# Patient Record
Sex: FEMALE | Race: BLACK OR AFRICAN AMERICAN | Employment: OTHER | URBAN - METROPOLITAN AREA
[De-identification: names, ages, dates, MRNs, and addresses within clinical notes are randomized per-mention and may not be internally consistent; named-entity substitution may affect disease eponyms.]

---

## 2017-01-27 DIAGNOSIS — Z74.09 IMPAIRED FUNCTIONAL MOBILITY, BALANCE, GAIT, AND ENDURANCE: ICD-10-CM

## 2017-01-27 DIAGNOSIS — Z91.81 RISK FOR FALLS: ICD-10-CM

## 2017-01-27 DIAGNOSIS — R26.9 GAIT ABNORMALITY: Primary | ICD-10-CM

## 2017-02-03 DIAGNOSIS — I10 ESSENTIAL HYPERTENSION WITH GOAL BLOOD PRESSURE LESS THAN 130/85: ICD-10-CM

## 2017-02-03 DIAGNOSIS — E78.2 MIXED HYPERLIPIDEMIA: ICD-10-CM

## 2017-02-06 RX ORDER — METOPROLOL TARTRATE 100 MG/1
TABLET ORAL
Qty: 90 TAB | Refills: 1 | Status: SHIPPED | OUTPATIENT
Start: 2017-02-06 | End: 2017-08-14 | Stop reason: SDUPTHER

## 2017-02-06 RX ORDER — FUROSEMIDE 20 MG/1
TABLET ORAL
Qty: 90 TAB | Refills: 1 | Status: SHIPPED | OUTPATIENT
Start: 2017-02-06 | End: 2017-08-14 | Stop reason: SDUPTHER

## 2017-02-06 RX ORDER — SIMVASTATIN 40 MG/1
TABLET, FILM COATED ORAL
Qty: 90 TAB | Refills: 1 | Status: SHIPPED | OUTPATIENT
Start: 2017-02-06 | End: 2017-12-05 | Stop reason: SDUPTHER

## 2017-02-10 ENCOUNTER — TELEPHONE (OUTPATIENT)
Dept: INTERNAL MEDICINE CLINIC | Age: 82
End: 2017-02-10

## 2017-02-10 DIAGNOSIS — E11.9 TYPE 2 DIABETES MELLITUS WITHOUT COMPLICATION, WITHOUT LONG-TERM CURRENT USE OF INSULIN (HCC): Primary | ICD-10-CM

## 2017-03-17 DIAGNOSIS — M17.0 PRIMARY OSTEOARTHRITIS OF BOTH KNEES: ICD-10-CM

## 2017-03-17 DIAGNOSIS — E11.9 TYPE 2 DIABETES MELLITUS WITHOUT COMPLICATION (HCC): ICD-10-CM

## 2017-03-17 RX ORDER — MELOXICAM 15 MG/1
TABLET ORAL
Qty: 90 TAB | Refills: 0 | Status: SHIPPED | OUTPATIENT
Start: 2017-03-17 | End: 2017-06-20 | Stop reason: SDUPTHER

## 2017-03-20 RX ORDER — SITAGLIPTIN AND METFORMIN HYDROCHLORIDE 50; 1000 MG/1; MG/1
TABLET, FILM COATED ORAL
Qty: 180 TAB | Refills: 0 | Status: SHIPPED | OUTPATIENT
Start: 2017-03-20 | End: 2017-08-25 | Stop reason: SDUPTHER

## 2017-05-02 DIAGNOSIS — F32.89 OTHER DEPRESSION: ICD-10-CM

## 2017-05-03 RX ORDER — PAROXETINE HYDROCHLORIDE 20 MG/1
TABLET, FILM COATED ORAL
Qty: 90 TAB | Refills: 0 | Status: SHIPPED | OUTPATIENT
Start: 2017-05-03 | End: 2017-10-02 | Stop reason: SDUPTHER

## 2017-05-09 ENCOUNTER — OFFICE VISIT (OUTPATIENT)
Dept: INTERNAL MEDICINE CLINIC | Age: 82
End: 2017-05-09

## 2017-05-09 VITALS
BODY MASS INDEX: 31.39 KG/M2 | RESPIRATION RATE: 20 BRPM | SYSTOLIC BLOOD PRESSURE: 148 MMHG | HEART RATE: 75 BPM | OXYGEN SATURATION: 96 % | HEIGHT: 67 IN | DIASTOLIC BLOOD PRESSURE: 73 MMHG | TEMPERATURE: 96.9 F | WEIGHT: 200 LBS

## 2017-05-09 DIAGNOSIS — F32.A DEPRESSION, UNSPECIFIED DEPRESSION TYPE: ICD-10-CM

## 2017-05-09 DIAGNOSIS — I10 ESSENTIAL HYPERTENSION: ICD-10-CM

## 2017-05-09 DIAGNOSIS — E11.9 TYPE 2 DIABETES MELLITUS WITHOUT COMPLICATION, WITHOUT LONG-TERM CURRENT USE OF INSULIN (HCC): Primary | ICD-10-CM

## 2017-05-09 DIAGNOSIS — I87.2 CHRONIC VENOUS INSUFFICIENCY: ICD-10-CM

## 2017-05-09 DIAGNOSIS — H61.23 EXCESSIVE CERUMEN IN BOTH EAR CANALS: ICD-10-CM

## 2017-05-09 DIAGNOSIS — E78.2 MIXED HYPERLIPIDEMIA: ICD-10-CM

## 2017-05-09 RX ORDER — DICYCLOMINE HYDROCHLORIDE 10 MG/1
10 CAPSULE ORAL 3 TIMES DAILY
COMMUNITY
Start: 2017-04-25 | End: 2018-03-09

## 2017-05-09 RX ORDER — MONTELUKAST SODIUM 4 MG/1
1 TABLET, CHEWABLE ORAL 2 TIMES DAILY
COMMUNITY
Start: 2017-04-25 | End: 2018-03-09

## 2017-05-09 NOTE — LETTER
5/16/2017 9:09 AM 
 
Ms. [facility] [address] [address] Dear [facility]: Please find your most recent results below. Resulted Orders CBC W/O DIFF Result Value Ref Range WBC 7.6 3.4 - 10.8 x10E3/uL  
 RBC 3.82 3.77 - 5.28 x10E6/uL HGB 10.8 (L) 11.1 - 15.9 g/dL HCT 35.0 34.0 - 46.6 % MCV 92 79 - 97 fL  
 MCH 28.3 26.6 - 33.0 pg  
 MCHC 30.9 (L) 31.5 - 35.7 g/dL  
 RDW 15.7 (H) 12.3 - 15.4 % PLATELET 667 529 - 264 x10E3/uL Narrative Performed at:  [address]  [id number] : Renate To MD, Phone:  [phone] METABOLIC PANEL, COMPREHENSIVE Result Value Ref Range Glucose 61 (L) 65 - 99 mg/dL BUN 15 8 - 27 mg/dL Creatinine 1.10 (H) 0.57 - 1.00 mg/dL GFR est non-AA 48 (L) >59 mL/min/1.73 GFR est AA 55 (L) >59 mL/min/1.73  
 BUN/Creatinine ratio 14 12 - 28 Sodium 145 (H) 134 - 144 mmol/L Potassium 4.6 3.5 - 5.2 mmol/L Chloride 101 96 - 106 mmol/L  
 CO2 22 18 - 29 mmol/L Calcium 9.3 8.7 - 10.3 mg/dL Protein, total 7.2 6.0 - 8.5 g/dL Albumin 4.4 3.5 - 4.8 g/dL GLOBULIN, TOTAL 2.8 1.5 - 4.5 g/dL A-G Ratio 1.6 1.2 - 2.2 Bilirubin, total <0.2 0.0 - 1.2 mg/dL Alk. phosphatase 49 39 - 117 IU/L  
 AST (SGOT) 18 0 - 40 IU/L  
 ALT (SGPT) 8 0 - 32 IU/L Narrative Performed at:  [address]  [id number] : Renate To MD, Phone:  [phone] TSH 3RD GENERATION Result Value Ref Range TSH 0.658 0.450 - 4.500 uIU/mL Narrative Performed at:  [address]  [id number] : Renate To MD, Phone:  [phone] MICROALBUMIN, UR, RAND W/ MICROALBUMIN/CREA RATIO Result Value Ref Range Creatinine, urine 103.1 Not Estab. mg/dL Microalbumin, urine 12.9 Not Estab. ug/mL Microalb/Creat ratio (ug/mg creat.) 12.5 0.0 - 30.0 mg/g creat Narrative Performed at:  [address]  [id number] : Stephani Olmedo MD, Phone:  [phone] CKD REPORT Result Value Ref Range Interpretation Note Comment:  
   Supplement report is available. Narrative Performed at:  [address] [place] [address]  [id number] : Ghazala Baxter PhD, Phone:  [phone] DIABETES PATIENT EDUCATION Result Value Ref Range PDF Image Not applicable Narrative Performed at:  [address] [place] [address]  [id number] : Ghazala Baxter PhD, Phone:  [phone] RECOMMENDATIONS: 
None. Keep up the good work! Please call me if you have any questions: [phone] Sincerely, Cristobal Hartmann NP

## 2017-05-09 NOTE — MR AVS SNAPSHOT
Visit Information Date & Time Provider Department Dept. Phone Encounter #  
 5/9/2017  1:00 PM Rashel Dillon, [address] [facility] Internal Medicine [phone] [id number] Upcoming Health Maintenance Date Due DTaP/Tdap/Td series (1 - Tdap) 8/13/1958 ZOSTER VACCINE AGE 60> 8/13/1997 HEMOGLOBIN A1C Q6M 12/10/2016 MICROALBUMIN Q1 2/13/2017 FOOT EXAM Q1 6/6/2017 MEDICARE YEARLY EXAM 6/7/2017 LIPID PANEL Q1 6/10/2017 EYE EXAM RETINAL OR DILATED Q1 7/25/2017 INFLUENZA AGE 9 TO ADULT 8/1/2017 Pneumococcal 65+ Low/Medium Risk (2 of 2 - PPSV23) 11/28/2017 GLAUCOMA SCREENING Q2Y 7/25/2018 COLONOSCOPY 4/17/2025 Allergies as of 5/9/2017  Review Complete On: 5/9/2017 By: Rashel Dillon NP No Known Allergies Current Immunizations  Reviewed on 11/28/2016 Name Date Influenza Vaccine 11/28/2016 Pneumococcal Conjugate (PCV-13) 11/28/2016 Not reviewed this visit You Were Diagnosed With   
  
 Codes Comments Type 2 diabetes mellitus without complication, without long-term current use of insulin (HCC)    -  Primary ICD-10-CM: E11.9 ICD-9-CM: 250.00 Essential hypertension     ICD-10-CM: I10 
ICD-9-CM: 401.9 Vitals BP Pulse Temp Resp Height(growth percentile) Weight(growth percentile) 148/73 75 96.9 °F (36.1 °C) (Oral) 20 5' 7\" (1.702 m) 200 lb (90.7 kg) SpO2 BMI OB Status Smoking Status 96% 31.32 kg/m2 Hysterectomy Never Smoker BMI and BSA Data Body Mass Index Body Surface Area  
 31.32 kg/m 2 2.07 m 2 Preferred Pharmacy Pharmacy Name Phone [address] [facility], [address] [address] [place] [address] [address] Your Updated Medication List  
  
   
This list is accurate as of: 5/9/17  1:52 PM.  Always use your most recent med list.  
  
  
  
  
 aspirin 81 mg chewable tablet Take 81 mg by mouth daily. colestipol 1 gram tablet Commonly known as:  COLESTID  
 Take 1 g by mouth two (2) times a day. dicyclomine 10 mg capsule Commonly known as:  BENTYL 10 mg three (3) times daily. furosemide 20 mg tablet Commonly known as:  LASIX Take 1 tablet by mouth  daily HumaLOG 100 unit/mL injection Generic drug:  insulin lispro 5 Units by SubCUTAneous route. Indications: sliding scale JANUMET 50-1,000 mg per tablet Generic drug:  SITagliptin-metFORMIN Take 1 tablet by mouth two  times daily with meals  
  
 meloxicam 15 mg tablet Commonly known as:  MOBIC Take 1 tablet by mouth  daily  
  
 metoprolol tartrate 100 mg IR tablet Commonly known as:  LOPRESSOR Take 1 tablet by mouth  daily OTHER Compression stockings knee thigh,large. please measure pt's calf PARoxetine 20 mg tablet Commonly known as:  PAXIL Take 1 tablet by mouth  every night at bedtime  
  
 raNITIdine 150 mg tablet Commonly known as:  ZANTAC Take 1 Tab by mouth nightly. simethicone 80 mg chewable tablet Commonly known as:  Laymon Captain Take 1 Tab by mouth two (2) times daily as needed for Flatulence. simvastatin 40 mg tablet Commonly known as:  ZOCOR Take 1 tablet by mouth  nightly VITAMIN D3 1,000 unit Cap Generic drug:  cholecalciferol Take 1,000 Units by mouth daily. * Dale Mering Commonly known as:  ULTRA-LIGHT ROLLATOR  
1 Device by Does Not Apply route daily as needed. * Dale Mering Commonly known as:  ULTRA-LIGHT ROLLATOR  
1 Units by Does Not Apply route daily as needed. * Notice: This list has 2 medication(s) that are the same as other medications prescribed for you. Read the directions carefully, and ask your doctor or other care provider to review them with you. We Performed the Following CBC W/O DIFF [57089 CPT(R)] METABOLIC PANEL, COMPREHENSIVE [45526 CPT(R)] MICROALBUMIN, UR, RAND W/ MICROALBUMIN/CREA RATIO [id number] CPT(R)] TSH 3RD GENERATION [33449 CPT(R)] Please provide this summary of care documentation to your next provider. Your primary care clinician is listed as Roberts Spatz. If you have any questions after today's visit, please call [phone].

## 2017-05-09 NOTE — PROGRESS NOTES
HISTORY OF PRESENT ILLNESS  Mehul Rhodes is a 80 y.o. female. This is a patient of Dr. Trevor Schwartz who presents today for follow-up. The patient's past medical history includes diabetes. She is followed by endocrinology every 6 months and states blood sugar has been under control. The patient is also followed by Dr. Bennett Baker related to IBS. She is taking Bentyl and Colestid, which have helped, she states. She describes regular bowel movements. The patient is generally feeling well at the time of today's visit. She denies chest pain, shortness of breath, dizziness, and GI/ problems at this time. She does describe some decreased hearing and occasional ringing in ears. Visit Vitals    /73    Pulse 75    Temp 96.9 °F (36.1 °C) (Oral)    Resp 20    Ht 5' 7\" (1.702 m)    Wt 200 lb (90.7 kg)    SpO2 96%    BMI 31.32 kg/m2     HPI    Review of Systems   Constitutional: Negative for chills and fever. HENT: Positive for hearing loss and tinnitus. Negative for congestion and ear pain. Eyes: Negative for blurred vision. Respiratory: Negative for cough, shortness of breath and wheezing. Cardiovascular: Negative for chest pain, palpitations and leg swelling. Gastrointestinal: Negative for abdominal pain, constipation and diarrhea. Genitourinary: Negative. Musculoskeletal: Negative. Skin: Negative. Neurological: Negative for dizziness and headaches. Endo/Heme/Allergies: Negative. Psychiatric/Behavioral: Negative. Physical Exam   Constitutional: She is oriented to person, place, and time. She appears well-developed and well-nourished. No distress. HENT:   Head: Normocephalic and atraumatic. Mouth/Throat: Oropharynx is clear and moist.   Cerumen noted in bilateral ear canals- partially blocking TM   Eyes: Conjunctivae are normal.   Neck: Neck supple. Cardiovascular: Normal rate, regular rhythm, normal heart sounds and intact distal pulses.     Pulmonary/Chest: Effort normal and breath sounds normal. No respiratory distress. She has no wheezes. She has no rales. Abdominal: Soft. Bowel sounds are normal. She exhibits no distension. There is no tenderness. Musculoskeletal: She exhibits no edema. Neurological: She is alert and oriented to person, place, and time. Skin: Skin is warm and dry. Psychiatric: She has a normal mood and affect. Her behavior is normal.   Nursing note and vitals reviewed. ASSESSMENT and PLAN    ICD-10-CM ICD-9-CM    1. Type 2 diabetes mellitus without complication, without long-term current use of insulin (HCC) E11.9 250.00 Will order  MICROALBUMIN, UR, RAND W/ MICROALBUMIN/CREA RATIO  Continue to follow with endocrinology   2. Essential hypertension I10 401.9 Stable. BP in office 148/73  Will order  CBC W/O DIFF      METABOLIC PANEL, COMPREHENSIVE      TSH 3RD GENERATION   3. Mixed hyperlipidemia E78.2 272.2 Taking Simvastatin 40 mg nightly   4. Chronic venous insufficiency I87.2 459.81 Taking Lasix 20 mg daily   5. Depression, unspecified depression type F32.9 311 Taking Paxil 20 mg nightly   6. Excessive cerumen in both ear canals H61.23 380.4 Ear lavage in office- patient tolerated well. Lab results and schedule of future lab studies reviewed with patient  Reviewed diet, exercise and weight control  Reviewed medications and side effects in detail  Patient encouraged to call or return to office if symptoms do not improve or worsen. Reviewed plan of care with patient who acknowledges understanding and agrees. Follow-up with Dr. Violet Flores in 6 months, or sooner as needed.

## 2017-05-09 NOTE — PROGRESS NOTES
Chief Complaint   Patient presents with    Follow Up Chronic Condition     Reviewed record  In preparation for visit and have obtained necessary documentation. 1. Have you been to the ER, urgent care clinic since your last visit? Hospitalized since your last visit? No    2. Have you seen or consulted any other health care providers outside of the [address] [place] [address] since your last visit? Include any pap smears or colon screening. No   Patient is accompanied by her daughter, Robbie Salguero.     Used 2 patient I. D. 's

## 2017-05-10 LAB
ALBUMIN SERPL-MCNC: 4.4 G/DL (ref 3.5–4.8)
ALBUMIN/CREAT UR: 12.5 MG/G CREAT (ref 0–30)
ALBUMIN/GLOB SERPL: 1.6 {RATIO} (ref 1.2–2.2)
ALP SERPL-CCNC: 49 IU/L (ref 39–117)
ALT SERPL-CCNC: 8 IU/L (ref 0–32)
AST SERPL-CCNC: 18 IU/L (ref 0–40)
BILIRUB SERPL-MCNC: <0.2 MG/DL (ref 0–1.2)
BUN SERPL-MCNC: 15 MG/DL (ref 8–27)
BUN/CREAT SERPL: 14 (ref 12–28)
CALCIUM SERPL-MCNC: 9.3 MG/DL (ref 8.7–10.3)
CHLORIDE SERPL-SCNC: 101 MMOL/L (ref 96–106)
CO2 SERPL-SCNC: 22 MMOL/L (ref 18–29)
CREAT SERPL-MCNC: 1.1 MG/DL (ref 0.57–1)
CREAT UR-MCNC: 103.1 MG/DL
ERYTHROCYTE [DISTWIDTH] IN BLOOD BY AUTOMATED COUNT: 15.7 % (ref 12.3–15.4)
GLOBULIN SER CALC-MCNC: 2.8 G/DL (ref 1.5–4.5)
GLUCOSE SERPL-MCNC: 61 MG/DL (ref 65–99)
HCT VFR BLD AUTO: 35 % (ref 34–46.6)
HGB BLD-MCNC: 10.8 G/DL (ref 11.1–15.9)
INTERPRETATION: NORMAL
Lab: NORMAL
MCH RBC QN AUTO: 28.3 PG (ref 26.6–33)
MCHC RBC AUTO-ENTMCNC: 30.9 G/DL (ref 31.5–35.7)
MCV RBC AUTO: 92 FL (ref 79–97)
MICROALBUMIN UR-MCNC: 12.9 UG/ML
PLATELET # BLD AUTO: 266 X10E3/UL (ref 150–379)
POTASSIUM SERPL-SCNC: 4.6 MMOL/L (ref 3.5–5.2)
PROT SERPL-MCNC: 7.2 G/DL (ref 6–8.5)
RBC # BLD AUTO: 3.82 X10E6/UL (ref 3.77–5.28)
SODIUM SERPL-SCNC: 145 MMOL/L (ref 134–144)
TSH SERPL DL<=0.005 MIU/L-ACNC: 0.66 UIU/ML (ref 0.45–4.5)
WBC # BLD AUTO: 7.6 X10E3/UL (ref 3.4–10.8)

## 2017-05-22 ENCOUNTER — TELEPHONE (OUTPATIENT)
Dept: INTERNAL MEDICINE CLINIC | Age: 82
End: 2017-05-22

## 2017-05-22 NOTE — TELEPHONE ENCOUNTER
Patient called to see what she needed to do if anything to get her disposable pampers at a whole sale facility or if they have to be bought at a drugstore

## 2017-05-23 NOTE — TELEPHONE ENCOUNTER
Call placed to pt and asked if there was anyway that her insurance would pay for adult undergarments, writer advised to contact her insurance to see what steps that insurance requires them to take, voiced understanding

## 2017-06-20 DIAGNOSIS — M17.0 PRIMARY OSTEOARTHRITIS OF BOTH KNEES: ICD-10-CM

## 2017-06-20 RX ORDER — MELOXICAM 15 MG/1
TABLET ORAL
Qty: 90 TAB | Refills: 1 | Status: SHIPPED | OUTPATIENT
Start: 2017-06-20 | End: 2017-12-05 | Stop reason: SDUPTHER

## 2017-08-14 ENCOUNTER — PATIENT OUTREACH (OUTPATIENT)
Dept: INTERNAL MEDICINE CLINIC | Age: 82
End: 2017-08-14

## 2017-08-14 DIAGNOSIS — I10 ESSENTIAL HYPERTENSION WITH GOAL BLOOD PRESSURE LESS THAN 130/85: ICD-10-CM

## 2017-08-14 NOTE — PROGRESS NOTES
E-mail received from virtual team member in regards to scheduling patient for AWV prior to September 30th. Several attempts to contact made by Letart Amiigo with no success. Writer is attempt contact and/or let Letart Buzz Referrals [facility] know if comes into contact as the list is reportable for the Medical Group. Email was forwarded to manager Corinna Rueda to arrange for pt to be contacted and scheduled for AWV with Shemar Trent NP if pt willing to do so. Typically pt sees PCP every 6 months in May and November. Pt also sees endocrinology for DM every 6 months and GI for IBS.

## 2017-08-15 RX ORDER — METOPROLOL TARTRATE 100 MG/1
TABLET ORAL
Qty: 90 TAB | Refills: 1 | Status: SHIPPED | OUTPATIENT
Start: 2017-08-15 | End: 2018-01-23 | Stop reason: SDUPTHER

## 2017-08-15 RX ORDER — FUROSEMIDE 20 MG/1
TABLET ORAL
Qty: 90 TAB | Refills: 1 | Status: SHIPPED | OUTPATIENT
Start: 2017-08-15 | End: 2017-11-09 | Stop reason: SDUPTHER

## 2017-08-25 DIAGNOSIS — E11.9 TYPE 2 DIABETES MELLITUS WITHOUT COMPLICATION (HCC): ICD-10-CM

## 2017-08-28 RX ORDER — SITAGLIPTIN AND METFORMIN HYDROCHLORIDE 50; 1000 MG/1; MG/1
TABLET, FILM COATED ORAL
Qty: 180 TAB | Refills: 5 | Status: SHIPPED | OUTPATIENT
Start: 2017-08-28 | End: 2020-12-14 | Stop reason: SDUPTHER

## 2017-10-02 DIAGNOSIS — F32.89 OTHER DEPRESSION: ICD-10-CM

## 2017-10-02 RX ORDER — PAROXETINE HYDROCHLORIDE 20 MG/1
TABLET, FILM COATED ORAL
Qty: 90 TAB | Refills: 1 | Status: SHIPPED | OUTPATIENT
Start: 2017-10-02 | End: 2018-01-23 | Stop reason: SDUPTHER

## 2017-11-09 ENCOUNTER — OFFICE VISIT (OUTPATIENT)
Dept: INTERNAL MEDICINE CLINIC | Age: 82
End: 2017-11-09

## 2017-11-09 VITALS
SYSTOLIC BLOOD PRESSURE: 166 MMHG | HEART RATE: 71 BPM | TEMPERATURE: 96.7 F | DIASTOLIC BLOOD PRESSURE: 71 MMHG | WEIGHT: 193 LBS | OXYGEN SATURATION: 97 % | RESPIRATION RATE: 20 BRPM | BODY MASS INDEX: 30.29 KG/M2 | HEIGHT: 67 IN

## 2017-11-09 DIAGNOSIS — Z00.00 MEDICARE ANNUAL WELLNESS VISIT, SUBSEQUENT: ICD-10-CM

## 2017-11-09 DIAGNOSIS — I10 ESSENTIAL HYPERTENSION: ICD-10-CM

## 2017-11-09 DIAGNOSIS — R26.9 GAIT DIFFICULTY: ICD-10-CM

## 2017-11-09 DIAGNOSIS — Z23 ENCOUNTER FOR IMMUNIZATION: ICD-10-CM

## 2017-11-09 DIAGNOSIS — E11.9 TYPE 2 DIABETES MELLITUS WITHOUT COMPLICATION, WITHOUT LONG-TERM CURRENT USE OF INSULIN (HCC): Primary | ICD-10-CM

## 2017-11-09 DIAGNOSIS — Z86.73 H/O: STROKE: ICD-10-CM

## 2017-11-09 RX ORDER — FUROSEMIDE 20 MG/1
TABLET ORAL
Qty: 45 TAB | Refills: 2 | Status: ON HOLD | OUTPATIENT
Start: 2017-11-09 | End: 2018-06-12

## 2017-11-09 NOTE — PATIENT INSTRUCTIONS
Schedule of Personalized Health Plan  (Provide Copy to Patient)  The best way to stay healthy is to live a healthy lifestyle. A healthy lifestyle includes regular exercise, eating a well-balanced diet, keeping a healthy weight and not smoking. Regular physical exams and screening tests are another important way to take care of yourself. Preventive exams provided by health care providers can find health problems early when treatment works best and can keep you from getting certain diseases or illnesses. Preventive services include exams, lab tests, screenings, shots, monitoring and information to help you take care of your own health. All people over 65 should have a pneumonia shot. Pneumonia shots are usually only needed once in a lifetime unless your doctor decides differently. Up to date    All people over 72 should have a yearly flu shot.will give. People over 65 are at medium to high risk for Hepatitis B. Three shots are needed for complete protection. In addition to your physical exam, some screening tests are recommended:    Bone mass measurement (dexa scan) is recommended every two years  Diabetes Mellitus screening is recommended every year. Glaucoma is an eye disease caused by high pressure in the eye. An eye exam is recommended every year. Cardiovascular screening tests that check your cholesterol and other blood fat (lipid) levels are recommended every five years. Colorectal Cancer screening tests help to find pre-cancerous polyps (growths in the colon) so they can be removed before they turn into cancer. Tests ordered for screening depend on your personal and family history risk factors. Screening for Breast Cancer is recommended yearly with a mammogram.N/A    Screening for Cervical Cancer is recommended every two years (annually for certain risk factors, such as previous history of STD or abnormal PAP in past 7 years), with a Pelvic Exam with PAP. N/A    Here is a list of your current Health Maintenance items with a due date:  Health Maintenance   Topic Date Due    DTaP/Tdap/Td series (1 - Tdap) 08/13/1958    ZOSTER VACCINE AGE 60>  06/13/1997    FOOT EXAM Q1  06/06/2017    EYE EXAM RETINAL OR DILATED Q1  07/25/2017    Influenza Age 5 to Adult  08/01/2017    Pneumococcal 65+ Low/Medium Risk (2 of 2 - PPSV23) 11/28/2017    HEMOGLOBIN A1C Q6M  02/16/2018    MICROALBUMIN Q1  05/09/2018    GLAUCOMA SCREENING Q2Y  07/25/2018    LIPID PANEL Q1  08/16/2018    MEDICARE YEARLY EXAM  11/10/2018    COLONOSCOPY  04/17/2025    OSTEOPOROSIS SCREENING (DEXA)  Completed          Pneumococcal Polysaccharide Vaccine: What You Need to Know  Why get vaccinated? Vaccination can protect older adults (and some children and younger adults) from pneumococcal disease. Pneumococcal disease is caused by bacteria that can spread from person to person through close contact. It can cause ear infections, and it can also lead to more serious infections of the:  · Lungs (pneumonia),  · Blood (bacteremia), and  · Covering of the brain and spinal cord (meningitis). Meningitis can cause deafness and brain damage, and it can be fatal.  Anyone can get pneumococcal disease, but children under 3years of age, people with certain medical conditions, adults over 72years of age, and cigarette smokers are at the highest risk. About 18,000 older adults die each year from pneumococcal disease in the [place]. Treatment of pneumococcal infections with penicillin and other drugs used to be more effective. But some strains of the disease have become resistant to these drugs. This makes prevention of the disease, through vaccination, even more important. Pneumococcal polysaccharide vaccine (PPSV23)  Pneumococcal polysaccharide vaccine (PPSV23) protects against 23 types of pneumococcal bacteria. It will not prevent all pneumococcal disease.   PPSV23 is recommended for:  · All adults 72years of age and older,  · Anyone 2 through 59years of age with certain long-term health problems,  · Anyone 2 through 59years of age with a weakened immune system,  · Adults 23 through 59years of age who smoke cigarettes or have asthma. Most people need only one dose of PPSV. A second dose is recommended for certain high-risk groups. People 72 and older should get a dose even if they have gotten one or more doses of the vaccine before they turned 65. Your healthcare provider can give you more information about these recommendations. Most healthy adults develop protection within 2 to 3 weeks of getting the shot. Some people should not get this vaccine  · Anyone who has had a life-threatening allergic reaction to PPSV should not get another dose. · Anyone who has a severe allergy to any component of PPSV should not receive it. Tell your provider if you have any severe allergies. · Anyone who is moderately or severely ill when the shot is scheduled may be asked to wait until they recover before getting the vaccine. Someone with a mild illness can usually be vaccinated. · Children less than 3years of age should not receive this vaccine. · There is no evidence that PPSV is harmful to either a pregnant woman or to her fetus. However, as a precaution, women who need the vaccine should be vaccinated before becoming pregnant, if possible. Risks of a vaccine reaction  With any medicine, including vaccines, there is a chance of side effects. These are usually mild and go away on their own, but serious reactions are also possible. About half of people who get PPSV have mild side effects, such as redness or pain where the shot is given, which go away within about two days. Less than 1 out of 100 people develop a fever, muscle aches, or more severe local reactions. Problems that could happen after any vaccine:  · People sometimes faint after a medical procedure, including vaccination.  Sitting or lying down for about 15 minutes can help prevent fainting, and injuries caused by a fall. Tell your doctor if you feel dizzy, or have vision changes or ringing in the ears. · Some people get severe pain in the shoulder and have difficulty moving the arm where a shot was given. This happens very rarely. · Any medication can cause a severe allergic reaction. Such reactions from a vaccine are very rare, estimated at about 1 in a million doses, and would happen within a few minutes to a few hours after the vaccination. As with any medicine, there is a very remote chance of a vaccine causing a serious injury or death. The safety of vaccines is always being monitored. For more information, visit: www.cdc.gov/vaccinesafety/  What if there is a serious reaction? What should I look for? Look for anything that concerns you, such as signs of a severe allergic reaction, very high fever, or unusual behavior. Signs of a severe allergic reaction can include hives, swelling of the face and throat, difficulty breathing, a fast heartbeat, dizziness, and weakness. These would usually start a few minutes to a few hours after the vaccination. What should I do? If you think it is a severe allergic reaction or other emergency that can't wait, call 9-1-1 or get to the nearest hospital. Otherwise, call your doctor. Afterward, the reaction should be reported to the Vaccine Adverse Event Reporting System (VAERS). Your doctor might file this report, or you can do it yourself through the VAERS web site at www.vaers. hhs.gov, or by calling 9-217.612.5306. VAERS does not give medical advice. How can I learn more? · Ask your doctor. He or she can give you the vaccine package insert or suggest other sources of information. · Call your local or state health department.   · Contact the Centers for Disease Control and Prevention (CDC):  ¨ Call 5-299.638.4368 (1-800-CDC-INFO) or  ¨ Visit CDC's website at www.cdc.gov/vaccines  Vaccine Information Statement  PPSV Vaccine  (04/24/2015)  Department of Health and Human Services  Centers for Disease Control and Prevention  Many Vaccine Information Statements are available in Khmer and other languages. See www.immunize.org/vis. Hojas de información Sobre Vacunas están disponibles en español y en muchos otros idiomas. Visite Sonia.si. Care instructions adapted under license by [employer] (which disclaims liability or warranty for this information). If you have questions about a medical condition or this instruction, always ask your healthcare professional. [address] any warranty or liability for your use of this information.

## 2017-11-09 NOTE — PROGRESS NOTES
Maryan Carey is a [de-identified] y.o. female  who presents for routine immunizations. She denies any symptoms , reactions or allergies that would exclude them from being immunized today. Risks and adverse reactions were discussed and the VIS was given to them. All questions were addressed. She was observed for 10 min post injection. There were no reactions observed.     Melisa Husain LPN

## 2017-11-09 NOTE — MR AVS SNAPSHOT
Visit Information Date & Time Provider Department Dept. Phone Encounter #  
 11/9/2017  2:45 PM Sami Thorpe, [address] [facility] Internal Medicine [phone] [id number] Follow-up Instructions Return in about 4 months (around 3/9/2018). Upcoming Health Maintenance Date Due DTaP/Tdap/Td series (1 - Tdap) 8/13/1958 ZOSTER VACCINE AGE 60> 6/13/1997 EYE EXAM RETINAL OR DILATED Q1 7/25/2017 Influenza Age 5 to Adult 8/1/2017 Pneumococcal 65+ Low/Medium Risk (2 of 2 - PPSV23) 11/28/2017 HEMOGLOBIN A1C Q6M 2/16/2018 MICROALBUMIN Q1 5/9/2018 GLAUCOMA SCREENING Q2Y 7/25/2018 LIPID PANEL Q1 8/16/2018 FOOT EXAM Q1 11/9/2018 MEDICARE YEARLY EXAM 11/10/2018 COLONOSCOPY 4/17/2025 Allergies as of 11/9/2017  Review Complete On: 11/9/2017 By: Sami Thorpe MD  
 No Known Allergies Current Immunizations  Reviewed on 11/9/2017 Name Date Influenza High Dose Vaccine PF 11/9/2017 Influenza Vaccine 11/28/2016 Pneumococcal Conjugate (PCV-13) 11/28/2016 Pneumococcal Polysaccharide (PPSV-23)  Incomplete Reviewed by Sami Thorpe MD on 11/9/2017 at  3:32 PM  
 Reviewed by Jimmy Spencer LPN on 67/9/6042 at  4:05 PM  
You Were Diagnosed With   
  
 Codes Comments Type 2 diabetes mellitus without complication, without long-term current use of insulin (HCC)    -  Primary ICD-10-CM: E11.9 ICD-9-CM: 250.00 Essential hypertension     ICD-10-CM: I10 
ICD-9-CM: 401.9 Gait difficulty     ICD-10-CM: R26.9 ICD-9-CM: 781.2 H/O: stroke     ICD-10-CM: Z86.73 
ICD-9-CM: V12.54 Encounter for immunization     ICD-10-CM: V42 ICD-9-CM: V03.89 Medicare annual wellness visit, subsequent     ICD-10-CM: Z00.00 ICD-9-CM: V70.0 Vitals BP Pulse Temp Resp Height(growth percentile) Weight(growth percentile) 166/71 (BP 1 Location: Left arm, BP Patient Position: Sitting) 71 96.7 °F (35.9 °C) (Oral) 20 5' 7\" (1.702 m) 193 lb (87.5 kg) SpO2 BMI OB Status Smoking Status 97% 30.23 kg/m2 Hysterectomy Never Smoker BMI and BSA Data Body Mass Index Body Surface Area  
 30.23 kg/m 2 2.03 m 2 Preferred Pharmacy Pharmacy Name Phone [facility] [address] [place], [place] - [address] [place] [address] Raeann Snow [phone] Your Updated Medication List  
  
   
This list is accurate as of: 11/9/17  4:09 PM.  Always use your most recent med list.  
  
  
  
  
 aspirin 81 mg chewable tablet Take 81 mg by mouth daily. colestipol 1 gram tablet Commonly known as:  COLESTID Take 1 g by mouth two (2) times a day. dicyclomine 10 mg capsule Commonly known as:  BENTYL 10 mg three (3) times daily. furosemide 20 mg tablet Commonly known as:  LASIX  
1 tab po QOD HumaLOG 100 unit/mL injection Generic drug:  insulin lispro 5 Units by SubCUTAneous route. Indications: sliding scale JANUMET 50-1,000 mg per tablet Generic drug:  SITagliptin-metFORMIN Take 1 tablet by mouth two  times daily with meals  
  
 meloxicam 15 mg tablet Commonly known as:  MOBIC Take 1 tablet by mouth  daily  
  
 metoprolol tartrate 100 mg IR tablet Commonly known as:  LOPRESSOR Take 1 tablet by mouth  daily OTHER Compression stockings knee thigh,large. please measure pt's calf PARoxetine 20 mg tablet Commonly known as:  PAXIL TAKE 1 TABLET BY MOUTH  EVERY NIGHT AT BEDTIME  
  
 raNITIdine 150 mg tablet Commonly known as:  ZANTAC Take 1 Tab by mouth nightly. simethicone 80 mg chewable tablet Commonly known as:  Dorlene Joselyn Take 1 Tab by mouth two (2) times daily as needed for Flatulence. simvastatin 40 mg tablet Commonly known as:  ZOCOR Take 1 tablet by mouth  nightly  
  
 varicella zoster vacine live 19,400 unit/0.65 mL Susr injection Commonly known as:  ZOSTAVAX  
1 Vial by SubCUTAneous route once for 1 dose. VITAMIN D3 1,000 unit Cap Generic drug:  cholecalciferol Take 1,000 Units by mouth daily. * Pollyann Ou Commonly known as:  ULTRA-LIGHT ROLLATOR  
1 Device by Does Not Apply route daily as needed. * Pollyann Ou Commonly known as:  ULTRA-LIGHT ROLLATOR  
1 Units by Does Not Apply route daily as needed. * Notice: This list has 2 medication(s) that are the same as other medications prescribed for you. Read the directions carefully, and ask your doctor or other care provider to review them with you. Prescriptions Sent to Pharmacy Refills  
 furosemide (LASIX) 20 mg tablet 2 Si tab po QOD Class: Normal  
 Pharmacy: [facility], [address] Ph #: [phone]  
 varicella zoster vacine live (ZOSTAVAX) 19,400 unit/0.65 mL susr injection 0 Si Vial by SubCUTAneous route once for 1 dose. Class: Normal  
 Pharmacy: [address], [address] [place] [address] Ph #: [phone] Route: SubCUTAneous We Performed the Following HEMOGLOBIN A1C WITH EAG [37574 CPT(R)] INFLUENZA VIRUS VACCINE, HIGH DOSE SEASONAL, PRESERVATIVE FREE [82884 CPT(R)] METABOLIC PANEL, COMPREHENSIVE [18019 CPT(R)] MICROALBUMIN, UR, RAND C9125659 CPT(R)] PNEUMOCOCCAL POLYSACCHARIDE VACCINE, 23-VALENT, ADULT OR IMMUNOSUPPRESSED PT DOSE, [36799 CPT(R)] REFERRAL TO PHYSICAL THERAPY [LUZ18 Custom] Comments: For gait and balance therapy Follow-up Instructions Return in about 4 months (around 3/9/2018). Referral Information Referral ID Referred By Referred To  
  
 [id number] [employer] Not Available Visits Status Start Date End Date 1 Open 17 If your referral has a status of pending review or denied, additional information will be sent to support the outcome of this decision. Patient Instructions Schedule of Personalized Health Plan (Provide Copy to Patient) The best way to stay healthy is to live a healthy lifestyle. A healthy lifestyle includes regular exercise, eating a well-balanced diet, keeping a healthy weight and not smoking. Regular physical exams and screening tests are another important way to take care of yourself. Preventive exams provided by health care providers can find health problems early when treatment works best and can keep you from getting certain diseases or illnesses. Preventive services include exams, lab tests, screenings, shots, monitoring and information to help you take care of your own health. All people over 65 should have a pneumonia shot. Pneumonia shots are usually only needed once in a lifetime unless your doctor decides differently. Up to date All people over 65 should have a yearly flu shot.will give. People over 65 are at medium to high risk for Hepatitis B. Three shots are needed for complete protection. In addition to your physical exam, some screening tests are recommended: 
 
Bone mass measurement (dexa scan) is recommended every two years Diabetes Mellitus screening is recommended every year. Glaucoma is an eye disease caused by high pressure in the eye. An eye exam is recommended every year. Cardiovascular screening tests that check your cholesterol and other blood fat (lipid) levels are recommended every five years. Colorectal Cancer screening tests help to find pre-cancerous polyps (growths in the colon) so they can be removed before they turn into cancer. Tests ordered for screening depend on your personal and family history risk factors. Screening for Breast Cancer is recommended yearly with a mammogram.N/A Screening for Cervical Cancer is recommended every two years (annually for certain risk factors, such as previous history of STD or abnormal PAP in past 7 years), with a Pelvic Exam with PAP. N/A Here is a list of your current Health Maintenance items with a due date: Health Maintenance Topic Date Due  
 DTaP/Tdap/Td series (1 - Tdap) 08/13/1958  ZOSTER VACCINE AGE 60>  06/13/1997  
 FOOT EXAM Q1  06/06/2017  
 EYE EXAM RETINAL OR DILATED Q1  07/25/2017  Influenza Age 5 to Adult  08/01/2017  Pneumococcal 65+ Low/Medium Risk (2 of 2 - PPSV23) 11/28/2017  
 HEMOGLOBIN A1C Q6M  02/16/2018  MICROALBUMIN Q1  05/09/2018  GLAUCOMA SCREENING Q2Y  07/25/2018  LIPID PANEL Q1  08/16/2018  MEDICARE YEARLY EXAM  11/10/2018  COLONOSCOPY  04/17/2025  
 OSTEOPOROSIS SCREENING (DEXA)  Completed Please provide this summary of care documentation to your next provider. Your primary care clinician is listed as Angelina Gonzalez. If you have any questions after today's visit, please call [phone].

## 2017-11-09 NOTE — PROGRESS NOTES
HISTORY OF PRESENT ILLNESS  Reyes Black is a [de-identified] y.o. female here for follow up. She is seen for diabetes. Anupam Frey He reports medication compliance: compliant all of the time. Diabetic diet compliance: compliant most of the time. Home glucose monitoring: is not performed. Further diabetic ROS: no polyuria or polydipsia, no chest pain, dyspnea or TIA's, no numbness, tingling or pain in extremities, no hypoglycemia. Lab review: labs reviewed, I note that glycosylated hemoglobin normallabs reviewed and discussed with patient. Eye exam:up to date. Has chronic venous insufficiency. Using Lasix every day. Lost 7 pounds weight. Has no heart failure no shortness of breath or orthopnea. has HTN and elevated lipid. on meds. no chest pain or palpitation. Reports gait weakness and balance problem. Need physical therapy. Need pneumonia and flu shot and shingles shot. Here for Medicare wellness visit. Has no living will. Anxiety   Pertinent negatives include no chest pain. Immunization/Injection   Pertinent negatives include no chest pain. Diabetes   Pertinent negatives include no chest pain. Hypertension    Associated symptoms include anxiety. Pertinent negatives include no chest pain, no blurred vision and no nausea. Follow-up   Pertinent negatives include no chest pain. Weight Loss   Pertinent negatives include no chest pain. Review of Systems   Constitutional: Negative. Negative for chills and fever. HENT: Negative. Eyes: Negative. Negative for blurred vision and double vision. Respiratory: Negative. Cardiovascular: Negative. Negative for chest pain. Gastrointestinal: Negative for heartburn and nausea. Musculoskeletal: Positive for back pain. Negative for falls. Skin: Negative. Neurological: Negative. Psychiatric/Behavioral: Negative. Physical Exam   Constitutional: She appears well-developed and well-nourished. No distress. Neck: Normal range of motion.  Neck supple. No JVD present. No thyromegaly present. Cardiovascular: Normal rate, regular rhythm, normal heart sounds and intact distal pulses. Pulmonary/Chest: Effort normal. No respiratory distress. She has no wheezes. She has rales. Musculoskeletal: She exhibits edema. She exhibits no tenderness. ch leg edema. Diabetic foot exam:     Left: Reflexes 2+     Vibratory sensation normal    Proprioception normal   Sharp/dull discrimination normal    Filament test normal sensation with micro filament   Pulse DP: 1+ (weak)   Pulse PT: 1+ (weak)   Deformities: None  Right: Reflexes 2+   Vibratory sensation normal   Proprioception normal   Sharp/dull discrimination normal   Filament test normal sensation with micro filament   Pulse DP: 1+ (weak)   Pulse PT: 1+ (weak)   Deformities: None   Psychiatric: She has a normal mood and affect. Her behavior is normal.       ASSESSMENT and PLAN  Diagnoses and all orders for this visit:    1. Type 2 diabetes mellitus without complication, without long-term current use of insulin (HCC)    Stable. On Janumet twice a day. Last A1c was normal.    Will check,    -     METABOLIC PANEL, COMPREHENSIVE  -     HEMOGLOBIN A1C WITH EAG  -     MICROALBUMIN, UR, RAND    2. Essential hypertension    Stable on current regimen. Will check,  -     METABOLIC PANEL, COMPREHENSIVE    3. Gait difficulty  Use quad cane. Need more gait and balance therapy. Will refer her,  -     REFERRAL TO PHYSICAL THERAPY    4. H/O: stroke  -     REFERRAL TO PHYSICAL THERAPY    5. Encounter for immunization  -     INFLUENZA VIRUS VACCINE, HIGH DOSE SEASONAL, PRESERVATIVE FREE  -     PNEUMOCOCCAL POLYSACCHARIDE VACCINE, 23-VALENT, ADULT OR IMMUNOSUPPRESSED PT DOSE,  -     varicella zoster vacine live (ZOSTAVAX) 19,400 unit/0.65 mL susr injection; 1 Vial by SubCUTAneous route once for 1 dose. 6. Medicare annual wellness visit, subsequent    7. Chronic venous insufficiency  Need to be on stockings.   Leg elevation. Lost 7 pounds weight. Will change to,  -     furosemide (LASIX) 20 mg tablet; 1 tab po QOD        Discussed expected course/resolution/complications of diagnosis in detail with patient. Medication risks/benefits/costs/interactions/alternatives discussed with patient. Pt was given an after visit summary which includes diagnoses, current medications & vitals. Pt expressed understanding with the diagnosis and plan.

## 2017-11-09 NOTE — PROGRESS NOTES
Virgen Alberto is a [de-identified] y.o. female and presents for annual Medicare Wellness Visit. Problem List: Reviewed with patient and discussed risk factors. Patient Active Problem List   Diagnosis Code    DM (diabetes mellitus) ([facility] [address] E11.9    Mixed hyperlipidemia E78.2    Chronic venous insufficiency I87.2    Depression F32.9    Stomach ulcer K25.9    Diverticula of colon K57.30    Advance care planning Z71.89    Essential hypertension I10    H/O: stroke Z86.73       Current medical providers:  Patient Care Team:  Julisa Raya MD as PCP - General (Internal Medicine)  Julisa Raya MD (Internal Medicine)  Jhonny Mas MD as Consulting Provider (Internal Medicine)  Jeff Woods DPM (Podiatry)  Vidhi Teague MD (Gastroenterology)  Lawyer Key DO (Ophthalmology)  Genaro Rose RN as Nurse Navigator (Internal Medicine)    PSH: Reviewed with patient  Past Surgical History:   Procedure Laterality Date    HX CARPAL TUNNEL RELEASE  1990    HX CATARACT REMOVAL      bilateral    HX HYSTERECTOMY  1986    HX HYSTERECTOMY      HX ORTHOPAEDIC      HX ORTHOPAEDIC      carpel tunnel left    HX ORTHOPAEDIC      left foot heel spur    HX REFRACTIVE SURGERY  2006        SH: Reviewed with patient  Social History   Substance Use Topics    Smoking status: Never Smoker    Smokeless tobacco: Never Used    Alcohol use No       FH: Reviewed with patient  History reviewed. No pertinent family history.     Medications/Allergies: Reviewed with patient  Current Outpatient Prescriptions on File Prior to Visit   Medication Sig Dispense Refill    PARoxetine (PAXIL) 20 mg tablet TAKE 1 TABLET BY MOUTH  EVERY NIGHT AT BEDTIME 90 Tab 1    JANUMET 50-1,000 mg per tablet Take 1 tablet by mouth two  times daily with meals 180 Tab 5    metoprolol tartrate (LOPRESSOR) 100 mg IR tablet Take 1 tablet by mouth  daily 90 Tab 1    meloxicam (MOBIC) 15 mg tablet Take 1 tablet by mouth  daily 90 Tab 1    simvastatin (ZOCOR) 40 mg tablet Take 1 tablet by mouth  nightly 90 Tab 1    Walker (ULTRA-LIGHT ROLLATOR) misc 1 Units by Does Not Apply route daily as needed. 1 Each 0    Walker (ULTRA-LIGHT ROLLATOR) misc 1 Device by Does Not Apply route daily as needed. 1 Each 0    cholecalciferol (VITAMIN D3) 1,000 unit cap Take 1,000 Units by mouth daily.  simethicone (MYLICON) 80 mg chewable tablet Take 1 Tab by mouth two (2) times daily as needed for Flatulence. 60 Tab 0    ranitidine (ZANTAC) 150 mg tablet Take 1 Tab by mouth nightly. (Patient taking differently: Take 1 Tab by mouth two (2) times a day.) 90 Tab 3    insulin lispro (HUMALOG) 100 unit/mL injection 5 Units by SubCUTAneous route. Indications: sliding scale      aspirin 81 mg chewable tablet Take 81 mg by mouth daily.  OTHER Compression stockings knee thigh,large. please measure pt's calf 2 Each o    dicyclomine (BENTYL) 10 mg capsule 10 mg three (3) times daily.  colestipol (COLESTID) 1 gram tablet Take 1 g by mouth two (2) times a day. No current facility-administered medications on file prior to visit. No Known Allergies    Objective:  Visit Vitals    /71 (BP 1 Location: Left arm, BP Patient Position: Sitting)    Pulse 71    Temp 96.7 °F (35.9 °C) (Oral)    Resp 20    Ht 5' 7\" (1.702 m)    Wt 193 lb (87.5 kg)    SpO2 97%    BMI 30.23 kg/m2    Body mass index is 30.23 kg/(m^2). Assessment of cognitive impairment: Alert and oriented x 3    Depression Screen:   PHQ over the last two weeks 6/6/2016   Little interest or pleasure in doing things Not at all   Feeling down, depressed or hopeless Not at all   Total Score PHQ 2 0       Fall Risk Assessment:    Fall Risk Assessment, last 12 mths 11/9/2017   Able to walk? Yes   Fall in past 12 months? Yes   Fall with injury?  No   Number of falls in past 12 months 1   Fall Risk Score 1       Functional Ability:   Does the patient exhibit a steady gait?  no   How long did it take the patient to get up and walk from a sitting position? 1 min   Is the patient self reliant?  (ie can do own laundry, meals, household chores)  yes     Does the patient handle his/her own medications? yes     Does the patient handle his/her own money? yes     Is the patients home safe (ie good lighting, handrails on stairs and bath, etc.)? yes     Did you notice or did patient express any hearing difficulties? no     Did you notice or did patient express any vision difficulties?   no     Were distance and reading eye charts used? no       Advance Care Planning:   Patient was offered the opportunity to discuss advance care planning:  yes     Does patient have an Advance Directive:  no   If no, did you provide information on Caring Connections? yes       Plan:      Orders Placed This Encounter    INFLUENZA VIRUS VACCINE, HIGH DOSE SEASONAL, PRESERVATIVE FREE    PNEUMOCOCCAL POLYSACCHARIDE VACCINE, 23-VALENT, ADULT OR IMMUNOSUPPRESSED PT DOSE,    METABOLIC PANEL, COMPREHENSIVE    HEMOGLOBIN A1C WITH EAG    MICROALBUMIN, UR, RAND    REFERRAL TO PHYSICAL THERAPY    furosemide (LASIX) 20 mg tablet    varicella zoster vacine live (ZOSTAVAX) 19,400 unit/0.65 mL susr injection       Health Maintenance   Topic Date Due    DTaP/Tdap/Td series (1 - Tdap) 08/13/1958    ZOSTER VACCINE AGE 60>  06/13/1997    FOOT EXAM Q1  06/06/2017    EYE EXAM RETINAL OR DILATED Q1  07/25/2017    Influenza Age 9 to Adult  08/01/2017    Pneumococcal 65+ Low/Medium Risk (2 of 2 - PPSV23) 11/28/2017    HEMOGLOBIN A1C Q6M  02/16/2018    MICROALBUMIN Q1  05/09/2018    GLAUCOMA SCREENING Q2Y  07/25/2018    LIPID PANEL Q1  08/16/2018    MEDICARE YEARLY EXAM  11/10/2018    COLONOSCOPY  04/17/2025    OSTEOPOROSIS SCREENING (DEXA)  Completed       *Patient verbalized understanding and agreement with the plan. A copy of the After Visit Summary with personalized health plan was given to the patient today.

## 2017-12-05 DIAGNOSIS — M17.0 PRIMARY OSTEOARTHRITIS OF BOTH KNEES: ICD-10-CM

## 2017-12-05 DIAGNOSIS — E78.2 MIXED HYPERLIPIDEMIA: ICD-10-CM

## 2017-12-05 RX ORDER — SIMVASTATIN 40 MG/1
TABLET, FILM COATED ORAL
Qty: 90 TAB | Refills: 1 | Status: SHIPPED | OUTPATIENT
Start: 2017-12-05 | End: 2018-07-30 | Stop reason: SDUPTHER

## 2017-12-05 RX ORDER — MELOXICAM 15 MG/1
TABLET ORAL
Qty: 90 TAB | Refills: 1 | Status: ON HOLD | OUTPATIENT
Start: 2017-12-05 | End: 2018-06-09 | Stop reason: SDUPTHER

## 2017-12-12 ENCOUNTER — DOCUMENTATION ONLY (OUTPATIENT)
Dept: PHARMACY | Age: 82
End: 2017-12-12

## 2017-12-12 NOTE — PROGRESS NOTES
Pharmacy Note:  Medication Adherence and Education    Elza Noguera is a [de-identified] y.o. female who's pharmacy  was called today for a medication adherence check in due to concerns with possible medication non-adherence based on pharmacy claims data. Patient was flagged for possible non-adherence with the following medication: Janumet 50/1000mg. Patient was not contacted due to most recent medication refill being picked up. Last PCP visit:  11/09/17  Next PCP visit:  Not scheduled as of now    Current refill history for Janumet   Last refill: 10/17/17 (90 day supply)    Take Away:  Per pharmacy claims data patient is adherent with medication. Patient last picked up medication refill on 10/17/17 and it was for a 90 day supply. Follow-up: Patient will continue to be monitored for the next couple of months and medication refills will be verified to make sure patient is staying adherent.     Thank you,  Latrice Becker CPhT

## 2018-01-23 DIAGNOSIS — F32.89 OTHER DEPRESSION: ICD-10-CM

## 2018-01-23 DIAGNOSIS — I10 ESSENTIAL HYPERTENSION WITH GOAL BLOOD PRESSURE LESS THAN 130/85: ICD-10-CM

## 2018-01-23 RX ORDER — PAROXETINE HYDROCHLORIDE 20 MG/1
TABLET, FILM COATED ORAL
Qty: 90 TAB | Refills: 1 | Status: SHIPPED | OUTPATIENT
Start: 2018-01-23 | End: 2018-08-29 | Stop reason: SDUPTHER

## 2018-01-23 RX ORDER — METOPROLOL TARTRATE 100 MG/1
TABLET ORAL
Qty: 90 TAB | Refills: 1 | Status: SHIPPED | OUTPATIENT
Start: 2018-01-23 | End: 2018-03-09 | Stop reason: SDUPTHER

## 2018-01-26 DIAGNOSIS — K29.70 GASTRITIS, PRESENCE OF BLEEDING UNSPECIFIED, UNSPECIFIED CHRONICITY, UNSPECIFIED GASTRITIS TYPE: ICD-10-CM

## 2018-01-29 RX ORDER — RANITIDINE 150 MG/1
150 TABLET, FILM COATED ORAL
Qty: 90 TAB | Refills: 3 | Status: SHIPPED | OUTPATIENT
Start: 2018-01-29 | End: 2019-03-03 | Stop reason: SDUPTHER

## 2018-03-09 ENCOUNTER — OFFICE VISIT (OUTPATIENT)
Dept: INTERNAL MEDICINE CLINIC | Age: 83
End: 2018-03-09

## 2018-03-09 VITALS
RESPIRATION RATE: 19 BRPM | OXYGEN SATURATION: 98 % | BODY MASS INDEX: 29.98 KG/M2 | DIASTOLIC BLOOD PRESSURE: 80 MMHG | HEIGHT: 67 IN | TEMPERATURE: 96.4 F | SYSTOLIC BLOOD PRESSURE: 160 MMHG | HEART RATE: 82 BPM | WEIGHT: 191 LBS

## 2018-03-09 DIAGNOSIS — I10 ESSENTIAL HYPERTENSION WITH GOAL BLOOD PRESSURE LESS THAN 130/85: Primary | ICD-10-CM

## 2018-03-09 DIAGNOSIS — E11.21 TYPE 2 DIABETES WITH NEPHROPATHY (HCC): ICD-10-CM

## 2018-03-09 RX ORDER — METOPROLOL TARTRATE 100 MG/1
100 TABLET ORAL 2 TIMES DAILY
Qty: 180 TAB | Refills: 1 | Status: SHIPPED | OUTPATIENT
Start: 2018-03-09 | End: 2018-08-29 | Stop reason: SDUPTHER

## 2018-03-09 NOTE — PROGRESS NOTES
Health Maintenance Due   Topic Date Due    DTaP/Tdap/Td series (1 - Tdap) 08/13/1958    ZOSTER VACCINE AGE 60>  06/13/1997    EYE EXAM RETINAL OR DILATED Q1  07/25/2017    HEMOGLOBIN A1C Q6M  02/16/2018       Chief Complaint   Patient presents with    Headache    Knee Pain     right    Hypertension    Diabetes       1. Have you been to the ER, urgent care clinic since your last visit? Hospitalized since your last visit? No    2. Have you seen or consulted any other health care providers outside of the [address] [place] [address] since your last visit? Include any pap smears or colon screening. No    3) Do you have an Advance Directive on file? no    4) Are you interested in receiving information on Advance Directives?  NO      Patient is accompanied by self I have received verbal consent from [facility] to discuss any/all medical information while they are present in the room

## 2018-03-09 NOTE — PATIENT INSTRUCTIONS
Learning About High Blood Pressure  What is high blood pressure? Blood pressure is a measure of how hard the blood pushes against the walls of your arteries. It's normal for blood pressure to go up and down throughout the day, but if it stays up, you have high blood pressure. Another name for high blood pressure is hypertension. Two numbers tell you your blood pressure. The first number is the systolic pressure. It shows how hard the blood pushes when your heart is pumping. The second number is the diastolic pressure. It shows how hard the blood pushes between heartbeats, when your heart is relaxed and filling with blood. A blood pressure of less than 120/80 (say \"120 over 80\") is ideal for an adult. High blood pressure is 140/90 or higher. You have high blood pressure if your top number is 140 or higher or your bottom number is 90 or higher, or both. Many people fall into the category in between, called prehypertension. People with prehypertension need to make lifestyle changes to bring their blood pressure down and help prevent or delay high blood pressure. What happens when you have high blood pressure? · Blood flows through your arteries with too much force. Over time, this damages the walls of your arteries. But you can't feel it. High blood pressure usually doesn't cause symptoms. · Fat and calcium start to build up in your arteries. This buildup is called plaque. Plaque makes your arteries narrower and stiffer. Blood can't flow through them as easily. · This lack of good blood flow starts to damage some of the organs in your body. This can lead to problems such as coronary artery disease and heart attack, heart failure, stroke, kidney failure, and eye damage. How can you prevent high blood pressure? · Stay at a healthy weight. · Try to limit how much sodium you eat to less than 2,300 milligrams (mg) a day.  If you limit your sodium to 1,500 mg a day, you can lower your blood pressure even more.  ¨ Buy foods that are labeled \"unsalted,\" \"sodium-free,\" or \"low-sodium. \" Foods labeled \"reduced-sodium\" and \"light sodium\" may still have too much sodium. ¨ Flavor your food with garlic, lemon juice, onion, vinegar, herbs, and spices instead of salt. Do not use soy sauce, steak sauce, onion salt, garlic salt, mustard, or ketchup on your food. ¨ Use less salt (or none) when recipes call for it. You can often use half the salt a recipe calls for without losing flavor. · Be physically active. Get at least 30 minutes of exercise on most days of the week. Walking is a good choice. You also may want to do other activities, such as running, swimming, cycling, or playing tennis or team sports. · Limit alcohol to 2 drinks a day for men and 1 drink a day for women. · Eat plenty of fruits, vegetables, and low-fat dairy products. Eat less saturated and total fats. How is high blood pressure treated? · Your doctor will suggest making lifestyle changes. For example, your doctor may ask you to eat healthy foods, quit smoking, lose extra weight, and be more active. · If lifestyle changes don't help enough or your blood pressure is very high, you will have to take medicine every day. Follow-up care is a key part of your treatment and safety. Be sure to make and go to all appointments, and call your doctor if you are having problems. It's also a good idea to know your test results and keep a list of the medicines you take. Where can you learn more? Go to http://karolina-thee.info/. Enter P501 in the search box to learn more about \"Learning About High Blood Pressure. \"  Current as of: September 21, 2016  Content Version: 11.4  © 0007-6606 [employer]. Care instructions adapted under license by [employer] (which disclaims liability or warranty for this information).  If you have questions about a medical condition or this instruction, always ask your healthcare professional. [employer]Bill, [facility] disclaims any warranty or liability for your use of this information. Acute High Blood Pressure: Care Instructions  Your Care Instructions    Acute high blood pressure is very high blood pressure. It's a serious problem. Very high blood pressure can damage your brain, heart, eyes, and kidneys. You may have been given medicines to lower your blood pressure. You may have gotten them as pills or through a needle in one of your veins. This is called an IV. And maybe you were given other medicines too. These can be needed when high blood pressure causes other problems. To keep your blood pressure at a lower level, you may need to make healthy lifestyle changes. And you will probably need to take medicines. Be sure to follow up with your doctor about your blood pressure and what you can do about it. Follow-up care is a key part of your treatment and safety. Be sure to make and go to all appointments, and call your doctor if you are having problems. It's also a good idea to know your test results and keep a list of the medicines you take. How can you care for yourself at home? · See your doctor as often as he or she recommends. This is to make sure your blood pressure is under control. You will probably go at least 2 times a year. But it may be more often at first.  · Take your blood pressure medicine exactly as prescribed. You may take one or more types. They include diuretics, beta-blockers, ACE inhibitors, calcium channel blockers, and angiotensin II receptor blockers. Call your doctor if you think you are having a problem with your medicine. · If you take blood pressure medicine, talk to your doctor before you take decongestants or anti-inflammatory medicine, such as ibuprofen. These can raise blood pressure. · Learn how to check your blood pressure at home. Check it often. · Ask your doctor if it's okay to drink alcohol.   · Talk to your doctor about lifestyle changes that can help blood pressure. These include being active and not smoking. When should you call for help? Call 911 anytime you think you may need emergency care. This may mean having symptoms that suggest that your blood pressure is causing a serious heart or blood vessel problem. Your blood pressure may be over 180/110. ? For example, call 911 if:  ? · You have symptoms of a heart attack. These may include:  ¨ Chest pain or pressure, or a strange feeling in the chest.  ¨ Sweating. ¨ Shortness of breath. ¨ Nausea or vomiting. ¨ Pain, pressure, or a strange feeling in the back, neck, jaw, or upper belly or in one or both shoulders or arms. ¨ Lightheadedness or sudden weakness. ¨ A fast or irregular heartbeat. ? · You have symptoms of a stroke. These may include:  ¨ Sudden numbness, tingling, weakness, or loss of movement in your face, arm, or leg, especially on only one side of your body. ¨ Sudden vision changes. ¨ Sudden trouble speaking. ¨ Sudden confusion or trouble understanding simple statements. ¨ Sudden problems with walking or balance. ¨ A sudden, severe headache that is different from past headaches. ? · You have severe back or belly pain. ?Do not wait until your blood pressure comes down on its own. Get help right away. ?Call your doctor now or seek immediate care if:  ? · Your blood pressure is much higher than normal (such as 180/110 or higher), but you don't have symptoms. ? · You think high blood pressure is causing symptoms, such as:  ¨ Severe headache. ¨ Blurry vision. ? Watch closely for changes in your health, and be sure to contact your doctor if:  ? · Your blood pressure measures 140/90 or higher at least 2 times. That means the top number is 140 or higher or the bottom number is 90 or higher, or both. ? · You think you may be having side effects from your blood pressure medicine. ? · Your blood pressure is usually normal, but it goes above normal at least 2 times.    Where can you learn more? Go to http://karolina-thee.info/. Enter D128 in the search box to learn more about \"Acute High Blood Pressure: Care Instructions. \"  Current as of: September 21, 2016  Content Version: 11.4  © 3237-8171 [employer]. Care instructions adapted under license by [employer] (which disclaims liability or warranty for this information). If you have questions about a medical condition or this instruction, always ask your healthcare professional. [address] any warranty or liability for your use of this information. Low Sodium Diet (2,000 Milligram): Care Instructions  Your Care Instructions    Too much sodium causes your body to hold on to extra water. This can raise your blood pressure and force your heart and kidneys to work harder. In very serious cases, this could cause you to be put in the hospital. It might even be life-threatening. By limiting sodium, you will feel better and lower your risk of serious problems. The most common source of sodium is salt. People get most of the salt in their diet from canned, prepared, and packaged foods. Fast food and restaurant meals also are very high in sodium. Your doctor will probably limit your sodium to less than 2,000 milligrams (mg) a day. This limit counts all the sodium in prepared and packaged foods and any salt you add to your food. Follow-up care is a key part of your treatment and safety. Be sure to make and go to all appointments, and call your doctor if you are having problems. It's also a good idea to know your test results and keep a list of the medicines you take. How can you care for yourself at home? Read food labels  · Read labels on cans and food packages. The labels tell you how much sodium is in each serving. Make sure that you look at the serving size. If you eat more than the serving size, you have eaten more sodium.   · Food labels also tell you the Percent Daily Value for sodium. Choose products with low Percent Daily Values for sodium. · Be aware that sodium can come in forms other than salt, including monosodium glutamate (MSG), sodium citrate, and sodium bicarbonate (baking soda). MSG is often added to Asian food. When you eat out, you can sometimes ask for food without MSG or added salt. Buy low-sodium foods  · Buy foods that are labeled \"unsalted\" (no salt added), \"sodium-free\" (less than 5 mg of sodium per serving), or \"low-sodium\" (less than 140 mg of sodium per serving). Foods labeled \"reduced-sodium\" and \"light sodium\" may still have too much sodium. Be sure to read the label to see how much sodium you are getting. · Buy fresh vegetables, or frozen vegetables without added sauces. Buy low-sodium versions of canned vegetables, soups, and other canned goods. Prepare low-sodium meals  · Cut back on the amount of salt you use in cooking. This will help you adjust to the taste. Do not add salt after cooking. One teaspoon of salt has about 2,300 mg of sodium. · Take the salt shaker off the table. · Flavor your food with garlic, lemon juice, onion, vinegar, herbs, and spices. Do not use soy sauce, lite soy sauce, steak sauce, onion salt, garlic salt, celery salt, mustard, or ketchup on your food. · Use low-sodium salad dressings, sauces, and ketchup. Or make your own salad dressings and sauces without adding salt. · Use less salt (or none) when recipes call for it. You can often use half the salt a recipe calls for without losing flavor. Other foods such as rice, pasta, and grains do not need added salt. · Rinse canned vegetables, and cook them in fresh water. This removes some-but not all-of the salt. · Avoid water that is naturally high in sodium or that has been treated with water softeners, which add sodium. Call your local water company to find out the sodium content of your water supply.  If you buy bottled water, read the label and choose a sodium-free brand. Avoid high-sodium foods  · Avoid eating:  ¨ Smoked, cured, salted, and canned meat, fish, and poultry. ¨ Ham, acosta, hot dogs, and luncheon meats. ¨ Regular, hard, and processed cheese and regular peanut butter. ¨ Crackers with salted tops, and other salted snack foods such as pretzels, chips, and salted popcorn. ¨ Frozen prepared meals, unless labeled low-sodium. ¨ Canned and dried soups, broths, and bouillon, unless labeled sodium-free or low-sodium. ¨ Canned vegetables, unless labeled sodium-free or low-sodium. ¨ Carry Smart fries, pizza, tacos, and other fast foods. ¨ Pickles, olives, ketchup, and other condiments, especially soy sauce, unless labeled sodium-free or low-sodium. Where can you learn more? Go to http://karolina-thee.info/. Enter K778 in the search box to learn more about \"Low Sodium Diet (2,000 Milligram): Care Instructions. \"  Current as of: May 12, 2017  Content Version: 11.4  © 3960-5402 [employer]. Care instructions adapted under license by [employer] (which disclaims liability or warranty for this information). If you have questions about a medical condition or this instruction, always ask your healthcare professional. [address] any warranty or liability for your use of this information.

## 2018-03-09 NOTE — MR AVS SNAPSHOT
[facility] 102 1400 [address] [place] [address] 
[phone] Patient: [facility] MRN: [id number] :1935 Visit Information Date & Time Provider Department Dept. Phone Encounter #  
 3/9/2018 11:40 AM Kal Davis NP [facility] [phone] [id number] Upcoming Health Maintenance Date Due DTaP/Tdap/Td series (1 - Tdap) 1956 ZOSTER VACCINE AGE 60> 1997 EYE EXAM RETINAL OR DILATED Q1 2017 HEMOGLOBIN A1C Q6M 2018 MICROALBUMIN Q1 2018 GLAUCOMA SCREENING Q2Y 2018 LIPID PANEL Q1 2018 FOOT EXAM Q1 2018 MEDICARE YEARLY EXAM 11/10/2018 COLONOSCOPY 2025 Allergies as of 3/9/2018  Review Complete On: 3/9/2018 By: Pushpa Marin LPN No Known Allergies Current Immunizations  Reviewed on 2017 Name Date Influenza High Dose Vaccine PF 2017 Influenza Vaccine 2016 Pneumococcal Conjugate (PCV-13) 2016 Pneumococcal Polysaccharide (PPSV-23) 2017 Not reviewed this visit You Were Diagnosed With   
  
 Codes Comments Essential hypertension with goal blood pressure less than 130/85     ICD-10-CM: I10 
ICD-9-CM: 401.9 Vitals BP Pulse Temp Resp Height(growth percentile) Weight(growth percentile) 160/80 (BP 1 Location: Right arm, BP Patient Position: Sitting) 82 96.4 °F (35.8 °C) (Oral) 19 5' 7\" (1.702 m) 191 lb (86.6 kg) SpO2 BMI OB Status Smoking Status 98% 29.91 kg/m2 Hysterectomy Never Smoker Vitals History BMI and BSA Data Body Mass Index Body Surface Area  
 29.91 kg/m 2 2.02 m 2 Preferred Pharmacy Pharmacy Name Phone 305 [facility], [address] [facility] [address] [place] [address] Your Updated Medication List  
  
   
This list is accurate as of 3/9/18 12:29 PM.  Always use your most recent med list.  
  
  
  
  
 APIDRA SOLOSTAR U-100 INSULIN 100 unit/mL pen Generic drug:  insulin glulisine U-100  
by SubCUTAneous route. aspirin 81 mg chewable tablet Take 81 mg by mouth daily. colestipol 1 gram tablet Commonly known as:  COLESTID Take 1 g by mouth two (2) times a day. dicyclomine 10 mg capsule Commonly known as:  BENTYL 10 mg three (3) times daily. furosemide 20 mg tablet Commonly known as:  LASIX  
1 tab po QOD HumaLOG U-100 Insulin 100 unit/mL injection Generic drug:  insulin lispro 5 Units by SubCUTAneous route. Indications: sliding scale JANUMET 50-1,000 mg per tablet Generic drug:  SITagliptin-metFORMIN Take 1 tablet by mouth two  times daily with meals  
  
 meloxicam 15 mg tablet Commonly known as:  MOBIC  
TAKE 1 TABLET BY MOUTH  DAILY  
  
 metoprolol tartrate 100 mg IR tablet Commonly known as:  LOPRESSOR Take 1 Tab by mouth two (2) times a day. OTHER Compression stockings knee thigh,large. please measure pt's calf PARoxetine 20 mg tablet Commonly known as:  PAXIL TAKE 1 TABLET BY MOUTH  EVERY NIGHT AT BEDTIME  
  
 raNITIdine 150 mg tablet Commonly known as:  ZANTAC Take 1 Tab by mouth nightly. simethicone 80 mg chewable tablet Commonly known as:  Theoplis Basque Take 1 Tab by mouth two (2) times daily as needed for Flatulence. simvastatin 40 mg tablet Commonly known as:  ZOCOR  
TAKE 1 TABLET BY MOUTH  NIGHTLY  
  
 VITAMIN D3 1,000 unit Cap Generic drug:  cholecalciferol Take 1,000 Units by mouth daily. * Andres Clutter Commonly known as:  ULTRA-LIGHT ROLLATOR  
1 Device by Does Not Apply route daily as needed. * Andres Clutter Commonly known as:  ULTRA-LIGHT ROLLATOR  
1 Units by Does Not Apply route daily as needed. * Notice: This list has 2 medication(s) that are the same as other medications prescribed for you.  Read the directions carefully, and ask your doctor or other care provider to review them with you. Prescriptions Sent to Pharmacy Refills  
 metoprolol tartrate (LOPRESSOR) 100 mg IR tablet 1 Sig: Take 1 Tab by mouth two (2) times a day. Class: Normal  
 Pharmacy: [address]  #: [phone] Route: Oral  
  
 Please provide this summary of care documentation to your next provider. Your primary care clinician is listed as Lakhwinder Ying. If you have any questions after today's visit, please call [phone].

## 2018-03-09 NOTE — PROGRESS NOTES
Subjective:     Chief Complaint   Patient presents with    Headache    Knee Pain     right    Hypertension    Diabetes       History of Present Illness    Kristal Gaytan is a [de-identified]y.o. year old female who is a patient of Dr. Miguel Joyner that presents today with her daughter related to elevated BP readings. She took her BP at home yesterday and it was 180/100. Repeat reading 30 min later was 159/90. Associated symptoms include headache. She denies any CP or SOB. She reports compliance with her Metoprolol 100 mg daily. No other new complaints today. She is followed by endocrinology for her DM. No BS readings over 200. No CP, SOB, GI, or  symptoms. NAD. Reviewed medications, recent lab work and imaging with patient. Pt reports compliance with medications. Current Outpatient Prescriptions on File Prior to Visit   Medication Sig Dispense Refill    raNITIdine (ZANTAC) 150 mg tablet Take 1 Tab by mouth nightly. 90 Tab 3    PARoxetine (PAXIL) 20 mg tablet TAKE 1 TABLET BY MOUTH  EVERY NIGHT AT BEDTIME 90 Tab 1    meloxicam (MOBIC) 15 mg tablet TAKE 1 TABLET BY MOUTH  DAILY 90 Tab 1    simvastatin (ZOCOR) 40 mg tablet TAKE 1 TABLET BY MOUTH  NIGHTLY 90 Tab 1    furosemide (LASIX) 20 mg tablet 1 tab po QOD 45 Tab 2    JANUMET 50-1,000 mg per tablet Take 1 tablet by mouth two  times daily with meals 180 Tab 5    Walker (ULTRA-LIGHT ROLLATOR) misc 1 Units by Does Not Apply route daily as needed. 1 Each 0    Walker (ULTRA-LIGHT ROLLATOR) misc 1 Device by Does Not Apply route daily as needed. 1 Each 0    cholecalciferol (VITAMIN D3) 1,000 unit cap Take 1,000 Units by mouth daily.  aspirin 81 mg chewable tablet Take 81 mg by mouth daily.  OTHER Compression stockings knee thigh,large. please measure pt's calf 2 Each o     No current facility-administered medications on file prior to visit.         No Known Allergies   Past Medical History:   Diagnosis Date    Arthritis     Chronic pain     Depression     [facility] ([facility] [address]     Hypercholesterolemia     Hypertension     Stroke ([facility]     TIA 10 yrs back    Stroke [facility])     2003      Past Surgical History:   Procedure Laterality Date    HX CARPAL TUNNEL RELEASE  1990    HX CATARACT REMOVAL      bilateral    HX HYSTERECTOMY  1986    HX HYSTERECTOMY      HX ORTHOPAEDIC      HX ORTHOPAEDIC      carpel tunnel left    HX ORTHOPAEDIC      left foot heel spur    HX REFRACTIVE SURGERY  2006      Social History   Substance Use Topics    Smoking status: Never Smoker    Smokeless tobacco: Never Used    Alcohol use No      No family history on file. Objective:     Vitals:    03/09/18 1206   BP: 160/80   Pulse: 82   Resp: 19   Temp: 96.4 °F (35.8 °C)   TempSrc: Oral   SpO2: 98%   Weight: 191 lb (86.6 kg)   Height: 5' 7\" (1.702 m)       Review of Systems   Constitutional: Negative. HENT: Negative. Eyes: Negative. Respiratory: Negative. Cardiovascular: Negative. Gastrointestinal: Negative. Genitourinary: Negative. Musculoskeletal: Negative. Skin: Negative. Neurological: Positive for headaches. Psychiatric/Behavioral: Negative. Physical Exam   Constitutional: She is oriented to person, place, and time. She appears well-developed and well-nourished. HENT:   Head: Normocephalic and atraumatic. Eyes: Conjunctivae and EOM are normal. Pupils are equal, round, and reactive to light. Neck: Normal range of motion. Neck supple. Cardiovascular: Normal rate, regular rhythm and normal heart sounds. Pulmonary/Chest: Effort normal and breath sounds normal. No respiratory distress. She has no wheezes. Abdominal: Soft. Bowel sounds are normal. She exhibits no distension. There is no tenderness. Musculoskeletal: Normal range of motion. She exhibits edema. She exhibits no tenderness or deformity. 1+ BLE edema. Neurological: She is alert and oriented to person, place, and time. Skin: Skin is warm and dry.  No rash noted. No erythema. No pallor. Psychiatric: She has a normal mood and affect. Her behavior is normal.   Nursing note and vitals reviewed. Assessment/ Plan:   Diagnoses and all orders for this visit:    1. Essential hypertension with goal blood pressure less than 130/85   Will order  -     metoprolol tartrate (LOPRESSOR) 100 mg IR tablet; Take 1 Tab by mouth two (2) times a day. Encourage the use of the DASH diet by eating vegetables, fruits, and whole grains. Including fat-free or low-fat dairy products, fish, poultry, beans, nuts, and vegetable oils. Limiting foods that are high in saturated fat, such as fatty meats, full-fat dairy products, and tropical oils such as coconut, palm kernel, and palm oils. Limiting sugar-sweetened beverages and sweets. When following the DASH eating plan, it is important to choose foods that are:  · Low in saturated and trans fats  · Rich in potassium, calcium, magnesium, fiber, and protein  · Lower in sodium  ·   2. Type 2 diabetes with nephropathy ([facility] [address]    Patient's plan of care has been reviewed with them. Patient and/or family have verbally conveyed their understanding and agreement of the patient's signs, symptoms, diagnosis, treatment and prognosis and additionally agree to follow up as recommended or return to [facility] should their condition change prior to follow-up. Discharge instructions have also been provided to the patient with some educational information regarding their diagnosis as well a list of reasons why they would want to return to the office prior to their follow-up appointment should their condition change. Follow-up with Dr. Irma Roman in 1 week for BP check.

## 2018-03-22 ENCOUNTER — OFFICE VISIT (OUTPATIENT)
Dept: INTERNAL MEDICINE CLINIC | Age: 83
End: 2018-03-22

## 2018-03-22 VITALS
HEIGHT: 67 IN | HEART RATE: 71 BPM | SYSTOLIC BLOOD PRESSURE: 144 MMHG | OXYGEN SATURATION: 98 % | BODY MASS INDEX: 29.19 KG/M2 | WEIGHT: 186 LBS | TEMPERATURE: 98.3 F | RESPIRATION RATE: 20 BRPM | DIASTOLIC BLOOD PRESSURE: 70 MMHG

## 2018-03-22 DIAGNOSIS — E11.9 TYPE 2 DIABETES MELLITUS WITHOUT COMPLICATION, WITHOUT LONG-TERM CURRENT USE OF INSULIN (HCC): ICD-10-CM

## 2018-03-22 DIAGNOSIS — K29.70 GASTRITIS WITHOUT BLEEDING, UNSPECIFIED CHRONICITY, UNSPECIFIED GASTRITIS TYPE: Primary | ICD-10-CM

## 2018-03-22 DIAGNOSIS — M19.019 SHOULDER ARTHRITIS: ICD-10-CM

## 2018-03-22 DIAGNOSIS — R41.3 MEMORY CHANGE: ICD-10-CM

## 2018-03-22 DIAGNOSIS — M47.896 OTHER OSTEOARTHRITIS OF SPINE, LUMBAR REGION: ICD-10-CM

## 2018-03-22 DIAGNOSIS — K25.9 GASTRIC ULCER, UNSPECIFIED CHRONICITY, UNSPECIFIED WHETHER GASTRIC ULCER HEMORRHAGE OR PERFORATION PRESENT: ICD-10-CM

## 2018-03-22 RX ORDER — HYDROCODONE BITARTRATE AND ACETAMINOPHEN 5; 325 MG/1; MG/1
TABLET ORAL
COMMUNITY
End: 2018-03-22 | Stop reason: SDUPTHER

## 2018-03-22 RX ORDER — ACETAMINOPHEN 325 MG/1
650 TABLET ORAL 2 TIMES DAILY
Qty: 120 TAB | Refills: 5 | Status: SHIPPED | OUTPATIENT
Start: 2018-03-22 | End: 2018-05-30 | Stop reason: SDUPTHER

## 2018-03-22 RX ORDER — HYDROCODONE BITARTRATE AND ACETAMINOPHEN 5; 325 MG/1; MG/1
1 TABLET ORAL
Qty: 30 TAB | Refills: 0 | Status: SHIPPED | OUTPATIENT
Start: 2018-03-22 | End: 2018-05-30 | Stop reason: SDUPTHER

## 2018-03-22 RX ORDER — ALUMINA, MAGNESIA, AND SIMETHICONE 2400; 2400; 240 MG/30ML; MG/30ML; MG/30ML
10 SUSPENSION ORAL
Qty: 300 ML | Refills: 2 | Status: SHIPPED | OUTPATIENT
Start: 2018-03-22 | End: 2018-06-04

## 2018-03-22 RX ORDER — LIDOCAINE 4 G/100G
PATCH TOPICAL
Qty: 30 PATCH | Refills: 3 | Status: SHIPPED | OUTPATIENT
Start: 2018-03-22 | End: 2018-06-04

## 2018-03-22 NOTE — MR AVS SNAPSHOT
[address] [place] [address] 
[phone] Patient: [facility] MRN: [id number] :1935 Visit Information Date & Time Provider Department Dept. Phone Encounter #  
 3/22/2018  3:00 PM Maxine Lee MD [facility] Internal Medicine [phone] [id number] Upcoming Health Maintenance Date Due DTaP/Tdap/Td series (1 - Tdap) 1956 ZOSTER VACCINE AGE 60> 1997 EYE EXAM RETINAL OR DILATED Q1 2017 HEMOGLOBIN A1C Q6M 2018 MICROALBUMIN Q1 2018 GLAUCOMA SCREENING Q2Y 2018 LIPID PANEL Q1 2018 FOOT EXAM Q1 2018 COLONOSCOPY 2025 Allergies as of 3/22/2018  Review Complete On: 3/22/2018 By: Maxine Lee MD  
 No Known Allergies Current Immunizations  Reviewed on 3/22/2018 Name Date Influenza High Dose Vaccine PF 2017 Influenza Vaccine 2016 Pneumococcal Conjugate (PCV-13) 2016 Pneumococcal Polysaccharide (PPSV-23) 2017 Reviewed by Maxine Lee MD on 3/22/2018 at  3:22 PM  
You Were Diagnosed With   
  
 Codes Comments Gastritis without bleeding, unspecified chronicity, unspecified gastritis type    -  Primary ICD-10-CM: K29.70 ICD-9-CM: 535.50 Gastric ulcer, unspecified chronicity, unspecified whether gastric ulcer hemorrhage or perforation present     ICD-10-CM: K25.9 ICD-9-CM: 531.90 Type 2 diabetes mellitus without complication, without long-term current use of insulin (HCC)     ICD-10-CM: E11.9 ICD-9-CM: 250.00 Other osteoarthritis of spine, lumbar region     ICD-10-CM: M47.896 ICD-9-CM: 721.3 Shoulder arthritis     ICD-10-CM: M19.019 
ICD-9-CM: 716.91 Memory change     ICD-10-CM: R41.3 ICD-9-CM: 780.93 Vitals BP Pulse Temp Resp Height(growth percentile) Weight(growth percentile)  144/70 (BP 1 Location: Left arm, BP Patient Position: Sitting) 71 98.3 °F (36.8 °C) (Oral) 20 5' 7\" (1.702 m) 186 lb (84.4 kg) SpO2 BMI OB Status Smoking Status 98% 29.13 kg/m2 Hysterectomy Never Smoker Vitals History BMI and BSA Data Body Mass Index Body Surface Area  
 29.13 kg/m 2 2 m 2 Preferred Pharmacy Pharmacy Name Phone [facility] [address] [facility], Aaron Maya [phone] Your Updated Medication List  
  
   
This list is accurate as of 3/22/18  3:24 PM.  Always use your most recent med list.  
  
  
  
  
 acetaminophen 325 mg tablet Commonly known as:  TYLENOL Take 2 Tabs by mouth two (2) times a day. aluminum & magnesium hydroxide-simethicone 400-400-40 mg/5 mL suspension Commonly known as:  COMFORT GEL EXTRA STRENGTH Take 10 mL by mouth three (3) times daily as needed for Indigestion. APIDRA SOLOSTAR U-100 INSULIN 100 unit/mL pen Generic drug:  insulin glulisine U-100  
by SubCUTAneous route. aspirin 81 mg chewable tablet Take 81 mg by mouth daily. furosemide 20 mg tablet Commonly known as:  LASIX  
1 tab po QOD HYDROcodone-acetaminophen 5-325 mg per tablet Commonly known as:  [facility] Take 1 Tab by mouth daily as needed for Pain. JANUMET 50-1,000 mg per tablet Generic drug:  SITagliptin-metFORMIN Take 1 tablet by mouth two  times daily with meals  
  
 lidocaine 4 % patch Commonly known as:  ASPERCREME Use 1 patch a  day  
  
 meloxicam 15 mg tablet Commonly known as:  MOBIC  
TAKE 1 TABLET BY MOUTH  DAILY  
  
 metoprolol tartrate 100 mg IR tablet Commonly known as:  LOPRESSOR Take 1 Tab by mouth two (2) times a day. OTHER Compression stockings knee thigh,large. please measure pt's calf PARoxetine 20 mg tablet Commonly known as:  PAXIL TAKE 1 TABLET BY MOUTH  EVERY NIGHT AT BEDTIME  
  
 raNITIdine 150 mg tablet Commonly known as:  ZANTAC Take 1 Tab by mouth nightly. simvastatin 40 mg tablet Commonly known as:  ZOCOR  
TAKE 1 TABLET BY MOUTH  NIGHTLY  
  
 VITAMIN D3 1,000 unit Cap Generic drug:  cholecalciferol Take 1,000 Units by mouth daily. * Ladan Sainte Marie Commonly known as:  ULTRA-LIGHT ROLLATOR  
1 Device by Does Not Apply route daily as needed. * Ladan Sainte [place] Commonly known as:  ULTRA-LIGHT ROLLATOR  
1 Units by Does Not Apply route daily as needed. * Notice: This list has 2 medication(s) that are the same as other medications prescribed for you. Read the directions carefully, and ask your doctor or other care provider to review them with you. Prescriptions Printed Refills HYDROcodone-acetaminophen (NORCO) 5-325 mg per tablet 0 Sig: Take 1 Tab by mouth daily as needed for Pain. Class: Print Route: Oral  
  
Prescriptions Sent to Pharmacy Refills  
 acetaminophen (TYLENOL) 325 mg tablet 5 Sig: Take 2 Tabs by mouth two (2) times a day. Class: Normal  
 Pharmacy: [facility] Jerry [address] Ph #: [phone] Route: Oral  
 lidocaine (ASPERCREME) 4 % patch 3 Sig: Use 1 patch a  day Class: Normal  
 Pharmacy: [address] [place] [address] Ph #: [phone]  
 aluminum & magnesium hydroxide-simethicone (COMFORT GEL EXTRA STRENGTH) 400-400-40 mg/5 mL suspension 2 Sig: Take 10 mL by mouth three (3) times daily as needed for Indigestion. Class: Normal  
 Pharmacy: [address] [place] [address] Ph #: [phone] Route: Oral  
  
We Performed the Following REFERRAL TO NEUROPSYCHOLOGY [OKT41 Custom] Referral Information Referral ID Referred By Referred To  
  
 [id number] [facility], [address] [place] [address] Ana Hurley [address] [facility], [address] [facility] Phone: [phone] Fax: [phone] Visits Status Start Date End Date 1 New Request 3/22/18 3/22/19 If your referral has a status of pending review or denied, additional information will be sent to support the outcome of this decision. Please provide this summary of care documentation to your next provider. Your primary care clinician is listed as Katherine Lui. If you have any questions after today's visit, please call [phone].

## 2018-03-22 NOTE — PROGRESS NOTES
HISTORY OF PRESENT ILLNESS  Veronica Wiley is a [de-identified] y.o. female here for follow up. Report epigastric pain and indigestion on and off. She is using his ranitidine, not helping her. No blood in the stool. She is seen for diabetes. Moon Trejo He reports medication compliance: compliant all of the time. Diabetic diet compliance: compliant most of the time. Home glucose monitoring: is not performed. Further diabetic ROS: no polyuria or polydipsia, no chest pain, dyspnea or TIA's, no numbness, tingling or pain in extremities, no hypoglycemia. Lab review: labs reviewed, I note that glycosylated hemoglobin normallabs reviewed and discussed with patient. Eye exam:up to date. Has seen endocrinologist recently, has all blood work done. Advised to obtain lab work report. Report lower back pain knee pain and shoulder pain almost every day. Using Tylenol and meloxicam, not helping her a lot. Previously she used hydrocodone which helped her sometimes. Asking to have it refilled. has HTN and elevated lipid. on meds. no chest pain or palpitation. Report memory changes. Would like to get it evaluated formally. Diabetes   Pertinent negatives include no chest pain. Hypertension    Associated symptoms include anxiety. Pertinent negatives include no chest pain, no blurred vision and no nausea. Memory Loss    Her past medical history is significant for hypertension. GI Problem   Pertinent negatives include no chest pain. Follow-up   Pertinent negatives include no chest pain. Review of Systems   Constitutional: Negative. Negative for chills and fever. HENT: Negative. Eyes: Negative. Negative for blurred vision and double vision. Respiratory: Negative. Cardiovascular: Negative. Negative for chest pain. Gastrointestinal: Negative for heartburn and nausea. Musculoskeletal: Positive for back pain and joint pain. Negative for falls. Skin: Negative. Neurological: Negative.     Psychiatric/Behavioral: Positive for memory loss. Physical Exam   Constitutional: She appears well-developed and well-nourished. No distress. Neck: Normal range of motion. Neck supple. No JVD present. No thyromegaly present. Cardiovascular: Normal rate, regular rhythm, normal heart sounds and intact distal pulses. Pulmonary/Chest: Effort normal. No respiratory distress. She has no wheezes. Musculoskeletal: She exhibits edema and tenderness. Non pitting edema. Right knee: Tender knee is present on the joint. Mild crepitus present. Range of motion mildly restricted. Lower back: Spine nontender. Paravertebral facet tenderness present. Range of motion restricted. Psychiatric: She has a normal mood and affect. Her behavior is normal.       ASSESSMENT and PLAN  Diagnoses and all orders for this visit:    1. Gastritis without bleeding, unspecified chronicity, unspecified gastritis type    On zantac, not controlling. Will call in,  -     aluminum & magnesium hydroxide-simethicone (COMFORT GEL EXTRA STRENGTH) 400-400-40 mg/5 mL suspension; Take 10 mL by mouth three (3) times daily as needed for Indigestion. 2. Gastric ulcer, unspecified chronicity, unspecified whether gastric ulcer hemorrhage or perforation present    On Zantac. Will give comfort gel. 3. Type 2 diabetes mellitus without complication, without long-term current use of insulin (HCC)  On Janumet and Apidra. Stable. Seeing endocrinologist few weeks back. Advised her to obtain lab work paper from her. 4. Other osteoarthritis of spine, lumbar region    Will give,  -     acetaminophen (TYLENOL) 325 mg tablet; Take 2 Tabs by mouth two (2) times a day. -     lidocaine (ASPERCREME) 4 % patch; Use 1 patch a  Day    We will try,  -     HYDROcodone-acetaminophen (NORCO) 5-325 mg per tablet; Take 1 Tab by mouth daily as needed for Pain. #30 no refill. She is also taking meloxicam 15 mg once a day.   5. Shoulder arthritis  -     acetaminophen (TYLENOL) 325 mg tablet; Take 2 Tabs by mouth two (2) times a day. -     lidocaine (ASPERCREME) 4 % patch; Use 1 patch a  day  -     HYDROcodone-acetaminophen (NORCO) 5-325 mg per tablet; Take 1 Tab by mouth daily as needed for Pain. 6. Memory change will refer,    Will refer,  -     REFERRAL TO NEUROPSYCHOLOGY        Discussed expected course/resolution/complications of diagnosis in detail with patient. Medication risks/benefits/costs/interactions/alternatives discussed with patient. Pt was given an after visit summary which includes diagnoses, current medications & vitals. Pt expressed understanding with the diagnosis and plan.

## 2018-05-30 ENCOUNTER — TELEPHONE (OUTPATIENT)
Dept: INTERNAL MEDICINE CLINIC | Age: 83
End: 2018-05-30

## 2018-05-30 ENCOUNTER — OFFICE VISIT (OUTPATIENT)
Dept: INTERNAL MEDICINE CLINIC | Age: 83
End: 2018-05-30

## 2018-05-30 VITALS
RESPIRATION RATE: 20 BRPM | WEIGHT: 188 LBS | HEART RATE: 69 BPM | OXYGEN SATURATION: 92 % | DIASTOLIC BLOOD PRESSURE: 90 MMHG | HEIGHT: 67 IN | TEMPERATURE: 97.5 F | SYSTOLIC BLOOD PRESSURE: 150 MMHG | BODY MASS INDEX: 29.51 KG/M2

## 2018-05-30 DIAGNOSIS — M19.019 SHOULDER ARTHRITIS: ICD-10-CM

## 2018-05-30 DIAGNOSIS — E11.21 TYPE 2 DIABETES WITH NEPHROPATHY (HCC): ICD-10-CM

## 2018-05-30 DIAGNOSIS — M54.42 CHRONIC BILATERAL LOW BACK PAIN WITH BILATERAL SCIATICA: ICD-10-CM

## 2018-05-30 DIAGNOSIS — E78.2 MIXED HYPERLIPIDEMIA: ICD-10-CM

## 2018-05-30 DIAGNOSIS — G89.29 CHRONIC BILATERAL LOW BACK PAIN WITH BILATERAL SCIATICA: ICD-10-CM

## 2018-05-30 DIAGNOSIS — M47.896 OTHER OSTEOARTHRITIS OF SPINE, LUMBAR REGION: ICD-10-CM

## 2018-05-30 DIAGNOSIS — M54.41 CHRONIC BILATERAL LOW BACK PAIN WITH BILATERAL SCIATICA: ICD-10-CM

## 2018-05-30 DIAGNOSIS — I10 ESSENTIAL HYPERTENSION: Primary | ICD-10-CM

## 2018-05-30 RX ORDER — LOSARTAN POTASSIUM 25 MG/1
25 TABLET ORAL DAILY
Qty: 90 TAB | Refills: 1 | Status: SHIPPED | OUTPATIENT
Start: 2018-05-30 | End: 2018-10-27 | Stop reason: SDUPTHER

## 2018-05-30 RX ORDER — ACETAMINOPHEN 325 MG/1
650 TABLET ORAL 2 TIMES DAILY
Qty: 120 TAB | Refills: 5 | Status: SHIPPED | OUTPATIENT
Start: 2018-05-30 | End: 2019-07-11 | Stop reason: SDUPTHER

## 2018-05-30 RX ORDER — HYDROCODONE BITARTRATE AND ACETAMINOPHEN 5; 325 MG/1; MG/1
1 TABLET ORAL
Qty: 30 TAB | Refills: 0 | Status: SHIPPED | OUTPATIENT
Start: 2018-05-30 | End: 2018-06-06

## 2018-05-30 NOTE — PATIENT INSTRUCTIONS

## 2018-05-30 NOTE — MR AVS SNAPSHOT
[address] [facility] [address] [place] [address] 
[phone] Patient: [facility] MRN: [id number] :1935 Visit Information Date & Time Provider Department Dept. Phone Encounter #  
 2018 10:15 AM Dillan Su [facility] Internal Medicine [id number] Your Appointments 2018  3:00 PM  
ROUTINE CARE with Karoline Bingham MD  
[facility] Internal Medicine [address]) Appt Note: 3 month f/u  
 [address] [facility] [address]  
[phone]  
  
   
 [address] [facility] [address]  
  
    
 10/24/2018  2:20 PM  
New Patient with Martínez Gonzalez PsyD  [facility] ([address]) Appt Note: new pt ref by [facility] provider Dr Kumari [phone] for early signs of dementia [place] [address] [facility] 99 [address] [phone]  
  
   
 [place] [address] [facility] [address] [id number] [address] Upcoming Health Maintenance Date Due DTaP/Tdap/Td series (1 - Tdap) 1956 ZOSTER VACCINE AGE 60> 1997 EYE EXAM RETINAL OR DILATED Q1 2017 MICROALBUMIN Q1 2018 GLAUCOMA SCREENING Q2Y 2018 Influenza Age 5 to Adult 2018 HEMOGLOBIN A1C Q6M 2018 FOOT EXAM Q1 2018 MEDICARE YEARLY EXAM 11/10/2018 LIPID PANEL Q1 3/8/2019 COLONOSCOPY 2025 Allergies as of 2018  Review Complete On: 2018 By: Karoline Bingham MD  
 No Known Allergies Current Immunizations  Reviewed on 2018 Name Date Influenza High Dose Vaccine PF 2017 Influenza Vaccine 2016 Pneumococcal Conjugate (PCV-13) 2016 Pneumococcal Polysaccharide (PPSV-23) 2017 Reviewed by Karoline Bingham MD on 2018 at 11:03 AM  
You Were Diagnosed With   
  
 Codes Comments Essential hypertension    -  Primary ICD-10-CM: I10 
ICD-9-CM: 401.9 Type 2 diabetes with nephropathy (HCC)     ICD-10-CM: E11.21 
ICD-9-CM: 250.40, 583.81 Mixed hyperlipidemia     ICD-10-CM: E78.2 ICD-9-CM: 272.2 Chronic bilateral low back pain with bilateral sciatica     ICD-10-CM: M54.42, M54.41, G89.29 ICD-9-CM: 724.2, 724.3, 338.29 Other osteoarthritis of spine, lumbar region     ICD-10-CM: M47.896 ICD-9-CM: 721.3 Shoulder arthritis     ICD-10-CM: M19.019 
ICD-9-CM: 716.91 Vitals BP Pulse Temp Resp Height(growth percentile) Weight(growth percentile) 150/90 69 97.5 °F (36.4 °C) (Oral) 20 5' 7\" (1.702 m) 188 lb (85.3 kg) SpO2 BMI OB Status Smoking Status 92% 29.44 kg/m2 Hysterectomy Never Smoker Vitals History BMI and BSA Data Body Mass Index Body Surface Area  
 29.44 kg/m 2 2.01 m 2 Preferred Pharmacy Pharmacy Name Phone [facility] [address] [address] Anita Liu [phone] Your Updated Medication List  
  
   
This list is accurate as of 5/30/18 11:04 AM.  Always use your most recent med list.  
  
  
  
  
 acetaminophen 325 mg tablet Commonly known as:  TYLENOL Take 2 Tabs by mouth two (2) times a day. aluminum & magnesium hydroxide-simethicone 400-400-40 mg/5 mL suspension Commonly known as:  COMFORT GEL EXTRA STRENGTH Take 10 mL by mouth three (3) times daily as needed for Indigestion. APIDRA SOLOSTAR U-100 INSULIN 100 unit/mL pen Generic drug:  insulin glulisine U-100  
by SubCUTAneous route. aspirin 81 mg chewable tablet Take 81 mg by mouth daily. furosemide 20 mg tablet Commonly known as:  LASIX  
1 tab po QOD HYDROcodone-acetaminophen 5-325 mg per tablet Commonly known as:  Amelia Bobbi Take 1 Tab by mouth daily as needed for Pain. JANUMET 50-1,000 mg per tablet Generic drug:  SITagliptin-metFORMIN Take 1 tablet by mouth two  times daily with meals  
  
 lidocaine 4 % patch Commonly known as:  SALONPAS/ASPERCREME  
 Use 1 patch a  day  
  
 losartan 25 mg tablet Commonly known as:  COZAAR Take 1 Tab by mouth daily. meloxicam 15 mg tablet Commonly known as:  MOBIC  
TAKE 1 TABLET BY MOUTH  DAILY  
  
 metoprolol tartrate 100 mg IR tablet Commonly known as:  LOPRESSOR Take 1 Tab by mouth two (2) times a day. OTHER Compression stockings knee thigh,large. please measure pt's calf PARoxetine 20 mg tablet Commonly known as:  PAXIL TAKE 1 TABLET BY MOUTH  EVERY NIGHT AT BEDTIME  
  
 raNITIdine 150 mg tablet Commonly known as:  ZANTAC Take 1 Tab by mouth nightly. simvastatin 40 mg tablet Commonly known as:  ZOCOR  
TAKE 1 TABLET BY MOUTH  NIGHTLY  
  
 VITAMIN D3 1,000 unit Cap Generic drug:  cholecalciferol Take 1,000 Units by mouth daily. * [facility] Commonly known as:  ULTRA-LIGHT ROLLATOR  
1 Device by Does Not Apply route daily as needed. * [facility] Commonly known as:  ULTRA-LIGHT ROLLATOR  
1 Units by Does Not Apply route daily as needed. * Notice: This list has 2 medication(s) that are the same as other medications prescribed for you. Read the directions carefully, and ask your doctor or other care provider to review them with you. Prescriptions Printed Refills HYDROcodone-acetaminophen (NORCO) 5-325 mg per tablet 0 Sig: Take 1 Tab by mouth daily as needed for Pain. Class: Print Route: Oral  
  
Prescriptions Sent to Pharmacy Refills  
 losartan (COZAAR) 25 mg tablet 1 Sig: Take 1 Tab by mouth daily. Class: Normal  
 Pharmacy: [facility] [address] KORINA ALLEN [address] Ph #: [phone] Route: Oral  
 acetaminophen (TYLENOL) 325 mg tablet 5 Sig: Take 2 Tabs by mouth two (2) times a day. Class: Normal  
 Pharmacy: [facility] [address] KORINA ALLEN [address] Ph #: [phone] Route: Oral  
  
We Performed the Following REFERRAL TO ORTHOPEDICS [CZT212 Custom] Referral Information Referral ID Referred By Referred To  
  
 [id number] [facility], [address] [address], [facility] [address] [facility], [address] Phone: [phone] Fax: [phone] Visits Status Start Date End Date 1 New Request 5/30/18 5/30/19 If your referral has a status of pending review or denied, additional information will be sent to support the outcome of this decision. Patient Instructions DASH Diet: Care Instructions Your Care Instructions The DASH diet is an eating plan that can help lower your blood pressure. DASH stands for Dietary Approaches to Stop Hypertension. Hypertension is high blood pressure. The DASH diet focuses on eating foods that are high in calcium, potassium, and magnesium. These nutrients can lower blood pressure. The foods that are highest in these nutrients are fruits, vegetables, low-fat dairy products, nuts, seeds, and legumes. But taking calcium, potassium, and magnesium supplements instead of eating foods that are high in those nutrients does not have the same effect. The DASH diet also includes whole grains, fish, and poultry. The DASH diet is one of several lifestyle changes your doctor may recommend to lower your high blood pressure. Your doctor may also want you to decrease the amount of sodium in your diet. Lowering sodium while following the DASH diet can lower blood pressure even further than just the DASH diet alone. Follow-up care is a key part of your treatment and safety. Be sure to make and go to all appointments, and call your doctor if you are having problems. It's also a good idea to know your test results and keep a list of the medicines you take. How can you care for yourself at home? Following the DASH diet · Eat 4 to 5 servings of fruit each day.  A serving is 1 medium-sized piece of fruit, ½ cup chopped or canned fruit, 1/4 cup dried fruit, or 4 ounces (½ cup) of fruit juice. Choose fruit more often than fruit juice. · Eat 4 to 5 servings of vegetables each day. A serving is 1 cup of lettuce or raw leafy vegetables, ½ cup of chopped or cooked vegetables, or 4 ounces (½ cup) of vegetable juice. Choose vegetables more often than vegetable juice. · Get 2 to 3 servings of low-fat and fat-free dairy each day. A serving is 8 ounces of milk, 1 cup of yogurt, or 1 ½ ounces of cheese. · Eat 6 to 8 servings of grains each day. A serving is 1 slice of bread, 1 ounce of dry cereal, or ½ cup of cooked rice, pasta, or cooked cereal. Try to choose whole-grain products as much as possible. · Limit lean meat, poultry, and fish to 2 servings each day. A serving is 3 ounces, about the size of a deck of cards. · Eat 4 to 5 servings of nuts, seeds, and legumes (cooked dried beans, lentils, and split peas) each week. A serving is 1/3 cup of nuts, 2 tablespoons of seeds, or ½ cup of cooked beans or peas. · Limit fats and oils to 2 to 3 servings each day. A serving is 1 teaspoon of vegetable oil or 2 tablespoons of salad dressing. · Limit sweets and added sugars to 5 servings or less a week. A serving is 1 tablespoon jelly or jam, ½ cup sorbet, or 1 cup of lemonade. · Eat less than 2,300 milligrams (mg) of sodium a day. If you limit your sodium to 1,500 mg a day, you can lower your blood pressure even more. Tips for success · Start small. Do not try to make dramatic changes to your diet all at once. You might feel that you are missing out on your favorite foods and then be more likely to not follow the plan. Make small changes, and stick with them. Once those changes become habit, add a few more changes. · Try some of the following: ¨ Make it a goal to eat a fruit or vegetable at every meal and at snacks. This will make it easy to get the recommended amount of fruits and vegetables each day. ¨ Try yogurt topped with fruit and nuts for a snack or healthy dessert. ¨ Add lettuce, tomato, cucumber, and onion to sandwiches. ¨ Combine a ready-made pizza crust with low-fat mozzarella cheese and lots of vegetable toppings. Try using tomatoes, squash, spinach, broccoli, carrots, cauliflower, and onions. ¨ Have a variety of cut-up vegetables with a low-fat dip as an appetizer instead of chips and dip. ¨ Sprinkle sunflower seeds or chopped almonds over salads. Or try adding chopped walnuts or almonds to cooked vegetables. ¨ Try some vegetarian meals using beans and peas. Add garbanzo or kidney beans to salads. Make burritos and tacos with mashed sampson beans or black beans. Where can you learn more? Go to http://karoilna-thee.info/. Enter P616 in the search box to learn more about \"DASH Diet: Care Instructions. \" Current as of: September 21, 2016 Content Version: 11.4 © 6220-0596 [employer]. Care instructions adapted under license by [employer] (which disclaims liability or warranty for this information). If you have questions about a medical condition or this instruction, always ask your healthcare professional. [address] any warranty or liability for your use of this information. Please provide this summary of care documentation to your next provider. Your primary care clinician is listed as Dereck Homans. If you have any questions after today's visit, please call [phone].

## 2018-05-30 NOTE — PROGRESS NOTES
Chief Complaint   Patient presents with    Skin Problem     both lower legs--redness    Back Pain     \"pain all over body\"     1. Have you been to the ER, urgent care clinic since your last visit? Hospitalized since your last visit? NO    2. Have you seen or consulted any other health care providers outside of the [facility] since your last visit? Include any pap smears or colon screening.  No

## 2018-05-30 NOTE — PROGRESS NOTES
HISTORY OF PRESENT ILLNESS  Enrique Garcia is a [de-identified] y.o. female here for follow up. Report pain all over her back going down to both legs. Also having shoulder pain. No bowel bladder problem, no numbness or weakness in the legs. She is accompanied by her daughter,  would like to see an orthopedics. Taking Tylenol every day. Using Papo María as needed. Needed refill. She is seen for diabetes. Kurt Dials He reports medication compliance: compliant all of the time. Diabetic diet compliance: compliant most of the time. Home glucose monitoring: is not performed. Further diabetic ROS: no polyuria or polydipsia, no chest pain, dyspnea or TIA's, no numbness, tingling or pain in extremities, no hypoglycemia. Lab review: labs reviewed, I note that glycosylated hemoglobin normallabs reviewed and discussed with patient. Eye exam:up to date. Noticed to have elevated blood pressure. Taking metoprolol only. No chest pain palpitation or shortness of breath. All labs reviewed. Skin Problem   Pertinent negatives include no chest pain. Back Pain    Pertinent negatives include no chest pain and no fever. Diabetes   Pertinent negatives include no chest pain. Hypertension    Associated symptoms include anxiety. Pertinent negatives include no chest pain, no blurred vision and no nausea. Follow-up   Pertinent negatives include no chest pain. Review of Systems   Constitutional: Negative. Negative for chills and fever. HENT: Negative. Eyes: Negative. Negative for blurred vision and double vision. Respiratory: Negative. Cardiovascular: Negative. Negative for chest pain. Gastrointestinal: Negative for heartburn and nausea. Musculoskeletal: Positive for back pain. Negative for falls. Skin: Negative. Neurological: Negative. Psychiatric/Behavioral: Negative. Physical Exam   Constitutional: She appears well-developed and well-nourished. No distress. Neck: Normal range of motion.  Neck supple. No JVD present. No thyromegaly present. Cardiovascular: Normal rate, regular rhythm, normal heart sounds and intact distal pulses. Pulmonary/Chest: Effort normal. No respiratory distress. She has no wheezes. Musculoskeletal: She exhibits edema. She exhibits no tenderness. Lower back: Point tenderness in the LS spine. Range of motion restricted. Left shoulder: Tenderness present. Range of motion restricted. Psychiatric: She has a normal mood and affect. Her behavior is normal.       ASSESSMENT and PLAN  Diagnoses and all orders for this visit:    1. Essential hypertension    On metoprolol. Blood pressure is uncontrolled. We will add,  -     losartan (COZAAR) 25 mg tablet; Take 1 Tab by mouth daily. Be on DASH diet. Follow-up in 1 month. 2. Type 2 diabetes with nephropathy (HCC)  A1c 6.4. Seeing endocrinologist Lorenzo Prince. Last labs done in March. All are stable. 3. Mixed hyperlipidemia   stable lipid panel. 4. Chronic bilateral low back pain with bilateral sciatica  Not I would like to see a specialist rather than having x-ray. Will refer,    -     REFERRAL TO ORTHOPEDICS  -     acetaminophen (TYLENOL) 325 mg tablet; Take 2 Tabs by mouth two (2) times a day. 5. Other osteoarthritis of spine, lumbar region  -     REFERRAL TO ORTHOPEDICS  -     acetaminophen (TYLENOL) 325 mg tablet; Take 2 Tabs by mouth two (2) times a day. -     HYDROcodone-acetaminophen (NORCO) 5-325 mg per tablet; Take 1 Tab by mouth daily as needed for Pain. #30 no refill. 6. Shoulder arthritis  -     HYDROcodone-acetaminophen (NORCO) 5-325 mg per tablet; Take 1 Tab by mouth daily as needed for Pain. Discussed expected course/resolution/complications of diagnosis in detail with patient. Medication risks/benefits/costs/interactions/alternatives discussed with patient. Pt was given an after visit summary which includes diagnoses, current medications & vitals.    Pt expressed understanding with the diagnosis and plan.

## 2018-05-30 NOTE — TELEPHONE ENCOUNTER
Call placed to lorene and provided with Dr Ronel Goldberg number, lorene voiced understanding and appreciation

## 2018-05-30 NOTE — TELEPHONE ENCOUNTER
----- Message from [facility] sent at 5/30/2018  1:48 PM EDT -----  Regarding: Dr. Fermin Cortes: [phone]  Daughter, Justice Juarez  stated the doctor's recommendation for a specialist does not take pt's insurance and needs another practice and recommendation so that an appt can be made. Best contact [phone].

## 2018-06-04 ENCOUNTER — APPOINTMENT (OUTPATIENT)
Dept: GENERAL RADIOLOGY | Age: 83
DRG: 093 | End: 2018-06-04
Attending: EMERGENCY MEDICINE
Payer: MEDICARE

## 2018-06-04 ENCOUNTER — HOSPITAL ENCOUNTER (INPATIENT)
Age: 83
LOS: 2 days | Discharge: HOME HEALTH | DRG: 093 | End: 2018-06-06
Attending: EMERGENCY MEDICINE | Admitting: HOSPITALIST
Payer: MEDICARE

## 2018-06-04 ENCOUNTER — APPOINTMENT (OUTPATIENT)
Dept: CT IMAGING | Age: 83
DRG: 093 | End: 2018-06-04
Attending: EMERGENCY MEDICINE
Payer: MEDICARE

## 2018-06-04 DIAGNOSIS — G92.8 TOXIC METABOLIC ENCEPHALOPATHY: ICD-10-CM

## 2018-06-04 DIAGNOSIS — R41.82 ALTERED MENTAL STATUS, UNSPECIFIED ALTERED MENTAL STATUS TYPE: Primary | ICD-10-CM

## 2018-06-04 LAB
ANION GAP SERPL CALC-SCNC: 7 MMOL/L (ref 5–15)
APPEARANCE UR: ABNORMAL
BACTERIA URNS QL MICRO: ABNORMAL /HPF
BASOPHILS # BLD: 0 K/UL (ref 0–0.1)
BASOPHILS NFR BLD: 0 % (ref 0–1)
BILIRUB UR QL: NEGATIVE
BUN SERPL-MCNC: 15 MG/DL (ref 6–20)
BUN/CREAT SERPL: 16 (ref 12–20)
CALCIUM SERPL-MCNC: 8.6 MG/DL (ref 8.5–10.1)
CHLORIDE SERPL-SCNC: 105 MMOL/L (ref 97–108)
CO2 SERPL-SCNC: 28 MMOL/L (ref 21–32)
COLOR UR: YELLOW
CREAT SERPL-MCNC: 0.96 MG/DL (ref 0.55–1.02)
DIFFERENTIAL METHOD BLD: ABNORMAL
EOSINOPHIL # BLD: 0.1 K/UL (ref 0–0.4)
EOSINOPHIL NFR BLD: 1 % (ref 0–7)
EPITH CASTS URNS QL MICRO: ABNORMAL /LPF
ERYTHROCYTE [DISTWIDTH] IN BLOOD BY AUTOMATED COUNT: 16.1 % (ref 11.5–14.5)
GLUCOSE BLD STRIP.AUTO-MCNC: 137 MG/DL (ref 65–100)
GLUCOSE BLD STRIP.AUTO-MCNC: 138 MG/DL (ref 65–100)
GLUCOSE BLD STRIP.AUTO-MCNC: 177 MG/DL (ref 65–100)
GLUCOSE SERPL-MCNC: 184 MG/DL (ref 65–100)
GLUCOSE UR STRIP.AUTO-MCNC: 100 MG/DL
HCT VFR BLD AUTO: 31.5 % (ref 35–47)
HGB BLD-MCNC: 9.9 G/DL (ref 11.5–16)
HGB UR QL STRIP: ABNORMAL
IMM GRANULOCYTES # BLD: 0 K/UL (ref 0–0.04)
IMM GRANULOCYTES NFR BLD AUTO: 0 % (ref 0–0.5)
KETONES UR QL STRIP.AUTO: NEGATIVE MG/DL
LEUKOCYTE ESTERASE UR QL STRIP.AUTO: NEGATIVE
LYMPHOCYTES # BLD: 1.8 K/UL (ref 0.8–3.5)
LYMPHOCYTES NFR BLD: 30 % (ref 12–49)
MCH RBC QN AUTO: 29.3 PG (ref 26–34)
MCHC RBC AUTO-ENTMCNC: 31.4 G/DL (ref 30–36.5)
MCV RBC AUTO: 93.2 FL (ref 80–99)
MONOCYTES # BLD: 0.6 K/UL (ref 0–1)
MONOCYTES NFR BLD: 10 % (ref 5–13)
NEUTS SEG # BLD: 3.4 K/UL (ref 1.8–8)
NEUTS SEG NFR BLD: 59 % (ref 32–75)
NITRITE UR QL STRIP.AUTO: NEGATIVE
NRBC # BLD: 0 K/UL (ref 0–0.01)
NRBC BLD-RTO: 0 PER 100 WBC
PH UR STRIP: 7.5 [PH] (ref 5–8)
PLATELET # BLD AUTO: 176 K/UL (ref 150–400)
PMV BLD AUTO: 11.5 FL (ref 8.9–12.9)
POTASSIUM SERPL-SCNC: 3.7 MMOL/L (ref 3.5–5.1)
PROT UR STRIP-MCNC: 100 MG/DL
RBC # BLD AUTO: 3.38 M/UL (ref 3.8–5.2)
RBC #/AREA URNS HPF: ABNORMAL /HPF (ref 0–5)
RBC MORPH BLD: ABNORMAL
SERVICE CMNT-IMP: ABNORMAL
SODIUM SERPL-SCNC: 140 MMOL/L (ref 136–145)
SP GR UR REFRACTOMETRY: 1.01 (ref 1–1.03)
TROPONIN I SERPL-MCNC: <0.04 NG/ML
UR CULT HOLD, URHOLD: NORMAL
UROBILINOGEN UR QL STRIP.AUTO: 0.2 EU/DL (ref 0.2–1)
WBC # BLD AUTO: 5.9 K/UL (ref 3.6–11)
WBC URNS QL MICRO: ABNORMAL /HPF (ref 0–4)

## 2018-06-04 PROCEDURE — 85610 PROTHROMBIN TIME: CPT

## 2018-06-04 PROCEDURE — 85025 COMPLETE CBC W/AUTO DIFF WBC: CPT | Performed by: EMERGENCY MEDICINE

## 2018-06-04 PROCEDURE — 84484 ASSAY OF TROPONIN QUANT: CPT | Performed by: EMERGENCY MEDICINE

## 2018-06-04 PROCEDURE — 99285 EMERGENCY DEPT VISIT HI MDM: CPT

## 2018-06-04 PROCEDURE — 77030005563 HC CATH URETH INT MMGH -A

## 2018-06-04 PROCEDURE — 74011250637 HC RX REV CODE- 250/637: Performed by: HOSPITALIST

## 2018-06-04 PROCEDURE — 80048 BASIC METABOLIC PNL TOTAL CA: CPT | Performed by: EMERGENCY MEDICINE

## 2018-06-04 PROCEDURE — 82962 GLUCOSE BLOOD TEST: CPT

## 2018-06-04 PROCEDURE — 81001 URINALYSIS AUTO W/SCOPE: CPT | Performed by: EMERGENCY MEDICINE

## 2018-06-04 PROCEDURE — 99218 HC RM OBSERVATION: CPT

## 2018-06-04 PROCEDURE — 93970 EXTREMITY STUDY: CPT

## 2018-06-04 PROCEDURE — 70450 CT HEAD/BRAIN W/O DYE: CPT

## 2018-06-04 PROCEDURE — 93005 ELECTROCARDIOGRAM TRACING: CPT

## 2018-06-04 PROCEDURE — 71045 X-RAY EXAM CHEST 1 VIEW: CPT

## 2018-06-04 PROCEDURE — 36415 COLL VENOUS BLD VENIPUNCTURE: CPT | Performed by: EMERGENCY MEDICINE

## 2018-06-04 PROCEDURE — 65660000000 HC RM CCU STEPDOWN

## 2018-06-04 PROCEDURE — 94762 N-INVAS EAR/PLS OXIMTRY CONT: CPT

## 2018-06-04 PROCEDURE — 96374 THER/PROPH/DIAG INJ IV PUSH: CPT

## 2018-06-04 PROCEDURE — 74011250636 HC RX REV CODE- 250/636: Performed by: HOSPITALIST

## 2018-06-04 RX ORDER — INSULIN LISPRO 100 [IU]/ML
INJECTION, SOLUTION INTRAVENOUS; SUBCUTANEOUS
Status: DISCONTINUED | OUTPATIENT
Start: 2018-06-04 | End: 2018-06-06 | Stop reason: HOSPADM

## 2018-06-04 RX ORDER — SIMVASTATIN 40 MG/1
40 TABLET, FILM COATED ORAL DAILY
Status: DISCONTINUED | OUTPATIENT
Start: 2018-06-05 | End: 2018-06-06 | Stop reason: HOSPADM

## 2018-06-04 RX ORDER — ENOXAPARIN SODIUM 100 MG/ML
40 INJECTION SUBCUTANEOUS EVERY 24 HOURS
Status: DISCONTINUED | OUTPATIENT
Start: 2018-06-04 | End: 2018-06-06 | Stop reason: HOSPADM

## 2018-06-04 RX ORDER — MELATONIN
1000 DAILY
Status: DISCONTINUED | OUTPATIENT
Start: 2018-06-05 | End: 2018-06-06 | Stop reason: HOSPADM

## 2018-06-04 RX ORDER — METOPROLOL TARTRATE 25 MG/1
100 TABLET, FILM COATED ORAL 2 TIMES DAILY
Status: DISCONTINUED | OUTPATIENT
Start: 2018-06-04 | End: 2018-06-06 | Stop reason: HOSPADM

## 2018-06-04 RX ORDER — BACLOFEN 10 MG/1
10 TABLET ORAL 3 TIMES DAILY
Status: ON HOLD | COMMUNITY
End: 2018-06-12

## 2018-06-04 RX ORDER — HYDRALAZINE HYDROCHLORIDE 20 MG/ML
10 INJECTION INTRAMUSCULAR; INTRAVENOUS
Status: COMPLETED | OUTPATIENT
Start: 2018-06-04 | End: 2018-06-04

## 2018-06-04 RX ORDER — SODIUM CHLORIDE 0.9 % (FLUSH) 0.9 %
5-10 SYRINGE (ML) INJECTION EVERY 8 HOURS
Status: DISCONTINUED | OUTPATIENT
Start: 2018-06-04 | End: 2018-06-06 | Stop reason: HOSPADM

## 2018-06-04 RX ORDER — LOSARTAN POTASSIUM 25 MG/1
25 TABLET ORAL DAILY
Status: DISCONTINUED | OUTPATIENT
Start: 2018-06-05 | End: 2018-06-06 | Stop reason: HOSPADM

## 2018-06-04 RX ORDER — ACETAMINOPHEN 325 MG/1
650 TABLET ORAL 2 TIMES DAILY
Status: DISCONTINUED | OUTPATIENT
Start: 2018-06-04 | End: 2018-06-06 | Stop reason: HOSPADM

## 2018-06-04 RX ORDER — PAROXETINE HYDROCHLORIDE 20 MG/1
20 TABLET, FILM COATED ORAL DAILY
Status: DISCONTINUED | OUTPATIENT
Start: 2018-06-05 | End: 2018-06-06 | Stop reason: HOSPADM

## 2018-06-04 RX ORDER — MELOXICAM 7.5 MG/1
15 TABLET ORAL DAILY
Status: DISCONTINUED | OUTPATIENT
Start: 2018-06-04 | End: 2018-06-06 | Stop reason: HOSPADM

## 2018-06-04 RX ORDER — SODIUM CHLORIDE 0.9 % (FLUSH) 0.9 %
5-10 SYRINGE (ML) INJECTION AS NEEDED
Status: DISCONTINUED | OUTPATIENT
Start: 2018-06-04 | End: 2018-06-06 | Stop reason: HOSPADM

## 2018-06-04 RX ORDER — GUAIFENESIN 100 MG/5ML
81 LIQUID (ML) ORAL DAILY
Status: DISCONTINUED | OUTPATIENT
Start: 2018-06-05 | End: 2018-06-06 | Stop reason: HOSPADM

## 2018-06-04 RX ORDER — FUROSEMIDE 40 MG/1
20 TABLET ORAL DAILY
Status: DISCONTINUED | OUTPATIENT
Start: 2018-06-05 | End: 2018-06-06 | Stop reason: HOSPADM

## 2018-06-04 RX ORDER — DEXTROSE 50 % IN WATER (D50W) INTRAVENOUS SYRINGE
12.5-25 AS NEEDED
Status: DISCONTINUED | OUTPATIENT
Start: 2018-06-04 | End: 2018-06-06 | Stop reason: HOSPADM

## 2018-06-04 RX ORDER — MAGNESIUM SULFATE 100 %
4 CRYSTALS MISCELLANEOUS AS NEEDED
Status: DISCONTINUED | OUTPATIENT
Start: 2018-06-04 | End: 2018-06-06 | Stop reason: HOSPADM

## 2018-06-04 RX ORDER — FAMOTIDINE 20 MG/1
20 TABLET, FILM COATED ORAL DAILY
Status: DISCONTINUED | OUTPATIENT
Start: 2018-06-05 | End: 2018-06-06 | Stop reason: HOSPADM

## 2018-06-04 RX ADMIN — MELOXICAM 15 MG: 7.5 TABLET ORAL at 17:37

## 2018-06-04 RX ADMIN — Medication 10 ML: at 17:40

## 2018-06-04 RX ADMIN — METFORMIN HYDROCHLORIDE: 500 TABLET, FILM COATED ORAL at 17:38

## 2018-06-04 RX ADMIN — ENOXAPARIN SODIUM 40 MG: 100 INJECTION SUBCUTANEOUS at 17:35

## 2018-06-04 RX ADMIN — METOPROLOL TARTRATE 100 MG: 25 TABLET ORAL at 17:36

## 2018-06-04 RX ADMIN — ACETAMINOPHEN 650 MG: 325 TABLET ORAL at 17:35

## 2018-06-04 RX ADMIN — HYDRALAZINE HYDROCHLORIDE 10 MG: 20 INJECTION INTRAMUSCULAR; INTRAVENOUS at 14:56

## 2018-06-04 NOTE — PROCEDURES
[facility]  *** FINAL REPORT ***    Name: Cassidy Ozuna  MRN: [id number]    Inpatient  : 13 Aug 1935  HIS Order #: [id number]  [id number] [facility] Visit #: [id number]  Date: 2018    TYPE OF TEST: Peripheral Venous Testing    REASON FOR TEST  Pain in limb, Limb swelling    Right Leg:-  Deep venous thrombosis:           No  Superficial venous thrombosis:    No  Deep venous insufficiency:        Not examined  Superficial venous insufficiency: Not examined    Left Leg:-  Deep venous thrombosis:           No  Superficial venous thrombosis:    No  Deep venous insufficiency:        Not examined  Superficial venous insufficiency: Not examined      INTERPRETATION/FINDINGS  PROCEDURE:  Color duplex ultrasound imaging of lower extremity veins. FINDINGS:       Right: The common femoral, deep femoral, femoral, popliteal,  posterior tibial, peroneal, and great saphenous are patent and without   evidence of thrombus;  each is fully compressible and there is no  narrowing of the flow channel on color Doppler imaging. Phasic flow  is observed in the common femoral vein. Left:   The common femoral, deep femoral, femoral, popliteal,  posterior tibial, peroneal, and great saphenous are patent and without   evidence of thrombus;  each is fully compressible and there is no  narrowing of the flow channel on color Doppler imaging. Phasic flow  is observed in the common femoral vein. IMPRESSION:  No evidence of right or left lower extremity vein  thrombosis. ADDITIONAL COMMENTS    I have personally reviewed the data relevant to the interpretation of  this  study. TECHNOLOGIST: Leartis Nyhan.  Remi Maurice  Signed: 2018 03:29 PM    PHYSICIAN: Anjana Lorenz., MD  Signed: 2018 05:13 PM

## 2018-06-04 NOTE — H&P
[address]   HISTORY AND PHYSICAL      Margaret Mccurdy  MR#: [id number]  : 1935  ACCOUNT #: [de-identified]   ADMIT DATE: 2018    PRIMARY CARE PHYSICIAN:  Gwen Mcleod    PRESENTING COMPLAINT:  Altered mental status. HISTORY OF PRESENT ILLNESS:  The patient is a very pleasant 22-year-old -American female who has previous history significant for hypertension, hyperlipidemia, type 2 diabetes, chronic pain,TIA 14 years ago without residual deficits was brought from home due to altered mental status. I have obtained the history from her daughter and daughter-in-law who are both on the bedside. The patient's daughter tells me that she has chronic back issues. However, recently her back pain has become worse. Dr. Gwen Mcleod started her on baclofen 10 mg 3 times daily on the . She was also given a prescription of hydrocodone/acetaminophen 5/325 mg, which is supposed to be taken 1 tablet once daily as needed. She got 1 tablet yesterday however this morning 2 tablets were missing from the bottle. The last time the patient was in her normal mental status was last night. This morning when patient's daughter saw her, she was confused and was subsequently brought to the emergency room. Initially, a code stroke was called in. Her workup included a CT scan of the chest which was unremarkable. The patient is still slightly confused, but is better. Her main complaint is back pain which radiates to her R leg. She denies having any chest pain or shortness of breath. PAST MEDICAL HISTORY:  Significant for:    1. Chronic pain. 2.  Depression. 3.  Type 2 diabetes. 4.  Hyperlipidemia. 5.  History of TIA 14 years ago. 6.  History of stroke in . PAST SURGICAL HISTORY:  Significant for hysterectomy, carpal tunnel surgery and refractive surgery. SOCIAL HISTORY:  The patient is a . She lives with her daughter. She does not smoke or drink.   She does not use any drugs. She walks with a cane. CODE STATUS:  FULL CODE. FAMILY HISTORY:  Not available as the patient was adopted. MEDICATIONS:  Include:   1. Janumet 50/1000 two tablets daily. 2.  Metoprolol, which is 100 mg.  3.  Losartan 25 mg daily. 4.  Meloxicam 15 mg daily. 5.  Paxil 10 mg daily. 6.  Lasix 20 mg daily. 7.  Simvastatin daily. 8.  Baby aspirin 81 mg daily. 9.  Lidocaine patch. 10.  Ranitidine 150 mg daily. 11.  Apidra sliding scale insulin. 12.  New prescription of baclofen 10 mg 3 times daily. 13.  Hydrocodone/acetaminophen 5/325 one tablet daily. REVIEW OF SYSTEMS:  Unreliable because of the patient's mental status. PHYSICAL EXAMINATION:  GENERAL:  The patient is an 62-year-old female, not in any acute distress, resting comfortably in bed. VITAL SIGNS:  Temperature 98, blood pressure 172/90, pulse is 67, respiratory rate is 16, saturating 98% on room air. HEENT:  Reveals pupils equally reactive to light and accommodation. NECK:  Supple. There is no lymphadenopathy or JVD. LUNGS:  Clear. No wheezing, no crackles. HEART:  S1 and S2 with a murmur in the aortic area. ABDOMEN:  Reveals no tenderness, no guarding, no rigidity. Bowel sounds are active. EXTREMITIES:  No pedal edema. Decreased peripheral pulses. No cyanosis or clubbing. NEUROLOGIC:  This patient is alert, oriented, answers most of the questions appropriately; however, sometimes she gets confused. He is moving all her extremities. There is no pronator drift. The patient is unable to walk or sit up due to her back issues. Cranial nerves are normal.  Sensory examination and motor examination is unremarkable. Cerebellar examination was not done because of her back pain. SKIN:  Unremarkable. LABORATORY DATA:  Lab work done in the emergency room reveals a CBC with a white count of 5.9, hemoglobin of 9.9, hematocrit 31.5, MCV 93.2, platelet count is 612,926.   Her previous CBC which was done on the 9th of May also showed a low hemoglobin of 10.8. Her chemistries in the ER reveal sodium 140, potassium 2.7, chloride 105, bicarb is 28, gap of 7, glucose 184, BUN 15, creatinine 0.96, calcium is 8.6. Troponin I is less than 0.04. Urinalysis revealed proteinuria, some mild glucosuria, small amount of blood. There are no WBCs, no nitrites or leukocyte esterase, 2+ bacteria. Her EKG shows normal sinus rhythm. Chest x-ray was done which shows no acute abnormality. CT scan of the head was unremarkable also. ASSESSMENT AND PLAN:      The patient is an 44-year-old female who has previous history significant for diabetes, essential hypertension, hyperlipidemia, chronic back pain and osteoarthritis is being admitted due to :    1. Altered mental status. I think this is probably due to taking Baclofen and Percocet. The patient's daughter states that there were 2 pills of hydrocodone missing this morning. She was supposed to have 29 pills in her bottle, currently there are 27. She was also started on baclofen. I will hold her Percocet  and baclofen until her mental status is better. 2.  Patient has previous history significant for stroke; however, her neurological examination is nonfocal.  CT scan is unremarkable. 3.  The patient does have a murmur. I will order an echocardiogram to further evaluate this. 4.  Anemia, which seems to be chronic. 5.  Type 2 diabetes. We will continue on current regimen. 6.  I will continue aspirin for stroke prophylaxis. 7.  Back pain ? Sciatica which has become worse. Patient probably will need physiotherapy consult. Once her symptoms are better, we can start her on tramadol. She probably will need more imaging. 8.  Monitor blood sugars. 9.  Deep venous thrombosis prophylaxis. 10.  History of hypertension. Continue current regimen. 11.  History of depression. Continue Paxil.       MD TIN Degroot/MN  D: 06/04/2018 14:22     T: 06/04/2018 14:47  JOB #: [id number]

## 2018-06-04 NOTE — ED PROVIDER NOTES
HPI Comments: [de-identified] y.o. female with past medical history significant for TIA, hypertension, hypercholesterolemia, diabetes, chronic pain who presents from home via EMS for evaluation of altered mental status. Per daughter, patient was normal when she went to bed last night but woke up confused this morning. Daughter states patient did not know the day of the week and looked \"spacy. \"  Patient takes pain medicine and muscle relaxers for chronic back pain; two days ago she was started on baclofen TID. Daughter gave patient baclofen last night at 2230. This morning when daughter woke patient up, she was confused and daughter noted that the top to the codeine bottle was off. Daughter thinks patient may have taken extra codeine (possibly hydrocodone, per chart). EMS reports patient was initially A&O x4 but became progressively more confused en route. No recent illness or other complaints of pain. There are no other acute medical concerns at this time. Social hx: Lives at home with daughter  PCP: Jose Escobar MD    Full history, physical exam, and ROS unable to be obtained due to:  confusion. Note written by Najma Hebert. Ivana Chirinos, as dictated by Sergio Knapp MD 10:49 AM      The history is provided by the patient, the EMS personnel and a relative. Past Medical History:   Diagnosis Date    Arthritis     Chronic pain     Depression     Diabetes ([address]     Hypercholesterolemia     Hypertension     [facility]     TIA 10 yrs back    Stroke ([facility] [address]     2003       Past Surgical History:   Procedure Laterality Date    HX CARPAL TUNNEL RELEASE  1990    HX CATARACT REMOVAL      bilateral    HX HYSTERECTOMY  1986    HX HYSTERECTOMY      HX ORTHOPAEDIC      HX ORTHOPAEDIC      carpel tunnel left    HX ORTHOPAEDIC      left foot heel spur    HX REFRACTIVE SURGERY  2006         No family history on file.     Social History     Social History    Marital status:      Spouse name: N/A    Number of children: N/A    Years of education: N/A     Occupational History    Not on file. Social History Main Topics    Smoking status: Never Smoker    Smokeless tobacco: Never Used    Alcohol use No    Drug use: No    Sexual activity: No      Comment: retired,relocated from [place] ,[address] [place] [address] with daughter     Other Topics Concern    Not on file     Social History Narrative    ** Merged History Encounter **              ALLERGIES: Review of patient's allergies indicates no known allergies. Review of Systems   Unable to perform ROS: Mental status change       Vitals:    06/04/18 1040 06/04/18 1130 06/04/18 1200 06/04/18 1230   BP: 189/82 167/71 182/81 172/90   Pulse: 70 70 70 67   Resp: 17 13 18 16   Temp: 98 °F (36.7 °C)      SpO2:  98% 98% 98%   Weight: 87.7 kg (193 lb 5.5 oz)      Height: 5' 7\" (1.702 m)               Physical Exam   Constitutional: She appears well-developed and well-nourished. No distress. HENT:   Head: Normocephalic and atraumatic. Mouth/Throat: Oropharynx is clear and moist.   Eyes: Pupils are equal, round, and reactive to light. No scleral icterus. Neck: Normal range of motion. Neck supple. No thyromegaly present. Cardiovascular: Normal rate, regular rhythm, normal heart sounds and intact distal pulses. No murmur heard. Pulmonary/Chest: Effort normal and breath sounds normal. No respiratory distress. Abdominal: Soft. Bowel sounds are normal. She exhibits no distension. There is no tenderness. Musculoskeletal: Normal range of motion. She exhibits no edema. Neurological: She is alert. Mental Status:  Oriented to time, place and person. Cranial Nerves: Intact visual fields. Fundi are benign. PERRL, EOM's full and intact, no nystagmus, no ptosis. Facial sensation is normal. Facial movement is symmetric. Palate is midline with normal sternocleidomastoid and trapezius muscle strength. Tongue is midline.      Motor:  5/5 strength in upper and lower proximal and distal muscles. Normal bulk and tone. Reflexes:  Biceps and quadriceps tendon reflexes 2+ and symmetrical.     Sensory:  Normal to light touch    Gait:   Deferred    Cerebellar:  Normal finger-to-nose and heal to shin. Negative Romberg. Skin: Skin is warm and dry. No rash noted. She is not diaphoretic. Nursing note and vitals reviewed. Note written by Diana Moreno. Kajal Oh, as dictated by Scotty Dooley MD 10:53 AM       MDM  Number of Diagnoses or Management Options  Diagnosis management comments: A:  [de-identified] F who woke this AM with acute confusion and disorientation as described by daughter. VS stable with elevated BP.  nonfocal neuro exam.    P:  Discussed with tele-neuro. No tpa. Rec'd admission for further work up.  ecg  cxr  Labs  UA  reassess        ED Course       Procedures     Head CT: IMPRESSION: No acute process. Portable Chest Xray:  Portable chest xray showing no signs of acute disease. This CXR was interpreted by Scotty Dooley MD, ED Provider. ED EKG interpretation:  Rhythm: normal sinus rhythm. Rate (approx.): 69.  Axis: normal.  ST segment:  No concerning ST elevations or depressions. This EKG was interpreted by Scotty Dooley MD,ED Provider. Labs unremarkable. CONSULT NOTE:  10:55 AM Scotty Dooley MD spoke with Dr. Marycruz White, Consult for Tele-Neurology. Discussed available diagnostic tests and clinical findings. CONSULT NOTE:  2:01 PM Scotty Dooley MD spoke with Dr. Satya Mccarty, Consult for Hospitalist.  Discussed available diagnostic tests and clinical findings. Dr. Satya Mccarty will admit.

## 2018-06-04 NOTE — Clinical Note
Patient Class[de-identified] Observation [501] Type of Bed: Neuro/Stroke [9] Reason for Observation: AMS Admitting Diagnosis: Altered mental status [780.97. ICD-9-CM] Admitting Physician: José Miguel Benavides [[id number]] Attending Physician: Kain Ramos

## 2018-06-04 NOTE — ED NOTES
Pt had a large amount of emesis in CT. No aspiration noted. Pt cleaned and assisted to stretcher. Pt denies nausea at this time. MD made aware.

## 2018-06-04 NOTE — PROGRESS NOTES
Speech pathology  Consult received for SLP dysphagia screening. Nursing dysphagia screen not yet completed. RN should complete STAND. If formal SLP evaluation is desired, please re-consult with SLP eval and treat order as SLP does not complete \"screenings\" only evaluations or treatments. Thanks.     Linda Tovar M.S. CCC-SLP

## 2018-06-04 NOTE — IP AVS SNAPSHOT
[address] 
[phone] Patient: [facility] MRN: [id number] :1935 A check lorne indicates which time of day the medication should be taken. My Medications CHANGE how you take these medications Instructions Each Dose to Equal  
 Morning Noon Evening Bedtime  
 raNITIdine 150 mg tablet Commonly known as:  ZANTAC What changed:  when to take this Your last dose was: Your next dose is: Take 1 Tab by mouth nightly. 150 mg  
    
   
   
   
  
 simvastatin 40 mg tablet Commonly known as:  ZOCOR What changed:  See the new instructions. Your last dose was: Your next dose is: TAKE 1 TABLET BY MOUTH  NIGHTLY CONTINUE taking these medications Instructions Each Dose to Equal  
 Morning Noon Evening Bedtime  
 acetaminophen 325 mg tablet Commonly known as:  TYLENOL Your last dose was: Your next dose is: Take 2 Tabs by mouth two (2) times a day. 650 mg APIDRA SOLOSTAR U-100 INSULIN 100 unit/mL pen Generic drug:  insulin glulisine U-100 Your last dose was: Your next dose is:    
   
   
 by SubCUTAneous route. 5 units with breakfast and Dinner, 3 units with Lunch Sliding Scale: 150-200 = add 2 units 201-250 = add 4 units 251-300 = add 5 units 301-350 = add 6 units 351 and above = add 8 units and call MD  
     
   
   
   
  
 aspirin 81 mg chewable tablet Your last dose was: Your next dose is: Take 81 mg by mouth daily. 81 mg  
    
   
   
   
  
 baclofen 10 mg tablet Commonly known as:  LIORESAL Your last dose was: Your next dose is: Take 10 mg by mouth three (3) times daily. 10 mg  
    
   
   
   
  
 furosemide 20 mg tablet Commonly known as:  LASIX Your last dose was: Your next dose is:    
   
   
 1 tab po QOD JANUMET 50-1,000 mg per tablet Generic drug:  SITagliptin-metFORMIN Your last dose was: Your next dose is: Take 1 tablet by mouth two  times daily with meals  
     
   
   
   
  
 losartan 25 mg tablet Commonly known as:  COZAAR Your last dose was: Your next dose is: Take 1 Tab by mouth daily. 25 mg  
    
   
   
   
  
 meloxicam 15 mg tablet Commonly known as:  MOBIC Your last dose was: Your next dose is: TAKE 1 TABLET BY MOUTH  DAILY  
     
   
   
   
  
 metoprolol tartrate 100 mg IR tablet Commonly known as:  LOPRESSOR Your last dose was: Your next dose is: Take 1 Tab by mouth two (2) times a day. 100 mg PARoxetine 20 mg tablet Commonly known as:  PAXIL Your last dose was: Your next dose is: TAKE 1 TABLET BY MOUTH  EVERY NIGHT AT BEDTIME  
     
   
   
   
  
 VITAMIN D3 1,000 unit Cap Generic drug:  cholecalciferol Your last dose was: Your next dose is: Take 1,000 Units by mouth daily. 1000 Units STOP taking these medications HYDROcodone-acetaminophen 5-325 mg per tablet Commonly known as:  Kaiden Foy

## 2018-06-04 NOTE — IP AVS SNAPSHOT
[facility] 
[phone] Patient: [facility] MRN: [id number] :1935 About your hospitalization You were admitted on:  2018 You last received care in the:  [facility] 6S NEURO-SCI TELE You were discharged on:  2018 Why you were hospitalized Your primary diagnosis was:  Not on File Your diagnoses also included: Altered Mental Status Follow-up Information Follow up With Details Comments Contact Info Andrei Xiong NP Go on 2018 Hospital PCP f/u appointment on  @ 11:00 a.m. 200 [facility] 102 [facility] 13 
[phone] [facility] On 2018 daughter plans to transport. [address] [place] [address] 
[phone] Your Scheduled Appointments 2018 11:00 AM EDT Office Visit with Andrei Xiong NP [facility] Internal Medicine ([address] [place] [address]) 200 [facility] [address] 102 [facility] Arnold 13  
[phone]  10:15 AM EDT ROUTINE CARE with Mar Blue MD  
[facility] Internal Medicine [address] [place] [address]) 200 [facility] [address] 102 [facility] 13  
[phone] Discharge Orders None A check lorne indicates which time of day the medication should be taken. My Medications CHANGE how you take these medications Instructions Each Dose to Equal  
 Morning Noon Evening Bedtime  
 raNITIdine 150 mg tablet Commonly known as:  ZANTAC What changed:  when to take this Your last dose was: Your next dose is: Take 1 Tab by mouth nightly. 150 mg  
    
   
   
   
  
 simvastatin 40 mg tablet Commonly known as:  ZOCOR What changed:  See the new instructions. Your last dose was: Your next dose is: TAKE 1 TABLET BY MOUTH  NIGHTLY CONTINUE taking these medications Instructions Each Dose to Equal  
 Morning Noon Evening Bedtime  
 acetaminophen 325 mg tablet Commonly known as:  TYLENOL Your last dose was: Your next dose is: Take 2 Tabs by mouth two (2) times a day. 650 mg APIDRA SOLOSTAR U-100 INSULIN 100 unit/mL pen Generic drug:  insulin glulisine U-100 Your last dose was: Your next dose is:    
   
   
 by SubCUTAneous route. 5 units with breakfast and Dinner, 3 units with Lunch Sliding Scale: 150-200 = add 2 units 201-250 = add 4 units 251-300 = add 5 units 301-350 = add 6 units 351 and above = add 8 units and call MD  
     
   
   
   
  
 aspirin 81 mg chewable tablet Your last dose was: Your next dose is: Take 81 mg by mouth daily. 81 mg  
    
   
   
   
  
 baclofen 10 mg tablet Commonly known as:  LIORESAL Your last dose was: Your next dose is: Take 10 mg by mouth three (3) times daily. 10 mg  
    
   
   
   
  
 furosemide 20 mg tablet Commonly known as:  LASIX Your last dose was: Your next dose is:    
   
   
 1 tab po QOD JANUMET 50-1,000 mg per tablet Generic drug:  SITagliptin-metFORMIN Your last dose was: Your next dose is: Take 1 tablet by mouth two  times daily with meals  
     
   
   
   
  
 losartan 25 mg tablet Commonly known as:  COZAAR Your last dose was: Your next dose is: Take 1 Tab by mouth daily. 25 mg  
    
   
   
   
  
 meloxicam 15 mg tablet Commonly known as:  MOBIC Your last dose was: Your next dose is: TAKE 1 TABLET BY MOUTH  DAILY  
     
   
   
   
  
 metoprolol tartrate 100 mg IR tablet Commonly known as:  LOPRESSOR Your last dose was: Your next dose is: Take 1 Tab by mouth two (2) times a day. 100 mg PARoxetine 20 mg tablet Commonly known as:  PAXIL Your last dose was: Your next dose is: TAKE 1 TABLET BY MOUTH  EVERY NIGHT AT BEDTIME  
     
   
   
   
  
 VITAMIN D3 1,000 unit Cap Generic drug:  cholecalciferol Your last dose was: Your next dose is: Take 1,000 Units by mouth daily. 1000 Units STOP taking these medications HYDROcodone-acetaminophen 5-325 mg per tablet Commonly known as:  Amelia Martines Discharge Instructions Discharge SNF/Rehab Instructions/LTAC  
 
  
PATIENT ID: [facility] MRN: [id number] YOB: 1935 DATE OF ADMISSION: 6/4/2018 10:38 AM   
DATE OF DISCHARGE: 6/6/2018 PRIMARY CARE PROVIDER: Torey Doyle MD  
 
 
ATTENDING PHYSICIAN: Alisa Sneed MD 
DISCHARGING PROVIDER: Alisa Sneed MD    
To contact this individual call [phone] and ask the  to page. If unavailable ask to be transferred the Adult Hospitalist Department. CONSULTATIONS: IP CONSULT TO NEUROLOGY PROCEDURES/SURGERIES: * No surgery found * [address] COURSE: The patient is a very pleasant 82-year-old -American female who has previous history significant for hypertension, hyperlipidemia, type 2 diabetes, chronic pain,TIA 14 years ago without residual deficits was brought from home due to altered mental status. Acute metabolic encephalopathy POA 
-improved, confusion better, patient is conscious and alert, answer questions appropriately 
-CT head no evidence of acute process 
-possible due to percocet or baclofen 
-no fever or leukocytosis,  
-UA no evidence of UTI 
-chest  X ray no acute abnormality 
-Echo LV EF 55-60% no regional wall motion abnormality wall thickness normal 
-seen by neurologist   
Anemia of chronic disease  
-H/H is stable -no hematemesis or melena  
HTN 
-BP not at goal, continue losartan, metoprolol and lasix, monitor BP 
DM-II 
-continue metformin/sitagliptin, sliding scale and monitor finger stick glucose Hx of TIA 
-continue aspirin and zocor Hx of hypercholesterolemia  
-continue zocor Chronic back pain with bilateral sciatica 
-stable -PT/OT  
Hx of depression  
-stable, continue paxil 
  
Code status: Full Code DVT prophylaxis: Lovenox 
  
 
 
DISCHARGE DIAGNOSES / PLAN:   
 
Acute metabolic encephalopathy POA 
-improved 
-continue supportive care and PT 
-seen by neurologist   
Anemia of chronic disease  
-H/H is stable HTN 
-Continue losartan, metoprolol and lasix, monitor BP 
DM-II 
-continue metformin/sitagliptin, sliding scale and monitor finger stick glucose Hx of TIA 
-continue aspirin and zocor Hx of hypercholesterolemia  
-continue zocor Chronic back pain with bilateral sciatica 
-stable -PT/OT  
Hx of depression  
- continue paxil PENDING TEST RESULTS:  
At the time of discharge the following test results are still pending: none FOLLOW UP APPOINTMENTS:   
Follow-up Information Follow up With Details Comments Contact Info 1. Srinivasa Watts NP Go on 6/13/2018 Hospital PCP f/u appointment on Wednesday June 13,2018 @ 11:00 a.m. [address] [place] [phone] 2. [facility] ([facility]) On 6/6/2018 daughter plans to transport. 1 [facility] [address] 
[phone] 
  
  
  
3. Kelle Valero MD, in one week ADDITIONAL CARE RECOMMENDATIONS: 
 
DIET: Diabetic Diet TUBE FEEDING INSTRUCTIONS: none OXYGEN / BiPAP SETTINGS: none ACTIVITY: Activity as tolerated WOUND CARE: none EQUIPMENT needed: none DISCHARGE MEDICATIONS: 
 See Medication Reconciliation Form NOTIFY YOUR PHYSICIAN FOR ANY OF THE FOLLOWING:  
Fever over 101 degrees for 24 hours.   
Chest pain, shortness of breath, fever, chills, nausea, vomiting, diarrhea, change in mentation, falling, weakness, bleeding. Severe pain or pain not relieved by medications. Or, any other signs or symptoms that you may have questions about. DISPOSITION: 
  Home With: 
 OT  PT  HH  RN  
  
x SNF/Inpatient Rehab/LTAC Independent/assisted living Hospice Other:  
 
 
PATIENT CONDITION AT DISCHARGE:  
 
Functional status Poor   
x Deconditioned Independent Cognition Lucid   
x Forgetful Dementia Catheters/lines (plus indication) Mark PICC   
 PEG   
x None Code status  
x  Full code DNR   
 
PHYSICAL EXAMINATION AT DISCHARGE: 
 Refer to Progress Note CHRONIC MEDICAL DIAGNOSES: 
Problem List as of 6/6/2018  Date Reviewed: 5/30/2018 Codes Class Noted - Resolved Altered mental status ICD-10-CM: R41.82 
ICD-9-CM: 780.97  6/4/2018 - Present Type 2 diabetes with nephropathy ([facility] ICD-10-CM: E11.21 
ICD-9-CM: 250.40, 583.81  3/9/2018 - Present H/O: stroke ICD-10-CM: Z86.73 
ICD-9-CM: V12.54  11/9/2017 - Present Advance care planning ICD-10-CM: Z71.89 ICD-9-CM: V65.49  1/21/2016 - Present Essential hypertension ICD-10-CM: I10 
ICD-9-CM: 401.9  1/21/2016 - Present Stomach ulcer ICD-10-CM: K25.9 ICD-9-CM: 531.90  3/12/2015 - Present Diverticula of colon ICD-10-CM: K57.30 ICD-9-CM: 562.10  3/12/2015 - Present DM (diabetes mellitus) ([facility] ICD-10-CM: E11.9 ICD-9-CM: 250.00  5/1/2014 - Present Mixed hyperlipidemia ICD-10-CM: E78.2 ICD-9-CM: 272.2  5/1/2014 - Present Chronic venous insufficiency ICD-10-CM: I87.2 ICD-9-CM: 459.81  5/1/2014 - Present Depression ICD-10-CM: F32.9 ICD-9-CM: 624  5/1/2014 - Present CDMP Checked:  
Yes x PROBLEM LIST Updated: 
Yes x Signed:  
Veronique Leslie MD 
6/6/2018 
1:23 PM 
 
  
 
 
  
  
  
Geraldine Announcement  We are excited to announce that we are making your provider's discharge notes available to you in [facility]. You will see these notes when they are completed and signed by the physician that discharged you from your recent hospital stay. If you have any questions or concerns about any information you see in [employer] [facility], please call the Health Information Department where you were seen or reach out to your Primary Care Provider for more information about your plan of care. Introducing Scar Malik As a Claudia Sepulveda patient, I wanted to make you aware of our electronic visit tool called Scar Malik. [employer] 24/7 allows you to connect within minutes with a medical provider 24 hours a day, seven days a week via a mobile device or tablet or logging into a secure website from your computer. You can access Scar King from anywhere in the [place]. A virtual visit might be right for you when you have a simple condition and feel like you just dont want to get out of bed, or cant get away from work for an appointment, when your regular Claudia Sepulveda provider is not available (evenings, weekends or holidays), or when youre out of town and need minor care. Electronic visits cost only $49 and if the ClaudiaTalasim 24/7 provider determines a prescription is needed to treat your condition, one can be electronically transmitted to a nearby pharmacy*. Please take a moment to enroll today if you have not already done so. The enrollment process is free and takes just a few minutes. To enroll, please download the [employer] 24/7 jose antonio to your tablet or phone, or visit [email or web address] to enroll on your computer. And, as an [address] [place] [address] patient with a [employer] account, the results of your visits will be scanned into your electronic medical record and your primary care provider will be able to view the scanned results.    
We urge you to continue to see your regular Claudia Sherpany provider for your ongoing medical care. And while your primary care provider may not be the one available when you seek a Scar Burkskalifin virtual visit, the peace of mind you get from getting a real diagnosis real time can be priceless. For more information on Scar Burkskalifin, view our Frequently Asked Questions (FAQs) at [email or web address]. Sincerely, 
 
Preston العلي MD 
Chief Medical Officer Abel Schmidt *:  certain medications cannot be prescribed via Scar Burksludwin Providers Seen During Your Hospitalization Provider Specialty Primary office phone Jan Boo MD Emergency Medicine [phone] Ronn Ni MD Internal Medicine [phone] Rachel Pittman MD Internal Medicine [phone] Your Primary Care Physician (PCP) Primary Care Physician Office Phone Office Fax [address], [address] [place] [address] [phone] You are allergic to the following No active allergies Recent Documentation Height Weight Breastfeeding? BMI OB Status Smoking Status 1.702 m 80.5 kg No 27.8 kg/m2 Hysterectomy Never Smoker Emergency Contacts Name Discharge Info Relation Home Work Mobile [address] [facility] CAREGIVER [3] Child [2] [phone] Patient Belongings The following personal items are in your possession at time of discharge: 
  Dental Appliances: None  Visual Aid: Glasses, With patient      Home Medications: None   Jewelry: None  Clothing: At bedside    Other Valuables: None  Personal Items Sent to Safe: none Please provide this summary of care documentation to your next provider. Signatures-by signing, you are acknowledging that this After Visit Summary has been reviewed with you and you have received a copy. Patient Signature:  ____________________________________________________________ Date:  ____________________________________________________________ `    
    
 Provider Signature:  ____________________________________________________________ Date:  ____________________________________________________________

## 2018-06-04 NOTE — ED TRIAGE NOTES
Triage note: Pt arrived via EMS from home where she lives with her daughter who states she was not confused last night when she went to bed. Pt is now stating the president is Jose Manuel Esther and unable to identify common objects.   [facility] 4.

## 2018-06-04 NOTE — PROGRESS NOTES
Admission Medication Reconciliation:    Information obtained from:  Patient's daughter/Medication list    Comments/Recommendations: Updated PTA meds/reviewed patient's allergies. 1)  Added Baclofen. Per Daughter, MD is using TID in attempt to \"wean\" patient off Norco.     2)  Reviewed insulin regimen:  Currently on Apidra:  5 units with Breakfast, 3 units with Lunch, and 5 units with Dinner. Patient has additional sliding scale based on BGs  Sliding Scale:  150-200 = add 2 units  201-250 = add 4 units  251-300 = add 5 units  301-350 = add 6 units  351 and above = add 8 units and call MD    3)  Removed the following patient no longer takes:  Lidocaine patch and Comfort Gel    4)  Patient taking Zocor and Zantac in the AM     5)  Patient on every other day Lasix last dose Yesterday (6/03) AM       Significant PMH/Disease States:   Past Medical History:   Diagnosis Date    Arthritis     Chronic pain     Depression     [facility]     Hypercholesterolemia     Hypertension     Stroke ([facility]     TIA 10 yrs back    Stroke [facility])     2003       Chief Complaint for this Admission:    Chief Complaint   Patient presents with    Altered mental status       Allergies:  Review of patient's allergies indicates no known allergies. Prior to Admission Medications:   Prior to Admission Medications   Prescriptions Last Dose Informant Patient Reported? Taking? HYDROcodone-acetaminophen (NORCO) 5-325 mg per tablet 6/4/2018 at Unknown time  No Yes   Sig: Take 1 Tab by mouth daily as needed for Pain. JANUMET 50-1,000 mg per tablet 6/4/2018 at Unknown time  No Yes   Sig: Take 1 tablet by mouth two  times daily with meals   PARoxetine (PAXIL) 20 mg tablet 6/3/2018 at Unknown time  No Yes   Sig: TAKE 1 TABLET BY MOUTH  EVERY NIGHT AT BEDTIME   acetaminophen (TYLENOL) 325 mg tablet   No Yes   Sig: Take 2 Tabs by mouth two (2) times a day.    aspirin 81 mg chewable tablet 6/4/2018 at Unknown time  Yes Yes   Sig: Take 81 mg by mouth daily. baclofen (LIORESAL) 10 mg tablet 2018 at Unknown time  Yes Yes   Sig: Take 10 mg by mouth three (3) times daily. cholecalciferol (VITAMIN D3) 1,000 unit cap 2018 at Unknown time  Yes Yes   Sig: Take 1,000 Units by mouth daily. furosemide (LASIX) 20 mg tablet 6/3/2018 at Unknown time  No Yes   Si tab po QOD   insulin glulisine U-100 (APIDRA SOLOSTAR U-100 INSULIN) 100 unit/mL pen 6/3/2018 at 5 units  Yes Yes   Sig: by SubCUTAneous route. 5 units with breakfast and Dinner, 3 units with Lunch  Sliding Scale:  150-200 = add 2 units  201-250 = add 4 units  251-300 = add 5 units  301-350 = add 6 units  351 and above = add 8 units and call MD   losartan (COZAAR) 25 mg tablet 2018 at Unknown time  No Yes   Sig: Take 1 Tab by mouth daily. meloxicam (MOBIC) 15 mg tablet 2018 at Unknown time  No Yes   Sig: TAKE 1 TABLET BY MOUTH  DAILY   metoprolol tartrate (LOPRESSOR) 100 mg IR tablet 2018 at Unknown time  No Yes   Sig: Take 1 Tab by mouth two (2) times a day. raNITIdine (ZANTAC) 150 mg tablet 2018 at AM  No Yes   Sig: Take 1 Tab by mouth nightly. Patient taking differently: Take 150 mg by mouth daily.    simvastatin (ZOCOR) 40 mg tablet 2018 at AM  No Yes   Sig: TAKE 1 TABLET BY MOUTH  NIGHTLY   Patient taking differently: TAKE 1 TABLET BY MOUTH Daily      Facility-Administered Medications: None

## 2018-06-05 LAB
ANION GAP SERPL CALC-SCNC: 8 MMOL/L (ref 5–15)
ATRIAL RATE: 69 BPM
BUN SERPL-MCNC: 15 MG/DL (ref 6–20)
BUN/CREAT SERPL: 16 (ref 12–20)
CALCIUM SERPL-MCNC: 9 MG/DL (ref 8.5–10.1)
CALCULATED P AXIS, ECG09: 64 DEGREES
CALCULATED R AXIS, ECG10: -24 DEGREES
CALCULATED T AXIS, ECG11: 73 DEGREES
CHLORIDE SERPL-SCNC: 108 MMOL/L (ref 97–108)
CO2 SERPL-SCNC: 27 MMOL/L (ref 21–32)
CREAT SERPL-MCNC: 0.95 MG/DL (ref 0.55–1.02)
DIAGNOSIS, 93000: NORMAL
ERYTHROCYTE [DISTWIDTH] IN BLOOD BY AUTOMATED COUNT: 16 % (ref 11.5–14.5)
GLUCOSE BLD STRIP.AUTO-MCNC: 111 MG/DL (ref 65–100)
GLUCOSE BLD STRIP.AUTO-MCNC: 120 MG/DL (ref 65–100)
GLUCOSE BLD STRIP.AUTO-MCNC: 139 MG/DL (ref 65–100)
GLUCOSE BLD STRIP.AUTO-MCNC: 163 MG/DL (ref 65–100)
GLUCOSE SERPL-MCNC: 109 MG/DL (ref 65–100)
HCT VFR BLD AUTO: 30.1 % (ref 35–47)
HGB BLD-MCNC: 9.7 G/DL (ref 11.5–16)
INR BLD: <0.9 (ref 0.9–1.2)
MCH RBC QN AUTO: 29.4 PG (ref 26–34)
MCHC RBC AUTO-ENTMCNC: 32.2 G/DL (ref 30–36.5)
MCV RBC AUTO: 91.2 FL (ref 80–99)
NRBC # BLD: 0 K/UL (ref 0–0.01)
NRBC BLD-RTO: 0 PER 100 WBC
P-R INTERVAL, ECG05: 184 MS
PLATELET # BLD AUTO: 178 K/UL (ref 150–400)
PMV BLD AUTO: 11.2 FL (ref 8.9–12.9)
POTASSIUM SERPL-SCNC: 3.6 MMOL/L (ref 3.5–5.1)
Q-T INTERVAL, ECG07: 400 MS
QRS DURATION, ECG06: 84 MS
QTC CALCULATION (BEZET), ECG08: 428 MS
RBC # BLD AUTO: 3.3 M/UL (ref 3.8–5.2)
SERVICE CMNT-IMP: ABNORMAL
SODIUM SERPL-SCNC: 143 MMOL/L (ref 136–145)
VENTRICULAR RATE, ECG03: 69 BPM
WBC # BLD AUTO: 6.9 K/UL (ref 3.6–11)

## 2018-06-05 PROCEDURE — 36415 COLL VENOUS BLD VENIPUNCTURE: CPT | Performed by: HOSPITALIST

## 2018-06-05 PROCEDURE — 80048 BASIC METABOLIC PNL TOTAL CA: CPT | Performed by: HOSPITALIST

## 2018-06-05 PROCEDURE — 85027 COMPLETE CBC AUTOMATED: CPT | Performed by: HOSPITALIST

## 2018-06-05 PROCEDURE — 82962 GLUCOSE BLOOD TEST: CPT

## 2018-06-05 PROCEDURE — 74011636637 HC RX REV CODE- 636/637: Performed by: HOSPITALIST

## 2018-06-05 PROCEDURE — 93306 TTE W/DOPPLER COMPLETE: CPT

## 2018-06-05 PROCEDURE — 97116 GAIT TRAINING THERAPY: CPT

## 2018-06-05 PROCEDURE — 97161 PT EVAL LOW COMPLEX 20 MIN: CPT

## 2018-06-05 PROCEDURE — 74011250636 HC RX REV CODE- 250/636: Performed by: HOSPITALIST

## 2018-06-05 PROCEDURE — 74011250637 HC RX REV CODE- 250/637: Performed by: HOSPITALIST

## 2018-06-05 PROCEDURE — 65660000000 HC RM CCU STEPDOWN

## 2018-06-05 RX ADMIN — MELOXICAM 15 MG: 7.5 TABLET ORAL at 09:02

## 2018-06-05 RX ADMIN — ASPIRIN 81 MG CHEWABLE TABLET 81 MG: 81 TABLET CHEWABLE at 09:03

## 2018-06-05 RX ADMIN — PAROXETINE HYDROCHLORIDE 20 MG: 20 TABLET, FILM COATED ORAL at 09:03

## 2018-06-05 RX ADMIN — LOSARTAN POTASSIUM 25 MG: 25 TABLET ORAL at 09:02

## 2018-06-05 RX ADMIN — METOPROLOL TARTRATE 100 MG: 25 TABLET ORAL at 17:11

## 2018-06-05 RX ADMIN — SIMVASTATIN 40 MG: 40 TABLET, FILM COATED ORAL at 09:03

## 2018-06-05 RX ADMIN — FAMOTIDINE 20 MG: 20 TABLET ORAL at 09:02

## 2018-06-05 RX ADMIN — ENOXAPARIN SODIUM 40 MG: 100 INJECTION SUBCUTANEOUS at 17:10

## 2018-06-05 RX ADMIN — VITAMIN D, TAB 1000IU (100/BT) 1000 UNITS: 25 TAB at 09:03

## 2018-06-05 RX ADMIN — INSULIN LISPRO 2 UNITS: 100 INJECTION, SOLUTION INTRAVENOUS; SUBCUTANEOUS at 13:03

## 2018-06-05 RX ADMIN — METFORMIN HYDROCHLORIDE: 500 TABLET, FILM COATED ORAL at 09:03

## 2018-06-05 RX ADMIN — METFORMIN HYDROCHLORIDE: 500 TABLET, FILM COATED ORAL at 17:14

## 2018-06-05 RX ADMIN — Medication 10 ML: at 06:41

## 2018-06-05 RX ADMIN — FUROSEMIDE 20 MG: 40 TABLET ORAL at 09:03

## 2018-06-05 RX ADMIN — METOPROLOL TARTRATE 100 MG: 25 TABLET ORAL at 09:03

## 2018-06-05 RX ADMIN — Medication 10 ML: at 21:59

## 2018-06-05 RX ADMIN — ACETAMINOPHEN 650 MG: 325 TABLET ORAL at 09:03

## 2018-06-05 RX ADMIN — Medication 10 ML: at 13:04

## 2018-06-05 RX ADMIN — ACETAMINOPHEN 650 MG: 325 TABLET ORAL at 17:11

## 2018-06-05 NOTE — PROGRESS NOTES
Hospitalist Progress Note  Rachel Pittman MD  Answering service: [phone] OR 36 from in house phone  Cell: [phone]      Date of Service:  2018  NAME:  Juan Bourgeois  :  1935  MRN:  [id number]      Admission Summary:   The patient is a very pleasant 74-year-old -American female who has previous history significant for hypertension, hyperlipidemia, type 2 diabetes, chronic pain,TIA 14 years ago without residual deficits was brought from home due to altered mental status. Interval history / Subjective:   She said she feels better, no chest pain     Assessment & Plan:     Acute metabolic encephalopathy POA  -confusion better, patient is conscious and alert, answer questions appropriately  -CT head no evidence of acute process  -possible due to percocet or baclofen  -no fever or leukocytosis,   -UA no evidence of UTI  -chest  X ray no acute abnormality    Anemia of chronic disease   -H/H is stable  -no hematemesis or melena    HTN  -BP not at goal, continue losartan, metoprolol and lasix, monitor BP    DM-II  -continue metformin/sitagliptin, sliding scale and monitor finger stick glucose    Hx of TIA  -continue aspirin and zocor    Hx of hypercholesterolemia   -continue zocor    Chronic back pain with bilateral sciatica  -stable  -PT/OT    Hx of depression   -stable, continue paxil    Code status: Full Code  DVT prophylaxis: Lovenox    Care Plan discussed with: Patient/Family and Nurse/CM  Disposition: [facility] with [facility] vs SNF, patient wants to go home   Case discussed with daughter at [facility]  Date Reviewed: 2018          Codes Class Noted POA    Altered mental status ICD-10-CM: R41.82  ICD-9-CM: 780.97  2018 Unknown              Vital Signs:    Last 24hrs VS reviewed since prior progress note. Most recent are:  Visit Vitals    /57 (BP 1 Location: Right arm, BP Patient Position:  At rest)    Pulse 65    Temp 98.1 °F (36.7 °C)    Resp 16    Ht 5' 7\" (1.702 m)    Wt 79.2 kg (174 lb 9.7 oz)    SpO2 98%    Breastfeeding No    BMI 27.35 kg/m2       No intake or output data in the 24 hours ending 06/05/18 0751     Physical Examination:             Constitutional:  No acute distress, cooperative, pleasant    ENT:  Oral mucous moist, oropharynx benign. Neck supple,    Resp:  CTA bilaterally. No wheezing/rhonchi/rales. No accessory muscle use   CV:  Regular rhythm, normal rate, no murmurs, gallops, rubs    GI:  Soft, non distended, non tender. normoactive bowel sounds, no hepatosplenomegaly     Musculoskeletal:  No edema    Neurologic:  Conscious and alert, oriented to place and person, Moves all extremities,CN II-XII reviewed     Skin:  Good turgor, no rashes or ulcers       Data Review:    Review and/or order of clinical lab test      Labs:     Recent Labs      06/05/18   0404  06/04/18   1138   WBC  6.9  5.9   HGB  9.7*  9.9*   HCT  30.1*  31.5*   PLT  178  176     Recent Labs      06/05/18   0404  06/04/18   1138   NA  143  140   K  3.6  3.7   CL  108  105   CO2  27  28   BUN  15  15   CREA  0.95  0.96   GLU  109*  184*   CA  9.0  8.6     No results for input(s): SGOT, GPT, ALT, AP, TBIL, TBILI, TP, ALB, GLOB, GGT, AML, LPSE in the last 72 hours. No lab exists for component: AMYP, HLPSE  Recent Labs      06/04/18   1050   INR  <0.9      No results for input(s): FE, TIBC, PSAT, FERR in the last 72 hours. No results found for: FOL, RBCF   No results for input(s): PH, PCO2, PO2 in the last 72 hours.   Recent Labs      06/04/18   1138   TROIQ  <0.04     Lab Results   Component Value Date/Time    Cholesterol, total 164 06/10/2016 10:34 AM    HDL Cholesterol 86 06/10/2016 10:34 AM    LDL, calculated 68 06/10/2016 10:34 AM    Triglyceride 49 06/10/2016 10:34 AM     Lab Results   Component Value Date/Time    Glucose (POC) 120 (H) 06/05/2018 07:26 AM    Glucose (POC) 137 (H) 06/04/2018 10:12 PM Glucose (POC) 138 (H) 06/04/2018 04:50 PM    Glucose (POC) 177 (H) 06/04/2018 10:48 AM     Lab Results   Component Value Date/Time    Color YELLOW 06/04/2018 12:05 PM    Appearance HAZY (A) 06/04/2018 12:05 PM    Specific gravity 1.015 06/04/2018 12:05 PM    pH (UA) 7.5 06/04/2018 12:05 PM    Protein 100 (A) 06/04/2018 12:05 PM    Glucose 100 (A) 06/04/2018 12:05 PM    Ketone NEGATIVE  06/04/2018 12:05 PM    Bilirubin NEGATIVE  06/04/2018 12:05 PM    Urobilinogen 0.2 06/04/2018 12:05 PM    Nitrites NEGATIVE  06/04/2018 12:05 PM    Leukocyte Esterase NEGATIVE  06/04/2018 12:05 PM    Epithelial cells FEW 06/04/2018 12:05 PM    Bacteria 2+ (A) 06/04/2018 12:05 PM    WBC 0-4 06/04/2018 12:05 PM    RBC 0-5 06/04/2018 12:05 PM         Medications Reviewed:     Current Facility-Administered Medications   Medication Dose Route Frequency    sodium chloride (NS) flush 5-10 mL  5-10 mL IntraVENous Q8H    sodium chloride (NS) flush 5-10 mL  5-10 mL IntraVENous PRN    enoxaparin (LOVENOX) injection 40 mg  40 mg SubCUTAneous Q24H    acetaminophen (TYLENOL) tablet 650 mg  650 mg Oral BID    aspirin chewable tablet 81 mg  81 mg Oral DAILY    cholecalciferol (VITAMIN D3) tablet 1,000 Units  1,000 Units Oral DAILY    furosemide (LASIX) tablet 20 mg  20 mg Oral DAILY    losartan (COZAAR) tablet 25 mg  25 mg Oral DAILY    meloxicam (MOBIC) tablet 15 mg  15 mg Oral DAILY    metoprolol tartrate (LOPRESSOR) tablet 100 mg  100 mg Oral BID    PARoxetine (PAXIL) tablet 20 mg  20 mg Oral DAILY    famotidine (PEPCID) tablet 20 mg  20 mg Oral DAILY    simvastatin (ZOCOR) tablet 40 mg  40 mg Oral DAILY    insulin lispro (HUMALOG) injection   SubCUTAneous AC&HS    glucose chewable tablet 16 g  4 Tab Oral PRN    dextrose (D50W) injection syrg 12.5-25 g  12.5-25 g IntraVENous PRN    glucagon (GLUCAGEN) injection 1 mg  1 mg IntraMUSCular PRN    SITagliptin/metFORMIN (JANUMET) 50/1000 MG   Oral BID WITH MEALS ______________________________________________________________________  EXPECTED LENGTH OF STAY: - - -  ACTUAL LENGTH OF STAY:          1                 Swati Smith MD

## 2018-06-05 NOTE — INTERDISCIPLINARY ROUNDS
IDR/SLIDR Summary          Patient: Angelica Landin MRN: [id number]    Age: [de-identified] y.o. YOB: 1935 Room/Bed: [facility]   Admit Diagnosis: Altered mental status  Altered mental status  Principal Diagnosis: <principal problem not specified>   Goals: ECHO  Readmission: NO  Quality Measure: Not applicable  VTE Prophylaxis: Chemical  Influenza Vaccine screening completed? YES  Pneumococcal Vaccine screening completed? NO  Mobility needs: Yes   Nutrition plan:No  Consults:P.T, O.T. and Case Management    Financial concerns:No  Escalated to CM? NO  RRAT Score:    Interventions:  Testing due for pt today?  YES  LOS: 1 days Expected length of stay  days  Discharge plan: TBD after eval   PCP: Sallie Pennington MD  Transportation needs: TBD    Days before discharge:two or more days before discharge   Discharge disposition: TBD    Signed:     Symone Hernandez RN  6/5/2018  10:24 AM

## 2018-06-05 NOTE — PROGRESS NOTES
Reason for Admission:   Altered mental status               RRAT Score:     30             Resources/supports as identified by patient/family:   Pt lives with her daughter, Raquel Sarmiento. Son lives nearby                [facility] and [place] facing patient (as identified by patient/family and CM): Finances/Medication cost?      no            Transportation? Support system or lack thereof?  no                     Living arrangements? See above note. Plan for utilizing home health:  TBD                        Likelihood of readmission: low                 Transition of Care Plan:           Home with daughterRaquel    CM met with pt and spoke with daughter, Raquel Sarmiento, to introduce them to the role of CM. They both verbalized understanding. This pt lives at home with her daughter who at the moment, does not work. Per pt/daughter, this pt is fairly independent with ADL's. She has a cane and a raised toilet seat at home. She has used [employer] before. CM will follow. LOLA Reynaga,MICHELLE-SW  Care Management Interventions  PCP Verified by CM:  Yes  Palliative Care Criteria Met (RRAT>21 & CHF Dx)?: No  Transition of Care Consult (CM Consult): Discharge Planning  MyChart Signup: No  Discharge Durable Medical Equipment: No  Physical Therapy Consult: Yes  Occupational Therapy Consult: No  Speech Therapy Consult: No  Current Support Network: Relative's Home (Lives with daughter.)  Confirm Follow Up Transport: Family  Plan discussed with Pt/Family/Caregiver: Yes  Freedom of Choice Offered: Yes  [place] Resource Information Provided?: No

## 2018-06-05 NOTE — CONSULTS
Consult dictated. Agree that she most likely had confusion related to medications-percocet and baclofen. Now back to baseline. Suspect mild dementia in the background. Recommend OP follow up. May DC home.   Sheridan Vidal MD

## 2018-06-05 NOTE — CONSULTS
[address]    Margaret Mccurdy  MR#: [id number]  : 1935  ACCOUNT #: [de-identified]   DATE OF SERVICE: 2018    REQUESTING PHYSICIAN:  Dr. Chelsy Aguillon. HISTORY OF PRESENT ILLNESS:  The patient is an 51-year-old -American female with past medical history of hypertension, hyperlipidemia, type 2 diabetes, chronic low back pain who was recently started on the baclofen 10 mg 3 times daily along with Percocet for pain control. Yesterday morning, the family noticed that she was acting very confused. She could not tell what date it was, who the president was and was mixing up her words. She was brought into the hospital and was admitted for further workup. Toxic encephalopathy related to medications was suspected to be a cause and baclofen/Percocet was held. Today, she is fairly back to her baseline. There was no associated headache, changes in vision, speech or focal motor or sensory deficits. No fever, seizure-like activity or changes in bladder/bowel control. PAST MEDICAL HISTORY:  As mentioned above. SOCIAL HISTORY:  The patient is , lives with her daughter. No smoking or alcohol use. HOME MEDICATIONS:  Janumet, metoprolol, losartan, Meloxicam, Paxil, Lasix, simvastatin, baby aspirin, lidocaine, Apidra, baclofen. ALLERGIES:  HYDROCODONE/ACETAMINOPHEN. REVIEW OF SYSTEMS:  As mentioned above. PHYSICAL EXAMINATION:  GENERAL:  The patient is alert, fully oriented. VITAL SIGNS:  Blood pressure 150/75, temperature 98.2, pulse 74. NEUROLOGIC:  Speech is clear. Comprehension is normal.  Pupils are equal, round and reactive. Extraocular movements are full. Face is symmetric. Tongue is midline. Hearing is baseline. She knew she was at [facility].  She had to struggle telling me today's date correctly. She initially said it was Wednesday, but then corrected herself. She knew it was 2018.   She could not tell me her street address and had the house numbers mixed up. She knew the names of all of her children. She is able to follow all commands. Muscle tone and bulk normal.  Strength normal in both upper and lower extremities. DTRs 2/2 and symmetric. Toes downgoing. Sensation intact. Gait baseline. HEART:  Regular rate and rhythm. CHEST:  Clear. ABDOMEN:  Soft, nontender, positive bowel sounds. EXTREMITIES:  No edema. LABORATORY DATA:  CBC:  WBC 6.9, hemoglobin 9.7, hematocrit 30.1, platelets 313. Chemistry unremarkable. BUN 15, creatinine 0.95. CT scan of the brain did not show any acute abnormalities. Carotid duplex in 07/2016 did not show any significant stenosis and vertebrals were patent with antegrade flow. ASSESSMENT AND PLAN:  An 47-year-old female who is admitted with confusion. I agree that this was most likely related to medications Percocet and baclofen. She should minimize use or possibly reduce the dose if she must use them. Now, she is back to baseline and I do not see any focal motor or sensory deficits. I do suspect a mild dementia in the background. I would recommend outpatient followup in this regard. She may be discharged home from a neurological standpoint. Please feel free to contact me with any further questions. Thank you for this consultation.       MD RUBEN Miguel / JAVIER  D: 06/05/2018 15:50     T: 06/05/2018 16:27  JOB #: [id number]

## 2018-06-05 NOTE — PROGRESS NOTES
Problem: Mobility Impaired (Adult and Pediatric)  Goal: *Acute Goals and Plan of Care (Insert Text)  Physical Therapy Goals  Initiated 6/5/2018  1. Patient will move from supine to sit and sit to supine , scoot up and down and roll side to side in bed with modified independence within 7 day(s). 2.  Patient will transfer from bed to chair and chair to bed with modified independence using the least restrictive device within 7 day(s). 3.  Patient will perform sit to stand with modified independence within 7 day(s). 4.  Patient will ambulate with modified independence for 150 feet with the least restrictive device within 7 day(s). 5.  Patient will ascend/descend 3 stairs with 2 handrail(s) with supervision/set-up within 7 day(s). physical Therapy EVALUATION  Patient: Anival Garcia [de-identified][de-identified] y.o. female)  Date: 6/5/2018  Primary Diagnosis: Altered mental status  Altered mental status        Precautions:        ASSESSMENT :  Based on the objective data described below, the patient presents with generalized weakness, impaired standing balance requiring B UE support for balance, and overall decreased independence from baseline following admission for AMS. PTA patient was ambulating with a SPC and admits to furniture walking or holding onto caregiver with other arm. Patient lives with daughter and is alone during the day. Patient is overall min A for mobility at this time. Patient trialed RW this session and required CGA/min A + verbal cues for safe techniques especially with turning. Patient remained OOB in chair at end of session. Discussed possibility of SNF placement at discharge as patient is alone during the day-family + patient will discuss. Recommending SNF vs HHPT pending progress. Patient will benefit from skilled intervention to address the above impairments.   Patients rehabilitation potential is considered to be Good  Factors which may influence rehabilitation potential include:   [] None noted  []         Mental ability/status  [x]         Medical condition  []         Home/family situation and support systems  []         Safety awareness  []         Pain tolerance/management  []         Other:      PLAN :  Recommendations and Planned Interventions:  [x]           Bed Mobility Training             [x]    Neuromuscular Re-Education  [x]           Transfer Training                   []    Orthotic/Prosthetic Training  [x]           Gait Training                         []    Modalities  [x]           Therapeutic Exercises           []    Edema Management/Control  [x]           Therapeutic Activities            [x]    Patient and Family Training/Education  []           Other (comment):    Frequency/Duration: Patient will be followed by physical therapy  5 times a week to address goals. Discharge Recommendations: SNF vs HHPT pending progress   Further Equipment Recommendations for Discharge: Owns SPC-will need RW ordered if discharged home     SUBJECTIVE:   Patient stated I sit a lot during the day.     OBJECTIVE DATA SUMMARY:   HISTORY:    Past Medical History:   Diagnosis Date    Arthritis     Chronic pain     Depression     Diabetes ([facility]     Hypercholesterolemia     Hypertension     Stroke ([facility]     TIA 10 yrs back    Stroke ([facility]     2003     Past Surgical History:   Procedure Laterality Date    HX CARPAL TUNNEL RELEASE  1990    HX CATARACT REMOVAL      bilateral    HX HYSTERECTOMY  1986    HX HYSTERECTOMY      HX ORTHOPAEDIC      HX ORTHOPAEDIC      carpel tunnel left    HX ORTHOPAEDIC      left foot heel spur    HX REFRACTIVE SURGERY  2006     Prior Level of Function/Home Situation: mod I with SPC, no falls, furniture walks or holds onto caregiver + SPC during ambulation, alone during the day  Personal factors and/or comorbidities impacting plan of care:     Home Situation  Home Environment: Patient room  # Steps to Enter: 3  Rails to Enter: Yes  Hand Rails : Bilateral  One/Two Story Residence: One story  Living Alone: No  Support Systems: Family member(s)  Patient Expects to be Discharged to[de-identified] Private residence  Current DME Used/Available at Home: marah Vaughan    EXAMINATION/PRESENTATION/DECISION MAKING:   Critical Behavior:  Neurologic State: Alert  Orientation Level: Disoriented to time, Oriented to person, Oriented to place, Oriented to situation  Cognition: Decreased attention/concentration, Follows commands, Memory loss     Hearing: Auditory  Auditory Impairment: None  Skin:    Edema:   Range Of Motion:  AROM: Generally decreased, functional           PROM: Within functional limits           Strength:    Strength: Generally decreased, functional                    Tone & Sensation:                                  Coordination:     Vision:      Functional Mobility:  Bed Mobility:     Supine to Sit: Contact guard assistance; Additional time        Transfers:  Sit to Stand: Contact guard assistance  Stand to Sit: Contact guard assistance        Bed to Chair: Minimum assistance (RW management)              Balance:   Sitting: Intact  Standing: Impaired  Standing - Static: Good;Constant support  Standing - Dynamic : Good  Ambulation/Gait Training:  Distance (ft): 35 Feet (ft)  Assistive Device: Gait belt;Walker, rolling  Ambulation - Level of Assistance: Minimal assistance;Contact guard assistance        Gait Abnormalities: Decreased step clearance                                       Stairs:               Therapeutic Exercises:       Functional Measure:  Barthel Index:    Bathin  Bladder: 0 (proctor)  Bowels: 5  Groomin  Dressin  Feeding: 10  Mobility: 10  Stairs: 5  Toilet Use: 5  Transfer (Bed to Chair and Back): 10  Total: 55       Barthel and G-code impairment scale:  Percentage of impairment CH  0% CI  1-19% CJ  20-39% CK  40-59% CL  60-79% CM  80-99% CN  100%   Barthel Score 0-100 100 99-80 79-60 59-40 20-39 1-19   0   Barthel Score 0-20 20 17-19 13-16 9-12 5-8 1-4 0      The Barthel ADL Index: Guidelines  1. The index should be used as a record of what a patient does, not as a record of what a patient could do. 2. The main aim is to establish degree of independence from any help, physical or verbal, however minor and for whatever reason. 3. The need for supervision renders the patient not independent. 4. A patient's performance should be established using the best available evidence. Asking the patient, friends/relatives and nurses are the usual sources, but direct observation and common sense are also important. However direct testing is not needed. 5. Usually the patient's performance over the preceding 24-48 hours is important, but occasionally longer periods will be relevant. 6. Middle categories imply that the patient supplies over 50 per cent of the effort. 7. Use of aids to be independent is allowed. Flor Ruiz., Barthel, D.W. (1641). Functional evaluation: the Barthel Index. [address] [facility] (14)2. Nathaly Aldana ryan DHARMESH Shah, Siobhan Hall., Mya Sanchez., Cathy, [address] [place] [address] (1999). Measuring the change indisability after inpatient rehabilitation; comparison of the responsiveness of the Barthel Index and Functional [place] Measure. Journal of Neurology, Neurosurgery, and Psychiatry, 66(4), 500-344. SHAYLA Dominguez.ELIJAH, YESSICA Bernard, & Juan Bence, M.A. (2004.) Assessment of post-stroke quality of life in cost-effectiveness studies: The usefulness of the Barthel Index and the EuroQoL-5D. Quality of Life Research, 13, 336-91         G codes: In compliance with CMSs Claims Based Outcome Reporting, the following G-code set was chosen for this patient based on their primary functional limitation being treated: The outcome measure chosen to determine the severity of the functional limitation was the Barthel with a score of 55/100 which was correlated with the impairment scale.     ? Mobility - Walking and Moving Around:     - CURRENT STATUS: CK - 40%-59% impaired, limited or restricted    - GOAL STATUS: CJ - 20%-39% impaired, limited or restricted    - D/C STATUS:  ---------------To be determined---------------          Pain:  Pain Scale 1: Numeric (0 - 10)  Pain Intensity 1: 0  Pain Location 1: Buttocks     Pain Description 1: Aching  Pain Intervention(s) 1: Repositioned; Rest  Activity Tolerance:   VSS  Please refer to the flowsheet for vital signs taken during this treatment. After treatment:   [x]         Patient left in no apparent distress sitting up in chair  []         Patient left in no apparent distress in bed  [x]         Call bell left within reach  [x]         Nursing notified  [x]         Caregiver present  []         Bed alarm activated    COMMUNICATION/EDUCATION:   The patients plan of care was discussed with: Registered Nurse. [x]         Fall prevention education was provided and the patient/caregiver indicated understanding. [x]         Patient/family have participated as able in goal setting and plan of care. [x]         Patient/family agree to work toward stated goals and plan of care. []         Patient understands intent and goals of therapy, but is neutral about his/her participation. []         Patient is unable to participate in goal setting and plan of care.     Thank you for this referral.  Renee Andersen, PT   Time Calculation: 23 mins

## 2018-06-05 NOTE — PROGRESS NOTES
Bedside and Verbal shift change report given to Samantha Hyman RN (oncoming nurse) by Jose Howe RN (offgoing nurse). Report included the following information SBAR, Kardex, Intake/Output, MAR and Recent Results.

## 2018-06-05 NOTE — CDMP QUERY
Please clarify if this patient is (was) being treated/managed for:     => Toxic encephalopathy (POA) in the setting of AMS 2/2 taking Baclofen and Percocet requiring medication adjustments and additional neuro check monitoring   => Other explanation of clinical findings  => Clinically Undetermined (no explanation for clinical findings)    The medical record reflects the following:     Clinical Indicators/ Risk Factors: Pt noted to be altered in mentation upon awakening, unable to identify common objects. Noted 2 Percocet pills gone from the total in bottle and also started on Baclofen      Treatment: Monitor neuro status, medication adjustments, CT head     Please clarify and document your clinical opinion in the progress notes and discharge summary including the definitive and/or presumptive diagnosis, (suspected or probable), related to the above clinical findings. Please include clinical findings supporting your diagnosis.     Thank you,    Bennie Fernandez, RN, BSN, [address] [facility],   [phone]

## 2018-06-05 NOTE — PROGRESS NOTES
Bedside and Verbal shift change report given to [address] (oncoming nurse) by Dominga Song RN (offgoing nurse). Report included the following information SBAR, Kardex and Recent Results.

## 2018-06-06 VITALS
HEIGHT: 67 IN | SYSTOLIC BLOOD PRESSURE: 151 MMHG | HEART RATE: 76 BPM | OXYGEN SATURATION: 100 % | RESPIRATION RATE: 14 BRPM | DIASTOLIC BLOOD PRESSURE: 73 MMHG | WEIGHT: 177.47 LBS | TEMPERATURE: 97.8 F | BODY MASS INDEX: 27.85 KG/M2

## 2018-06-06 LAB
GLUCOSE BLD STRIP.AUTO-MCNC: 102 MG/DL (ref 65–100)
GLUCOSE BLD STRIP.AUTO-MCNC: 117 MG/DL (ref 65–100)
GLUCOSE BLD STRIP.AUTO-MCNC: 139 MG/DL (ref 65–100)
SERVICE CMNT-IMP: ABNORMAL

## 2018-06-06 PROCEDURE — 97116 GAIT TRAINING THERAPY: CPT

## 2018-06-06 PROCEDURE — 82962 GLUCOSE BLOOD TEST: CPT

## 2018-06-06 PROCEDURE — 74011250637 HC RX REV CODE- 250/637: Performed by: HOSPITALIST

## 2018-06-06 PROCEDURE — 74011250636 HC RX REV CODE- 250/636: Performed by: HOSPITALIST

## 2018-06-06 RX ADMIN — ENOXAPARIN SODIUM 40 MG: 100 INJECTION SUBCUTANEOUS at 17:38

## 2018-06-06 RX ADMIN — FUROSEMIDE 20 MG: 40 TABLET ORAL at 08:38

## 2018-06-06 RX ADMIN — LOSARTAN POTASSIUM 25 MG: 25 TABLET ORAL at 08:38

## 2018-06-06 RX ADMIN — ACETAMINOPHEN 650 MG: 325 TABLET ORAL at 17:37

## 2018-06-06 RX ADMIN — Medication 10 ML: at 06:00

## 2018-06-06 RX ADMIN — PAROXETINE HYDROCHLORIDE 20 MG: 20 TABLET, FILM COATED ORAL at 08:37

## 2018-06-06 RX ADMIN — METFORMIN HYDROCHLORIDE: 500 TABLET, FILM COATED ORAL at 08:38

## 2018-06-06 RX ADMIN — FAMOTIDINE 20 MG: 20 TABLET ORAL at 08:37

## 2018-06-06 RX ADMIN — Medication 10 ML: at 14:00

## 2018-06-06 RX ADMIN — METOPROLOL TARTRATE 100 MG: 25 TABLET ORAL at 08:36

## 2018-06-06 RX ADMIN — METFORMIN HYDROCHLORIDE: 500 TABLET, FILM COATED ORAL at 17:43

## 2018-06-06 RX ADMIN — SIMVASTATIN 40 MG: 40 TABLET, FILM COATED ORAL at 08:38

## 2018-06-06 RX ADMIN — METOPROLOL TARTRATE 100 MG: 25 TABLET ORAL at 17:39

## 2018-06-06 RX ADMIN — MELOXICAM 15 MG: 7.5 TABLET ORAL at 08:38

## 2018-06-06 RX ADMIN — VITAMIN D, TAB 1000IU (100/BT) 1000 UNITS: 25 TAB at 08:37

## 2018-06-06 RX ADMIN — ACETAMINOPHEN 650 MG: 325 TABLET ORAL at 08:37

## 2018-06-06 RX ADMIN — ASPIRIN 81 MG CHEWABLE TABLET 81 MG: 81 TABLET CHEWABLE at 08:37

## 2018-06-06 NOTE — DISCHARGE SUMMARY
Discharge Summary       PATIENT ID: Opal Manning  MRN: [id number]   YOB: 1935    DATE OF ADMISSION: 6/4/2018 10:38 AM    DATE OF DISCHARGE: 6/6/2018   PRIMARY CARE PROVIDER: Joanna Gomes MD     ATTENDING PHYSICIAN: Charo Tucker MD  DISCHARGING PROVIDER: Misbah Pedersen MD    To contact this individual call [phone] and ask the  to page. If unavailable ask to be transferred the Adult Hospitalist Department. CONSULTATIONS: IP CONSULT TO NEUROLOGY    PROCEDURES/SURGERIES: * No surgery found *    ADMITTING DIAGNOSES & HOSPITAL COURSE:     The patient is a very pleasant 62-year-old -American female who has previous history significant for hypertension, hyperlipidemia, type 2 diabetes, chronic pain,TIA 14 years ago without residual deficits was brought from home due to altered mental status.    Acute metabolic encephalopathy POA  -improved, confusion better, patient is conscious and alert, answer questions appropriately  -CT head no evidence of acute process  -possible due to percocet or baclofen  -no fever or leukocytosis,   -UA no evidence of UTI  -chest  X ray no acute abnormality  -Echo LV EF 55-60% no regional wall motion abnormality wall thickness normal  -seen by neurologist    Anemia of chronic disease   -H/H is stable  -no hematemesis or melena   HTN  -BP not at goal, continue losartan, metoprolol and lasix, monitor BP  DM-II  -continue metformin/sitagliptin, sliding scale and monitor finger stick glucose  Hx of TIA  -continue aspirin and zocor  Hx of hypercholesterolemia   -continue zocor  Chronic back pain with bilateral sciatica  -stable  -PT/OT   Hx of depression   -stable, continue paxil     Code status: Full Code  DVT prophylaxis: Lovenox         DISCHARGE DIAGNOSES / PLAN:      Acute metabolic encephalopathy POA  -improved  -continue supportive care and PT  -seen by neurologist    Anemia of chronic disease   -H/H is stable  HTN  -Continue losartan, metoprolol and lasix, monitor BP  DM-II  -continue metformin/sitagliptin, sliding scale and monitor finger stick glucose  Hx of TIA  -continue aspirin and zocor  Hx of hypercholesterolemia   -continue zocor  Chronic back pain with bilateral sciatica  -stable  -PT/OT   Hx of depression   - continue paxil    PENDING TEST RESULTS:   At the time of discharge the following test results are still pending: none    FOLLOW UP APPOINTMENTS:    Follow-up Information     Follow up With Details Comments Contact Info    Yelitza Magallanes MD In one week  [address]  [address] Antionette Acuna, NP Go on 6/13/2018 Hospital PCP f/u appointment on Wednesday June 13,2018 @ 11:00 a.m. [address] [address]  [phone]         ADDITIONAL CARE RECOMMENDATIONS:     DIET: Diabetic Diet    ACTIVITY: Activity as tolerated    WOUND CARE: none    EQUIPMENT needed: none      DISCHARGE MEDICATIONS:  Current Discharge Medication List      CONTINUE these medications which have NOT CHANGED    Details   baclofen (LIORESAL) 10 mg tablet Take 10 mg by mouth three (3) times daily. losartan (COZAAR) 25 mg tablet Take 1 Tab by mouth daily. Qty: 90 Tab, Refills: 1    Associated Diagnoses: Essential hypertension      acetaminophen (TYLENOL) 325 mg tablet Take 2 Tabs by mouth two (2) times a day. Qty: 120 Tab, Refills: 5    Associated Diagnoses: Other osteoarthritis of spine, lumbar region; Chronic bilateral low back pain with bilateral sciatica      insulin glulisine U-100 (APIDRA SOLOSTAR U-100 INSULIN) 100 unit/mL pen by SubCUTAneous route.  5 units with breakfast and Dinner, 3 units with Lunch  Sliding Scale:  150-200 = add 2 units  201-250 = add 4 units  251-300 = add 5 units  301-350 = add 6 units  351 and above = add 8 units and call MD    Associated Diagnoses: Type 2 diabetes with nephropathy (HCC)      metoprolol tartrate (LOPRESSOR) 100 mg IR tablet Take 1 Tab by mouth two (2) times a day. Qty: 180 Tab, Refills: 1    Associated Diagnoses: Essential hypertension with goal blood pressure less than 130/85      raNITIdine (ZANTAC) 150 mg tablet Take 1 Tab by mouth nightly. Qty: 90 Tab, Refills: 3    Associated Diagnoses: Gastritis, presence of bleeding unspecified, unspecified chronicity, unspecified gastritis type      PARoxetine (PAXIL) 20 mg tablet TAKE 1 TABLET BY MOUTH  EVERY NIGHT AT BEDTIME  Qty: 90 Tab, Refills: 1    Associated Diagnoses: Other depression      meloxicam (MOBIC) 15 mg tablet TAKE 1 TABLET BY MOUTH  DAILY  Qty: 90 Tab, Refills: 1    Associated Diagnoses: Primary osteoarthritis of both knees      simvastatin (ZOCOR) 40 mg tablet TAKE 1 TABLET BY MOUTH  NIGHTLY  Qty: 90 Tab, Refills: 1    Associated Diagnoses: Mixed hyperlipidemia      furosemide (LASIX) 20 mg tablet 1 tab po QOD  Qty: 45 Tab, Refills: 2      JANUMET 50-1,000 mg per tablet Take 1 tablet by mouth two  times daily with meals  Qty: 180 Tab, Refills: 5    Associated Diagnoses: Type 2 diabetes mellitus without complication (HCC)      cholecalciferol (VITAMIN D3) 1,000 unit cap Take 1,000 Units by mouth daily. aspirin 81 mg chewable tablet Take 81 mg by mouth daily. STOP taking these medications       HYDROcodone-acetaminophen (NORCO) 5-325 mg per tablet Comments:   Reason for Stopping:                 NOTIFY YOUR PHYSICIAN FOR ANY OF THE FOLLOWING:   Fever over 101 degrees for 24 hours. Chest pain, shortness of breath, fever, chills, nausea, vomiting, diarrhea, change in mentation, falling, weakness, bleeding. Severe pain or pain not relieved by medications. Or, any other signs or symptoms that you may have questions about.     DISPOSITION:    Home With:   OT  PT  HH  RN      x Long term SNF/Inpatient Rehab    Independent/assisted living    Hospice    Other:       PATIENT CONDITION AT DISCHARGE:     Functional status    Poor    x Deconditioned     Independent      Cognition     Lucid    x Forgetful     Dementia      Catheters/lines (plus indication)    Mark     PICC     PEG    x None      Code status    x Full code     DNR      PHYSICAL EXAMINATION AT DISCHARGE:   Refer to Progress Note      CHRONIC MEDICAL DIAGNOSES:  Problem List as of 6/6/2018  Date Reviewed: 5/30/2018          Codes Class Noted - Resolved    Altered mental status ICD-10-CM: R41.82  ICD-9-CM: 780.97  6/4/2018 - Present        Type 2 diabetes with nephropathy ([facility] ICD-10-CM: E11.21  ICD-9-CM: 250.40, 583.81  3/9/2018 - Present        H/O: stroke ICD-10-CM: Z86.73  ICD-9-CM: V12.54  11/9/2017 - Present        Advance care planning ICD-10-CM: Z71.89  ICD-9-CM: V65.49  1/21/2016 - Present        Essential hypertension ICD-10-CM: I10  ICD-9-CM: 401.9  1/21/2016 - Present        Stomach ulcer ICD-10-CM: K25.9  ICD-9-CM: 531.90  3/12/2015 - Present        Diverticula of colon ICD-10-CM: K57.30  ICD-9-CM: 562.10  3/12/2015 - Present        DM (diabetes mellitus) ([facility] ICD-10-CM: E11.9  ICD-9-CM: 250.00  5/1/2014 - Present        Mixed hyperlipidemia ICD-10-CM: E78.2  ICD-9-CM: 272.2  5/1/2014 - Present        Chronic venous insufficiency ICD-10-CM: I87.2  ICD-9-CM: 459.81  5/1/2014 - Present        Depression ICD-10-CM: F32.9  ICD-9-CM: 918  5/1/2014 - Present              Greater than 35 minutes were spent with the patient on counseling and coordination of care    Signed:   Ryland Lewis MD  6/6/2018  8:01 AM

## 2018-06-06 NOTE — PROGRESS NOTES
Hospitalist Progress Note  Ryland Lewis MD  Answering service: [phone] OR 36 from in house phone  Cell: [phone]      Date of Service:  2018  NAME:  Rodolfo Valdez  :  1935  MRN:  [id number]      Admission Summary:   The patient is a very pleasant 82-year-old -American female who has previous history significant for hypertension, hyperlipidemia, type 2 diabetes, chronic pain,TIA 14 years ago without residual deficits was brought from home due to altered mental status.      Interval history / Subjective:   She said she feels better, no chest pain     Assessment & Plan:     Acute metabolic encephalopathy POA  -confusion better, patient is conscious and alert, answer questions appropriately  -CT head no evidence of acute process  -possible due to percocet or baclofen  -no fever or leukocytosis,   -UA no evidence of UTI  -chest  X ray no acute abnormality  -Echo LV EF 55-60% no regional wall motion abnormality wall thickness normal  -seen by neurologist     Anemia of chronic disease   -H/H is stable  -no hematemesis or melena    HTN  -BP not at goal, continue losartan, metoprolol and lasix, monitor BP    DM-II  -continue metformin/sitagliptin, sliding scale and monitor finger stick glucose    Hx of TIA  -continue aspirin and zocor    Hx of hypercholesterolemia   -continue zocor    Chronic back pain with bilateral sciatica  -stable  -PT/OT    Hx of depression   -stable, continue paxil    Code status: Full Code  DVT prophylaxis: Lovenox    Care Plan discussed with: Patient/Family and Nurse/CM  Disposition: Rehab vs SNF  Case discussed with her daughter, Trista Gallegos at  [phone], and she said she is working and she found the patient on the floor and she does not think it is safe for her to go home now       Hospital Problems  Date Reviewed: 2018          Codes Class Noted POA    Altered mental status ICD-10-CM: R41.82  ICD-9-CM: 780.97  6/4/2018 Unknown              Vital Signs:    Last 24hrs VS reviewed since prior progress note. Most recent are:  Visit Vitals    /76 (BP 1 Location: Right arm, BP Patient Position: At rest)    Pulse 68    Temp 98.4 °F (36.9 °C)    Resp 13    Ht 5' 7\" (1.702 m)    Wt 80.5 kg (177 lb 7.5 oz)    SpO2 98%    Breastfeeding No    BMI 27.8 kg/m2         Intake/Output Summary (Last 24 hours) at 06/06/18 0747  Last data filed at 06/05/18 1700   Gross per 24 hour   Intake              240 ml   Output                1 ml   Net              239 ml        Physical Examination:             Constitutional:  No acute distress, cooperative, pleasant    ENT:  Oral mucous moist, oropharynx benign. Neck supple,    Resp:  CTA bilaterally. No wheezing/rhonchi/rales. No accessory muscle use   CV:  Regular rhythm, normal rate, no murmurs, gallops, rubs    GI:  Soft, non distended, non tender. normoactive bowel sounds, no hepatosplenomegaly     Musculoskeletal:  No edema    Neurologic:  Conscious and alert, oriented to place and person, Moves all extremities,CN II-XII reviewed     Skin:  Good turgor, no rashes or ulcers       Data Review:    Review and/or order of clinical lab test      Labs:     Recent Labs      06/05/18   0404  06/04/18   1138   WBC  6.9  5.9   HGB  9.7*  9.9*   HCT  30.1*  31.5*   PLT  178  176     Recent Labs      06/05/18   0404  06/04/18   1138   NA  143  140   K  3.6  3.7   CL  108  105   CO2  27  28   BUN  15  15   CREA  0.95  0.96   GLU  109*  184*   CA  9.0  8.6     No results for input(s): SGOT, GPT, ALT, AP, TBIL, TBILI, TP, ALB, GLOB, GGT, AML, LPSE in the last 72 hours. No lab exists for component: AMYP, HLPSE  Recent Labs      06/04/18   1050   INR  <0.9      No results for input(s): FE, TIBC, PSAT, FERR in the last 72 hours. No results found for: FOL, RBCF   No results for input(s): PH, PCO2, PO2 in the last 72 hours.   Recent Labs      06/04/18   1138   ENA <0.04     Lab Results   Component Value Date/Time    Cholesterol, total 164 06/10/2016 10:34 AM    HDL Cholesterol 86 06/10/2016 10:34 AM    LDL, calculated 68 06/10/2016 10:34 AM    Triglyceride 49 06/10/2016 10:34 AM     Lab Results   Component Value Date/Time    Glucose (POC) 117 (H) 06/06/2018 07:22 AM    Glucose (POC) 111 (H) 06/05/2018 10:00 PM    Glucose (POC) 139 (H) 06/05/2018 04:48 PM    Glucose (POC) 163 (H) 06/05/2018 11:53 AM    Glucose (POC) 120 (H) 06/05/2018 07:26 AM     Lab Results   Component Value Date/Time    Color YELLOW 06/04/2018 12:05 PM    Appearance HAZY (A) 06/04/2018 12:05 PM    Specific gravity 1.015 06/04/2018 12:05 PM    pH (UA) 7.5 06/04/2018 12:05 PM    Protein 100 (A) 06/04/2018 12:05 PM    Glucose 100 (A) 06/04/2018 12:05 PM    Ketone NEGATIVE  06/04/2018 12:05 PM    Bilirubin NEGATIVE  06/04/2018 12:05 PM    Urobilinogen 0.2 06/04/2018 12:05 PM    Nitrites NEGATIVE  06/04/2018 12:05 PM    Leukocyte Esterase NEGATIVE  06/04/2018 12:05 PM    Epithelial cells FEW 06/04/2018 12:05 PM    Bacteria 2+ (A) 06/04/2018 12:05 PM    WBC 0-4 06/04/2018 12:05 PM    RBC 0-5 06/04/2018 12:05 PM         Medications Reviewed:     Current Facility-Administered Medications   Medication Dose Route Frequency    sodium chloride (NS) flush 5-10 mL  5-10 mL IntraVENous Q8H    sodium chloride (NS) flush 5-10 mL  5-10 mL IntraVENous PRN    enoxaparin (LOVENOX) injection 40 mg  40 mg SubCUTAneous Q24H    acetaminophen (TYLENOL) tablet 650 mg  650 mg Oral BID    aspirin chewable tablet 81 mg  81 mg Oral DAILY    cholecalciferol (VITAMIN D3) tablet 1,000 Units  1,000 Units Oral DAILY    furosemide (LASIX) tablet 20 mg  20 mg Oral DAILY    losartan (COZAAR) tablet 25 mg  25 mg Oral DAILY    meloxicam (MOBIC) tablet 15 mg  15 mg Oral DAILY    metoprolol tartrate (LOPRESSOR) tablet 100 mg  100 mg Oral BID    PARoxetine (PAXIL) tablet 20 mg  20 mg Oral DAILY    famotidine (PEPCID) tablet 20 mg  20 mg Oral DAILY    simvastatin (ZOCOR) tablet 40 mg  40 mg Oral DAILY    insulin lispro (HUMALOG) injection   SubCUTAneous AC&HS    glucose chewable tablet 16 g  4 Tab Oral PRN    dextrose (D50W) injection syrg 12.5-25 g  12.5-25 g IntraVENous PRN    glucagon (GLUCAGEN) injection 1 mg  1 mg IntraMUSCular PRN    SITagliptin/metFORMIN (JANUMET) 50/1000 MG   Oral BID WITH MEALS     ______________________________________________________________________  EXPECTED LENGTH OF STAY: 2d 4h  ACTUAL LENGTH OF STAY:          2                 Beena Gould MD

## 2018-06-06 NOTE — PROGRESS NOTES
I have reviewed discharge instructions with the patient and daughter. The patient and daughter verbalized understanding. TRANSFER - OUT REPORT:    Verbal report given to Marval Saint, RN (name) on [facility]  being transferred to [facility] (unit) for routine progression of care       Report consisted of patients Situation, Background, Assessment and   Recommendations(SBAR). Information from the following report(s) SBAR, Kardex, Intake/Output, MAR, Recent Results and Cardiac Rhythm NSR was reviewed with the receiving nurse. Opportunity for questions and clarification was provided.       Patient transported with:   Family and personal belongings

## 2018-06-06 NOTE — PROGRESS NOTES
This CM was informed by co-worker that pt's daughter wants SNF/rehab. CM talked to pt to inform and then talked to her daughter, Carolina Estrada, [phone], to offer freedom of choice for SNF. The pt's daughter has preference for [facility]. Referral sent through [employer]. CM informed the daughter that pt's insurance will need to give authorization for pt to go to SNF. Miriam Friedman RN ACM CRM    [facility] will accept pt pending auth. They are asking for a DMAS 95 before pt can come. CM will do this prior to pt leaving. Met with pt's daughter in pt's room. She will transport pt to [facility] when ready to go. Pt stated she was just informed that she will have to stay here one more night.

## 2018-06-06 NOTE — PROGRESS NOTES
Spiritual Care Partner Volunteer visited patient in room 662 on 6.06.18. Documented by: : Rev. Millicent Lozano.  Rosendo Castaneda; [facility], to contact [phone] [address] Estrada call: [phone]

## 2018-06-06 NOTE — PROGRESS NOTES
Pharmacist Discharge Medication Reconciliation    Discharging Provider: Dr. Abel Chapman PMH: CVA, hypertension, hypercholesterolemia, diabetes mellitus, depression, chronic pain   Chief Complaint for this Admission: metabolic encephalopathy   Allergies: Review of patient's allergies indicates no known allergies. Discharge Medications:   Current Discharge Medication List        CONTINUE these medications which have NOT CHANGED    Details   baclofen (LIORESAL) 10 mg tablet Take 10 mg by mouth three (3) times daily. losartan (COZAAR) 25 mg tablet Take 1 Tab by mouth daily. Qty: 90 Tab, Refills: 1    Associated Diagnoses: Essential hypertension      acetaminophen (TYLENOL) 325 mg tablet Take 2 Tabs by mouth two (2) times a day. Qty: 120 Tab, Refills: 5    Associated Diagnoses: Other osteoarthritis of spine, lumbar region; Chronic bilateral low back pain with bilateral sciatica      insulin glulisine U-100 (APIDRA SOLOSTAR U-100 INSULIN) 100 unit/mL pen by SubCUTAneous route. 5 units with breakfast and Dinner, 3 units with Lunch  Sliding Scale:  150-200 = add 2 units  201-250 = add 4 units  251-300 = add 5 units  301-350 = add 6 units  351 and above = add 8 units and call MD    Associated Diagnoses: Type 2 diabetes with nephropathy (HCC)      metoprolol tartrate (LOPRESSOR) 100 mg IR tablet Take 1 Tab by mouth two (2) times a day. Qty: 180 Tab, Refills: 1    Associated Diagnoses: Essential hypertension with goal blood pressure less than 130/85      raNITIdine (ZANTAC) 150 mg tablet Take 1 Tab by mouth nightly.   Qty: 90 Tab, Refills: 3    Associated Diagnoses: Gastritis, presence of bleeding unspecified, unspecified chronicity, unspecified gastritis type      PARoxetine (PAXIL) 20 mg tablet TAKE 1 TABLET BY MOUTH  EVERY NIGHT AT BEDTIME  Qty: 90 Tab, Refills: 1    Associated Diagnoses: Other depression      meloxicam (MOBIC) 15 mg tablet TAKE 1 TABLET BY MOUTH  DAILY  Qty: 90 Tab, Refills: 1 Associated Diagnoses: Primary osteoarthritis of both knees      simvastatin (ZOCOR) 40 mg tablet TAKE 1 TABLET BY MOUTH  NIGHTLY  Qty: 90 Tab, Refills: 1    Associated Diagnoses: Mixed hyperlipidemia      furosemide (LASIX) 20 mg tablet 1 tab po QOD  Qty: 45 Tab, Refills: 2      JANUMET 50-1,000 mg per tablet Take 1 tablet by mouth two  times daily with meals  Qty: 180 Tab, Refills: 5    Associated Diagnoses: Type 2 diabetes mellitus without complication (HCC)      cholecalciferol (VITAMIN D3) 1,000 unit cap Take 1,000 Units by mouth daily. aspirin 81 mg chewable tablet Take 81 mg by mouth daily. STOP taking these medications       HYDROcodone-acetaminophen (NORCO) 5-325 mg per tablet Comments:   Reason for Stopping:               The patient's chart, MAR and AVS were reviewed by Jeramy Rowe Pharm. D., BCPS, BCPPS.

## 2018-06-06 NOTE — PROGRESS NOTES
[facility] follow-up appointment on Wednesday June 13,2018 @ 11:00 a.m.  With Yuliana Orantes NP  Added to Wilmer Ceron CM Specialist

## 2018-06-06 NOTE — DISCHARGE INSTRUCTIONS
Discharge SNF/Rehab Instructions/LTAC        PATIENT ID: Kristina Juarez  MRN: [id number]   YOB: 1935    DATE OF ADMISSION: 6/4/2018 10:38 AM    DATE OF DISCHARGE: 6/6/2018    PRIMARY CARE PROVIDER: Daphnie Richards MD       ATTENDING PHYSICIAN: Norma Potts MD  DISCHARGING PROVIDER: Norma Potts MD     To contact this individual call [phone] and ask the  to page. If unavailable ask to be transferred the Adult Hospitalist Department. CONSULTATIONS: IP CONSULT TO NEUROLOGY    PROCEDURES/SURGERIES: * No surgery found *    ADMITTING DIAGNOSES & HOSPITAL COURSE:     The patient is a very pleasant 53-year-old -American female who has previous history significant for hypertension, hyperlipidemia, type 2 diabetes, chronic pain,TIA 14 years ago without residual deficits was brought from home due to altered mental status.    Acute metabolic encephalopathy POA  -improved, confusion better, patient is conscious and alert, answer questions appropriately  -CT head no evidence of acute process  -possible due to percocet or baclofen  -no fever or leukocytosis,   -UA no evidence of UTI  -chest  X ray no acute abnormality  -Echo LV EF 55-60% no regional wall motion abnormality wall thickness normal  -seen by neurologist    Anemia of chronic disease   -H/H is stable  -no hematemesis or melena   HTN  -BP not at goal, continue losartan, metoprolol and lasix, monitor BP  DM-II  -continue metformin/sitagliptin, sliding scale and monitor finger stick glucose  Hx of TIA  -continue aspirin and zocor  Hx of hypercholesterolemia   -continue zocor  Chronic back pain with bilateral sciatica  -stable  -PT/OT   Hx of depression   -stable, continue paxil     Code status: Full Code  DVT prophylaxis: Lovenox         DISCHARGE DIAGNOSES / PLAN:      Acute metabolic encephalopathy POA  -improved  -continue supportive care and PT  -seen by neurologist    Anemia of chronic disease   -H/H is stable  HTN  -Continue losartan, metoprolol and lasix, monitor BP  DM-II  -continue metformin/sitagliptin, sliding scale and monitor finger stick glucose  Hx of TIA  -continue aspirin and zocor  Hx of hypercholesterolemia   -continue zocor  Chronic back pain with bilateral sciatica  -stable  -PT/OT   Hx of depression   - continue paxil    PENDING TEST RESULTS:   At the time of discharge the following test results are still pending: none     FOLLOW UP APPOINTMENTS:    Follow-up Information     Follow up With Details Comments Contact Info   1. Lucy Cabrera, NP Go on 6/13/2018 Hospital PCP f/u appointment on Wednesday June 13,2018 @ 11:00 a.m. [address] [id number]  [phone]     2. [facility] ([facility]) On 6/6/2018 daughter plans to transport. [address] [place] [address]  [phone]           3. Neema Michelle MD, in one week    ADDITIONAL CARE RECOMMENDATIONS:    DIET: Diabetic Diet    TUBE FEEDING INSTRUCTIONS: none    OXYGEN / BiPAP SETTINGS: none    ACTIVITY: Activity as tolerated    WOUND CARE: none    EQUIPMENT needed: none      DISCHARGE MEDICATIONS:   See Medication Reconciliation Form      NOTIFY YOUR PHYSICIAN FOR ANY OF THE FOLLOWING:   Fever over 101 degrees for 24 hours. Chest pain, shortness of breath, fever, chills, nausea, vomiting, diarrhea, change in mentation, falling, weakness, bleeding. Severe pain or pain not relieved by medications. Or, any other signs or symptoms that you may have questions about.     DISPOSITION:    Home With:   OT  PT  HH  RN      x SNF/Inpatient Rehab/LTAC    Independent/assisted living    Hospice    Other:       PATIENT CONDITION AT DISCHARGE:     Functional status    Poor    x Deconditioned     Independent      Cognition     Lucid    x Forgetful     Dementia      Catheters/lines (plus indication)    Mark     PICC     PEG    x None      Code status   x  Full code     DNR      PHYSICAL EXAMINATION AT DISCHARGE:   Refer to Progress Note      CHRONIC MEDICAL DIAGNOSES:  Problem List as of 6/6/2018  Date Reviewed: 5/30/2018          Codes Class Noted - Resolved    Altered mental status ICD-10-CM: R41.82  ICD-9-CM: 780.97  6/4/2018 - Present        Type 2 diabetes with nephropathy ([facility] ICD-10-CM: E11.21  ICD-9-CM: 250.40, 583.81  3/9/2018 - Present        H/O: stroke ICD-10-CM: Z86.73  ICD-9-CM: V12.54  11/9/2017 - Present        Advance care planning ICD-10-CM: Z71.89  ICD-9-CM: V65.49  1/21/2016 - Present        Essential hypertension ICD-10-CM: I10  ICD-9-CM: 401.9  1/21/2016 - Present        Stomach ulcer ICD-10-CM: K25.9  ICD-9-CM: 531.90  3/12/2015 - Present        Diverticula of colon ICD-10-CM: K57.30  ICD-9-CM: 562.10  3/12/2015 - Present        DM (diabetes mellitus) ([facility] ICD-10-CM: E11.9  ICD-9-CM: 250.00  5/1/2014 - Present        Mixed hyperlipidemia ICD-10-CM: E78.2  ICD-9-CM: 272.2  5/1/2014 - Present        Chronic venous insufficiency ICD-10-CM: I87.2  ICD-9-CM: 459.81  5/1/2014 - Present        Depression ICD-10-CM: F32.9  ICD-9-CM: 700  5/1/2014 - Present                CDMP Checked:   Yes x     PROBLEM LIST Updated:  Yes x         Signed:   Tayo Santos MD  6/6/2018  1:23 PM

## 2018-06-06 NOTE — INTERDISCIPLINARY ROUNDS
IDR Summary          Patient: Rodolfo Valdez MRN: [id number]    Age: [de-identified] y.o. YOB: 1935 Room/Bed: [facility]   Admit Diagnosis: Altered mental status  Altered mental status  Principal Diagnosis: <principal problem not specified>   [facility]: Attending: Faustino Doherty   Care Manager: Oscar Choi  RN: Doroteo Gonzalez  PCP: Kellie Muniz MD    Readmission: NO          Testing due for pt today? NO    Discharge plan for the day: Rehab Placement;  Referral sent        Discharge plan for the stay: Referral sent for IP Rehab Placement    Discharge plan for the way: Awaiting placement    Signed:     Doroteo Gonzalez  6/6/2018  12:31 PM

## 2018-06-06 NOTE — PROGRESS NOTES
Received call from intiki MCLEAN who notified CM that pt needs assistance with discharge to [facility]. [facility] has received insurance auth. CM confirmed this with Shekhar Finley, phone [phone] in admissions there and also faxed pt's AVS to her attention, fax [phone]. Discharge envelope to go with pt to [facility] to include AVS, kardex, MAR and nurse to call report to [phone] HUMMEL  Updated nurse Emanuel Frank on unit.     NADER Harris

## 2018-06-08 ENCOUNTER — HOSPITAL ENCOUNTER (INPATIENT)
Age: 83
LOS: 3 days | Discharge: NURSING, REHAB OR LONG-TERM CARE | DRG: 682 | End: 2018-06-12
Attending: EMERGENCY MEDICINE | Admitting: INTERNAL MEDICINE
Payer: MEDICARE

## 2018-06-08 ENCOUNTER — APPOINTMENT (OUTPATIENT)
Dept: GENERAL RADIOLOGY | Age: 83
DRG: 682 | End: 2018-06-08
Attending: EMERGENCY MEDICINE
Payer: MEDICARE

## 2018-06-08 DIAGNOSIS — N30.00 ACUTE CYSTITIS WITHOUT HEMATURIA: ICD-10-CM

## 2018-06-08 DIAGNOSIS — N17.9 AKI (ACUTE KIDNEY INJURY) (HCC): Primary | ICD-10-CM

## 2018-06-08 DIAGNOSIS — R41.82 ALTERED MENTAL STATUS, UNSPECIFIED ALTERED MENTAL STATUS TYPE: ICD-10-CM

## 2018-06-08 LAB
ALBUMIN SERPL-MCNC: 3.7 G/DL (ref 3.5–5)
ALBUMIN SERPL-MCNC: 4 G/DL (ref 3.5–5)
ALBUMIN/GLOB SERPL: 1 {RATIO} (ref 1.1–2.2)
ALBUMIN/GLOB SERPL: 1 {RATIO} (ref 1.1–2.2)
ALP SERPL-CCNC: 56 U/L (ref 45–117)
ALP SERPL-CCNC: 63 U/L (ref 45–117)
ALT SERPL-CCNC: 19 U/L (ref 12–78)
ALT SERPL-CCNC: 22 U/L (ref 12–78)
ANION GAP SERPL CALC-SCNC: 11 MMOL/L (ref 5–15)
ANION GAP SERPL CALC-SCNC: 9 MMOL/L (ref 5–15)
APPEARANCE UR: ABNORMAL
AST SERPL-CCNC: 29 U/L (ref 15–37)
AST SERPL-CCNC: 30 U/L (ref 15–37)
BASOPHILS # BLD: 0 K/UL (ref 0–0.1)
BASOPHILS NFR BLD: 0 % (ref 0–1)
BILIRUB SERPL-MCNC: 0.2 MG/DL (ref 0.2–1)
BILIRUB SERPL-MCNC: 0.2 MG/DL (ref 0.2–1)
BILIRUB UR QL: NEGATIVE
BUN SERPL-MCNC: 30 MG/DL (ref 6–20)
BUN SERPL-MCNC: 31 MG/DL (ref 6–20)
BUN/CREAT SERPL: 16 (ref 12–20)
BUN/CREAT SERPL: 18 (ref 12–20)
CALCIUM SERPL-MCNC: 8.9 MG/DL (ref 8.5–10.1)
CALCIUM SERPL-MCNC: 9.4 MG/DL (ref 8.5–10.1)
CHLORIDE SERPL-SCNC: 103 MMOL/L (ref 97–108)
CHLORIDE SERPL-SCNC: 106 MMOL/L (ref 97–108)
CO2 SERPL-SCNC: 21 MMOL/L (ref 21–32)
CO2 SERPL-SCNC: 25 MMOL/L (ref 21–32)
COLOR UR: ABNORMAL
CREAT SERPL-MCNC: 1.71 MG/DL (ref 0.55–1.02)
CREAT SERPL-MCNC: 1.83 MG/DL (ref 0.55–1.02)
DIFFERENTIAL METHOD BLD: ABNORMAL
EOSINOPHIL # BLD: 0.2 K/UL (ref 0–0.4)
EOSINOPHIL NFR BLD: 2 % (ref 0–7)
ERYTHROCYTE [DISTWIDTH] IN BLOOD BY AUTOMATED COUNT: 16.6 % (ref 11.5–14.5)
GLOBULIN SER CALC-MCNC: 3.8 G/DL (ref 2–4)
GLOBULIN SER CALC-MCNC: 4.1 G/DL (ref 2–4)
GLUCOSE SERPL-MCNC: 107 MG/DL (ref 65–100)
GLUCOSE SERPL-MCNC: 89 MG/DL (ref 65–100)
GLUCOSE UR STRIP.AUTO-MCNC: NEGATIVE MG/DL
HCT VFR BLD AUTO: 35.4 % (ref 35–47)
HGB BLD-MCNC: 11 G/DL (ref 11.5–16)
HGB UR QL STRIP: NEGATIVE
IMM GRANULOCYTES # BLD: 0 K/UL (ref 0–0.04)
IMM GRANULOCYTES NFR BLD AUTO: 0 % (ref 0–0.5)
KETONES UR QL STRIP.AUTO: NEGATIVE MG/DL
LEUKOCYTE ESTERASE UR QL STRIP.AUTO: ABNORMAL
LYMPHOCYTES # BLD: 2.2 K/UL (ref 0.8–3.5)
LYMPHOCYTES NFR BLD: 28 % (ref 12–49)
MCH RBC QN AUTO: 28.8 PG (ref 26–34)
MCHC RBC AUTO-ENTMCNC: 31.1 G/DL (ref 30–36.5)
MCV RBC AUTO: 92.7 FL (ref 80–99)
MONOCYTES # BLD: 1 K/UL (ref 0–1)
MONOCYTES NFR BLD: 12 % (ref 5–13)
NEUTS SEG # BLD: 4.7 K/UL (ref 1.8–8)
NEUTS SEG NFR BLD: 58 % (ref 32–75)
NITRITE UR QL STRIP.AUTO: NEGATIVE
NRBC # BLD: 0 K/UL (ref 0–0.01)
NRBC BLD-RTO: 0 PER 100 WBC
PH UR STRIP: 6 [PH] (ref 5–8)
PLATELET # BLD AUTO: 204 K/UL (ref 150–400)
PMV BLD AUTO: 11.6 FL (ref 8.9–12.9)
POTASSIUM SERPL-SCNC: 5.1 MMOL/L (ref 3.5–5.1)
POTASSIUM SERPL-SCNC: 5.3 MMOL/L (ref 3.5–5.1)
PROT SERPL-MCNC: 7.5 G/DL (ref 6.4–8.2)
PROT SERPL-MCNC: 8.1 G/DL (ref 6.4–8.2)
PROT UR STRIP-MCNC: ABNORMAL MG/DL
RBC # BLD AUTO: 3.82 M/UL (ref 3.8–5.2)
SODIUM SERPL-SCNC: 137 MMOL/L (ref 136–145)
SODIUM SERPL-SCNC: 138 MMOL/L (ref 136–145)
SP GR UR REFRACTOMETRY: 1.02 (ref 1–1.03)
UROBILINOGEN UR QL STRIP.AUTO: 1 EU/DL (ref 0.2–1)
WBC # BLD AUTO: 8.1 K/UL (ref 3.6–11)

## 2018-06-08 PROCEDURE — 81001 URINALYSIS AUTO W/SCOPE: CPT | Performed by: EMERGENCY MEDICINE

## 2018-06-08 PROCEDURE — 87186 SC STD MICRODIL/AGAR DIL: CPT | Performed by: EMERGENCY MEDICINE

## 2018-06-08 PROCEDURE — 99285 EMERGENCY DEPT VISIT HI MDM: CPT

## 2018-06-08 PROCEDURE — 85025 COMPLETE CBC W/AUTO DIFF WBC: CPT | Performed by: EMERGENCY MEDICINE

## 2018-06-08 PROCEDURE — 96365 THER/PROPH/DIAG IV INF INIT: CPT

## 2018-06-08 PROCEDURE — 87086 URINE CULTURE/COLONY COUNT: CPT | Performed by: EMERGENCY MEDICINE

## 2018-06-08 PROCEDURE — 74011250636 HC RX REV CODE- 250/636: Performed by: EMERGENCY MEDICINE

## 2018-06-08 PROCEDURE — 36415 COLL VENOUS BLD VENIPUNCTURE: CPT | Performed by: EMERGENCY MEDICINE

## 2018-06-08 PROCEDURE — 80053 COMPREHEN METABOLIC PANEL: CPT | Performed by: EMERGENCY MEDICINE

## 2018-06-08 PROCEDURE — 87077 CULTURE AEROBIC IDENTIFY: CPT | Performed by: EMERGENCY MEDICINE

## 2018-06-08 PROCEDURE — 74011000258 HC RX REV CODE- 258: Performed by: EMERGENCY MEDICINE

## 2018-06-08 PROCEDURE — 71045 X-RAY EXAM CHEST 1 VIEW: CPT

## 2018-06-08 RX ORDER — CEPHALEXIN 500 MG/1
500 CAPSULE ORAL
Status: DISCONTINUED | OUTPATIENT
Start: 2018-06-08 | End: 2018-06-08

## 2018-06-08 RX ADMIN — SODIUM CHLORIDE 1000 ML: 900 INJECTION, SOLUTION INTRAVENOUS at 22:52

## 2018-06-08 RX ADMIN — CEFTRIAXONE SODIUM 1 G: 1 INJECTION, POWDER, FOR SOLUTION INTRAMUSCULAR; INTRAVENOUS at 23:45

## 2018-06-08 NOTE — IP AVS SNAPSHOT
[address] 
[phone] Patient: [facility] MRN: [id number] :1935 About your hospitalization You were admitted on:  2018 You last received care in the:  [address] [place] [address] You were discharged on:  2018 Why you were hospitalized Your primary diagnosis was:  Acute Delirium Follow-up Information Follow up With Details Comments Contact Info Nimisha Moreland MD Schedule an appointment as soon as possible for a visit in 1 week For follow up after hospitalization [address] [facility] [address] [place] [address] 
[phone] 3060 [facility]   [address] [place] [address] 
[phone] Discharge Orders None A check lorne indicates which time of day the medication should be taken. My Medications START taking these medications Instructions Each Dose to Equal  
 Morning Noon Evening Bedtime L. acidoph & paracasei- S therm- Bifido 8 billion cell Cap cap Commonly known as:  ARLEY-Q/RISAQUAD Your last dose was: Your next dose is: Take 1 Cap by mouth daily. OK to substitute other probiotic. 1 Cap CHANGE how you take these medications Instructions Each Dose to Equal  
 Morning Noon Evening Bedtime  
 baclofen 10 mg tablet Commonly known as:  LIORESAL What changed:   
- how much to take - when to take this 
- reasons to take this 
- additional instructions Your last dose was: Your next dose is: Take 0.5 Tabs by mouth three (3) times daily as needed. For back pain. 5 mg  
    
   
   
   
  
 furosemide 20 mg tablet Commonly known as:  LASIX What changed:   
- how much to take 
- how to take this - when to take this 
- reasons to take this 
- additional instructions Your last dose was: Your next dose is: Take 1 Tab by mouth daily as needed. For fluid overload, leg swelling. 20 mg  
    
   
   
   
  
 raNITIdine 150 mg tablet Commonly known as:  ZANTAC What changed:  when to take this Your last dose was: Your next dose is: Take 1 Tab by mouth nightly. 150 mg  
    
   
   
   
  
 simvastatin 40 mg tablet Commonly known as:  ZOCOR What changed:  See the new instructions. Your last dose was: Your next dose is: TAKE 1 TABLET BY MOUTH  NIGHTLY CONTINUE taking these medications Instructions Each Dose to Equal  
 Morning Noon Evening Bedtime  
 acetaminophen 325 mg tablet Commonly known as:  TYLENOL Your last dose was: Your next dose is: Take 2 Tabs by mouth two (2) times a day. 650 mg APIDRA SOLOSTAR U-100 INSULIN 100 unit/mL pen Generic drug:  insulin glulisine U-100 Your last dose was: Your next dose is:    
   
   
 by SubCUTAneous route. 5 units with breakfast and Dinner, 3 units with Lunch Sliding Scale: 150-200 = add 2 units 201-250 = add 4 units 251-300 = add 5 units 301-350 = add 6 units 351 and above = add 8 units and call MD  
     
   
   
   
  
 aspirin 81 mg chewable tablet Your last dose was: Your next dose is: Take 81 mg by mouth daily. 81 mg JANUMET 50-1,000 mg per tablet Generic drug:  SITagliptin-metFORMIN Your last dose was: Your next dose is: Take 1 tablet by mouth two  times daily with meals  
     
   
   
   
  
 losartan 25 mg tablet Commonly known as:  COZAAR Your last dose was: Your next dose is: Take 1 Tab by mouth daily. 25 mg  
    
   
   
   
  
 meloxicam 15 mg tablet Commonly known as:  MOBIC Your last dose was: Your next dose is: TAKE 1 TABLET BY MOUTH  DAILY  
     
   
   
   
  
 metoprolol tartrate 100 mg IR tablet Commonly known as:  LOPRESSOR Your last dose was: Your next dose is: Take 1 Tab by mouth two (2) times a day. 100 mg PARoxetine 20 mg tablet Commonly known as:  PAXIL Your last dose was: Your next dose is: TAKE 1 TABLET BY MOUTH  EVERY NIGHT AT BEDTIME  
     
   
   
   
  
 VITAMIN D3 1,000 unit Cap Generic drug:  cholecalciferol Your last dose was: Your next dose is: Take 1,000 Units by mouth daily. 1000 Units Where to Get Your Medications These medications were sent to [address] [facility], [address] [facility] [address] [facility] [address], [facility] [address] Phone:  [phone]  
  meloxicam 15 mg tablet Information on where to get these meds will be given to you by the nurse or doctor. ! Ask your nurse or doctor about these medications  
  baclofen 10 mg tablet  
 furosemide 20 mg tablet L. acidoph & paracasei- S therm- Bifido 8 billion cell Cap cap Discharge Instructions Discharging provider: Blu Gregg MD 
 
Primary care provider: Torey Doyle MD 
 
[address]: 
 
This is an 74-year-old woman with a past medical history significant for hypertension, dyslipidemia, type 2 diabetes, depression, chronic back pain, who was in her usual state of health until the day of her presentation at the emergency room when the patient developed a change in mental status. Ernie Gonzalez resides at the assisted living facility.  The shortness of breath is progressive and because of that, she was sent to the emergency room. Jan Velarde was last admitted to this hospital from 06/04/2018 to 06/06/2018 for evaluation of similar symptoms.  The change in mental status was attributed to Percocet, which the patient was taking for chronic back pain.  The Percocet was discontinued.  The patient was discharged back to the assisted living facility in a stable condition.  When the patient came back to the emergency room, she was found to be in acute renal failure.  Patient was discharged from the hospital with normal renal function.  Her urinalysis is also suggestive of acute cystitis.  Patient was referred to the hospitalist service for evaluation for admission.  No history of fever, no rigors and no chills.  The patient has had a stroke in the past without any significant residual deficits.  During the recent hospitalization for the acute delirium, the patient was seen by the neurologist.  A CT scan of the head was also obtained, which was negative. Metabolic encephalopathy (POA) - due to renal dysfunction and UTI 
- CT head 6/4 without acute abnormality - Reduce baclofen dosage 
- Re-orient as able EDWARD (POA) - pre-renal, NSAIDs. Resolved back to baseline with IVF. Change lasix to as needed. Encourage fluid intake E.coli UTI (POA) - Ucx 6/8 with E.coli 
- Ceftriaxone 6/9-6/11. Should be sufficient for uncomplicated cystitis Aortic stenosis 
- Echo 6/5 shows LVEF 55-60%, no WMA, moderate aortic stenosis - Lasix changed to as needed - Recommend follow up with cardiology for monitoring DM2 (chronic) - A1c 6.8 
- Continue stiagliptin, metformin, insulin 
- Diabetic diet HTN (chronic) - OK to restart losartan. Continue metoprolol HLD (chronic) - continue simvastatin Chronic low back pain - Reduce dose of baclofen 
- OK to restart mobic but she needs good fluid intake to prevent recurrent EDWARD - Acetaminophen as needed FOLLOW-UP CARE RECOMMENDATIONS: 
 
APPOINTMENTS: 
Follow-up Information Follow up With Details Comments Contact Info  Mariel Holder MD Schedule an appointment as soon as possible for a visit in 1 week For follow up after hospitalization 5[address] [facility] [address] [place] [address] 
[phone] Future Appointments Date Time Provider Hardik Castro 10/24/2018 2:20 PM Alok Adams 103, Khris 2 It is very important that you keep follow-up appointment(s). Bring discharge papers, medication list (and/or medication bottles) to follow-up appointments for review by outpatient provider(s). ONGOING TREATMENT PLAN: Encourage fluids Specific symptoms to watch for: chest pain, shortness of breath, fever, chills, nausea, vomiting, diarrhea, change in mentation, falling, weakness, bleeding. DIET:  Diabetic Diet ACTIVITY:  Activity as tolerated and PT/OT Eval and Treat GOALS OF CARE: 
x  Eventual return to home/independent/assisted living Long term SNF Hospice No rehospitalization Patient condition at discharge:  
Functional status Poor   
x  Deconditioned Independent Cognition Lucid  
x  Forgetful (some sensescence) Dementia Catheters/lines (plus indication) Mark PICC   
  PEG Code status 
x  Full code DNR Stephanie Robertson . . . . . . . . . . . . . . . . . . . . . . . . . . . . . . . . . . . . . . . . . . . . . . . . . . . . . . . . . . . . . . . . . . . . . . Stephanie Robertson CHRONIC MEDICAL CONDITIONS: 
Problem List as of 6/12/2018  Date Reviewed: 6/9/2018 Codes Class Noted - Resolved * (Principal)Acute delirium ICD-10-CM: R41.0 ICD-9-CM: 780.09  6/9/2018 - Present Altered mental status ICD-10-CM: R41.82 
ICD-9-CM: 780.97  6/4/2018 - Present Type 2 diabetes with nephropathy ([facility] ICD-10-CM: E11.21 
ICD-9-CM: 250.40, 583.81  3/9/2018 - Present H/O: stroke ICD-10-CM: Z86.73 
ICD-9-CM: V12.54  11/9/2017 - Present Advance care planning ICD-10-CM: Z71.89 ICD-9-CM: V65.49  1/21/2016 - Present Essential hypertension ICD-10-CM: I10 
ICD-9-CM: 401.9  1/21/2016 - Present Stomach ulcer ICD-10-CM: K25.9 ICD-9-CM: 531.90  3/12/2015 - Present Diverticula of colon ICD-10-CM: K57.30 ICD-9-CM: 562.10  3/12/2015 - Present DM (diabetes mellitus) ([facility] ICD-10-CM: E11.9 ICD-9-CM: 250.00  5/1/2014 - Present Mixed hyperlipidemia ICD-10-CM: E78.2 ICD-9-CM: 272.2  5/1/2014 - Present Chronic venous insufficiency ICD-10-CM: I87.2 ICD-9-CM: 459.81  5/1/2014 - Present Depression ICD-10-CM: F32.9 ICD-9-CM: 549  5/1/2014 - Present Effective [employer] Announcement We are excited to announce that we are making your provider's discharge notes available to you in PeerReach. You will see these notes when they are completed and signed by the physician that discharged you from your recent hospital stay. If you have any questions or concerns about any information you see in PeerReach, please call the Health Information Department where you were seen or reach out to your Primary Care Provider for more information about your plan of care. Introducing Scar Malik As a [employer] patient, I wanted to make you aware of our electronic visit tool called Scar Burksludwin. [employer] 24/7 allows you to connect within minutes with a medical provider 24 hours a day, seven days a week via a mobile device or tablet or logging into a secure website from your computer. You can access Scar Malik from anywhere in the [place]. A virtual visit might be right for you when you have a simple condition and feel like you just dont want to get out of bed, or cant get away from work for an appointment, when your regular [employer] provider is not available (evenings, weekends or holidays), or when youre out of town and need minor care. Electronic visits cost only $49 and if the [employer] 24/7 provider determines a prescription is needed to treat your condition, one can be electronically transmitted to a nearby pharmacy*. Please take a moment to enroll today if you have not already done so. The enrollment process is free and takes just a few minutes. To enroll, please download the [email or web address] 24/7 jose antonio to your tablet or phone, or visit [email or web address] to enroll on your computer. And, as an [address] [place] [address] patient with a [employer] account, the results of your visits will be scanned into your electronic medical record and your primary care provider will be able to view the scanned results. We urge you to continue to see your regular [email or web address] provider for your ongoing medical care. And while your primary care provider may not be the one available when you seek a [employer] virtual visit, the peace of mind you get from getting a real diagnosis real time can be priceless. For more information on [employer], view our Frequently Asked Questions (FAQs) at [email or web address]. Sincerely, 
 
Pavel Terry MD 
Chief Medical Officer [address] [place] [address] *:  certain medications cannot be prescribed via [employer] Unresulted tests-please follow up with your PCP on these results Procedure/Test Authorizing Provider  CBC W/O DIFF Stevenson Hopson MD  
 CBC W/O DIFF Stevenson Hopson MD  
 CBC WITH AUTOMATED DIFF Freddie Dominguez MD  
 CBC WITH AUTOMATED DIFF Cuong Eugene MD  
 CULTURE, URINE Cuong Eugene MD  
 DUPLEX LOWER EXT VENOUS BILAT Freddie Dominguez MD  
 HEMOGLOBIN A1C WITH Mario Mederos MD  
 LIPID PANEL MD Lovely Peters MD  
 METABOLIC PANEL, Freddie Dominguez MD  
 METABOLIC PANEL, Freddie Dominguez MD  
 METABOLIC PANEL, Nathan Castillo MD  
 METABOLIC PANEL, Miriam Flowers MD  
 METABOLIC PANEL, MD Dimple Conde MD  
 SAMPLES BEING Shira Duke MD  
 TSH 3RD Ruy Xavier MD  
 Rajiv Madison MD  
 XR CHEST PORT Susanna Arshad MD  
  
Providers Seen During Your Hospitalization Provider Specialty Primary office phone Susanna Arshad MD Emergency Medicine [phone] Brian Cabral MD Internal Medicine [phone] Gil Suárez MD Internal Medicine [phone] Stefanie Alvarado MD Internal Medicine [phone] Xochitl Gonzalez MD Internal Medicine [phone] Your Primary Care Physician (PCP) Primary Care Physician Office Phone Office Fax [address], [address] [place] [address] [phone] You are allergic to the following No active allergies Recent Documentation Height Weight BMI OB Status Smoking Status 1.702 m 84 kg 29 kg/m2 Hysterectomy Never Smoker Emergency Contacts Name Discharge Info Relation Home Work Mobile [address] [facility] CAREGIVER [3] Child [2] [phone] Hardik Amezquita CAREGIVER [3] Child [2]   [phone] Patient Belongings The following personal items are in your possession at time of discharge: 
     Visual Aid: Glasses, With patient      Home Medications: None   Jewelry: None       Other Valuables: Eyeglasses Please provide this summary of care documentation to your next provider. Signatures-by signing, you are acknowledging that this After Visit Summary has been reviewed with you and you have received a copy. Patient Signature:  ____________________________________________________________ Date:  ____________________________________________________________  
  
Galina Abbott Provider Signature:  ____________________________________________________________ Date:  ____________________________________________________________

## 2018-06-08 NOTE — ED TRIAGE NOTES
Patient reports to the ED via EMS from [facility] complaining of altered mental status. Patient admitted 6/4, discharged on 6/6. Daughter reports pateint was AAO x4 yesterday. Went to see her today, patient is lethargic and oriented only to self.

## 2018-06-09 DIAGNOSIS — M17.0 PRIMARY OSTEOARTHRITIS OF BOTH KNEES: ICD-10-CM

## 2018-06-09 PROBLEM — R41.0 ACUTE DELIRIUM: Status: ACTIVE | Noted: 2018-06-09

## 2018-06-09 LAB
ALBUMIN SERPL-MCNC: 3.5 G/DL (ref 3.5–5)
ALBUMIN/GLOB SERPL: 0.9 {RATIO} (ref 1.1–2.2)
ALP SERPL-CCNC: 48 U/L (ref 45–117)
ALT SERPL-CCNC: 22 U/L (ref 12–78)
ANION GAP SERPL CALC-SCNC: 10 MMOL/L (ref 5–15)
AST SERPL-CCNC: 32 U/L (ref 15–37)
BASOPHILS # BLD: 0 K/UL (ref 0–0.1)
BASOPHILS NFR BLD: 0 % (ref 0–1)
BILIRUB SERPL-MCNC: 0.3 MG/DL (ref 0.2–1)
BUN SERPL-MCNC: 25 MG/DL (ref 6–20)
BUN/CREAT SERPL: 21 (ref 12–20)
CALCIUM SERPL-MCNC: 8.9 MG/DL (ref 8.5–10.1)
CHLORIDE SERPL-SCNC: 108 MMOL/L (ref 97–108)
CHOLEST SERPL-MCNC: 153 MG/DL
CO2 SERPL-SCNC: 24 MMOL/L (ref 21–32)
CREAT SERPL-MCNC: 1.21 MG/DL (ref 0.55–1.02)
DIFFERENTIAL METHOD BLD: ABNORMAL
EOSINOPHIL # BLD: 0.1 K/UL (ref 0–0.4)
EOSINOPHIL NFR BLD: 2 % (ref 0–7)
ERYTHROCYTE [DISTWIDTH] IN BLOOD BY AUTOMATED COUNT: 16.5 % (ref 11.5–14.5)
EST. AVERAGE GLUCOSE BLD GHB EST-MCNC: 148 MG/DL
GLOBULIN SER CALC-MCNC: 3.8 G/DL (ref 2–4)
GLUCOSE BLD STRIP.AUTO-MCNC: 102 MG/DL (ref 65–100)
GLUCOSE BLD STRIP.AUTO-MCNC: 130 MG/DL (ref 65–100)
GLUCOSE BLD STRIP.AUTO-MCNC: 132 MG/DL (ref 65–100)
GLUCOSE BLD STRIP.AUTO-MCNC: 172 MG/DL (ref 65–100)
GLUCOSE BLD STRIP.AUTO-MCNC: 210 MG/DL (ref 65–100)
GLUCOSE SERPL-MCNC: 125 MG/DL (ref 65–100)
HBA1C MFR BLD: 6.8 % (ref 4.2–6.3)
HCT VFR BLD AUTO: 33.5 % (ref 35–47)
HDLC SERPL-MCNC: 78 MG/DL
HDLC SERPL: 2 {RATIO} (ref 0–5)
HGB BLD-MCNC: 10.5 G/DL (ref 11.5–16)
IMM GRANULOCYTES # BLD: 0 K/UL (ref 0–0.04)
IMM GRANULOCYTES NFR BLD AUTO: 0 % (ref 0–0.5)
LDLC SERPL CALC-MCNC: 67.8 MG/DL (ref 0–100)
LIPID PROFILE,FLP: NORMAL
LYMPHOCYTES # BLD: 1.8 K/UL (ref 0.8–3.5)
LYMPHOCYTES NFR BLD: 26 % (ref 12–49)
MAGNESIUM SERPL-MCNC: 1.5 MG/DL (ref 1.6–2.4)
MCH RBC QN AUTO: 29.2 PG (ref 26–34)
MCHC RBC AUTO-ENTMCNC: 31.3 G/DL (ref 30–36.5)
MCV RBC AUTO: 93.1 FL (ref 80–99)
MONOCYTES # BLD: 0.8 K/UL (ref 0–1)
MONOCYTES NFR BLD: 11 % (ref 5–13)
NEUTS SEG # BLD: 4.3 K/UL (ref 1.8–8)
NEUTS SEG NFR BLD: 61 % (ref 32–75)
NRBC # BLD: 0 K/UL (ref 0–0.01)
NRBC BLD-RTO: 0 PER 100 WBC
PHOSPHATE SERPL-MCNC: 4 MG/DL (ref 2.6–4.7)
PLATELET # BLD AUTO: 204 K/UL (ref 150–400)
PMV BLD AUTO: 11.6 FL (ref 8.9–12.9)
POTASSIUM SERPL-SCNC: 4.3 MMOL/L (ref 3.5–5.1)
PROT SERPL-MCNC: 7.3 G/DL (ref 6.4–8.2)
RBC # BLD AUTO: 3.6 M/UL (ref 3.8–5.2)
SERVICE CMNT-IMP: ABNORMAL
SODIUM SERPL-SCNC: 142 MMOL/L (ref 136–145)
TRIGL SERPL-MCNC: 36 MG/DL (ref ?–150)
TSH SERPL DL<=0.05 MIU/L-ACNC: 0.38 UIU/ML (ref 0.36–3.74)
VLDLC SERPL CALC-MCNC: 7.2 MG/DL
WBC # BLD AUTO: 7.1 K/UL (ref 3.6–11)

## 2018-06-09 PROCEDURE — 80053 COMPREHEN METABOLIC PANEL: CPT | Performed by: INTERNAL MEDICINE

## 2018-06-09 PROCEDURE — 74011250636 HC RX REV CODE- 250/636: Performed by: INTERNAL MEDICINE

## 2018-06-09 PROCEDURE — 84443 ASSAY THYROID STIM HORMONE: CPT | Performed by: INTERNAL MEDICINE

## 2018-06-09 PROCEDURE — 74011250637 HC RX REV CODE- 250/637: Performed by: HOSPITALIST

## 2018-06-09 PROCEDURE — 93970 EXTREMITY STUDY: CPT

## 2018-06-09 PROCEDURE — 83036 HEMOGLOBIN GLYCOSYLATED A1C: CPT | Performed by: INTERNAL MEDICINE

## 2018-06-09 PROCEDURE — 36415 COLL VENOUS BLD VENIPUNCTURE: CPT | Performed by: INTERNAL MEDICINE

## 2018-06-09 PROCEDURE — 83735 ASSAY OF MAGNESIUM: CPT | Performed by: INTERNAL MEDICINE

## 2018-06-09 PROCEDURE — 74011636637 HC RX REV CODE- 636/637: Performed by: INTERNAL MEDICINE

## 2018-06-09 PROCEDURE — 84100 ASSAY OF PHOSPHORUS: CPT | Performed by: INTERNAL MEDICINE

## 2018-06-09 PROCEDURE — 80061 LIPID PANEL: CPT | Performed by: INTERNAL MEDICINE

## 2018-06-09 PROCEDURE — 74011250637 HC RX REV CODE- 250/637: Performed by: INTERNAL MEDICINE

## 2018-06-09 PROCEDURE — 82962 GLUCOSE BLOOD TEST: CPT

## 2018-06-09 PROCEDURE — 85025 COMPLETE CBC W/AUTO DIFF WBC: CPT | Performed by: INTERNAL MEDICINE

## 2018-06-09 PROCEDURE — 65270000029 HC RM PRIVATE

## 2018-06-09 PROCEDURE — 74011000258 HC RX REV CODE- 258: Performed by: INTERNAL MEDICINE

## 2018-06-09 RX ORDER — DEXTROSE 50 % IN WATER (D50W) INTRAVENOUS SYRINGE
12.5-25 AS NEEDED
Status: DISCONTINUED | OUTPATIENT
Start: 2018-06-09 | End: 2018-06-12 | Stop reason: HOSPADM

## 2018-06-09 RX ORDER — PAROXETINE HYDROCHLORIDE 20 MG/1
20 TABLET, FILM COATED ORAL DAILY
Status: DISCONTINUED | OUTPATIENT
Start: 2018-06-09 | End: 2018-06-09

## 2018-06-09 RX ORDER — SODIUM CHLORIDE 0.9 % (FLUSH) 0.9 %
5-10 SYRINGE (ML) INJECTION AS NEEDED
Status: DISCONTINUED | OUTPATIENT
Start: 2018-06-09 | End: 2018-06-12 | Stop reason: HOSPADM

## 2018-06-09 RX ORDER — METOPROLOL TARTRATE 50 MG/1
100 TABLET ORAL 2 TIMES DAILY
Status: DISCONTINUED | OUTPATIENT
Start: 2018-06-09 | End: 2018-06-12 | Stop reason: HOSPADM

## 2018-06-09 RX ORDER — SODIUM CHLORIDE 9 MG/ML
50 INJECTION, SOLUTION INTRAVENOUS CONTINUOUS
Status: DISCONTINUED | OUTPATIENT
Start: 2018-06-09 | End: 2018-06-10

## 2018-06-09 RX ORDER — HYDRALAZINE HYDROCHLORIDE 20 MG/ML
10 INJECTION INTRAMUSCULAR; INTRAVENOUS ONCE
Status: COMPLETED | OUTPATIENT
Start: 2018-06-09 | End: 2018-06-09

## 2018-06-09 RX ORDER — SIMVASTATIN 20 MG/1
20 TABLET, FILM COATED ORAL
Status: DISCONTINUED | OUTPATIENT
Start: 2018-06-09 | End: 2018-06-12 | Stop reason: HOSPADM

## 2018-06-09 RX ORDER — HYDRALAZINE HYDROCHLORIDE 20 MG/ML
10 INJECTION INTRAMUSCULAR; INTRAVENOUS
Status: DISCONTINUED | OUTPATIENT
Start: 2018-06-09 | End: 2018-06-12 | Stop reason: HOSPADM

## 2018-06-09 RX ORDER — LANOLIN ALCOHOL/MO/W.PET/CERES
400 CREAM (GRAM) TOPICAL DAILY
Status: DISCONTINUED | OUTPATIENT
Start: 2018-06-09 | End: 2018-06-12 | Stop reason: HOSPADM

## 2018-06-09 RX ORDER — BISACODYL 5 MG
5 TABLET, DELAYED RELEASE (ENTERIC COATED) ORAL DAILY PRN
Status: DISCONTINUED | OUTPATIENT
Start: 2018-06-09 | End: 2018-06-12 | Stop reason: HOSPADM

## 2018-06-09 RX ORDER — SIMVASTATIN 40 MG/1
40 TABLET, FILM COATED ORAL
Status: DISCONTINUED | OUTPATIENT
Start: 2018-06-09 | End: 2018-06-09

## 2018-06-09 RX ORDER — MAGNESIUM SULFATE 100 %
4 CRYSTALS MISCELLANEOUS AS NEEDED
Status: DISCONTINUED | OUTPATIENT
Start: 2018-06-09 | End: 2018-06-12 | Stop reason: HOSPADM

## 2018-06-09 RX ORDER — ONDANSETRON 2 MG/ML
4 INJECTION INTRAMUSCULAR; INTRAVENOUS
Status: DISCONTINUED | OUTPATIENT
Start: 2018-06-09 | End: 2018-06-12 | Stop reason: HOSPADM

## 2018-06-09 RX ORDER — SODIUM CHLORIDE 0.9 % (FLUSH) 0.9 %
5-10 SYRINGE (ML) INJECTION EVERY 8 HOURS
Status: DISCONTINUED | OUTPATIENT
Start: 2018-06-09 | End: 2018-06-12 | Stop reason: HOSPADM

## 2018-06-09 RX ORDER — FAMOTIDINE 20 MG/1
20 TABLET, FILM COATED ORAL
Status: DISCONTINUED | OUTPATIENT
Start: 2018-06-09 | End: 2018-06-12 | Stop reason: HOSPADM

## 2018-06-09 RX ORDER — PAROXETINE HYDROCHLORIDE 20 MG/1
20 TABLET, FILM COATED ORAL
Status: DISCONTINUED | OUTPATIENT
Start: 2018-06-09 | End: 2018-06-12 | Stop reason: HOSPADM

## 2018-06-09 RX ORDER — AMLODIPINE BESYLATE 5 MG/1
5 TABLET ORAL DAILY
Status: DISCONTINUED | OUTPATIENT
Start: 2018-06-10 | End: 2018-06-10

## 2018-06-09 RX ORDER — INSULIN LISPRO 100 [IU]/ML
INJECTION, SOLUTION INTRAVENOUS; SUBCUTANEOUS
Status: DISCONTINUED | OUTPATIENT
Start: 2018-06-09 | End: 2018-06-12 | Stop reason: HOSPADM

## 2018-06-09 RX ORDER — HEPARIN SODIUM 5000 [USP'U]/ML
5000 INJECTION, SOLUTION INTRAVENOUS; SUBCUTANEOUS EVERY 8 HOURS
Status: DISCONTINUED | OUTPATIENT
Start: 2018-06-09 | End: 2018-06-10

## 2018-06-09 RX ADMIN — FAMOTIDINE 20 MG: 20 TABLET ORAL at 21:29

## 2018-06-09 RX ADMIN — HYDRALAZINE HYDROCHLORIDE 10 MG: 20 INJECTION INTRAMUSCULAR; INTRAVENOUS at 03:41

## 2018-06-09 RX ADMIN — HEPARIN SODIUM 5000 UNITS: 5000 INJECTION, SOLUTION INTRAVENOUS; SUBCUTANEOUS at 23:16

## 2018-06-09 RX ADMIN — Medication 1 CAPSULE: at 09:12

## 2018-06-09 RX ADMIN — Medication 400 MG: at 13:00

## 2018-06-09 RX ADMIN — PAROXETINE HYDROCHLORIDE 20 MG: 20 TABLET, FILM COATED ORAL at 21:29

## 2018-06-09 RX ADMIN — METOPROLOL TARTRATE 100 MG: 50 TABLET ORAL at 18:46

## 2018-06-09 RX ADMIN — SODIUM CHLORIDE 1 G: 900 INJECTION, SOLUTION INTRAVENOUS at 23:21

## 2018-06-09 RX ADMIN — SIMVASTATIN 20 MG: 20 TABLET, FILM COATED ORAL at 21:28

## 2018-06-09 RX ADMIN — HEPARIN SODIUM 5000 UNITS: 5000 INJECTION, SOLUTION INTRAVENOUS; SUBCUTANEOUS at 15:31

## 2018-06-09 RX ADMIN — SODIUM CHLORIDE 125 ML/HR: 900 INJECTION, SOLUTION INTRAVENOUS at 02:42

## 2018-06-09 RX ADMIN — METOPROLOL TARTRATE 100 MG: 50 TABLET ORAL at 09:12

## 2018-06-09 RX ADMIN — INSULIN LISPRO 3 UNITS: 100 INJECTION, SOLUTION INTRAVENOUS; SUBCUTANEOUS at 16:58

## 2018-06-09 RX ADMIN — Medication 10 ML: at 06:00

## 2018-06-09 RX ADMIN — HEPARIN SODIUM 5000 UNITS: 5000 INJECTION, SOLUTION INTRAVENOUS; SUBCUTANEOUS at 06:20

## 2018-06-09 RX ADMIN — Medication 10 ML: at 21:28

## 2018-06-09 NOTE — PROGRESS NOTES
Primary Nurse Marylee Clayman, OG and Shonda Mariscal, RN performed a dual skin assessment on this patient No impairment noted  Jens score is 16

## 2018-06-09 NOTE — ED PROVIDER NOTES
HPI Comments: [de-identified] y.o. female with past medical history significant for chronic back pain, stroke, diabetes, and hypertension who presents from [facility] via EMS for evaluation s/p mental status decline. Per daughter, the patient was recently discharged from hospital. Upon chart review, pt was admitted for metabolic encephalopathy. A possible cause of this was found to be her prescriptions for Hydrocodone or Baclofen. Pt's prescription for Hydrocodone was discontinued but was kept on Baclofen 10 mg upon discharge. Per daughter, the pt was alert, oriented, and otherwise herself after being discharged, and yesterday. Pt's daughter reports she noticed the pt \"was not herself\" today which she describes as \"confusion\" and worsened throughout the day. Pt denies any vomiting, pain, or fever. There are no other acute medical concerns at this time. Social hx - Tobacco use: none, Alcohol Use: none      PCP: Jose Escobar MD    Note written by Orestes Chao, as dictated by Susanna Arshad MD 10:33 PM.        The history is provided by the patient and a relative. No  was used. Past Medical History:   Diagnosis Date    Arthritis     Chronic pain     Depression     Diabetes ([facility]     Hypercholesterolemia     Hypertension     Stroke ([facility]     TIA 10 yrs back    Stroke ([facility]     2003       Past Surgical History:   Procedure Laterality Date    HX CARPAL TUNNEL RELEASE  1990    HX CATARACT REMOVAL      bilateral    HX HYSTERECTOMY  1986    HX HYSTERECTOMY      HX ORTHOPAEDIC      HX ORTHOPAEDIC      carpel tunnel left    HX ORTHOPAEDIC      left foot heel spur    HX REFRACTIVE SURGERY  2006         History reviewed. No pertinent family history. Social History     Social History    Marital status:      Spouse name: N/A    Number of children: N/A    Years of education: N/A     Occupational History    Not on file.      Social History Main Topics    Smoking status: Never Smoker    Smokeless tobacco: Never Used    Alcohol use No    Drug use: No    Sexual activity: No      Comment: retired,relocated from [place] ,[address] [place] [address] with daughter     Other Topics Concern    Not on file     Social History Narrative    ** Merged History Encounter **              ALLERGIES: Review of patient's allergies indicates no known allergies. Review of Systems   Constitutional: Negative for chills and fever. HENT: Negative for congestion. Respiratory: Negative for cough and shortness of breath. Cardiovascular: Negative for chest pain. Gastrointestinal: Negative for abdominal pain, constipation, diarrhea and vomiting. Genitourinary: Negative for difficulty urinating and dysuria. Musculoskeletal: Positive for back pain. Neurological: Negative for dizziness and light-headedness. Psychiatric/Behavioral: Positive for confusion. All other systems reviewed and are negative. Vitals:    06/08/18 1930 06/08/18 2045 06/08/18 2145 06/08/18 2215   BP: 135/63 145/60 128/59 128/56   Pulse: 70 64 63 64   Resp: 15 18 19 20   Temp: 97.6 °F (36.4 °C)      SpO2: 100%  96% 96%   Height: 5' 7\" (1.702 m)               Physical Exam   Constitutional: She appears well-developed. No distress. Pt is arousable but drowsy. HENT:   Head: Normocephalic and atraumatic. Eyes: Pupils are equal, round, and reactive to light. No scleral icterus. Neck: Normal range of motion. Neck supple. Cardiovascular: Normal rate and regular rhythm. Murmur heard. Systolic murmur is present   Pulmonary/Chest: Effort normal and breath sounds normal.   Abdominal: Soft. She exhibits no distension. There is no tenderness. There is no rebound and no guarding. Musculoskeletal: Normal range of motion. Neurological: She is alert. Pt is oriented to person only. Skin: Skin is warm and dry. She is not diaphoretic. Psychiatric: She has a normal mood and affect.  Her behavior is normal. Thought content normal.   Nursing note and vitals reviewed. MDM  Number of Diagnoses or Management Options  Acute cystitis without hematuria: new and requires workup  EDWARD (acute kidney injury) ([facility]): new and requires workup  Altered mental status, unspecified altered mental status type: new and requires workup  Diagnosis management comments: The history, exam, diagnostic testing and the patient's current condition do not suggest arrhythmia, STEMI, seizure, meningitis, stroke,  subarachnoid hemorrhage, or intracranial bleeding. Workup significant for UTI with EDWARD. Patient will be admitted for additional management. ED Course       Procedures    PROGRESS NOTE:  10:35 PM  Pt and family was updated. They agree with the plan of care. CONSULT NOTE:  11:19 PM Randall Marie MD communicated with Dr. Areli Hopson, Consult for hospitalist via [facility] Text. Discussed available diagnostic tests and clinical findings. Dr. Areli Hopson will admit the pt.    11:19 PM  Patient is being admitted to the hospital.  The results of their tests and reasons for their admission have been discussed with them and/or available family. They convey agreement and understanding for the need to be admitted and for their admission diagnosis. Consultation will be made now with the inpatient physician for hospitalization. 11:51 PM  Patient admitted to Dr. Areli Hopson. Discussed available diagnostic tests and clinical findings.

## 2018-06-09 NOTE — PROCEDURES
[facility]  *** FINAL REPORT ***    Name: Radames Parra  MRN: [id number]    Inpatient  : 13 Aug 1935  HIS Order #: [id number]  [address] [facility] Visit #: [id number]  Date: 2018    TYPE OF TEST: Peripheral Venous Testing    REASON FOR TEST  Pain in limb, Limb swelling    Right Leg:-  Deep venous thrombosis:           No  Superficial venous thrombosis:    No  Deep venous insufficiency:        Not examined  Superficial venous insufficiency: Not examined    Left Leg:-  Deep venous thrombosis:           No  Superficial venous thrombosis:    No  Deep venous insufficiency:        Not examined  Superficial venous insufficiency: Not examined      INTERPRETATION/FINDINGS  PROCEDURE:  Color duplex ultrasound imaging of lower extremity veins. FINDINGS:       Right: The common femoral, deep femoral, femoral, popliteal,  posterior tibial  and great saphenous are patent and without evidence  of thrombus;  each is fully compressible and there is no narrowing of  the flow channel on color Doppler imaging. The peroneal vein was not  visualized. Phasic flow is observed in the common femoral vein. Left:   The common femoral, deep femoral, femoral, popliteal,  posterior tibial, peroneal, and great saphenous are patent and without   evidence of thrombus;  each is fully compressible and there is no  narrowing of the flow channel on color Doppler imaging. Phasic flow  is observed in the common femoral vein. IMPRESSION:  No evidence of right or left lower extremity vein  thrombosis where visualized. ADDITIONAL COMMENTS    I have personally reviewed the data relevant to the interpretation of  this  study.     TECHNOLOGIST: Margaret Holt RVT  Signed: 2018 09:49 AM    PHYSICIAN: Valerie Maharaj., MD  Signed: 06/10/2018 08:42 AM

## 2018-06-09 NOTE — ROUTINE PROCESS
TRANSFER - OUT REPORT:    Verbal report given to Theresa Vernon RN(name) on [address] [facility]  being transferred to (unit) for routine progression of care       Report consisted of patients Situation, Background, Assessment and   Recommendations(SBAR). Information from the following report(s) SBAR, ED Summary, [facility] and Recent Results was reviewed with the receiving nurse. Opportunity for questions and clarification was provided.       Patient transported with:   [employer]

## 2018-06-09 NOTE — H&P
[address]  HISTORY AND PHYSICAL      Maribell Riggins  MR#: [id number]  : 1935  ACCOUNT #: [de-identified]   ADMIT DATE: 2018    Admission order was placed at 0026 hours. The patient was seen shortly after that. PRIMARY CARE PHYSICIAN:  Amber Knox MD     SOURCE OF INFORMATION:  The patient who is not a good historian because of confusion and patient's relatives who were present at the bedside. CHIEF COMPLAINT:  Confusion. HISTORY OF PRESENT ILLNESS:  This is an 72-year-old woman with a past medical history significant for hypertension, dyslipidemia, type 2 diabetes, depression, chronic back pain, who was in her usual state of health until the day of her presentation at the emergency room when the patient developed a change in mental status. Patient resides at the assisted living facility. The shortness of breath is progressive and because of that, she was sent to the emergency room. She was last admitted to this hospital from 2018 to 2018 for evaluation of similar symptoms. The change in mental status was attributed to Percocet, which the patient was taking for chronic back pain. The Percocet was discontinued. The patient was discharged back to the assisted living facility in a stable condition. When the patient came back to the emergency room, she was found to be in acute renal failure. Patient was discharged from the hospital with normal renal function. Her urinalysis is also suggestive of acute cystitis. Patient was referred to the hospitalist service for evaluation for admission. No history of fever, no rigors and no chills. The patient has had a stroke in the past without any significant residual deficits. During the recent hospitalization for the acute delirium, the patient was seen by the neurologist.  A CT scan of the head was also obtained, which was negative.     PAST MEDICAL HISTORY:  Hypertension, dyslipidemia, type 2 diabetes, depression, chronic back pain. ALLERGIES:  NO KNOWN DRUG ALLERGIES. MEDICATIONS:  Tylenol 650 mg twice daily, aspirin 81 mg daily, baclofen 10 mg 3 times daily, Lasix 20 mg every other day, sliding scale with insulin coverage, Janumet 50/1000 one tablet twice daily, Losartan 25 mg daily, Mobic 15 mg daily, Lopressor 100 mg twice daily, Paxil 20 mg daily, Zantac 150 mg daily at bedtime, Zocor 40 mg daily. FAMILY HISTORY:  This was reviewed, not pertinent to present illness. No family history of strokes. PAST SURGICAL HISTORY:  This is significant for hysterectomy, cataract extraction. SOCIAL HISTORY:  No history of alcohol or tobacco abuse. REVIEW OF SYSTEMS:    HEAD, EYES, EARS, NOSE AND THROAT:  This is positive for confusion. No headache, no blurring of vision, no photophobia. RESPIRATORY:  No cough, no shortness of breath, no hemoptysis. CARDIOVASCULAR:  No chest pain, no orthopnea, no palpitations. GASTROINTESTINAL:  No nausea, vomiting, no diarrhea, no constipation. GENITOURINARY:  No dysuria, no urgency and no frequency. All other systems reviewed and they are negative. PHYSICAL EXAMINATION:  GENERAL:  The patient appeared ill, in moderate distress. VITAL SIGNS:  On arrival to the emergency room, temperature of 97.6, pulse 70, respiratory rate 15, blood pressure 135/63, oxygen saturation 100% on room air. HEAD:  Normocephalic, atraumatic. EYES:  Normal eye movement. No redness, no drainage, no discharge. EARS:  Normal external ears with no obvious drainage. NOSE:  No deformity and no drainage. MOUTH AND THROAT:  No visible oral lesion. Dry oral mucosa. NECK:  Supple, no JVD, no thyromegaly. CHEST:  Clear breath sounds. No wheezing, no crackles. HEART:  Normal S1 and S2, regular. No clinically appreciable murmurs. ABDOMEN:  Soft, nontender. Normal bowel sounds. CENTRAL NERVOUS SYSTEM:  Alert, oriented to place and person.   No gross focal neurological deficits. EXTREMITIES:  Edema 2+. Pulses 2+ bilaterally. SKIN:  No active skin lesion seen in the exposed part of the body. MUSCULOSKELETAL:  Bilateral lower extremity swelling noted present on admission. PSYCHIATRIC:  Unable to assess mood and affect. LYMPHATIC SYSTEM:  No cervical lymphadenopathy. DIAGNOSTIC DATA:  Chest x-ray, no acute pathology. LABORATORY DATA:  Chemistry:  Sodium 138, potassium 5.1, chloride 106, CO2 21, glucose 89, BUN 31, creatinine 1.71, calcium 8.9, bilirubin total 0.2, ALT 19, AST 30, alkaline phosphatase 56, total protein 7.5, albumin level 3.7, globulin 3.8. Urinalysis: This is significant for negative blood, moderate leukocyte esterase, negative nitrite, bacteria not available. Hematology:  WBC 8.1, hemoglobin 11.0, hematocrit 35.4, platelet 363. ASSESSMENT:  1. Acute delirium. 2.  Suspected acute cystitis without hematuria. 3.  Hypertension. 4.  Dyslipidemia. 5.  Type 2 diabetes. 6.  Depression. 7.  Chronic back pain  8. Acute renal failure. 9.  Leg swelling. PLAN:  1. Acute delirium. We will admit the patient for further evaluation and treatment. Patient was recently admitted for evaluation of similar symptoms. CT scan of the head was negative. We will not repeat a CT scan of the head. This is most likely due to metabolic event such as acute renal failure and a suspected acute cystitis. We will identify and treat underlying etiology factors. 2.  Acute cystitis without hematuria. We will start the patient on Rocephin. We will await urine culture if done in the emergency room. 3.  Hypertension. We will resume her preadmission medications except for losartan, Lasix because of the acute renal failure. The patient was to be placed on hydralazine as needed for blood pressure control. 4.  Dyslipidemia. We will resume her home medications. 5.  Type 2 diabetes. We will place the patient on sliding scale with insulin coverage.   We will check hemoglobin A1c levels. 6.  Depression. We will resume her home medications. 7.  Chronic back pain. We will place the patient on a simple analgesic. Will hold Mobic because of the acute renal failure. 8.  Acute renal failure. This is most likely due to volume depletion. Will carry out hydration with normal saline. We will hold nephrotoxic drugs such as Mobic, losartan. We will also hold Janumet. Will monitor the patient's renal function. If there is no improvement with hydration, the patient may require further evaluation such as a renal ultrasound and a nephrology consult. 9.  Bilateral leg swelling. We will check ultrasound of the lower extremity for DVT. 10.  Other issues. CODE STATUS:  THE PATIENT IS A FULL CODE. We will place the patient on heparin for DVT prophylaxis.       Eneida Yu MD       RE/LN  D: 06/09/2018 03:57     T: 06/09/2018 05:29  JOB #: [id number]  CC: Melani GARCIAS MD

## 2018-06-09 NOTE — PROGRESS NOTES
Care Management - Review for potential readmission. Patient was admitted to [address] [place] [address] from 6-4 to 6-6-18 for acute metabolic encephalopathy, possibly from her medications. Patient to ED today for AMS. Date of previous inpatient admission/ ED visit? 6-4 to 6-6-18    What brought the patient back to ED? AMS    Did patient decline recommended services during last admission/ ED visit (if yes, what)? Per daughter, patient was \"fine\" yesterday and today she was in and out. Has patient seen a provider since their last inpatient admission/ED visit (if yes, when)? Possibly at facility. CM Interventions:  From previous inpatient admission/ED visit: [facility]  From current inpatient admission/ED visit: Daughter would like patient to return to a SNF for rehab prior to returning to her home. She is perhaps interested in a new facility. Explained that patient has [employer] Medicare Complete. If patient is discharged tonight from ED, she will need to return to [facility] and they can assist her with transfer to another facility. Explained authorization cannot be obtained over the weekend. If patient is admitted as observation and [facility] can accept her back without a new authorization on Saturday, she may need to return there. If patient is here through the weekend, CM can assist with locating another facility on Monday if that is what her daughter decides to do. Per daughter, patient's belongings are still at the facility. DME at home: elevated toilet seat, can  ADLs: currently needs assistance and needs rehab  Insurance: [facility] Complete  Emergency Contact: daughter Paradise Fiore (home [phone]; cell [phone])    Care Management Interventions  PCP Verified by CM: Yes (Dr. De Oswald)  Last Visit to PCP: 05/30/18  Mode of Transport at Discharge:  Other (see comment) (Daughter verses ambulance depending on mentation.)  Transition of Care Consult (CM Consult): SNF [facility])  Partner SNF: Yes  Discharge Durable Medical Equipment: No  Physical Therapy Consult: No  Occupational Therapy Consult: No  Speech Therapy Consult: No  Current Support Network: Relative's Home (Patient normally lives with her daughter.  She has been at [facility] for 2 days.)  Confirm Follow Up Transport: Family (daughter)  Plan discussed with Pt/Family/Caregiver: Yes  Freedom of Choice Offered: Yes  [address] Provided?: No  Discharge Location  Discharge Placement: Skilled nursing facility      NADER Moses

## 2018-06-10 LAB
ANION GAP SERPL CALC-SCNC: 7 MMOL/L (ref 5–15)
BACTERIA SPEC CULT: ABNORMAL
BUN SERPL-MCNC: 17 MG/DL (ref 6–20)
BUN/CREAT SERPL: 19 (ref 12–20)
CALCIUM SERPL-MCNC: 8.5 MG/DL (ref 8.5–10.1)
CC UR VC: ABNORMAL
CHLORIDE SERPL-SCNC: 110 MMOL/L (ref 97–108)
CO2 SERPL-SCNC: 24 MMOL/L (ref 21–32)
CREAT SERPL-MCNC: 0.91 MG/DL (ref 0.55–1.02)
ERYTHROCYTE [DISTWIDTH] IN BLOOD BY AUTOMATED COUNT: 16.4 % (ref 11.5–14.5)
GLUCOSE BLD STRIP.AUTO-MCNC: 107 MG/DL (ref 65–100)
GLUCOSE BLD STRIP.AUTO-MCNC: 141 MG/DL (ref 65–100)
GLUCOSE BLD STRIP.AUTO-MCNC: 175 MG/DL (ref 65–100)
GLUCOSE BLD STRIP.AUTO-MCNC: 215 MG/DL (ref 65–100)
GLUCOSE SERPL-MCNC: 131 MG/DL (ref 65–100)
HCT VFR BLD AUTO: 29.8 % (ref 35–47)
HGB BLD-MCNC: 9.5 G/DL (ref 11.5–16)
MCH RBC QN AUTO: 29.3 PG (ref 26–34)
MCHC RBC AUTO-ENTMCNC: 31.9 G/DL (ref 30–36.5)
MCV RBC AUTO: 92 FL (ref 80–99)
NRBC # BLD: 0 K/UL (ref 0–0.01)
NRBC BLD-RTO: 0 PER 100 WBC
PLATELET # BLD AUTO: 183 K/UL (ref 150–400)
PMV BLD AUTO: 11.5 FL (ref 8.9–12.9)
POTASSIUM SERPL-SCNC: 4.4 MMOL/L (ref 3.5–5.1)
RBC # BLD AUTO: 3.24 M/UL (ref 3.8–5.2)
SERVICE CMNT-IMP: ABNORMAL
SODIUM SERPL-SCNC: 141 MMOL/L (ref 136–145)
WBC # BLD AUTO: 6.7 K/UL (ref 3.6–11)

## 2018-06-10 PROCEDURE — 74011250636 HC RX REV CODE- 250/636: Performed by: HOSPITALIST

## 2018-06-10 PROCEDURE — 74011636637 HC RX REV CODE- 636/637: Performed by: INTERNAL MEDICINE

## 2018-06-10 PROCEDURE — 74011250637 HC RX REV CODE- 250/637: Performed by: HOSPITALIST

## 2018-06-10 PROCEDURE — 36415 COLL VENOUS BLD VENIPUNCTURE: CPT | Performed by: HOSPITALIST

## 2018-06-10 PROCEDURE — 65270000029 HC RM PRIVATE

## 2018-06-10 PROCEDURE — 85027 COMPLETE CBC AUTOMATED: CPT | Performed by: HOSPITALIST

## 2018-06-10 PROCEDURE — 80048 BASIC METABOLIC PNL TOTAL CA: CPT | Performed by: HOSPITALIST

## 2018-06-10 PROCEDURE — 74011250636 HC RX REV CODE- 250/636: Performed by: INTERNAL MEDICINE

## 2018-06-10 PROCEDURE — 74011250637 HC RX REV CODE- 250/637: Performed by: INTERNAL MEDICINE

## 2018-06-10 PROCEDURE — 82962 GLUCOSE BLOOD TEST: CPT

## 2018-06-10 RX ORDER — ENOXAPARIN SODIUM 100 MG/ML
40 INJECTION SUBCUTANEOUS EVERY 24 HOURS
Status: DISCONTINUED | OUTPATIENT
Start: 2018-06-10 | End: 2018-06-12 | Stop reason: HOSPADM

## 2018-06-10 RX ORDER — BACLOFEN 10 MG/1
5 TABLET ORAL 2 TIMES DAILY
Status: DISCONTINUED | OUTPATIENT
Start: 2018-06-10 | End: 2018-06-12 | Stop reason: HOSPADM

## 2018-06-10 RX ORDER — AMLODIPINE BESYLATE 5 MG/1
7.5 TABLET ORAL DAILY
Status: DISCONTINUED | OUTPATIENT
Start: 2018-06-10 | End: 2018-06-12 | Stop reason: HOSPADM

## 2018-06-10 RX ADMIN — METOPROLOL TARTRATE 100 MG: 50 TABLET ORAL at 18:18

## 2018-06-10 RX ADMIN — ENOXAPARIN SODIUM 40 MG: 100 INJECTION SUBCUTANEOUS at 12:01

## 2018-06-10 RX ADMIN — METFORMIN HYDROCHLORIDE: 500 TABLET, FILM COATED ORAL at 09:52

## 2018-06-10 RX ADMIN — METOPROLOL TARTRATE 100 MG: 50 TABLET ORAL at 09:53

## 2018-06-10 RX ADMIN — Medication 10 ML: at 07:27

## 2018-06-10 RX ADMIN — INSULIN LISPRO 2 UNITS: 100 INJECTION, SOLUTION INTRAVENOUS; SUBCUTANEOUS at 07:27

## 2018-06-10 RX ADMIN — INSULIN LISPRO 3 UNITS: 100 INJECTION, SOLUTION INTRAVENOUS; SUBCUTANEOUS at 12:02

## 2018-06-10 RX ADMIN — BACLOFEN 5 MG: 10 TABLET ORAL at 09:52

## 2018-06-10 RX ADMIN — Medication 400 MG: at 09:53

## 2018-06-10 RX ADMIN — Medication 10 ML: at 22:06

## 2018-06-10 RX ADMIN — SIMVASTATIN 20 MG: 20 TABLET, FILM COATED ORAL at 22:04

## 2018-06-10 RX ADMIN — AMLODIPINE BESYLATE 7.5 MG: 5 TABLET ORAL at 09:51

## 2018-06-10 RX ADMIN — BACLOFEN 5 MG: 10 TABLET ORAL at 18:18

## 2018-06-10 RX ADMIN — FAMOTIDINE 20 MG: 20 TABLET ORAL at 22:04

## 2018-06-10 RX ADMIN — Medication 1 CAPSULE: at 09:52

## 2018-06-10 RX ADMIN — PAROXETINE HYDROCHLORIDE 20 MG: 20 TABLET, FILM COATED ORAL at 22:04

## 2018-06-10 RX ADMIN — METFORMIN HYDROCHLORIDE: 500 TABLET, FILM COATED ORAL at 18:19

## 2018-06-10 RX ADMIN — HEPARIN SODIUM 5000 UNITS: 5000 INJECTION, SOLUTION INTRAVENOUS; SUBCUTANEOUS at 07:27

## 2018-06-10 NOTE — PROGRESS NOTES
Hospitalist Progress Note  Thea Das MD  Answering service: [phone] from in house phone  Cell:       Date of Service:  6/10/2018  NAME:  Td Farias  :  1935  MRN:  [id number]      Admission Summary: This is an 32-year-old woman with a past medical history significant for hypertension, dyslipidemia, type 2 diabetes, depression, chronic back pain, who was in her usual state of health until the day of her presentation at the emergency room when the patient developed a change in mental status. Patient resides at the assisted living facility. The shortness of breath is progressive and because of that, she was sent to the emergency room. She was last admitted to this hospital from 2018 to 2018 for evaluation of similar symptoms. The change in mental status was attributed to Percocet, which the patient was taking for chronic back pain. The Percocet was discontinued. The patient was discharged back to the assisted living facility in a stable condition. When the patient came back to the emergency room, she was found to be in acute renal failure. Patient was discharged from the hospital with normal renal function. Her urinalysis is also suggestive of acute cystitis. Patient was referred to the hospitalist service for evaluation for admission. No history of fever, no rigors and no chills. The patient has had a stroke in the past without any significant residual deficits. During the recent hospitalization for the acute delirium, the patient was seen by the neurologist.  A CT scan of the head was also obtained, which was negative. Interval history / Subjective:     More awake and alert answers all questions appropriately     Assessment & Plan:     1. Acute delirium. Resolved probably was due to EDWARD ? UTI. ARB on hold. I will increase dose of Norvasc. 2.  Acute cystitis without hematuria. On rocephin. Urine shows GNR sensitivity is pending. 3.  Hypertension. Increase Norvasc as BP is on higher side  4. Dyslipidemia. We will resume her home medications. 5.  Type 2 diabetes. Restart Janumet as renal function back to base line. 6.  Depression. We will resume her home medications. 7.  Chronic back pain. Restart Baclofen at lower dose. 8.  Acute renal failure. Resolved with IV fluids will stop fluids. Code status: full  DVT prophylaxis: Lovenox    Care Plan discussed with: Patient/Family  Disposition: TBD     Hospital Problems  Date Reviewed: 6/9/2018          Codes Class Noted POA    * (Principal)Acute delirium ICD-10-CM: R41.0  ICD-9-CM: 780.09  6/9/2018 Yes                Review of Systems:   A comprehensive review of systems was negative except for that written in the HPI. Vital Signs:    Last 24hrs VS reviewed since prior progress note. Most recent are:  Visit Vitals    /74 (BP 1 Location: Left arm, BP Patient Position: At rest;Supine; Head of bed elevated (Comment degrees))    Pulse 70    Temp 99.1 °F (37.3 °C)    Resp 16    Ht 5' 7\" (1.702 m)    Wt 84 kg (185 lb 3 oz)    SpO2 95%    BMI 29 kg/m2         Intake/Output Summary (Last 24 hours) at 06/10/18 0803  Last data filed at 06/10/18 0640   Gross per 24 hour   Intake                0 ml   Output             2000 ml   Net            -2000 ml        Physical Examination:             Constitutional:  No acute distress, cooperative, pleasant    ENT:  Oral mucous moist, oropharynx benign. Neck supple,    Resp:  CTA bilaterally. No wheezing/rhonchi/rales. No accessory muscle use   CV:  Regular rhythm, normal rate, no murmurs, gallops, rubs    GI:  Soft, non distended, non tender. normoactive bowel sounds, no hepatosplenomegaly     Musculoskeletal:  No edema, warm, 2+ pulses throughout    Neurologic:  Moves all extremities. AAOx3, CN II-XII reviewed     Psych:  Good insight, Not anxious nor agitated.        Data Review:    Review and/or order of clinical lab test      Labs:     Recent Labs      06/10/18   0347  06/09/18   0609   WBC  6.7  7.1   HGB  9.5*  10.5*   HCT  29.8*  33.5*   PLT  183  204     Recent Labs      06/10/18   0347  06/09/18   0609  06/08/18   2140   NA  141  142  138   K  4.4  4.3  5.1   CL  110*  108  106   CO2  24  24  21   BUN  17  25*  31*   CREA  0.91  1.21*  1.71*   GLU  131*  125*  89   CA  8.5  8.9  8.9   MG   --   1.5*   --    PHOS   --   4.0   --      Recent Labs      06/09/18 0609 06/08/18 2140  06/08/18 1957   SGOT  32  30  29   ALT  22  19  22   AP  48  56  63   TBILI  0.3  0.2  0.2   TP  7.3  7.5  8.1   ALB  3.5  3.7  4.0   GLOB  3.8  3.8  4.1*     No results for input(s): INR, PTP, APTT in the last 72 hours. No lab exists for component: INREXT   No results for input(s): FE, TIBC, PSAT, FERR in the last 72 hours. No results found for: FOL, RBCF   No results for input(s): PH, PCO2, PO2 in the last 72 hours. No results for input(s): CPK, CKNDX, TROIQ in the last 72 hours.     No lab exists for component: CPKMB  Lab Results   Component Value Date/Time    Cholesterol, total 153 06/09/2018 06:09 AM    HDL Cholesterol 78 06/09/2018 06:09 AM    LDL, calculated 67.8 06/09/2018 06:09 AM    Triglyceride 36 06/09/2018 06:09 AM    CHOL/HDL Ratio 2.0 06/09/2018 06:09 AM     Lab Results   Component Value Date/Time    Glucose (POC) 141 (H) 06/10/2018 06:08 AM    Glucose (POC) 172 (H) 06/09/2018 09:15 PM    Glucose (POC) 210 (H) 06/09/2018 04:14 PM    Glucose (POC) 132 (H) 06/09/2018 11:15 AM    Glucose (POC) 130 (H) 06/09/2018 06:19 AM     Lab Results   Component Value Date/Time    Color YELLOW/STRAW 06/08/2018 07:57 PM    Appearance CLOUDY (A) 06/08/2018 07:57 PM    Specific gravity 1.019 06/08/2018 07:57 PM    Specific gravity 1.015 06/04/2018 12:05 PM    pH (UA) 6.0 06/08/2018 07:57 PM    Protein TRACE (A) 06/08/2018 07:57 PM    Glucose NEGATIVE  06/08/2018 07:57 PM    Ketone NEGATIVE  06/08/2018 07:57 PM Bilirubin NEGATIVE  06/08/2018 07:57 PM    Urobilinogen 1.0 06/08/2018 07:57 PM    Nitrites NEGATIVE  06/08/2018 07:57 PM    Leukocyte Esterase MODERATE (A) 06/08/2018 07:57 PM    Epithelial cells FEW 06/04/2018 12:05 PM    Bacteria 2+ (A) 06/04/2018 12:05 PM    WBC 0-4 06/04/2018 12:05 PM    RBC 0-5 06/04/2018 12:05 PM         Medications Reviewed:     Current Facility-Administered Medications   Medication Dose Route Frequency    baclofen (LIORESAL) tablet 5 mg  5 mg Oral BID    amLODIPine (NORVASC) tablet 7.5 mg  7.5 mg Oral DAILY    heparin (porcine) injection 5,000 Units  5,000 Units SubCUTAneous Q8H    insulin lispro (HUMALOG) injection   SubCUTAneous AC&HS    glucose chewable tablet 16 g  4 Tab Oral PRN    dextrose (D50W) injection syrg 12.5-25 g  12.5-25 g IntraVENous PRN    glucagon (GLUCAGEN) injection 1 mg  1 mg IntraMUSCular PRN    metoprolol tartrate (LOPRESSOR) tablet 100 mg  100 mg Oral BID    famotidine (PEPCID) tablet 20 mg  20 mg Oral QHS    sodium chloride (NS) flush 5-10 mL  5-10 mL IntraVENous Q8H    sodium chloride (NS) flush 5-10 mL  5-10 mL IntraVENous PRN    ondansetron (ZOFRAN) injection 4 mg  4 mg IntraVENous Q4H PRN    bisacodyl (DULCOLAX) tablet 5 mg  5 mg Oral DAILY PRN    cefTRIAXone (ROCEPHIN) 1 g in 0.9% sodium chloride (MBP/ADV) 50 mL  1 g IntraVENous Q24H    lactobac ac& pc-s.therm-b.anim (ARLEY Q/RISAQUAD)  1 Cap Oral DAILY    hydrALAZINE (APRESOLINE) 20 mg/mL injection 10 mg  10 mg IntraVENous Q6H PRN    magnesium oxide (MAG-OX) tablet 400 mg  400 mg Oral DAILY    simvastatin (ZOCOR) tablet 20 mg  20 mg Oral QHS    PARoxetine (PAXIL) tablet 20 mg  20 mg Oral QHS     ______________________________________________________________________  EXPECTED LENGTH OF STAY: - - -  ACTUAL LENGTH OF STAY:          1                 Rudy Montes MD

## 2018-06-11 LAB
ANION GAP SERPL CALC-SCNC: 6 MMOL/L (ref 5–15)
BUN SERPL-MCNC: 19 MG/DL (ref 6–20)
BUN/CREAT SERPL: 21 (ref 12–20)
CALCIUM SERPL-MCNC: 8.7 MG/DL (ref 8.5–10.1)
CHLORIDE SERPL-SCNC: 110 MMOL/L (ref 97–108)
CO2 SERPL-SCNC: 24 MMOL/L (ref 21–32)
CREAT SERPL-MCNC: 0.92 MG/DL (ref 0.55–1.02)
ERYTHROCYTE [DISTWIDTH] IN BLOOD BY AUTOMATED COUNT: 16.6 % (ref 11.5–14.5)
GLUCOSE BLD STRIP.AUTO-MCNC: 108 MG/DL (ref 65–100)
GLUCOSE BLD STRIP.AUTO-MCNC: 132 MG/DL (ref 65–100)
GLUCOSE BLD STRIP.AUTO-MCNC: 136 MG/DL (ref 65–100)
GLUCOSE BLD STRIP.AUTO-MCNC: 159 MG/DL (ref 65–100)
GLUCOSE SERPL-MCNC: 108 MG/DL (ref 65–100)
HCT VFR BLD AUTO: 32.1 % (ref 35–47)
HGB BLD-MCNC: 10.1 G/DL (ref 11.5–16)
MCH RBC QN AUTO: 29.6 PG (ref 26–34)
MCHC RBC AUTO-ENTMCNC: 31.5 G/DL (ref 30–36.5)
MCV RBC AUTO: 94.1 FL (ref 80–99)
NRBC # BLD: 0 K/UL (ref 0–0.01)
NRBC BLD-RTO: 0 PER 100 WBC
PLATELET # BLD AUTO: 182 K/UL (ref 150–400)
PMV BLD AUTO: 11.5 FL (ref 8.9–12.9)
POTASSIUM SERPL-SCNC: 4.8 MMOL/L (ref 3.5–5.1)
RBC # BLD AUTO: 3.41 M/UL (ref 3.8–5.2)
SERVICE CMNT-IMP: ABNORMAL
SODIUM SERPL-SCNC: 140 MMOL/L (ref 136–145)
WBC # BLD AUTO: 6.4 K/UL (ref 3.6–11)

## 2018-06-11 PROCEDURE — 74011250636 HC RX REV CODE- 250/636: Performed by: HOSPITALIST

## 2018-06-11 PROCEDURE — 80048 BASIC METABOLIC PNL TOTAL CA: CPT | Performed by: HOSPITALIST

## 2018-06-11 PROCEDURE — 85027 COMPLETE CBC AUTOMATED: CPT | Performed by: HOSPITALIST

## 2018-06-11 PROCEDURE — 74011250637 HC RX REV CODE- 250/637: Performed by: INTERNAL MEDICINE

## 2018-06-11 PROCEDURE — 36415 COLL VENOUS BLD VENIPUNCTURE: CPT | Performed by: HOSPITALIST

## 2018-06-11 PROCEDURE — 74011000258 HC RX REV CODE- 258: Performed by: HOSPITALIST

## 2018-06-11 PROCEDURE — 65270000029 HC RM PRIVATE

## 2018-06-11 PROCEDURE — 82962 GLUCOSE BLOOD TEST: CPT

## 2018-06-11 PROCEDURE — 74011250637 HC RX REV CODE- 250/637: Performed by: HOSPITALIST

## 2018-06-11 PROCEDURE — 74011636637 HC RX REV CODE- 636/637: Performed by: INTERNAL MEDICINE

## 2018-06-11 RX ORDER — GUAIFENESIN 100 MG/5ML
81 LIQUID (ML) ORAL DAILY
Status: DISCONTINUED | OUTPATIENT
Start: 2018-06-11 | End: 2018-06-12 | Stop reason: HOSPADM

## 2018-06-11 RX ORDER — MELOXICAM 15 MG/1
TABLET ORAL
Qty: 90 TAB | Refills: 1 | Status: SHIPPED | OUTPATIENT
Start: 2018-06-11 | End: 2018-12-23 | Stop reason: SDUPTHER

## 2018-06-11 RX ADMIN — FAMOTIDINE 20 MG: 20 TABLET ORAL at 22:06

## 2018-06-11 RX ADMIN — METOPROLOL TARTRATE 100 MG: 50 TABLET ORAL at 17:42

## 2018-06-11 RX ADMIN — SODIUM CHLORIDE 1 G: 900 INJECTION, SOLUTION INTRAVENOUS at 01:03

## 2018-06-11 RX ADMIN — Medication 400 MG: at 09:05

## 2018-06-11 RX ADMIN — METFORMIN HYDROCHLORIDE: 500 TABLET, FILM COATED ORAL at 08:25

## 2018-06-11 RX ADMIN — PAROXETINE HYDROCHLORIDE 20 MG: 20 TABLET, FILM COATED ORAL at 22:06

## 2018-06-11 RX ADMIN — LACTULOSE 30 G: 20 SOLUTION ORAL at 17:46

## 2018-06-11 RX ADMIN — Medication 10 ML: at 01:09

## 2018-06-11 RX ADMIN — INSULIN LISPRO 2 UNITS: 100 INJECTION, SOLUTION INTRAVENOUS; SUBCUTANEOUS at 12:15

## 2018-06-11 RX ADMIN — LACTULOSE 30 G: 20 SOLUTION ORAL at 16:05

## 2018-06-11 RX ADMIN — BACLOFEN 5 MG: 10 TABLET ORAL at 17:43

## 2018-06-11 RX ADMIN — Medication 10 ML: at 22:07

## 2018-06-11 RX ADMIN — ASPIRIN 81 MG 81 MG: 81 TABLET ORAL at 09:05

## 2018-06-11 RX ADMIN — Medication 1 CAPSULE: at 09:05

## 2018-06-11 RX ADMIN — ENOXAPARIN SODIUM 40 MG: 100 INJECTION SUBCUTANEOUS at 13:50

## 2018-06-11 RX ADMIN — BACLOFEN 5 MG: 10 TABLET ORAL at 08:29

## 2018-06-11 RX ADMIN — SIMVASTATIN 20 MG: 20 TABLET, FILM COATED ORAL at 22:06

## 2018-06-11 RX ADMIN — AMLODIPINE BESYLATE 7.5 MG: 5 TABLET ORAL at 09:05

## 2018-06-11 RX ADMIN — Medication 10 ML: at 13:51

## 2018-06-11 RX ADMIN — METOPROLOL TARTRATE 100 MG: 50 TABLET ORAL at 09:05

## 2018-06-11 RX ADMIN — METFORMIN HYDROCHLORIDE: 500 TABLET, FILM COATED ORAL at 17:43

## 2018-06-11 RX ADMIN — SODIUM CHLORIDE 1 G: 900 INJECTION, SOLUTION INTRAVENOUS at 23:40

## 2018-06-11 NOTE — PROGRESS NOTES
Spiritual Care Partner Volunteer visited patient in Rm 537 on 6/11/18. Documented by:   Chaplain Johnson MDiv, MACE  [phone]

## 2018-06-11 NOTE — PROGRESS NOTES
Bedside shift change report given to María Elena Doan RN (oncoming nurse) by Ashwini Garces (offgoing nurse). Report included the following information SBAR, Kardex, Intake/Output, MAR and Recent Results.

## 2018-06-11 NOTE — CDMP QUERY
Please clarify if this patient is (was) being treated/managed for: Metabolic Encephalopathy in the setting of Acute delirium, worsening confusion, AMS.    => Other explanation of clinical findings  => Clinically Undetermined (no explanation for clinical findings)    The medical record reflects the following clinical findings, treatment, and risk factors. Risk Factors:  UTI    Clinical Indicators:    Acute delirium, worsening confusion, AMS with return to baseline    Treatment: Monitoring    Please clarify and document your clinical opinion in the progress notes and discharge summary including the definitive and/or presumptive diagnosis, (suspected or probable), related to the above clinical findings. Please include clinical findings supporting your diagnosis.     Thank you  [facility]  [phone]

## 2018-06-11 NOTE — PROGRESS NOTES
Problem: Falls - Risk of  Goal: *Absence of Falls  Document Marvin Fall Risk and appropriate interventions in the flowsheet.    Outcome: Progressing Towards Goal  Fall Risk Interventions:  Mobility Interventions: Communicate number of staff needed for ambulation/transfer, Patient to call before getting OOB, PT Consult for mobility concerns, Utilize walker, cane, or other assitive device, OT consult for ADLs    Mentation Interventions: Door open when patient unattended, Family/sitter at bedside, More frequent rounding, Reorient patient, Room close to nurse's station, Update white board    Medication Interventions: Evaluate medications/consider consulting pharmacy, Patient to call before getting OOB, Teach patient to arise slowly, Utilize gait belt for transfers/ambulation    Elimination Interventions: Call light in reach, Patient to call for help with toileting needs, Toileting schedule/hourly rounds    History of Falls Interventions: Door open when patient unattended, Evaluate medications/consider consulting pharmacy, Room close to nurse's station, Utilize gait belt for transfer/ambulation

## 2018-06-11 NOTE — PROGRESS NOTES
Hospitalist Progress Note  Ema Mccurdy MD  Answering service: [phone] from in house phone         Date of Service:  2018  NAME:  Cynthia Christensen  :  1935  MRN:  [id number]    Admission Summary:    This is an 45-year-old woman with a past medical history significant for hypertension, dyslipidemia, type 2 diabetes, depression, chronic back pain, who was in her usual state of health until the day of her presentation at the emergency room when the patient developed a change in mental status.  Patient resides at the assisted living facility.  The shortness of breath is progressive and because of that, she was sent to the emergency room. Melissa Velarde was last admitted to this hospital from 2018 to 2018 for evaluation of similar symptoms.  The change in mental status was attributed to Percocet, which the patient was taking for chronic back pain.  The Percocet was discontinued.  The patient was discharged back to the assisted living facility in a stable condition.  When the patient came back to the emergency room, she was found to be in acute renal failure.  Patient was discharged from the hospital with normal renal function.  Her urinalysis is also suggestive of acute cystitis.  Patient was referred to the hospitalist service for evaluation for admission.  No history of fever, no rigors and no chills.  The patient has had a stroke in the past without any significant residual deficits.  During the recent hospitalization for the acute delirium, the patient was seen by the neurologist.  A CT scan of the head was also obtained, which was negative.     Interval history / Subjective:     2018 :  Is alert, daughter hopeful will go to rehab, came from one PTA,  Urine cult sent to abx given ,pt more alert daily. ,     PT/OT and dispo per their recommendations on oral abx.     Family ( daughter ) c/o constipation for mother, will allow lactulose x 3       Assessment & Plan:       Acute delirium.  Resolved probably was due to EDWARD ? UTI. ARB on hold. I will increase dose of Norvasc. HTN:   BP Readings from Last 1 Encounters:   06/11/18 156/79       Acute cystitis without hematuria. On rocephin. Urine : e. Coli, mulitiply sens and sens to rocephin currently on see cult below: 9  Culture result: ESCHERICHIA COLI (A)   Generally sensitive;         Dyslipidemia.  We will resume her home medications. Type 2 diabetes.  Restart Janumet as renal function back to base line. Lab Results   Component Value Date/Time    Glucose 108 (H) 06/11/2018 05:35 AM    Glucose (POC) 159 (H) 06/11/2018 11:19 AM         Depression.  We will resume her home medications. Chronic back pain.  Restart Baclofen at lower dose. Acute renal failure. Resolved with IV fluids will stop fluids.   Lab Results   Component Value Date/Time    Creatinine 0.92 06/11/2018 05:35 AM      Code status: full  DVT prophylaxis: Lovenox     Care Plan discussed with: Patient/Family  Disposition: TBD      Culture result: ESCHERICHIA COLI (A)       Antibiotic Sensitivity MIGUEL A Unit Status      Amikacin ($) Susceptible <=16 ug/mL Final     Method: MIGUEL A     Ampicillin ($) Resistant >16 ug/mL Final     Method: MIGUEL A     Ampicillin/sulbactam ($) Susceptible <=8/4 ug/mL Final     Method: MIGUEL A     Aztreonam ($$$$) Susceptible <=4 ug/mL Final     Method: MIGUEL A     Cefazolin ($) Susceptible <=8 ug/mL Final     Method: MIGUEL A     Cefepime ($$) Susceptible <=4 ug/mL Final     Method: MIGUEL A     Cefotaxime Susceptible <=2 ug/mL Final     Method: MIGUEL A     Ceftazidime ($) Susceptible 4 ug/mL Final     Method: MIGUEL A     Ceftriaxone ($) Susceptible <=1 ug/mL Final     Method: MIGUEL A     Cefuroxime ($) Susceptible <=4 ug/mL Final     Method: MIGUEL A     Ciprofloxacin ($) Intermediate 2 ug/mL Final     Method: MIGUEL A     Gentamicin ($) Susceptible <=4 ug/mL Final     Method: MIGUEL A     Imipenem Susceptible <=1 ug/mL Final     Method: MIGUEL A     Levofloxacin ($) Intermediate 4 ug/mL Final     Method: MIGUEL A     Meropenem ($$) Susceptible <=1 ug/mL Final     Method: MIGUEL A     Nitrofurantoin Susceptible <=32 ug/mL Final     Method: MIGUEL A     Piperacillin/Tazobac ($) Susceptible <=16 ug/mL Final     Method: MIGUEL A     Tobramycin ($) Resistant >8 ug/mL Final     Method: MIGUEL A     Trimeth/Sulfa Susceptible <=2/38 ug/mL Final                Hospital Problems  Date Reviewed: 6/9/2018          Codes Class Noted POA    * (Principal)Acute delirium ICD-10-CM: R41.0  ICD-9-CM: 780.09  6/9/2018 Yes                Review of Systems:   A comprehensive review of systems was negative. Vital Signs:    Last 24hrs VS reviewed since prior progress note. Most recent are:  Visit Vitals    /79 (BP 1 Location: Left arm, BP Patient Position: At rest)    Pulse 71    Temp 98.4 °F (36.9 °C)    Resp 18    Ht 5' 7\" (1.702 m)    Wt 84 kg (185 lb 3 oz)    SpO2 95%    BMI 29 kg/m2         Intake/Output Summary (Last 24 hours) at 06/11/18 1427  Last data filed at 06/11/18 1310   Gross per 24 hour   Intake                0 ml   Output             1950 ml   Net            -1950 ml        Physical Examination:             Constitutional:  No acute distress, cooperative, pleasant    ENT:  Oral mucous moist, oropharynx benign. Neck supple,    Resp:  CTA bilaterally. No wheezing/rhonchi/rales. No accessory muscle use   CV:  Regular rhythm, normal rate, no murmurs, gallops, rubs    GI:  Soft, non distended, non tender. normoactive bowel sounds, no hepatosplenomegaly     Musculoskeletal:  No edema, warm, 2+ pulses throughout    Neurologic:  Moves all extremities.       Psych:  poor insight  Skin:  Good turgor, no rashes or ulcers       Data Review:    Review and/or order of clinical lab test      Labs:     Recent Labs      06/11/18   0535  06/10/18   0347   WBC  6.4  6.7   HGB  10.1*  9.5*   HCT  32.1*  29.8*   PLT  182  183     Recent Labs      06/11/18   0535  06/10/18 8183  06/09/18   0609   NA  140  141  142   K  4.8  4.4  4.3   CL  110*  110*  108   CO2  24  24  24   BUN  19  17  25*   CREA  0.92  0.91  1.21*   GLU  108*  131*  125*   CA  8.7  8.5  8.9   MG   --    --   1.5*   PHOS   --    --   4.0     Recent Labs      06/09/18   0609  06/08/18   2140  06/08/18 1957   SGOT  32  30  29   ALT  22  19  22   AP  48  56  63   TBILI  0.3  0.2  0.2   TP  7.3  7.5  8.1   ALB  3.5  3.7  4.0   GLOB  3.8  3.8  4.1*     No results for input(s): INR, PTP, APTT in the last 72 hours. No lab exists for component: INREXT   No results for input(s): FE, TIBC, PSAT, FERR in the last 72 hours. No results found for: FOL, RBCF   No results for input(s): PH, PCO2, PO2 in the last 72 hours. No results for input(s): CPK, CKNDX, TROIQ in the last 72 hours.     No lab exists for component: CPKMB  Lab Results   Component Value Date/Time    Cholesterol, total 153 06/09/2018 06:09 AM    HDL Cholesterol 78 06/09/2018 06:09 AM    LDL, calculated 67.8 06/09/2018 06:09 AM    Triglyceride 36 06/09/2018 06:09 AM    CHOL/HDL Ratio 2.0 06/09/2018 06:09 AM     Lab Results   Component Value Date/Time    Glucose (POC) 159 (H) 06/11/2018 11:19 AM    Glucose (POC) 108 (H) 06/11/2018 06:39 AM    Glucose (POC) 175 (H) 06/10/2018 09:21 PM    Glucose (POC) 107 (H) 06/10/2018 04:09 PM    Glucose (POC) 215 (H) 06/10/2018 11:06 AM     Lab Results   Component Value Date/Time    Color YELLOW/STRAW 06/08/2018 07:57 PM    Appearance CLOUDY (A) 06/08/2018 07:57 PM    Specific gravity 1.019 06/08/2018 07:57 PM    Specific gravity 1.015 06/04/2018 12:05 PM    pH (UA) 6.0 06/08/2018 07:57 PM    Protein TRACE (A) 06/08/2018 07:57 PM    Glucose NEGATIVE  06/08/2018 07:57 PM    Ketone NEGATIVE  06/08/2018 07:57 PM    Bilirubin NEGATIVE  06/08/2018 07:57 PM    Urobilinogen 1.0 06/08/2018 07:57 PM    Nitrites NEGATIVE  06/08/2018 07:57 PM    Leukocyte Esterase MODERATE (A) 06/08/2018 07:57 PM    Epithelial cells FEW 06/04/2018 12:05 PM    Bacteria 2+ (A) 06/04/2018 12:05 PM    WBC 0-4 06/04/2018 12:05 PM    RBC 0-5 06/04/2018 12:05 PM         Medications Reviewed:     Current Facility-Administered Medications   Medication Dose Route Frequency    aspirin chewable tablet 81 mg  81 mg Oral DAILY    baclofen (LIORESAL) tablet 5 mg  5 mg Oral BID    amLODIPine (NORVASC) tablet 7.5 mg  7.5 mg Oral DAILY    SITagliptin/metFORMIN (JANUMET) 50/1000 MG   Oral BID WITH MEALS    enoxaparin (LOVENOX) injection 40 mg  40 mg SubCUTAneous Q24H    insulin lispro (HUMALOG) injection   SubCUTAneous AC&HS    glucose chewable tablet 16 g  4 Tab Oral PRN    dextrose (D50W) injection syrg 12.5-25 g  12.5-25 g IntraVENous PRN    glucagon (GLUCAGEN) injection 1 mg  1 mg IntraMUSCular PRN    metoprolol tartrate (LOPRESSOR) tablet 100 mg  100 mg Oral BID    famotidine (PEPCID) tablet 20 mg  20 mg Oral QHS    sodium chloride (NS) flush 5-10 mL  5-10 mL IntraVENous Q8H    sodium chloride (NS) flush 5-10 mL  5-10 mL IntraVENous PRN    ondansetron (ZOFRAN) injection 4 mg  4 mg IntraVENous Q4H PRN    bisacodyl (DULCOLAX) tablet 5 mg  5 mg Oral DAILY PRN    cefTRIAXone (ROCEPHIN) 1 g in 0.9% sodium chloride (MBP/ADV) 50 mL  1 g IntraVENous Q24H    lactobac ac& pc-s.therm-b.anim (ARLEY Q/RISAQUAD)  1 Cap Oral DAILY    hydrALAZINE (APRESOLINE) 20 mg/mL injection 10 mg  10 mg IntraVENous Q6H PRN    magnesium oxide (MAG-OX) tablet 400 mg  400 mg Oral DAILY    simvastatin (ZOCOR) tablet 20 mg  20 mg Oral QHS    PARoxetine (PAXIL) tablet 20 mg  20 mg Oral QHS     ______________________________________________________________________  EXPECTED LENGTH OF STAY: 3d 0h  ACTUAL LENGTH OF STAY:          2                 Issa Morton MD

## 2018-06-11 NOTE — ACP (ADVANCE CARE PLANNING)
Visited pt in response to an In-Basket request to assist with Advance Medical Directive. Explained document to patient, who is currently unable to complete same. A review of pt's chart indicates pt currently suffers from Altered mental status. Pt's Nurse Melina Crowley confirms same. Chaplains will follow as same.   Chaplain Lucien, MDiv, MS, [facility]  [phone])

## 2018-06-11 NOTE — PROGRESS NOTES
Reason for Admission:   AMS               RRAT Score:    34              Resources/supports as identified by patient/family:   daughter Snidi Mitchell (home [phone]; cell [phone])                Top Challenges facing patient (as identified by patient/family and CM):  Placement, was at [facility] but daughter does not want her to return there                     Finances/Medication cost?  No concerns at this time , has [address] Medicare Complete              Transportation? Daughter vs ambulance              Support system or lack thereof?  daughter Sindi Mitchell (home [phone]; cell [phone])                     Living arrangements? Lives with daughter Pavel Barragan             Self-care/ADLs/Cognition? Confused at times          Current Advanced Directive/Advance Care Plan:  Declined one at this time                          Plan for utilizing home health:  No need for home health at this time                        Likelihood of readmission: low                 Transition of Care Plan:      Cm met with patient and daughter to discuss discharge plan after reviewing CM note from the ED. Patients' daughter would like her to return to [facility] when medically stable. Cm sent a referral to them via [employer], advising them to start insurance authorization. Will follow.   [employer] , [place]

## 2018-06-11 NOTE — PROGRESS NOTES
NUTRITION COMPLETE ASSESSMENT    RECOMMENDATIONS:   1. Consistent CHO/2 gm Na+ diet  2. RD add Glucerna Shake (once/day)  3. Notify kitchen pt avoids all chocolate (causes diarrhea per daughter)  4. Daughter to assist with [facility] menu selections  5. Consider check B12 with admit delirium in AL pt with hx progressive wt loss     Interventions/Plan:   Food/Nutrient Delivery:  Modify diet/texture/consistency/nutrients (CHO/Na+) Commercial supplement (Glucerna shake (vanilla)) Meal setup      Nutrition Education:     Coordination of Care:    Nutrition Counseling:        Assessment:   Reason for Assessment:     [x]BPA/MST Referral: MST score 4     Diet: Consistent carb (2 gm Na+)  Supplements: RD add one Glucerna Shake (vanilla) and adjust PRN based on meal intake  Nutritionally Significant Medications: [x] Reviewed & Includes: Insulin, chronulac, Risaquad; rocephin  Meal Intake: No data found. Subjective:  Per daughter, # range; her appetite has been pretty good; chocolate causes diarrhea; she drinks Glucerna Shakes in green bottle (Glucerna Hunger Smart Shake 180 kcal/15 gm pro/14 gm CHO). Objective:  Hx HTN; HL; type 2 DM; depression; Admit from [address] [place] [address] with AMS, delerium. Although BMI 29 and overweight, malnutrition trigger for weight loss. Appears gradual progressive loss ~27# (13%) x past two years comparing wt 212# (6/6/2016) to admit 185#. Daughter claims # and prior encounter 1/2016 wt was 226# with loss 41# (18%) x past two and half years. Lives in [address] [place] [address] and admit with delirium. Daughter claims her appetite is pretty good, but gives her a Glucerna Shake daily (180 kcal), ? Consistency and amount of meals at [facility]. No acute wt loss or acute change in appetite, appears chronic. Full dentures, chew/swallow ok. Hx DM, A1C 6.8: POC , 159 and 108 today; Rx insulin and consistent CHO diet. C/O constipation now with Rx Chronulac, no other GI complaints. Urine culture pending.  Could consider check B12 in pt living in AL with delirium and hx progressive wt loss. Estimated Nutrition Needs:   Kcals/day: 1800 Kcals/day  Protein: 85 g (~1 gm/kg )  Fluid: 1900 ml     Based On: Prasanth Santana (MSJ x ~1.2)  Weight Used:  84 kg (185#)    Pt expected to meet estimated nutrient needs:  [x]   Yes with ONS  Comparative Standards:  ;  ;      Nutrition Diagnosis:   1. Altered GI function related to bowels as evidenced by Rx chronulac; daughter reports constipation    2. Unintended weight loss related to appetite/intake as evidenced by 8# loss x past ~7 months; net loss 27# x past two years      Goals:     Consume 100% Glucerna Shake a day starting in next 24 hr     Monitoring & Evaluation:    -  weight, LBM     Previous Nutrition Goals Met:  N/A  Previous Recommendations:      N/A    Education & Discharge Needs:   [x] None Identified   [x] Participated in care plan, discharge planning, and/or interdisciplinary rounds        Cultural, Advent and ethnic food preferences identified:   None    Skin Integrity: [x]Intact; PI prevention  Edema: [x]None []Other  Last BM: PTA  Food Allergies: [x]NKA; Intolerance to chocolate, per daughter causes diarrhea    Anthropometrics:    Weight Loss Metrics 6/9/2018 6/6/2018 5/30/2018 3/22/2018 3/9/2018 11/9/2017 5/9/2017   Today's Wt 185 lb 3 oz 177 lb 7.5 oz 188 lb 186 lb 191 lb 193 lb 200 lb   BMI 29 kg/m2 27.8 kg/m2 29.44 kg/m2 29.13 kg/m2 29.91 kg/m2 30.23 kg/m2 31.32 kg/m2      Last 3 Recorded Weights in this Encounter    06/09/18 0238   Weight: 84 kg (185 lb 3 oz)      Weight Source: Bed  Height: 5' 7\" (170.2 cm),    Body mass index is 29 kg/(m^2).   IBW : 61.2 kg (135 lb),    Usual Body Weight: 99.8 kg (220 lb) (Per daughter),      Labs:    Lab Results   Component Value Date/Time    Sodium 140 06/11/2018 05:35 AM    Potassium 4.8 06/11/2018 05:35 AM    Chloride 110 (H) 06/11/2018 05:35 AM    CO2 24 06/11/2018 05:35 AM    Glucose 108 (H) 06/11/2018 05:35 AM    BUN 19 06/11/2018 05:35 AM    Creatinine 0.92 06/11/2018 05:35 AM    Calcium 8.7 06/11/2018 05:35 AM    Magnesium 1.5 (L) 06/09/2018 06:09 AM    Phosphorus 4.0 06/09/2018 06:09 AM    Albumin 3.5 06/09/2018 06:09 AM     Lab Results   Component Value Date/Time    Hemoglobin A1c 6.8 (H) 06/09/2018 06:09 AM    Hemoglobin A1c, External 6.1 02/13/2016     Lab Results   Component Value Date/Time    Glucose 108 (H) 06/11/2018 05:35 AM    Glucose (POC) 132 (H) 06/11/2018 04:34 PM      Lab Results   Component Value Date/Time    ALT (SGPT) 22 06/09/2018 06:09 AM    AST (SGOT) 32 06/09/2018 06:09 AM    Alk.  phosphatase 48 06/09/2018 06:09 AM    Bilirubin, total 0.3 06/09/2018 06:09 AM        Cedric Villalba RD

## 2018-06-11 NOTE — PROGRESS NOTES
Bedside shift change report given to Dharmesh Mcdonald (oncoming nurse) by Clifton Bowman RN (offgoing nurse). Report included the following information SBAR, Kardex, MAR and Recent Results.

## 2018-06-11 NOTE — PROGRESS NOTES
Spiritual Care Assessment/Progress Note  [address]      NAMESylvia Moreno      MRN: [id number]  AGE: [de-identified] y.o. SEX: female  Temple Affiliation: Nondenominational   Language: English     6/11/2018     Total Time (in minutes): 10     Spiritual Assessment begun in [address] [place] [address] [address] through conversation with:         [x]Patient        [] Family    [] Friend(s)        Reason for Consult: Advance medical directive consult     Spiritual beliefs: (Please include comment if needed)     [x] Identifies with a alessai tradition:     [] Supported by a alessia community:      [] Claims no spiritual orientation:      [] Seeking spiritual identity:           [] Adheres to an individual form of spirituality:      [] Not able to assess:                     Identified resources for coping:      [] Prayer                               [] Music                  [] Guided Imagery     [x] Family/friends                 [] Pet visits     [] Devotional reading                         [] Unknown     [] Other:                                              Interventions offered during this visit: (See comments for more details)    Patient Interventions: Advance medical directive consult, Affirmation of emotions/emotional suffering           Plan of Care:     [] Support spiritual and/or cultural needs    [] Support AMD and/or advance care planning process      [] Support grieving process   [] Coordinate Rites and/or Rituals    [] Coordination with community clergy   [x] No spiritual needs identified at this time   [] Detailed Plan of Care below (See Comments)  [] Make referral to Music Therapy  [] Make referral to Pet Therapy     [] Make referral to Addiction services  [] Make referral to [facility]  [] Make referral to Spiritual Care Partner  [] No future visits requested        [x] Follow up visits as needed     Visited pt in response to an In-Basket request to assist with Advance Medical Directive.  Explained document to patient, who is currently unable to complete same. A review of pt's chart indicates pt currently suffers from Altered mental status. Pt's Nurse Zahra Austin confirms same. Chaplains will follow as same.   Chaplain Blas, MDiv, MS, [facility]  [phone])

## 2018-06-12 VITALS
TEMPERATURE: 97.9 F | HEART RATE: 71 BPM | RESPIRATION RATE: 18 BRPM | HEIGHT: 67 IN | SYSTOLIC BLOOD PRESSURE: 142 MMHG | OXYGEN SATURATION: 98 % | WEIGHT: 185.19 LBS | BODY MASS INDEX: 29.07 KG/M2 | DIASTOLIC BLOOD PRESSURE: 78 MMHG

## 2018-06-12 LAB
GLUCOSE BLD STRIP.AUTO-MCNC: 125 MG/DL (ref 65–100)
GLUCOSE BLD STRIP.AUTO-MCNC: 153 MG/DL (ref 65–100)
SERVICE CMNT-IMP: ABNORMAL
SERVICE CMNT-IMP: ABNORMAL

## 2018-06-12 PROCEDURE — G8979 MOBILITY GOAL STATUS: HCPCS

## 2018-06-12 PROCEDURE — G8978 MOBILITY CURRENT STATUS: HCPCS

## 2018-06-12 PROCEDURE — 97535 SELF CARE MNGMENT TRAINING: CPT

## 2018-06-12 PROCEDURE — G8988 SELF CARE GOAL STATUS: HCPCS

## 2018-06-12 PROCEDURE — 97165 OT EVAL LOW COMPLEX 30 MIN: CPT

## 2018-06-12 PROCEDURE — 82962 GLUCOSE BLOOD TEST: CPT

## 2018-06-12 PROCEDURE — 97530 THERAPEUTIC ACTIVITIES: CPT

## 2018-06-12 PROCEDURE — 97161 PT EVAL LOW COMPLEX 20 MIN: CPT

## 2018-06-12 PROCEDURE — G8987 SELF CARE CURRENT STATUS: HCPCS

## 2018-06-12 PROCEDURE — 74011250636 HC RX REV CODE- 250/636: Performed by: HOSPITALIST

## 2018-06-12 PROCEDURE — 97116 GAIT TRAINING THERAPY: CPT

## 2018-06-12 PROCEDURE — 74011250637 HC RX REV CODE- 250/637: Performed by: INTERNAL MEDICINE

## 2018-06-12 PROCEDURE — 74011636637 HC RX REV CODE- 636/637: Performed by: INTERNAL MEDICINE

## 2018-06-12 PROCEDURE — 74011250637 HC RX REV CODE- 250/637: Performed by: HOSPITALIST

## 2018-06-12 RX ORDER — BACLOFEN 10 MG/1
5 TABLET ORAL
Qty: 30 TAB | Refills: 2 | Status: SHIPPED
Start: 2018-06-12 | End: 2018-08-29 | Stop reason: SDUPTHER

## 2018-06-12 RX ORDER — FUROSEMIDE 20 MG/1
20 TABLET ORAL
Qty: 30 TAB | Refills: 2 | Status: SHIPPED
Start: 2018-06-12 | End: 2018-10-29 | Stop reason: SDUPTHER

## 2018-06-12 RX ADMIN — BACLOFEN 5 MG: 10 TABLET ORAL at 08:48

## 2018-06-12 RX ADMIN — ASPIRIN 81 MG 81 MG: 81 TABLET ORAL at 08:50

## 2018-06-12 RX ADMIN — METFORMIN HYDROCHLORIDE: 500 TABLET, FILM COATED ORAL at 08:11

## 2018-06-12 RX ADMIN — METOPROLOL TARTRATE 100 MG: 50 TABLET ORAL at 08:48

## 2018-06-12 RX ADMIN — ENOXAPARIN SODIUM 40 MG: 100 INJECTION SUBCUTANEOUS at 11:55

## 2018-06-12 RX ADMIN — Medication 400 MG: at 08:49

## 2018-06-12 RX ADMIN — Medication 1 CAPSULE: at 08:47

## 2018-06-12 RX ADMIN — AMLODIPINE BESYLATE 7.5 MG: 5 TABLET ORAL at 08:48

## 2018-06-12 RX ADMIN — INSULIN LISPRO 2 UNITS: 100 INJECTION, SOLUTION INTRAVENOUS; SUBCUTANEOUS at 11:55

## 2018-06-12 NOTE — PROGRESS NOTES
Problem: Mobility Impaired (Adult and Pediatric)  Goal: *Acute Goals and Plan of Care (Insert Text)  Physical Therapy Goals  Initiated 6/12/2018  1. Patient will move from supine to sit and sit to supine  in bed with supervision/set-up within 7 day(s). 2.  Patient will transfer from bed to chair and chair to bed with supervision/set-up using the least restrictive device within 7 day(s). 3.  Patient will perform sit to stand with supervision/set-up within 7 day(s). 4.  Patient will ambulate with supervision/set-up for 100 feet with the least restrictive device within 7 day(s). physical Therapy EVALUATION  Patient: Anival Garcia [de-identified][de-identified] y.o. female)  Date: 6/12/2018  Primary Diagnosis: Acute delirium        Precautions:   Fall    ASSESSMENT :  Based on the objective data described below, the patient presents with improved mentation from admission for delirium, alert and oriented x 4, names correct month however unable to state correct year, has decreased safety awareness, pt presents with decreased generalized strength, balance, functional mobility, and unsteady gait requiring the support of a walker. Pt is currently at risk for falls as suggested by the Tinetti assessment. Pt reports she has back pain which is chronic per chart review. Pt was instructed to use the log roll technique and given minimal assist to transfer to sit EOB. Pt stood with minimal assist from EOB however required moderate assist to stand from toilet. Pt ambulates with a rolling walker x 20 feet with CGA with slow pace and flexed posture. Pt needed frequent reminding for upright posture and to stay within the walker base. Pt remained up in chair at end of session. Reminded pt to call out for assistance if she wants to return to bed. Pt was readmitted from SNF, recommend pt return to SNF to continue rehab. Patient will benefit from skilled intervention to address the above impairments.   Patients rehabilitation potential is considered to be Good  Factors which may influence rehabilitation potential include:   []         None noted  [x]         Mental ability/status  []         Medical condition  []         Home/family situation and support systems  [x]         Safety awareness  []         Pain tolerance/management  []         Other:      PLAN :  Recommendations and Planned Interventions:  [x]           Bed Mobility Training             []    Neuromuscular Re-Education  [x]           Transfer Training                   []    Orthotic/Prosthetic Training  [x]           Gait Training                         []    Modalities  []           Therapeutic Exercises           []    Edema Management/Control  []           Therapeutic Activities            [x]    Patient and Family Training/Education  []           Other (comment):    Frequency/Duration: Patient will be followed by physical therapy  5 times a week to address goals. Discharge Recommendations: [facility]  Further Equipment Recommendations for Discharge: [facility] be determined at [facility]     SUBJECTIVE:   Patient agreeable to participate with therapy.      OBJECTIVE DATA SUMMARY:   HISTORY:    Past Medical History:   Diagnosis Date    Arthritis     Chronic pain     Depression     [facility] [address] [facility]     Hypercholesterolemia     Hypertension     Stroke ([facility]     TIA 10 yrs back    Stroke ([facility] [address] [facility]     2003     Past Surgical History:   Procedure Laterality Date    HX CARPAL TUNNEL RELEASE  1990    HX CATARACT REMOVAL      bilateral    HX HYSTERECTOMY  1986    HX HYSTERECTOMY      HX ORTHOPAEDIC      HX ORTHOPAEDIC      carpel tunnel left    HX ORTHOPAEDIC      left foot heel spur    HX REFRACTIVE SURGERY  2006     Prior Level of Function/Home Situation:  Pt was last admitted 6/4 to 6/6  for AMS which was attributed to percocet medication, pt was transferred to [facility] and has been using a rolling walker since admission, previously per chart review pt used a straight cane  Personal factors and/or comorbidities impacting plan of care: memory loss    Home Situation  Home Environment: Skilled nursing facility  # Steps to Enter: 3  One/Two Story Residence: One story  Living Alone: No  Support Systems: Family member(s), Skilled nursing facility  Patient Expects to be Discharged to[de-identified] Skilled nursing facility  Current DME Used/Available at Home: [address], rolling    EXAMINATION/PRESENTATION/DECISION MAKING:   Critical Behavior:  Neurologic State: Alert, Appropriate for age  Orientation Level: Oriented X4  Cognition: Follows commands, Poor safety awareness, Memory loss, Impaired decision making  Safety/Judgement: Decreased awareness of need for assistance, Decreased awareness of need for safety, Decreased insight into deficits, Awareness of environment  Hearing: Auditory  Auditory Impairment: Hard of hearing, bilateral  Skin:  intact    Range Of Motion:   within functional limits                        Strength:     generally decreased, functional                     Tone & Sensation:    intact                              Coordination:  functional  Vision:   Acuity: Within Defined Limits;Able to read clock/calendar on wall without difficulty; Able to read employee name badge without difficulty  Functional Mobility:  Bed Mobility:  Pt instructed in log roll technique  Rolling: Minimum assistance  Supine to Sit: Minimum assistance (for trunk righting), verbal cues provided, pt used bed rail        Transfers:  Sit to Stand: Minimum assistance (from EOB), required moderate assist x 1 to stand from toilet   Stand to Sit: Contact guard assistance                      Balance:   Sitting: Intact  Standing: Impaired  Standing - Static: Fair  Standing - Dynamic : Fair  Ambulation/Gait Training:  Distance (ft): 20 Feet (ft)  Assistive Device: Walker, rolling;Gait belt  Ambulation - Level of Assistance: Contact guard assistance;Assist x1     Gait Description (WDL): Exceptions to [facility]  Gait Abnormalities: Decreased step clearance (flexed posture), pt required frequent reminders for upright posture              Speed/Yumi: Pace decreased (<100 feet/min)  Step Length: Left shortened;Right shortened               Functional Measure:  Tinetti test:    Sitting Balance: 1  Arises: 0  Attempts to Rise: 0  Immediate Standing Balance: 1  Standing Balance: 1  Nudged: 0  Eyes Closed: 0  Turn 360 Degrees - Continuous/Discontinuous: 1  Turn 360 Degrees - Steady/Unsteady: 1  Sitting Down: 1  Balance Score: 6  Indication of Gait: 1  R Step Length/Height: 1  L Step Length/Height: 1  R Foot Clearance: 1  L Foot Clearance: 1  Step Symmetry: 1  Step Continuity: 1  Path: 1  Trunk: 1  Walking Time: 1  Gait Score: 10  Total Score: 16       Tinetti Test and G-code impairment scale:  Percentage of Impairment CH    0%   CI    1-19% CJ    20-39% CK    40-59% CL    60-79% CM    80-99% CN     100%   Tinetti  Score 0-28 28 23-27 17-22 12-16 6-11 1-5 0       Tinetti Tool Score Risk of Falls  <19 = High Fall Risk  19-24 = Moderate Fall Risk  25-28 = Low Fall Risk  Tinetti ME. Performance-Oriented Assessment of Mobility Problems in Elderly Patients. [address]; [id number]. (Scoring Description: PT Bulletin Feb. 10, 1993)    Older adults: Shahriar Lira et al, 2009; n = 1000 [facility] elderly evaluated with ABC, NAVARRO, ADL, and IADL)  · Mean NAVARRO score for males aged 69-68 years = 26.21(3.40)  · Mean NAVARRO score for females age 69-68 years = 25.16(4.30)  · Mean NAVARRO score for males over 80 years = 23.29(6.02)  · Mean NAVARRO score for females over 80 years = 17.20(8.32)       G codes: In compliance with CMSs Claims Based Outcome Reporting, the following G-code set was chosen for this patient based on their primary functional limitation being treated: The outcome measure chosen to determine the severity of the functional limitation was the Tinetti with a score of 16/28 which was correlated with the impairment scale.     ? Mobility - Walking and Moving Around:     - CURRENT STATUS: CK - 40%-59% impaired, limited or restricted    - GOAL STATUS: CJ - 20%-39% impaired, limited or restricted    - D/C STATUS:  ---------------To be determined---------------      Physical Therapy Evaluation Charge Determination   History Examination Presentation Decision-Making   MEDIUM  Complexity : 1-2 comorbidities / personal factors will impact the outcome/ POC  MEDIUM Complexity : 3 Standardized tests and measures addressing body structure, function, activity limitation and / or participation in recreation  LOW Complexity : Stable, uncomplicated  LOW Complexity : FOTO score of       Based on the above components, the patient evaluation is determined to be of the following complexity level: LOW     Pain:  Pain Scale 1: Numeric (0 - 10)  Pain Intensity 1: 6  Location: back  Description: aching, chronic  Intervention: rest, repositioning               Activity Tolerance:   fair    After treatment:   [x]         Patient left in no apparent distress sitting up in chair  []         Patient left in no apparent distress in bed  [x]         Call bell left within reach  [x]         Nursing notified  []         Caregiver present  []         Bed alarm activated    COMMUNICATION/EDUCATION:   The patients plan of care was discussed with: Registered Nurse. [x]         Fall prevention education was provided and the patient/caregiver indicated understanding. []         Patient/family have participated as able in goal setting and plan of care. [x]         Patient/family agree to work toward stated goals and plan of care. []         Patient understands intent and goals of therapy, but is neutral about his/her participation. []         Patient is unable to participate in goal setting and plan of care.     Thank you for this referral.  Cecelia Whitaker   Time Calculation: 29 mins

## 2018-06-12 NOTE — PROGRESS NOTES
Problem: Self Care Deficits Care Plan (Adult)  Goal: *Acute Goals and Plan of Care (Insert Text)  Occupational Therapy Goals  Initiated 6/12/2018  1. Patient will perform lower body dressing with moderate assistance  within 7 day(s). 2.  Patient will perform grooming standing at sink with supervision/set-up within 7 day(s). 3.  Patient will perform bathing with minimal assistance within 7 day(s). 4.  Patient will perform toilet transfers with contact guard assist within 7 day(s). 5.  Patient will perform all aspects of toileting with minimal assistance within 7 day(s). 6.  Patient will participate in upper extremity therapeutic exercise/activities with supervision/ set-up for 10 minutes within 7 day(s). 7.  Patient will utilize energy conservation techniques during functional activities with verbal cues within 7 day(s). Occupational Therapy EVALUATION  Patient: Jesus Hoover [de-identified][de-identified] y.o. female)  Date: 6/12/2018  Primary Diagnosis: Acute delirium        Precautions: fall       ASSESSMENT :  Based on the objective data described below, the patient presents with impaired static and dynamic standing balance, impaired functional mobility, fall risk, impaired access to LB, and impaired ADL independence s/p admission for acute delirium (resolved). Patient received supine in bed, alert, oriented x3 (unsure of year but know month). Co-treated with physical therapist to optimize patient safety and functional outcome. Performs bed mobility with min-A, sit-stand with min-A, ambulates with CGA using RW, requires mod-A for toilet transfer on raised toilet seat due to lower surface. Patient requires overall set-up to CGA for UB ADLs and mod-A to total-A for LB ADLs. Patient admitted from SNF, recommend return to SNF at discharge for continued therapy and assistance. Patient will benefit from skilled intervention to address the above impairments.   Patients rehabilitation potential is considered to be Good  Factors which may influence rehabilitation potential include:   [x]             None noted  []             Mental ability/status  []             Medical condition  []             Home/family situation and support systems  []             Safety awareness  []             Pain tolerance/management  []             Other:      PLAN :  Recommendations and Planned Interventions:  [x]               Self Care Training                  [x]        Therapeutic Activities  [x]               Functional Mobility Training    []        Cognitive Retraining  [x]               Therapeutic Exercises           [x]        Endurance Activities  [x]               Balance Training                   []        Neuromuscular Re-Education  []               Visual/Perceptual Training     [x]   Home Safety Training  [x]               Patient Education                 [x]        Family Training/Education  []               Other (comment):    Frequency/Duration: Patient will be followed by occupational therapy 3 times a week to address goals. Discharge Recommendations: Skilled Nursing Facility  Further Equipment Recommendations for Discharge: none     SUBJECTIVE:   Patient stated I feel okay.     OBJECTIVE DATA SUMMARY:   HISTORY:   Past Medical History:   Diagnosis Date    Arthritis     Chronic pain     Depression     [facility] [address] [facility]     Hypercholesterolemia     Hypertension     Stroke ([facility] [address] [facility]     TIA 10 yrs back    Stroke ([facility] [address] [facility]     2003     Past Surgical History:   Procedure Laterality Date    HX CARPAL TUNNEL RELEASE  1990    HX CATARACT REMOVAL      bilateral    HX HYSTERECTOMY  1986    HX HYSTERECTOMY      HX ORTHOPAEDIC      HX ORTHOPAEDIC      carpel tunnel left    HX ORTHOPAEDIC      left foot heel spur    HX REFRACTIVE SURGERY  2006       Prior Level of Function/Environment/Context: Patient admitted from [facility], reports ambulated with RW and receiving assistance with all ADLs  Expanded or extensive additional review of patient history:     Home Situation  Home Environment: Private residence  # Steps to Enter: 3  One/Two Story Residence: One story  Living Alone: No  Support Systems: Family member(s), Friends \ neighbors  Patient Expects to be Discharged to[de-identified] Rehabilitation facility  Current DME Used/Available at Home: Other (comment) (pt needs a walker)    EXAMINATION OF PERFORMANCE DEFICITS:  Cognitive/Behavioral Status:  Neurologic State: Alert  Orientation Level: Oriented to person;Oriented to place;Oriented to situation;Disoriented to time  Cognition: Follows commands  Perception: Appears intact  Perseveration: No perseveration noted  Safety/Judgement: Awareness of environment;Decreased awareness of need for safety;Decreased awareness of need for assistance;Decreased insight into deficits    Skin: visible skin appears intact    Edema: none noted    Hearing: Auditory  Auditory Impairment: Hard of hearing, bilateral    Vision/Perceptual:                           Acuity: Within Defined Limits;Able to read clock/calendar on wall without difficulty; Able to read employee name badge without difficulty         Range of Motion:  AROM: BUE generally decreased/ functional                            Strength:  BUE generally decreased/ functional                   Coordination:     Fine Motor Skills-Upper: Left Intact; Right Intact    Gross Motor Skills-Upper: Left Intact; Right Intact    Tone & Sensation:  Tone: Normal  Sensation: Intact                            Balance:  Sitting: Intact  Standing: Impaired  Standing - Static: Fair  Standing - Dynamic : Fair    Functional Mobility and Transfers for ADLs:  Bed Mobility:  Rolling: Minimum assistance  Supine to Sit: Minimum assistance (for trunk righting)    Transfers:  Sit to Stand: Minimum assistance (from EOB)  Stand to Sit: Contact guard assistance  Bed to Chair: Minimum assistance  Toilet Transfer :  Moderate assistance (from raised toilet seat, using grab bar)    ADL Assessment:  Feeding: Independent (inferred)    Oral Facial Hygiene/Grooming: Contact guard assistance (infer)    Bathing: Moderate assistance (inferred for impaired access to LB)    Upper Body Dressing: Setup (inferred)    Lower Body Dressing: Maximum assistance (impaired access to distal LB)    Toileting: Maximum assistance (bladder hygiene seated, max-A for raising/ lowering brierf)                ADL Intervention and task modifications:               Cognitive Retraining  Safety/Judgement: Awareness of environment;Decreased awareness of need for safety;Decreased awareness of need for assistance;Decreased insight into deficits      Functional Measure:  Barthel Index:    Bathin  Bladder: 5  Bowels: 5  Groomin  Dressin  Feeding: 10  Mobility: 0  Stairs: 0  Toilet Use: 5  Transfer (Bed to Chair and Back): 10  Total: 45       Barthel and G-code impairment scale:  Percentage of impairment CH  0% CI  1-19% CJ  20-39% CK  40-59% CL  60-79% CM  80-99% CN  100%   Barthel Score 0-100 100 99-80 79-60 59-40 20-39 1-19   0   Barthel Score 0-20 20 17-19 13-16 9-12 5-8 1-4 0      The Barthel ADL Index: Guidelines  1. The index should be used as a record of what a patient does, not as a record of what a patient could do. 2. The main aim is to establish degree of independence from any help, physical or verbal, however minor and for whatever reason. 3. The need for supervision renders the patient not independent. 4. A patient's performance should be established using the best available evidence. Asking the patient, friends/relatives and nurses are the usual sources, but direct observation and common sense are also important. However direct testing is not needed. 5. Usually the patient's performance over the preceding 24-48 hours is important, but occasionally longer periods will be relevant. 6. Middle categories imply that the patient supplies over 50 per cent of the effort.   7. Use of aids to be independent is kristofer. Flor Ruiz., Barthel, D.W. ([id number]). Functional evaluation: the Barthel Index. [address]. DHARMESH Rosales, Siobhan Hall.Mya.Cathy, [address] (1999). Measuring the change indisability after inpatient rehabilitation; comparison of the responsiveness of the Barthel Index and Functional Crockett Measure. Journal of Neurology, Neurosurgery, and Psychiatry, 66(4), 914-346. AYSE Dominguez, YESSICA Bernard, & Juan Bence, M.A. (2004.) Assessment of post-stroke quality of life in cost-effectiveness studies: The usefulness of the Barthel Index and the EuroQoL-5D. Quality of Life Research, 13, 078-68         G codes: In compliance with CMSs Claims Based Outcome Reporting, the following G-code set was chosen for this patient based on their primary functional limitation being treated: The outcome measure chosen to determine the severity of the functional limitation was the Barthel Index with a score of 45/100 which was correlated with the impairment scale. ? Self Care:     - CURRENT STATUS: CK - 40%-59% impaired, limited or restricted    - GOAL STATUS: CJ - 20%-39% impaired, limited or restricted    - D/C STATUS:  ---------------To be determined---------------     Occupational Therapy Evaluation Charge Determination   History Examination Decision-Making   LOW Complexity : Brief history review  MEDIUM Complexity : 3-5 performance deficits relating to physical, cognitive , or psychosocial skils that result in activity limitations and / or participation restrictions MEDIUM Complexity : Patient may present with comorbidities that affect occupational performnce.  Miniml to moderate modification of tasks or assistance (eg, physical or verbal ) with assesment(s) is necessary to enable patient to complete evaluation       Based on the above components, the patient evaluation is determined to be of the following complexity level: LOW   Pain:  Pain Scale 1: Numeric (0 - 10)  Pain Intensity 1: 0              Activity Tolerance:   VSS  Please refer to the flowsheet for vital signs taken during this treatment. After treatment:   [x] Patient left in no apparent distress sitting up in chair  [] Patient left in no apparent distress in bed  [x] Call bell left within reach  [x] Nursing notified  [] Caregiver present  [] Bed alarm activated    COMMUNICATION/EDUCATION:   The patients plan of care was discussed with: Physical Therapist and Registered Nurse. [x] Home safety education was provided and the patient/caregiver indicated understanding. [x] Patient/family have participated as able in goal setting and plan of care. [x] Patient/family agree to work toward stated goals and plan of care. [] Patient understands intent and goals of therapy, but is neutral about his/her participation. [] Patient is unable to participate in goal setting and plan of care. This patients plan of care is appropriate for delegation to [facility].     Thank you for this referral.  Christiano Bauman, OT  Time Calculation: 25 mins

## 2018-06-12 NOTE — PROGRESS NOTES
Patient stated he takes Nexium everyday, currently not on scheduled list. Refused BP medications this time.

## 2018-06-12 NOTE — DISCHARGE INSTRUCTIONS
Discharging provider: Harpreet Sinha MD    Primary care [place]    [address]:    This is an 82-year-old woman with a past medical history significant for hypertension, dyslipidemia, type 2 diabetes, depression, chronic back pain, who was in her usual state of health until the day of her presentation at the emergency room when the patient developed a change in mental status. Lisa Dodson resides at the assisted living facility.  The shortness of breath is progressive and because of that, she was sent to the emergency room. Rose aMry Dunn was last admitted to this hospital from 06/04/2018 to 06/06/2018 for evaluation of similar symptoms.  The change in mental status was attributed to Percocet, which the patient was taking for chronic back pain.  The Percocet was discontinued.  The patient was discharged back to the assisted living facility in a stable condition.  When the patient came back to the emergency room, she was found to be in acute renal failure.  Patient was discharged from the hospital with normal renal function.  Her urinalysis is also suggestive of acute cystitis.  Patient was referred to the hospitalist service for evaluation for admission.  No history of fever, no rigors and no chills.  The patient has had a stroke in the past without any significant residual deficits.  During the recent hospitalization for the acute delirium, the patient was seen by the neurologist.  A CT scan of the head was also obtained, which was negative. Metabolic encephalopathy (POA) - due to renal dysfunction and UTI  - CT head 6/4 without acute abnormality  - Reduce baclofen dosage  - Re-orient as able    EDWARD (POA) - pre-renal, NSAIDs. Resolved back to baseline with IVF. Change lasix to as needed. Encourage fluid intake    E.coli UTI (POA)  - Ucx 6/8 with E.coli  - Ceftriaxone 6/9-6/11.  Should be sufficient for uncomplicated cystitis    Aortic stenosis  - Echo 6/5 shows LVEF 55-60%, no WMA, moderate aortic stenosis  - Lasix changed to as needed  - Recommend follow up with cardiology for monitoring    DM2 (chronic) - A1c 6.8  - Continue stiagliptin, metformin, insulin  - Diabetic diet    HTN (chronic)  - OK to restart losartan. Continue metoprolol    HLD (chronic) - continue simvastatin    Chronic low back pain  - Reduce dose of baclofen  - OK to restart mobic but she needs good fluid intake to prevent recurrent EDWARD  - Acetaminophen as needed    FOLLOW-UP CARE RECOMMENDATIONS:    APPOINTMENTS:  Follow-up Information     Follow up With Details Comments Contact Info    Galilea Stubbs MD Schedule an appointment as soon as possible for a visit in 1 week For follow up after hospitalization [address]  [address]  [phone]           Future Appointments  Date Time Provider Hardik Castro   10/24/2018 2:20 PM Alok Adams 103, PsyD Christina [address]     It is very important that you keep follow-up appointment(s). Bring discharge papers, medication list (and/or medication bottles) to follow-up appointments for review by outpatient provider(s). ONGOING TREATMENT PLAN: Encourage fluids    Specific symptoms to watch for: chest pain, shortness of breath, fever, chills, nausea, vomiting, diarrhea, change in mentation, falling, weakness, bleeding. DIET:  Diabetic Diet    ACTIVITY:  Activity as tolerated and PT/OT Eval and Treat    GOALS OF CARE:  x  Eventual return to home/independent/assisted living     Long term SNF      Hospice     No rehospitalization     Patient condition at discharge:   Functional status    Poor    x  Deconditioned      Independent   Cognition    Lucid   x  Forgetful (some sensescence)     Dementia   Catheters/lines (plus indication)    Mark     PICC      PEG         Code status  x  Full code      DNR    . . . . . . . . . . . . . . . . . . . . . . . . . . . . . . . . . . . . . . . . . . . . . . . . . . . . . . . . . . . . . . . . . . . . . . . [place] CHRONIC MEDICAL CONDITIONS:  Problem List as of 6/12/2018  Date Reviewed: 6/9/2018          Codes Class Noted - Resolved    * (Principal)Acute delirium ICD-10-CM: R41.0  ICD-9-CM: 780.09  6/9/2018 - Present        Altered mental status ICD-10-CM: R41.82  ICD-9-CM: 780.97  6/4/2018 - Present        Type 2 diabetes with nephropathy ([facility] ICD-10-CM: E11.21  ICD-9-CM: 250.40, 583.81  3/9/2018 - Present        H/O: stroke ICD-10-CM: Z86.73  ICD-9-CM: V12.54  11/9/2017 - Present        Advance care planning ICD-10-CM: Z71.89  ICD-9-CM: V65.49  1/21/2016 - Present        Essential hypertension ICD-10-CM: I10  ICD-9-CM: 401.9  1/21/2016 - Present        Stomach ulcer ICD-10-CM: K25.9  ICD-9-CM: 531.90  3/12/2015 - Present        Diverticula of colon ICD-10-CM: K57.30  ICD-9-CM: 562.10  3/12/2015 - Present        DM (diabetes mellitus) ([facility] ICD-10-CM: E11.9  ICD-9-CM: 250.00  5/1/2014 - Present        Mixed hyperlipidemia ICD-10-CM: E78.2  ICD-9-CM: 272.2  5/1/2014 - Present        Chronic venous insufficiency ICD-10-CM: I87.2  ICD-9-CM: 459.81  5/1/2014 - Present        Depression ICD-10-CM: F32.9  ICD-9-CM: 738  5/1/2014 - Present

## 2018-06-12 NOTE — PROGRESS NOTES
Cm informed that patient is stable for discharge today and authorization has been received for patient to go to [facility]. Cm contacted patients' daughter and she asked that patient work with therapy before she leaves. Daughter also asked that cm arrange transport. Referral sent to [facility] for 1:30 pm and they responded they can come at that time. Discharge instructions have been faxed and report can be called to [phone].   [employer] , [place]

## 2018-06-12 NOTE — DISCHARGE SUMMARY
Discharge Summary     Patient:  Chavez Fenton       MRN: [id number]       YOB: 1935       Age: [de-identified] y.o. Date of admission:  6/8/2018    Date of discharge:  6/12/2018    Primary care provider: Dr. Kwesi Elias MD    Admitting provider:  Gabi Charles MD    Discharging provider:  Jake [address] [phone]. If unavailable, call [phone] and ask the  to page the triage hospitalist.    Consultations  None    Procedures  * No surgery found *    Discharge destination: SNF. The patient is stable for discharge. Admission diagnosis  Acute delirium    Current Discharge Medication List      START taking these medications    Details   L. acidoph & paracasei- S therm- Bifido (ARLEY-Q/RISAQUAD) 8 billion cell cap cap Take 1 Cap by mouth daily. OK to substitute other probiotic. Qty: 30 Cap, Refills: 0         CONTINUE these medications which have CHANGED    Details   baclofen (LIORESAL) 10 mg tablet Take 0.5 Tabs by mouth three (3) times daily as needed. For back pain. Qty: 30 Tab, Refills: 2      furosemide (LASIX) 20 mg tablet Take 1 Tab by mouth daily as needed. For fluid overload, leg swelling. Qty: 30 Tab, Refills: 2         CONTINUE these medications which have NOT CHANGED    Details   meloxicam (MOBIC) 15 mg tablet TAKE 1 TABLET BY MOUTH  DAILY  Qty: 90 Tab, Refills: 1    Associated Diagnoses: Primary osteoarthritis of both knees      losartan (COZAAR) 25 mg tablet Take 1 Tab by mouth daily. Qty: 90 Tab, Refills: 1    Associated Diagnoses: Essential hypertension      acetaminophen (TYLENOL) 325 mg tablet Take 2 Tabs by mouth two (2) times a day.   Qty: 120 Tab, Refills: 5    Associated Diagnoses: Other osteoarthritis of spine, lumbar region; Chronic bilateral low back pain with bilateral sciatica      insulin glulisine U-100 (APIDRA SOLOSTAR U-100 INSULIN) 100 unit/mL pen by SubCUTAneous route. 5 units with breakfast and Dinner, 3 units with Lunch  Sliding Scale:  150-200 = add 2 units  201-250 = add 4 units  251-300 = add 5 units  301-350 = add 6 units  351 and above = add 8 units and call MD    Associated Diagnoses: Type 2 diabetes with nephropathy (HCC)      metoprolol tartrate (LOPRESSOR) 100 mg IR tablet Take 1 Tab by mouth two (2) times a day. Qty: 180 Tab, Refills: 1    Associated Diagnoses: Essential hypertension with goal blood pressure less than 130/85      raNITIdine (ZANTAC) 150 mg tablet Take 1 Tab by mouth nightly. Qty: 90 Tab, Refills: 3    Associated Diagnoses: Gastritis, presence of bleeding unspecified, unspecified chronicity, unspecified gastritis type      PARoxetine (PAXIL) 20 mg tablet TAKE 1 TABLET BY MOUTH  EVERY NIGHT AT BEDTIME  Qty: 90 Tab, Refills: 1    Associated Diagnoses: Other depression      simvastatin (ZOCOR) 40 mg tablet TAKE 1 TABLET BY MOUTH  NIGHTLY  Qty: 90 Tab, Refills: 1    Associated Diagnoses: Mixed hyperlipidemia      JANUMET 50-1,000 mg per tablet Take 1 tablet by mouth two  times daily with meals  Qty: 180 Tab, Refills: 5    Associated Diagnoses: Type 2 diabetes mellitus without complication (HCC)      cholecalciferol (VITAMIN D3) 1,000 unit cap Take 1,000 Units by mouth daily. aspirin 81 mg chewable tablet Take 81 mg by mouth daily. Follow-up Information     Follow up With Details Comments Contact Info    Gwen Mcleod MD Schedule an appointment as soon as possible for a visit in 1 week For follow up after hospitalization [address] [facility]  [address]  [phone]            Future Appointments  Date Time Provider Hardik Castro   10/24/2018 2:20 PM Vishal Gonzalez 29     Final discharge diagnoses and brief hospital course  Please also refer to the admission H&P for details on the presenting problem.     This is an 71-year-old woman with a past medical history significant for hypertension, dyslipidemia, type 2 diabetes, depression, chronic back pain, who was in her usual state of health until the day of her presentation at the emergency room when the patient developed a change in mental status. Farhana Salas resides at the assisted living facility.  The shortness of breath is progressive and because of that, she was sent to the emergency room. Ally Gomez was last admitted to this hospital from 06/04/2018 to 06/06/2018 for evaluation of similar symptoms.  The change in mental status was attributed to Percocet, which the patient was taking for chronic back pain.  The Percocet was discontinued.  The patient was discharged back to the assisted living facility in a stable condition.  When the patient came back to the emergency room, she was found to be in acute renal failure.  Patient was discharged from the hospital with normal renal function.  Her urinalysis is also suggestive of acute cystitis.  Patient was referred to the hospitalist service for evaluation for admission.  No history of fever, no rigors and no chills.  The patient has had a stroke in the past without any significant residual deficits.  During the recent hospitalization for the acute delirium, the patient was seen by the neurologist.  A CT scan of the head was also obtained, which was negative. Metabolic encephalopathy (POA) - due to renal dysfunction and UTI  - CT head 6/4 without acute abnormality  - Reduce baclofen dosage  - Re-orient as able    EDWARD (POA) - pre-renal, NSAIDs. Resolved back to baseline with IVF. Change lasix to as needed. Encourage fluid intake    E.coli UTI (POA)  - Ucx 6/8 with E.coli  - Ceftriaxone 6/9-6/11.  Should be sufficient for uncomplicated cystitis    Aortic stenosis  - Echo 6/5 shows LVEF 55-60%, no WMA, moderate aortic stenosis  - Recommend follow up with cardiology for monitoring    DM2 (chronic) - A1c 6.8  - Continue stiagliptin, metformin, insulin  - Diabetic diet    HTN (chronic)  - OK to restart losartan. Continue metoprolol    HLD (chronic) - continue simvastatin    Chronic low back pain  - Reduce dose of baclofen  - OK to restart mobic but she needs good fluid intake to prevent recurrent EDWARD  - Acetaminophen as needed    Depression - continue paxil    FOLLOW-UP CARE RECOMMENDATIONS:    It is very important that you keep follow-up appointment(s). Bring discharge papers, medication list (and/or medication bottles) to follow-up appointments for review by outpatient provider(s). ONGOING TREATMENT PLAN: Encourage fluids    Specific symptoms to watch for: chest pain, shortness of breath, fever, chills, nausea, vomiting, diarrhea, change in mentation, falling, weakness, bleeding. DIET:  Diabetic Diet    ACTIVITY:  Activity as tolerated and PT/OT Eval and Treat    Physical examination at discharge  Visit Vitals    /70    Pulse 83    Temp 98.1 °F (36.7 °C)    Resp 18    Ht 5' 7\" (1.702 m)    Wt 84 kg (185 lb 3 oz)    SpO2 95%    BMI 29 kg/m2     Constitutional:  No acute distress, cooperative, pleasant    ENT:  Oral mucous moist, oropharynx benign. Neck supple,    Resp:  CTA bilaterally. No wheezing/rhonchi/rales. No accessory muscle use   CV:  Regular rhythm, normal rate, no murmurs, gallops, rubs    GI:  Soft, non distended, non tender. normoactive bowel sounds, no hepatosplenomegaly     Musculoskeletal:  No edema, warm, 2+ pulses throughout    Neurologic:  Moves all extremities. Psych:  poor insight, cooperative       Skin:  Good turgor, no rashes or ulcers    Pertinent imaging studies:    CXR 6/8/18  FINDINGS: A portable AP radiograph of the chest was obtained at 2012 hours. The  patient is on a cardiac monitor. The lungs are clear. Heart size is within  normal limits. Aortic arch is slightly ectatic.   There are degenerative changes  in the spine and shoulders.      IMPRESSION  IMPRESSION: No acute process    Duplex LE doppler 6/10/18  FINDINGS:       Right: The common femoral, deep femoral, femoral, popliteal,  posterior tibial  and great saphenous are patent and without evidence  of thrombus;  each is fully compressible and there is no narrowing of  the flow channel on color Doppler imaging. The peroneal vein was not  visualized. Phasic flow is observed in the common femoral vein. Left:   The common femoral, deep femoral, femoral, popliteal,  posterior tibial, peroneal, and great saphenous are patent and without   evidence of thrombus;  each is fully compressible and there is no  narrowing of the flow channel on color Doppler imaging. Phasic flow  is observed in the common femoral vein.     IMPRESSION:  No evidence of right or left lower extremity vein  thrombosis where visualized. Recent Labs      06/11/18   0535  06/10/18   0347   WBC  6.4  6.7   HGB  10.1*  9.5*   HCT  32.1*  29.8*   PLT  182  183     Recent Labs      06/11/18   0535  06/10/18   0347   NA  140  141   K  4.8  4.4   CL  110*  110*   CO2  24  24   BUN  19  17   CREA  0.92  0.91   GLU  108*  131*   CA  8.7  8.5     No results for input(s): SGOT, GPT, AP, TBIL, TP, ALB, GLOB, GGT, AML, LPSE in the last 72 hours. No lab exists for component: AMYP, HLPSE  No results for input(s): INR, PTP, APTT in the last 72 hours. No lab exists for component: INREXT, INREXT   No results for input(s): FE, TIBC, PSAT, FERR in the last 72 hours. No results for input(s): PH, PCO2, PO2 in the last 72 hours. No results for input(s): CPK, CKMB in the last 72 hours.     No lab exists for component: TROPONINI  No components found for: [place]    Chronic Diagnoses:    Problem List as of 6/12/2018  Date Reviewed: 6/9/2018          Codes Class Noted - Resolved    * (Principal)Acute delirium ICD-10-CM: R41.0  ICD-9-CM: 780.09  6/9/2018 - Present        Altered mental status ICD-10-CM: R41.82  ICD-9-CM: 780.97  6/4/2018 - Present        Type 2 diabetes with nephropathy ([facility] ICD-10-CM: E11.21  ICD-9-CM: 250.40, 583.81  3/9/2018 - Present        H/O: stroke ICD-10-CM: Z86.73  ICD-9-CM: V12.54  11/9/2017 - Present        Advance care planning ICD-10-CM: Z71.89  ICD-9-CM: V65.49  1/21/2016 - Present        Essential hypertension ICD-10-CM: I10  ICD-9-CM: 401.9  1/21/2016 - Present        Stomach ulcer ICD-10-CM: K25.9  ICD-9-CM: 531.90  3/12/2015 - Present        Diverticula of colon ICD-10-CM: K57.30  ICD-9-CM: 562.10  3/12/2015 - Present        DM [facility]diabetes mellitus) ([facility] ICD-10-CM: E11.9  ICD-9-CM: 250.00  5/1/2014 - Present        Mixed hyperlipidemia ICD-10-CM: E78.2  ICD-9-CM: 272.2  5/1/2014 - Present        Chronic venous insufficiency ICD-10-CM: I87.2  ICD-9-CM: 459.81  5/1/2014 - Present        Depression ICD-10-CM: F32.9  ICD-9-CM: 709  5/1/2014 - Present              Time spent on discharge related activities today greater than 30 minutes.       Signed:  Darell Salazar MD                 Hospitalist, Internal Medicine      Cc: Ilene Armstrong MD

## 2018-06-19 ENCOUNTER — TELEPHONE (OUTPATIENT)
Dept: INTERNAL MEDICINE CLINIC | Age: 83
End: 2018-06-19

## 2018-06-19 NOTE — TELEPHONE ENCOUNTER
[place] called. He is following up on [facility] paper work he said he faxed over to the office. The number listed is his work number and he will be there until 330pm today.

## 2018-06-20 NOTE — TELEPHONE ENCOUNTER
Pt is calling about his [facility] paperwork again. ..  [facility], [phone]

## 2018-07-02 NOTE — IP AVS SNAPSHOT
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 She did initiate EPOCH-R  in the hospital on 06/16/2018.    History of Present Illness      The patient is a 25 y.o. female with the above-mentioned history, who returns today for scheduled follow-up and lab review.  She was recently discharged from the hospital after receiving her first cycle of EPOCH-R, initiated 6/16/2018.  She did receive growth factor support with Neulasta 6/22/2018.   Additionally, the patient reports tolerating chemotherapy reasonably well.  She denies nausea or vomiting.  She denies any changes in her bowel or bladder habits.  She notes overall improvement in her shortness of breath and cough since beginning chemotherapy.  She has not experienced significant arthralgias related to Neulasta.  Currently, she denies fevers or chills, signs or symptoms of infection.  She has noted improvement in her night sweats since initiation of chemotherapy.  She denies dysuria, productive sputum, head congestion, headaches.  She does continue on prophylaxis including acyclovir, fluconazole and Bactrim.  She denies signs or symptoms of bleeding aside from menstrual spotting.  She did initiate Lupron, though has continued to have menstrual irregularities since beginning chemotherapy.    Patient is here today without complaints.  She has not had any nausea vomiting.  She did have neutropenia with low-grade fever for which she required Levaquin.  Her next Lupron injection is due on July 12.    Past Medical History, Past Surgical History, Social History, Family History have been reviewed and are without significant changes except as mentioned.    Oncologic history:.   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  She saw Dr. Arina Cohen on last Friday.  The reason the patient was referred to Dr. Cohen was because they thought she had cardiomegaly on chest x-ray.   [address] 
[phone] Patient: [facility] MRN: [id number] :1935 A check lorne indicates which time of day the medication should be taken. My Medications START taking these medications Instructions Each Dose to Equal  
 Morning Noon Evening Bedtime L. acidoph & paracasei- S therm- Bifido 8 billion cell Cap cap Commonly known as:  ARLEY-Q/RISAQUAD Your last dose was: Your next dose is: Take 1 Cap by mouth daily. OK to substitute other probiotic. 1 Cap CHANGE how you take these medications Instructions Each Dose to Equal  
 Morning Noon Evening Bedtime  
 baclofen 10 mg tablet Commonly known as:  LIORESAL What changed:   
- how much to take - when to take this 
- reasons to take this 
- additional instructions Your last dose was: Your next dose is: Take 0.5 Tabs by mouth three (3) times daily as needed. For back pain. 5 mg  
    
   
   
   
  
 furosemide 20 mg tablet Commonly known as:  LASIX What changed:   
- how much to take 
- how to take this - when to take this 
- reasons to take this 
- additional instructions Your last dose was: Your next dose is: Take 1 Tab by mouth daily as needed. For fluid overload, leg swelling. 20 mg  
    
   
   
   
  
 raNITIdine 150 mg tablet Commonly known as:  ZANTAC What changed:  when to take this Your last dose was: Your next dose is: Take 1 Tab by mouth nightly. 150 mg  
    
   
   
   
  
 simvastatin 40 mg tablet Commonly known as:  ZOCOR What changed:  See the new instructions. Your last dose was: Your next dose is: TAKE 1 TABLET BY MOUTH  NIGHTLY CONTINUE taking these medications  Instructions Each Dose to Equal  
 However a chest x-ray was ordered which showedThe chest x-ray showed extensive abnormal increased density throughout the anterior mediastinum extending into the left hilar region and left perihilar region and is most likely due to confluent lymphadenopathy.     CT angiogram of the chest did not show pulmonary embolism but showed     there is a large conglomerate  mass encases multiple vascular structures of the mediastinum and left  hilar region that is most likely extensive confluent lymphadenopathy.  The primary differential diagnostic considerations would be lymphoma.  The tumor posteriorly displaces the pulmonary arteries and the left and  moderately narrows the main pulmonary artery. The tumor also posteriorly  displaces the trachea and markedly narrows the left mainstem bronchus.  The pulmonary veins in the left are also encased and narrowed. The mass  encases and markedly narrows the SVC. The large edy mass measures  approximately 19.8 x 13.7 x 14.0 cm in diameter. Enlarged lymph nodes  are also present in the left supraclavicular region where they appear to  produce occlusion of the left internal jugular vein. There is no  axillary lymphadenopathy. There are no filling defects within the  pulmonary arteries. There is no CT evidence of pulmonary embolism. There  is a small left pleural effusion. A small pericardial effusion measuring  8 mm in maximum thickness is also present. There is compressive  atelectasis of the left lung. Patchy airspace opacities are also present  in the superior aspect of the left upper lobe. These are most likely due  to direct tumor infiltration of the lung parenchyma, but could also  represent postobstructive pneumonia. The right lung is well expanded and  clear. Images through the upper abdomen partially show what could be a  edy mass in the retroperitoneum posterior and inferior to the  pancreas. Further evaluation with a CT scan of the abdomen and pelvis is  recommended.  Morning Noon Evening Bedtime  
 acetaminophen 325 mg tablet Commonly known as:  TYLENOL Your last dose was: Your next dose is: Take 2 Tabs by mouth two (2) times a day. 650 mg APIDRA SOLOSTAR U-100 INSULIN 100 unit/mL pen Generic drug:  insulin glulisine U-100 Your last dose was: Your next dose is:    
   
   
 by SubCUTAneous route. 5 units with breakfast and Dinner, 3 units with Lunch Sliding Scale: 150-200 = add 2 units 201-250 = add 4 units 251-300 = add 5 units 301-350 = add 6 units 351 and above = add 8 units and call MD  
     
   
   
   
  
 aspirin 81 mg chewable tablet Your last dose was: Your next dose is: Take 81 mg by mouth daily. 81 mg JANUMET 50-1,000 mg per tablet Generic drug:  SITagliptin-metFORMIN Your last dose was: Your next dose is: Take 1 tablet by mouth two  times daily with meals  
     
   
   
   
  
 losartan 25 mg tablet Commonly known as:  COZAAR Your last dose was: Your next dose is: Take 1 Tab by mouth daily. 25 mg  
    
   
   
   
  
 meloxicam 15 mg tablet Commonly known as:  MOBIC Your last dose was: Your next dose is: TAKE 1 TABLET BY MOUTH  DAILY  
     
   
   
   
  
 metoprolol tartrate 100 mg IR tablet Commonly known as:  LOPRESSOR Your last dose was: Your next dose is: Take 1 Tab by mouth two (2) times a day. 100 mg PARoxetine 20 mg tablet Commonly known as:  PAXIL Your last dose was: Your next dose is: TAKE 1 TABLET BY MOUTH  EVERY NIGHT AT BEDTIME  
     
   
   
   
  
 VITAMIN D3 1,000 unit Cap Generic drug:  cholecalciferol Your last dose was: Your next dose is: Take 1,000 Units by mouth daily. 1000 Units Bone window images demonstrate patchy sclerosis in the C7  and T1 and T2 and T3 and T4 vertebral body suspicious for bone  involvement with lymphoma.     IMPRESSION:  Very large tumor mass in the mediastinum encasing multiple  vascular structures and also moderately narrowing the left mainstem  bronchus as described in more detail above. Direct tumor infiltration of  the left upper lobe is also suspected. Small left pleural effusion and a  small pericardial effusion. There may also be partially visualized  lymphadenopathy in the upper abdomen. Patchy areas of sclerosis are also  noted in the C7 and T1 and T2 and T3 and T4 vertebral bodies suspicious  for osseous metastatic disease. The findings are consistent with  Lymphoma.     Dr. Cohen felt that she had a small pericardial effusion.  Patient has been symptomatic with cough which is worsening which started back in February 2018 and worsened over the last 4 months.  Patient also has some shortness of breath with walking up the steps.  She has not lost weight.  She say she is reasonable appetite.  She does have fever kind of low-grade fever and night sweats.  She is more fatigued compared to before.  She was referred to us because of concern that this could be lymphoma.  She does not have any tissue diagnosis yet.  Patient does complain of back pain.    Echo done on admission June 12, 2018 by Dr. Fitzgerald showed that the patient's posterior and appears large primary leukemia the left ventricle and apex.  There is no tamponade.  The pericardium appears thickened pointing to involvement of the pericardium into the mediastinal process.  Dr. Fitzgerald felt that it would be difficult to do pericardial synthesis as the mediastinal mass covers the anterior aspect of the heart.  Ejection fraction is 58%  CT-guided needle biopsy June 12, 2018 was negative  LDH is elevated.  Uric acid is high.  MRI thoracic spine, negative  CT abdomen pelvis negative  Scan July 13, 2018 shows large  Where to Get Your Medications These medications were sent to [address], [address] [facility] [address], [facility] [address] Phone:  [phone]  
  meloxicam 15 mg tablet Information on where to get these meds will be given to you by the nurse or doctor. ! Ask your nurse or doctor about these medications  
  baclofen 10 mg tablet  
 furosemide 20 mg tablet L. acidoph & paracasei- S therm- Bifido 8 billion cell Cap cap infiltrative edy mass in the anterior mediastinum and left hilar region that nearly completely fills the left hemithorax and shows intense hypermetabolism with an SUV of 19.8.  No foci of pathologic hypermetabolism are identified elsewhere in the chest, neck abdomen or pelvis.    Pathology was consistent with primary mediastinal large B-cell lymphoma.    Patient was seen by Dr. Melgar.  He discussed harvesting eggs versus Zoladex plus oral contraceptives versus Zoladex alone for fertility preservation.  Due to large size of the tumor and rapid initiation of treatment needed the patient was not able to have aches harvested done.  Because she is at increased risk of thrombosis with the use of oral contraceptives secondary to malignancy he recommended Zoladex alone.  Patient received first dose of Zoladex 3.6 mg subcutaneous on Belia 15, 2018.    Patient started on EPOCH/R on June 16, 2018    Patient had a reaction to Rituxan which improved with steroids, Benadryl and Pepcid.  She had to receive it slow.  She started having itching over her EARS , sore throat.  She was given IV steroids Benadryl and Pepcid and following that she was started at a slower rate and she completed it.    Right upper extremity Doppler ultrasound showed new superficial vein thrombosis around the PICC line with no extension into the deep system.  Repeat Doppler was ordered.    A Doppler ultrasound initially showed superficial thrombophlebitis.  She was on prophylactic Arixtra.  A Doppler was repeated 2 days later on 6/20/18  which showed extension of thrombus into the axillary and brachial veins.  She was therefore started on Xarelto 15 mg one every 12 hours and the PICC line was removed as soon as chemotherapy completed on 6/20/18.  She will take 15 mg of Xarelto every 12 hours for 21 days and then transition to 20 mg once a day.  The patient will have CBC performed twice a week.  If the platelet count drops below 50,000, anticoagulation  "will need to be temporarily held  Patient was started on acyclovir/fluconazole/Bactrim  Bone marrow done June 14, 2018, morphology was negative for lymphoma    Fertility preservation, Zoladex is next due July 12, 2018.    Review of Systems   Constitutional: Positive for fatigue. Negative for activity change, appetite change, chills and fever.   HENT: Negative for mouth sores.    Eyes: Negative for visual disturbance.   Respiratory: Positive for cough (improved) and shortness of breath (improved).    Cardiovascular: Negative.    Gastrointestinal: Negative.  Negative for abdominal pain, diarrhea, nausea and vomiting.   Endocrine: Negative.    Genitourinary: Positive for menstrual problem. Negative for dysuria and frequency.   Musculoskeletal: Negative for arthralgias, back pain, joint swelling and myalgias.   Skin: Negative.    Allergic/Immunologic: Negative.    Neurological: Negative.  Negative for dizziness and weakness.   Hematological: Negative.  Does not bruise/bleed easily.   Psychiatric/Behavioral: The patient is not nervous/anxious.    All other systems reviewed and are negative.  Review of systems unchanged except as updated.    Medications:  The current medication list was reviewed in the EMR    ALLERGIES:  No Known Allergies    Objective      Vitals:    07/02/18 0750   BP: 90/60   Pulse: 63   Resp: 16   Temp: 97.9 °F (36.6 °C)   SpO2: 96%   Weight: 63 kg (138 lb 12.8 oz)   Height: 157 cm (61.81\")   PainSc:   1   PainLoc: Back  Comment: lower back     Current Status 7/2/2018   ECOG score 0       Physical Exam   Constitutional: She is oriented to person, place, and time. She appears well-developed and well-nourished.   She is accompanied by her mother today.   HENT:   Head: Normocephalic.   Eyes: Pupils are equal, round, and reactive to light.   Neck: Normal range of motion.   Cardiovascular: Normal rate, regular rhythm and normal heart sounds.    Pulmonary/Chest: Effort normal and breath sounds normal. No " respiratory distress. She has no wheezes. She has no rales.   Abdominal: Soft.   Lymphadenopathy:     She has no cervical adenopathy.   Neurological: She is alert and oriented to person, place, and time.   Skin: Skin is warm and dry.   Psychiatric: She has a normal mood and affect.   physical exam unchanged from previous except as updated.    RECENT LABS:    Results from last 7 days  Lab Units 07/02/18  0737 06/29/18  1249 06/27/18  1039   WBC 10*3/mm3 14.49* 15.81* 0.80*   NEUTROS ABS 10*3/mm3 7.36* 8.51* 0.14*   HEMOGLOBIN g/dL 12.7 11.9 12.2   HEMATOCRIT % 38.7 35.2 35.6   PLATELETS 10*3/mm3 136* 130* 148*       Results from last 7 days  Lab Units 06/25/18  1237   SODIUM mmol/L 139   POTASSIUM mmol/L 4.4   CHLORIDE mmol/L 98   CO2 mmol/L 28.5   BUN mg/dL 19   CREATININE mg/dL 0.70   CALCIUM mg/dL 10.1   GLUCOSE mg/dL 95           Assessment/Plan   1.  Primary mediastinal large B-cell lymphoma: The patient presented with dyspnea, cough, and chest pain.  A CT angiogram of the chest 6/8/18 showed a very large tumor mass in the mediastinum encasing multiple vascular structures and narrowing the left mainstem bronchus.  There was direct tumor infiltration in the left upper lobe.  There was a small left pleural effusion.  She underwent a CT-guided biopsy of the mediastinal mass on 6/12/18 and pathology reviewed at [facility] oncology showed diffuse large B-cell lymphoma consistent with a primary diffuse large B-cell lymphoma.  A bone marrow exam was performed on 6/14/18 which was negative for lymphoma.  She underwent a PET scan 6/13/18 which showed a large hypermetabolic mass in the chest involving the anterior mediastinum, left hilum, nearly completely filling the left hemothorax with SUV 19.8.  There was physiologic distribution elsewhere.     The patient was started on IV fluid hydration and allopurinol for prevention of hyperuricemia.  She initiated systemic chemotherapy with DA-EPOCH-R on 6/16/18 and completed the  evening of 6/21/18.  She had a infusion reaction to rituximab but was able to complete with additional medications and otherwise tolerated chemotherapy well.  She will require additional premedications with additional rituximab.  Plan is to monitor her blood counts twice per week outpatient and proceed with cycle 2 of chemotherapy day 21.     The patient had significant improvement in shortness of breath, cough, and chest pain by the time of discharge.    · Patient received Neulasta support  · Her white count dropped down to as low as 0.8 low-grade temperature and patient was placed on Levaquin ×5 days starting June 27, 2018, neutropenia AT  11 days posttreatment.  Platelets dropped to 82.  · Her next Lupron injection is due July 12.  · She is due to start chemotherapy on July 9.  · Discussed with Dr. Fox at the [facility] and his suggestion was to do the CT scan and PET scan after 2 cycles and discuss the results with him.  If she has an excellent response early on she would not require any further treatments after completion of 6 cycles of EPOCH/R        2.  Pericardial effusion:   a 2-D echocardiogram 6/8/18 showed a left ventricular systolic function 58%.  There was a 2 cm pericardial effusion with no tamponade.  The pericardium appeared thickened.  She had no evidence of volume overload or Nod throughout the hospital stay     3.   FEN: She was monitored very carefully for any evidence of tumor lysis.  She was maintained on IV fluids and allopurinol throughout hospitalization.    her uric acid at discharge was 1.2 but I recommended that she stay on allopurinol twice daily until her next cycle of chemotherapy to prevent hyperuricemia.  She has mild hypokalemia and will be discharged on potassium 20 mEq once per day.  Have ordered an outpatient BMP weekly for monitoring of her renal function and electrolytes     4.  Fertility preservation:  Patient received Zoladex injection  for fertility  preservation; this should be continued once monthly during her chemotherapy.  Next dose due 7/12/18     5.  Right upper extremity swelling:  The patient had a PIC line placed in the right upper extremity for administration of chemotherapy.  Her arm was noted to be swollen.  A Doppler ultrasound initially showed superficial thrombophlebitis.  She was on prophylactic Arixtra.  A Doppler was repeated 2 days later on 6/20/18  which showed extension of thrombus into the axillary and brachial veins.  She was therefore started on Xarelto 15 mg one every 12 hours and the PICC line was removed as soon as chemotherapy completed on 6/20/18.  She will take 15 mg of Xarelto every 12 hours for 21 days and then transition to 20 mg once a day.  The patient will have CBC performed twice a week.  If the platelet count drops below 50,000, anticoagulation will need to be temporarily held     6.  ID: The patient will receive Neulasta 24 hours after completion of chemotherapy for reduction of neutropenic infection.  She will be discharged on prophylactic antibiotics including acyclovir, Diflucan, and Bactrim.  She may require bacterial prophylaxis if the ANC drops below 1000.    Plan 1.  Follow-up July 9 with nurse practitioner for admission to the hospital for cycle 2 of chemotherapy with EPOCH/R. we will give Neulasta support    2.  Lupron injection is due 3.6 mg subcutaneous on July 12, 2018.    3.  Nurse practitioner to discuss with me on July 9 ninth prior to admission    4.  CT scan of the neck chest abdomen pelvis and PET scan to be done after completion of cycle 2 of chemotherapy and need to discuss with Dr. Fox at the [facility].  If patient has a very good response then standard of care is to give only 6 cycles of EPOCH/R    5.  Continue Xarelto    6.  PICC line to be placed on admission    7.  We will consult vascular surgery for possible port placement when she is admitted to the hospital.  Both acute right upper  extremity deep vein thrombosis axillary and brachial veins it may be difficult to place port and she may need PICC line with every chemotherapy.     Millie Padilla MD   7/2/2018      CC: Dr. Arina Munoz

## 2018-07-24 ENCOUNTER — OFFICE VISIT (OUTPATIENT)
Dept: URGENT CARE | Age: 83
End: 2018-07-24

## 2018-07-24 VITALS
SYSTOLIC BLOOD PRESSURE: 195 MMHG | TEMPERATURE: 97.3 F | HEART RATE: 79 BPM | OXYGEN SATURATION: 95 % | DIASTOLIC BLOOD PRESSURE: 79 MMHG | HEIGHT: 67 IN | RESPIRATION RATE: 16 BRPM | WEIGHT: 185 LBS | BODY MASS INDEX: 29.03 KG/M2

## 2018-07-24 DIAGNOSIS — H61.21 IMPACTED CERUMEN OF RIGHT EAR: Primary | ICD-10-CM

## 2018-07-24 NOTE — PROGRESS NOTES
HPI Comments: Sayda Rodriguez presents with right ear fullness for the past week. Denies pain, fever, dizziness, ear drainage, cough, ST. The history is provided by the patient. Past Medical History:   Diagnosis Date    Arthritis     Chronic pain     Depression     Diabetes ([address]     Hypercholesterolemia     Hypertension     St[facility] [address]     TIA 10 yrs back    Stroke ([facility] [address]     2003        Past Surgical History:   Procedure Laterality Date    HX CARPAL TUNNEL RELEASE  1990    HX CATARACT REMOVAL      bilateral    HX HYSTERECTOMY  1986    HX HYSTERECTOMY      HX ORTHOPAEDIC      HX ORTHOPAEDIC      carpel tunnel left    HX ORTHOPAEDIC      left foot heel spur    HX REFRACTIVE SURGERY  2006         History reviewed. No pertinent family history. Social History     Social History    Marital status:      Spouse name: N/A    Number of children: N/A    Years of education: N/A     Occupational History    Not on file. Social History Main Topics    Smoking status: Never Smoker    Smokeless tobacco: Never Used    Alcohol use No    Drug use: No    Sexual activity: No      Comment: retired,relocated from [address] with daughter     Other Topics Concern    Not on file     Social History Narrative    ** Merged History Encounter **                     ALLERGIES: Review of patient's allergies indicates no known allergies. Review of Systems   Constitutional: Negative for activity change, appetite change, chills and fever. HENT: Negative for congestion, ear pain, rhinorrhea, sinus pain, sinus pressure, sore throat and trouble swallowing. Respiratory: Negative for cough, shortness of breath and wheezing. Cardiovascular: Negative for chest pain and palpitations. Gastrointestinal: Negative for nausea and vomiting. Musculoskeletal: Negative for myalgias. Skin: Negative for rash. Neurological: Negative for dizziness and headaches.    Hematological: Negative for adenopathy. Vitals:    07/24/18 1117   BP: 195/79   Pulse: 79   Resp: 16   Temp: 97.3 °F (36.3 °C)   SpO2: 95%   Weight: 185 lb (83.9 kg)   Height: 5' 7\" (1.702 m)       Physical Exam   Constitutional: She appears well-developed and well-nourished. No distress. HENT:   Right Ear: Tympanic membrane, external ear and ear canal normal.   Left Ear: Tympanic membrane, external ear and ear canal normal.   Right ear canal: cerumen impaction s/p lavage  Left ear canal: ceruminous s/p lavage   Neurological: She is alert. Skin: She is not diaphoretic. Psychiatric: She has a normal mood and affect. Her behavior is normal. Judgment and thought content normal.   Nursing note and vitals reviewed. MDM    Procedures             ICD-10-CM ICD-9-CM    1. Impacted cerumen of right ear H61.21 380.4 REMOVE IMPACTED EAR WAX     S/p lavage with relief.

## 2018-07-24 NOTE — MR AVS SNAPSHOT
[address]avier [address] [place] [address] 
[phone] Patient: [facility] MRN: [id number] :1935 Visit Information Date & Time Provider Department Dept. Phone Encounter #  
 2018 11:15 AM [address] [facility] [phone] [id number] Your Appointments 2018  2:45 PM  
ROUTINE CARE with Roland Magallon MD  
[facility] Internal Medicine [facility]) Appt Note: f/u  
 [address] [place] [address]  
[phone]  
  
   
 [address] [facility] [address] [address]  
  
    
 10/24/2018  2:20 PM  
New Patient with Carmelita Herbert PsyD [address] [facility] [address] ([facility]) Appt Note: new pt ref by [facility] provider Dr Senait Jacques [phone] for early signs of dementia [place] [address] [facility] 99 [address] [phone]  
  
   
 [place] [address] [facility] [address] [id number] [address] Upcoming Health Maintenance Date Due DTaP/Tdap/Td series (1 - Tdap) 1956 ZOSTER VACCINE AGE 60> 1997 EYE EXAM RETINAL OR DILATED Q1 2017 MICROALBUMIN Q1 2018 GLAUCOMA SCREENING Q2Y 2018 Influenza Age 5 to Adult 2018 FOOT EXAM Q1 2018 HEMOGLOBIN A1C Q6M 2018 LIPID PANEL Q1 2019 COLONOSCOPY 2025 Allergies as of 2018  Review Complete On: 2018 By: Cyndee Pierce MD  
 No Known Allergies Current Immunizations  Reviewed on 2018 Name Date Influenza High Dose Vaccine PF 2017 Influenza Vaccine 2016 Pneumococcal Conjugate (PCV-13) 2016 Pneumococcal Polysaccharide (PPSV-23) 2017 Not reviewed this visit You Were Diagnosed With   
  
 Codes Comments Impacted cerumen of right ear    -  Primary ICD-10-CM: H61.21 ICD-9-CM: 380.4 Vitals BP Pulse Temp Resp Height(growth percentile) Weight(growth percentile) 195/79 79 97.3 °F (36.3 °C) 16 5' 7\" (1.702 m) 185 lb (83.9 kg) SpO2 BMI OB Status Smoking Status 95% 28.98 kg/m2 Hysterectomy Never Smoker BMI and BSA Data Body Mass Index Body Surface Area  
 28.98 kg/m 2 1.99 m 2 Preferred Pharmacy Pharmacy Name Phone [facility] [address] [facility], [place] [address] Ramona Ringer [phone] Your Updated Medication List  
  
   
This list is accurate as of 7/24/18 11:38 AM.  Always use your most recent med list.  
  
  
  
  
 acetaminophen 325 mg tablet Commonly known as:  TYLENOL Take 2 Tabs by mouth two (2) times a day. APIDRA SOLOSTAR U-100 INSULIN 100 unit/mL pen Generic drug:  insulin glulisine U-100  
by SubCUTAneous route. 5 units with breakfast and Dinner, 3 units with Lunch Sliding Scale: 150-200 = add 2 units 201-250 = add 4 units 251-300 = add 5 units 301-350 = add 6 units 351 and above = add 8 units and call MD  
  
 aspirin 81 mg chewable tablet Take 81 mg by mouth daily. baclofen 10 mg tablet Commonly known as:  LIORESAL Take 0.5 Tabs by mouth three (3) times daily as needed. For back pain. furosemide 20 mg tablet Commonly known as:  LASIX Take 1 Tab by mouth daily as needed. For fluid overload, leg swelling. JANUMET 50-1,000 mg per tablet Generic drug:  SITagliptin-metFORMIN Take 1 tablet by mouth two  times daily with meals L. acidoph & paracasei- S therm- Bifido 8 billion cell Cap cap Commonly known as:  ARLEY-Q/RISAQUAD Take 1 Cap by mouth daily. OK to substitute other probiotic.  
  
 losartan 25 mg tablet Commonly known as:  COZAAR Take 1 Tab by mouth daily. meloxicam 15 mg tablet Commonly known as:  MOBIC  
TAKE 1 TABLET BY MOUTH  DAILY  
  
 metoprolol tartrate 100 mg IR tablet Commonly known as:  LOPRESSOR Take 1 Tab by mouth two (2) times a day. PARoxetine 20 mg tablet Commonly known as:  PAXIL TAKE 1 TABLET BY MOUTH  EVERY NIGHT AT BEDTIME  
  
 raNITIdine 150 mg tablet Commonly known as:  ZANTAC Take 1 Tab by mouth nightly. simvastatin 40 mg tablet Commonly known as:  ZOCOR  
TAKE 1 TABLET BY MOUTH  NIGHTLY  
  
 VITAMIN D3 1,000 unit Cap Generic drug:  cholecalciferol Take 1,000 Units by mouth daily. We Performed the Following REMOVE IMPACTED EAR WAX [14484 CPT(R)] Patient Instructions Earwax Blockage: Care Instructions Your Care Instructions Earwax is a natural substance that protects the ear canal. Normally, earwax drains from the ears and does not cause problems. Sometimes earwax builds up and hardens. Earwax blockage (also called cerumen impaction) can cause some loss of hearing and pain. When wax is tightly packed, you will need to have your doctor remove it. Follow-up care is a key part of your treatment and safety. Be sure to make and go to all appointments, and call your doctor if you are having problems. It's also a good idea to know your test results and keep a list of the medicines you take. How can you care for yourself at home? · Do not try to remove earwax with cotton swabs, fingers, or other objects. This can make the blockage worse and damage the eardrum. · If your doctor recommends that you try to remove earwax at home: ¨ Soften and loosen the earwax with warm mineral oil. You also can try hydrogen peroxide mixed with an equal amount of room temperature water. Place 2 drops of the fluid, warmed to body temperature, in the ear two times a day for up to 5 days. ¨ Once the wax is loose and soft, all that is usually needed to remove it from the ear canal is a gentle, warm shower. Direct the water into the ear, then tip your head to let the earwax drain out. Dry your ear thoroughly with a hair dryer set on low. Hold the dryer several inches from your ear. ¨ If the warm mineral oil and shower do not work, use an over-the-counter wax softener. Read and follow all instructions on the label. After using the wax softener, use an ear syringe to gently flush the ear. Make sure the flushing solution is body temperature. Cool or hot fluids in the ear can cause dizziness. When should you call for help? Call your doctor now or seek immediate medical care if: 
  · Pus or blood drains from your ear.  
  · Your ears are ringing or feel full.  
  · You have a loss of hearing.  
 Watch closely for changes in your health, and be sure to contact your doctor if: 
  · You have pain or reduced hearing after 1 week of home treatment.  
  · You have any new symptoms, such as nausea or balance problems. Where can you learn more? Go to http://karolina-thee.info/. Enter V255 in the search box to learn more about \"Earwax Blockage: Care Instructions. \" Current as of: November 20, 2017 Content Version: 11.7 © 0169-0158 [employer]. Care instructions adapted under license by [employer] (which disclaims liability or warranty for this information). If you have questions about a medical condition or this instruction, always ask your healthcare professional. [address] any warranty or liability for your use of this information. Please provide this summary of care documentation to your next provider. Your primary care clinician is listed as Sami Thorpe. If you have any questions after today's visit, please call [phone].

## 2018-07-24 NOTE — PATIENT INSTRUCTIONS
Earwax Blockage: Care Instructions  Your Care Instructions    Earwax is a natural substance that protects the ear canal. Normally, earwax drains from the ears and does not cause problems. Sometimes earwax builds up and hardens. Earwax blockage (also called cerumen impaction) can cause some loss of hearing and pain. When wax is tightly packed, you will need to have your doctor remove it. Follow-up care is a key part of your treatment and safety. Be sure to make and go to all appointments, and call your doctor if you are having problems. It's also a good idea to know your test results and keep a list of the medicines you take. How can you care for yourself at home? · Do not try to remove earwax with cotton swabs, fingers, or other objects. This can make the blockage worse and damage the eardrum. · If your doctor recommends that you try to remove earwax at home:  ¨ Soften and loosen the earwax with warm mineral oil. You also can try hydrogen peroxide mixed with an equal amount of room temperature water. Place 2 drops of the fluid, warmed to body temperature, in the ear two times a day for up to 5 days. ¨ Once the wax is loose and soft, all that is usually needed to remove it from the ear canal is a gentle, warm shower. Direct the water into the ear, then tip your head to let the earwax drain out. Dry your ear thoroughly with a hair dryer set on low. Hold the dryer several inches from your ear. ¨ If the warm mineral oil and shower do not work, use an over-the-counter wax softener. Read and follow all instructions on the label. After using the wax softener, use an ear syringe to gently flush the ear. Make sure the flushing solution is body temperature. Cool or hot fluids in the ear can cause dizziness. When should you call for help? Call your doctor now or seek immediate medical care if:    · Pus or blood drains from your ear.     · Your ears are ringing or feel full.     · You have a loss of hearing.  Watch closely for changes in your health, and be sure to contact your doctor if:    · You have pain or reduced hearing after 1 week of home treatment.     · You have any new symptoms, such as nausea or balance problems. Where can you learn more? Go to http://karolina-thee.info/. Enter F124 in the search box to learn more about \"Earwax Blockage: Care Instructions. \"  Current as of: November 20, 2017  Content Version: 11.7  © 6666-2477 [employer]. Care instructions adapted under license by [employer] (which disclaims liability or warranty for this information). If you have questions about a medical condition or this instruction, always ask your healthcare professional. [address] any warranty or liability for your use of this information.

## 2018-07-30 DIAGNOSIS — E78.2 MIXED HYPERLIPIDEMIA: ICD-10-CM

## 2018-07-31 RX ORDER — SIMVASTATIN 40 MG/1
TABLET, FILM COATED ORAL
Qty: 90 TAB | Refills: 1 | Status: SHIPPED | OUTPATIENT
Start: 2018-07-31 | End: 2019-01-20 | Stop reason: SDUPTHER

## 2018-08-29 ENCOUNTER — OFFICE VISIT (OUTPATIENT)
Dept: INTERNAL MEDICINE CLINIC | Age: 83
End: 2018-08-29

## 2018-08-29 VITALS
BODY MASS INDEX: 29.35 KG/M2 | OXYGEN SATURATION: 98 % | TEMPERATURE: 97.6 F | HEIGHT: 67 IN | WEIGHT: 187 LBS | SYSTOLIC BLOOD PRESSURE: 155 MMHG | DIASTOLIC BLOOD PRESSURE: 79 MMHG | RESPIRATION RATE: 18 BRPM | HEART RATE: 72 BPM

## 2018-08-29 DIAGNOSIS — F32.89 OTHER DEPRESSION: ICD-10-CM

## 2018-08-29 DIAGNOSIS — Z23 ENCOUNTER FOR IMMUNIZATION: ICD-10-CM

## 2018-08-29 DIAGNOSIS — E11.9 TYPE 2 DIABETES MELLITUS WITHOUT COMPLICATION, WITHOUT LONG-TERM CURRENT USE OF INSULIN (HCC): ICD-10-CM

## 2018-08-29 DIAGNOSIS — I10 ESSENTIAL HYPERTENSION WITH GOAL BLOOD PRESSURE LESS THAN 130/85: ICD-10-CM

## 2018-08-29 DIAGNOSIS — B37.2 CANDIDAL INTERTRIGO: ICD-10-CM

## 2018-08-29 DIAGNOSIS — I10 ESSENTIAL HYPERTENSION: ICD-10-CM

## 2018-08-29 DIAGNOSIS — M47.22 OSTEOARTHRITIS OF SPINE WITH RADICULOPATHY, CERVICAL REGION: Primary | ICD-10-CM

## 2018-08-29 DIAGNOSIS — E78.2 MIXED HYPERLIPIDEMIA: ICD-10-CM

## 2018-08-29 RX ORDER — NYSTATIN 100000 [USP'U]/G
POWDER TOPICAL 2 TIMES DAILY
Qty: 1 BOTTLE | Refills: 1 | Status: SHIPPED | OUTPATIENT
Start: 2018-08-29 | End: 2019-01-30 | Stop reason: ALTCHOICE

## 2018-08-29 RX ORDER — PAROXETINE HYDROCHLORIDE 20 MG/1
20 TABLET, FILM COATED ORAL DAILY
Qty: 90 TAB | Refills: 1 | Status: SHIPPED | OUTPATIENT
Start: 2018-08-29 | End: 2018-09-21 | Stop reason: SDUPTHER

## 2018-08-29 RX ORDER — BACLOFEN 10 MG/1
5 TABLET ORAL
Qty: 30 TAB | Refills: 2 | Status: SHIPPED | OUTPATIENT
Start: 2018-08-29 | End: 2019-02-17 | Stop reason: SDUPTHER

## 2018-08-29 RX ORDER — METOPROLOL TARTRATE 100 MG/1
100 TABLET ORAL 2 TIMES DAILY
Qty: 180 TAB | Refills: 1 | Status: SHIPPED | OUTPATIENT
Start: 2018-08-29 | End: 2019-04-21 | Stop reason: SDUPTHER

## 2018-08-29 RX ORDER — GUAIFENESIN 100 MG/5ML
81 LIQUID (ML) ORAL DAILY
Qty: 90 TAB | Refills: 4 | Status: SHIPPED | OUTPATIENT
Start: 2018-08-29

## 2018-08-29 NOTE — PROGRESS NOTES
HISTORY OF PRESENT ILLNESS  Daniel Castro is a 82 y.o. female here for follow up. Report neck pain radiating to both arms. She went to subacute rehab after hospital admission. Had x-ray knee done showed spondylosis of the cervical area. TENS unit helps her during therapy. Need to get her prescriptions. She is seen for diabetes. Rose Mary Grace He reports medication compliance: compliant all of the time. Diabetic diet compliance: compliant most of the time. Home glucose monitoring: is not performed. Further diabetic ROS: no polyuria or polydipsia, no chest pain, dyspnea or TIA's, no numbness, tingling or pain in extremities, no hypoglycemia. Lab review: labs reviewed, I note that glycosylated hemoglobin normallabs reviewed and discussed with patient. Eye exam:up to date. Report rash on both groin. Slightly itchy. Has hypertension,  Taking metoprolol only. No chest pain palpitation or shortness of breath. Need med refill. All labs reviewed. Need flu shot. Hypertension    Associated symptoms include anxiety and neck pain. Pertinent negatives include no chest pain, no blurred vision and no nausea. Neck Pain   Pertinent negatives include no chest pain. Skin Problem   Pertinent negatives include no chest pain. Back Pain    Pertinent negatives include no chest pain and no fever. Diabetes   Pertinent negatives include no chest pain. Review of Systems   Constitutional: Negative. Negative for chills and fever. HENT: Negative. Eyes: Negative. Negative for blurred vision and double vision. Respiratory: Negative. Cardiovascular: Negative. Negative for chest pain. Gastrointestinal: Negative for heartburn and nausea. Musculoskeletal: Positive for back pain and neck pain. Negative for falls. Skin: Positive for itching and rash. Neurological: Negative. Psychiatric/Behavioral: Negative. Physical Exam   Constitutional: She appears well-developed and well-nourished. No distress. Neck: Normal range of motion. Neck supple. No JVD present. No thyromegaly present. Cardiovascular: Normal rate, regular rhythm, normal heart sounds and intact distal pulses. Pulmonary/Chest: Effort normal. No respiratory distress. She has no wheezes. Musculoskeletal: She exhibits tenderness. Neck: Spine nontender paravertebral muscle spasm present. Range of motion restricted. .   Neurological: She displays normal reflexes. She exhibits normal muscle tone. Coordination normal.   Skin:   Bilateral groin. Mild to moderate Candida infection present. Itchy. Psychiatric: She has a normal mood and affect. Her behavior is normal.       ASSESSMENT and PLAN  Diagnoses and all orders for this visit:    1. Osteoarthritis of spine with radiculopathy, cervical region  X-ray of the cervical spine shows spondylosis. She has finished her physical therapy mentioned only TENS unit helped her a lot. Will order,    -     TENS unit and electrodes cmpk; Use 3 times a day  -     REFERRAL TO HOME HEALTH    We will refill,  -     baclofen (LIORESAL) 10 mg tablet; Take 0.5 Tabs by mouth three (3) times daily as needed. For back pain. -     REFERRAL TO PAIN CLINIC for epidural shot. 2. Type 2 diabetes mellitus without complication, without long-term current use of insulin (HCC)    Stable A1c. Continue same medicine. 3. Essential hypertension     stable on current regimen will continue same. Will order,      -     aspirin 81 mg chewable tablet; Take 1 Tab by mouth daily. 4. Mixed hyperlipidemia    On Zocor. No myalgia. 5. Essential hypertension with goal blood pressure less than 130/85    Stable. Will refill,  -     metoprolol tartrate (LOPRESSOR) 100 mg IR tablet; Take 1 Tab by mouth two (2) times a day. 6. Other depression    Stable. Will refill,  -     PARoxetine (PAXIL) 20 mg tablet; Take 1 Tab by mouth daily. 7. Candidal intertrigo  -     nystatin (MYCOSTATIN) powder;  Apply  to affected area two (2) times a day. 8. Encounter for immunization  -     INFLUENZA VACCINE INACTIVATED (IIV), SUBUNIT, ADJUVANTED, IM              Discussed expected course/resolution/complications of diagnosis in detail with patient. Medication risks/benefits/costs/interactions/alternatives discussed with patient. Pt was given an after visit summary which includes diagnoses, current medications & vitals. Pt expressed understanding with the diagnosis and plan.

## 2018-08-29 NOTE — MR AVS SNAPSHOT
[address] [facility] [address] [place] [address] 
[phone] Patient: [facility] MRN: [id number] :1935 Visit Information Date & Time Provider Department Dept. Phone Encounter #  
 2018  1:45 PM Dillan Burt [facility] Internal Medicine [phone] [id number] Your Appointments 10/24/2018  2:20 PM  
New Patient with Chloe Corona PsyD [address] [facility] [address] ([address]) Appt Note: new pt ref by [facility] provider Dr Shama Werner [phone] for early signs of dementia [place]  [address] [facility] 99 [address] [phone]  
  
   
 [place]  [address] [id number] [address] Upcoming Health Maintenance Date Due DTaP/Tdap/Td series (1 - Tdap) 1956 ZOSTER VACCINE AGE 60> 1997 EYE EXAM RETINAL OR DILATED Q1 2017 MICROALBUMIN Q1 2018 GLAUCOMA SCREENING Q2Y 2018 Influenza Age 5 to Adult 2018 FOOT EXAM Q1 2018 MEDICARE YEARLY EXAM 11/10/2018 HEMOGLOBIN A1C Q6M 2018 LIPID PANEL Q1 2019 COLONOSCOPY 2025 Allergies as of 2018  Review Complete On: 2018 By: Kvng Anders MD  
 No Known Allergies Current Immunizations  Reviewed on 2018 Name Date Influenza High Dose Vaccine PF 2017 Influenza Vaccine 2016 Influenza Vaccine (Tri) Adjuvanted  Incomplete Pneumococcal Conjugate (PCV-13) 2016 Pneumococcal Polysaccharide (PPSV-23) 2017 Reviewed by Kvng Anders MD on 2018 at  2:31 PM  
You Were Diagnosed With   
  
 Codes Comments Osteoarthritis of spine with radiculopathy, cervical region    -  Primary ICD-10-CM: M47.22 
ICD-9-CM: 721.0 Type 2 diabetes mellitus without complication, without long-term current use of insulin (HCC)     ICD-10-CM: E11.9 ICD-9-CM: 250.00 Essential hypertension     ICD-10-CM: I10 
ICD-9-CM: 401.9 Mixed hyperlipidemia     ICD-10-CM: E78.2 ICD-9-CM: 272.2 Essential hypertension with goal blood pressure less than 130/85     ICD-10-CM: I10 
ICD-9-CM: 401.9 Other depression     ICD-10-CM: F32.89 ICD-9-CM: 560 Candidal intertrigo     ICD-10-CM: B37.2 ICD-9-CM: 112.3 Encounter for immunization     ICD-10-CM: M00 ICD-9-CM: V03.89 Vitals BP Pulse Temp Resp Height(growth percentile) Weight(growth percentile) 155/79 (BP 1 Location: Left arm, BP Patient Position: Sitting) 72 97.6 °F (36.4 °C) (Oral) 18 5' 7\" (1.702 m) 187 lb (84.8 kg) SpO2 BMI OB Status Smoking Status 98% 29.29 kg/m2 Hysterectomy Never Smoker Vitals History BMI and BSA Data Body Mass Index Body Surface Area  
 29.29 kg/m 2 2 m 2 Preferred Pharmacy Pharmacy Name Phone [facility] [address] [phone] Your Updated Medication List  
  
   
This list is accurate as of 8/29/18  2:31 PM.  Always use your most recent med list.  
  
  
  
  
 acetaminophen 325 mg tablet Commonly known as:  TYLENOL Take 2 Tabs by mouth two (2) times a day. APIDRA SOLOSTAR U-100 INSULIN 100 unit/mL pen Generic drug:  insulin glulisine U-100  
by SubCUTAneous route. 5 units with breakfast and Dinner, 3 units with Lunch Sliding Scale: 150-200 = add 2 units 201-250 = add 4 units 251-300 = add 5 units 301-350 = add 6 units 351 and above = add 8 units and call MD  
  
 aspirin 81 mg chewable tablet Take 1 Tab by mouth daily. baclofen 10 mg tablet Commonly known as:  LIORESAL Take 0.5 Tabs by mouth three (3) times daily as needed. For back pain. furosemide 20 mg tablet Commonly known as:  LASIX Take 1 Tab by mouth daily as needed. For fluid overload, leg swelling. JANUMET 50-1,000 mg per tablet Generic drug:  SITagliptin-metFORMIN  
 Take 1 tablet by mouth two  times daily with meals L. acidoph & paracasei- S therm- Bifido 8 billion cell Cap cap Commonly known as:  ARLEY-Q/RISAQUAD Take 1 Cap by mouth daily. OK to substitute other probiotic.  
  
 losartan 25 mg tablet Commonly known as:  COZAAR Take 1 Tab by mouth daily. meloxicam 15 mg tablet Commonly known as:  MOBIC  
TAKE 1 TABLET BY MOUTH  DAILY  
  
 metoprolol tartrate 100 mg IR tablet Commonly known as:  LOPRESSOR Take 1 Tab by mouth two (2) times a day. nystatin powder Commonly known as:  MYCOSTATIN Apply  to affected area two (2) times a day. PARoxetine 20 mg tablet Commonly known as:  PAXIL Take 1 Tab by mouth daily. raNITIdine 150 mg tablet Commonly known as:  ZANTAC Take 1 Tab by mouth nightly. simvastatin 40 mg tablet Commonly known as:  ZOCOR  
TAKE 1 TABLET BY MOUTH  NIGHTLY  
  
 TENS unit and electrodes Cmpk Use 3 times a day VITAMIN D3 1,000 unit Cap Generic drug:  cholecalciferol Take 1,000 Units by mouth daily. Prescriptions Printed Refills TENS unit and electrodes cmpk 0 Sig: Use 3 times a day Class: Print Prescriptions Sent to Pharmacy Refills  
 baclofen (LIORESAL) 10 mg tablet 2 Sig: Take 0.5 Tabs by mouth three (3) times daily as needed. For back pain. Class: Normal  
 Pharmacy: [address] Ph #: [phone] Route: Oral  
 metoprolol tartrate (LOPRESSOR) 100 mg IR tablet 1 Sig: Take 1 Tab by mouth two (2) times a day. Class: Normal  
 Pharmacy: [address] Ph #: [phone] Route: Oral  
 PARoxetine (PAXIL) 20 mg tablet 1 Sig: Take 1 Tab by mouth daily. Class: Normal  
 Pharmacy: [address] Ph #: [phone]  Route: Oral  
 aspirin 81 mg chewable tablet 4  
 Sig: Take 1 Tab by mouth daily. Class: Normal  
 Pharmacy: [facility] DR KORINA ALLEN 166 [facility], [address] [place] [address] Ph #: [phone] Route: Oral  
 nystatin (MYCOSTATIN) powder 1 Sig: Apply  to affected area two (2) times a day. Class: Normal  
 Pharmacy: [facility] ELROY ALLEN [address] [facility], [address] [place] [address] Ph #: [phone] Route: Topical  
  
We Performed the Following INFLUENZA VACCINE INACTIVATED (IIV), SUBUNIT, ADJUVANTED, IM [id number] CPT(R)] [address] REFERRAL TO PAIN CLINIC [AAF11 Custom] Referral Information Referral ID Referred By Referred To  
  
 [id number] Amalia Carlson Not Available Visits Status Start Date End Date 1 New Request 8/29/18 8/29/19 If your referral has a status of pending review or denied, additional information will be sent to support the outcome of this decision. Referral ID Referred By Referred To  
 [id number] [facility] [address] Visits Status Start Date End Date 1 New Request 8/29/18 8/29/19 If your referral has a status of pending review or denied, additional information will be sent to support the outcome of this decision. Please provide this summary of care documentation to your next provider. Your primary care clinician is listed as Cullen Li. If you have any questions after today's visit, please call [phone].

## 2018-08-29 NOTE — PROGRESS NOTES
Patient identified with 2 ID's, Name and Libia Pichardo is a 82 y.o. female  Chief Complaint   Patient presents with    Hypertension     follow up appointment       1. Have you been to the ER, urgent care clinic since your last visit? Hospitalized since your last visit? Yes admission from  [facility] ED 6-8-18 for acute delirium      2. Have you seen or consulted any other health care providers outside of the [address] [place] [address] Brayan since your last visit? Include any pap smears or colon screening. No     In the event something were to happen to you and you were unable to speak on your behalf, do you have an Advance Directive/ Living Will in place stating your wishes? No      If no, would you like information?  declined    Visit Vitals    /79 (BP 1 Location: Left arm, BP Patient Position: Sitting)    Pulse 72    Temp 97.6 °F (36.4 °C) (Oral)    Resp 18    Ht 5' 7\" (1.702 m)    Wt 187 lb (84.8 kg)    SpO2 98%    BMI 29.29 kg/m2       Medication Reconciliation reviewed with patient on this date    Fall Risk Assessment, last 12 mths 5/30/2018   Able to walk? Yes   Fall in past 12 months? No   Fall with injury? -   Number of falls in past 12 months -   Fall Risk Score -       PHQ over the last two weeks 3/9/2018   Little interest or pleasure in doing things Not at all   Feeling down, depressed, irritable, or hopeless Not at all   Total Score PHQ 2 0       Learning Assessment 5/15/2014   PRIMARY LEARNER Patient   PRIMARY LANGUAGE ENGLISH   LEARNER PREFERENCE PRIMARY READING   ANSWERED BY patient   RELATIONSHIP SELF       Abuse Screening Questionnaire 5/30/2018   Do you ever feel afraid of your partner? N   Are you in a relationship with someone who physically or mentally threatens you? N   Is it safe for you to go home? Y     After obtaining consent, and per orders of Dr. Diana Lux, injection of flu vaccine administered to left deltoid by Debbie Garcia RN.  Patient instructed to remain in clinic for 20 minutes afterwards, and to report any adverse reaction to me immediately. VIS information sheet provided.

## 2018-09-21 DIAGNOSIS — I10 ESSENTIAL HYPERTENSION WITH GOAL BLOOD PRESSURE LESS THAN 130/85: ICD-10-CM

## 2018-09-21 DIAGNOSIS — F32.89 OTHER DEPRESSION: ICD-10-CM

## 2018-09-21 RX ORDER — METOPROLOL TARTRATE 100 MG/1
TABLET ORAL
Qty: 180 TAB | Refills: 1 | Status: SHIPPED | OUTPATIENT
Start: 2018-09-21 | End: 2018-10-29 | Stop reason: SDUPTHER

## 2018-09-24 RX ORDER — FUROSEMIDE 20 MG/1
TABLET ORAL
Qty: 45 TAB | Refills: 3 | Status: SHIPPED | OUTPATIENT
Start: 2018-09-24 | End: 2019-09-15 | Stop reason: SDUPTHER

## 2018-09-24 RX ORDER — PAROXETINE HYDROCHLORIDE 20 MG/1
TABLET, FILM COATED ORAL
Qty: 90 TAB | Refills: 3 | Status: SHIPPED | OUTPATIENT
Start: 2018-09-24 | End: 2019-09-15 | Stop reason: SDUPTHER

## 2018-10-27 DIAGNOSIS — I10 ESSENTIAL HYPERTENSION: ICD-10-CM

## 2018-10-29 ENCOUNTER — HOSPITAL ENCOUNTER (OUTPATIENT)
Dept: GENERAL RADIOLOGY | Age: 83
Discharge: HOME | End: 2018-10-29
Payer: MEDICARE

## 2018-10-29 ENCOUNTER — OFFICE VISIT (OUTPATIENT)
Dept: INTERNAL MEDICINE CLINIC | Age: 83
End: 2018-10-29

## 2018-10-29 ENCOUNTER — TELEPHONE (OUTPATIENT)
Dept: INTERNAL MEDICINE CLINIC | Age: 83
End: 2018-10-29

## 2018-10-29 ENCOUNTER — HOME HEALTH ADMISSION (OUTPATIENT)
Dept: HOME HEALTH SERVICES | Facility: HOME HEALTH | Age: 83
End: 2018-10-29
Payer: MEDICARE

## 2018-10-29 VITALS
RESPIRATION RATE: 15 BRPM | WEIGHT: 190.4 LBS | HEART RATE: 65 BPM | SYSTOLIC BLOOD PRESSURE: 164 MMHG | BODY MASS INDEX: 29.88 KG/M2 | OXYGEN SATURATION: 96 % | DIASTOLIC BLOOD PRESSURE: 83 MMHG | HEIGHT: 67 IN

## 2018-10-29 DIAGNOSIS — E11.9 TYPE 2 DIABETES MELLITUS WITHOUT COMPLICATION, WITHOUT LONG-TERM CURRENT USE OF INSULIN (HCC): Primary | ICD-10-CM

## 2018-10-29 DIAGNOSIS — Z86.73 H/O: STROKE: ICD-10-CM

## 2018-10-29 DIAGNOSIS — G89.29 CHRONIC BILATERAL LOW BACK PAIN WITHOUT SCIATICA: ICD-10-CM

## 2018-10-29 DIAGNOSIS — M54.50 CHRONIC BILATERAL LOW BACK PAIN WITHOUT SCIATICA: ICD-10-CM

## 2018-10-29 DIAGNOSIS — Z71.89 ACP (ADVANCE CARE PLANNING): ICD-10-CM

## 2018-10-29 DIAGNOSIS — I10 ESSENTIAL HYPERTENSION: ICD-10-CM

## 2018-10-29 DIAGNOSIS — E55.9 VITAMIN D DEFICIENCY: ICD-10-CM

## 2018-10-29 DIAGNOSIS — F32.A DEPRESSION, UNSPECIFIED DEPRESSION TYPE: ICD-10-CM

## 2018-10-29 DIAGNOSIS — Z00.00 MEDICARE ANNUAL WELLNESS VISIT, SUBSEQUENT: ICD-10-CM

## 2018-10-29 DIAGNOSIS — E78.2 MIXED HYPERLIPIDEMIA: ICD-10-CM

## 2018-10-29 PROCEDURE — 72100 X-RAY EXAM L-S SPINE 2/3 VWS: CPT

## 2018-10-29 RX ORDER — TRAMADOL HYDROCHLORIDE 50 MG/1
50 TABLET ORAL
Qty: 30 TAB | Refills: 0 | Status: SHIPPED | OUTPATIENT
Start: 2018-10-29 | End: 2018-12-10 | Stop reason: SDUPTHER

## 2018-10-29 RX ORDER — LOSARTAN POTASSIUM 25 MG/1
TABLET ORAL
Qty: 90 TAB | Refills: 1 | Status: SHIPPED | OUTPATIENT
Start: 2018-10-29 | End: 2019-01-30 | Stop reason: SDUPTHER

## 2018-10-29 NOTE — TELEPHONE ENCOUNTER
Stacy Huerta called from [facility] home care. They received the order for Home health but needs toknow what she needs. PT? OT?  Please resend with specifics

## 2018-10-29 NOTE — PROGRESS NOTES
HISTORY OF PRESENT ILLNESS Breana Golden is a 82 y.o. female here for follow up. Report lower back pain for long time. Meloxicam is not helping her at all. She is taking Tylenol twice a day, not helping her at all. No bowel bladder problem. Did not see a back specialist yet. Has gait and balance problem, would like to see a therapist. 
 
She is seen for diabetes. David Perez He reports medication compliance: compliant all of the time. Diabetic diet compliance: compliant most of the time. Home glucose monitoring: is not performed. Further diabetic ROS: no polyuria or polydipsia, no chest pain, dyspnea or TIA's, no numbness, tingling or pain in extremities, no hypoglycemia. Lab review: labs reviewed, I note that glycosylated hemoglobin normallabs reviewed and discussed with patient. Eye exam:up to date. Has hypertension, compliant with medication. No chest pain palpitation or shortness of breath. Here for Medicare wellness visit. Has no living will. Hypertension Pertinent negatives include no chest pain, no blurred vision and no nausea. Back Pain Pertinent negatives include no chest pain and no fever. Diabetes Pertinent negatives include no chest pain. Cholesterol Problem Pertinent negatives include no chest pain. Review of Systems Constitutional: Negative. Negative for chills and fever. HENT: Negative. Eyes: Negative. Negative for blurred vision and double vision. Respiratory: Negative. Cardiovascular: Negative. Negative for chest pain. Gastrointestinal: Negative for heartburn and nausea. Musculoskeletal: Positive for back pain. Negative for falls. Skin: Negative. Neurological: Negative. Psychiatric/Behavioral: Negative. Physical Exam  
Constitutional: She appears well-developed and well-nourished. No distress. Neck: Normal range of motion. Neck supple. No JVD present. No thyromegaly present. Cardiovascular: Normal rate, regular rhythm, normal heart sounds and intact distal pulses. Pulmonary/Chest: Effort normal. No respiratory distress. She has no wheezes. Musculoskeletal: She exhibits tenderness. Neck: Spine nontender paravertebral muscle spasm present. Range of motion restricted. Clerance Ricarolyn Neurological: She displays normal reflexes. She exhibits normal muscle tone. Coordination normal.  
Skin:  
Bilateral groin. Mild to moderate Candida infection present. Itchy. Psychiatric: She has a normal mood and affect. Her behavior is normal.  
 
 
ASSESSMENT and PLAN Diagnoses and all orders for this visit: 
 
1. Type 2 diabetes mellitus without complication, without long-term current use of insulin ([address] On Janumet, stable. Will check, 
-     METABOLIC PANEL, COMPREHENSIVE 
-     HEMOGLOBIN A1C WITH EAG 2. Essential hypertension Stable with current regimen, will continue same. Will check, 
-     METABOLIC PANEL, COMPREHENSIVE 3. Mixed hyperlipidemia On Zocor, stable. 4. H/O: stroke On aspirin and statin. Stable. 5. Depression, unspecified depression type Under control, taking Paxil. 6. Chronic bilateral low back pain without sciatica #30 no refill. 
-     XR SPINE LUMB 2 OR 3 V; Future 
-     REFERRAL TO HOME HEALTH 
-     REFERRAL TO ORTHOPEDICS 
-     traMADol (ULTRAM) 50 mg tablet; Take 1 Tab by mouth two (2) times daily as needed for Pain. Max Daily Amount: 100 mg. #30 no refill. 7. Vitamin D deficiency 
-     VITAMIN D, 25 HYDROXY 8. Medicare annual wellness visit, subsequent Discussed expected course/resolution/complications of diagnosis in detail with patient. Medication risks/benefits/costs/interactions/alternatives discussed with patient. Pt was given an after visit summary which includes diagnoses, current medications & vitals. Pt expressed understanding with the diagnosis and plan.

## 2018-10-29 NOTE — PATIENT INSTRUCTIONS
Schedule of Personalized Health Plan (Provide Copy to Patient) The best way to stay healthy is to live a healthy lifestyle. A healthy lifestyle includes regular exercise, eating a well-balanced diet, keeping a healthy weight and not smoking. Regular physical exams and screening tests are another important way to take care of yourself. Preventive exams provided by health care providers can find health problems early when treatment works best and can keep you from getting certain diseases or illnesses. Preventive services include exams, lab tests, screenings, shots, monitoring and information to help you take care of your own health. All people over 65 should have a pneumonia shot. Pneumonia shots are usually only needed once in a lifetime unless your doctor decides differently. All people over 65 should have a yearly flu shot. up to date People over 65 are at medium to high risk for Hepatitis B. Three shots are needed for complete protection. In addition to your physical exam, some screening tests are recommended: 
 
Bone mass measurement (dexa scan) is recommended every two years. up to date Diabetes Mellitus screening is recommended every year. up to date Glaucoma is an eye disease caused by high pressure in the eye. An eye exam is recommended every year. up to date. Cardiovascular screening tests that check your cholesterol and other blood fat (lipid) levels are recommended every five years. Colorectal Cancer screening tests help to find pre-cancerous polyps (growths in the colon) so they can be removed before they turn into cancer. Tests ordered for screening depend on your personal and family history risk factors. N/A Screening for Breast Cancer is recommended yearly with a mammogram.N/A Screening for Cervical Cancer is recommended every two years (annually for certain risk factors, such as previous history of STD or abnormal PAP in past 7 years), with a Pelvic Exam with PAP. N/A 
 
 Here is a list of your current Health Maintenance items with a due date: 
Health Maintenance Topic Date Due  
 DTaP/Tdap/Td series (1 - Tdap) 08/13/1958  Shingrix Vaccine Age 50> (1 of 2) 08/13/1987  
 EYE EXAM RETINAL OR DILATED Q1  07/25/2017  MICROALBUMIN Q1  05/09/2018  GLAUCOMA SCREENING Q2Y  07/25/2018  
 FOOT EXAM Q1  11/09/2018  MEDICARE YEARLY EXAM  11/10/2018  HEMOGLOBIN A1C Q6M  12/09/2018  LIPID PANEL Q1  06/09/2019  COLONOSCOPY  04/17/2025  Bone Densitometry (Dexa) Screening  Completed  Pneumococcal 65+ Low/Medium Risk  Completed  Influenza Age 5 to Adult  Completed

## 2018-10-29 NOTE — PROGRESS NOTES
Health Maintenance Due Topic Date Due  
 DTaP/Tdap/Td series (1 - Tdap) 08/13/1958  Shingrix Vaccine Age 50> (1 of 2) 08/13/1987  
 EYE EXAM RETINAL OR DILATED Q1  07/25/2017  MICROALBUMIN Q1  05/09/2018  GLAUCOMA SCREENING Q2Y  07/25/2018  
 FOOT EXAM Q1  11/09/2018 Chief Complaint Patient presents with  Hypertension  Cholesterol Problem  Diabetes [facility] Annual Wellness Visit 1. Have you been to the ER, urgent care clinic since your last visit? Hospitalized since your last visit? Yes When: 6/4/18 &6/8/18 [address] [place] [address] 2. Have you seen or consulted any other health care providers outside of the [address] [place] [address] since your last visit? Include any pap smears or colon screening. No 
 
3) Do you have an Advance Directive on file? no 
 
4) Are you interested in receiving information on Advance Directives? NO Patient is accompanied by daughter-in-law I have received verbal consent from [facility] to discuss any/all medical information while they are present in the room.

## 2018-10-29 NOTE — PROGRESS NOTES
Genaro Madrigal is a 82 y.o. female and presents for annual Medicare Wellness Visit. Problem List: Reviewed with patient and discussed risk factors. Patient Active Problem List  
Diagnosis Code  DM (diabetes mellitus) ([address] E11.9  Mixed hyperlipidemia E78.2  Chronic venous insufficiency I87.2  Depression F32.9  Stomach ulcer K25.9  Diverticula of colon K57.30  Advance care planning Z71.89  
 Essential hypertension I10  
 H/O: stroke Z80.78  
 Type 2 diabetes with nephropathy (HCC) E11.21  
 Altered mental status R41.82  
 Acute delirium R41.0  Mild depression (HCC) F32.0 Current medical providers:  Patient Care Team: 
Dorothy Morillo MD as PCP - General (Internal Medicine) Dorothy Morillo MD (Internal Medicine) Jeane Arce MD as Consulting Provider (Internal Medicine) Grazyna Garcia DPM (Podiatry) Morgan Fontenot MD (Gastroenterology) Georgie Lewis DO (Ophthalmology) PSH: Reviewed with patient Past Surgical History:  
Procedure Laterality Date  HX CARPAL TUNNEL RELEASE  1990  
 HX CATARACT REMOVAL    
 bilateral  
 HX HYSTERECTOMY  1986  HX HYSTERECTOMY  HX ORTHOPAEDIC    
 HX ORTHOPAEDIC    
 carpel tunnel left  HX ORTHOPAEDIC    
 left foot heel spur  HX REFRACTIVE SURGERY  2006 SH: Reviewed with patient Social History Tobacco Use  Smoking status: Never Smoker  Smokeless tobacco: Never Used Substance Use Topics  Alcohol use: No  
 Drug use: No  
 
 
FH: Reviewed with patient Family History Adopted: Yes Medications/Allergies: Reviewed with patient Current Outpatient Medications on File Prior to Visit Medication Sig Dispense Refill  losartan (COZAAR) 25 mg tablet TAKE 1 TABLET BY MOUTH DAILY 90 Tab 1  
 PARoxetine (PAXIL) 20 mg tablet TAKE 1 TABLET BY MOUTH  EVERY NIGHT AT BEDTIME 90 Tab 3  
 TENS unit and electrodes cmpk Use 3 times a day 1 Each 0  
  baclofen (LIORESAL) 10 mg tablet Take 0.5 Tabs by mouth three (3) times daily as needed. For back pain. 30 Tab 2  
 metoprolol tartrate (LOPRESSOR) 100 mg IR tablet Take 1 Tab by mouth two (2) times a day. 180 Tab 1  
 aspirin 81 mg chewable tablet Take 1 Tab by mouth daily. 90 Tab 4  
 nystatin (MYCOSTATIN) powder Apply  to affected area two (2) times a day. 1 Bottle 1  
 simvastatin (ZOCOR) 40 mg tablet TAKE 1 TABLET BY MOUTH  NIGHTLY 90 Tab 1  
 L. acidoph & paracasei- S therm- Bifido (ARLEY-Q/RISAQUAD) 8 billion cell cap cap Take 1 Cap by mouth daily. OK to substitute other probiotic. 30 Cap 0  
 meloxicam (MOBIC) 15 mg tablet TAKE 1 TABLET BY MOUTH  DAILY 90 Tab 1  
 acetaminophen (TYLENOL) 325 mg tablet Take 2 Tabs by mouth two (2) times a day. 120 Tab 5  
 raNITIdine (ZANTAC) 150 mg tablet Take 1 Tab by mouth nightly. (Patient taking differently: Take 150 mg by mouth daily.) 90 Tab 3  JANUMET 50-1,000 mg per tablet Take 1 tablet by mouth two  times daily with meals 180 Tab 5  cholecalciferol (VITAMIN D3) 1,000 unit cap Take 1,000 Units by mouth daily.  furosemide (LASIX) 20 mg tablet TAKE 1 TABLET BY MOUTH  EVERY OTHER DAY (Patient not taking: Reported on 10/29/2018) 45 Tab 3 No current facility-administered medications on file prior to visit. No Known Allergies Objective: 
Visit Vitals /83 (BP 1 Location: Left arm, BP Patient Position: Sitting) Pulse 65 Resp 15 Ht 5' 7\" (1.702 m) Wt 190 lb 6.4 oz (86.4 kg) SpO2 96% BMI 29.82 kg/m² Body mass index is 29.82 kg/m². Assessment of cognitive impairment: Alert and oriented x 3 Depression Screen: PHQ over the last two weeks 10/29/2018 Little interest or pleasure in doing things Not at all Feeling down, depressed, irritable, or hopeless Not at all Total Score PHQ 2 0 Fall Risk Assessment:   
Fall Risk Assessment, last 12 mths 10/29/2018 Able to walk? Yes Fall in past 12 months?  Yes  
 Fall with injury? No  
Number of falls in past 12 months 2 Fall Risk Score 2 Functional Ability:  
Does the patient exhibit a steady gait?  no How long did it take the patient to get up and walk from a sitting position? 30 sec Is the patient self reliant?  (ie can do own laundry, meals, household chores)  no Does the patient handle his/her own medications? yes Does the patient handle his/her own money? yes Is the patients home safe (ie good lighting, handrails on stairs and bath, etc.)? yes Did you notice or did patient express any hearing difficulties? no 
  
Did you notice or did patient express any vision difficulties?   no 
  
Were distance and reading eye charts used? no 
  
 
Advance Care Planning:  
Patient was offered the opportunity to discuss advance care planning:  yes Does patient have an Advance Directive:  no If no, did you provide information on Caring Connections? yes Plan:   
 
Orders Placed This Encounter  XR SPINE LUMB 2 OR 3 V  
 METABOLIC PANEL, COMPREHENSIVE  VITAMIN D, [address]  REFERRAL TO ORTHOPEDICS  traMADol (ULTRAM) 50 mg tablet Health Maintenance Topic Date Due  
 DTaP/Tdap/Td series (1 - Tdap) 08/13/1958  Shingrix Vaccine Age 50> (1 of 2) 08/13/1987  
 EYE EXAM RETINAL OR DILATED Q1  07/25/2017  MICROALBUMIN Q1  05/09/2018  GLAUCOMA SCREENING Q2Y  07/25/2018  
 FOOT EXAM Q1  11/09/2018  MEDICARE YEARLY EXAM  11/10/2018  HEMOGLOBIN A1C Q6M  12/09/2018  LIPID PANEL Q1  06/09/2019  COLONOSCOPY  04/17/2025  Bone Densitometry (Dexa) Screening  Completed  Pneumococcal 65+ Low/Medium Risk  Completed  Influenza Age 5 to Adult  Completed *Patient verbalized understanding and agreement with the plan. A copy of the After Visit Summary with personalized health plan was given to the patient today.

## 2018-10-30 ENCOUNTER — HOME CARE VISIT (OUTPATIENT)
Dept: SCHEDULING | Facility: HOME HEALTH | Age: 83
End: 2018-10-30
Payer: MEDICARE

## 2018-10-30 VITALS
DIASTOLIC BLOOD PRESSURE: 78 MMHG | RESPIRATION RATE: 17 BRPM | OXYGEN SATURATION: 96 % | WEIGHT: 191 LBS | BODY MASS INDEX: 29.98 KG/M2 | HEIGHT: 67 IN | SYSTOLIC BLOOD PRESSURE: 140 MMHG | HEART RATE: 68 BPM | TEMPERATURE: 98.2 F

## 2018-10-30 LAB
25(OH)D3+25(OH)D2 SERPL-MCNC: 27.7 NG/ML (ref 30–100)
ALBUMIN SERPL-MCNC: 4.2 G/DL (ref 3.5–4.7)
ALBUMIN/GLOB SERPL: 1.6 {RATIO} (ref 1.2–2.2)
ALP SERPL-CCNC: 45 IU/L (ref 39–117)
ALT SERPL-CCNC: 9 IU/L (ref 0–32)
AST SERPL-CCNC: 24 IU/L (ref 0–40)
BILIRUB SERPL-MCNC: 0.3 MG/DL (ref 0–1.2)
BUN SERPL-MCNC: 17 MG/DL (ref 8–27)
BUN/CREAT SERPL: 15 (ref 12–28)
CALCIUM SERPL-MCNC: 8.7 MG/DL (ref 8.7–10.3)
CHLORIDE SERPL-SCNC: 102 MMOL/L (ref 96–106)
CO2 SERPL-SCNC: 24 MMOL/L (ref 20–29)
CREAT SERPL-MCNC: 1.12 MG/DL (ref 0.57–1)
EST. AVERAGE GLUCOSE BLD GHB EST-MCNC: 146 MG/DL
GLOBULIN SER CALC-MCNC: 2.6 G/DL (ref 1.5–4.5)
GLUCOSE SERPL-MCNC: 112 MG/DL (ref 65–99)
HBA1C MFR BLD: 6.7 % (ref 4.8–5.6)
INTERPRETATION: NORMAL
Lab: NORMAL
POTASSIUM SERPL-SCNC: 4.5 MMOL/L (ref 3.5–5.2)
PROT SERPL-MCNC: 6.8 G/DL (ref 6–8.5)
SODIUM SERPL-SCNC: 141 MMOL/L (ref 134–144)

## 2018-10-30 PROCEDURE — G0151 HHCP-SERV OF PT,EA 15 MIN: HCPCS

## 2018-10-30 PROCEDURE — 400013 HH SOC

## 2018-11-01 ENCOUNTER — HOME CARE VISIT (OUTPATIENT)
Dept: SCHEDULING | Facility: HOME HEALTH | Age: 83
End: 2018-11-01
Payer: MEDICARE

## 2018-11-01 PROCEDURE — G0157 HHC PT ASSISTANT EA 15: HCPCS

## 2018-11-02 VITALS
HEART RATE: 70 BPM | OXYGEN SATURATION: 92 % | SYSTOLIC BLOOD PRESSURE: 131 MMHG | DIASTOLIC BLOOD PRESSURE: 86 MMHG | TEMPERATURE: 98.8 F | RESPIRATION RATE: 18 BRPM

## 2018-11-06 ENCOUNTER — HOME CARE VISIT (OUTPATIENT)
Dept: SCHEDULING | Facility: HOME HEALTH | Age: 83
End: 2018-11-06
Payer: MEDICARE

## 2018-11-06 VITALS
OXYGEN SATURATION: 97 % | TEMPERATURE: 99.9 F | DIASTOLIC BLOOD PRESSURE: 94 MMHG | HEART RATE: 54 BPM | SYSTOLIC BLOOD PRESSURE: 148 MMHG | RESPIRATION RATE: 18 BRPM

## 2018-11-06 PROCEDURE — G0157 HHC PT ASSISTANT EA 15: HCPCS

## 2018-11-08 ENCOUNTER — HOME CARE VISIT (OUTPATIENT)
Dept: SCHEDULING | Facility: HOME HEALTH | Age: 83
End: 2018-11-08
Payer: MEDICARE

## 2018-11-08 ENCOUNTER — DOCUMENTATION ONLY (OUTPATIENT)
Dept: INTERNAL MEDICINE CLINIC | Age: 83
End: 2018-11-08

## 2018-11-08 VITALS
RESPIRATION RATE: 18 BRPM | HEART RATE: 67 BPM | SYSTOLIC BLOOD PRESSURE: 145 MMHG | DIASTOLIC BLOOD PRESSURE: 86 MMHG | OXYGEN SATURATION: 97 % | TEMPERATURE: 99.9 F

## 2018-11-08 PROCEDURE — G0157 HHC PT ASSISTANT EA 15: HCPCS

## 2018-11-13 ENCOUNTER — HOME CARE VISIT (OUTPATIENT)
Dept: SCHEDULING | Facility: HOME HEALTH | Age: 83
End: 2018-11-13
Payer: MEDICARE

## 2018-11-13 PROCEDURE — G0157 HHC PT ASSISTANT EA 15: HCPCS

## 2018-11-14 VITALS
TEMPERATURE: 99.6 F | DIASTOLIC BLOOD PRESSURE: 76 MMHG | OXYGEN SATURATION: 98 % | HEART RATE: 67 BPM | RESPIRATION RATE: 18 BRPM | SYSTOLIC BLOOD PRESSURE: 137 MMHG

## 2018-11-15 ENCOUNTER — HOME CARE VISIT (OUTPATIENT)
Dept: SCHEDULING | Facility: HOME HEALTH | Age: 83
End: 2018-11-15
Payer: MEDICARE

## 2018-11-15 PROCEDURE — G0157 HHC PT ASSISTANT EA 15: HCPCS

## 2018-11-16 VITALS
SYSTOLIC BLOOD PRESSURE: 148 MMHG | TEMPERATURE: 98.1 F | DIASTOLIC BLOOD PRESSURE: 92 MMHG | OXYGEN SATURATION: 97 % | HEART RATE: 79 BPM

## 2018-11-19 ENCOUNTER — HOME CARE VISIT (OUTPATIENT)
Dept: SCHEDULING | Facility: HOME HEALTH | Age: 83
End: 2018-11-19
Payer: MEDICARE

## 2018-11-19 PROCEDURE — G0151 HHCP-SERV OF PT,EA 15 MIN: HCPCS

## 2018-11-21 VITALS — DIASTOLIC BLOOD PRESSURE: 80 MMHG | OXYGEN SATURATION: 98 % | SYSTOLIC BLOOD PRESSURE: 132 MMHG | HEART RATE: 78 BPM

## 2018-12-10 DIAGNOSIS — G89.29 CHRONIC BILATERAL LOW BACK PAIN WITHOUT SCIATICA: ICD-10-CM

## 2018-12-10 DIAGNOSIS — M54.50 CHRONIC BILATERAL LOW BACK PAIN WITHOUT SCIATICA: ICD-10-CM

## 2018-12-10 RX ORDER — TRAMADOL HYDROCHLORIDE 50 MG/1
TABLET ORAL
Qty: 30 TAB | Refills: 0 | Status: SHIPPED | OUTPATIENT
Start: 2018-12-10 | End: 2019-02-17 | Stop reason: SDUPTHER

## 2018-12-23 DIAGNOSIS — M17.0 PRIMARY OSTEOARTHRITIS OF BOTH KNEES: ICD-10-CM

## 2018-12-24 RX ORDER — MELOXICAM 15 MG/1
TABLET ORAL
Qty: 90 TAB | Refills: 1 | Status: SHIPPED | OUTPATIENT
Start: 2018-12-24 | End: 2019-07-14 | Stop reason: SDUPTHER

## 2019-01-20 DIAGNOSIS — E78.2 MIXED HYPERLIPIDEMIA: ICD-10-CM

## 2019-01-21 RX ORDER — SIMVASTATIN 40 MG/1
TABLET, FILM COATED ORAL
Qty: 90 TAB | Refills: 1 | Status: SHIPPED | OUTPATIENT
Start: 2019-01-21 | End: 2019-08-07 | Stop reason: SDUPTHER

## 2019-01-30 ENCOUNTER — OFFICE VISIT (OUTPATIENT)
Dept: INTERNAL MEDICINE CLINIC | Age: 84
End: 2019-01-30

## 2019-01-30 VITALS
HEIGHT: 67 IN | OXYGEN SATURATION: 95 % | SYSTOLIC BLOOD PRESSURE: 172 MMHG | HEART RATE: 72 BPM | DIASTOLIC BLOOD PRESSURE: 87 MMHG | WEIGHT: 182 LBS | TEMPERATURE: 97.3 F | BODY MASS INDEX: 28.56 KG/M2 | RESPIRATION RATE: 18 BRPM

## 2019-01-30 DIAGNOSIS — E66.3 OVERWEIGHT: ICD-10-CM

## 2019-01-30 DIAGNOSIS — I10 ESSENTIAL HYPERTENSION: Primary | ICD-10-CM

## 2019-01-30 RX ORDER — LOSARTAN POTASSIUM 50 MG/1
50 TABLET ORAL DAILY
Qty: 30 TAB | Refills: 0 | Status: SHIPPED | OUTPATIENT
Start: 2019-01-30 | End: 2019-03-16 | Stop reason: SDUPTHER

## 2019-01-30 NOTE — PROGRESS NOTES
Health Maintenance Due Topic Date Due  
 DTaP/Tdap/Td series (1 - Tdap) 08/13/1958  Shingrix Vaccine Age 50> (1 of 2) 08/13/1987  MICROALBUMIN Q1  05/09/2018  GLAUCOMA SCREENING Q2Y  07/25/2018  
 EYE EXAM RETINAL OR DILATED  07/25/2018  
 FOOT EXAM Q1  11/09/2018 Chief Complaint Patient presents with  Hypertension  Head Injury 1. Have you been to the ER, urgent care clinic since your last visit? Hospitalized since your last visit? No 
 
2. Have you seen or consulted any other health care providers outside of the [address] [place] [address] since your last visit? Include any pap smears or colon screening. No 
 
3) Do you have an Advance Directive on file? no 
 
4) Are you interested in receiving information on Advance Directives? NO Patient is accompanied by self I have received verbal consent from [facility] to discuss any/all medical information while they are present in the room.

## 2019-01-31 NOTE — PROGRESS NOTES
Subjective: Chief Complaint Patient presents with  Hypertension  Head Injury History of Present Illness Jerald Leo is a 82y.o. year old female who is a patient of Dr. Jae Ortega that presents today with her daughter for complaints of elevated BP. She has a hx of HTN and is taking Losartan and Metoprolol. She reports compliance with her medications. She states for the past several days she has been having some discomfort in her head, so her daughter began checking her BP. Since Sunday her BP readings have all be higher than 170/85. Some as high as 200/100. Her BP is also elevated in office today. She denies any other associated symptoms. No other new complaints at this time. NAD. No CP, SOB, GI, or  symptoms. Reviewed medications, recent lab work and imaging with patient. Pt reports compliance with medications. Current Outpatient Medications on File Prior to Visit Medication Sig Dispense Refill  simvastatin (ZOCOR) 40 mg tablet TAKE 1 TABLET BY MOUTH NIGHTLY 90 Tab 1  
 meloxicam (MOBIC) 15 mg tablet TAKE 1 TABLET BY MOUTH  DAILY 90 Tab 1  
 traMADol (ULTRAM) 50 mg tablet TAKE 1 TABLET BY MOUTH TWICE DAILY AS NEEDED FOR PAIN MAX  DAILY  AMOUNT  100  MG. 30 Tab 0  
 furosemide (LASIX) 20 mg tablet TAKE 1 TABLET BY MOUTH  EVERY OTHER DAY 45 Tab 3  
 PARoxetine (PAXIL) 20 mg tablet TAKE 1 TABLET BY MOUTH  EVERY NIGHT AT BEDTIME 90 Tab 3  
 baclofen (LIORESAL) 10 mg tablet Take 0.5 Tabs by mouth three (3) times daily as needed. For back pain. 30 Tab 2  
 metoprolol tartrate (LOPRESSOR) 100 mg IR tablet Take 1 Tab by mouth two (2) times a day. 180 Tab 1  
 aspirin 81 mg chewable tablet Take 1 Tab by mouth daily. 90 Tab 4  
 acetaminophen (TYLENOL) 325 mg tablet Take 2 Tabs by mouth two (2) times a day. 120 Tab 5  
 raNITIdine (ZANTAC) 150 mg tablet Take 1 Tab by mouth nightly.  (Patient taking differently: Take 150 mg by mouth daily.) 90 Tab 3  
  JANUMET 50-1,000 mg per tablet Take 1 tablet by mouth two  times daily with meals 180 Tab 5  cholecalciferol (VITAMIN D3) 1,000 unit cap Take 1,000 Units by mouth daily. No current facility-administered medications on file prior to visit. No Known Allergies Past Medical History:  
Diagnosis Date  Arthritis  Chronic pain  Depression  Diabetes ([facility]  Hypercholesterolemia  Hypertension  Stroke ([facility] TIA 10 yrs back  [facility] Past Surgical History:  
Procedure Laterality Date  HX CARPAL TUNNEL RELEASE  1990  
 HX CATARACT REMOVAL    
 bilateral  
 HX HYSTERECTOMY  1986  HX HYSTERECTOMY  HX ORTHOPAEDIC    
 HX ORTHOPAEDIC    
 carpel tunnel left  HX ORTHOPAEDIC    
 left foot heel spur  HX REFRACTIVE SURGERY  2006 Social History Tobacco Use  Smoking status: Never Smoker  Smokeless tobacco: Never Used Substance Use Topics  Alcohol use: No  
 Drug use: No  
  
Family History Adopted: Yes Objective:  
 
Vitals:  
 01/30/19 1438 BP: 172/87 Pulse: 72 Resp: 18 Temp: 97.3 °F (36.3 °C) TempSrc: Oral  
SpO2: 95% Weight: 182 lb (82.6 kg) Height: 5' 7\" (1.702 m) Review of Systems Constitutional: Negative. HENT: Negative. Eyes: Negative. Respiratory: Negative. Cardiovascular: Negative. Gastrointestinal: Negative. Genitourinary: Negative. Musculoskeletal: Negative. Skin: Negative. Neurological: Positive for headaches. Psychiatric/Behavioral: Negative. Physical Exam  
Constitutional: She is oriented to person, place, and time. She appears well-developed and well-nourished. Pleasant elderly AA female. NAD HENT:  
Head: Normocephalic and atraumatic. Eyes: Conjunctivae and EOM are normal. Pupils are equal, round, and reactive to light. Neck: Normal range of motion. Neck supple. Cardiovascular: Normal rate, regular rhythm and normal heart sounds. Pulmonary/Chest: Effort normal and breath sounds normal. No respiratory distress. She has no wheezes. Abdominal: She exhibits no distension. There is no tenderness. Musculoskeletal: She exhibits no edema, tenderness or deformity. In wheelchair Neurological: She is alert and oriented to person, place, and time. Skin: Skin is warm and dry. No rash noted. No erythema. No pallor. Psychiatric: She has a normal mood and affect. Her behavior is normal.  
Nursing note and vitals reviewed. Assessment/ Plan:  
Diagnoses and all orders for this visit: 1. Essential hypertension Will increase 
-     losartan (COZAAR) 50 mg tablet; Take 1 Tab by mouth daily. 2. Overweight Addressed weight, diet and exercise with patient. Decrease carbohydrates (white foods, sweet foods, sweet drinks and alcohol), increase green leafy vegetables and protein (lean meats and beans) with each meal. Avoid fried foods. Eat 3-5 small meals daily. Do not skip meals. Increase water intake. Increase physical activity to 30 minutes daily for health benefit or 60 minutes daily to prevent weight regain, as tolerated. Get 7-8 hours uninterrupted sleep nightly. Patient's plan of care has been reviewed with them. Patient and/or family have verbally conveyed their understanding and agreement of the patient's signs, symptoms, diagnosis, treatment and prognosis and additionally agree to follow up as recommended or return to [facility] should their condition change prior to follow-up. Discharge instructions have also been provided to the patient with some educational information regarding their diagnosis as well a list of reasons why they would want to return to the office prior to their follow-up appointment should their condition change. Follow-up with Dr. Dakota Joseph as scheduled in February.

## 2019-01-31 NOTE — PATIENT INSTRUCTIONS
DASH Diet: Care Instructions Your Care Instructions The DASH diet is an eating plan that can help lower your blood pressure. DASH stands for Dietary Approaches to Stop Hypertension. Hypertension is high blood pressure. The DASH diet focuses on eating foods that are high in calcium, potassium, and magnesium. These nutrients can lower blood pressure. The foods that are highest in these nutrients are fruits, vegetables, low-fat dairy products, nuts, seeds, and legumes. But taking calcium, potassium, and magnesium supplements instead of eating foods that are high in those nutrients does not have the same effect. The DASH diet also includes whole grains, fish, and poultry. The DASH diet is one of several lifestyle changes your doctor may recommend to lower your high blood pressure. Your doctor may also want you to decrease the amount of sodium in your diet. Lowering sodium while following the DASH diet can lower blood pressure even further than just the DASH diet alone. Follow-up care is a key part of your treatment and safety. Be sure to make and go to all appointments, and call your doctor if you are having problems. It's also a good idea to know your test results and keep a list of the medicines you take. How can you care for yourself at home? Following the DASH diet · Eat 4 to 5 servings of fruit each day. A serving is 1 medium-sized piece of fruit, ½ cup chopped or canned fruit, 1/4 cup dried fruit, or 4 ounces (½ cup) of fruit juice. Choose fruit more often than fruit juice. · Eat 4 to 5 servings of vegetables each day. A serving is 1 cup of lettuce or raw leafy vegetables, ½ cup of chopped or cooked vegetables, or 4 ounces (½ cup) of vegetable juice. Choose vegetables more often than vegetable juice. · Get 2 to 3 servings of low-fat and fat-free dairy each day. A serving is 8 ounces of milk, 1 cup of yogurt, or 1 ½ ounces of cheese. · Eat 6 to 8 servings of grains each day. A serving is 1 slice of bread, 1 ounce of dry cereal, or ½ cup of cooked rice, pasta, or cooked cereal. Try to choose whole-grain products as much as possible. · Limit lean meat, poultry, and fish to 2 servings each day. A serving is 3 ounces, about the size of a deck of cards. · Eat 4 to 5 servings of nuts, seeds, and legumes (cooked dried beans, lentils, and split peas) each week. A serving is 1/3 cup of nuts, 2 tablespoons of seeds, or ½ cup of cooked beans or peas. · Limit fats and oils to 2 to 3 servings each day. A serving is 1 teaspoon of vegetable oil or 2 tablespoons of salad dressing. · Limit sweets and added sugars to 5 servings or less a week. A serving is 1 tablespoon jelly or jam, ½ cup sorbet, or 1 cup of lemonade. · Eat less than 2,300 milligrams (mg) of sodium a day. If you limit your sodium to 1,500 mg a day, you can lower your blood pressure even more. Tips for success · Start small. Do not try to make dramatic changes to your diet all at once. You might feel that you are missing out on your favorite foods and then be more likely to not follow the plan. Make small changes, and stick with them. Once those changes become habit, add a few more changes. · Try some of the following: ? Make it a goal to eat a fruit or vegetable at every meal and at snacks. This will make it easy to get the recommended amount of fruits and vegetables each day. ? Try yogurt topped with fruit and nuts for a snack or healthy dessert. ? Add lettuce, tomato, cucumber, and onion to sandwiches. ? Combine a ready-made pizza crust with low-fat mozzarella cheese and lots of vegetable toppings. Try using tomatoes, squash, spinach, broccoli, carrots, cauliflower, and onions. ? Have a variety of cut-up vegetables with a low-fat dip as an appetizer instead of chips and dip. ? Sprinkle sunflower seeds or chopped almonds over salads.  Or try adding chopped walnuts or almonds to cooked vegetables. ? Try some vegetarian meals using beans and peas. Add garbanzo or kidney beans to salads. Make burritos and tacos with mashed sampson beans or black beans. Where can you learn more? Go to http://karolina-thee.info/. Enter V052 in the search box to learn more about \"DASH Diet: Care Instructions. \" Current as of: July 22, 2018 Content Version: 11.9 © 6726-4571 [employer]. Care instructions adapted under license by [employer] (which disclaims liability or warranty for this information). If you have questions about a medical condition or this instruction, always ask your healthcare professional. [address] any warranty or liability for your use of this information. Learning About High Blood Pressure What is high blood pressure? Blood pressure is a measure of how hard the blood pushes against the walls of your arteries. It's normal for blood pressure to go up and down throughout the day, but if it stays up, you have high blood pressure. Another name for high blood pressure is hypertension. Two numbers tell you your blood pressure. The first number is the systolic pressure. It shows how hard the blood pushes when your heart is pumping. The second number is the diastolic pressure. It shows how hard the blood pushes between heartbeats, when your heart is relaxed and filling with blood. Your doctor will give you a goal for your blood pressure. Your goal will be based on your health and your age. High blood pressure (hypertension) means that the top number stays high, or the bottom number stays high, or both. High blood pressure increases the risk of stroke, heart attack, and other problems. You and your doctor will talk about your risks of these problems based on your blood pressure. What happens when you have high blood pressure? · Blood flows through your arteries with too much force. Over time, this damages the walls of your arteries. But you can't feel it. High blood pressure usually doesn't cause symptoms. · Fat and calcium start to build up in your arteries. This buildup is called plaque. Plaque makes your arteries narrower and stiffer. Blood can't flow through them as easily. · This lack of good blood flow starts to damage some of the organs in your body. This can lead to problems such as coronary artery disease and heart attack, heart failure, stroke, kidney failure, and eye damage. How can you prevent high blood pressure? · Stay at a healthy weight. · Try to limit how much sodium you eat to less than 2,300 milligrams (mg) a day. If you limit your sodium to 1,500 mg a day, you can lower your blood pressure even more. ? Buy foods that are labeled \"unsalted,\" \"sodium-free,\" or \"low-sodium. \" Foods labeled \"reduced-sodium\" and \"light sodium\" may still have too much sodium. ? Flavor your food with garlic, lemon juice, onion, vinegar, herbs, and spices instead of salt. Do not use soy sauce, steak sauce, onion salt, garlic salt, mustard, or ketchup on your food. ? Use less salt (or none) when recipes call for it. You can often use half the salt a recipe calls for without losing flavor. · Be physically active. Get at least 30 minutes of exercise on most days of the week. Walking is a good choice. You also may want to do other activities, such as running, swimming, cycling, or playing tennis or team sports. · Limit alcohol to 2 drinks a day for men and 1 drink a day for women. · Eat plenty of fruits, vegetables, and low-fat dairy products. Eat less saturated and total fats. How is high blood pressure treated? · Your doctor will suggest making lifestyle changes. For example, your doctor may ask you to eat healthy foods, quit smoking, lose extra weight, and be more active. · If lifestyle changes don't help enough, your doctor may recommend that you take medicine. · When blood pressure is very high, medicines are needed to lower it. Follow-up care is a key part of your treatment and safety. Be sure to make and go to all appointments, and call your doctor if you are having problems. It's also a good idea to know your test results and keep a list of the medicines you take. Where can you learn more? Go to http://karolina-thee.info/. Enter P501 in the search box to learn more about \"Learning About High Blood Pressure. \" Current as of: July 22, 2018 Content Version: 11.9 © 4691-8687 [employer]. Care instructions adapted under license by [employer] (which disclaims liability or warranty for this information). If you have questions about a medical condition or this instruction, always ask your healthcare professional. [address] any warranty or liability for your use of this information.

## 2019-02-17 DIAGNOSIS — G89.29 CHRONIC BILATERAL LOW BACK PAIN WITHOUT SCIATICA: ICD-10-CM

## 2019-02-17 DIAGNOSIS — M47.22 OSTEOARTHRITIS OF SPINE WITH RADICULOPATHY, CERVICAL REGION: ICD-10-CM

## 2019-02-17 DIAGNOSIS — M54.50 CHRONIC BILATERAL LOW BACK PAIN WITHOUT SCIATICA: ICD-10-CM

## 2019-02-18 RX ORDER — TRAMADOL HYDROCHLORIDE 50 MG/1
TABLET ORAL
Qty: 30 TAB | Refills: 0 | Status: SHIPPED | OUTPATIENT
Start: 2019-02-18 | End: 2019-03-16 | Stop reason: SDUPTHER

## 2019-02-18 RX ORDER — BACLOFEN 10 MG/1
TABLET ORAL
Qty: 30 TAB | Refills: 2 | Status: SHIPPED | OUTPATIENT
Start: 2019-02-18 | End: 2019-05-26 | Stop reason: SDUPTHER

## 2019-03-03 DIAGNOSIS — K29.70 GASTRITIS, PRESENCE OF BLEEDING UNSPECIFIED, UNSPECIFIED CHRONICITY, UNSPECIFIED GASTRITIS TYPE: ICD-10-CM

## 2019-03-04 RX ORDER — RANITIDINE 150 MG/1
TABLET, FILM COATED ORAL
Qty: 90 TAB | Refills: 3 | Status: SHIPPED | OUTPATIENT
Start: 2019-03-04 | End: 2019-12-04 | Stop reason: ALTCHOICE

## 2019-03-07 ENCOUNTER — OFFICE VISIT (OUTPATIENT)
Dept: INTERNAL MEDICINE CLINIC | Age: 84
End: 2019-03-07

## 2019-03-07 VITALS
TEMPERATURE: 95.7 F | HEART RATE: 75 BPM | DIASTOLIC BLOOD PRESSURE: 90 MMHG | WEIGHT: 191 LBS | SYSTOLIC BLOOD PRESSURE: 155 MMHG | OXYGEN SATURATION: 95 % | HEIGHT: 67 IN | RESPIRATION RATE: 16 BRPM | BODY MASS INDEX: 29.98 KG/M2

## 2019-03-07 DIAGNOSIS — I10 ESSENTIAL HYPERTENSION: ICD-10-CM

## 2019-03-07 DIAGNOSIS — E11.9 TYPE 2 DIABETES MELLITUS WITHOUT COMPLICATION, WITHOUT LONG-TERM CURRENT USE OF INSULIN (HCC): Primary | ICD-10-CM

## 2019-03-07 DIAGNOSIS — E55.9 VITAMIN D DEFICIENCY: ICD-10-CM

## 2019-03-07 DIAGNOSIS — G89.29 CHRONIC MIDLINE LOW BACK PAIN WITHOUT SCIATICA: ICD-10-CM

## 2019-03-07 DIAGNOSIS — M54.50 CHRONIC MIDLINE LOW BACK PAIN WITHOUT SCIATICA: ICD-10-CM

## 2019-03-07 DIAGNOSIS — Z86.73 H/O: STROKE: ICD-10-CM

## 2019-03-07 RX ORDER — AMLODIPINE BESYLATE 2.5 MG/1
2.5 TABLET ORAL DAILY
Qty: 30 TAB | Refills: 3 | Status: SHIPPED | OUTPATIENT
Start: 2019-03-07 | End: 2019-06-08 | Stop reason: SDUPTHER

## 2019-03-07 NOTE — PROGRESS NOTES
Identified pt with two pt identifiers(name and ). Reviewed record in preparation for visit and have obtained necessary documentation. All patient medications has been reviewed. Chief Complaint   Patient presents with    Diabetes     4 month f/u    Hypertension    Cholesterol Problem    Diabetic Foot Exam    Other     Home nurse is requesting electrical probes rx       Health Maintenance Due   Topic    DTaP/Tdap/Td series (1 - Tdap)    Shingrix Vaccine Age 50> (1 of 2)    MICROALBUMIN Q1     GLAUCOMA SCREENING Q2Y     EYE EXAM RETINAL OR DILATED     FOOT EXAM Q1        Vitals:    19 1446   BP: 173/87   Pulse: 75   Resp: 16   Temp: 95.7 °F (35.4 °C)   TempSrc: Oral   SpO2: 95%   Weight: 191 lb (86.6 kg)   Height: 5' 7\" (1.702 m)   PainSc:   0 - No pain       Coordination of Care Questionnaire:  :   1) Have you been to an emergency room, urgent care, or hospitalized since your last visit?   no       2. Have seen or consulted any other health care provider since your last visit? NO    3) Do you have an Advanced Directive/ Living Will in place? Yes, informed pt to bring into office  If yes, do we have a copy on file NO  If no, would you like information NO    Patient is accompanied by friend I have received verbal consent from [facility] to discuss any/all medical information while they are present in the room.

## 2019-03-07 NOTE — PROGRESS NOTES
HISTORY OF PRESENT ILLNESS  Enmanuel Foley is a 82 y.o. female here for follow up. Has hypertension, on multiple medications. Blood pressure has been elevated lately. I reviewed her blood pressure log. Average blood pressure is 155/90 mmHg. No chest pain palpitation or shortness of breath  Has chronic lower back pain, using Tylenol and baclofen. Pain is not controlled. She is wondering if she can get get TENS unit. She is working with physical therapy right now no fall or injury. She is seen for diabetes. Jeff Bowen He reports medication compliance: compliant all of the time. Diabetic diet compliance: compliant most of the time. Home glucose monitoring: is not performed. Further diabetic ROS: no polyuria or polydipsia, no chest pain, dyspnea or TIA's, no numbness, tingling or pain in extremities, no hypoglycemia. Lab review: labs reviewed, I note that glycosylated hemoglobin normallabs reviewed and discussed with patient. Eye exam:up to date. Hypertension    Pertinent negatives include no chest pain, no blurred vision and no nausea. Cholesterol Problem   Pertinent negatives include no chest pain. Diabetes   Pertinent negatives include no chest pain. Back Pain    Pertinent negatives include no chest pain and no fever. Review of Systems   Constitutional: Negative. Negative for chills and fever. HENT: Negative. Eyes: Negative. Negative for blurred vision and double vision. Respiratory: Negative. Cardiovascular: Negative. Negative for chest pain. Gastrointestinal: Negative for heartburn and nausea. Musculoskeletal: Positive for back pain. Negative for falls. Skin: Negative. Neurological: Negative. Psychiatric/Behavioral: Negative. Physical Exam   Constitutional: She appears well-developed and well-nourished. No distress. Neck: Normal range of motion. Neck supple. No JVD present. No thyromegaly present.    Cardiovascular: Normal rate, regular rhythm, normal heart sounds and intact distal pulses. Pulmonary/Chest: Effort normal. No respiratory distress. She has no wheezes. Musculoskeletal: She exhibits tenderness. Back: Spine nontender paravertebral muscle spasm present. Range of motion restricted. .    Diabetic foot exam:     Left Foot:   Visual Exam: normal    Pulse DP: 2+ (normal)   Filament test: reduced sensation    Vibratory sensation: diminished      Right Foot:   Visual Exam: normal    Pulse DP: 2+ (normal)   Filament test: normal sensation    Vibratory sensation: normal             Neurological: She displays normal reflexes. She exhibits normal muscle tone. Coordination normal.   Psychiatric: She has a normal mood and affect. Her behavior is normal.       ASSESSMENT and PLAN    Diagnoses and all orders for this visit:    1. Type 2 diabetes mellitus without complication, without long-term current use of insulin (HCC)    On Janumet twice daily. Will check,  -     HEMOGLOBIN A1C WITH EAG  -     METABOLIC PANEL, COMPREHENSIVE    2. Essential hypertension    Blood pressure log reviewed. Average blood pressure is 155/90 mmHg. She is already taking metoprolol and losartan. Will add amlodipine 2.5 mg a day. -     METABOLIC PANEL, COMPREHENSIVE  -     amLODIPine (NORVASC) 2.5 mg tablet; Take 1 Tab by mouth daily. 3. H/O: stroke  Using walker and wheelchair. On aspirin and statin. 4. Chronic midline low back pain without sciatica    Using Tylenol as needed. Sometimes she is baclofen. Will give,  -     TENS unit and electrodes cmpk; Use in lower back every day. DX;chronic lower back pain  Continue physical therapy and occasional therapy. 5. Vitamin D deficiency     Will check,  -     VITAMIN D, 25 HYDROXY            Discussed expected course/resolution/complications of diagnosis in detail with patient. Medication risks/benefits/costs/interactions/alternatives discussed with patient.    Pt was given an after visit summary which includes diagnoses, current medications & vitals. Pt expressed understanding with the diagnosis and plan.

## 2019-03-08 LAB
25(OH)D3+25(OH)D2 SERPL-MCNC: 38 NG/ML (ref 30–100)
ALBUMIN SERPL-MCNC: 4.4 G/DL (ref 3.5–4.7)
ALBUMIN/GLOB SERPL: 1.5 {RATIO} (ref 1.2–2.2)
ALP SERPL-CCNC: 49 IU/L (ref 39–117)
ALT SERPL-CCNC: 10 IU/L (ref 0–32)
AST SERPL-CCNC: 19 IU/L (ref 0–40)
BILIRUB SERPL-MCNC: <0.2 MG/DL (ref 0–1.2)
BUN SERPL-MCNC: 19 MG/DL (ref 8–27)
BUN/CREAT SERPL: 20 (ref 12–28)
CALCIUM SERPL-MCNC: 8.9 MG/DL (ref 8.7–10.3)
CHLORIDE SERPL-SCNC: 102 MMOL/L (ref 96–106)
CO2 SERPL-SCNC: 26 MMOL/L (ref 20–29)
CREAT SERPL-MCNC: 0.93 MG/DL (ref 0.57–1)
EST. AVERAGE GLUCOSE BLD GHB EST-MCNC: 148 MG/DL
GLOBULIN SER CALC-MCNC: 3 G/DL (ref 1.5–4.5)
GLUCOSE SERPL-MCNC: 89 MG/DL (ref 65–99)
HBA1C MFR BLD: 6.8 % (ref 4.8–5.6)
INTERPRETATION: NORMAL
Lab: NORMAL
POTASSIUM SERPL-SCNC: 5.5 MMOL/L (ref 3.5–5.2)
PROT SERPL-MCNC: 7.4 G/DL (ref 6–8.5)
SODIUM SERPL-SCNC: 141 MMOL/L (ref 134–144)

## 2019-03-16 DIAGNOSIS — G89.29 CHRONIC BILATERAL LOW BACK PAIN WITHOUT SCIATICA: ICD-10-CM

## 2019-03-16 DIAGNOSIS — M54.50 CHRONIC BILATERAL LOW BACK PAIN WITHOUT SCIATICA: ICD-10-CM

## 2019-03-18 RX ORDER — TRAMADOL HYDROCHLORIDE 50 MG/1
TABLET ORAL
Qty: 30 TAB | Refills: 0 | Status: SHIPPED | OUTPATIENT
Start: 2019-03-18 | End: 2019-04-17

## 2019-04-21 DIAGNOSIS — I10 ESSENTIAL HYPERTENSION WITH GOAL BLOOD PRESSURE LESS THAN 130/85: ICD-10-CM

## 2019-04-22 RX ORDER — METOPROLOL TARTRATE 100 MG/1
TABLET ORAL
Qty: 180 TAB | Refills: 1 | Status: SHIPPED | OUTPATIENT
Start: 2019-04-22 | End: 2019-09-15 | Stop reason: SDUPTHER

## 2019-04-24 ENCOUNTER — OFFICE VISIT (OUTPATIENT)
Dept: URGENT CARE | Age: 84
End: 2019-04-24

## 2019-04-24 VITALS
WEIGHT: 189 LBS | BODY MASS INDEX: 29.66 KG/M2 | OXYGEN SATURATION: 99 % | DIASTOLIC BLOOD PRESSURE: 75 MMHG | HEIGHT: 67 IN | SYSTOLIC BLOOD PRESSURE: 164 MMHG | RESPIRATION RATE: 18 BRPM | TEMPERATURE: 97.4 F | HEART RATE: 71 BPM

## 2019-04-24 DIAGNOSIS — M25.512 ACUTE PAIN OF LEFT SHOULDER: ICD-10-CM

## 2019-04-24 DIAGNOSIS — Z74.09 DECREASED AMBULATION STATUS: ICD-10-CM

## 2019-04-24 DIAGNOSIS — S80.02XA CONTUSION OF LEFT KNEE, INITIAL ENCOUNTER: ICD-10-CM
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
## 2019-04-24 RX ORDER — ONDANSETRON 4 MG/1
4 TABLET, ORALLY DISINTEGRATING ORAL
Qty: 1 TAB | Refills: 0 | Status: SHIPPED | COMMUNITY
Start: 2019-04-24 | End: 2019-04-24

## 2019-04-24 NOTE — LETTER
NOTIFICATION RETURN TO WORK / SCHOOL 
 
4/24/2019 4:14 PM 
 
Ms. [facility] [place] [id number] To Whom It May Concern: 
 
[facility] is currently under the care of [address]. She was brought to clinic by her daughter Mary Landaverde and she can go back to work 4/25/19. If there are questions or concerns please have the patient contact our office. Sincerely, G[facility] PROVIDER

## 2019-04-24 NOTE — PATIENT INSTRUCTIONS
Contusion: Care Instructions  Your Care Instructions    Contusion is the medical term for a bruise. It is the result of a direct blow or an impact, such as a fall. Contusions are common sports injuries. Most people think of a bruise as a black-and-blue spot. This happens when small blood vessels get torn and leak blood under the skin. But bones, muscles, and organs can also get bruised. This may damage deep tissues but not cause a bruise you can see. The doctor will do a physical exam to find the location of your contusion. You may also have tests to make sure you do not have a more serious injury, such as a broken bone or nerve damage. These may include X-rays or other imaging tests like a CT scan or MRI. Deep-tissue contusions may cause pain and swelling. But if there is no serious damage, they will often get better in a few weeks with home treatment. The doctor has checked you carefully, but problems can develop later. If you notice any problems or new symptoms, get medical treatment right away. Follow-up care is a key part of your treatment and safety. Be sure to make and go to all appointments, and call your doctor if you are having problems. It's also a good idea to know your test results and keep a list of the medicines you take. How can you care for yourself at home? · Put ice or a cold pack on the sore area for 10 to 20 minutes at a time to stop swelling. Put a thin cloth between the ice pack and your skin. · Be safe with medicines. Read and follow all instructions on the label. ? If the doctor gave you a prescription medicine for pain, take it as prescribed. ? If you are not taking a prescription pain medicine, ask your doctor if you can take an over-the-counter medicine. · If you can, prop up the sore area on pillows as much as possible for the next few days. Try to keep the sore area above the level of your heart. When should you call for help?   Call your doctor now or seek immediate medical care if:    · Your pain gets worse.     · You have new or worse swelling.     · You have tingling, weakness, or numbness in the area near the contusion.     · The area near the contusion is cold or pale.    Watch closely for changes in your health, and be sure to contact your doctor if:    · You do not get better as expected. Where can you learn more? Go to http://karolina-thee.info/. Enter D539 in the search box to learn more about \"Contusion: Care Instructions. \"  Current as of: September 23, 2018  Content Version: 11.9  © 4962-0311 [employer]. Care instructions adapted under license by [employer] (which disclaims liability or warranty for this information). If you have questions about a medical condition or this instruction, always ask your healthcare professional. [address] any warranty or liability for your use of this information.

## 2019-04-24 NOTE — PROGRESS NOTES
Fall   This is a new problem. The current episode started yesterday (slipped in bedroom - fell on her left side). The problem has not changed since onset. Associated symptoms comments: Left shoulder- left hip and left knee pain  Difficulty with movements  Not able to walk. The symptoms are aggravated by walking and standing. Nothing relieves the symptoms. She has tried nothing for the symptoms.         Past Medical History:   Diagnosis Date    Arthritis     Chronic pain     Depression     [facility]     Hypercholesterolemia     Hypertension     Stroke ([facility]     TIA 10 yrs back    [facility]     [facility]        Past Surgical History:   Procedure Laterality Date    HX CARPAL TUNNEL RELEASE  1990    HX CATARACT REMOVAL      bilateral    HX HYSTERECTOMY  1986    HX HYSTERECTOMY      HX ORTHOPAEDIC      HX ORTHOPAEDIC      carpel tunnel left    HX ORTHOPAEDIC      left foot heel spur    HX REFRACTIVE SURGERY  2006         Family History   Adopted: Yes        Social History     Socioeconomic History    Marital status:      Spouse name: Not on file    Number of children: Not on file    Years of education: Not on file    Highest education level: Not on file   Occupational History    Not on file   Social Needs    Financial resource strain: Not on file    Food insecurity:     Worry: Not on file     Inability: Not on file    Transportation needs:     Medical: Not on file     Non-medical: Not on file   Tobacco Use    Smoking status: Never Smoker    Smokeless tobacco: Never Used   Substance and Sexual Activity    Alcohol use: No    Drug use: No    Sexual activity: Never     Birth control/protection: None     Comment: retired,relocated from [address] with daughter   Lifestyle    Physical activity:     Days per week: Not on file     Minutes per session: Not on file    Stress: Not on file   Relationships    Social connections:     Talks on phone: Not on file     Gets together: Not on file     Attends [employer] service: Not on file     Active member of club or organization: Not on file     Attends meetings of clubs or organizations: Not on file     Relationship status: Not on file    Intimate partner violence:     Fear of current or ex partner: Not on file     Emotionally abused: Not on file     Physically abused: Not on file     Forced sexual activity: Not on file   Other Topics Concern    Not on file   Social History Narrative    ** Merged History Encounter **                     ALLERGIES: Patient has no known allergies. Review of Systems   Musculoskeletal: Positive for gait problem. All other systems reviewed and are negative. Vitals:    04/24/19 1355   BP: 164/75   Pulse: 71   Resp: 18   Temp: 97.4 °F (36.3 °C)   SpO2: 99%   Weight: 189 lb (85.7 kg)   Height: 5' 7\" (1.702 m)       Physical Exam   Constitutional: She is oriented to person, place, and time. No distress. Musculoskeletal:        Right shoulder: Normal.        Left shoulder: She exhibits decreased range of motion and pain. She exhibits no tenderness, no bony tenderness, no deformity and no spasm. Left hip: She exhibits decreased range of motion. Left knee: She exhibits decreased range of motion. She exhibits no effusion, no ecchymosis and no laceration (superficial abrasion ). No tenderness found. Cervical back: She exhibits decreased range of motion and pain. She exhibits no tenderness. Legs:  Neurological: She is alert and oriented to person, place, and time. Nursing note and vitals reviewed. MDM    Procedures      ICD-10-CM ICD-9-CM    1. Fall, initial encounter [address]. XXXA E888.9 XR KNEE LT 3 V      XR HIP LT W OR WO PELV 2-3 VWS      XR SHOULDER LT AP/LAT MIN 2 V   2. Contusion of left knee, initial encounter S80. 02XA 924.11    3. Acute pain of left shoulder M25.512 719.41    4.  Decreased ambulation status Z74.09 780.99      Medications Ordered Today   Medications    ondansetron (ZOFRAN ODT) 4 mg disintegrating tablet     Sig: Take 1 Tab by mouth now for 1 dose. Dispense:  1 Tab     Refill:  0     Order Specific Question:   Expiration Date     Answer:   4/30/2021     Order Specific Question:   Lot#     Answer:   [id number]     Order Specific Question:        Answer: [employer]urobindo     Order Specific Question:   NDC#     Answer:   [id number]     Results for orders placed or performed in visit on 04/24/19   XR KNEE LT 3 V    Narrative    EXAM: XR KNEE LT 3 V    INDICATION: fall. COMPARISON: None. FINDINGS: Three views of the left knee demonstrate no fracture or other acute  osseous or articular abnormality. There is no effusion. Impression    IMPRESSION: No acute abnormality. Results for orders placed or performed in visit on 04/24/19   XR HIP LT W OR WO PELV 2-3 VWS    Narrative    EXAM:  XR HIP LT W OR WO PELV 2-3 VWS  INDICATION: Left hip pain after fall. COMPARISON: None. FINDINGS: An AP view of the pelvis and a frogleg lateral view of the left hip  demonstrate no fracture, dislocation or other acute abnormality. There is  dextroconvex scoliosis and degenerative change of the lumbar spine. Impression    IMPRESSION: Normal left hip. Results for orders placed or performed in visit on 04/24/19   XR SHOULDER LT AP/LAT MIN 2 V    Narrative    EXAM:  XR SHOULDER LT AP/LAT MIN 2 V  INDICATION: Left shoulder pain after fall. COMPARISON: None. FINDINGS: Two views of the left shoulder demonstrate generative change. There is  no fracture, dislocation or other abnormality. There is no axillary view. Impression    IMPRESSION: No fracture. The patients condition was discussed with the patient and they understand. The patient is to follow up with primary care doctor. If signs and symptoms become worse the pt is to go to the ER. The patient is to take medications as prescribed.

## 2019-05-15 ENCOUNTER — TELEPHONE (OUTPATIENT)
Dept: NEUROLOGY | Age: 84
End: 2019-05-15

## 2019-05-15 NOTE — TELEPHONE ENCOUNTER
Called daughter back and advised that Dr. Regan OON with the [facility] side of pt's plan, advised I can add pt to list to call back once he is credentialed.  She said she will look around but to still add pt to list.

## 2019-05-15 NOTE — TELEPHONE ENCOUNTER
----- Message from [facility] sent at 5/15/2019 12:37 PM EDT -----  Regarding: Dr. Uyen Birch  Contact: [phone]  Pt's, Yadi Jules [phone], is requesting a call back to schedule new pt ref by [facility] provider Dr Erasmo Espinal [phone] for early signs of dementia

## 2019-05-26 DIAGNOSIS — M47.22 OSTEOARTHRITIS OF SPINE WITH RADICULOPATHY, CERVICAL REGION: ICD-10-CM

## 2019-05-28 RX ORDER — BACLOFEN 10 MG/1
TABLET ORAL
Qty: 30 TAB | Refills: 2 | Status: SHIPPED | OUTPATIENT
Start: 2019-05-28 | End: 2019-08-31 | Stop reason: SDUPTHER

## 2019-06-08 DIAGNOSIS — I10 ESSENTIAL HYPERTENSION: ICD-10-CM

## 2019-06-10 RX ORDER — AMLODIPINE BESYLATE 2.5 MG/1
TABLET ORAL
Qty: 30 TAB | Refills: 3 | Status: SHIPPED | OUTPATIENT
Start: 2019-06-10 | End: 2019-11-01 | Stop reason: SDUPTHER

## 2019-07-11 ENCOUNTER — OFFICE VISIT (OUTPATIENT)
Dept: INTERNAL MEDICINE CLINIC | Age: 84
End: 2019-07-11

## 2019-07-11 VITALS
DIASTOLIC BLOOD PRESSURE: 80 MMHG | RESPIRATION RATE: 18 BRPM | BODY MASS INDEX: 29.38 KG/M2 | HEIGHT: 67 IN | TEMPERATURE: 98.2 F | WEIGHT: 187.2 LBS | OXYGEN SATURATION: 96 % | HEART RATE: 74 BPM | SYSTOLIC BLOOD PRESSURE: 142 MMHG

## 2019-07-11 DIAGNOSIS — M54.41 CHRONIC BILATERAL LOW BACK PAIN WITH BILATERAL SCIATICA: ICD-10-CM

## 2019-07-11 DIAGNOSIS — M47.896 OTHER OSTEOARTHRITIS OF SPINE, LUMBAR REGION: ICD-10-CM

## 2019-07-11 DIAGNOSIS — Z78.0 POST-MENOPAUSE: ICD-10-CM

## 2019-07-11 DIAGNOSIS — I87.2 CHRONIC VENOUS INSUFFICIENCY: ICD-10-CM

## 2019-07-11 DIAGNOSIS — M54.42 CHRONIC BILATERAL LOW BACK PAIN WITH BILATERAL SCIATICA: ICD-10-CM

## 2019-07-11 DIAGNOSIS — I10 ESSENTIAL HYPERTENSION: ICD-10-CM

## 2019-07-11 DIAGNOSIS — G89.29 CHRONIC BILATERAL LOW BACK PAIN WITH BILATERAL SCIATICA: ICD-10-CM

## 2019-07-11 DIAGNOSIS — E11.9 TYPE 2 DIABETES MELLITUS WITHOUT COMPLICATION, WITHOUT LONG-TERM CURRENT USE OF INSULIN (HCC): ICD-10-CM

## 2019-07-11 DIAGNOSIS — M19.90 OSTEOARTHRITIS, UNSPECIFIED OSTEOARTHRITIS TYPE, UNSPECIFIED SITE: Primary | ICD-10-CM

## 2019-07-11 RX ORDER — ACETAMINOPHEN 325 MG/1
650 TABLET ORAL 2 TIMES DAILY
Qty: 120 TAB | Refills: 5 | Status: SHIPPED | OUTPATIENT
Start: 2019-07-11 | End: 2020-09-03 | Stop reason: SDUPTHER

## 2019-07-11 NOTE — PROGRESS NOTES
HISTORY OF PRESENT ILLNESS  Leana Smith is a 82 y.o. female here for follow up. She is accompanied by her next her neighbor. She is getting frail. Using walker to walk. But  She is getting slower and not able to do all ADL. Needs some help. No fall or injury. Report generalized aches and pains all over her body. Have DJD. Taking meloxicam and Tylenol. Having loose stool often. No diarrhea. Has hypertension, on multiple medications. No chest pain or palpitation. Reported leg swelling. No shortness of breath orthopnea. Has chronic lower back pain, using Tylenol and baclofen. Pain is not controlled. She is wondering if she can get get TENS unit. She is working with physical therapy right now no fall or injury. She is seen for diabetes. Kelley Ch He reports medication compliance: compliant all of the time. Diabetic diet compliance: compliant most of the time. Home glucose monitoring: is not performed. Further diabetic ROS: no polyuria or polydipsia, no chest pain, dyspnea or TIA's, no numbness, tingling or pain in extremities, no hypoglycemia. Lab review: labs reviewed, I note that glycosylated hemoglobin normallabs reviewed and discussed with patient. Eye exam:up to date. Diabetes   Pertinent negatives include no chest pain. Hypertension    Pertinent negatives include no chest pain, no blurred vision and no nausea. Cholesterol Problem   Pertinent negatives include no chest pain. Back Pain    Pertinent negatives include no chest pain and no fever. Generalized Body Aches   Pertinent negatives include no chest pain. Gait Problem   Pertinent negatives include no chest pain. Review of Systems   Constitutional: Negative. Negative for chills and fever. HENT: Negative. Eyes: Negative. Negative for blurred vision and double vision. Respiratory: Negative. Cardiovascular: Negative. Negative for chest pain. Gastrointestinal: Positive for diarrhea.  Negative for heartburn and nausea. Musculoskeletal: Positive for back pain and gait problem. Negative for falls. Skin: Negative. Psychiatric/Behavioral: Negative. Physical Exam   Constitutional: She appears well-developed and well-nourished. No distress. Neck: Normal range of motion. Neck supple. No JVD present. No thyromegaly present. Cardiovascular: Normal rate, regular rhythm, normal heart sounds and intact distal pulses. Pulmonary/Chest: Effort normal. No respiratory distress. She has no wheezes. Musculoskeletal: She exhibits tenderness. Bilateral trace edema present chronic venous insufficiency. Spine nontender. Range of motion mildly restricted. Neurological: She displays normal reflexes. She exhibits normal muscle tone. Coordination normal.   Psychiatric: She has a normal mood and affect. Her behavior is normal.       ASSESSMENT and PLAN    Diagnoses and all orders for this visit:    1. Osteoarthritis, unspecified osteoarthritis type, unspecified site    Advised to take Tylenol twice a day. Already on Mobic. Will add,  -     menthol (BIOFREEZE, MENTHOL,) 4 % gel; Apply top BID    2. Other osteoarthritis of spine, lumbar region  -     acetaminophen (TYLENOL) 325 mg tablet; Take 2 Tabs by mouth two o (2) times a day. -     menthol (BIOFREEZE, MENTHOL,) 4 % gel; Apply top BID    3. Chronic bilateral low back pain with bilateral sciatica  -     acetaminophen (TYLENOL) 325 mg tablet; Take 2 Tabs by mouth two (2) times a day. 4. Chronic venous insufficiency  Leg elevation. Stockings. 5. Essential hypertension     stable on current regimen. Will continue same.      -     METABOLIC PANEL, COMPREHENSIVE    6. Type 2 diabetes mellitus without complication, without long-term current use of insulin ([address]    On Janumet. Will check,  -     HEMOGLOBIN A1C WITH EAG  -     MICROALBUMIN, UR, RAND W/ MICROALB/CREAT RATIO    7.  Post-menopause    Will order,  -     DEXA BONE DENSITY STUDY AXIAL; Future            Discussed expected course/resolution/complications of diagnosis in detail with patient. Medication risks/benefits/costs/interactions/alternatives discussed with patient. Pt was given an after visit summary which includes diagnoses, current medications & vitals. Pt expressed understanding with the diagnosis and plan.

## 2019-07-11 NOTE — PROGRESS NOTES
Nemo Bates is a 82 y.o. female    Chief Complaint   Patient presents with    Diabetes    Hypertension    Cholesterol Problem     1. Have you been to the ER, urgent care clinic since your last visit? Hospitalized since your last visit? No    2. Have you seen or consulted any other health care providers outside of the [address] [place] [address] since your last visit? Include any pap smears or colon screening.   No      Visit Vitals  /80 (BP 1 Location: Left arm, BP Patient Position: Sitting)   Pulse 74   Temp 98.2 °F (36.8 °C) (Oral)   Resp 18   Ht 5' 7\" (1.702 m)   Wt 187 lb 3.2 oz (84.9 kg)   SpO2 96%   BMI 29.32 kg/m²

## 2019-07-12 LAB
ALBUMIN SERPL-MCNC: 4.4 G/DL (ref 3.5–4.7)
ALBUMIN/CREAT UR: 10.8 MG/G CREAT (ref 0–30)
ALBUMIN/GLOB SERPL: 1.6 {RATIO} (ref 1.2–2.2)
ALP SERPL-CCNC: 43 IU/L (ref 39–117)
ALT SERPL-CCNC: 7 IU/L (ref 0–32)
AST SERPL-CCNC: 17 IU/L (ref 0–40)
BILIRUB SERPL-MCNC: 0.3 MG/DL (ref 0–1.2)
BUN SERPL-MCNC: 16 MG/DL (ref 8–27)
BUN/CREAT SERPL: 14 (ref 12–28)
CALCIUM SERPL-MCNC: 9.2 MG/DL (ref 8.7–10.3)
CHLORIDE SERPL-SCNC: 101 MMOL/L (ref 96–106)
CO2 SERPL-SCNC: 25 MMOL/L (ref 20–29)
CREAT SERPL-MCNC: 1.16 MG/DL (ref 0.57–1)
CREAT UR-MCNC: 62.1 MG/DL
EST. AVERAGE GLUCOSE BLD GHB EST-MCNC: 166 MG/DL
GLOBULIN SER CALC-MCNC: 2.7 G/DL (ref 1.5–4.5)
GLUCOSE SERPL-MCNC: 95 MG/DL (ref 65–99)
HBA1C MFR BLD: 7.4 % (ref 4.8–5.6)
INTERPRETATION: NORMAL
Lab: NORMAL
MICROALBUMIN UR-MCNC: 6.7 UG/ML
POTASSIUM SERPL-SCNC: 4.1 MMOL/L (ref 3.5–5.2)
PROT SERPL-MCNC: 7.1 G/DL (ref 6–8.5)
SODIUM SERPL-SCNC: 141 MMOL/L (ref 134–144)

## 2019-07-14 DIAGNOSIS — M17.0 PRIMARY OSTEOARTHRITIS OF BOTH KNEES: ICD-10-CM

## 2019-07-15 RX ORDER — MELOXICAM 15 MG/1
TABLET ORAL
Qty: 90 TAB | Refills: 1 | Status: SHIPPED | OUTPATIENT
Start: 2019-07-15 | End: 2020-01-20

## 2019-07-18 NOTE — PROGRESS NOTES
Slightly reduced kidney function test.  Please reduce meloxicam 15 mg tablets to half tablet a day. Need to drink more fluid. Diabetes slightly worse, may need to be on ADA diet and exercise. No change of medicine for now.

## 2019-07-20 NOTE — PROGRESS NOTES
Called the pt and after verifying HIPAA, reviewed the pt's lab results and provider's recommendations. The pt wrote down and read back the directions for the meloxicam. She voiced thanks and understanding.

## 2019-08-07 DIAGNOSIS — E78.2 MIXED HYPERLIPIDEMIA: ICD-10-CM

## 2019-08-07 RX ORDER — SIMVASTATIN 40 MG/1
TABLET, FILM COATED ORAL
Qty: 90 TAB | Refills: 1 | Status: SHIPPED | OUTPATIENT
Start: 2019-08-07 | End: 2020-02-03

## 2019-08-31 ENCOUNTER — OFFICE VISIT (OUTPATIENT)
Dept: URGENT CARE | Age: 84
End: 2019-08-31

## 2019-08-31 VITALS
SYSTOLIC BLOOD PRESSURE: 143 MMHG | HEIGHT: 67 IN | HEART RATE: 78 BPM | WEIGHT: 187 LBS | BODY MASS INDEX: 29.35 KG/M2 | DIASTOLIC BLOOD PRESSURE: 68 MMHG | TEMPERATURE: 98.7 F | OXYGEN SATURATION: 97 % | RESPIRATION RATE: 18 BRPM

## 2019-08-31 DIAGNOSIS — R35.0 FREQUENCY OF URINATION: ICD-10-CM

## 2019-08-31 DIAGNOSIS — N30.90 CYSTITIS: Primary | ICD-10-CM

## 2019-08-31 LAB
BILIRUB UR QL STRIP: NEGATIVE
GLUCOSE UR-MCNC: NEGATIVE MG/DL
KETONES P FAST UR STRIP-MCNC: NEGATIVE MG/DL
PH UR STRIP: 5.5 [PH] (ref 4.6–8)
PROT UR QL STRIP: NORMAL
SP GR UR STRIP: 1.02 (ref 1–1.03)
UA UROBILINOGEN AMB POC: NORMAL (ref 0.2–1)
URINALYSIS CLARITY POC: NORMAL
URINALYSIS COLOR POC: NORMAL
URINE BLOOD POC: NORMAL
URINE LEUKOCYTES POC: NORMAL
URINE NITRITES POC: NEGATIVE

## 2019-08-31 RX ORDER — TRAMADOL HYDROCHLORIDE 50 MG/1
TABLET ORAL
COMMUNITY
End: 2019-11-11 | Stop reason: SDUPTHER

## 2019-08-31 RX ORDER — NITROFURANTOIN 25; 75 MG/1; MG/1
100 CAPSULE ORAL 2 TIMES DAILY
Qty: 10 CAP | Refills: 0 | Status: SHIPPED | OUTPATIENT
Start: 2019-08-31 | End: 2019-09-05

## 2019-08-31 NOTE — PATIENT INSTRUCTIONS
Urinary Tract Infection in Women: Care Instructions  Your Care Instructions    A urinary tract infection, or UTI, is a general term for an infection anywhere between the kidneys and the urethra (where urine comes out). Most UTIs are bladder infections. They often cause pain or burning when you urinate. UTIs are caused by bacteria and can be cured with antibiotics. Be sure to complete your treatment so that the infection goes away. Follow-up care is a key part of your treatment and safety. Be sure to make and go to all appointments, and call your doctor if you are having problems. It's also a good idea to know your test results and keep a list of the medicines you take. How can you care for yourself at home? · Take your antibiotics as directed. Do not stop taking them just because you feel better. You need to take the full course of antibiotics. · Drink extra water and other fluids for the next day or two. This may help wash out the bacteria that are causing the infection. (If you have kidney, heart, or liver disease and have to limit fluids, talk with your doctor before you increase your fluid intake.)  · Avoid drinks that are carbonated or have caffeine. They can irritate the bladder. · Urinate often. Try to empty your bladder each time. · To relieve pain, take a hot bath or lay a heating pad set on low over your lower belly or genital area. Never go to sleep with a heating pad in place. To prevent UTIs  · Drink plenty of water each day. This helps you urinate often, which clears bacteria from your system. (If you have kidney, heart, or liver disease and have to limit fluids, talk with your doctor before you increase your fluid intake.)  · Urinate when you need to. · Urinate right after you have sex. · Change sanitary pads often. · Avoid douches, bubble baths, feminine hygiene sprays, and other feminine hygiene products that have deodorants.   · After going to the bathroom, wipe from front to back.  When should you call for help? Call your doctor now or seek immediate medical care if:    · Symptoms such as fever, chills, nausea, or vomiting get worse or appear for the first time.     · You have new pain in your back just below your rib cage. This is called flank pain.     · There is new blood or pus in your urine.     · You have any problems with your antibiotic medicine.    Watch closely for changes in your health, and be sure to contact your doctor if:    · You are not getting better after taking an antibiotic for 2 days.     · Your symptoms go away but then come back. Where can you learn more? Go to http://karolina-thee.info/. Enter I812 in the search box to learn more about \"Urinary Tract Infection in Women: Care Instructions. \"  Current as of: December 19, 2018  Content Version: 12.1  © 9460-0982 [employer]. Care instructions adapted under license by [employer] (which disclaims liability or warranty for this information). If you have questions about a medical condition or this instruction, always ask your healthcare professional. [address] any warranty or liability for your use of this information.

## 2019-08-31 NOTE — PROGRESS NOTES
Patient with urinary symptoms for several days, foul odor and dysuria. The history is provided by the patient. Bladder Infection   This is a new problem. The current episode started more than 2 days ago. The problem occurs constantly. The problem has not changed since onset. Nothing aggravates the symptoms. Nothing relieves the symptoms.         Past Medical History:   Diagnosis Date    Arthritis     Chronic pain     Depression     [facility]     Hypercholesterolemia     Hypertension     Stroke ([facility]     TIA 10 yrs back    Stroke ([facility]     2003        Past Surgical History:   Procedure Laterality Date    HX CARPAL TUNNEL RELEASE  1990    HX CATARACT REMOVAL      bilateral    HX HYSTERECTOMY  1986    HX HYSTERECTOMY      HX ORTHOPAEDIC      HX ORTHOPAEDIC      carpel tunnel left    HX ORTHOPAEDIC      left foot heel spur    HX REFRACTIVE SURGERY  2006         Family History   Adopted: Yes        Social History     Socioeconomic History    Marital status:      Spouse name: Not on file    Number of children: Not on file    Years of education: Not on file    Highest education level: Not on file   Occupational History    Not on file   Social Needs    Financial resource strain: Not on file    Food insecurity:     Worry: Not on file     Inability: Not on file    Transportation needs:     Medical: Not on file     Non-medical: Not on file   Tobacco Use    Smoking status: Never Smoker    Smokeless tobacco: Never Used   Substance and Sexual Activity    Alcohol use: No    Drug use: No    Sexual activity: Never     Birth control/protection: None     Comment: retired,relocated from [address] with daughter   Lifestyle    Physical activity:     Days per week: Not on file     Minutes per session: Not on file    Stress: Not on file   Relationships    Social connections:     Talks on phone: Not on file     Gets together: Not on file     Attends Gnosticist service: Not on file Active member of club or organization: Not on file     Attends meetings of clubs or organizations: Not on file     Relationship status: Not on file    Intimate partner violence:     Fear of current or ex partner: Not on file     Emotionally abused: Not on file     Physically abused: Not on file     Forced sexual activity: Not on file   Other Topics Concern    Not on file   Social History Narrative    ** Merged History Encounter **                     ALLERGIES: Patient has no known allergies. Review of Systems   Constitutional: Negative for activity change, appetite change, chills and fever. Genitourinary: Positive for difficulty urinating, dysuria, frequency and urgency. Vitals:    08/31/19 1151 08/31/19 1206   BP: 152/67 143/68   Pulse: 78    Resp: 18    Temp: 98.7 °F (37.1 °C)    SpO2: 97%    Weight: 187 lb (84.8 kg)    Height: 5' 7\" (1.702 m)        Physical Exam   Constitutional: She appears well-developed and well-nourished. She does not appear ill. No distress. HENT:   Head: Normocephalic. Right Ear: Ear canal normal. No drainage. Tympanic membrane is not erythematous and not bulging. Left Ear: Tympanic membrane and ear canal normal. No drainage. Tympanic membrane is not erythematous and not bulging. Nose: Nose normal. No rhinorrhea. Mouth/Throat: No oropharyngeal exudate, posterior oropharyngeal edema or posterior oropharyngeal erythema. Cardiovascular: Normal rate, regular rhythm and normal heart sounds. Pulmonary/Chest: Effort normal and breath sounds normal. No respiratory distress. She has no decreased breath sounds. She has no rhonchi. Abdominal: There is tenderness in the suprapubic area. There is no CVA tenderness. Lymphadenopathy:     She has no cervical adenopathy. MDM    ICD-10-CM ICD-9-CM    1. Cystitis N30.90 595.9 nitrofurantoin, macrocrystal-monohydrate, (MACROBID) 100 mg capsule      URINE CULTURE,COMPREHENSIVE   2.  Frequency of urination R35.0 788.41 AMB POC URINALYSIS DIP STICK AUTO W/O MICRO     Medications Ordered Today   Medications    nitrofurantoin, macrocrystal-monohydrate, (MACROBID) 100 mg capsule     Sig: Take 1 Cap by mouth two (2) times a day for 5 days. Indications: Bacterial Urinary Tract Infection     Dispense:  10 Cap     Refill:  0     Results for orders placed or performed in visit on 08/31/19   AMB POC URINALYSIS DIP STICK AUTO W/O MICRO   Result Value Ref Range    Color (UA POC)      Clarity (UA POC)      Glucose (UA POC) Negative Negative    Bilirubin (UA POC) Negative Negative    Ketones (UA POC) Negative Negative    Specific gravity (UA POC) 1.020 1.001 - 1.035    Blood (UA POC) 2+ Negative    pH (UA POC) 5.5 4.6 - 8.0    Protein (UA POC) 1+ Negative    Urobilinogen (UA POC) 0.2 mg/dL 0.2 - 1    Nitrites (UA POC) Negative Negative    Leukocyte esterase (UA POC) 1+ Negative     The patients condition was discussed with the patient and they understand. The patient is to follow up with primary care doctor. If signs and symptoms become worse the pt is to go to the ER. The patient is to take medications as prescribed.      Procedures

## 2019-09-04 LAB — BACTERIA UR CULT: ABNORMAL

## 2019-09-04 NOTE — PROGRESS NOTES
Pt informed of results. Pt instructed to complete rx and f/u if symptoms are not resolved. Pt verbalized understanding.

## 2019-09-07 DIAGNOSIS — I10 ESSENTIAL HYPERTENSION: ICD-10-CM

## 2019-09-09 RX ORDER — LOSARTAN POTASSIUM 50 MG/1
TABLET ORAL
Qty: 90 TAB | Refills: 1 | Status: SHIPPED | OUTPATIENT
Start: 2019-09-09 | End: 2020-03-02 | Stop reason: SDUPTHER

## 2019-09-15 DIAGNOSIS — I10 ESSENTIAL HYPERTENSION WITH GOAL BLOOD PRESSURE LESS THAN 130/85: ICD-10-CM

## 2019-09-15 DIAGNOSIS — F32.89 OTHER DEPRESSION: ICD-10-CM

## 2019-09-16 RX ORDER — PAROXETINE HYDROCHLORIDE 20 MG/1
TABLET, FILM COATED ORAL
Qty: 90 TAB | Refills: 3 | Status: SHIPPED | OUTPATIENT
Start: 2019-09-16 | End: 2020-09-21

## 2019-09-16 RX ORDER — METOPROLOL TARTRATE 100 MG/1
TABLET ORAL
Qty: 180 TAB | Refills: 1 | Status: SHIPPED | OUTPATIENT
Start: 2019-09-16 | End: 2020-05-06

## 2019-09-16 RX ORDER — FUROSEMIDE 20 MG/1
TABLET ORAL
Qty: 45 TAB | Refills: 3 | Status: SHIPPED | OUTPATIENT
Start: 2019-09-16 | End: 2020-09-03 | Stop reason: SDUPTHER

## 2019-11-10 DIAGNOSIS — M47.22 OSTEOARTHRITIS OF SPINE WITH RADICULOPATHY, CERVICAL REGION: ICD-10-CM

## 2019-11-11 RX ORDER — BACLOFEN 10 MG/1
TABLET ORAL
Qty: 30 TAB | Refills: 1 | Status: SHIPPED | OUTPATIENT
Start: 2019-11-11 | End: 2019-12-30

## 2019-11-14 ENCOUNTER — OFFICE VISIT (OUTPATIENT)
Dept: INTERNAL MEDICINE CLINIC | Age: 84
End: 2019-11-14

## 2019-11-14 VITALS
BODY MASS INDEX: 29.03 KG/M2 | SYSTOLIC BLOOD PRESSURE: 134 MMHG | HEART RATE: 72 BPM | HEIGHT: 67 IN | OXYGEN SATURATION: 98 % | WEIGHT: 185 LBS | TEMPERATURE: 98 F | RESPIRATION RATE: 15 BRPM | DIASTOLIC BLOOD PRESSURE: 84 MMHG

## 2019-11-14 DIAGNOSIS — N30.90 CYSTITIS: ICD-10-CM

## 2019-11-14 DIAGNOSIS — E55.9 VITAMIN D DEFICIENCY: ICD-10-CM

## 2019-11-14 DIAGNOSIS — R30.0 DYSURIA: Primary | ICD-10-CM

## 2019-11-14 DIAGNOSIS — R31.9 HEMATURIA, UNSPECIFIED TYPE: ICD-10-CM

## 2019-11-14 DIAGNOSIS — E11.9 TYPE 2 DIABETES MELLITUS WITHOUT COMPLICATION, WITHOUT LONG-TERM CURRENT USE OF INSULIN (HCC): ICD-10-CM

## 2019-11-14 DIAGNOSIS — Z23 ENCOUNTER FOR IMMUNIZATION: ICD-10-CM

## 2019-11-14 DIAGNOSIS — Z71.89 ACP (ADVANCE CARE PLANNING): ICD-10-CM

## 2019-11-14 DIAGNOSIS — I10 ESSENTIAL HYPERTENSION: ICD-10-CM

## 2019-11-14 DIAGNOSIS — Z00.00 MEDICARE ANNUAL WELLNESS VISIT, SUBSEQUENT: ICD-10-CM

## 2019-11-14 LAB
BILIRUB UR QL STRIP: NEGATIVE
GLUCOSE UR-MCNC: NEGATIVE MG/DL
KETONES P FAST UR STRIP-MCNC: NEGATIVE MG/DL
PH UR STRIP: 5.5 [PH] (ref 4.6–8)
PROT UR QL STRIP: NEGATIVE
SP GR UR STRIP: 1.01 (ref 1–1.03)
UA UROBILINOGEN AMB POC: NORMAL (ref 0.2–1)
URINALYSIS CLARITY POC: NORMAL
URINALYSIS COLOR POC: YELLOW
URINE BLOOD POC: NORMAL
URINE LEUKOCYTES POC: NORMAL
URINE NITRITES POC: POSITIVE

## 2019-11-14 RX ORDER — CEFUROXIME AXETIL 500 MG/1
500 TABLET ORAL 2 TIMES DAILY
Qty: 10 TAB | Refills: 0 | Status: SHIPPED | OUTPATIENT
Start: 2019-11-14 | End: 2020-04-13 | Stop reason: ALTCHOICE

## 2019-11-14 RX ORDER — ERGOCALCIFEROL 1.25 MG/1
CAPSULE ORAL
Refills: 2 | COMMUNITY
Start: 2019-11-01

## 2019-11-14 NOTE — PROGRESS NOTES
Health Maintenance Due   Topic Date Due    DTaP/Tdap/Td series (1 - Tdap) 08/13/1958    Shingrix Vaccine Age 50> (1 of 2) 08/13/1987    EYE EXAM RETINAL OR DILATED  07/25/2018    LIPID PANEL Q1  06/09/2019    Influenza Age 5 to Adult  08/01/2019    MEDICARE YEARLY EXAM  10/30/2019       Chief Complaint   Patient presents with    Hypertension    Diabetes    Cholesterol Problem       1. Have you been to the ER, urgent care clinic since your last visit? Hospitalized since your last visit? Yes urgent care (but not 100% sure)    2. Have you seen or consulted any other health care providers outside of the [address] [place] [address] since your last visit? Include any pap smears or colon screening. No    3) Do you have an Advance Directive on file? no    4) Are you interested in receiving information on Advance Directives? NO      Patient is accompanied by adult caretaker I have received verbal consent from [facility] to discuss any/all medical information while they are present in the room. [facility] is a 82 y.o. female  who presents for routine immunization(s) Fluad. Patient denies any symptoms , reactions or allergies that would exclude them from being immunized today. Risks and adverse reactions were discussed and the VIS was given to them. Patient voiced full understanding and signed Adult Immunization Consent form. All questions were addressed. Patient was observed for 10 min post injection. There were no reactions observed.     Delbing Light, LPN    Results for orders placed or performed in visit on 11/14/19   AMB POC URINALYSIS DIP STICK AUTO W/O MICRO   Result Value Ref Range    Color (UA POC) Yellow     Clarity (UA POC) Cloudy     Glucose (UA POC) Negative Negative    Bilirubin (UA POC) Negative Negative    Ketones (UA POC) Negative Negative    Specific gravity (UA POC) 1.010 1.001 - 1.035    Blood (UA POC) Trace Negative    pH (UA POC) 5.5 4.6 - 8.0    Protein (UA POC) Negative Negative    Urobilinogen (UA POC) 0.2 mg/dL 0.2 - 1    Nitrites (UA POC) Positive Negative    Leukocyte esterase (UA POC) 1+ Negative

## 2019-11-14 NOTE — PROGRESS NOTES
This is the Subsequent Medicare Annual Wellness Exam, performed 12 months or more after the Initial AWV or the last Subsequent AWV    I have reviewed the patient's medical history in detail and updated the computerized patient record.      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    Altered mental status R41.82    Acute delirium R41.0    Mild depression ([address] F32.0     Past Medical History:   Diagnosis Date    Arthritis     Chronic pain     Depression     Diabetes ([facility]     Hypercholesterolemia     Hypertension     Stroke ([facility]     TIA 10 yrs back    Stroke ([facility]     2003      Past Surgical History:   Procedure Laterality Date    HX CARPAL TUNNEL RELEASE  1990    HX CATARACT REMOVAL      bilateral    HX HYSTERECTOMY  1986    HX HYSTERECTOMY      HX ORTHOPAEDIC      HX ORTHOPAEDIC      carpel tunnel left    HX ORTHOPAEDIC      left foot heel spur    HX REFRACTIVE SURGERY  2006     Current Outpatient Medications   Medication Sig Dispense Refill    VITAMIN D2 50,000 unit capsule TAKE 1 CAPSULE BY MOUTH ONCE A WEEK  2    baclofen (LIORESAL) 10 mg tablet TAKE 1/2 (ONE-HALF) TABLET BY MOUTH THREE TIMES DAILY AS NEEDED FOR BACK PAIN 30 Tab 1    traMADol (ULTRAM) 50 mg tablet TAKE 1 TABLET BY MOUTH TWICE DAILY AS NEEDED FOR PAIN 30 Tab 0    amLODIPine (NORVASC) 2.5 mg tablet TAKE 1 TABLET BY MOUTH ONCE DAILY 30 Tab 3    metoprolol tartrate (LOPRESSOR) 100 mg IR tablet TAKE 1 TABLET BY MOUTH TWO  TIMES DAILY 180 Tab 1    furosemide (LASIX) 20 mg tablet TAKE 1 TABLET BY MOUTH  EVERY OTHER DAY 45 Tab 3    PARoxetine (PAXIL) 20 mg tablet TAKE 1 TABLET BY MOUTH  EVERY NIGHT AT BEDTIME 90 Tab 3    losartan (COZAAR) 50 mg tablet TAKE 1 TABLET BY MOUTH ONCE DAILY 90 Tab 1    simvastatin (ZOCOR) 40 mg tablet TAKE 1 TABLET BY MOUTH ONCE DAILY AT NIGHT 90 Tab 1    meloxicam (MOBIC) 15 mg tablet TAKE 1 TABLET BY MOUTH  DAILY 90 Tab 1    acetaminophen (TYLENOL) 325 mg tablet Take 2 Tabs by mouth two (2) times a day. 120 Tab 5    raNITIdine (ZANTAC) 150 mg tablet TAKE 1 TABLET BY MOUTH  NIGHTLY 90 Tab 3    aspirin 81 mg chewable tablet Take 1 Tab by mouth daily. 90 Tab 4    JANUMET 50-1,000 mg per tablet Take 1 tablet by mouth two  times daily with meals 180 Tab 5    cholecalciferol (VITAMIN D3) 1,000 unit cap Take 2,000 Units by mouth daily.  menthol (BIOFREEZE, MENTHOL,) 4 % gel Apply top BID 1 Tube 2    TENS unit and electrodes cmpk Use in lower back every day. DX;chronic lower back pain 1 Each 0     No Known Allergies    Family History   Adopted: Yes     Social History     Tobacco Use    Smoking status: Never Smoker    Smokeless tobacco: Never Used   Substance Use Topics    Alcohol use: No       Depression Risk Factor Screening:     3 most recent PHQ Screens 11/14/2019   Little interest or pleasure in doing things Not at all   Feeling down, depressed, irritable, or hopeless Not at all   Total Score PHQ 2 0       Alcohol Risk Factor Screening:   Do you average 1 drink per night or more than 7 drinks a week:  No    On any one occasion in the past three months have you have had more than 3 drinks containing alcohol:  No      Functional Ability and Level of Safety:   Hearing: The patient needs further evaluation. Activities of Daily Living: The home contains: walker  Patient needs help with:  transportation, shopping, preparing meals and housework    Ambulation: with difficulty, uses a walker    Fall Risk:  Fall Risk Assessment, last 12 mths 11/14/2019   Able to walk? Yes   Fall in past 12 months?  No   Fall with injury? -   Number of falls in past 12 months -   Fall Risk Score -       Abuse Screen:  Patient is not abused    Cognitive Screening   Has your family/caregiver stated any concerns about your memory: no  Cognitive Screening: Normal - MMSE (Mini Mental Status Exam)    Patient Care Team   Patient Care Team:  Roseann Webb MD as PCP - General (Internal Medicine)  Roseann Webb MD as PCP - [facility]  Tulio Harvey MD as Consulting Provider (Internal Medicine)  Filemon Gil DPM ([facility])  Joshua York MD (Gastroenterology)  Anais Shaffer DO (Ophthalmology)    Assessment/Plan   Education and counseling provided:  Are appropriate based on today's review and evaluation  End-of-Life planning (with patient's consent)  Pneumococcal Vaccine  Bone mass measurement (DEXA)  Screening for glaucoma  Diabetes screening test    Diagnoses and all orders for this visit:    1.  Encounter for immunization  -     INFLUENZA VACCINE INACTIVATED (IIV), SUBUNIT, ADJUVANTED, IM  -     ADMIN INFLUENZA VIRUS VAC        Health Maintenance Due   Topic Date Due    DTaP/Tdap/Td series (1 - Tdap) 08/13/1958    Shingrix Vaccine Age 50> (1 of 2) 08/13/1987    EYE EXAM RETINAL OR DILATED  07/25/2018    LIPID PANEL Q1  06/09/2019    Influenza Age 5 to Adult  08/01/2019

## 2019-11-14 NOTE — PROGRESS NOTES
HISTORY OF PRESENT ILLNESS  Arelis Romero is a 82 y.o. female here for follow up. She is accompanied by her next her neighbor. Report dysuria and increased urinary frequency for a week. No flank pain or fever. Has hypertension, on multiple medications. No chest pain or palpitation. Has chronic lower back pain, using Tylenol and baclofen. Pain is not controlled. She is wondering if she can get get TENS unit. She is working with physical therapy right now no fall or injury. She is seen for diabetes. Duane Leader He reports medication compliance: compliant all of the time. Diabetic diet compliance: compliant most of the time. Home glucose monitoring: is not performed. Further diabetic ROS: no polyuria or polydipsia, no chest pain, dyspnea or TIA's, no numbness, tingling or pain in extremities, no hypoglycemia. Lab review: labs reviewed, I note that glycosylated hemoglobin normallabs reviewed. Need flu shot. Here for Medicare wellness visit. Has no living will. Diabetes   Pertinent negatives include no chest pain. Hypertension    Pertinent negatives include no chest pain, no blurred vision and no nausea. Cholesterol Problem   Pertinent negatives include no chest pain. Generalized Body Aches   Pertinent negatives include no chest pain. Gait Problem   Pertinent negatives include no chest pain. Back Pain    Associated symptoms include dysuria. Pertinent negatives include no chest pain and no fever. Immunization/Injection   Pertinent negatives include no chest pain. Review of Systems   Constitutional: Negative. Negative for chills and fever. HENT: Negative. Eyes: Negative. Negative for blurred vision and double vision. Respiratory: Negative. Cardiovascular: Negative. Negative for chest pain. Gastrointestinal: Negative for heartburn and nausea. Genitourinary: Positive for dysuria and frequency. Negative for flank pain. Musculoskeletal: Positive for gait problem.  Negative for falls.   Skin: Negative. Psychiatric/Behavioral: Positive for memory loss. Physical Exam   Constitutional: She appears well-developed and well-nourished. No distress. Neck: Normal range of motion. Neck supple. No JVD present. No thyromegaly present. Cardiovascular: Normal rate, regular rhythm, normal heart sounds and intact distal pulses. Pulmonary/Chest: Effort normal. No respiratory distress. She has no wheezes. Abdominal: Soft. Bowel sounds are normal. She exhibits no distension. There is no tenderness. KUB nontender. Musculoskeletal:        Neurological: She displays normal reflexes. She exhibits normal muscle tone. Coordination normal.   Psychiatric: She has a normal mood and affect. Her behavior is normal.       ASSESSMENT and PLAN    Diagnoses and all orders for this visit:    1. Hematuria, unspecified type  -     CULTURE, URINE    2. Encounter for immunization  -     INFLUENZA VACCINE INACTIVATED (IIV), SUBUNIT, ADJUVANTED, IM  -     ADMIN INFLUENZA VIRUS VAC    3. Dysuria  -     AMB POC URINALYSIS DIP STICK AUTO W/O MICRO  UA shows positive nitrate, leukocyte positive blood. Will send out,  -     CULTURE, URINE    4. Essential hypertension    Stable on current regimen, continue same. Will check,  -     METABOLIC PANEL, COMPREHENSIVE    5. Type 2 diabetes mellitus without complication, without long-term current use of insulin (HCC)    Diet controlled. Will check,  -     METABOLIC PANEL, COMPREHENSIVE  -     HEMOGLOBIN A1C WITH EAG  -     LDL, DIRECT    6. Medicare annual wellness visit, subsequent    7. ACP (advance care planning)    8. Vitamin D deficiency  -     VITAMIN D, 25 HYDROXY    9. Cystitis    Need to drink more fluid. Will call in,  -     cefUROXime (CEFTIN) 500 mg tablet; Take 1 Tab by mouth two (2) times a day. Discussed expected course/resolution/complications of diagnosis in detail with patient.    Medication risks/benefits/costs/interactions/alternatives discussed with patient. Pt was given an after visit summary which includes diagnoses, current medications & vitals. Pt expressed understanding with the diagnosis and plan.

## 2019-11-14 NOTE — ACP (ADVANCE CARE PLANNING)
Advance Care Planning (ACP) Provider Conversation Snapshot    Date of ACP Conversation: 11/14/19  Persons included in Conversation:  patient  Length of ACP Conversation in minutes:  16 minutes    Authorized Decision Maker (if patient is incapable of making informed decisions):    This person is:   VERÓNICA daughtertomas            For Patients with Decision Making Capacity:   Values/Goals: Exploration of values, goals, and preferences if recovery is not expected, even with continued medical treatment in the event of:  Imminent death    Conversation Outcomes / Follow-Up Plan:   Recommended completion of Advance Directive form after review of ACP materials and conversation with prospective healthcare agent

## 2019-11-14 NOTE — PATIENT INSTRUCTIONS
Vaccine Information Statement Influenza (Flu) Vaccine (Inactivated or Recombinant): What You Need to Know Many Vaccine Information Statements are available in Hungarian and other languages. See www.immunize.org/vis Hojas de información sobre vacunas están disponibles en español y en muchos otros idiomas. Visite www.immunize.org/vis 1. Why get vaccinated? Influenza vaccine can prevent influenza (flu). Flu is a contagious disease that spreads around the [place] every year, usually between October and May. Anyone can get the flu, but it is more dangerous for some people. Infants and young children, people 72years of age and older, pregnant women, and people with certain health conditions or a weakened immune system are at greatest risk of flu complications. Pneumonia, bronchitis, sinus infections and ear infections are examples of flu-related complications. If you have a medical condition, such as heart disease, cancer or diabetes, flu can make it worse. Flu can cause fever and chills, sore throat, muscle aches, fatigue, cough, headache, and runny or stuffy nose. Some people may have vomiting and diarrhea, though this is more common in children than adults. Each year thousands of people in the [facility] die from flu, and many more are hospitalized. Flu vaccine prevents millions of illnesses and flu-related visits to the doctor each year. 2. Influenza vaccines CDC recommends everyone 10months of age and older get vaccinated every flu season. Children 6 months through 6years of age may need 2 doses during a single flu season. Everyone else needs only 1 dose each flu season. It takes about 2 weeks for protection to develop after vaccination. There are many flu viruses, and they are always changing. Each year a new flu vaccine is made to protect against three or four viruses that are likely to cause disease in the upcoming flu season.  Even when the vaccine doesnt exactly match these viruses, it may still provide some protection. Influenza vaccine does not cause flu. Influenza vaccine may be given at the same time as other vaccines. 3. Talk with your health care provider Tell your vaccine provider if the person getting the vaccine: 
 Has had an allergic reaction after a previous dose of influenza vaccine, or has any severe, life-threatening allergies.  Has ever had Guillain-Barré Syndrome (also called GBS). In some cases, your health care provider may decide to postpone influenza vaccination to a future visit. People with minor illnesses, such as a cold, may be vaccinated. People who are moderately or severely ill should usually wait until they recover before getting influenza vaccine. Your health care provider can give you more information. 4. Risks of a reaction  Soreness, redness, and swelling where shot is given, fever, muscle aches, and headache can happen after influenza vaccine.  There may be a very small increased risk of Guillain-Barré Syndrome (GBS) after inactivated influenza vaccine (the flu shot). [facility] children who get the flu shot along with pneumococcal vaccine (PCV13), and/or DTaP vaccine at the same time might be slightly more likely to have a seizure caused by fever. Tell your health care provider if a child who is getting flu vaccine has ever had a seizure. People sometimes faint after medical procedures, including vaccination. Tell your provider if you feel dizzy or have vision changes or ringing in the ears. As with any medicine, there is a very remote chance of a vaccine causing a severe allergic reaction, other serious injury, or death. 5. What if there is a serious problem? An allergic reaction could occur after the vaccinated person leaves the clinic.  If you see signs of a severe allergic reaction (hives, swelling of the face and throat, difficulty breathing, a fast heartbeat, dizziness, or weakness), call 9-1-1 and get the person to the nearest hospital. 
 
For other signs that concern you, call your health care provider. Adverse reactions should be reported to the Vaccine Adverse Event Reporting System (VAERS). Your health care provider will usually file this report, or you can do it yourself. Visit the VAERS website at www.vaers. hhs.gov or call 1-462.960.7997. VAERS is only for reporting reactions, and VAERS staff do not give medical advice. 6. The National Vaccine Injury Compensation Program 
 
The [facility] Vaccine Injury Compensation Program (VICP) is a federal program that was created to compensate people who may have been injured by certain vaccines. Visit the VICP website at www.[facility].gov/vaccinecompensation or call 2-105.920.6848 to learn about the program and about filing a claim. There is a time limit to file a claim for compensation. 7. How can I learn more?  Ask your health care provider.  Call your local or state health department.  Contact the Centers for Disease Control and Prevention (CDC): 
- Call 1-448.840.7321 (6-075-JIZ-INFO) or 
- Visit CDCs influenza website at www.cdc.gov/flu Vaccine Information Statement (Interim) Inactivated Influenza Vaccine 8/15/2019 
42 U. Orlinda Sandhoff [id number] [facility] and [employer] Centers for Disease Control and Prevention Office Use Only Medicare Wellness Visit, Female The best way to live healthy is to have a lifestyle where you eat a well-balanced diet, exercise regularly, limit alcohol use, and quit all forms of tobacco/nicotine, if applicable. Regular preventive services are another way to keep healthy. Preventive services (vaccines, screening tests, monitoring & exams) can help personalize your care plan, which helps you manage your own care. Screening tests can find health problems at the earliest stages, when they are easiest to treat. [facility] follows the current, evidence-based guidelines published by the [facility] ([facility]) when recommending preventive services for our patients. Because we follow these guidelines, sometimes recommendations change over time as research supports it. (For example, mammograms used to be recommended annually. Even though Medicare will still pay for an annual mammogram, the newer guidelines recommend a mammogram every two years for women of average risk). Of course, you and your doctor may decide to screen more often for some diseases, based on your risk and your co-morbidities (chronic disease you are already diagnosed with). Preventive services for you include: - Medicare offers their members a free annual wellness visit, which is time for you and your primary care provider to discuss and plan for your preventive service needs. Take advantage of this benefit every year! 
-All adults over the age of 72 should receive the recommended pneumonia vaccines. Current USPSTF guidelines recommend a series of two vaccines for the best pneumonia protection.  
-All adults should have a flu vaccine yearly and a tetanus vaccine every 10 years.  
-All adults age 48 and older should receive the shingles vaccines (series of two vaccines).      
-All adults age 38-68 who are overweight should have a diabetes screening test once every three years.  
-All adults born between [birth year] and 1965 should be screened once for Hepatitis C. 
-Other screening tests and preventive services for persons with diabetes include: an eye exam to screen for diabetic retinopathy, a kidney function test, a foot exam, and stricter control over your cholesterol.  
-Cardiovascular screening for adults with routine risk involves an electrocardiogram (ECG) at intervals determined by your doctor.  
-Colorectal cancer screenings should be done for adults age 54-65 with no increased risk factors for colorectal cancer. There are a number of acceptable methods of screening for this type of cancer. Each test has its own benefits and drawbacks. Discuss with your doctor what is most appropriate for you during your annual wellness visit. The different tests include: colonoscopy (considered the best screening method), a fecal occult blood test, a fecal DNA test, and sigmoidoscopy. 
 
-A bone mass density test is recommended when a woman turns 65 to screen for osteoporosis. This test is only recommended one time, as a screening. Some providers will use this same test as a disease monitoring tool if you already have osteoporosis. -Breast cancer screenings are recommended every other year for women of normal risk, age 54-69. 
-Cervical cancer screenings for women over age 72 are only recommended with certain risk factors. Here is a list of your current Health Maintenance items (your personalized list of preventive services) with a due date: 
Health Maintenance Due Topic Date Due  
 DTaP/Tdap/Td  (1 - Tdap) 08/13/1958  Shingles Vaccine (1 of 2) 08/13/1987 [facility]  Eye Exam  07/25/2018  Cholesterol Test   06/09/2019  Flu Vaccine  08/01/2019

## 2019-11-15 LAB
25(OH)D3+25(OH)D2 SERPL-MCNC: 54.9 NG/ML (ref 30–100)
ALBUMIN SERPL-MCNC: 4.7 G/DL (ref 3.5–4.7)
ALBUMIN/GLOB SERPL: 1.6 {RATIO} (ref 1.2–2.2)
ALP SERPL-CCNC: 56 IU/L (ref 39–117)
ALT SERPL-CCNC: 8 IU/L (ref 0–32)
AST SERPL-CCNC: 19 IU/L (ref 0–40)
BILIRUB SERPL-MCNC: 0.2 MG/DL (ref 0–1.2)
BUN SERPL-MCNC: 17 MG/DL (ref 8–27)
BUN/CREAT SERPL: 17 (ref 12–28)
CALCIUM SERPL-MCNC: 9.7 MG/DL (ref 8.7–10.3)
CHLORIDE SERPL-SCNC: 101 MMOL/L (ref 96–106)
CO2 SERPL-SCNC: 22 MMOL/L (ref 20–29)
CREAT SERPL-MCNC: 1.01 MG/DL (ref 0.57–1)
EST. AVERAGE GLUCOSE BLD GHB EST-MCNC: 151 MG/DL
GLOBULIN SER CALC-MCNC: 3 G/DL (ref 1.5–4.5)
GLUCOSE SERPL-MCNC: 95 MG/DL (ref 65–99)
HBA1C MFR BLD: 6.9 % (ref 4.8–5.6)
INTERPRETATION: NORMAL
LDLC SERPL DIRECT ASSAY-MCNC: 65 MG/DL (ref 0–99)
Lab: NORMAL
POTASSIUM SERPL-SCNC: 4.7 MMOL/L (ref 3.5–5.2)
PROT SERPL-MCNC: 7.7 G/DL (ref 6–8.5)
SODIUM SERPL-SCNC: 142 MMOL/L (ref 134–144)

## 2019-11-18 LAB — BACTERIA UR CULT: ABNORMAL

## 2019-11-19 NOTE — PROGRESS NOTES
Creatinine is high, drink plenty of water during the day. A1c is coming back down. Continue reg mine and watch fried, starches and carbohydrates   Ecoli, nitrates and Leukos found in urine.  Antibiotic started   All other labs stable

## 2019-12-02 ENCOUNTER — TELEPHONE (OUTPATIENT)
Dept: INTERNAL MEDICINE CLINIC | Age: 84
End: 2019-12-02

## 2019-12-02 DIAGNOSIS — K25.9 GASTRIC ULCER, UNSPECIFIED CHRONICITY, UNSPECIFIED WHETHER GASTRIC ULCER HEMORRHAGE OR PERFORATION PRESENT: Primary | ICD-10-CM

## 2019-12-02 NOTE — TELEPHONE ENCOUNTER
----- Message from Kike Patel sent at 12/2/2019  4:24 PM EST -----  Regarding: Dr. Vasquez Vallecillo / Telephone  Caller's first and last name: ST COLE NGUYENTL - Daughter  Reason for call: Pt's daughter is requesting Dr. Vasquez Vallecillo prescribe an alternative medication for Zantac for the pt since Zantac was recalled.   Callback required yes/no and why: Yes  Best contact number(s): [phone]  Details to clarify the request:

## 2019-12-04 RX ORDER — FAMOTIDINE 20 MG/1
20 TABLET, FILM COATED ORAL 2 TIMES DAILY
Qty: 60 TAB | Refills: 5 | Status: SHIPPED | OUTPATIENT
Start: 2019-12-04

## 2019-12-04 NOTE — TELEPHONE ENCOUNTER
Requested Prescriptions     Signed Prescriptions Disp Refills    famotidine (PEPCID) 20 mg tablet 60 Tab 5     Sig: Take 1 Tab by mouth two (2) times a day. Authorizing Provider: Jimena Cardenas     Ordering User: Mechelle Acosta, verbal order received.

## 2019-12-29 DIAGNOSIS — M47.22 OSTEOARTHRITIS OF SPINE WITH RADICULOPATHY, CERVICAL REGION: ICD-10-CM

## 2019-12-30 RX ORDER — BACLOFEN 10 MG/1
TABLET ORAL
Qty: 30 TAB | Refills: 0 | Status: SHIPPED | OUTPATIENT
Start: 2019-12-30 | End: 2020-02-03

## 2020-01-17 DIAGNOSIS — M17.0 PRIMARY OSTEOARTHRITIS OF BOTH KNEES: ICD-10-CM

## 2020-01-20 RX ORDER — MELOXICAM 15 MG/1
TABLET ORAL
Qty: 90 TAB | Refills: 1 | Status: SHIPPED | OUTPATIENT
Start: 2020-01-20 | End: 2020-09-21

## 2020-02-02 DIAGNOSIS — M47.22 OSTEOARTHRITIS OF SPINE WITH RADICULOPATHY, CERVICAL REGION: ICD-10-CM

## 2020-02-02 DIAGNOSIS — E78.2 MIXED HYPERLIPIDEMIA: ICD-10-CM

## 2020-02-03 RX ORDER — BACLOFEN 10 MG/1
TABLET ORAL
Qty: 30 TAB | Refills: 0 | Status: SHIPPED | OUTPATIENT
Start: 2020-02-03 | End: 2020-02-25 | Stop reason: SDUPTHER

## 2020-02-03 RX ORDER — SIMVASTATIN 40 MG/1
TABLET, FILM COATED ORAL
Qty: 90 TAB | Refills: 0 | Status: SHIPPED | OUTPATIENT
Start: 2020-02-03 | End: 2020-04-27

## 2020-02-23 DIAGNOSIS — M47.22 OSTEOARTHRITIS OF SPINE WITH RADICULOPATHY, CERVICAL REGION: ICD-10-CM

## 2020-02-23 DIAGNOSIS — I10 ESSENTIAL HYPERTENSION: ICD-10-CM

## 2020-02-24 RX ORDER — AMLODIPINE BESYLATE 2.5 MG/1
TABLET ORAL
Qty: 30 TAB | Refills: 0 | Status: SHIPPED | OUTPATIENT
Start: 2020-02-24 | End: 2020-03-09

## 2020-02-25 RX ORDER — BACLOFEN 10 MG/1
TABLET ORAL
Qty: 30 TAB | Refills: 0 | Status: SHIPPED | OUTPATIENT
Start: 2020-02-25 | End: 2020-04-07

## 2020-02-28 DIAGNOSIS — M17.0 PRIMARY OSTEOARTHRITIS OF BOTH KNEES: ICD-10-CM

## 2020-03-02 DIAGNOSIS — I10 ESSENTIAL HYPERTENSION: ICD-10-CM

## 2020-03-02 RX ORDER — TRAMADOL HYDROCHLORIDE 50 MG/1
50 TABLET ORAL
Qty: 30 TAB | Refills: 0 | Status: SHIPPED | OUTPATIENT
Start: 2020-03-02 | End: 2020-04-01

## 2020-03-02 RX ORDER — LOSARTAN POTASSIUM 50 MG/1
50 TABLET ORAL DAILY
Qty: 90 TAB | Refills: 0 | Status: SHIPPED | OUTPATIENT
Start: 2020-03-02 | End: 2020-03-08

## 2020-03-02 NOTE — TELEPHONE ENCOUNTER
Requested Prescriptions     Pending Prescriptions Disp Refills    losartan (COZAAR) 50 mg tablet 90 Tab 1     11/14/2019  No upcoming  [facility] on file

## 2020-03-05 DIAGNOSIS — I10 ESSENTIAL HYPERTENSION: ICD-10-CM

## 2020-03-08 RX ORDER — LOSARTAN POTASSIUM 50 MG/1
TABLET ORAL
Qty: 90 TAB | Refills: 0 | Status: SHIPPED | OUTPATIENT
Start: 2020-03-08 | End: 2020-05-18

## 2020-04-01 DIAGNOSIS — M17.0 PRIMARY OSTEOARTHRITIS OF BOTH KNEES: ICD-10-CM

## 2020-04-01 RX ORDER — TRAMADOL HYDROCHLORIDE 50 MG/1
50 TABLET ORAL
Qty: 30 TAB | Refills: 0 | OUTPATIENT
Start: 2020-04-01 | End: 2020-05-01

## 2020-04-01 NOTE — TELEPHONE ENCOUNTER
Requested Prescriptions     Pending Prescriptions Disp Refills    traMADoL (ULTRAM) 50 mg tablet 30 Tab 0     Sig: Take 1 Tab by mouth daily as needed for Pain for up to 30 days.    11/14/2019  No upcoming appointments  [facility] on [facility]

## 2020-04-07 DIAGNOSIS — M47.22 OSTEOARTHRITIS OF SPINE WITH RADICULOPATHY, CERVICAL REGION: ICD-10-CM

## 2020-04-07 DIAGNOSIS — I10 ESSENTIAL HYPERTENSION: ICD-10-CM

## 2020-04-07 RX ORDER — BACLOFEN 10 MG/1
TABLET ORAL
Qty: 30 TAB | Refills: 0 | Status: SHIPPED | OUTPATIENT
Start: 2020-04-07 | End: 2020-04-13 | Stop reason: ALTCHOICE

## 2020-04-07 RX ORDER — AMLODIPINE BESYLATE 2.5 MG/1
TABLET ORAL
Qty: 30 TAB | Refills: 0 | Status: SHIPPED | OUTPATIENT
Start: 2020-04-07 | End: 2020-05-18

## 2020-04-13 ENCOUNTER — VIRTUAL VISIT (OUTPATIENT)
Dept: INTERNAL MEDICINE CLINIC | Age: 85
End: 2020-04-13

## 2020-04-13 VITALS — HEIGHT: 67 IN | BODY MASS INDEX: 28.98 KG/M2

## 2020-04-13 DIAGNOSIS — M17.0 PRIMARY OSTEOARTHRITIS OF BOTH KNEES: ICD-10-CM

## 2020-04-13 DIAGNOSIS — M47.22 OSTEOARTHRITIS OF SPINE WITH RADICULOPATHY, CERVICAL REGION: ICD-10-CM

## 2020-04-13 DIAGNOSIS — I10 ESSENTIAL HYPERTENSION: Primary | ICD-10-CM

## 2020-04-13 DIAGNOSIS — E11.9 TYPE 2 DIABETES MELLITUS WITHOUT COMPLICATION, WITHOUT LONG-TERM CURRENT USE OF INSULIN (HCC): ICD-10-CM

## 2020-04-13 RX ORDER — BACLOFEN 10 MG/1
10 TABLET ORAL
Qty: 45 TAB | Refills: 1 | Status: SHIPPED | OUTPATIENT
Start: 2020-04-13 | End: 2020-04-13 | Stop reason: SDUPTHER

## 2020-04-13 RX ORDER — BACLOFEN 10 MG/1
10 TABLET ORAL
Qty: 60 TAB | Refills: 1 | Status: SHIPPED | OUTPATIENT
Start: 2020-04-13 | End: 2020-08-31

## 2020-04-13 RX ORDER — TRAMADOL HYDROCHLORIDE 50 MG/1
50 TABLET ORAL
Qty: 30 TAB | Refills: 0 | Status: SHIPPED | OUTPATIENT
Start: 2020-04-13 | End: 2020-05-18

## 2020-04-13 NOTE — PROGRESS NOTES
Health Maintenance Due   Topic Date Due    DTaP/Tdap/Td series (1 - Tdap) 08/13/1958    Shingrix Vaccine Age 50> (1 of 2) 08/13/1987    Eye Exam Retinal or Dilated  07/25/2018    Lipid Screen  06/09/2019    Foot Exam Q1  03/07/2020       Chief Complaint   Patient presents with    Medication Refill     Tramadol    Diabetes    Hypertension    Cough     and sniffling; believes it is allergies       1. Have you been to the ER, urgent care clinic since your last visit? Hospitalized since your last visit? No    2. Have you seen or consulted any other health care providers outside of the [address] [place] [address] since your last visit? Include any pap smears or colon screening. No    3) Do you have an Advance Directive on file? no    4) Are you interested in receiving information on Advance Directives? NO      Patient is accompanied by daughter I have received verbal consent from [facility] to discuss any/all medical information while they are present in the room.

## 2020-04-13 NOTE — PROGRESS NOTES
Consent: Lynda Piper, who was seen by synchronous (real-time) audio-video technology, and/or her healthcare decision maker, is aware that this patient-initiated, Telehealth encounter on 4/13/2020 is a billable service, with coverage as determined by her insurance carrier. She is aware that she may receive a bill and has provided verbal consent to proceed: Yes. Assessment & Plan:   Diagnoses and all orders for this visit:    1. Essential hypertension  Stable blood pressure. On losartan and amlodipine. 2. Primary osteoarthritis of both knees  -     traMADoL (ULTRAM) 50 mg tablet; Take 1 Tab by mouth daily as needed for Pain for up to 30 days. 3. Osteoarthritis of spine with radiculopathy, cervical region  -     traMADoL (ULTRAM) 50 mg tablet; Take 1 Tab by mouth daily as needed for Pain for up to 30 days. -     baclofen (LIORESAL) 10 mg tablet; Take 1 Tab by mouth two (2) times daily as needed for Muscle Spasm(s). Please ignore previous script  4. type 2 diabetes mellitus    On Janumet. Blood sugars been running okay. Last lab work done for 5 months back. A1c was stable. Will recheck CMP A1c and urine microalbumin. Discussed expected course/resolution/complications of diagnosis in detail with patient. Medication risks/benefits/costs/interactions/alternatives discussed with patient. Pt was given an after visit summary which includes diagnoses, current medications & vitals. Pt expressed understanding with the diagnosis and plan. I spent at least 25 minutes with this established patient, and >50% of the time was spent counseling and/or coordinating care regarding For chronic back pain, nasal congestion, diabetes and hypertension management. 712  Subjective:   Lynda Piper is a 82 y.o. female who was seen for Medication Refill (Tramadol); Diabetes; Hypertension; Cough (and sniffling; believes it is allergies); and Back Pain  Ms.  [address] has been suffering from chronic lower back pain. Back has lot of stiffness. She has been using baclofen twice a day. Her daughter mentioned without baclofen she is not able to function at all. Sometimes she also takes tramadol. Would like to get refill on both. Has diabetes, blood sugar seems within normal limit. Need lab work. Denies chest pain palpitation or shortness of breath. Has hypertension, compliant with medicine. Has mild nasal congestion. No wheezing shortness of breath or fever. Prior to Admission medications    Medication Sig Start Date End Date Taking? Authorizing Provider   traMADoL (ULTRAM) 50 mg tablet Take 1 Tab by mouth daily as needed for Pain for up to 30 days. 4/13/20 5/13/20 Yes Melida Mchugh MD   baclofen (LIORESAL) 10 mg tablet Take 1 Tab by mouth two (2) times daily as needed for Muscle Spasm(s). Please ignore previous script 4/13/20  Yes Melida Mchugh MD   [facility]) 2.5 mg tablet Take 1 tablet by mouth once daily 4/7/20  Yes Melida Mchugh MD   losartan (COZAAR) 50 mg tablet Take 1 tablet by mouth once daily 3/8/20  Yes Tiara Alvarez NP   simvastatin (ZOCOR) 40 mg tablet TAKE 1 TABLET BY MOUTH ONCE DAILY AT NIGHT 2/3/20  Yes Tiara Alvarez NP   meloxicam (MOBIC) 15 mg tablet TAKE 1 TABLET BY MOUTH  DAILY 1/20/20  Yes Melida Mchugh MD   famotidine (PEPCID) 20 mg tablet Take 1 Tab by mouth two (2) times a day. 12/4/19  Yes Melida Mchugh MD   VITAMIN D2 50,000 unit capsule TAKE 1 CAPSULE BY MOUTH ONCE A WEEK 11/1/19  Yes Provider, Historical   metoprolol tartrate (LOPRESSOR) 100 mg IR tablet TAKE 1 TABLET BY MOUTH TWO  TIMES DAILY 9/16/19  Yes Ruby Alejandre NP   furosemide (LASIX) 20 mg tablet TAKE 1 TABLET BY MOUTH  EVERY OTHER DAY 9/16/19  Yes Melida Mchugh MD   PARoxetine (PAXIL) 20 mg tablet TAKE 1 TABLET BY MOUTH  EVERY NIGHT AT BEDTIME 9/16/19  Yes Melida Mchugh MD   acetaminophen (TYLENOL) 325 mg tablet Take 2 Tabs by mouth two (2) times a day.  7/11/19  Yes Melida Mchugh MD   aspirin 81 mg chewable tablet Take 1 Tab by mouth daily. 8/29/18  Yes Yasmeen Reeves MD   JANUMET 50-1,000 mg per tablet Take 1 tablet by mouth two  times daily with meals 8/28/17  Yes Yasmeen Reeves MD   cholecalciferol (VITAMIN D3) 1,000 unit cap Take 2,000 Units by mouth daily. 11/28/16  Yes Yasmeen Reeves MD   baclofen (LIORESAL) 10 mg tablet Take 1 Tab by mouth two (2) times daily as needed for Muscle Spasm(s). 4/13/20 4/13/20  Yasmeen Reeves MD   baclofen (LIORESAL) 10 mg tablet TAKE 1/2 (ONE-HALF) TABLET BY MOUTH THREE TIMES DAILY AS NEEDED FOR BACK PAIN 4/7/20 4/13/20  César Alvarez, NP   cefUROXime (CEFTIN) 500 mg tablet Take 1 Tab by mouth two (2) times a day. 11/14/19 4/13/20  Yasmeen Reeves MD   menthol Zuly Tyson MENTHOL,) 4 % gel Apply top BID 7/11/19   Yasmeen Reeves MD   TENS unit and electrodes cmpk Use in lower back every day. DX;chronic lower back pain 3/7/19   Yasmeen Reeves MD     No Known Allergies    ROS significant for back pain and stiffness. Mild nasal congestion. Objective:   Vital Signs: (As obtained by patient/caregiver at home)  Visit Vitals  Ht 5' 7\" (1.702 m)   BMI 28.98 kg/m²            Constitutional: [x] Appears well-developed and well-nourished [x] No apparent distress      [] Abnormal -     Mental status: [x] Alert and awake  [x] Oriented to person/place/time [x] Able to follow commands    [] Abnormal -         HENT: [x] Normocephalic, atraumatic  [] Abnormal -   [x] Mouth/Throat: Mucous membranes are moist    External Ears [x] Normal  [] Abnormal -    Neck: [x] No visualized mass [] Abnormal -     Pulmonary/Chest: [x] Respiratory effort normal   [x] No visualized signs of difficulty breathing or respiratory distress        [] Abnormal -      Musculoskeletal:   [x] Normal gait with no signs of ataxia   Lower back: Spine tender. Paravertebral stiffness present. Range of motion moderately restricted.     Neurological:        [x] No Facial Asymmetry (Cranial nerve 7 motor function) (limited exam due to video visit)          [x] No gaze palsy        [] Abnormal -          Skin:        [x] No significant exanthematous lesions or discoloration noted on facial skin         [] Abnormal -            Psychiatric:       [x] Normal Affect [] Abnormal -        [x] No Hallucinations    Other pertinent observable physical exam findings:-        We discussed the expected course, resolution and complications of the diagnosis(es) in detail. Medication risks, benefits, costs, interactions, and alternatives were discussed as indicated. I advised her to contact the office if her condition worsens, changes or fails to improve as anticipated. She expressed understanding with the diagnosis(es) and plan. Maru Fuentes is a 82 y.o. female being evaluated by a video visit encounter for concerns as above. A caregiver was present when appropriate. Due to this being a TeleHealth encounter (During EEJGN-00 public health emergency), evaluation of the following organ systems was limited: Vitals/Constitutional/EENT/Resp/CV/GI//MS/Neuro/Skin/Heme-Lymph-Imm. Pursuant to the emergency declaration under the [facility], 1135 waiver authority and the [employer] and [employer] Act, this Virtual  Visit was conducted, with patient's (and/or legal guardian's) consent, to reduce the patient's risk of exposure to COVID-19 and provide necessary medical care. Services were provided through a video synchronous discussion virtually to substitute for in-person clinic visit. Patient and provider were located at their individual homes.         Pamela Jansen MD

## 2020-04-26 DIAGNOSIS — E78.2 MIXED HYPERLIPIDEMIA: ICD-10-CM

## 2020-04-27 RX ORDER — SIMVASTATIN 40 MG/1
TABLET, FILM COATED ORAL
Qty: 90 TAB | Refills: 0 | Status: SHIPPED | OUTPATIENT
Start: 2020-04-27 | End: 2020-07-27

## 2020-05-01 ENCOUNTER — VIRTUAL VISIT (OUTPATIENT)
Dept: INTERNAL MEDICINE CLINIC | Age: 85
End: 2020-05-01

## 2020-05-01 VITALS — BODY MASS INDEX: 28.98 KG/M2 | HEIGHT: 67 IN

## 2020-05-01 DIAGNOSIS — R35.0 URINARY FREQUENCY: Primary | ICD-10-CM

## 2020-05-01 DIAGNOSIS — E11.9 TYPE 2 DIABETES MELLITUS WITHOUT COMPLICATION, WITHOUT LONG-TERM CURRENT USE OF INSULIN (HCC): ICD-10-CM

## 2020-05-01 DIAGNOSIS — R51.9 NONINTRACTABLE EPISODIC HEADACHE, UNSPECIFIED HEADACHE TYPE: ICD-10-CM

## 2020-05-01 DIAGNOSIS — R68.89 NECK PROBLEM: ICD-10-CM

## 2020-05-01 DIAGNOSIS — I10 ESSENTIAL HYPERTENSION: ICD-10-CM

## 2020-05-01 RX ORDER — CIPROFLOXACIN 500 MG/1
500 TABLET ORAL 2 TIMES DAILY
Qty: 14 TAB | Refills: 0 | Status: SHIPPED | OUTPATIENT
Start: 2020-05-01 | End: 2020-09-03 | Stop reason: SDUPTHER

## 2020-05-01 NOTE — PROGRESS NOTES
Satish Trujillo is a 82 y.o. female who was seen by synchronous (real-time) audio-video technology on 5/1/2020. Consent: Satish Trujillo, who was seen by synchronous (real-time) audio-video technology, and/or her healthcare decision maker, is aware that this patient-initiated, Telehealth encounter on 5/1/2020 is a billable service, with coverage as determined by her insurance carrier. She is aware that she may receive a bill and has provided verbal consent to proceed: Yes. Assessment & Plan:   Diagnoses and all orders for this visit:    1. Urinary frequency  -     ciprofloxacin HCl (CIPRO) 500 mg tablet; Take 1 Tab by mouth two (2) times a day. 2. Essential hypertension    3. Type 2 diabetes mellitus without complication, without long-term current use of insulin ([facility]    4. Nonintractable episodic headache, unspecified headache type    5. Neck problem          I spent at least 23 minutes on this visit with this established patient. ([id number])    Subjective:   Satish Trujillo is a 82 y.o. female who was seen for Urinary Odor (frequency, no dysuria); Hypertension; Diabetes; and Fall (Had 2 falls prior to [address] [place] [address], not addressed at that time)    Patient was seen today for a follow up and complaints of urinary frequency. Patient complains of urinary symptoms for the last week. Reports no dysuria. Urine has smell to it and is having some vaginal itching. Also, complains of headaches and that their frequency is picking up. Reports that Tylenol usually helps. Denies any stiff neck or photosensitivity. No slurred speech or weakness     HTN: stable, daughter reports she checks it daily     Denies CP, SOB, fever, chills, flank pain   Prior to Admission medications    Medication Sig Start Date End Date Taking?  Authorizing Provider   simvastatin (ZOCOR) 40 mg tablet Take 1 tablet by mouth nightly 4/27/20  Yes César Alvarez NP   traMADoL (ULTRAM) 50 mg tablet Take 1 Tab by mouth daily as needed for Pain for up to 30 days. 4/13/20 5/13/20 Yes Tyrese Morales MD   baclofen (LIORESAL) 10 mg tablet Take 1 Tab by mouth two (2) times daily as needed for Muscle Spasm(s). Please ignore previous script 4/13/20  Yes Tyrese Morales MD   amLODIPine [facility]) 2.5 mg tablet Take 1 tablet by mouth once daily 4/7/20  Yes Tyrese Morales MD   losartan (COZAAR) 50 mg tablet Take 1 tablet by mouth once daily 3/8/20  Yes Ira Alvarez NP   meloxicam (MOBIC) 15 mg tablet TAKE 1 TABLET BY MOUTH  DAILY 1/20/20  Yes Tyrese Morales MD   famotidine (PEPCID) 20 mg tablet Take 1 Tab by mouth two (2) times a day. 12/4/19  Yes Tyrese Morales MD   VITAMIN D2 50,000 unit capsule TAKE 1 CAPSULE BY MOUTH ONCE A WEEK 11/1/19  Yes Provider, Alysia   metoprolol tartrate (LOPRESSOR) 100 mg IR tablet TAKE 1 TABLET BY MOUTH TWO  TIMES DAILY 9/16/19  Yes Omar Weir NP   furosemide (LASIX) 20 mg tablet TAKE 1 TABLET BY MOUTH  EVERY OTHER DAY 9/16/19  Yes Tyrese Morales MD   PARoxetine (PAXIL) 20 mg tablet TAKE 1 TABLET BY MOUTH  EVERY NIGHT AT BEDTIME 9/16/19  Yes Tyrese Morales MD   acetaminophen (TYLENOL) 325 mg tablet Take 2 Tabs by mouth two (2) times a day. 7/11/19  Yes Tyrese Morales MD   aspirin 81 mg chewable tablet Take 1 Tab by mouth daily. 8/29/18  Yes Tyrese Morales MD   JANUMET 50-1,000 mg per tablet Take 1 tablet by mouth two  times daily with meals 8/28/17  Yes Tyrese Morales MD   cholecalciferol (VITAMIN D3) 1,000 unit cap Take 2,000 Units by mouth daily. 11/28/16  Yes Tyrese Morales MD   menthol Kana Chinchilla MENTHOL,) 4 % gel Apply top BID 7/11/19   Tyrese Morales MD   TENS unit and electrodes cmpk Use in lower back every day.   DX;chronic lower back pain 3/7/19   Tyrese Morales MD     No Known Allergies    Patient Active Problem List   Diagnosis Code    DM (diabetes mellitus) ([facility] E11.9    Mixed hyperlipidemia E78.2    Chronic venous insufficiency I87.2    Depression F32.9    Stomach ulcer K25.9    Diverticula of colon K57.30    Advance care planning Z71.89    Essential hypertension I10    H/O: stroke Z80.78    Type 2 diabetes with nephropathy ([facility] E11.21    Altered mental status R41.82    Acute delirium R41.0    Mild depression ([facility] F32.0     Patient Active Problem List    Diagnosis Date Noted    Mild depression ([facility] 10/29/2018    Acute delirium 06/09/2018    Altered mental status 06/04/2018    Type 2 diabetes with nephropathy ([facility] 03/09/2018    H/O: stroke 11/09/2017    Advance care planning 01/21/2016    Essential hypertension 01/21/2016    Stomach ulcer 03/12/2015    Diverticula of colon 03/12/2015    DM (diabetes mellitus) ([facility] 05/01/2014    Mixed hyperlipidemia 05/01/2014    Chronic venous insufficiency 05/01/2014    Depression 05/01/2014     Current Outpatient Medications   Medication Sig Dispense Refill    ciprofloxacin HCl (CIPRO) 500 mg tablet Take 1 Tab by mouth two (2) times a day. 14 Tab 0    simvastatin (ZOCOR) 40 mg tablet Take 1 tablet by mouth nightly 90 Tab 0    traMADoL (ULTRAM) 50 mg tablet Take 1 Tab by mouth daily as needed for Pain for up to 30 days. 30 Tab 0    baclofen (LIORESAL) 10 mg tablet Take 1 Tab by mouth two (2) times daily as needed for Muscle Spasm(s). Please ignore previous script 60 Tab 1    amLODIPine (NORVASC) 2.5 mg tablet Take 1 tablet by mouth once daily 30 Tab 0    losartan (COZAAR) 50 mg tablet Take 1 tablet by mouth once daily 90 Tab 0    meloxicam (MOBIC) 15 mg tablet TAKE 1 TABLET BY MOUTH  DAILY 90 Tab 1    famotidine (PEPCID) 20 mg tablet Take 1 Tab by mouth two (2) times a day.  60 Tab 5    VITAMIN D2 50,000 unit capsule TAKE 1 CAPSULE BY MOUTH ONCE A WEEK  2    metoprolol tartrate (LOPRESSOR) 100 mg IR tablet TAKE 1 TABLET BY MOUTH TWO  TIMES DAILY 180 Tab 1    furosemide (LASIX) 20 mg tablet TAKE 1 TABLET BY MOUTH  EVERY OTHER DAY 45 Tab 3    PARoxetine (PAXIL) 20 mg tablet TAKE 1 TABLET BY MOUTH  EVERY NIGHT AT BEDTIME 90 Tab 3    acetaminophen (TYLENOL) 325 mg tablet Take 2 Tabs by mouth two (2) times a day. 120 Tab 5    aspirin 81 mg chewable tablet Take 1 Tab by mouth daily. 90 Tab 4    JANUMET 50-1,000 mg per tablet Take 1 tablet by mouth two  times daily with meals 180 Tab 5    cholecalciferol (VITAMIN D3) 1,000 unit cap Take 2,000 Units by mouth daily.  menthol (BIOFREEZE, MENTHOL,) 4 % gel Apply top BID 1 Tube 2    TENS unit and electrodes cmpk Use in lower back every day. DX;chronic lower back pain 1 Each 0     No Known Allergies  Past Medical History:   Diagnosis Date    Arthritis     Chronic pain     Depression     Diabetes ([facility] [address] [address]     Hypercholesterolemia     Hypertension     Stroke ([facility] [address]     TIA 10 yrs back    Stroke ([facility] [address]     2003     Past Surgical History:   Procedure Laterality Date    HX CARPAL TUNNEL RELEASE  1990    HX CATARACT REMOVAL      bilateral    HX HYSTERECTOMY  1986    HX HYSTERECTOMY      HX ORTHOPAEDIC      HX ORTHOPAEDIC      carpel tunnel left    HX ORTHOPAEDIC      left foot heel spur    HX REFRACTIVE SURGERY  2006     Family History   Adopted: Yes     Social History     Tobacco Use    Smoking status: Never Smoker    Smokeless tobacco: Never Used   Substance Use Topics    Alcohol use: No       Review of Systems   Constitutional: Negative for chills and fever. Respiratory: Negative. Cardiovascular: Negative. Gastrointestinal: Negative for abdominal pain and nausea. Genitourinary: Positive for frequency and urgency. Negative for dysuria and flank pain. Vaginal itching   Musculoskeletal: Positive for falls and joint pain. Neurological: Negative. Psychiatric/Behavioral: Negative.         Objective:   Vital Signs: (As obtained by patient/caregiver at home)  Visit Vitals  Ht 5' 7\" (1.702 m)   BMI 28.98 kg/m²        [INSTRUCTIONS:  \"[x]\" Indicates a positive item  \"[]\" Indicates a negative item  -- DELETE ALL ITEMS NOT EXAMINED]    Constitutional: [x] Appears well-developed and well-nourished [x] No apparent distress      [] Abnormal -     Mental status: [x] Alert and awake  [x] Oriented to person/place/time [x] Able to follow commands    [] Abnormal -     Eyes:   EOM    [x]  Normal    [] Abnormal -   Sclera  [x]  Normal    [] Abnormal -          Discharge [x]  None visible   [] Abnormal -     HENT: [x] Normocephalic, atraumatic  [] Abnormal -   [x] Mouth/Throat: Mucous membranes are moist    External Ears [x] Normal  [] Abnormal -    Neck: [x] No visualized mass [] Abnormal -     Pulmonary/Chest: [x] Respiratory effort normal   [x] No visualized signs of difficulty breathing or respiratory distress        [] Abnormal -      Musculoskeletal:   [x] Normal gait with no signs of ataxia         [x] Normal range of motion of neck        [] Abnormal -     Neurological:        [x] No Facial Asymmetry (Cranial nerve 7 motor function) (limited exam due to video visit)          [x] No gaze palsy        [] Abnormal -          Skin:        [x] No significant exanthematous lesions or discoloration noted on facial skin         [] Abnormal -            Psychiatric:       [x] Normal Affect [] Abnormal -        [x] No Hallucinations    Other pertinent observable physical exam findings:-        We discussed the expected course, resolution and complications of the diagnosis(es) in detail. Medication risks, benefits, costs, interactions, and alternatives were discussed as indicated. I advised her to contact the office if her condition worsens, changes or fails to improve as anticipated. She expressed understanding with the diagnosis(es) and plan. Ezra Morrison is a 82 y.o. female who was evaluated by a video visit encounter for concerns as above. Patient identification was verified prior to start of the visit. A caregiver was present when appropriate.  Due to this being a TeleHealth encounter (During [employer]-76 public health emergency), evaluation of the following organ systems was limited: Vitals/Constitutional/EENT/Resp/CV/GI//MS/Neuro/Skin/Heme-Lymph-Imm. Pursuant to the emergency declaration under the [address] [place] [address], [facility] waiver authority and the [employer] Act, this Virtual  Visit was conducted, with patient's (and/or legal guardian's) consent, to reduce the patient's risk of exposure to COVID-19 and provide necessary medical care. Services were provided through a video synchronous discussion virtually to substitute for in-person clinic visit. Patient and provider were located at their individual homes.       Cy Balderas NP

## 2020-05-01 NOTE — PROGRESS NOTES
Health Maintenance Due   Topic Date Due    DTaP/Tdap/Td series (1 - Tdap) 08/13/1958    Shingrix Vaccine Age 50> (1 of 2) 08/13/1987    Eye Exam Retinal or Dilated  07/25/2018    Lipid Screen  06/09/2019    Foot Exam Q1  03/07/2020    A1C test (Diabetic or Prediabetic)  05/14/2020       No chief complaint on file. 1. Have you been to the ER, urgent care clinic since your last visit? Hospitalized since your last visit? No    2. Have you seen or consulted any other health care providers outside of the [address] [place] [address] since your last visit? Include any pap smears or colon screening. No    3) Do you have an Advance Directive on file? no    4) Are you interested in receiving information on Advance Directives? NO      Patient is accompanied by daughter I have received verbal consent from [facility] to discuss any/all medical information while they are present in the room.

## 2020-05-06 DIAGNOSIS — I10 ESSENTIAL HYPERTENSION WITH GOAL BLOOD PRESSURE LESS THAN 130/85: ICD-10-CM

## 2020-05-06 RX ORDER — METOPROLOL TARTRATE 100 MG/1
TABLET ORAL
Qty: 180 TAB | Refills: 1 | Status: SHIPPED | OUTPATIENT
Start: 2020-05-06 | End: 2020-09-21

## 2020-05-17 DIAGNOSIS — M17.0 PRIMARY OSTEOARTHRITIS OF BOTH KNEES: ICD-10-CM

## 2020-05-17 DIAGNOSIS — I10 ESSENTIAL HYPERTENSION: ICD-10-CM

## 2020-05-17 DIAGNOSIS — M47.22 OSTEOARTHRITIS OF SPINE WITH RADICULOPATHY, CERVICAL REGION: ICD-10-CM

## 2020-05-18 RX ORDER — TRAMADOL HYDROCHLORIDE 50 MG/1
TABLET ORAL
Qty: 30 TAB | Refills: 0 | Status: SHIPPED | OUTPATIENT
Start: 2020-05-18 | End: 2020-08-31

## 2020-05-18 RX ORDER — LOSARTAN POTASSIUM 50 MG/1
TABLET ORAL
Qty: 90 TAB | Refills: 0 | Status: SHIPPED | OUTPATIENT
Start: 2020-05-18 | End: 2020-07-27

## 2020-05-18 RX ORDER — AMLODIPINE BESYLATE 2.5 MG/1
TABLET ORAL
Qty: 30 TAB | Refills: 0 | Status: SHIPPED | OUTPATIENT
Start: 2020-05-18 | End: 2020-06-01

## 2020-05-19 ENCOUNTER — TELEPHONE (OUTPATIENT)
Dept: INTERNAL MEDICINE CLINIC | Age: 85
End: 2020-05-19

## 2020-05-19 DIAGNOSIS — B37.9 YEAST INFECTION: Primary | ICD-10-CM

## 2020-05-19 RX ORDER — FLUCONAZOLE 150 MG/1
150 TABLET ORAL DAILY
Qty: 1 TAB | Refills: 0 | Status: SHIPPED | OUTPATIENT
Start: 2020-05-19 | End: 2020-05-20

## 2020-06-06 DIAGNOSIS — I10 ESSENTIAL HYPERTENSION: ICD-10-CM

## 2020-06-08 RX ORDER — AMLODIPINE BESYLATE 2.5 MG/1
TABLET ORAL
Qty: 30 TAB | Refills: 0 | Status: SHIPPED | OUTPATIENT
Start: 2020-06-08 | End: 2020-07-27

## 2020-06-12 LAB
ALBUMIN SERPL-MCNC: 4.6 G/DL (ref 3.6–4.6)
ALBUMIN/CREAT UR: 18 MG/G CREAT (ref 0–29)
ALBUMIN/GLOB SERPL: 1.7 {RATIO} (ref 1.2–2.2)
ALP SERPL-CCNC: 47 IU/L (ref 39–117)
ALT SERPL-CCNC: 9 IU/L (ref 0–32)
AST SERPL-CCNC: 20 IU/L (ref 0–40)
BILIRUB SERPL-MCNC: 0.2 MG/DL (ref 0–1.2)
BUN SERPL-MCNC: 17 MG/DL (ref 8–27)
BUN/CREAT SERPL: 16 (ref 12–28)
CALCIUM SERPL-MCNC: 9.6 MG/DL (ref 8.7–10.3)
CHLORIDE SERPL-SCNC: 103 MMOL/L (ref 96–106)
CO2 SERPL-SCNC: 25 MMOL/L (ref 20–29)
CREAT SERPL-MCNC: 1.08 MG/DL (ref 0.57–1)
CREAT UR-MCNC: 119.5 MG/DL
EST. AVERAGE GLUCOSE BLD GHB EST-MCNC: 169 MG/DL
GLOBULIN SER CALC-MCNC: 2.7 G/DL (ref 1.5–4.5)
GLUCOSE SERPL-MCNC: 120 MG/DL (ref 65–99)
HBA1C MFR BLD: 7.5 % (ref 4.8–5.6)
INTERPRETATION: NORMAL
Lab: NORMAL
MICROALBUMIN UR-MCNC: 22 UG/ML
POTASSIUM SERPL-SCNC: 5.3 MMOL/L (ref 3.5–5.2)
PROT SERPL-MCNC: 7.3 G/DL (ref 6–8.5)
SODIUM SERPL-SCNC: 142 MMOL/L (ref 134–144)

## 2020-07-26 DIAGNOSIS — E78.2 MIXED HYPERLIPIDEMIA: ICD-10-CM

## 2020-07-27 RX ORDER — SIMVASTATIN 40 MG/1
TABLET, FILM COATED ORAL
Qty: 90 TAB | Refills: 0 | Status: SHIPPED | OUTPATIENT
Start: 2020-07-27 | End: 2020-11-09 | Stop reason: SDUPTHER

## 2020-08-11 ENCOUNTER — VIRTUAL VISIT (OUTPATIENT)
Dept: INTERNAL MEDICINE CLINIC | Age: 85
End: 2020-08-11
Payer: MEDICARE

## 2020-08-11 DIAGNOSIS — G89.29 CHRONIC BILATERAL LOW BACK PAIN WITH BILATERAL SCIATICA: ICD-10-CM

## 2020-08-11 DIAGNOSIS — I10 ESSENTIAL HYPERTENSION: ICD-10-CM

## 2020-08-11 DIAGNOSIS — E11.9 TYPE 2 DIABETES MELLITUS WITHOUT COMPLICATION, WITHOUT LONG-TERM CURRENT USE OF INSULIN (HCC): ICD-10-CM

## 2020-08-11 DIAGNOSIS — M25.511 BILATERAL SHOULDER PAIN, UNSPECIFIED CHRONICITY: ICD-10-CM

## 2020-08-11 DIAGNOSIS — M47.896 OTHER OSTEOARTHRITIS OF SPINE, LUMBAR REGION: ICD-10-CM

## 2020-08-11 DIAGNOSIS — M79.89 BILATERAL SWELLING OF FEET: Primary | ICD-10-CM

## 2020-08-11 DIAGNOSIS — M54.42 CHRONIC BILATERAL LOW BACK PAIN WITH BILATERAL SCIATICA: ICD-10-CM

## 2020-08-11 DIAGNOSIS — M54.41 CHRONIC BILATERAL LOW BACK PAIN WITH BILATERAL SCIATICA: ICD-10-CM

## 2020-08-11 DIAGNOSIS — M25.512 BILATERAL SHOULDER PAIN, UNSPECIFIED CHRONICITY: ICD-10-CM

## 2020-08-11 PROCEDURE — 99214 OFFICE O/P EST MOD 30 MIN: CPT | Performed by: INTERNAL MEDICINE

## 2020-08-11 PROCEDURE — 3051F HG A1C>EQUAL 7.0%<8.0%: CPT | Performed by: INTERNAL MEDICINE

## 2020-08-11 RX ORDER — METOLAZONE 5 MG/1
5 TABLET ORAL DAILY
Qty: 7 TAB | Refills: 0 | Status: SHIPPED | OUTPATIENT
Start: 2020-08-11 | End: 2020-09-03 | Stop reason: ALTCHOICE

## 2020-08-11 NOTE — PROGRESS NOTES
Health Maintenance Due   Topic Date Due    DTaP/Tdap/Td series (1 - Tdap) 08/13/1958    Shingrix Vaccine Age 50> (1 of 2) 08/13/1987    Eye Exam Retinal or Dilated  07/25/2018    Lipid Screen  06/09/2019    Foot Exam Q1  03/07/2020    Influenza Age 5 to Adult  08/01/2020       Chief Complaint   Patient presents with    Arm Pain     right arm entire arm started yesterday    Foot Swelling     bilateral feet       1. Have you been to the ER, urgent care clinic since your last visit? Hospitalized since your last visit? No    2. Have you seen or consulted any other health care providers outside of the [address] [place] [address] since your last visit? Include any pap smears or colon screening. No    3) Do you have an Advance Directive on file? no    4) Are you interested in receiving information on Advance Directives? NO      Patient is accompanied by self I have received verbal consent from [facility] to discuss any/all medical information while they are present in the room.

## 2020-08-11 NOTE — PROGRESS NOTES
Leonette Cranker is a 82 y.o. female who was seen by synchronous (real-time) audio-video technology on 8/11/2020 for Arm Pain (right arm entire arm started yesterday) and Foot Swelling (bilateral feet)        Assessment & Plan:   Diagnoses and all orders for this visit:    1. Bilateral swelling of feet  -     metOLazone (ZAROXOLYN) 5 mg tablet; Take 1 Tab by mouth daily. 2. Essential hypertension    3. Type 2 diabetes mellitus without complication, without long-term current use of insulin ([facility] [address]    4. Bilateral shoulder pain, unspecified chronicity    5. Other osteoarthritis of spine, lumbar region    6. Chronic bilateral low back pain with bilateral sciatica    Will add metolazone since Cr. Was slightly elevated in June. Is on CCB, but a low dose for 7 days. Encourage a cardiac diet and elevated as tolerated      Patient reports that she has not taken Tylenol or NSAIDS. Reccommended taking Tylenol 650 mg every 6 hours. Subjective:     Patient with a history of DM, HTN, back pain. Was seen for a a follow up, as well as concerns with arm pain and ankle swelling. Reports that the pain began last night. States that it happens when lifting the arm and brush her hair. No pain when it is not moving it. Has used heating pads and it has helped. Reports that the pain begins in her shoulders and moves down. Rates the pain a 4/10 when it occurs. Reports ankle swelling for the last week. Reports that she has not been elevating like she should and sometimes monitors her salt. Denies calf pain. Last Cr was slightly elevated     Denies CP, SOB, fever     Prior to Admission medications    Medication Sig Start Date End Date Taking?  Authorizing Provider   simvastatin (ZOCOR) 40 mg tablet Take 1 tablet by mouth nightly 7/27/20   Doug Ingram NP   losartan (COZAAR) 50 mg tablet Take 1 tablet by mouth once daily 7/27/20   Jeremy Rush MD   amLODIPine (NORVASC) 2.5 mg tablet Take 1 tablet by mouth once daily 7/27/20   Peter Morrissey MD   metoprolol tartrate (LOPRESSOR) 100 mg IR tablet TAKE 1 TABLET BY MOUTH TWO  TIMES DAILY 5/6/20   Sudheer Alvarez, NP   ciprofloxacin HCl (CIPRO) 500 mg tablet Take 1 Tab by mouth two (2) times a day. 5/1/20   Sudheer Alvarez, NP   baclofen (LIORESAL) 10 mg tablet Take 1 Tab by mouth two (2) times daily as needed for Muscle Spasm(s). Please ignore previous script 4/13/20   Peter Morrissey MD   meloxicam [facility]) 15 mg tablet TAKE 1 TABLET BY MOUTH  DAILY 1/20/20   Peter Morrissey MD   famotidine (PEPCID) 20 mg tablet Take 1 Tab by mouth two (2) times a day. 12/4/19   Peter Morrissey MD   VITAMIN D2 50,000 unit capsule TAKE 1 CAPSULE BY MOUTH ONCE A WEEK 11/1/19   Provider, Historical   furosemide (LASIX) 20 mg tablet TAKE 1 TABLET BY MOUTH  EVERY OTHER DAY 9/16/19   Peter Morrissey MD   PARoxetine (PAXIL) 20 mg tablet TAKE 1 TABLET BY MOUTH  EVERY NIGHT AT BEDTIME 9/16/19   Peter Morrissey MD   acetaminophen (TYLENOL) 325 mg tablet Take 2 Tabs by mouth two (2) times a day. 7/11/19   Peter Morrissey MD   menthol Annitacarolyn Merritt, MENTHOL,) 4 % gel Apply top BID 7/11/19   Peter Morrissey MD   TENS unit and electrodes cmpk Use in lower back every day. DX;chronic lower back pain 3/7/19   Peter Morrissey MD   aspirin 81 mg chewable tablet Take 1 Tab by mouth daily. 8/29/18   Peter Morrissey MD   JANUMET 50-1,000 mg per tablet Take 1 tablet by mouth two  times daily with meals 8/28/17   Peter Morrissey MD   cholecalciferol (VITAMIN D3) 1,000 unit cap Take 2,000 Units by mouth daily.  11/28/16   Peter Morrissey MD     Patient Active Problem List   Diagnosis Code    DM (diabetes mellitus) ([facility] [address] [address] E11.9    Mixed hyperlipidemia E78.2    Chronic venous insufficiency I87.2    Depression F32.9    Stomach ulcer K25.9    Diverticula of colon K57.30    Advance care planning Z71.89    Essential hypertension I10    H/O: stroke Z80.78    Type 2 diabetes with nephropathy ([facility] E11.21    Altered mental status R41.82    Acute delirium R41.0    Mild depression ([facility] F32.0     Patient Active Problem List    Diagnosis Date Noted    Mild depression ([facility] 10/29/2018    Acute delirium 06/09/2018    Altered mental status 06/04/2018    Type 2 diabetes with nephropathy ([facility] 03/09/2018    H/O: stroke 11/09/2017    Advance care planning 01/21/2016    Essential hypertension 01/21/2016    Stomach ulcer 03/12/2015    Diverticula of colon 03/12/2015    DM (diabetes mellitus) ([facility] 05/01/2014    Mixed hyperlipidemia 05/01/2014    Chronic venous insufficiency 05/01/2014    Depression 05/01/2014     Current Outpatient Medications   Medication Sig Dispense Refill    metOLazone (ZAROXOLYN) 5 mg tablet Take 1 Tab by mouth daily. 7 Tab 0    simvastatin (ZOCOR) 40 mg tablet Take 1 tablet by mouth nightly 90 Tab 0    losartan (COZAAR) 50 mg tablet Take 1 tablet by mouth once daily 90 Tab 0    amLODIPine (NORVASC) 2.5 mg tablet Take 1 tablet by mouth once daily 30 Tab 0    metoprolol tartrate (LOPRESSOR) 100 mg IR tablet TAKE 1 TABLET BY MOUTH TWO  TIMES DAILY 180 Tab 1    ciprofloxacin HCl (CIPRO) 500 mg tablet Take 1 Tab by mouth two (2) times a day. 14 Tab 0    baclofen (LIORESAL) 10 mg tablet Take 1 Tab by mouth two (2) times daily as needed for Muscle Spasm(s). Please ignore previous script 60 Tab 1    meloxicam (MOBIC) 15 mg tablet TAKE 1 TABLET BY MOUTH  DAILY 90 Tab 1    famotidine (PEPCID) 20 mg tablet Take 1 Tab by mouth two (2) times a day. 60 Tab 5    VITAMIN D2 50,000 unit capsule TAKE 1 CAPSULE BY MOUTH ONCE A WEEK  2    furosemide (LASIX) 20 mg tablet TAKE 1 TABLET BY MOUTH  EVERY OTHER DAY 45 Tab 3    PARoxetine (PAXIL) 20 mg tablet TAKE 1 TABLET BY MOUTH  EVERY NIGHT AT BEDTIME 90 Tab 3    acetaminophen (TYLENOL) 325 mg tablet Take 2 Tabs by mouth two (2) times a day. 120 Tab 5    menthol (BIOFREEZE, MENTHOL,) 4 % gel Apply top BID 1 Tube 2    TENS unit and electrodes cmpk Use in lower back every day.   DX;chronic lower back pain 1 Each 0    aspirin 81 mg chewable tablet Take 1 Tab by mouth daily. 90 Tab 4    JANUMET 50-1,000 mg per tablet Take 1 tablet by mouth two  times daily with meals 180 Tab 5    cholecalciferol (VITAMIN D3) 1,000 unit cap Take 2,000 Units by mouth daily. No Known Allergies  Past Medical History:   Diagnosis Date    Arthritis     Chronic pain     Depression     Diabetes ([facility]     Hypercholesterolemia     Hypertension     [facility]     TIA 10 yrs back    Stroke ([facility]     2003     Past Surgical History:   Procedure Laterality Date    HX CARPAL TUNNEL RELEASE  1990    HX CATARACT REMOVAL      bilateral    HX HYSTERECTOMY  1986    HX HYSTERECTOMY      HX ORTHOPAEDIC      HX ORTHOPAEDIC      carpel tunnel left    HX ORTHOPAEDIC      left foot heel spur    HX REFRACTIVE SURGERY  2006     Family History   Adopted: Yes     Social History     Tobacco Use    Smoking status: Never Smoker    Smokeless tobacco: Never Used   Substance Use Topics    Alcohol use: No       Review of Systems   Constitutional: Negative for chills and fever. Respiratory: Negative. Cardiovascular: Negative. Negative for chest pain. Gastrointestinal: Negative. Genitourinary: Negative. Musculoskeletal: Positive for back pain and joint pain. Negative for falls. Neurological: Negative. Psychiatric/Behavioral: Negative.         Objective:     Patient-Reported Vitals 8/11/2020   Patient-Reported Height 5f7        [INSTRUCTIONS:  \"[x]\" Indicates a positive item  \"[]\" Indicates a negative item  -- DELETE ALL ITEMS NOT EXAMINED]    Constitutional: [x] Appears well-developed and well-nourished [x] No apparent distress      [] Abnormal -     Mental status: [x] Alert and awake  [x] Oriented to person/place/time [x] Able to follow commands    [] Abnormal -     Neck: [x] No visualized mass [] Abnormal -     Pulmonary/Chest: [x] Respiratory effort normal   [x] No visualized signs of difficulty breathing or respiratory distress        [] Abnormal -      Musculoskeletal:   [x] Normal gait with no signs of ataxia         [x] Normal range of motion of neck        [x] Abnormal - bilateral ankles with edema. Does not appear pitting     Neurological:        [x] No Facial Asymmetry (Cranial nerve 7 motor function) (limited exam due to video visit)          [x] No gaze palsy        [] Abnormal -          Skin:        [x] No significant exanthematous lesions or discoloration noted on facial skin         [] Abnormal -            Psychiatric:       [x] Normal Affect [] Abnormal -        [x] No Hallucinations    Other pertinent observable physical exam findings:-        We discussed the expected course, resolution and complications of the diagnosis(es) in detail. Medication risks, benefits, costs, interactions, and alternatives were discussed as indicated. I advised her to contact the office if her condition worsens, changes or fails to improve as anticipated. She expressed understanding with the diagnosis(es) and plan. [facility], who was evaluated through a patient-initiated, synchronous (real-time) audio-video encounter, and/or her healthcare decision maker, is aware that it is a billable service, with coverage as determined by her insurance carrier. She provided verbal consent to proceed: Yes, and patient identification was verified. It was conducted pursuant to the emergency declaration under the [address] [place] [address] authority and the [employer] Act. A caregiver was present when appropriate. Ability to conduct physical exam was limited. I was in the office. The patient was in the office.       Tushar Gomes NP

## 2020-08-30 DIAGNOSIS — M47.22 OSTEOARTHRITIS OF SPINE WITH RADICULOPATHY, CERVICAL REGION: ICD-10-CM

## 2020-08-30 DIAGNOSIS — M17.0 PRIMARY OSTEOARTHRITIS OF BOTH KNEES: ICD-10-CM

## 2020-08-31 RX ORDER — TRAMADOL HYDROCHLORIDE 50 MG/1
TABLET ORAL
Qty: 30 TAB | Refills: 0 | Status: SHIPPED | OUTPATIENT
Start: 2020-08-31 | End: 2020-12-28

## 2020-08-31 RX ORDER — BACLOFEN 10 MG/1
TABLET ORAL
Qty: 60 TAB | Refills: 0 | Status: SHIPPED | OUTPATIENT
Start: 2020-08-31 | End: 2020-11-09

## 2020-09-03 ENCOUNTER — VIRTUAL VISIT (OUTPATIENT)
Dept: INTERNAL MEDICINE CLINIC | Age: 85
End: 2020-09-03
Payer: MEDICARE

## 2020-09-03 DIAGNOSIS — N30.90 CYSTITIS: ICD-10-CM

## 2020-09-03 DIAGNOSIS — R35.0 URINARY FREQUENCY: ICD-10-CM

## 2020-09-03 DIAGNOSIS — I10 ESSENTIAL HYPERTENSION: Primary | ICD-10-CM

## 2020-09-03 DIAGNOSIS — E11.9 TYPE 2 DIABETES MELLITUS WITHOUT COMPLICATION, WITHOUT LONG-TERM CURRENT USE OF INSULIN (HCC): ICD-10-CM

## 2020-09-03 DIAGNOSIS — I10 ESSENTIAL HYPERTENSION WITH GOAL BLOOD PRESSURE LESS THAN 130/85: ICD-10-CM

## 2020-09-03 DIAGNOSIS — M79.89 BILATERAL SWELLING OF FEET: ICD-10-CM

## 2020-09-03 PROCEDURE — G8419 CALC BMI OUT NRM PARAM NOF/U: HCPCS | Performed by: INTERNAL MEDICINE

## 2020-09-03 PROCEDURE — G8427 DOCREV CUR MEDS BY ELIG CLIN: HCPCS | Performed by: INTERNAL MEDICINE

## 2020-09-03 PROCEDURE — G8756 NO BP MEASURE DOC: HCPCS | Performed by: INTERNAL MEDICINE

## 2020-09-03 PROCEDURE — 3051F HG A1C>EQUAL 7.0%<8.0%: CPT | Performed by: INTERNAL MEDICINE

## 2020-09-03 PROCEDURE — 99214 OFFICE O/P EST MOD 30 MIN: CPT | Performed by: INTERNAL MEDICINE

## 2020-09-03 PROCEDURE — G9717 DOC PT DX DEP/BP F/U NT REQ: HCPCS | Performed by: INTERNAL MEDICINE

## 2020-09-03 PROCEDURE — 1090F PRES/ABSN URINE INCON ASSESS: CPT | Performed by: INTERNAL MEDICINE

## 2020-09-03 PROCEDURE — G8536 NO DOC ELDER MAL SCRN: HCPCS | Performed by: INTERNAL MEDICINE

## 2020-09-03 PROCEDURE — G8399 PT W/DXA RESULTS DOCUMENT: HCPCS | Performed by: INTERNAL MEDICINE

## 2020-09-03 PROCEDURE — 1101F PT FALLS ASSESS-DOCD LE1/YR: CPT | Performed by: INTERNAL MEDICINE

## 2020-09-03 RX ORDER — CIPROFLOXACIN 500 MG/1
500 TABLET ORAL 2 TIMES DAILY
Qty: 10 TAB | Refills: 0 | Status: SHIPPED | OUTPATIENT
Start: 2020-09-03 | End: 2020-11-06 | Stop reason: ALTCHOICE

## 2020-09-03 RX ORDER — DEXTROMETHORPHAN HYDROBROMIDE, GUAIFENESIN 5; 100 MG/5ML; MG/5ML
650 LIQUID ORAL
COMMUNITY

## 2020-09-03 RX ORDER — FUROSEMIDE 20 MG/1
TABLET ORAL
Qty: 45 TAB | Refills: 3 | Status: SHIPPED | OUTPATIENT
Start: 2020-09-03 | End: 2020-09-21

## 2020-09-03 NOTE — PROGRESS NOTES
Health Maintenance Due   Topic Date Due    DTaP/Tdap/Td series (1 - Tdap) 08/13/1958    Shingrix Vaccine Age 50> (1 of 2) 08/13/1987    Eye Exam Retinal or Dilated  07/25/2018    Lipid Screen  06/09/2019    Foot Exam Q1  03/07/2020    Flu Vaccine (1) 09/01/2020       Chief Complaint   Patient presents with    Urinary Odor     and dysuria    Leg Swelling       1. Have you been to the ER, urgent care clinic since your last visit? Hospitalized since your last visit? No    2. Have you seen or consulted any other health care providers outside of the [address] [place] [address] since your last visit? Include any pap smears or colon screening. No    3) Do you have an Advance Directive on file? no    4) Are you interested in receiving information on Advance Directives? NO      Patient is accompanied by self I have received verbal consent from [facility] to discuss any/all medical information while they are present in the room. Awake/Alert

## 2020-09-03 NOTE — PROGRESS NOTES
Leela Nuñez is a 82 y.o. female who was seen by synchronous (real-time) audio-video technology on 9/3/2020 for Urinary Odor (and dysuria) and Leg Swelling        Assessment & Plan:   Diagnoses and all orders for this visit:    1. Essential hypertension  Blood pressure. On amlodipine losartan. CMP stable. 2. Bilateral swelling of feet    She was prescribed metolazone, I do not want her to take metolazone. I have talked to her grandson that she may take Lasix 1 tablet every day and extra another tablet on Monday and Friday. Need to do leg elevation and watch salt. Will give,  -     furosemide (LASIX) 20 mg tablet; 1 tablet p.o. daily and another tablet on Monday and Friday every week. Discontinue metolazone. 4. Type 2 diabetes mellitus without complication, without long-term current use of insulin ([facility])  A1c stable. On Janumet. 5. Urinary frequency  -     ciprofloxacin HCl (CIPRO) 500 mg tablet; Take 1 Tab by mouth two (2) times a day. 6. Cystitis    Has a increased urinary frequency with strong urinary odor. She is not able to come in the office. Will call in,  -     ciprofloxacin HCl (CIPRO) 500 mg tablet; Take 1 Tab by mouth two (2) times a day. Advised to drink more fluids. If symptoms persist, she needs to come here for urine sample. I spent at least 25 minutes on this visit with this established patient. Subjective:     Ms. Alejandro Lantigua [place] reports increased urinary frequency and strong urinary odor for last 5 to 7 days. She has chronic back pain so not sure if she has flank pain also. No fever. Has diabetes, compliant with medications. Monitoring blood sugar closely. A1c seems stable. Has hypertension, compliant with medicine. Denies chest pain palpitation or shortness of breath. She has chronic back pain. Taking tramadol and muscle relaxer. Doing well. Has bilateral leg swelling and feet swelling. She is taking Lasix 20 mg a day. No shortness of breath or orthopnea. Leg swelling is not down. She was prescribed metolazone by my nurse practitioner. She did not start it. Prior to Admission medications    Medication Sig Start Date End Date Taking? Authorizing Provider   acetaminophen (TYLENOL) 650 mg TbER Take 650 mg by mouth two (2) times daily as needed for Pain. Yes Provider, Historical   traMADoL (ULTRAM) 50 mg tablet TAKE 1 TABLET BY MOUTH ONCE DAILY AS NEEDED FOR PAIN FOR  UP  TO  30  DAYS 8/31/20 9/30/20 Yes Steve Nelson MD   baclofen (LIORESAL) 10 mg tablet TAKE 1 TABLET BY MOUTH TWICE DAILY AS NEEDED FOR MUSCLE SPASM 8/31/20  Yes Steve Nelson MD   simvastatin (ZOCOR) 40 mg tablet Take 1 tablet by mouth nightly 7/27/20  Yes Polly Alvarez, NP   losartan (COZAAR) 50 mg tablet Take 1 tablet by mouth once daily 7/27/20  Yes Steve Nelson MD   amLODIPine (NORVASC) 2.5 mg tablet Take 1 tablet by mouth once daily 7/27/20  Yes Steve Nelson MD   metoprolol tartrate (LOPRESSOR) 100 mg IR tablet TAKE 1 TABLET BY MOUTH TWO  TIMES DAILY 5/6/20  Yes Polly Alvarez, NP   meloxicam (MOBIC) 15 mg tablet TAKE 1 TABLET BY MOUTH  DAILY 1/20/20  Yes Steve Nelson MD   famotidine (PEPCID) 20 mg tablet Take 1 Tab by mouth two (2) times a day. 12/4/19  Yes Steve Nelson MD   VITAMIN D2 50,000 unit capsule TAKE 1 CAPSULE BY MOUTH ONCE A WEEK 11/1/19  Yes Provider, Historical   furosemide (LASIX) 20 mg tablet TAKE 1 TABLET BY MOUTH  EVERY OTHER DAY 9/16/19  Yes Steve Nelson MD   PARoxetine (PAXIL) 20 mg tablet TAKE 1 TABLET BY MOUTH  EVERY NIGHT AT BEDTIME 9/16/19  Yes Steve Nelson MD   aspirin 81 mg chewable tablet Take 1 Tab by mouth daily. 8/29/18  Yes Steve Nelson MD   JANUMET 50-1,000 mg per tablet Take 1 tablet by mouth two  times daily with meals 8/28/17  Yes Steve Nelson MD   cholecalciferol (VITAMIN D3) 1,000 unit cap Take 2,000 Units by mouth daily. 11/28/16  Yes Steve Nelson MD   metOLazone (ZAROXOLYN) 5 mg tablet Take 1 Tab by mouth daily.  8/11/20 9/3/20  Chayito Alejo NP ciprofloxacin HCl (CIPRO) 500 mg tablet Take 1 Tab by mouth two (2) times a day. 5/1/20   Gela Alvarez, ESTEBAN   menthol (BIOFREEZE, MENTHOL,) 4 % gel Apply top BID 7/11/19   Mel Foster MD   acetaminophen (TYLENOL) 325 mg tablet Take 2 Tabs by mouth two (2) times a day. 7/11/19 9/3/20  Mel Foster MD   TENS unit and electrodes cmpk Use in lower back every day. DX;chronic lower back pain 3/7/19   Mel Foster MD     Past Medical History:   Diagnosis Date    Arthritis     Chronic pain     Depression     Diabetes ([facility]     Hypercholesterolemia     Hypertension     Stroke [facility])     TIA 10 yrs back    Stroke ([facility]     2003       ROS significant for feet swelling and urinary frequency and urine odor. Objective:     Patient-Reported Vitals 8/11/2020   Patient-Reported Height 5f7          Constitutional: [x] Appears well-developed and well-nourished [x] No apparent distress      [] Abnormal -     Mental status: [x] Alert and awake  [x] Oriented to person/place/time [x] Able to follow commands    [] Abnormal -     HENT: [x] Normocephalic, atraumatic  [] Abnormal -   [x] Mouth/Throat: Mucous membranes are moist    External Ears [x] Normal  [] Abnormal -    Neck: [x] No visualized mass [] Abnormal -     Pulmonary/Chest: [x] Respiratory effort normal   [x] No visualized signs of difficulty breathing or respiratory distress        [] Abnormal -      Musculoskeletal:   [x] Normal gait with no signs of ataxia         [x] Normal range of motion of neck        [] Abnormal -     Neurological:        [x] No Facial Asymmetry (Cranial nerve 7 motor function) (limited exam due to video visit)          [x] No gaze palsy        [] Abnormal -    Extremities: Bilateral trace edema present.       Skin:        [x] No significant exanthematous lesions or discoloration noted on facial skin         [] Abnormal -            Psychiatric:       [x] Normal Affect [] Abnormal -        [x] No Hallucinations    Other pertinent observable physical exam findings:-        We discussed the expected course, resolution and complications of the diagnosis(es) in detail. Medication risks, benefits, costs, interactions, and alternatives were discussed as indicated. I advised her to contact the office if her condition worsens, changes or fails to improve as anticipated. She expressed understanding with the diagnosis(es) and plan. [facility], who was evaluated through a patient-initiated, synchronous (real-time) audio-video encounter, and/or her healthcare decision maker, is aware that it is a billable service, with coverage as determined by her insurance carrier. She provided verbal consent to proceed: Yes, and patient identification was verified. It was conducted pursuant to the emergency declaration under the [address] [place] [address] authority and the [employer] General Act. A caregiver was present when appropriate. Ability to conduct physical exam was limited. I was in the office. The patient was at home.       Kayode Degroot MD

## 2020-09-20 DIAGNOSIS — F32.89 OTHER DEPRESSION: ICD-10-CM

## 2020-09-20 DIAGNOSIS — I10 ESSENTIAL HYPERTENSION WITH GOAL BLOOD PRESSURE LESS THAN 130/85: ICD-10-CM

## 2020-09-20 DIAGNOSIS — M17.0 PRIMARY OSTEOARTHRITIS OF BOTH KNEES: ICD-10-CM

## 2020-09-20 DIAGNOSIS — M79.89 BILATERAL SWELLING OF FEET: ICD-10-CM

## 2020-09-21 RX ORDER — PAROXETINE HYDROCHLORIDE 20 MG/1
TABLET, FILM COATED ORAL
Qty: 90 TAB | Refills: 3 | Status: SHIPPED | OUTPATIENT
Start: 2020-09-21 | End: 2021-10-18

## 2020-09-21 RX ORDER — MELOXICAM 15 MG/1
TABLET ORAL
Qty: 90 TAB | Refills: 3 | Status: SHIPPED | OUTPATIENT
Start: 2020-09-21 | End: 2021-10-18

## 2020-09-21 RX ORDER — FUROSEMIDE 20 MG/1
TABLET ORAL
Qty: 45 TAB | Refills: 3 | Status: SHIPPED | OUTPATIENT
Start: 2020-09-21 | End: 2021-04-12 | Stop reason: SDUPTHER

## 2020-09-21 RX ORDER — METOPROLOL TARTRATE 100 MG/1
TABLET ORAL
Qty: 180 TAB | Refills: 3 | Status: SHIPPED | OUTPATIENT
Start: 2020-09-21 | End: 2021-10-18

## 2020-11-04 DIAGNOSIS — R15.9 INCONTINENCE OF FECES, UNSPECIFIED FECAL INCONTINENCE TYPE: Primary | ICD-10-CM

## 2020-11-06 ENCOUNTER — VIRTUAL VISIT (OUTPATIENT)
Dept: INTERNAL MEDICINE CLINIC | Age: 85
End: 2020-11-06
Payer: MEDICARE

## 2020-11-06 DIAGNOSIS — R19.7 DIARRHEA, UNSPECIFIED TYPE: Primary | ICD-10-CM

## 2020-11-06 DIAGNOSIS — E11.9 TYPE 2 DIABETES MELLITUS WITHOUT COMPLICATION, WITHOUT LONG-TERM CURRENT USE OF INSULIN (HCC): ICD-10-CM

## 2020-11-06 DIAGNOSIS — Z71.89 ACP (ADVANCE CARE PLANNING): ICD-10-CM

## 2020-11-06 DIAGNOSIS — I10 ESSENTIAL HYPERTENSION: ICD-10-CM

## 2020-11-06 DIAGNOSIS — Z00.00 MEDICARE ANNUAL WELLNESS VISIT, SUBSEQUENT: ICD-10-CM

## 2020-11-06 PROCEDURE — G8427 DOCREV CUR MEDS BY ELIG CLIN: HCPCS | Performed by: INTERNAL MEDICINE

## 2020-11-06 PROCEDURE — G9717 DOC PT DX DEP/BP F/U NT REQ: HCPCS | Performed by: INTERNAL MEDICINE

## 2020-11-06 PROCEDURE — G8399 PT W/DXA RESULTS DOCUMENT: HCPCS | Performed by: INTERNAL MEDICINE

## 2020-11-06 PROCEDURE — G0439 PPPS, SUBSEQ VISIT: HCPCS | Performed by: INTERNAL MEDICINE

## 2020-11-06 PROCEDURE — G8756 NO BP MEASURE DOC: HCPCS | Performed by: INTERNAL MEDICINE

## 2020-11-06 PROCEDURE — 99214 OFFICE O/P EST MOD 30 MIN: CPT | Performed by: INTERNAL MEDICINE

## 2020-11-06 PROCEDURE — G8419 CALC BMI OUT NRM PARAM NOF/U: HCPCS | Performed by: INTERNAL MEDICINE

## 2020-11-06 PROCEDURE — 1101F PT FALLS ASSESS-DOCD LE1/YR: CPT | Performed by: INTERNAL MEDICINE

## 2020-11-06 PROCEDURE — 1090F PRES/ABSN URINE INCON ASSESS: CPT | Performed by: INTERNAL MEDICINE

## 2020-11-06 PROCEDURE — G8536 NO DOC ELDER MAL SCRN: HCPCS | Performed by: INTERNAL MEDICINE

## 2020-11-06 PROCEDURE — 99497 ADVNCD CARE PLAN 30 MIN: CPT | Performed by: INTERNAL MEDICINE

## 2020-11-06 PROCEDURE — 3051F HG A1C>EQUAL 7.0%<8.0%: CPT | Performed by: INTERNAL MEDICINE

## 2020-11-06 RX ORDER — CHOLESTYRAMINE 4 G/9G
1 POWDER, FOR SUSPENSION ORAL
Qty: 30 PACKET | Refills: 0 | Status: SHIPPED | OUTPATIENT
Start: 2020-11-06

## 2020-11-06 NOTE — ACP (ADVANCE CARE PLANNING)

## 2020-11-06 NOTE — PATIENT INSTRUCTIONS
Medicare Wellness Visit, Female The best way to live healthy is to have a lifestyle where you eat a well-balanced diet, exercise regularly, limit alcohol use, and quit all forms of tobacco/nicotine, if applicable. Regular preventive services are another way to keep healthy. Preventive services (vaccines, screening tests, monitoring & exams) can help personalize your care plan, which helps you manage your own care. Screening tests can find health problems at the earliest stages, when they are easiest to treat. [facility] follows the current, evidence-based guidelines published by the [facility] ([facility]) when recommending preventive services for our patients. Because we follow these guidelines, sometimes recommendations change over time as research supports it. (For example, mammograms used to be recommended annually. Even though Medicare will still pay for an annual mammogram, the newer guidelines recommend a mammogram every two years for women of average risk). Of course, you and your doctor may decide to screen more often for some diseases, based on your risk and your co-morbidities (chronic disease you are already diagnosed with). Preventive services for you include: - Medicare offers their members a free annual wellness visit, which is time for you and your primary care provider to discuss and plan for your preventive service needs. Take advantage of this benefit every year! 
-All adults over the age of 72 should receive the recommended pneumonia vaccines. Current USPSTF guidelines recommend a series of two vaccines for the best pneumonia protection.  
-All adults should have a flu vaccine yearly and a tetanus vaccine every 10 years.  
-All adults age 48 and older should receive the shingles vaccines (series of two vaccines). -All adults age 38-68 who are overweight should have a diabetes screening test once every three years. -All adults born between [birth year] and 1963 should be screened once for Hepatitis C. 
-Other screening tests and preventive services for persons with diabetes include: an eye exam to screen for diabetic retinopathy, a kidney function test, a foot exam, and stricter control over your cholesterol.  
-Cardiovascular screening for adults with routine risk involves an electrocardiogram (ECG) at intervals determined by your doctor.  
-Colorectal cancer screenings should be done for adults age 54-65 with no increased risk factors for colorectal cancer. There are a number of acceptable methods of screening for this type of cancer. Each test has its own benefits and drawbacks. Discuss with your doctor what is most appropriate for you during your annual wellness visit. The different tests include: colonoscopy (considered the best screening method), a fecal occult blood test, a fecal DNA test, and sigmoidoscopy. 
 
-A bone mass density test is recommended when a woman turns 65 to screen for osteoporosis. This test is only recommended one time, as a screening. Some providers will use this same test as a disease monitoring tool if you already have osteoporosis. -Breast cancer screenings are recommended every other year for women of normal risk, age 54-69. 
-Cervical cancer screenings for women over age 72 are only recommended with certain risk factors. Here is a list of your current Health Maintenance items (your personalized list of preventive services) with a due date: 
Health Maintenance Due Topic Date Due  
 DTaP/Tdap/Td  (1 - Tdap) 08/13/1958  Shingles Vaccine (1 of 2) 08/13/1987 Mariela Noland Eye Exam  07/25/2018  Cholesterol Test   06/09/2019 Mariela Noland Diabetic Foot Care  03/07/2020  Yearly Flu Vaccine (1) 09/01/2020

## 2020-11-06 NOTE — PROGRESS NOTES
This is the Subsequent Medicare Annual Wellness Exam, performed 12 months or more after the Initial AWV or the last Subsequent AWV    I have reviewed the patient's medical history in detail and updated the computerized patient record. History     Patient Active Problem List   Diagnosis Code    DM (diabetes mellitus) ([facility] E11.9    Mixed hyperlipidemia E78.2    Chronic venous insufficiency I87.2    Depression F32.9    Stomach ulcer K25.9    Diverticula of colon K57.30    Advance care planning Z71.89    Essential hypertension I10    H/O: stroke Z80.78    Type 2 diabetes with nephropathy ([facility] E11.21    Altered mental status R41.82    Acute delirium R41.0    Mild depression ([address] F32.0     Past Medical History:   Diagnosis Date    Arthritis     Chronic pain     Depression     Diabetes ([facility]     Hypercholesterolemia     Hypertension     Stroke ([facility] [facility]     TIA 10 yrs back    Stroke ([facility]     2003      Past Surgical History:   Procedure Laterality Date    HX CARPAL TUNNEL RELEASE  1990    HX CATARACT REMOVAL      bilateral    HX HYSTERECTOMY  1986    HX HYSTERECTOMY      HX ORTHOPAEDIC      HX ORTHOPAEDIC      carpel tunnel left    HX ORTHOPAEDIC      left foot heel spur    HX REFRACTIVE SURGERY  2006     Current Outpatient Medications   Medication Sig Dispense Refill    meloxicam (MOBIC) 15 mg tablet TAKE 1 TABLET BY MOUTH  DAILY 90 Tab 3    PARoxetine (PAXIL) 20 mg tablet TAKE 1 TABLET BY MOUTH  EVERY NIGHT AT BEDTIME 90 Tab 3    furosemide (LASIX) 20 mg tablet TAKE 1 TABLET BY MOUTH  EVERY OTHER DAY 45 Tab 3    metoprolol tartrate (LOPRESSOR) 100 mg IR tablet TAKE 1 TABLET BY MOUTH  TWICE DAILY 180 Tab 3    acetaminophen (TYLENOL) 650 mg TbER Take 650 mg by mouth two (2) times daily as needed for Pain.       baclofen (LIORESAL) 10 mg tablet TAKE 1 TABLET BY MOUTH TWICE DAILY AS NEEDED FOR MUSCLE SPASM 60 Tab 0    simvastatin (ZOCOR) 40 mg tablet Take 1 tablet by mouth nightly 90 Tab 0    losartan (COZAAR) 50 mg tablet Take 1 tablet by mouth once daily 90 Tab 0    amLODIPine (NORVASC) 2.5 mg tablet Take 1 tablet by mouth once daily 30 Tab 0    famotidine (PEPCID) 20 mg tablet Take 1 Tab by mouth two (2) times a day. 60 Tab 5    VITAMIN D2 50,000 unit capsule TAKE 1 CAPSULE BY MOUTH ONCE A WEEK  2    menthol (BIOFREEZE, MENTHOL,) 4 % gel Apply top BID 1 Tube 2    TENS unit and electrodes cmpk Use in lower back every day. DX;chronic lower back pain 1 Each 0    aspirin 81 mg chewable tablet Take 1 Tab by mouth daily. 90 Tab 4    JANUMET 50-1,000 mg per tablet Take 1 tablet by mouth two  times daily with meals 180 Tab 5    cholecalciferol (VITAMIN D3) 1,000 unit cap Take 2,000 Units by mouth daily. No Known Allergies    Family History   Adopted: Yes     Social History     Tobacco Use    Smoking status: Never Smoker    Smokeless tobacco: Never Used   Substance Use Topics    Alcohol use: No       Depression Risk Factor Screening:     3 most recent PHQ Screens 11/6/2020   Little interest or pleasure in doing things Not at all   Feeling down, depressed, irritable, or hopeless Not at all   Total Score PHQ 2 0       Alcohol Risk Screen   Do you average more than 1 drink per night or more than 7 drinks a week:  No    On any one occasion in the past three months have you have had more than 3 drinks containing alcohol:  No        Functional Ability and Level of Safety:   Hearing: The patient needs further evaluation. Activities of Daily Living: The home contains: walker cane  Patient does total self care     Ambulation: with mild difficulty     Fall Risk:  Fall Risk Assessment, last 12 mths 11/6/2020   Able to walk? Yes   Fall in past 12 months?  No   Fall with injury? -   Number of falls in past 12 months -   Fall Risk Score -     Abuse Screen:  Patient is not abused       Cognitive Screening   Has your family/caregiver stated any concerns about your memory: no    Cognitive Screening: Normal - MMSE (Mini Mental Status Exam)    Patient Care Team   Patient Care Team:  French Oliver MD as PCP - General (Internal Medicine)  French Oliver MD as PCP - [facility] Empaneled Provider  Tirso Leong MD as Consulting Provider (Internal Medicine)  Fouzia Mcgill DPM (Denny Kohler)  Justin Laura MD (Gastroenterology)  Juanita Bosworth, DO (Ophthalmology)    Assessment/Plan   Education and counseling provided:  Are appropriate based on today's review and evaluation  Pneumococcal Vaccine  Influenza Vaccine  Bone mass measurement (DEXA)  Screening for glaucoma        Health Maintenance Due   Topic Date Due    DTaP/Tdap/Td series (1 - Tdap) 08/13/1958    Shingrix Vaccine Age 50> (1 of 2) 08/13/1987    Eye Exam Retinal or Dilated  07/25/2018    Lipid Screen  06/09/2019    Foot Exam Q1  03/07/2020    Flu Vaccine (1) 09/01/2020       [facility], who was evaluated through a synchronous (real-time) audio-video encounter, and/or her healthcare decision maker, is aware that it is a billable service, with coverage as determined by her insurance carrier. She provided verbal consent to proceed: Yes, and patient identification was verified. It was conducted pursuant to the emergency declaration under the [address] [place] [address], [address] [place] [address] authority and the [employer] Act. A caregiver was present when appropriate. Ability to conduct physical exam was limited. I was at home. The patient was at home.     Dillon Batista MD

## 2020-11-06 NOTE — PROGRESS NOTES
Health Maintenance Due   Topic Date Due    DTaP/Tdap/Td series (1 - Tdap) 08/13/1958    Shingrix Vaccine Age 50> (1 of 2) 08/13/1987    Eye Exam Retinal or Dilated  07/25/2018    Lipid Screen  06/09/2019    Foot Exam Q1  03/07/2020    Flu Vaccine (1) 09/01/2020    Medicare Yearly Exam  11/14/2020       Chief Complaint   Patient presents with    Diarrhea     3-4 x a day for past 3-4 weeks, denies abdominal pain, no bleeding       1. Have you been to the ER, urgent care clinic since your last visit? Hospitalized since your last visit? No    2. Have you seen or consulted any other health care providers outside of the [address] [place] [address] since your last visit? Include any pap smears or colon screening. No    3) Do you have an Advance Directive on file? no    4) Are you interested in receiving information on Advance Directives? NO      Patient is accompanied by daughter I have received verbal consent from [facility] to discuss any/all medical information while they are present in the room.

## 2020-11-08 DIAGNOSIS — M47.22 OSTEOARTHRITIS OF SPINE WITH RADICULOPATHY, CERVICAL REGION: ICD-10-CM

## 2020-11-09 DIAGNOSIS — E78.2 MIXED HYPERLIPIDEMIA: ICD-10-CM

## 2020-11-09 RX ORDER — SIMVASTATIN 40 MG/1
TABLET, FILM COATED ORAL
Qty: 90 TAB | Refills: 0 | Status: SHIPPED | OUTPATIENT
Start: 2020-11-09 | End: 2021-02-08

## 2020-11-09 RX ORDER — BACLOFEN 10 MG/1
TABLET ORAL
Qty: 60 TAB | Refills: 0 | Status: SHIPPED | OUTPATIENT
Start: 2020-11-09 | End: 2020-12-28

## 2020-12-06 DIAGNOSIS — I10 ESSENTIAL HYPERTENSION: ICD-10-CM

## 2020-12-07 RX ORDER — LOSARTAN POTASSIUM 50 MG/1
TABLET ORAL
Qty: 90 TAB | Refills: 0 | Status: SHIPPED | OUTPATIENT
Start: 2020-12-07 | End: 2021-02-22

## 2020-12-14 ENCOUNTER — OFFICE VISIT (OUTPATIENT)
Dept: INTERNAL MEDICINE CLINIC | Age: 85
End: 2020-12-14
Payer: MEDICARE

## 2020-12-14 VITALS
WEIGHT: 197.7 LBS | OXYGEN SATURATION: 95 % | BODY MASS INDEX: 31.03 KG/M2 | RESPIRATION RATE: 18 BRPM | SYSTOLIC BLOOD PRESSURE: 160 MMHG | DIASTOLIC BLOOD PRESSURE: 80 MMHG | HEART RATE: 70 BPM | TEMPERATURE: 97.8 F | HEIGHT: 67 IN

## 2020-12-14 DIAGNOSIS — E78.2 MIXED HYPERLIPIDEMIA: ICD-10-CM

## 2020-12-14 DIAGNOSIS — E11.9 TYPE 2 DIABETES MELLITUS WITHOUT COMPLICATION, WITHOUT LONG-TERM CURRENT USE OF INSULIN (HCC): ICD-10-CM

## 2020-12-14 DIAGNOSIS — I10 ESSENTIAL HYPERTENSION WITH GOAL BLOOD PRESSURE LESS THAN 130/85: ICD-10-CM

## 2020-12-14 DIAGNOSIS — I10 ESSENTIAL HYPERTENSION: ICD-10-CM

## 2020-12-14 DIAGNOSIS — Z86.73 H/O: STROKE: ICD-10-CM

## 2020-12-14 DIAGNOSIS — E11.9 TYPE 2 DIABETES MELLITUS WITHOUT COMPLICATION, WITHOUT LONG-TERM CURRENT USE OF INSULIN (HCC): Primary | ICD-10-CM

## 2020-12-14 DIAGNOSIS — E11.9 TYPE 2 DIABETES MELLITUS WITHOUT COMPLICATION (HCC): ICD-10-CM

## 2020-12-14 DIAGNOSIS — Z23 ENCOUNTER FOR IMMUNIZATION: ICD-10-CM

## 2020-12-14 DIAGNOSIS — Z78.0 POST-MENOPAUSE: ICD-10-CM

## 2020-12-14 PROCEDURE — G0008 ADMIN INFLUENZA VIRUS VAC: HCPCS | Performed by: INTERNAL MEDICINE

## 2020-12-14 PROCEDURE — G9717 DOC PT DX DEP/BP F/U NT REQ: HCPCS | Performed by: INTERNAL MEDICINE

## 2020-12-14 PROCEDURE — G8754 DIAS BP LESS 90: HCPCS | Performed by: INTERNAL MEDICINE

## 2020-12-14 PROCEDURE — 1101F PT FALLS ASSESS-DOCD LE1/YR: CPT | Performed by: INTERNAL MEDICINE

## 2020-12-14 PROCEDURE — 1090F PRES/ABSN URINE INCON ASSESS: CPT | Performed by: INTERNAL MEDICINE

## 2020-12-14 PROCEDURE — G8399 PT W/DXA RESULTS DOCUMENT: HCPCS | Performed by: INTERNAL MEDICINE

## 2020-12-14 PROCEDURE — 99214 OFFICE O/P EST MOD 30 MIN: CPT | Performed by: INTERNAL MEDICINE

## 2020-12-14 PROCEDURE — G8427 DOCREV CUR MEDS BY ELIG CLIN: HCPCS | Performed by: INTERNAL MEDICINE

## 2020-12-14 PROCEDURE — G8753 SYS BP > OR = 140: HCPCS | Performed by: INTERNAL MEDICINE

## 2020-12-14 PROCEDURE — G8536 NO DOC ELDER MAL SCRN: HCPCS | Performed by: INTERNAL MEDICINE

## 2020-12-14 PROCEDURE — 90694 VACC AIIV4 NO PRSRV 0.5ML IM: CPT | Performed by: INTERNAL MEDICINE

## 2020-12-14 PROCEDURE — G8417 CALC BMI ABV UP PARAM F/U: HCPCS | Performed by: INTERNAL MEDICINE

## 2020-12-14 RX ORDER — AMLODIPINE BESYLATE 2.5 MG/1
TABLET ORAL
Qty: 90 TAB | Refills: 1 | Status: SHIPPED | OUTPATIENT
Start: 2020-12-14 | End: 2021-02-22

## 2020-12-14 RX ORDER — SITAGLIPTIN AND METFORMIN HYDROCHLORIDE 50; 1000 MG/1; MG/1
TABLET, FILM COATED ORAL
Qty: 180 TAB | Refills: 1 | Status: SHIPPED | OUTPATIENT
Start: 2020-12-14 | End: 2022-01-18

## 2020-12-14 NOTE — PROGRESS NOTES
Room 4    Identified pt with two pt identifiers(name and ). Reviewed record in preparation for visit and have obtained necessary documentation. All patient medications has been reviewed. Chief Complaint   Patient presents with    Complete Physical       3 most recent PHQ Screens 2020   Little interest or pleasure in doing things Not at all   Feeling down, depressed, irritable, or hopeless Not at all   Total Score PHQ 2 0     Abuse Screening Questionnaire 2020   Do you ever feel afraid of your partner? N   Are you in a relationship with someone who physically or mentally threatens you? N   Is it safe for you to go home? Y       Health Maintenance Due   Topic    DTaP/Tdap/Td series (1 - Tdap)    Shingrix Vaccine Age 49> (1 of 2)    Eye Exam Retinal or Dilated     Lipid Screen     Flu Vaccine (1)    A1C test (Diabetic or Prediabetic)     GLAUCOMA SCREENING Q2Y      Health Maintenance Review: Patient reminded of \"due or due soon\" health maintenance. I have asked the patient to contact his/her primary care provider (PCP) for follow-up on his/her health maintenance. Vitals:    20 1526   Temp: 97.8 °F (36.6 °C)   TempSrc: Oral       Wt Readings from Last 3 Encounters:   19 185 lb (83.9 kg)   19 187 lb (84.8 kg)   19 187 lb 3.2 oz (84.9 kg)     Temp Readings from Last 3 Encounters:   20 97.8 °F (36.6 °C) (Oral)   19 98 °F (36.7 °C) (Oral)   19 98.7 °F (37.1 °C)     BP Readings from Last 3 Encounters:   19 134/84   19 143/68   19 142/80     Pulse Readings from Last 3 Encounters:   19 72   19 78   19 74       Coordination of Care Questionnaire:   1) Have you been to an emergency room, urgent care, or hospitalized since your last visit? No    2. Have seen or consulted any other health care provider since your last visit?   No

## 2020-12-14 NOTE — PROGRESS NOTES
Damien Friedman is a 82 y.o. female  who presents for routine immunization(s). Patient denies any symptoms , reactions or allergies that would exclude them from being immunized today. Risks and adverse reactions were discussed. The patient/caregiver was provided the VIS and allotted time to read and ask questions prior to administration of vaccine. Patient voiced full understanding and signed Adult Immunization Consent form. All questions were addressed. Patient was observed for 10 min post injection. There were no reactions observed.

## 2020-12-14 NOTE — PROGRESS NOTES
HISTORY OF PRESENT ILLNESS  Suzie Gil is a 82 y.o. female here for follow-up. Has diabetes, compliant with Janumet. Need refill. Medications is very expensive. Eye checkup up-to-date. Need foot exam.  Has hypertension, compliant with medication. Denies chest pain palpitation or shortness of breath. Had history of stroke, gait and balance is not great. Using wheelchair and walker to walk. On aspirin and statin. Has anxiety and depression, on Paxil. Doing well. Need flu shot. Need bone density. HPI    Review of Systems   Constitutional: Negative. HENT: Negative. Eyes: Negative. Respiratory: Negative. Cardiovascular: Negative. Gastrointestinal: Negative. Genitourinary: Negative. Musculoskeletal: Negative. Skin: Negative. Neurological: Negative. Endo/Heme/Allergies: Negative. Psychiatric/Behavioral: Negative. Physical Exam  Constitutional:       Appearance: Normal appearance. She is obese. Neck:      Musculoskeletal: Normal range of motion and neck supple. Cardiovascular:      Rate and Rhythm: Normal rate and regular rhythm. Pulses: Normal pulses. Heart sounds: Normal heart sounds. Pulmonary:      Effort: Pulmonary effort is normal.      Breath sounds: Normal breath sounds. Abdominal:      General: Abdomen is flat. Bowel sounds are normal.      Palpations: Abdomen is soft. Musculoskeletal:      Comments: Diabetic foot exam:     Left Foot:   Visual Exam: normal    Pulse DP: 2+ (normal)   Filament test: normal sensation    Vibratory sensation: normal      Right Foot:   Visual Exam: normal    Pulse DP: 2+ (normal)   Filament test: normal sensation    Vibratory sensation: normal     Neurological:      Mental Status: She is alert. Psychiatric:         Mood and Affect: Mood normal.         Thought Content: Thought content normal.         ASSESSMENT and PLANWill check, diagnoses and all orders for this visit:    1.  Type 2 diabetes mellitus without complication, without long-term current use of insulin ([facility]  Be on ADA diet and exercise. She is on Janumet which is very expensive. We will send it to mail order. Will order,    -     METABOLIC PANEL, COMPREHENSIVE; Future  -     MICROALBUMIN, UR, RAND W/ MICROALB/CREAT RATIO; Future  -     HEMOGLOBIN A1C WITH EAG; Future  -     SITagliptin-metFORMIN (Janumet) 50-1,000 mg per tablet; Take 1 tablet by mouth two  times daily with meals    2. Essential hypertension   stable blood pressure. On losartan and amlodipine.     -     METABOLIC PANEL, COMPREHENSIVE; Future  -     CBC WITH AUTOMATED DIFF; Future  -     amLODIPine (NORVASC) 2.5 mg tablet; Take 1 tablet by mouth once daily    3. Mixed hyperlipidemia  -     METABOLIC PANEL, COMPREHENSIVE; Future  -     LIPID PANEL; Future    4. Essential hypertension with goal blood pressure less than 130/85    5. H/O: stroke  Doing well. On aspirin and statin. 6. Type 2 diabetes mellitus without complication (HCC)  -     SITagliptin-metFORMIN (Janumet) 50-1,000 mg per tablet; Take 1 tablet by mouth two  times daily with meals    7. Encounter for immunization    We will give,  -     FLU (FLUAD QUAD INFLUENZA VACCINE,QUAD,ADJUVANTED)    8. Post-menopause    Will order,  -     DEXA BONE DENSITY STUDY AXIAL; Future    Issues reviewed with her: referral to Diabetic Education department, diabetic diet discussed in detail, written exchange diet given, home glucose monitoring emphasized, all medications, side effects and compliance discussed carefully, foot care discussed and Podiatry visits discussed, annual eye examinations at Ophthalmology discussed, long term diabetic complications discussed and labs immediately prior to next visit.

## 2020-12-15 LAB
ALBUMIN SERPL-MCNC: 4.5 G/DL (ref 3.6–4.6)
ALBUMIN/GLOB SERPL: 1.5 {RATIO} (ref 1.2–2.2)
ALP SERPL-CCNC: 48 IU/L (ref 39–117)
ALT SERPL-CCNC: 9 IU/L (ref 0–32)
AST SERPL-CCNC: 14 IU/L (ref 0–40)
BASOPHILS # BLD AUTO: 0 X10E3/UL (ref 0–0.2)
BASOPHILS NFR BLD AUTO: 0 %
BILIRUB SERPL-MCNC: 0.3 MG/DL (ref 0–1.2)
BUN SERPL-MCNC: 18 MG/DL (ref 8–27)
BUN/CREAT SERPL: 19 (ref 12–28)
CALCIUM SERPL-MCNC: 9.1 MG/DL (ref 8.7–10.3)
CHLORIDE SERPL-SCNC: 101 MMOL/L (ref 96–106)
CHOLEST SERPL-MCNC: 138 MG/DL (ref 100–199)
CO2 SERPL-SCNC: 19 MMOL/L (ref 20–29)
CREAT SERPL-MCNC: 0.97 MG/DL (ref 0.57–1)
EOSINOPHIL # BLD AUTO: 0.2 X10E3/UL (ref 0–0.4)
EOSINOPHIL NFR BLD AUTO: 2 %
ERYTHROCYTE [DISTWIDTH] IN BLOOD BY AUTOMATED COUNT: 14.8 % (ref 11.7–15.4)
EST. AVERAGE GLUCOSE BLD GHB EST-MCNC: 189 MG/DL
GLOBULIN SER CALC-MCNC: 3 G/DL (ref 1.5–4.5)
GLUCOSE SERPL-MCNC: 107 MG/DL (ref 65–99)
HBA1C MFR BLD: 8.2 % (ref 4.8–5.6)
HCT VFR BLD AUTO: 29.6 % (ref 34–46.6)
HDLC SERPL-MCNC: 59 MG/DL
HGB BLD-MCNC: 9.3 G/DL (ref 11.1–15.9)
IMM GRANULOCYTES # BLD AUTO: 0 X10E3/UL (ref 0–0.1)
IMM GRANULOCYTES NFR BLD AUTO: 0 %
INTERPRETATION, 910389: NORMAL
INTERPRETATION: NORMAL
LDLC SERPL CALC-MCNC: 63 MG/DL (ref 0–99)
LYMPHOCYTES # BLD AUTO: 1.9 X10E3/UL (ref 0.7–3.1)
LYMPHOCYTES NFR BLD AUTO: 21 %
Lab: NORMAL
MCH RBC QN AUTO: 28.8 PG (ref 26.6–33)
MCHC RBC AUTO-ENTMCNC: 31.4 G/DL (ref 31.5–35.7)
MCV RBC AUTO: 92 FL (ref 79–97)
MONOCYTES # BLD AUTO: 0.7 X10E3/UL (ref 0.1–0.9)
MONOCYTES NFR BLD AUTO: 8 %
NEUTROPHILS # BLD AUTO: 6 X10E3/UL (ref 1.4–7)
NEUTROPHILS NFR BLD AUTO: 69 %
PDF IMAGE, 910387: NORMAL
PLATELET # BLD AUTO: 305 X10E3/UL (ref 150–450)
POTASSIUM SERPL-SCNC: 4.8 MMOL/L (ref 3.5–5.2)
PROT SERPL-MCNC: 7.5 G/DL (ref 6–8.5)
RBC # BLD AUTO: 3.23 X10E6/UL (ref 3.77–5.28)
SODIUM SERPL-SCNC: 141 MMOL/L (ref 134–144)
TRIGL SERPL-MCNC: 80 MG/DL (ref 0–149)
VLDLC SERPL CALC-MCNC: 16 MG/DL (ref 5–40)
WBC # BLD AUTO: 8.9 X10E3/UL (ref 3.4–10.8)

## 2020-12-18 NOTE — PROGRESS NOTES
Hemoglobin dropping, need to be on iron rich diet. Repeat hemoglobin with iron panel in 2 months. Slightly raised fasting sugar, watch sweets and carbohydrate.

## 2020-12-21 DIAGNOSIS — D64.9 ANEMIA, UNSPECIFIED TYPE: Primary | ICD-10-CM

## 2020-12-21 DIAGNOSIS — D64.9 ANEMIA, UNSPECIFIED TYPE: ICD-10-CM

## 2021-01-08 ENCOUNTER — HOSPITAL ENCOUNTER (OUTPATIENT)
Dept: MAMMOGRAPHY | Age: 86
Discharge: HOME | End: 2021-01-08
Attending: INTERNAL MEDICINE
Payer: MEDICARE

## 2021-01-08 DIAGNOSIS — Z78.0 POST-MENOPAUSE: ICD-10-CM

## 2021-01-08 PROCEDURE — 77080 DXA BONE DENSITY AXIAL: CPT

## 2021-01-09 LAB
BASOPHILS # BLD AUTO: 0 X10E3/UL (ref 0–0.2)
BASOPHILS NFR BLD AUTO: 0 %
EOSINOPHIL # BLD AUTO: 0.1 X10E3/UL (ref 0–0.4)
EOSINOPHIL NFR BLD AUTO: 2 %
ERYTHROCYTE [DISTWIDTH] IN BLOOD BY AUTOMATED COUNT: 14.6 % (ref 11.7–15.4)
FERRITIN SERPL-MCNC: 13 NG/ML (ref 15–150)
HCT VFR BLD AUTO: 30.2 % (ref 34–46.6)
HGB BLD-MCNC: 9.3 G/DL (ref 11.1–15.9)
IMM GRANULOCYTES # BLD AUTO: 0 X10E3/UL (ref 0–0.1)
IMM GRANULOCYTES NFR BLD AUTO: 0 %
IRON SATN MFR SERPL: 5 % (ref 15–55)
IRON SERPL-MCNC: 19 UG/DL (ref 27–139)
LYMPHOCYTES # BLD AUTO: 2.1 X10E3/UL (ref 0.7–3.1)
LYMPHOCYTES NFR BLD AUTO: 29 %
MCH RBC QN AUTO: 28.4 PG (ref 26.6–33)
MCHC RBC AUTO-ENTMCNC: 30.8 G/DL (ref 31.5–35.7)
MCV RBC AUTO: 92 FL (ref 79–97)
MONOCYTES # BLD AUTO: 0.7 X10E3/UL (ref 0.1–0.9)
MONOCYTES NFR BLD AUTO: 10 %
NEUTROPHILS # BLD AUTO: 4.2 X10E3/UL (ref 1.4–7)
NEUTROPHILS NFR BLD AUTO: 59 %
PLATELET # BLD AUTO: 305 X10E3/UL (ref 150–450)
RBC # BLD AUTO: 3.27 X10E6/UL (ref 3.77–5.28)
TIBC SERPL-MCNC: 373 UG/DL (ref 250–450)
UIBC SERPL-MCNC: 354 UG/DL (ref 118–369)
WBC # BLD AUTO: 7.1 X10E3/UL (ref 3.4–10.8)

## 2021-01-13 ENCOUNTER — TELEPHONE (OUTPATIENT)
Dept: INTERNAL MEDICINE CLINIC | Age: 86
End: 2021-01-13

## 2021-01-13 DIAGNOSIS — D50.9 IRON DEFICIENCY ANEMIA, UNSPECIFIED IRON DEFICIENCY ANEMIA TYPE: Primary | ICD-10-CM

## 2021-01-13 RX ORDER — FERROUS SULFATE 325(65) MG
325 TABLET, DELAYED RELEASE (ENTERIC COATED) ORAL
Qty: 30 TAB | Refills: 1 | Status: SHIPPED | OUTPATIENT
Start: 2021-01-13 | End: 2021-02-22

## 2021-01-13 NOTE — PROGRESS NOTES
Iron level is low, with stable hemoglobin. Start, ferrous sulfate 325 mg po daily. Repeat CBC in 4-6 weeks.

## 2021-01-13 NOTE — TELEPHONE ENCOUNTER
----- Message from Sheeba Deutsch NP sent at 1/13/2021  8:23 AM EST -----  Iron level is low, with stable hemoglobin. Start, ferrous sulfate 325 mg po daily. Repeat CBC in 4-6 weeks.

## 2021-02-06 DIAGNOSIS — M47.22 OSTEOARTHRITIS OF SPINE WITH RADICULOPATHY, CERVICAL REGION: ICD-10-CM

## 2021-02-06 DIAGNOSIS — E78.2 MIXED HYPERLIPIDEMIA: ICD-10-CM

## 2021-02-08 RX ORDER — SIMVASTATIN 40 MG/1
TABLET, FILM COATED ORAL
Qty: 90 TAB | Refills: 0 | Status: SHIPPED | OUTPATIENT
Start: 2021-02-08 | End: 2021-05-10

## 2021-02-08 RX ORDER — BACLOFEN 10 MG/1
TABLET ORAL
Qty: 30 TAB | Refills: 0 | Status: SHIPPED | OUTPATIENT
Start: 2021-02-08 | End: 2021-02-22

## 2021-02-20 DIAGNOSIS — I10 ESSENTIAL HYPERTENSION: ICD-10-CM

## 2021-02-20 DIAGNOSIS — M47.22 OSTEOARTHRITIS OF SPINE WITH RADICULOPATHY, CERVICAL REGION: ICD-10-CM

## 2021-02-22 RX ORDER — LOSARTAN POTASSIUM 50 MG/1
TABLET ORAL
Qty: 90 TAB | Refills: 0 | Status: SHIPPED | OUTPATIENT
Start: 2021-02-22 | End: 2021-06-04

## 2021-02-22 RX ORDER — AMLODIPINE BESYLATE 2.5 MG/1
TABLET ORAL
Qty: 90 TAB | Refills: 0 | Status: SHIPPED | OUTPATIENT
Start: 2021-02-22 | End: 2021-04-12 | Stop reason: DRUGHIGH

## 2021-02-22 RX ORDER — BACLOFEN 10 MG/1
TABLET ORAL
Qty: 30 TAB | Refills: 0 | Status: SHIPPED | OUTPATIENT
Start: 2021-02-22 | End: 2021-04-04

## 2021-02-22 RX ORDER — FERROUS SULFATE 325(65) MG
TABLET, DELAYED RELEASE (ENTERIC COATED) ORAL
Qty: 30 TAB | Refills: 0 | Status: SHIPPED | OUTPATIENT
Start: 2021-02-22 | End: 2021-04-05

## 2021-03-01 DIAGNOSIS — D50.9 IRON DEFICIENCY ANEMIA, UNSPECIFIED IRON DEFICIENCY ANEMIA TYPE: ICD-10-CM

## 2021-03-02 ENCOUNTER — TELEPHONE (OUTPATIENT)
Dept: INTERNAL MEDICINE CLINIC | Age: 86
End: 2021-03-02

## 2021-03-02 NOTE — TELEPHONE ENCOUNTER
Pts son called states that pt does not want to COVID vaccine and to cancel  the appt. I called and spoke with pt. Pt thinks this is a scam and that is why she does not want to go. She said well how will she get her second vaccine. I advised pt of how the appt works and that this not not a scam. I asked pt to please think about it. She said that she will. I called pts daughter and left a VM.

## 2021-04-04 DIAGNOSIS — M47.22 OSTEOARTHRITIS OF SPINE WITH RADICULOPATHY, CERVICAL REGION: ICD-10-CM

## 2021-04-04 RX ORDER — BACLOFEN 10 MG/1
TABLET ORAL
Qty: 30 TAB | Refills: 0 | Status: SHIPPED | OUTPATIENT
Start: 2021-04-04 | End: 2021-05-04

## 2021-04-05 RX ORDER — FERROUS SULFATE 325(65) MG
TABLET, DELAYED RELEASE (ENTERIC COATED) ORAL
Qty: 30 TAB | Refills: 0 | Status: SHIPPED | OUTPATIENT
Start: 2021-04-05 | End: 2021-05-04

## 2021-04-12 ENCOUNTER — OFFICE VISIT (OUTPATIENT)
Dept: INTERNAL MEDICINE CLINIC | Age: 86
End: 2021-04-12
Payer: MEDICARE

## 2021-04-12 VITALS
OXYGEN SATURATION: 98 % | WEIGHT: 182 LBS | TEMPERATURE: 97 F | HEART RATE: 71 BPM | BODY MASS INDEX: 28.56 KG/M2 | RESPIRATION RATE: 18 BRPM | SYSTOLIC BLOOD PRESSURE: 142 MMHG | DIASTOLIC BLOOD PRESSURE: 90 MMHG | HEIGHT: 67 IN

## 2021-04-12 DIAGNOSIS — I87.2 CHRONIC VENOUS INSUFFICIENCY: ICD-10-CM

## 2021-04-12 DIAGNOSIS — R19.7 DIARRHEA, UNSPECIFIED TYPE: ICD-10-CM

## 2021-04-12 DIAGNOSIS — E11.9 TYPE 2 DIABETES MELLITUS WITHOUT COMPLICATION, WITHOUT LONG-TERM CURRENT USE OF INSULIN (HCC): ICD-10-CM

## 2021-04-12 DIAGNOSIS — Z86.73 H/O: STROKE: ICD-10-CM

## 2021-04-12 DIAGNOSIS — M79.89 BILATERAL SWELLING OF FEET: ICD-10-CM

## 2021-04-12 DIAGNOSIS — I10 ESSENTIAL HYPERTENSION: Primary | ICD-10-CM

## 2021-04-12 PROCEDURE — G8427 DOCREV CUR MEDS BY ELIG CLIN: HCPCS | Performed by: INTERNAL MEDICINE

## 2021-04-12 PROCEDURE — G8755 DIAS BP > OR = 90: HCPCS | Performed by: INTERNAL MEDICINE

## 2021-04-12 PROCEDURE — 1101F PT FALLS ASSESS-DOCD LE1/YR: CPT | Performed by: INTERNAL MEDICINE

## 2021-04-12 PROCEDURE — 1090F PRES/ABSN URINE INCON ASSESS: CPT | Performed by: INTERNAL MEDICINE

## 2021-04-12 PROCEDURE — G8417 CALC BMI ABV UP PARAM F/U: HCPCS | Performed by: INTERNAL MEDICINE

## 2021-04-12 PROCEDURE — G8536 NO DOC ELDER MAL SCRN: HCPCS | Performed by: INTERNAL MEDICINE

## 2021-04-12 PROCEDURE — G9717 DOC PT DX DEP/BP F/U NT REQ: HCPCS | Performed by: INTERNAL MEDICINE

## 2021-04-12 PROCEDURE — G8399 PT W/DXA RESULTS DOCUMENT: HCPCS | Performed by: INTERNAL MEDICINE

## 2021-04-12 PROCEDURE — 99214 OFFICE O/P EST MOD 30 MIN: CPT | Performed by: INTERNAL MEDICINE

## 2021-04-12 PROCEDURE — G8753 SYS BP > OR = 140: HCPCS | Performed by: INTERNAL MEDICINE

## 2021-04-12 RX ORDER — FUROSEMIDE 20 MG/1
TABLET ORAL
Qty: 50 TAB | Refills: 3 | Status: SHIPPED | OUTPATIENT
Start: 2021-04-12 | End: 2022-03-28

## 2021-04-12 RX ORDER — AMLODIPINE BESYLATE 5 MG/1
5 TABLET ORAL DAILY
Qty: 90 TAB | Refills: 1 | Status: SHIPPED | OUTPATIENT
Start: 2021-04-12 | End: 2021-07-19 | Stop reason: SDUPTHER

## 2021-04-12 RX ORDER — GLIPIZIDE 2.5 MG/1
2.5 TABLET, EXTENDED RELEASE ORAL DAILY
Qty: 30 TAB | Refills: 4 | Status: SHIPPED | OUTPATIENT
Start: 2021-04-12 | End: 2021-09-09 | Stop reason: ALTCHOICE

## 2021-04-12 NOTE — PROGRESS NOTES
HISTORY OF PRESENT ILLNESS  Manuel Ellington 1981 Rhina Schmidt is a 82 y.o. female here for follow-up. Has diabetes, compliant with Janumet. A1c is uncontrolled. Report diarrhea for last several months. She went to urgent care, was prescribed budesonide which helped her. He has seen GI specialist.  No blood in the stool. Right hand area seems under control. Has hypertension, compliant with medication. Reviewed blood pressure logs. Average blood pressure 150/90. Compliant with medication. Denies chest pain palpitation or shortness of breath. Had history of stroke, gait and balance is not great. Using wheelchair and walker to walk. On aspirin and statin. Has anxiety and depression, on Paxil. Doing well. HPI      Review of Systems   Constitutional: Negative. HENT: Negative. Eyes: Negative. Respiratory: Negative. Cardiovascular: Positive for leg swelling. Gastrointestinal: Positive for diarrhea. Genitourinary: Negative. Musculoskeletal: Negative. Skin: Negative. Neurological: Negative. Endo/Heme/Allergies: Negative. Psychiatric/Behavioral: Negative. Physical Exam  Constitutional:       Appearance: Normal appearance. She is obese. Neck:      Musculoskeletal: Normal range of motion and neck supple. Cardiovascular:      Rate and Rhythm: Normal rate and regular rhythm. Pulses: Normal pulses. Heart sounds: Normal heart sounds. Pulmonary:      Effort: Pulmonary effort is normal.      Breath sounds: Normal breath sounds. Abdominal:      General: Abdomen is flat. Bowel sounds are normal.      Palpations: Abdomen is soft. Musculoskeletal:         General: Swelling present. Comments: Bilateral 1+ leg edema present. No sign of cellulitis. Neurological:      Mental Status: She is alert. Psychiatric:         Mood and Affect: Mood normal.         Thought Content: Thought content normal.         ASSESSMENT and PLAN    Diagnoses and all orders for this visit:    1. Essential hypertension    Diastolic blood pressure in the 90s. He is on losartan and metoprolol. Taking low-dose of amlodipine. Will increase,  -     amLODIPine (NORVASC) 5 mg tablet; Take 1 Tab by mouth daily. DC norvasc 2.5 mg  Reassured patient that it might cause living more leg swelling. She needs to use stockings and leg elevation. 2. Diarrhea, unspecified type    Has a frequent loose stool. Did not see GI specialist.  Diarrhea is stable right now. She was prescribed budesonide from urgent care which helped her. And need to rule out Crohn's disease or ulcerative colitis. Will refer,  -     REFERRAL TO GASTROENTEROLOGY    3. Type 2 diabetes mellitus without complication, without long-term current use of insulin ([address]    Diabetes is not controlled. A1c high. Taking Janumet every day. We will add,  -     glipiZIDE SR (Glucotrol XL) 2.5 mg CR tablet; Take 1 Tab by mouth daily. 4. H/O: stroke  Mostly wheelchair-bound. On aspirin and statin. 5. Bilateral swelling of feet    From chronic venous insufficiency. Advised to use stockings. Need to do leg elevation. Will increase,  -     furosemide (LASIX) 20 mg tablet; TAKE 1 TABLET BY MOUTH  Monday,wednesday Friday and saturday    6. Chronic venous insufficiency    Need to take Lasix 4 days a week. Issues reviewed with her: referral to Diabetic Education department, diabetic diet discussed in detail, written exchange diet given, home glucose monitoring emphasized, all medications, side effects and compliance discussed carefully, foot care discussed and Podiatry visits discussed, annual eye examinations at Ophthalmology discussed, long term diabetic complications discussed and labs immediately prior to next visit.

## 2021-04-12 NOTE — PROGRESS NOTES
Health Maintenance Due   Topic Date Due    DTaP/Tdap/Td series (1 - Tdap) Never done    Shingrix Vaccine Age 50> (1 of 2) Never done    Eye Exam Retinal or Dilated  07/25/2018       Chief Complaint   Patient presents with    Hypertension    Diabetes       1. Have you been to the ER, urgent care clinic since your last visit? Hospitalized since your last visit? No    2. Have you seen or consulted any other health care providers outside of the [address] [place] [address] since your last visit? Include any pap smears or colon screening. No    3) Do you have an Advance Directive on file? no    4) Are you interested in receiving information on Advance Directives? NO      Patient is accompanied by self I have received verbal consent from [address] [place] [address] to discuss any/all medical information while they are present in the room.

## 2021-05-03 DIAGNOSIS — M47.22 OSTEOARTHRITIS OF SPINE WITH RADICULOPATHY, CERVICAL REGION: ICD-10-CM

## 2021-05-04 RX ORDER — FERROUS SULFATE 325(65) MG
TABLET, DELAYED RELEASE (ENTERIC COATED) ORAL
Qty: 30 TAB | Refills: 0 | Status: SHIPPED | OUTPATIENT
Start: 2021-05-04 | End: 2021-06-07

## 2021-05-04 RX ORDER — BACLOFEN 10 MG/1
TABLET ORAL
Qty: 30 TAB | Refills: 0 | Status: SHIPPED | OUTPATIENT
Start: 2021-05-04 | End: 2021-05-10

## 2021-05-09 DIAGNOSIS — E78.2 MIXED HYPERLIPIDEMIA: ICD-10-CM

## 2021-05-09 DIAGNOSIS — M47.22 OSTEOARTHRITIS OF SPINE WITH RADICULOPATHY, CERVICAL REGION: ICD-10-CM

## 2021-05-10 RX ORDER — BACLOFEN 10 MG/1
TABLET ORAL
Qty: 30 TAB | Refills: 0 | Status: SHIPPED | OUTPATIENT
Start: 2021-05-10 | End: 2021-06-14

## 2021-05-10 RX ORDER — SIMVASTATIN 40 MG/1
TABLET, FILM COATED ORAL
Qty: 90 TAB | Refills: 0 | Status: SHIPPED | OUTPATIENT
Start: 2021-05-10 | End: 2021-08-09

## 2021-06-03 DIAGNOSIS — I10 ESSENTIAL HYPERTENSION: ICD-10-CM

## 2021-06-04 RX ORDER — LOSARTAN POTASSIUM 50 MG/1
TABLET ORAL
Qty: 90 TABLET | Refills: 0 | Status: SHIPPED | OUTPATIENT
Start: 2021-06-04 | End: 2021-06-07

## 2021-06-06 DIAGNOSIS — I10 ESSENTIAL HYPERTENSION: ICD-10-CM

## 2021-06-06 DIAGNOSIS — M47.22 OSTEOARTHRITIS OF SPINE WITH RADICULOPATHY, CERVICAL REGION: ICD-10-CM

## 2021-06-06 DIAGNOSIS — M17.0 PRIMARY OSTEOARTHRITIS OF BOTH KNEES: ICD-10-CM

## 2021-06-07 RX ORDER — TRAMADOL HYDROCHLORIDE 50 MG/1
TABLET ORAL
Qty: 15 TABLET | Refills: 0 | Status: SHIPPED | OUTPATIENT
Start: 2021-06-07 | End: 2021-06-10

## 2021-06-07 RX ORDER — FERROUS SULFATE 325(65) MG
TABLET, DELAYED RELEASE (ENTERIC COATED) ORAL
Qty: 30 TABLET | Refills: 0 | Status: SHIPPED | OUTPATIENT
Start: 2021-06-07 | End: 2021-06-14

## 2021-06-07 RX ORDER — LOSARTAN POTASSIUM 50 MG/1
TABLET ORAL
Qty: 90 TABLET | Refills: 0 | Status: SHIPPED | OUTPATIENT
Start: 2021-06-07 | End: 2021-11-29 | Stop reason: SDUPTHER

## 2021-06-13 DIAGNOSIS — M47.22 OSTEOARTHRITIS OF SPINE WITH RADICULOPATHY, CERVICAL REGION: ICD-10-CM

## 2021-06-14 RX ORDER — BACLOFEN 10 MG/1
TABLET ORAL
Qty: 30 TABLET | Refills: 0 | Status: SHIPPED | OUTPATIENT
Start: 2021-06-14 | End: 2021-08-09

## 2021-06-14 RX ORDER — FERROUS SULFATE 325(65) MG
TABLET, DELAYED RELEASE (ENTERIC COATED) ORAL
Qty: 30 TABLET | Refills: 0 | Status: SHIPPED | OUTPATIENT
Start: 2021-06-14 | End: 2021-08-17

## 2021-07-19 DIAGNOSIS — I10 ESSENTIAL HYPERTENSION: ICD-10-CM

## 2021-07-19 RX ORDER — AMLODIPINE BESYLATE 5 MG/1
5 TABLET ORAL DAILY
Qty: 90 TABLET | Refills: 1 | Status: SHIPPED | OUTPATIENT
Start: 2021-07-19 | End: 2021-08-17 | Stop reason: SDUPTHER

## 2021-07-27 ENCOUNTER — TELEPHONE (OUTPATIENT)
Dept: INTERNAL MEDICINE CLINIC | Age: 86
End: 2021-07-27

## 2021-07-27 ENCOUNTER — OFFICE VISIT (OUTPATIENT)
Dept: INTERNAL MEDICINE CLINIC | Age: 86
End: 2021-07-27
Payer: MEDICARE

## 2021-07-27 VITALS
HEIGHT: 67 IN | BODY MASS INDEX: 28.56 KG/M2 | SYSTOLIC BLOOD PRESSURE: 140 MMHG | TEMPERATURE: 97.5 F | RESPIRATION RATE: 18 BRPM | WEIGHT: 182 LBS | HEART RATE: 77 BPM | OXYGEN SATURATION: 93 % | DIASTOLIC BLOOD PRESSURE: 86 MMHG

## 2021-07-27 DIAGNOSIS — N39.0 URINARY TRACT INFECTION WITHOUT HEMATURIA, SITE UNSPECIFIED: ICD-10-CM

## 2021-07-27 DIAGNOSIS — F32.A MILD DEPRESSION: ICD-10-CM

## 2021-07-27 DIAGNOSIS — R41.3 MEMORY CHANGE: Primary | ICD-10-CM

## 2021-07-27 DIAGNOSIS — E11.21 TYPE 2 DIABETES WITH NEPHROPATHY (HCC): ICD-10-CM

## 2021-07-27 PROCEDURE — G8399 PT W/DXA RESULTS DOCUMENT: HCPCS | Performed by: INTERNAL MEDICINE

## 2021-07-27 PROCEDURE — G8417 CALC BMI ABV UP PARAM F/U: HCPCS | Performed by: INTERNAL MEDICINE

## 2021-07-27 PROCEDURE — 81003 URINALYSIS AUTO W/O SCOPE: CPT | Performed by: INTERNAL MEDICINE

## 2021-07-27 PROCEDURE — G8427 DOCREV CUR MEDS BY ELIG CLIN: HCPCS | Performed by: INTERNAL MEDICINE

## 2021-07-27 PROCEDURE — G9717 DOC PT DX DEP/BP F/U NT REQ: HCPCS | Performed by: INTERNAL MEDICINE

## 2021-07-27 PROCEDURE — 1101F PT FALLS ASSESS-DOCD LE1/YR: CPT | Performed by: INTERNAL MEDICINE

## 2021-07-27 PROCEDURE — 1090F PRES/ABSN URINE INCON ASSESS: CPT | Performed by: INTERNAL MEDICINE

## 2021-07-27 PROCEDURE — G8754 DIAS BP LESS 90: HCPCS | Performed by: INTERNAL MEDICINE

## 2021-07-27 PROCEDURE — G8753 SYS BP > OR = 140: HCPCS | Performed by: INTERNAL MEDICINE

## 2021-07-27 PROCEDURE — 99214 OFFICE O/P EST MOD 30 MIN: CPT | Performed by: INTERNAL MEDICINE

## 2021-07-27 PROCEDURE — G8536 NO DOC ELDER MAL SCRN: HCPCS | Performed by: INTERNAL MEDICINE

## 2021-07-27 RX ORDER — NITROFURANTOIN 25; 75 MG/1; MG/1
100 CAPSULE ORAL 2 TIMES DAILY
Qty: 10 CAPSULE | Refills: 0 | Status: SHIPPED | OUTPATIENT
Start: 2021-07-27 | End: 2021-08-10 | Stop reason: ALTCHOICE

## 2021-07-27 NOTE — PROGRESS NOTES
Health Maintenance Due   Topic Date Due    DTaP/Tdap/Td series (1 - Tdap) Never done    Shingrix Vaccine Age 50> (1 of 2) Never done    MICROALBUMIN Q1  06/11/2021    A1C test (Diabetic or Prediabetic)  06/14/2021       Chief Complaint   Patient presents with    Altered mental status    Other     sleeping more and bruising on left lower leg    Swelling    Diabetes    Bladder Infection       1. Have you been to the ER, urgent care clinic since your last visit? Hospitalized since your last visit? [facility] x this year. 2. Have you seen or consulted any other health care providers outside of the [address] [place] [address] since your last visit? Include any pap smears or colon screening. No    3) Do you have an Advance Directive on file? no    4) Are you interested in receiving information on Advance Directives? NO      Patient is accompanied by daughter I have received verbal consent from [address] [place] [address] to discuss any/all medical information while they are present in the room.

## 2021-07-27 NOTE — PROGRESS NOTES
HISTORY OF PRESENT ILLNESS  Anne ARITA [place] Hussain is a 82 y.o. female. Patient was seen with daughter. Daughter reports that over the last few weeks patient has been having an increase in memory deficit. Is not able to remember things she said or did within a few days. Is not violent and can discuss her past. Does not complain of stomach pain or urinary concerns. Daughter would like a definite dx so would like to see psych as well. Continues to eat her meals, but often has to be reminded by daughter. Reports no falls or reports of HA. Patient continues to have lower leg swelling. Will see vascular in the next few weeks. Reports no CP, SOB. Visit Vitals  BP (!) 140/86 (BP 1 Location: Right upper arm, BP Patient Position: Sitting, BP Cuff Size: Adult)   Pulse 77   Temp 97.5 °F (36.4 °C) (Oral)   Resp 18   Ht 5' 7\" (1.702 m)   Wt 182 lb (82.6 kg)   SpO2 93%   BMI 28.51 kg/m²     Past Medical History:   Diagnosis Date    Arthritis     Chronic pain     Depression     Diabetes ([address]     Hypercholesterolemia     Hypertension     Stroke ([address]     TIA 10 yrs back    Stroke ([address]     2003     Past Surgical History:   Procedure Laterality Date    HX CARPAL TUNNEL RELEASE  1990    HX CATARACT REMOVAL      bilateral    HX HYSTERECTOMY  1986    HX HYSTERECTOMY      HX ORTHOPAEDIC      HX ORTHOPAEDIC      carpel tunnel left    HX ORTHOPAEDIC      left foot heel spur    HX REFRACTIVE SURGERY  2006     Family History   Adopted: Yes     Outpatient Encounter Medications as of 7/27/2021   Medication Sig Dispense Refill    nitrofurantoin, macrocrystal-monohydrate, (MACROBID) 100 mg capsule Take 1 Capsule by mouth two (2) times a day. 10 Capsule 0    Janumet XR 50-1,000 mg TM24 TAKE 1 TABLET BY MOUTH  TWICE DAILY 180 Tablet 3    amLODIPine (NORVASC) 5 mg tablet Take 1 Tablet by mouth daily.  90 Tablet 1    baclofen (LIORESAL) 10 mg tablet TAKE 1 TABLET BY MOUTH TWICE DAILY AS NEEDED FOR MUSCLE SPASM 30 Tablet 0  ferrous sulfate (IRON) 325 mg (65 mg iron) EC tablet TAKE 1 TABLET BY MOUTH ONCE DAILY BEFORE BREAKFAST 30 Tablet 0    losartan (COZAAR) 50 mg tablet Take 1 tablet by mouth once daily 90 Tablet 0    simvastatin (ZOCOR) 40 mg tablet TAKE 1 TABLET BY MOUTH AT BEDTIME 90 Tab 0    furosemide (LASIX) 20 mg tablet TAKE 1 TABLET BY MOUTH  Monday,wednesday Friday and saturday 50 Tab 3    SITagliptin-metFORMIN (Janumet) 50-1,000 mg per tablet Take 1 tablet by mouth two  times daily with meals 180 Tab 1    cholestyramine (QUESTRAN) 4 gram packet Take 1 Packet by mouth daily (with dinner). 30 Packet 0    meloxicam (MOBIC) 15 mg tablet TAKE 1 TABLET BY MOUTH  DAILY 90 Tab 3    PARoxetine (PAXIL) 20 mg tablet TAKE 1 TABLET BY MOUTH  EVERY NIGHT AT BEDTIME 90 Tab 3    metoprolol tartrate (LOPRESSOR) 100 mg IR tablet TAKE 1 TABLET BY MOUTH  TWICE DAILY 180 Tab 3    acetaminophen (TYLENOL) 650 mg TbER Take 650 mg by mouth two (2) times daily as needed for Pain.  famotidine (PEPCID) 20 mg tablet Take 1 Tab by mouth two (2) times a day. 60 Tab 5    VITAMIN D2 50,000 unit capsule TAKE 1 CAPSULE BY MOUTH ONCE A WEEK  2    menthol (BIOFREEZE, MENTHOL,) 4 % gel Apply top BID 1 Tube 2    aspirin 81 mg chewable tablet Take 1 Tab by mouth daily. 90 Tab 4    cholecalciferol (VITAMIN D3) 1,000 unit cap Take 2,000 Units by mouth daily.  glipiZIDE SR (Glucotrol XL) 2.5 mg CR tablet Take 1 Tab by mouth daily. (Patient not taking: Reported on 7/27/2021) 30 Tab 4    TENS unit and electrodes cmpk Use in lower back every day. DX;chronic lower back pain (Patient not taking: Reported on 7/27/2021) 1 Each 0     No facility-administered encounter medications on file as of 7/27/2021. HPI    Review of Systems   Reason unable to perform ROS: per daughter    Constitutional: Negative. Respiratory: Negative. Cardiovascular: Negative. Gastrointestinal: Negative. Musculoskeletal: Positive for joint pain.  Negative for falls.   Neurological: Negative. Psychiatric/Behavioral: Positive for memory loss. The patient has insomnia. Physical Exam  Vitals and nursing note reviewed. Constitutional:       General: She is not in acute distress. HENT:      Head: Normocephalic. Cardiovascular:      Rate and Rhythm: Normal rate and regular rhythm. Pulmonary:      Effort: Pulmonary effort is normal.      Breath sounds: Normal breath sounds. Abdominal:      Palpations: Abdomen is soft. Skin:     General: Skin is warm. Neurological:      Mental Status: She is alert. Motor: No weakness. Comments: Oriented x2   Psychiatric:         Mood and Affect: Mood normal.         Behavior: Behavior normal.         Cognition and Memory: She exhibits impaired recent memory. ASSESSMENT and PLAN  Diagnoses and all orders for this visit:    1. Memory change  -     REFERRAL TO NEUROPSYCHOLOGY  -     AMB POC URINALYSIS DIP STICK AUTO W/O MICRO  -     CULTURE, URINE; Future    2. Mild depression ([facility]    3. Type 2 diabetes with nephropathy ([facility]    4. Urinary tract infection without hematuria, site unspecified  -     nitrofurantoin, macrocrystal-monohydrate, (MACROBID) 100 mg capsule; Take 1 Capsule by mouth two (2) times a day. lab results and schedule of future lab studies reviewed with patient  reviewed medications and side effects in detail    Follow-up and Dispositions    · Return if symptoms worsen or fail to improve, for Follow up.

## 2021-07-30 ENCOUNTER — TELEPHONE (OUTPATIENT)
Dept: INTERNAL MEDICINE CLINIC | Age: 86
End: 2021-07-30

## 2021-07-30 ENCOUNTER — VIRTUAL VISIT (OUTPATIENT)
Dept: INTERNAL MEDICINE CLINIC | Age: 86
End: 2021-07-30
Payer: MEDICARE

## 2021-07-30 DIAGNOSIS — K52.9 CHRONIC DIARRHEA: ICD-10-CM

## 2021-07-30 DIAGNOSIS — N39.0 URINARY TRACT INFECTION WITHOUT HEMATURIA, SITE UNSPECIFIED: Primary | ICD-10-CM

## 2021-07-30 DIAGNOSIS — R11.0 NAUSEA: ICD-10-CM

## 2021-07-30 LAB
BACTERIA UR CULT: ABNORMAL
SPECIMEN STATUS REPORT, ROLRST: NORMAL

## 2021-07-30 PROCEDURE — 99442 PR PHYS/QHP TELEPHONE EVALUATION 11-20 MIN: CPT | Performed by: NURSE PRACTITIONER

## 2021-07-30 RX ORDER — CEFUROXIME AXETIL 250 MG/1
250 TABLET ORAL 2 TIMES DAILY
Qty: 14 TABLET | Refills: 0 | Status: SHIPPED | OUTPATIENT
Start: 2021-07-30 | End: 2021-08-06

## 2021-07-30 NOTE — PROGRESS NOTES
Camilla Telles is a 82 y.o. female, evaluated via audio-only technology on 7/30/2021 for Tingling, Dizziness, Vomiting, and Bladder Infection (Currently treating with / Karla Grade )  . Assessment & Plan:   Diagnoses and all orders for this visit:    1. Urinary tract infection without hematuria, site unspecified    2. Nausea    3. Chronic diarrhea    May discontinue Macrobid. Will order  -     cefUROXime (CEFTIN) 250 mg tablet; Take 1 Tablet by mouth two (2) times a day for 7 days. Per chart review, patient has previously tolerated medication. Advised to take with food and water. Encourage oral fluids, as tolerated. [place] diet, as tolerated. Gastroenterology, as previously referred. Provided contact information for [facility], as well. Patient encouraged to call or return to office if symptoms do not improve or worsen. Reviewed medications and side effects in detail. Reviewed plan of care with patient who acknowledges understanding and agrees. 12  Subjective: This patient of Caitlyn Mccollum NP is seen today with complaints of upset stomach. The patient was seen by PCP 3 days ago and diagnosed with urinary tract infection. Patient started Karla Grade. Urine culture now available does indicate E. Coli UTI sensitive to Macrobid. Patient began Karla Grade yesterday. Since this morning, she has had upset stomach with several episodes of diarrhea this morning, nausea,and  decreased appetite. Patient did take the medication this morning on an empty stomach. Per patient's daughter, patient has had ongoing frequent loose stools over the last several months and is taking budesonide 3 mg tid which has been helping. She is awaiting appt with gastroenterology. She says she did have appt scheduled for next week, but this now has to be rescheduled as the provider will be out of the office and it will likely be several months before she gets in.       Prior to Admission medications    Medication Sig Start Date End Date Taking? Authorizing Provider   nitrofurantoin, macrocrystal-monohydrate, (MACROBID) 100 mg capsule Take 1 Capsule by mouth two (2) times a day. 7/27/21  Yes Eugenio Alvarez NP   Janumet XR 50-1,000 mg TM24 TAKE 1 TABLET BY MOUTH  TWICE DAILY 7/19/21  Yes Malvin Nguyen MD   amLODIPine (NORVASC) 5 mg tablet Take 1 Tablet by mouth daily. 7/19/21  Yes Malvin Nguyen MD   baclofen (LIORESAL) 10 mg tablet TAKE 1 TABLET BY MOUTH TWICE DAILY AS NEEDED FOR MUSCLE SPASM 6/14/21  Yes Eugenio Alvarez NP   ferrous sulfate (IRON) 325 mg (65 mg iron) EC tablet TAKE 1 TABLET BY MOUTH ONCE DAILY BEFORE BREAKFAST 6/14/21  Yes Eugenio Alvarez NP   losartan (COZAAR) 50 mg tablet Take 1 tablet by mouth once daily 6/7/21  Yes Malvin Nguyen MD   simvastatin (ZOCOR) 40 mg tablet TAKE 1 TABLET BY MOUTH AT BEDTIME 5/10/21  Yes Malvin Nguyen MD   glipiZIDE SR (Glucotrol XL) 2.5 mg CR tablet Take 1 Tab by mouth daily. 4/12/21  Corinne Nguyen MD   furosemide (LASIX) 20 mg tablet TAKE 1 TABLET BY MOUTH  Monday,wednesday Friday and saturday 4/12/21  Yes Malvin Nguyen MD   SITagliptin-metFORMIN (Janumet) 50-1,000 mg per tablet Take 1 tablet by mouth two  times daily with meals 12/14/20  Corinne Nguyen MD   cholestyramine Catherene Doom) 4 gram packet Take 1 Packet by mouth daily (with dinner). 11/6/20  Yes Malvin Nguyen MD   meloxicam (MOBIC) 15 mg tablet TAKE 1 TABLET BY MOUTH  DAILY 9/21/20  Corinne Nguyen MD   PARoxetine (PAXIL) 20 mg tablet TAKE 1 TABLET BY MOUTH  EVERY NIGHT AT BEDTIME 9/21/20  Corinne Nguyen MD   metoprolol tartrate (LOPRESSOR) 100 mg IR tablet TAKE 1 TABLET BY MOUTH  TWICE DAILY 9/21/20  Yes Eugenio Alvarez NP   acetaminophen (TYLENOL) 650 mg TbER Take 650 mg by mouth two (2) times daily as needed for Pain. Yes Provider, Historical   famotidine (PEPCID) 20 mg tablet Take 1 Tab by mouth two (2) times a day.  12/4/19  Yes Malvin Nguyen MD   VITAMIN D2 50,000 unit capsule TAKE 1 CAPSULE BY MOUTH ONCE A WEEK 11/1/19  Yes Provider, Historical   menthol (BIOFREEZE, MENTHOL,) 4 % gel Apply top BID 7/11/19  Yes Derick Marlow MD   TENS unit and electrodes cmpk Use in lower back every day. DX;chronic lower back pain 3/7/19  Yes Derick Marlow MD   aspirin 81 mg chewable tablet Take 1 Tab by mouth daily. 8/29/18  Yes Derick Marlow MD   cholecalciferol (VITAMIN D3) 1,000 unit cap Take 2,000 Units by mouth daily. 11/28/16  Yes Derick Marlow MD     No Known Allergies  Past Medical History:   Diagnosis Date    Arthritis     Chronic pain     Depression     Diabetes ([facility]     Hypercholesterolemia     Hypertension     Stroke ([facility]     TIA 10 yrs back    Stroke ([facility]     2003     Past Surgical History:   Procedure Laterality Date    HX CARPAL TUNNEL RELEASE  1990    HX CATARACT REMOVAL      bilateral    HX HYSTERECTOMY  1986    HX HYSTERECTOMY      HX ORTHOPAEDIC      HX ORTHOPAEDIC      carpel tunnel left    HX ORTHOPAEDIC      left foot heel spur    HX REFRACTIVE SURGERY  2006       Review of Systems   Constitutional: Negative for chills, fever and malaise/fatigue. HENT: Negative. Respiratory: Negative. Cardiovascular: Negative. Gastrointestinal: Positive for diarrhea and nausea. Musculoskeletal: Negative. Skin: Negative. Neurological: Negative. Endo/Heme/Allergies: Negative. Psychiatric/Behavioral: Negative. Patient-Reported Vitals 7/30/2021   Patient-Reported Weight 182 lbs   Patient-Reported Height -   Patient-Reported Pulse 85   Patient-Reported Temperature 98.6   Patient-Reported Systolic  355   Patient-Reported Diastolic 68   Patient-Reported LMP hysterectomy        [place], who was evaluated through a patient-initiated, synchronous (real-time) audio only encounter, and/or her healthcare decision maker, is aware that it is a billable service, with coverage as determined by her insurance carrier.  She provided verbal consent to proceed: Yes. She has not had a related appointment within my department in the past 7 days or scheduled within the next 24 hours.       Total Time: minutes: 11-20 minutes    Michael Hsieh NP

## 2021-07-30 NOTE — PROGRESS NOTES
Health Maintenance Due   Topic Date Due    DTaP/Tdap/Td series (1 - Tdap) Never done    Shingrix Vaccine Age 50> (1 of 2) Never done    MICROALBUMIN Q1  06/11/2021    A1C test (Diabetic or Prediabetic)  06/14/2021       Chief Complaint   Patient presents with    Tingling    Dizziness    Vomiting    Bladder Infection     Currently treating with / Margo Steinberg        1. Have you been to the ER, urgent care clinic since your last visit? Hospitalized since your last visit? No    2. Have you seen or consulted any other health care providers outside of the [address] [place] [address] since your last visit? Include any pap smears or colon screening. No    3) Do you have an Advance Directive on file? no    4) Are you interested in receiving information on Advance Directives? NO      Patient is accompanied by daughter I have received verbal consent from [address] [place] [address] to discuss any/all medical information while they are present in the room.

## 2021-07-30 NOTE — TELEPHONE ENCOUNTER
----- Message from Jens Alvarez NP sent at 7/30/2021  1:09 PM EDT -----  Patient is currently on right abx

## 2021-07-30 NOTE — LETTER
NOTIFICATION RETURN TO WORK / SCHOOL    7/30/2021 3:55 PM    Ms. Anne ARITA [address] [address]      To Whom It May Concern:    [facility] is currently under the care of [address]. Please excuse her daughter, Mushtaq Tidwell, from work 07/30/21. If there are questions or concerns please have the patient contact our office.         Sincerely,      Valentina Salazar NP

## 2021-07-30 NOTE — TELEPHONE ENCOUNTER
Call placed to daughter and discussed urine results, She states her mother is not feeling well, having some nausea and vomiting.  Pt scheduled for VV with NP

## 2021-08-02 ENCOUNTER — TELEPHONE (OUTPATIENT)
Dept: INTERNAL MEDICINE CLINIC | Age: 86
End: 2021-08-02

## 2021-08-02 NOTE — TELEPHONE ENCOUNTER
Faxed [employer] form faxed to the [employer] per pt katherine Oliva.   Fax:[phone]  #- [phone]
31-May-2017

## 2021-08-05 ENCOUNTER — OFFICE VISIT (OUTPATIENT)
Dept: NEUROLOGY | Age: 86
End: 2021-08-05
Payer: MEDICARE

## 2021-08-05 VITALS
DIASTOLIC BLOOD PRESSURE: 60 MMHG | RESPIRATION RATE: 18 BRPM | SYSTOLIC BLOOD PRESSURE: 120 MMHG | OXYGEN SATURATION: 93 % | HEART RATE: 63 BPM

## 2021-08-05 DIAGNOSIS — R41.3 SHORT-TERM MEMORY LOSS: Primary | ICD-10-CM

## 2021-08-05 DIAGNOSIS — F01.50 MIXED CORTICAL AND SUBCORTICAL VASCULAR DEMENTIA WITHOUT BEHAVIORAL DISTURBANCE (HCC): ICD-10-CM

## 2021-08-05 PROCEDURE — 99204 OFFICE O/P NEW MOD 45 MIN: CPT | Performed by: PSYCHIATRY & NEUROLOGY

## 2021-08-05 PROCEDURE — 1090F PRES/ABSN URINE INCON ASSESS: CPT | Performed by: PSYCHIATRY & NEUROLOGY

## 2021-08-05 PROCEDURE — G9717 DOC PT DX DEP/BP F/U NT REQ: HCPCS | Performed by: PSYCHIATRY & NEUROLOGY

## 2021-08-05 PROCEDURE — G8536 NO DOC ELDER MAL SCRN: HCPCS | Performed by: PSYCHIATRY & NEUROLOGY

## 2021-08-05 PROCEDURE — G8399 PT W/DXA RESULTS DOCUMENT: HCPCS | Performed by: PSYCHIATRY & NEUROLOGY

## 2021-08-05 PROCEDURE — G8417 CALC BMI ABV UP PARAM F/U: HCPCS | Performed by: PSYCHIATRY & NEUROLOGY

## 2021-08-05 PROCEDURE — G8754 DIAS BP LESS 90: HCPCS | Performed by: PSYCHIATRY & NEUROLOGY

## 2021-08-05 PROCEDURE — G8427 DOCREV CUR MEDS BY ELIG CLIN: HCPCS | Performed by: PSYCHIATRY & NEUROLOGY

## 2021-08-05 PROCEDURE — 1101F PT FALLS ASSESS-DOCD LE1/YR: CPT | Performed by: PSYCHIATRY & NEUROLOGY

## 2021-08-05 PROCEDURE — G8752 SYS BP LESS 140: HCPCS | Performed by: PSYCHIATRY & NEUROLOGY

## 2021-08-05 RX ORDER — DONEPEZIL HYDROCHLORIDE 5 MG/1
5 TABLET, FILM COATED ORAL
Qty: 30 TABLET | Refills: 1 | Status: SHIPPED | OUTPATIENT
Start: 2021-08-05 | End: 2021-09-24

## 2021-08-05 NOTE — LETTER
To whom it may concern     8/5/2021 8:50 AM    Ms. Anne ARITA [address] [facility] is currently under the care of [facility]. She was seen in the office today at 8 AM and was brought in to the visit by her daughter, Christelle Black who is her primary caregiver. If there are questions or concerns please have the patient contact our office.         Sincerely,      Greg Larios MD

## 2021-08-05 NOTE — PATIENT INSTRUCTIONS
[facility]   Statement to Patients  April 1, 2014      In an effort to ensure the large volume of patient prescription refills is processed in the most efficient and expeditious manner, we are asking our patients to assist us by calling your Pharmacy for all prescription refills, this will include also your  Mail Order Pharmacy. The pharmacy will contact our office electronically to continue the refill process. Please do not wait until the last minute to call your pharmacy. We need at least 48 hours (2days) to fill prescriptions. We also encourage you to call your pharmacy before going to  your prescription to make sure it is ready. With regard to controlled substance prescription refill requests (narcotic refills) that need to be picked up at our office, we ask your cooperation by providing us with at least 72 hours (3days) notice that you will need a refill. We will not refill narcotic prescription refill requests after 4:00pm on any weekday, Monday through Thursday, or after 2:00pm on Fridays, or on the weekends. We encourage everyone to explore another way of getting your prescription refill request processed using [employer], our patient web portal through our electronic medical record system. [employer] is an efficient and effective way to communicate your medication request directly to the office and  downloadable as an jose antonio on your smart phone . [employer] also features a review functionality that allows you to view your medication list as well as leave messages for your physician. Are you ready to get connected? If so please review the attatched instructions or speak to any of our staff to get you set up right away! Thank you so much for your cooperation. Should you have any questions please contact our Practice Administrator.     The Physicians and Staff,  [facility]

## 2021-08-05 NOTE — PROGRESS NOTES
Chief Complaint   Patient presents with    Memory Loss       HISTORY OF PRESENT ILLNESS  Anne ARITA 1981 Rhina Schmidt is a 82 y.o. female who came in for a neurological consultation requested by Ang Golden NP  She came along with her daughter. For the past couple years she has been having difficulties with short-term memory. She will often repeat herself, forgets conversations or forgets what she needs to do. Mostly stays at home very sedentary lifestyle. Needs supervision with most activities of daily living. She is able to feed herself, can take a shower herself and will sometimes walk outside of the house with a walker. Watches television while at home and sometimes she will do crossword puzzles, plays board games. No changes in vision, speech, swallowing ability. Denies any tremor, behavioral issues or hallucinations. Sleeps well at night. Sometimes she will wake up to go to the bathroom or other times because she just cannot stay asleep. No wandering episodes. She does not drive or handle money. She used to work as a  and has been retired for more than 10 years. Past Medical History:   Diagnosis Date    Arthritis     Chronic pain     Depression     [facility]     Hypercholesterolemia     Hypertension     Stroke ([facility]     TIA 10 yrs back    [facility]     [facility]     Current Outpatient Medications   Medication Sig    donepeziL (ARICEPT) 5 mg tablet Take 1 Tablet by mouth nightly.  cefUROXime (CEFTIN) 250 mg tablet Take 1 Tablet by mouth two (2) times a day for 7 days.  Janumet XR 50-1,000 mg TM24 TAKE 1 TABLET BY MOUTH  TWICE DAILY    amLODIPine (NORVASC) 5 mg tablet Take 1 Tablet by mouth daily.     baclofen (LIORESAL) 10 mg tablet TAKE 1 TABLET BY MOUTH TWICE DAILY AS NEEDED FOR MUSCLE SPASM    ferrous sulfate (IRON) 325 mg (65 mg iron) EC tablet TAKE 1 TABLET BY MOUTH ONCE DAILY BEFORE BREAKFAST    losartan (COZAAR) 50 mg tablet Take 1 tablet by mouth once daily  simvastatin (ZOCOR) 40 mg tablet TAKE 1 TABLET BY MOUTH AT BEDTIME    glipiZIDE SR (Glucotrol XL) 2.5 mg CR tablet Take 1 Tab by mouth daily.  furosemide (LASIX) 20 mg tablet TAKE 1 TABLET BY MOUTH  Monday,wednesday Friday and saturday    SITagliptin-metFORMIN (Janumet) 50-1,000 mg per tablet Take 1 tablet by mouth two  times daily with meals    cholestyramine (QUESTRAN) 4 gram packet Take 1 Packet by mouth daily (with dinner).  meloxicam (MOBIC) 15 mg tablet TAKE 1 TABLET BY MOUTH  DAILY    PARoxetine (PAXIL) 20 mg tablet TAKE 1 TABLET BY MOUTH  EVERY NIGHT AT BEDTIME    metoprolol tartrate (LOPRESSOR) 100 mg IR tablet TAKE 1 TABLET BY MOUTH  TWICE DAILY    acetaminophen (TYLENOL) 650 mg TbER Take 650 mg by mouth two (2) times daily as needed for Pain.  famotidine (PEPCID) 20 mg tablet Take 1 Tab by mouth two (2) times a day.  VITAMIN D2 50,000 unit capsule TAKE 1 CAPSULE BY MOUTH ONCE A WEEK    menthol (BIOFREEZE, MENTHOL,) 4 % gel Apply top BID    TENS unit and electrodes cmpk Use in lower back every day. DX;chronic lower back pain    aspirin 81 mg chewable tablet Take 1 Tab by mouth daily.  cholecalciferol (VITAMIN D3) 1,000 unit cap Take 2,000 Units by mouth daily.  nitrofurantoin, macrocrystal-monohydrate, (MACROBID) 100 mg capsule Take 1 Capsule by mouth two (2) times a day. (Patient not taking: Reported on 8/5/2021)     No current facility-administered medications for this visit.      No Known Allergies  Family History   Adopted: Yes     Social History     Tobacco Use    Smoking status: Never Smoker    Smokeless tobacco: Never Used   Substance Use Topics    Alcohol use: No    Drug use: No     Past Surgical History:   Procedure Laterality Date    HX CARPAL TUNNEL RELEASE  1990    HX CATARACT REMOVAL      bilateral    HX HYSTERECTOMY  1986    HX HYSTERECTOMY      HX ORTHOPAEDIC      HX ORTHOPAEDIC      carpel tunnel left    HX ORTHOPAEDIC      left foot heel spur    HX REFRACTIVE SURGERY  2006         REVIEW OF SYSTEMS  Review of Systems - History obtained from the patient  Psychological ROS: negative  ENT ROS: negative  Hematological and Lymphatic ROS: negative  Endocrine ROS: negative  Respiratory ROS: no cough, shortness of breath, or wheezing  Cardiovascular ROS: no chest pain or dyspnea on exertion  Gastrointestinal ROS: no abdominal pain, change in bowel habits, or black or bloody stools  Genito-Urinary ROS: no dysuria, trouble voiding, or hematuria  Musculoskeletal ROS: negative  Dermatological ROS: negative      PHYSICAL EXAMINATION:    Visit Vitals  /60   Pulse 63   Resp 18   SpO2 93%     General:  Well nourished and groomed individual in no acute distress. Neck: Supple, nontender, no bruits, no pain with resistance to active range of motion. Heart: Regular rate and rhythm. Normal S1S2. Lungs:  Equal chest expansion, no cough, no wheeze  Musculoskeletal:  Extremities revealed no edema and had full range of motion of joints. Psych:  Good mood and bright affect    NEUROLOGICAL EXAMINATION:     Mental Status:   Alert and oriented to person, place. She scored 19/30 on the [facility] cognitive assessment. She had difficulties with visuospatial/executive function. Delayed recall was 3/5. She had problem with calculations mentally but could do it on paper. Significantly impaired word recall. Cranial Nerves:    II, III, IV, VI:  Visual acuity grossly intact. Visual fields are normal.    Pupils are equal, round, and reactive to light and accommodation. Extra-ocular movements are full and fluid. Fundoscopic exam was benign, no ptosis or nystagmus. V-XII: Hearing is grossly intact. Facial features are symmetric, with normal sensation and strength. The palate rises symmetrically and the tongue protrudes midline. Sternocleidomastoids 5/5. Motor Examination: Normal tone, bulk, and strength. 5/5 muscle strength throughout.   No cogwheel rigidity or clonus present. Sensory exam:  Normal throughout to pinprick, temperature, and vibration sense. Normal proprioception. Coordination:  Finger to nose and rapid arm movement testing was normal.   No resting or intention tremor    Gait and Station: Able to walk with assistance only. Reflexes:  DTRs 2+ throughout. Toes downgoing. LABS / IMAGING  Lab Results   Component Value Date/Time    WBC 7.1 01/08/2021 01:20 PM    HGB 9.3 (L) 01/08/2021 01:20 PM    HCT 30.2 (L) 01/08/2021 01:20 PM    PLATELET 866 25/84/7478 01:20 PM    MCV 92 01/08/2021 01:20 PM     Lab Results   Component Value Date/Time    Sodium 141 12/14/2020 04:14 PM    Potassium 4.8 12/14/2020 04:14 PM    Chloride 101 12/14/2020 04:14 PM    CO2 19 (L) 12/14/2020 04:14 PM    Anion gap 6 06/11/2018 05:35 AM    Glucose 107 (H) 12/14/2020 04:14 PM    BUN 18 12/14/2020 04:14 PM    Creatinine 0.97 12/14/2020 04:14 PM    BUN/Creatinine ratio 19 12/14/2020 04:14 PM    GFR est AA 62 12/14/2020 04:14 PM    GFR est non-AA 54 (L) 12/14/2020 04:14 PM    Calcium 9.1 12/14/2020 04:14 PM    Bilirubin, total 0.3 12/14/2020 04:14 PM    Alk. phosphatase 48 12/14/2020 04:14 PM    Protein, total 7.5 12/14/2020 04:14 PM    Albumin 4.5 12/14/2020 04:14 PM    Globulin 3.8 06/09/2018 06:09 AM    A-G Ratio 1.5 12/14/2020 04:14 PM    ALT (SGPT) 9 12/14/2020 04:14 PM    AST (SGOT) 14 12/14/2020 04:14 PM     Lab Results   Component Value Date/Time    TSH 0.38 06/09/2018 06:09 AM     No results found for: B12LT, FOL, RBCF      ASSESSMENT    ICD-10-CM ICD-9-CM    1. Short-term memory loss  R41.3 780.93 CT HEAD WO CONT      VITAMIN B12      TSH 3RD GENERATION      donepeziL (ARICEPT) 5 mg tablet   2.  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 Tor Freeman has at least moderate dementia and I suspect it is of a mixed vascular and Alzheimer's type  She has significant difficulties with visuospatial function, executive function, word recall and short-term memory  I have recommended screening work-up as above including CT brain, B12 levels and TSHS  Start donepezil 5 mg daily for a month then increase to 10 mg daily if she can tolerate  Different resources strengthen her memory were discussed and importance of physical activity was reviewed  Her daughter currently lives with her and she is under supervision 24/7. She does not drive or handle money/finances  We discussed that her needs are likely going to increase over time and we do not have any definitive treatment for dementia  I will continue to follow her periodically    Thank you for allowing me to participate in the care of Ms. Nick Nixon. Please feel free to contact me if you have any questions. I will be happy to follow to follow her along with you.     Maximo Casper MD  Diplomate, American Board of Psychiatry & Neurology (Neurology)  [place] [employer] Board of Psychiatry & Neurology (Clinical Neurophysiology)  Diplomate, American Board of Electrodiagnostic Medicine

## 2021-08-06 LAB
TSH SERPL DL<=0.005 MIU/L-ACNC: 0.47 UIU/ML (ref 0.45–4.5)
VIT B12 SERPL-MCNC: 71 PG/ML (ref 232–1245)

## 2021-08-08 DIAGNOSIS — M47.22 OSTEOARTHRITIS OF SPINE WITH RADICULOPATHY, CERVICAL REGION: ICD-10-CM

## 2021-08-09 RX ORDER — BACLOFEN 10 MG/1
TABLET ORAL
Qty: 30 TABLET | Refills: 0 | Status: SHIPPED | OUTPATIENT
Start: 2021-08-09 | End: 2021-09-13

## 2021-08-10 ENCOUNTER — OFFICE VISIT (OUTPATIENT)
Dept: URGENT CARE | Age: 86
End: 2021-08-10
Payer: MEDICARE

## 2021-08-10 VITALS
HEART RATE: 70 BPM | OXYGEN SATURATION: 95 % | RESPIRATION RATE: 18 BRPM | TEMPERATURE: 98.4 F | DIASTOLIC BLOOD PRESSURE: 84 MMHG | SYSTOLIC BLOOD PRESSURE: 137 MMHG

## 2021-08-10 DIAGNOSIS — R30.0 DYSURIA: Primary | ICD-10-CM

## 2021-08-10 DIAGNOSIS — R41.3 MEMORY CHANGE: ICD-10-CM

## 2021-08-10 LAB
BILIRUB UR QL STRIP: NEGATIVE
GLUCOSE UR-MCNC: NEGATIVE MG/DL
KETONES P FAST UR STRIP-MCNC: NEGATIVE MG/DL
PH UR STRIP: 5.5 [PH] (ref 4.6–8)
PROT UR QL STRIP: NORMAL
SP GR UR STRIP: 1.03 (ref 1–1.03)
UA UROBILINOGEN AMB POC: NORMAL (ref 0.2–1)
URINALYSIS CLARITY POC: CLEAR
URINALYSIS COLOR POC: NORMAL
URINE BLOOD POC: NEGATIVE
URINE LEUKOCYTES POC: NORMAL
URINE NITRITES POC: NEGATIVE

## 2021-08-10 PROCEDURE — G8399 PT W/DXA RESULTS DOCUMENT: HCPCS | Performed by: FAMILY MEDICINE

## 2021-08-10 PROCEDURE — G8754 DIAS BP LESS 90: HCPCS | Performed by: FAMILY MEDICINE

## 2021-08-10 PROCEDURE — 81003 URINALYSIS AUTO W/O SCOPE: CPT | Performed by: FAMILY MEDICINE

## 2021-08-10 PROCEDURE — G8536 NO DOC ELDER MAL SCRN: HCPCS | Performed by: FAMILY MEDICINE

## 2021-08-10 PROCEDURE — G9717 DOC PT DX DEP/BP F/U NT REQ: HCPCS | Performed by: FAMILY MEDICINE

## 2021-08-10 PROCEDURE — 1101F PT FALLS ASSESS-DOCD LE1/YR: CPT | Performed by: FAMILY MEDICINE

## 2021-08-10 PROCEDURE — 1090F PRES/ABSN URINE INCON ASSESS: CPT | Performed by: FAMILY MEDICINE

## 2021-08-10 PROCEDURE — 99213 OFFICE O/P EST LOW 20 MIN: CPT | Performed by: FAMILY MEDICINE

## 2021-08-10 PROCEDURE — G8427 DOCREV CUR MEDS BY ELIG CLIN: HCPCS | Performed by: FAMILY MEDICINE

## 2021-08-10 PROCEDURE — G8417 CALC BMI ABV UP PARAM F/U: HCPCS | Performed by: FAMILY MEDICINE

## 2021-08-10 PROCEDURE — G8752 SYS BP LESS 140: HCPCS | Performed by: FAMILY MEDICINE

## 2021-08-10 RX ORDER — DOXYCYCLINE 100 MG/1
100 TABLET ORAL 2 TIMES DAILY
Qty: 14 TABLET | Refills: 0 | Status: SHIPPED | OUTPATIENT
Start: 2021-08-10 | End: 2021-08-15

## 2021-08-10 NOTE — PROGRESS NOTES
Kizzy Mead is a 82 y.o. female who complains of dysuria x 2 weeks, without flank pain, fever, chills, or abdominal pain. Dx'ed with UTI 2 weeks ago, initially treated with Sandra Landin but was having abdominal discomfort while on it; was then changed to ceftin which she completed but has persistent dysuria. Denies urinary frequency. Reports slight lower back pain. The history is provided by the patient. Past Medical History:   Diagnosis Date    Arthritis     Chronic pain     Depression     [facility]     Hypercholesterolemia     Hypertension     Stroke ([facility]     TIA 10 yrs back    Stroke ([facility]     2003        Past Surgical History:   Procedure Laterality Date    HX CARPAL TUNNEL RELEASE  1990    HX CATARACT REMOVAL      bilateral    HX HYSTERECTOMY  1986    HX HYSTERECTOMY      HX ORTHOPAEDIC      HX ORTHOPAEDIC      carpel tunnel left    HX ORTHOPAEDIC      left foot heel spur    HX REFRACTIVE SURGERY  2006         Family History   Adopted: Yes        Social History     Socioeconomic History    Marital status:      Spouse name: Not on file    Number of children: Not on file    Years of education: Not on file    Highest education level: Not on file   Occupational History    Not on file   Tobacco Use    Smoking status: Never Smoker    Smokeless tobacco: Never Used   Substance and Sexual Activity    Alcohol use: No    Drug use: No    Sexual activity: Never     Birth control/protection: None     Comment: retired,relocated from [address] with daughter   Other Topics Concern    Not on file   Social History Narrative    ** Merged History Encounter **          Social Determinants of Health     Financial Resource Strain:     Difficulty of Paying Living Expenses:    Food Insecurity:     Worried About Running Out of Food in the Last Year:     [address] in the Last Year:    Transportation Needs:     Lack of Transportation (Medical):      Lack of Transportation (Non-Medical):    Physical Activity:     Days of Exercise per Week:     Minutes of Exercise per Session:    Stress:     Feeling of Stress :    Social Connections:     Frequency of Communication with Friends and Family:     Frequency of Social Gatherings with Friends and Family:     Attends Nondenominational Services:     Active Member of Clubs or Organizations:     Attends Club or Organization Meetings:     Marital Status:    Intimate Partner Violence:     Fear of Current or Ex-Partner:     Emotionally Abused:     Physically Abused:     Sexually Abused: ALLERGIES: Patient has no known allergies. Review of Systems   Gastrointestinal: Negative for abdominal pain, nausea and vomiting. Genitourinary: Positive for dysuria. Negative for flank pain, frequency and urgency. Musculoskeletal: Positive for back pain. Vitals:    08/10/21 1123   BP: 137/84   Pulse: 70   Resp: 18   Temp: 98.4 °F (36.9 °C)   SpO2: 95%       Physical Exam  Vitals and nursing note reviewed. Constitutional:       General: She is not in acute distress. Appearance: She is well-developed. She is not diaphoretic. Cardiovascular:      Rate and Rhythm: Normal rate and regular rhythm. Heart sounds: Murmur heard. Pulmonary:      Effort: Pulmonary effort is normal. No respiratory distress. Breath sounds: Normal breath sounds. No stridor. No wheezing, rhonchi or rales. Abdominal:      General: There is no distension. Palpations: Abdomen is soft. Tenderness: There is no abdominal tenderness. There is no right CVA tenderness, left CVA tenderness, guarding or rebound. Neurological:      Mental Status: She is alert. Psychiatric:         Behavior: Behavior normal.         Thought Content: Thought content normal.         Judgment: Judgment normal.         [facility]    ICD-10-CM ICD-9-CM   1.  Dysuria  R30.0 788.1       Orders Placed This Encounter    CULTURE, URINE     Standing Status: Future     Standing Expiration Date:   2/10/2022    AMB POC URINALYSIS DIP STICK AUTO W/O MICRO    doxycycline (ADOXA) 100 mg tablet     Sig: Take 1 Tablet by mouth two (2) times a day for 7 days. Dispense:  14 Tablet     Refill:  0        Start doxycycline  Await UCx  F/u with PCP    If signs and symptoms become worse the pt is to go to the ER.        Results for orders placed or performed in visit on 08/10/21   AMB POC URINALYSIS DIP STICK AUTO W/O MICRO   Result Value Ref Range    Color (UA POC) Dark Yellow     Clarity (UA POC) Clear     Glucose (UA POC) Negative Negative    Bilirubin (UA POC) Negative Negative    Ketones (UA POC) Negative Negative    Specific gravity (UA POC) 1.030 1.001 - 1.035    Blood (UA POC) Negative Negative    pH (UA POC) 5.5 4.6 - 8.0    Protein (UA POC) 1+ Negative    Urobilinogen (UA POC) 0.2 mg/dL 0.2 - 1    Nitrites (UA POC) Negative Negative    Leukocyte esterase (UA POC) 1+ Negative     Procedures

## 2021-08-10 NOTE — PATIENT INSTRUCTIONS
Urinary Tract Infection (UTI) in Women: Care Instructions  Overview     A urinary tract infection, or UTI, is a general term for an infection anywhere between the kidneys and the urethra (where urine comes out). Most UTIs are bladder infections. They often cause pain or burning when you urinate. UTIs are caused by bacteria and can be cured with antibiotics. Be sure to complete your treatment so that the infection does not get worse. Follow-up care is a key part of your treatment and safety. Be sure to make and go to all appointments, and call your doctor if you are having problems. It's also a good idea to know your test results and keep a list of the medicines you take. How can you care for yourself at home? · Take your antibiotics as directed. Do not stop taking them just because you feel better. You need to take the full course of antibiotics. · Drink extra water and other fluids for the next day or two. This will help make the urine less concentrated and help wash out the bacteria that are causing the infection. (If you have kidney, heart, or liver disease and have to limit fluids, talk with your doctor before you increase the amount of fluids you drink.)  · Avoid drinks that are carbonated or have caffeine. They can irritate the bladder. · Urinate often. Try to empty your bladder each time. · To relieve pain, take a hot bath or lay a heating pad set on low over your lower belly or genital area. Never go to sleep with a heating pad in place. To prevent UTIs  · Drink plenty of water each day. This helps you urinate often, which clears bacteria from your system. (If you have kidney, heart, or liver disease and have to limit fluids, talk with your doctor before you increase the amount of fluids you drink.)  · Urinate when you need to. · If you are sexually active, urinate right after you have sex. · Change sanitary pads often.   · Avoid douches, bubble baths, feminine hygiene sprays, and other See chart for paper documentation during downtime. feminine hygiene products that have deodorants. · After going to the bathroom, wipe from front to back. When should you call for help? Call your doctor now or seek immediate medical care if:    · Symptoms such as fever, chills, nausea, or vomiting get worse or appear for the first time.     · You have new pain in your back just below your rib cage. This is called flank pain.     · There is new blood or pus in your urine.     · You have any problems with your antibiotic medicine. Watch closely for changes in your health, and be sure to contact your doctor if:    · You are not getting better after taking an antibiotic for 2 days.     · Your symptoms go away but then come back. Where can you learn more? Go to http://www.gray.com/  Enter E214 in the search box to learn more about \"Urinary Tract Infection (UTI) in Women: Care Instructions. \"  Current as of: June 29, 2020               Content Version: 12.8  © 2006-2021 [employer]. Care instructions adapted under license by [employer] (which disclaims liability or warranty for this information). If you have questions about a medical condition or this instruction, always ask your healthcare professional. [address] any warranty or liability for your use of this information.

## 2021-08-13 LAB
BACTERIA SPEC CULT: NORMAL
CC UR VC: NORMAL
SERVICE CMNT-IMP: NORMAL

## 2021-08-13 NOTE — PROGRESS NOTES
Notify Urine specimen contaminated. RTC for recollection of urine culture. Ok to hold off symptoms have resolved.

## 2021-08-14 ENCOUNTER — HOSPITAL ENCOUNTER (OUTPATIENT)
Age: 86
Setting detail: OBSERVATION
Discharge: HOME HEALTH | End: 2021-08-15
Attending: STUDENT IN AN ORGANIZED HEALTH CARE EDUCATION/TRAINING PROGRAM | Admitting: STUDENT IN AN ORGANIZED HEALTH CARE EDUCATION/TRAINING PROGRAM
Payer: MEDICARE

## 2021-08-14 ENCOUNTER — APPOINTMENT (OUTPATIENT)
Dept: CT IMAGING | Age: 86
End: 2021-08-14
Attending: STUDENT IN AN ORGANIZED HEALTH CARE EDUCATION/TRAINING PROGRAM
Payer: MEDICARE

## 2021-08-14 ENCOUNTER — APPOINTMENT (OUTPATIENT)
Dept: GENERAL RADIOLOGY | Age: 86
End: 2021-08-14
Attending: STUDENT IN AN ORGANIZED HEALTH CARE EDUCATION/TRAINING PROGRAM
Payer: MEDICARE

## 2021-08-14 DIAGNOSIS — R42 DIZZINESS: Primary | ICD-10-CM

## 2021-08-14 DIAGNOSIS — R29.898 UPPER EXTREMITY WEAKNESS: ICD-10-CM

## 2021-08-14 LAB
ALBUMIN SERPL-MCNC: 3.6 G/DL (ref 3.5–5)
ALBUMIN/GLOB SERPL: 0.9 {RATIO} (ref 1.1–2.2)
ALP SERPL-CCNC: 41 U/L (ref 45–117)
ALT SERPL-CCNC: 20 U/L (ref 12–78)
ANION GAP SERPL CALC-SCNC: 5 MMOL/L (ref 5–15)
APPEARANCE UR: CLEAR
AST SERPL-CCNC: 24 U/L (ref 15–37)
BACTERIA URNS QL MICRO: NEGATIVE /HPF
BASOPHILS # BLD: 0 K/UL (ref 0–0.1)
BASOPHILS NFR BLD: 0 % (ref 0–1)
BILIRUB SERPL-MCNC: 0.7 MG/DL (ref 0.2–1)
BILIRUB UR QL: NEGATIVE
BNP SERPL-MCNC: 1304 PG/ML
BUN SERPL-MCNC: 13 MG/DL (ref 6–20)
BUN/CREAT SERPL: 15 (ref 12–20)
CALCIUM SERPL-MCNC: 8.1 MG/DL (ref 8.5–10.1)
CHLORIDE SERPL-SCNC: 106 MMOL/L (ref 97–108)
CO2 SERPL-SCNC: 28 MMOL/L (ref 21–32)
COLOR UR: ABNORMAL
CREAT SERPL-MCNC: 0.84 MG/DL (ref 0.55–1.02)
DIFFERENTIAL METHOD BLD: ABNORMAL
EOSINOPHIL # BLD: 0.2 K/UL (ref 0–0.4)
EOSINOPHIL NFR BLD: 2 % (ref 0–7)
EPITH CASTS URNS QL MICRO: ABNORMAL /LPF
ERYTHROCYTE [DISTWIDTH] IN BLOOD BY AUTOMATED COUNT: 20.3 % (ref 11.5–14.5)
EST. AVERAGE GLUCOSE BLD GHB EST-MCNC: 189 MG/DL
FOLATE SERPL-MCNC: 20 NG/ML (ref 5–21)
GLOBULIN SER CALC-MCNC: 3.8 G/DL (ref 2–4)
GLUCOSE BLD STRIP.AUTO-MCNC: 124 MG/DL (ref 65–117)
GLUCOSE BLD STRIP.AUTO-MCNC: 166 MG/DL (ref 65–117)
GLUCOSE SERPL-MCNC: 126 MG/DL (ref 65–100)
GLUCOSE UR STRIP.AUTO-MCNC: NEGATIVE MG/DL
HBA1C MFR BLD: 8.2 % (ref 4–5.6)
HCT VFR BLD AUTO: 28.4 % (ref 35–47)
HGB BLD-MCNC: 9.7 G/DL (ref 11.5–16)
HGB UR QL STRIP: ABNORMAL
HYALINE CASTS URNS QL MICRO: ABNORMAL /LPF (ref 0–5)
IMM GRANULOCYTES # BLD AUTO: 0.1 K/UL (ref 0–0.04)
IMM GRANULOCYTES NFR BLD AUTO: 1 % (ref 0–0.5)
KETONES UR QL STRIP.AUTO: NEGATIVE MG/DL
LEUKOCYTE ESTERASE UR QL STRIP.AUTO: NEGATIVE
LYMPHOCYTES # BLD: 2.8 K/UL (ref 0.8–3.5)
LYMPHOCYTES NFR BLD: 34 % (ref 12–49)
MCH RBC QN AUTO: 38.2 PG (ref 26–34)
MCHC RBC AUTO-ENTMCNC: 34.2 G/DL (ref 30–36.5)
MCV RBC AUTO: 111.8 FL (ref 80–99)
MONOCYTES # BLD: 0.6 K/UL (ref 0–1)
MONOCYTES NFR BLD: 7 % (ref 5–13)
NEUTS SEG # BLD: 4.5 K/UL (ref 1.8–8)
NEUTS SEG NFR BLD: 56 % (ref 32–75)
NITRITE UR QL STRIP.AUTO: NEGATIVE
NRBC # BLD: 0.02 K/UL (ref 0–0.01)
NRBC BLD-RTO: 0.2 PER 100 WBC
PH UR STRIP: 8 [PH] (ref 5–8)
PLATELET # BLD AUTO: 291 K/UL (ref 150–400)
PMV BLD AUTO: 11.9 FL (ref 8.9–12.9)
POTASSIUM SERPL-SCNC: 4.1 MMOL/L (ref 3.5–5.1)
PROT SERPL-MCNC: 7.4 G/DL (ref 6.4–8.2)
PROT UR STRIP-MCNC: NEGATIVE MG/DL
RBC # BLD AUTO: 2.54 M/UL (ref 3.8–5.2)
RBC #/AREA URNS HPF: ABNORMAL /HPF (ref 0–5)
RBC MORPH BLD: ABNORMAL
RBC MORPH BLD: ABNORMAL
SERVICE CMNT-IMP: ABNORMAL
SERVICE CMNT-IMP: ABNORMAL
SODIUM SERPL-SCNC: 139 MMOL/L (ref 136–145)
SP GR UR REFRACTOMETRY: 1.01 (ref 1–1.03)
TROPONIN I SERPL-MCNC: <0.05 NG/ML
UR CULT HOLD, URHOLD: NORMAL
UROBILINOGEN UR QL STRIP.AUTO: 0.2 EU/DL (ref 0.2–1)
WBC # BLD AUTO: 8.2 K/UL (ref 3.6–11)
WBC URNS QL MICRO: ABNORMAL /HPF (ref 0–4)

## 2021-08-14 PROCEDURE — 83921 ORGANIC ACID SINGLE QUANT: CPT

## 2021-08-14 PROCEDURE — 81001 URINALYSIS AUTO W/SCOPE: CPT

## 2021-08-14 PROCEDURE — 74011250636 HC RX REV CODE- 250/636: Performed by: STUDENT IN AN ORGANIZED HEALTH CARE EDUCATION/TRAINING PROGRAM

## 2021-08-14 PROCEDURE — 74011250637 HC RX REV CODE- 250/637: Performed by: STUDENT IN AN ORGANIZED HEALTH CARE EDUCATION/TRAINING PROGRAM

## 2021-08-14 PROCEDURE — 93005 ELECTROCARDIOGRAM TRACING: CPT

## 2021-08-14 PROCEDURE — 99218 HC RM OBSERVATION: CPT

## 2021-08-14 PROCEDURE — 83880 ASSAY OF NATRIURETIC PEPTIDE: CPT

## 2021-08-14 PROCEDURE — 77030019905 HC CATH URETH INTMIT MDII -A

## 2021-08-14 PROCEDURE — 84484 ASSAY OF TROPONIN QUANT: CPT

## 2021-08-14 PROCEDURE — 96361 HYDRATE IV INFUSION ADD-ON: CPT

## 2021-08-14 PROCEDURE — 82962 GLUCOSE BLOOD TEST: CPT

## 2021-08-14 PROCEDURE — 99285 EMERGENCY DEPT VISIT HI MDM: CPT

## 2021-08-14 PROCEDURE — 36415 COLL VENOUS BLD VENIPUNCTURE: CPT

## 2021-08-14 PROCEDURE — 80053 COMPREHEN METABOLIC PANEL: CPT

## 2021-08-14 PROCEDURE — 83036 HEMOGLOBIN GLYCOSYLATED A1C: CPT

## 2021-08-14 PROCEDURE — 70450 CT HEAD/BRAIN W/O DYE: CPT

## 2021-08-14 PROCEDURE — 85025 COMPLETE CBC W/AUTO DIFF WBC: CPT

## 2021-08-14 PROCEDURE — 71045 X-RAY EXAM CHEST 1 VIEW: CPT

## 2021-08-14 PROCEDURE — 96360 HYDRATION IV INFUSION INIT: CPT

## 2021-08-14 PROCEDURE — 82746 ASSAY OF FOLIC ACID SERUM: CPT

## 2021-08-14 RX ORDER — SODIUM CHLORIDE 0.9 % (FLUSH) 0.9 %
5-40 SYRINGE (ML) INJECTION AS NEEDED
Status: DISCONTINUED | OUTPATIENT
Start: 2021-08-14 | End: 2021-08-15 | Stop reason: HOSPADM

## 2021-08-14 RX ORDER — DONEPEZIL HYDROCHLORIDE 5 MG/1
5 TABLET, FILM COATED ORAL
Status: DISCONTINUED | OUTPATIENT
Start: 2021-08-14 | End: 2021-08-15 | Stop reason: HOSPADM

## 2021-08-14 RX ORDER — ONDANSETRON 2 MG/ML
4 INJECTION INTRAMUSCULAR; INTRAVENOUS
Status: DISCONTINUED | OUTPATIENT
Start: 2021-08-14 | End: 2021-08-15 | Stop reason: HOSPADM

## 2021-08-14 RX ORDER — ONDANSETRON 4 MG/1
4 TABLET, ORALLY DISINTEGRATING ORAL
Status: DISCONTINUED | OUTPATIENT
Start: 2021-08-14 | End: 2021-08-15 | Stop reason: HOSPADM

## 2021-08-14 RX ORDER — MAGNESIUM SULFATE 100 %
4 CRYSTALS MISCELLANEOUS AS NEEDED
Status: DISCONTINUED | OUTPATIENT
Start: 2021-08-14 | End: 2021-08-15 | Stop reason: HOSPADM

## 2021-08-14 RX ORDER — ATORVASTATIN CALCIUM 20 MG/1
20 TABLET, FILM COATED ORAL DAILY
Status: DISCONTINUED | OUTPATIENT
Start: 2021-08-15 | End: 2021-08-15 | Stop reason: HOSPADM

## 2021-08-14 RX ORDER — DEXTROSE 50 % IN WATER (D50W) INTRAVENOUS SYRINGE
25-50 AS NEEDED
Status: DISCONTINUED | OUTPATIENT
Start: 2021-08-14 | End: 2021-08-15 | Stop reason: HOSPADM

## 2021-08-14 RX ORDER — ACETAMINOPHEN 325 MG/1
650 TABLET ORAL
Status: DISCONTINUED | OUTPATIENT
Start: 2021-08-14 | End: 2021-08-15 | Stop reason: HOSPADM

## 2021-08-14 RX ORDER — BACLOFEN 10 MG/1
10 TABLET ORAL
Status: DISCONTINUED | OUTPATIENT
Start: 2021-08-14 | End: 2021-08-15 | Stop reason: HOSPADM

## 2021-08-14 RX ORDER — POLYETHYLENE GLYCOL 3350 17 G/17G
17 POWDER, FOR SOLUTION ORAL DAILY PRN
Status: DISCONTINUED | OUTPATIENT
Start: 2021-08-14 | End: 2021-08-15 | Stop reason: HOSPADM

## 2021-08-14 RX ORDER — GUAIFENESIN 100 MG/5ML
81 LIQUID (ML) ORAL DAILY
Status: DISCONTINUED | OUTPATIENT
Start: 2021-08-15 | End: 2021-08-15 | Stop reason: HOSPADM

## 2021-08-14 RX ORDER — ACETAMINOPHEN 650 MG/1
650 SUPPOSITORY RECTAL
Status: DISCONTINUED | OUTPATIENT
Start: 2021-08-14 | End: 2021-08-15 | Stop reason: HOSPADM

## 2021-08-14 RX ORDER — SODIUM CHLORIDE 0.9 % (FLUSH) 0.9 %
5-40 SYRINGE (ML) INJECTION EVERY 8 HOURS
Status: DISCONTINUED | OUTPATIENT
Start: 2021-08-14 | End: 2021-08-15 | Stop reason: HOSPADM

## 2021-08-14 RX ORDER — LANOLIN ALCOHOL/MO/W.PET/CERES
325 CREAM (GRAM) TOPICAL
Status: DISCONTINUED | OUTPATIENT
Start: 2021-08-15 | End: 2021-08-15 | Stop reason: HOSPADM

## 2021-08-14 RX ORDER — INSULIN LISPRO 100 [IU]/ML
INJECTION, SOLUTION INTRAVENOUS; SUBCUTANEOUS
Status: DISCONTINUED | OUTPATIENT
Start: 2021-08-14 | End: 2021-08-15 | Stop reason: HOSPADM

## 2021-08-14 RX ORDER — AMLODIPINE BESYLATE 5 MG/1
5 TABLET ORAL DAILY
Status: DISCONTINUED | OUTPATIENT
Start: 2021-08-15 | End: 2021-08-15 | Stop reason: HOSPADM

## 2021-08-14 RX ORDER — METOPROLOL TARTRATE 50 MG/1
100 TABLET ORAL 2 TIMES DAILY
Status: DISCONTINUED | OUTPATIENT
Start: 2021-08-15 | End: 2021-08-15 | Stop reason: HOSPADM

## 2021-08-14 RX ORDER — LOSARTAN POTASSIUM 50 MG/1
100 TABLET ORAL DAILY
Status: DISCONTINUED | OUTPATIENT
Start: 2021-08-15 | End: 2021-08-15 | Stop reason: HOSPADM

## 2021-08-14 RX ORDER — PAROXETINE HYDROCHLORIDE 20 MG/1
20 TABLET, FILM COATED ORAL DAILY
Status: DISCONTINUED | OUTPATIENT
Start: 2021-08-15 | End: 2021-08-15 | Stop reason: HOSPADM

## 2021-08-14 RX ADMIN — DONEPEZIL HYDROCHLORIDE 5 MG: 5 TABLET, FILM COATED ORAL at 23:27

## 2021-08-14 RX ADMIN — SODIUM CHLORIDE 500 ML: 900 INJECTION, SOLUTION INTRAVENOUS at 20:01

## 2021-08-14 RX ADMIN — Medication 10 ML: at 22:14

## 2021-08-14 NOTE — ED TRIAGE NOTES
Pt arrives with EMS from home for new onset bilateral arm shaking. Per EMS, shaking is worse when arms extended and holding something.

## 2021-08-14 NOTE — ED PROVIDER NOTES
Chief Complaint   Patient presents with    Tremors     bilateral upper extremities     History and review of systems limited due to dementia. This is an 72-year-old female with a history of hypertension, hyperlipidemia, diabetes, and multiple prior strokes and TIAs according to her daughter, recent diagnosis of dementia, brought in today for multiple complaints including dizziness for the last 3 weeks, nausea, and bilateral upper extremity tremor since yesterday. She's unable to characterize the dizziness any further but denies any headaches or vomiting. Her daughter denies any reported chest pain, shortness of breath, or abdominal pain. Has noticed that she has seemed more off balance than usual and yesterday when she was trying to hold a glass was so tremulous that she spilled its contents when she tried to hold it with each hand. The patient herself feels weak in her left leg as well. She has significant lower extremity edema at baseline and is able to use a walker for transfers but has poor mobility, lives with her daughter. She is fully vaccinated and's COVID-19. No other recent changes in her health.       Past Medical History:   Diagnosis Date    Arthritis     Chronic pain     Depression     Diabetes ([address]     Hypercholesterolemia     Hypertension     [address]     TIA 10 yrs back    Stroke ([facility] [address]     2003       Past Surgical History:   Procedure Laterality Date    HX CARPAL TUNNEL RELEASE  1990    HX CATARACT REMOVAL      bilateral    HX HYSTERECTOMY  1986    HX HYSTERECTOMY      HX ORTHOPAEDIC      HX ORTHOPAEDIC      carpel tunnel left    HX ORTHOPAEDIC      left foot heel spur    HX REFRACTIVE SURGERY  2006         Family History:   Adopted: Yes       Social History     Socioeconomic History    Marital status:      Spouse name: Not on file    Number of children: Not on file    Years of education: Not on file    Highest education level: Not on file   Occupational History    Not on file   Tobacco Use    Smoking status: Never Smoker    Smokeless tobacco: Never Used   Substance and Sexual Activity    Alcohol use: No    Drug use: No    Sexual activity: Never     Birth control/protection: None     Comment: retired,relocated from [place] ,[address] [place] [address] with daughter   Other Topics Concern    Not on file   Social History Narrative    ** Merged History Encounter **          Social Determinants of Health     Financial Resource Strain:     Difficulty of Paying Living Expenses:    Food Insecurity:     Worried About Running Out of Food in the Last Year:     [address] in the Last Year:    Transportation Needs:     Lack of Transportation (Medical):  Lack of Transportation (Non-Medical):    Physical Activity:     Days of Exercise per Week:     Minutes of Exercise per Session:    Stress:     Feeling of Stress :    Social Connections:     Frequency of Communication with Friends and Family:     Frequency of Social Gatherings with Friends and Family:     Attends [employer] Services:     Active Member of Clubs or Organizations:     Attends Club or Organization Meetings:     Marital Status:    Intimate Partner Violence:     Fear of Current or Ex-Partner:     Emotionally Abused:     Physically Abused:     Sexually Abused: ALLERGIES: Patient has no known allergies. Review of Systems   Unable to perform ROS: Dementia   Neurological: Positive for tremors.        Vitals:    08/14/21 1203 08/14/21 1900   BP: (!) 150/85 (!) 185/98   Pulse: 77 79   Resp: 18 18   Temp: 97.8 °F (36.6 °C)    SpO2: 91% 94%            Physical Exam  General:  Awake and alert, NAD  HEENT:  NC/AT, equal pupils, moist mucous membranes  Neck:   Normal inspection, full range of motion  Cardiac:  RRR, no murmurs  Respiratory:  Clear bilaterally, no wheezes, rales, rhonchi  Abdomen:  Soft and nontender, nondistended  Extremities: Warm and well perfused,+moderate pitting edema in the lower extremities  Neuro:  +Coarse intention tremor in the upper extremities, otherwise moving all extremities symmetrically without gross motor deficit  Skin:   No rashes or pallor    RESULTS  Recent Results (from the past 12 hour(s))   GLUCOSE, POC    Collection Time: 08/14/21  3:28 PM   Result Value Ref Range    Glucose (POC) 124 (H) 65 - 117 mg/dL    Performed by [facility]    METABOLIC PANEL, COMPREHENSIVE    Collection Time: 08/14/21  6:49 PM   Result Value Ref Range    Sodium 139 136 - 145 mmol/L    Potassium 4.1 3.5 - 5.1 mmol/L    Chloride 106 97 - 108 mmol/L    CO2 28 21 - 32 mmol/L    Anion gap 5 5 - 15 mmol/L    Glucose 126 (H) 65 - 100 mg/dL    BUN 13 6 - 20 MG/DL    Creatinine 0.84 0.55 - 1.02 MG/DL    BUN/Creatinine ratio 15 12 - 20      GFR est AA >60 >60 ml/min/1.73m2    GFR est non-AA >60 >60 ml/min/1.73m2    Calcium 8.1 (L) 8.5 - 10.1 MG/DL    Bilirubin, total 0.7 0.2 - 1.0 MG/DL    ALT (SGPT) 20 12 - 78 U/L    AST (SGOT) 24 15 - 37 U/L    Alk. phosphatase 41 (L) 45 - 117 U/L    Protein, total 7.4 6.4 - 8.2 g/dL    Albumin 3.6 3.5 - 5.0 g/dL    Globulin 3.8 2.0 - 4.0 g/dL    A-G Ratio 0.9 (L) 1.1 - 2.2     CBC WITH AUTOMATED DIFF    Collection Time: 08/14/21  6:49 PM   Result Value Ref Range    WBC 8.2 3.6 - 11.0 K/uL    RBC 2.54 (L) 3.80 - 5.20 M/uL    HGB 9.7 (L) 11.5 - 16.0 g/dL    HCT 28.4 (L) 35.0 - 47.0 %    .8 (H) 80.0 - 99.0 FL    MCH 38.2 (H) 26.0 - 34.0 PG    MCHC 34.2 30.0 - 36.5 g/dL    RDW 20.3 (H) 11.5 - 14.5 %    PLATELET 116 839 - 287 K/uL    MPV 11.9 8.9 - 12.9 FL    NRBC 0.2 (H) 0  WBC    ABSOLUTE NRBC 0.02 (H) 0.00 - 0.01 K/uL    NEUTROPHILS 56 32 - 75 %    LYMPHOCYTES 34 12 - 49 %    MONOCYTES 7 5 - 13 %    EOSINOPHILS 2 0 - 7 %    BASOPHILS 0 0 - 1 %    IMMATURE GRANULOCYTES 1 (H) 0.0 - 0.5 %    ABS. NEUTROPHILS 4.5 1.8 - 8.0 K/UL    ABS. LYMPHOCYTES 2.8 0.8 - 3.5 K/UL    ABS. MONOCYTES 0.6 0.0 - 1.0 K/UL    ABS. EOSINOPHILS 0.2 0.0 - 0.4 K/UL    ABS.  BASOPHILS 0.0 0.0 - 0.1 K/UL    ABS. IMM. GRANS. 0.1 (H) 0.00 - 0.04 K/UL    DF SMEAR SCANNED      RBC COMMENTS ANISOCYTOSIS  2+        RBC COMMENTS MACROCYTOSIS  2+       NT-PRO BNP    Collection Time: 08/14/21  6:49 PM   Result Value Ref Range    NT pro-BNP 1,304 (H) <450 PG/ML   TROPONIN I    Collection Time: 08/14/21  6:49 PM   Result Value Ref Range    Troponin-I, Qt. <0.05 <0.05 ng/mL        IMAGING  CT HEAD WO CONT    Result Date: 8/14/2021  No acute findings. Chronic microvascular ischemic white matter change. Intracranial atherosclerosis. XR CHEST PORT    Result Date: 8/14/2021  No acute findings. Procedures - none unless documented below  EKG as interpreted by me:  Normal sinus rhythm at a rate of 70, left axis deviation, normal intervals, left ventricular hypertrophy with associated repolarization changes in the lateral limb leads, grossly no ST elevation suggesting acute ischemia. ED course: Labs, EKG and imaging reviewed. Daughter reports tremor and weakness in the upper extremities, concerned for recurrent stroke. She has dementia, has been dizzy for several weeks, nauseous, no focal deficits on exam and CT head is unremarkable but will need stroke/TIA investigation. Urinalysis pending. Will admit for continued management, discussed with the hospitalist.      Damian Longoria Serve for Admission  8:08 PM    ED Room Number:   ER12/12  Patient Name and age:  Porter Christensen 82 y.o.  female  Working Diagnosis:     1. Dizziness    2. Upper extremity weakness      COVID suspicion:   no  Code Status:    Full Code  Readmission:    no  Isolation Requirements:  no  Recommended Level of Care: telemetry  Department:    [facility] Adult ED - 21   Other:     Daughter reports tremor and weakness in the upper extremities, concerned for recurrent stroke.   She has dementia, has been dizzy for several weeks, nauseous, no focal deficits on exam and CT head is unremarkable but will need stroke/TIA investigation. Urinalysis pending.

## 2021-08-15 ENCOUNTER — APPOINTMENT (OUTPATIENT)
Dept: MRI IMAGING | Age: 86
End: 2021-08-15
Attending: STUDENT IN AN ORGANIZED HEALTH CARE EDUCATION/TRAINING PROGRAM
Payer: MEDICARE

## 2021-08-15 VITALS
SYSTOLIC BLOOD PRESSURE: 154 MMHG | DIASTOLIC BLOOD PRESSURE: 78 MMHG | OXYGEN SATURATION: 92 % | TEMPERATURE: 98.3 F | HEART RATE: 79 BPM | RESPIRATION RATE: 16 BRPM

## 2021-08-15 LAB
ATRIAL RATE: 70 BPM
ATRIAL RATE: 72 BPM
CALCULATED P AXIS, ECG09: 100 DEGREES
CALCULATED P AXIS, ECG09: 75 DEGREES
CALCULATED R AXIS, ECG10: -40 DEGREES
CALCULATED R AXIS, ECG10: -42 DEGREES
CALCULATED T AXIS, ECG11: 101 DEGREES
CALCULATED T AXIS, ECG11: 94 DEGREES
DIAGNOSIS, 93000: NORMAL
DIAGNOSIS, 93000: NORMAL
GLUCOSE BLD STRIP.AUTO-MCNC: 108 MG/DL (ref 65–117)
GLUCOSE BLD STRIP.AUTO-MCNC: 142 MG/DL (ref 65–117)
P-R INTERVAL, ECG05: 156 MS
P-R INTERVAL, ECG05: 162 MS
Q-T INTERVAL, ECG07: 402 MS
Q-T INTERVAL, ECG07: 414 MS
QRS DURATION, ECG06: 120 MS
QRS DURATION, ECG06: 96 MS
QTC CALCULATION (BEZET), ECG08: 440 MS
QTC CALCULATION (BEZET), ECG08: 447 MS
SERVICE CMNT-IMP: ABNORMAL
SERVICE CMNT-IMP: NORMAL
VENTRICULAR RATE, ECG03: 70 BPM
VENTRICULAR RATE, ECG03: 72 BPM

## 2021-08-15 PROCEDURE — 97530 THERAPEUTIC ACTIVITIES: CPT

## 2021-08-15 PROCEDURE — 99218 HC RM OBSERVATION: CPT

## 2021-08-15 PROCEDURE — 70551 MRI BRAIN STEM W/O DYE: CPT

## 2021-08-15 PROCEDURE — 97116 GAIT TRAINING THERAPY: CPT

## 2021-08-15 PROCEDURE — 96372 THER/PROPH/DIAG INJ SC/IM: CPT

## 2021-08-15 PROCEDURE — 97161 PT EVAL LOW COMPLEX 20 MIN: CPT

## 2021-08-15 PROCEDURE — 74011250636 HC RX REV CODE- 250/636: Performed by: STUDENT IN AN ORGANIZED HEALTH CARE EDUCATION/TRAINING PROGRAM

## 2021-08-15 PROCEDURE — 99214 OFFICE O/P EST MOD 30 MIN: CPT | Performed by: PSYCHIATRY & NEUROLOGY

## 2021-08-15 PROCEDURE — 82962 GLUCOSE BLOOD TEST: CPT

## 2021-08-15 PROCEDURE — 74011250637 HC RX REV CODE- 250/637: Performed by: STUDENT IN AN ORGANIZED HEALTH CARE EDUCATION/TRAINING PROGRAM

## 2021-08-15 RX ORDER — CYANOCOBALAMIN 1000 UG/ML
INJECTION, SOLUTION INTRAMUSCULAR; SUBCUTANEOUS
Qty: 30 ML | Refills: 1 | Status: SHIPPED | OUTPATIENT
Start: 2021-08-15 | End: 2022-10-26 | Stop reason: ALTCHOICE

## 2021-08-15 RX ORDER — TRAMADOL HYDROCHLORIDE 50 MG/1
50 TABLET ORAL
COMMUNITY

## 2021-08-15 RX ORDER — CYANOCOBALAMIN 1000 UG/ML
1000 INJECTION, SOLUTION INTRAMUSCULAR; SUBCUTANEOUS DAILY
Status: DISCONTINUED | OUTPATIENT
Start: 2021-08-15 | End: 2021-08-15 | Stop reason: HOSPADM

## 2021-08-15 RX ADMIN — LOSARTAN POTASSIUM 100 MG: 50 TABLET, FILM COATED ORAL at 09:54

## 2021-08-15 RX ADMIN — CYANOCOBALAMIN 1000 MCG: 1000 INJECTION, SOLUTION INTRAMUSCULAR; SUBCUTANEOUS at 02:34

## 2021-08-15 RX ADMIN — AMLODIPINE BESYLATE 5 MG: 5 TABLET ORAL at 09:55

## 2021-08-15 RX ADMIN — Medication 10 ML: at 05:54

## 2021-08-15 RX ADMIN — PAROXETINE HYDROCHLORIDE 20 MG: 20 TABLET, FILM COATED ORAL at 09:55

## 2021-08-15 RX ADMIN — ATORVASTATIN CALCIUM 20 MG: 20 TABLET, FILM COATED ORAL at 09:54

## 2021-08-15 RX ADMIN — ASPIRIN 81 MG: 81 TABLET, CHEWABLE ORAL at 09:54

## 2021-08-15 RX ADMIN — FERROUS SULFATE TAB 325 MG (65 MG ELEMENTAL FE) 325 MG: 325 (65 FE) TAB at 07:00

## 2021-08-15 RX ADMIN — METOPROLOL TARTRATE 100 MG: 50 TABLET, FILM COATED ORAL at 09:54

## 2021-08-15 NOTE — PROGRESS NOTES
Orthopedic Spine Nursing Dual Skin Assessment      Primary Nurse Judith Roche RN and Rajat Strange RN performed a dual skin assessment on patient Zeynep Rose, admitted on 8/14/2021  3:11 PM, found with No impairment noted, and with a Jens Score: 21. Wound care consult will be placed if appropriate.       Signed by: Judith Roche RN, BSN, CMSRN                       8/15/2021 at 1:08 AM

## 2021-08-15 NOTE — CONSULTS
Consult dictated. 82year-old with mild to moderate dementia, presenting with dizziness, tremors in both upper extremities, generalized weakness. She has a benign tremor of both upper extremities on exam which does not seem to be limiting her activities. Suspect generalized deconditioning. MRI brain is negative for any acute findings. Okay to discharge home with home PT.  B12 is being replenished.   Asia Arrington MD

## 2021-08-15 NOTE — PROGRESS NOTES
.                Orthopedic Spine Nursing Note      Patient Name: Zeynep Rose    : 1935               MRN: [id number]    Admit Diagnosis: Dizziness [R42]    Surgery: * No surgery found *           Patient and daughter refused to have lab draw done, stating that \"her mom has been stuck a lot of times, and it was just a few hours since drawing for the same test\".         Lines:   Peripheral IV 21 Left Antecubital (Active)   Site Assessment Clean, dry, & intact 21   Phlebitis Assessment 0 21 190   Infiltration Assessment 0 21   Dressing Status Clean, dry, & intact 21       Signed by: Judith Roche, RN, BSN, CMSRN                                            8/15/2021 at 2:51 AM

## 2021-08-15 NOTE — PROGRESS NOTES
.                Orthopedic Spine Nursing Note      Patient Name: Camilla Telles    : 1935               MRN: [id number]    Admit Diagnosis: Dizziness [R42]    Surgery: * No surgery found *           Patient and daughter refused to have lab draw done, stating that \"her mom has been stuck a lot of times, and it was just a few hours since drawing for the same test\".  Nurse explained        Lines:   Peripheral IV 21 Left Antecubital (Active)   Site Assessment Clean, dry, & intact 21   Phlebitis Assessment 0 21 190   Infiltration Assessment 0 21   Dressing Status Clean, dry, & intact 21       Signed by: Jaida Ledbetter RN, BSN, CMSRN                                            8/15/2021 at 2:51 AM

## 2021-08-15 NOTE — CONSULTS
[address]    Name:  Deirdre Black  MR#:  [id number]  :  1935  ACCOUNT #:  [de-identified]  DATE OF SERVICE:  08/15/2021    REQUESTING PHYSICIAN:  Gucci Gtz MD    REASON FOR EVALUATION:  Dizziness, ataxia, tremors. HISTORY OF PRESENT ILLNESS:  The patient is an 51-year-old female who is known to me from outpatient clinic and was recently evaluated for memory problems. 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 She was found to have difficulties with visual-spatial function, executive function, word recall, and short-term memory. She was brought in this time because of multiple complaints including tremulousness in her extremities, feeling of dizziness, and physical deconditioning/generalized sense of weakness. She was admitted for further workup and had an MRI scan of the brain and labs which were unremarkable. She did have B12 levels checked recently which came back significantly below the lower limits of normal.  Overall she feels much better today. Tremors come on mainly when she tries to do something with her hands such as eating but do not seem to be limiting her activities. No changes in overall strength or focal weakness. No chest pain, shortness of breath, palpitations, nausea, or vomiting. PAST MEDICAL HISTORY:  As mentioned above. HOME MEDICATIONS:  1. Doxycycline. 2.  Simvastatin. 3.  Baclofen. 4.  Donepezil. 5.  Janumet XR. 6.  Norvasc. 7.  Glipizide. 8.  Lasix. 9.  Sitagliptin-metformin. 10.  Paroxetine. 11.  Metoprolol. 12.  Aspirin 81 mg daily. 13.  Vitamin D.    SOCIAL HISTORY:  The patient lives with her daughter. No history of smoking or alcohol use. Is able to ambulate independently. PHYSICAL EXAMINATION:  GENERAL:  The patient is sitting in bed in no acute distress, is able to answer simple questions and follows commands.   VITAL SIGNS:  Blood pressure 154/78, temperature is 98.3, pulse 79. HEART:  Regular rate and rhythm. CHEST:  Clear. ABDOMEN:  Soft, nontender. Positive bowel sounds. EXTREMITIES:  No edema. NEUROLOGIC:  Speech is clear. Comprehension is normal.  Pupils are equal, round, reactive. Extraocular movements are full. Face is symmetric. Tongue is midline. Hearing is baseline. Muscle tone and bulk normal.  Strength normal in all extremities. DTRs hypoactive all over. Toes downgoing. Sensation intact. Gait exam is deferred. LABORATORY DATA:  CBC with WBC 8.2, hemoglobin 9.7, hematocrit 28.4, platelets 681. Chemistry:  Sodium 139, potassium 4.1, BUN 13, creatinine 0.84. AST 24, ALT 20. Vitamin B12 level is 71. Hemoglobin A1c is 8.2. MRI scan of the brain shows advanced small vessel ischemic changes. No acute findings. ASSESSMENT AND PLAN:  An 82-year-old female with an underlying diagnosis of moderate dementia of Alzheimer's and vascular type. She is presenting with symptoms of dizziness, tremors in both upper extremities, and generalized weakness. On exam, she does have mild intention tremor in both upper extremities which does not seem to be limiting her activities. Suspect this to be a benign essential tremor. She appears to have generalized deconditioning. MRI of the brain is negative for any acute findings. Agree with the plan to discharge home and continue with home physical therapy for deconditioning. Her B12 is being replenished parenterally. She will continue outpatient followup as scheduled. Thank you for this consultation.       Pili Moss MD      [id number]  D:  08/15/2021 13:32  T:  08/15/2021 15:15  JOB #:  [id number]

## 2021-08-15 NOTE — PROGRESS NOTES
[address]           8/15/2021      RE: Radha Petty        To Whom It May Concern,    This is to certify that [address] [place] [address] has been under the care of [facility] due to acute illness from 8/14/2021 until 8/15/2021. Patient's daughter (Patricia Emanuel) who is patient's care taker should be excused for work missed. Patient has underlying condition which required Patricia Emanuel to miss work as she was at bedside throughout patient's hospitalization. Patricia Emanuel may return to work on/after 8/16/2021. Please feel free to contact my office ([phone]) if you have any questions or concerns. Thank you for your assistance in this matter.     Sincerely,            Sugar Quiros M.D  Internal Medicine

## 2021-08-15 NOTE — H&P
History & Physical    Primary Care Provider: Sugey Mccracken NP  Source of Information: Patient, family and chart review    History of Presenting Illness:   Radha Petty is a 82 y.o. female with hx of dementia, mdd, niddm ii, hypertension, CVAs and TIAs, dyslipidemia who presents to hospital accompanied by daughter with multiple neurological concerns. Patient states over the last 3 days, she has noted intermittent unintentional tremors in her right hand. Though she has recent diagnosis of dementia, she is able to provide reliable and appropriate history. Additionally, patient describes overall deconditioning weakness and inability to perform her daily tasks as swiftly as she used to before. Patient's daughter who is at bedside states she is extremely concerned that patient have had recurrent stroke or even worse. She states patient has had dizziness that is severely worsened from her baseline over the last 2 weeks. She has also noted increased weakness and tremors in patient's right upper extremity. Also reports intermittent nausea and decreased p.o. intake. The patient denies any fever, chills, chest pain, cough, congestion, recent illness, palpitations, or dysuria. Vitals on ER presentation remarkable for BPs to 180s over 90s. Labs remarkable for baseline hemoglobin of 9.7. Chest x-ray and CT head showed no acute findings. Review of Systems:  A comprehensive review of systems was negative except for that written in the History of Present Illness.      Past Medical History:   Diagnosis Date    Arthritis     Chronic pain     Depression     [facility]     Hypercholesterolemia     Hypertension     Stroke ([facility]     TIA 10 yrs back    Stroke ([facility] [facility]     2003      Past Surgical History:   Procedure Laterality Date    HX CARPAL TUNNEL RELEASE  1990    HX CATARACT REMOVAL      bilateral    HX HYSTERECTOMY  1986    HX HYSTERECTOMY      HX ORTHOPAEDIC      HX ORTHOPAEDIC      carpel tunnel left    HX ORTHOPAEDIC      left foot heel spur    HX REFRACTIVE SURGERY  2006     Prior to Admission medications    Medication Sig Start Date End Date Taking? Authorizing Provider   doxycycline (ADOXA) 100 mg tablet Take 1 Tablet by mouth two (2) times a day for 7 days. 8/10/21 8/17/21  Trevor Olivas MD   simvastatin (ZOCOR) 40 mg tablet TAKE 1 TABLET BY MOUTH AT BEDTIME 8/9/21   Joi Pearson NP   baclofen (LIORESAL) 10 mg tablet TAKE 1 TABLET BY MOUTH TWICE DAILY AS NEEDED FOR MUSCLE SPASM 8/9/21   Joi Pearson NP   donepeziL (ARICEPT) 5 mg tablet Take 1 Tablet by mouth nightly. 8/5/21   Monica Segura MD   Janumet XR 50-1,000 mg TM24 TAKE 1 TABLET BY MOUTH  TWICE DAILY 7/19/21   Bryan Pace MD   amLODIPine (NORVASC) 5 mg tablet Take 1 Tablet by mouth daily. 7/19/21   Bryan Pace MD   ferrous sulfate (IRON) 325 mg (65 mg iron) EC tablet TAKE 1 TABLET BY MOUTH ONCE DAILY BEFORE BREAKFAST 6/14/21   Sherley Alvarez NP   losartan (COZAAR) 50 mg tablet Take 1 tablet by mouth once daily 6/7/21   Bryan Pace MD   glipiZIDE SR (Glucotrol XL) 2.5 mg CR tablet Take 1 Tab by mouth daily. 4/12/21   Bryan Pace MD   furosemide (LASIX) 20 mg tablet TAKE 1 TABLET BY MOUTH  Monday,wednesday Friday and saturday 4/12/21   Bryan Pace MD   SITagliptin-metFORMIN (Janumet) 50-1,000 mg per tablet Take 1 tablet by mouth two  times daily with meals 12/14/20   Bryan Pace MD   cholestyramine Kaitlin Nelly) 4 gram packet Take 1 Packet by mouth daily (with dinner).  11/6/20   Bryan Pace MD   meloxicam (MOBIC) 15 mg tablet TAKE 1 TABLET BY MOUTH  DAILY 9/21/20   Bryan Pace MD   PARoxetine (PAXIL) 20 mg tablet TAKE 1 TABLET BY MOUTH  EVERY NIGHT AT BEDTIME 9/21/20   Bryan Pace MD   metoprolol tartrate (LOPRESSOR) 100 mg IR tablet TAKE 1 TABLET BY MOUTH  TWICE DAILY 9/21/20   Sherley Alvarez NP   acetaminophen (TYLENOL) 650 mg TbER Take 650 mg by mouth two (2) times daily as needed for Pain. Provider, Historical   famotidine (PEPCID) 20 mg tablet Take 1 Tab by mouth two (2) times a day. 12/4/19   Chavez Sabillon MD   VITAMIN D2 50,000 unit capsule TAKE 1 CAPSULE BY MOUTH ONCE A WEEK 11/1/19   Provider, Historical   menthol (BIOFREEZE, MENTHOL,) 4 % gel Apply top BID 7/11/19   Chavez Sabillon MD   TENS unit and electrodes cmpk Use in lower back every day. DX;chronic lower back pain 3/7/19   Chavez Sabillon MD   aspirin 81 mg chewable tablet Take 1 Tab by mouth daily. 8/29/18   Chavez Sabillon MD   cholecalciferol (VITAMIN D3) 1,000 unit cap Take 2,000 Units by mouth daily. 11/28/16   Chavez Sabillon MD     No Known Allergies   Family History   Adopted: Yes        SOCIAL HISTORY:  Patient resides:  Independently x   Assisted Living    SNF    With family care       Smoking history:   None x   Former    Chronic      Alcohol history:   None x   Social    Chronic      Ambulates:   Independently x   w/cane    w/walker    w/wc    CODE STATUS:  DNR    Full x   Other      Objective:     Physical Exam:     Visit Vitals  BP (!) 185/98   Pulse 79   Temp 97.8 °F (36.6 °C)   Resp 18   SpO2 94%           General:  Alert, cooperative, no distress, appears stated age. Head:  Normocephalic, without obvious abnormality, atraumatic. Eyes:  Conjunctivae/corneas clear. PERRL, EOMs intact. Nose: Nares normal. Septum midline. Mucosa normal.        Neck: Supple, symmetrical, trachea midline, no carotid bruit and no JVD. Lungs:   Clear to auscultation bilaterally. Chest wall:  No tenderness or deformity. Heart:  Regular rate and rhythm, S1, S2 normal, no murmur, click, rub or gallop. Abdomen:   Soft, non-tender. Bowel sounds normal. No masses,  No organomegaly. Extremities: Extremities normal, atraumatic, no cyanosis or edema. Pulses: 2+ and symmetric all extremities. Skin: Skin color, texture, turgor normal. No rashes or lesions   Neurologic: CNII-XII intact.           EKG: Normal sinus rhythm. Data Review:     Recent Days:  Recent Labs     08/14/21 1849   WBC 8.2   HGB 9.7*   HCT 28.4*        Recent Labs     08/14/21 1849      K 4.1      CO2 28   *   BUN 13   CREA 0.84   CA 8.1*   ALB 3.6   ALT 20     No results for input(s): PH, PCO2, PO2, HCO3, FIO2 in the last 72 hours. 24 Hour Results:  Recent Results (from the past 24 hour(s))   GLUCOSE, POC    Collection Time: 08/14/21  3:28 PM   Result Value Ref Range    Glucose (POC) 124 (H) 65 - 117 mg/dL    Performed by Wilbert Singh    METABOLIC PANEL, COMPREHENSIVE    Collection Time: 08/14/21  6:49 PM   Result Value Ref Range    Sodium 139 136 - 145 mmol/L    Potassium 4.1 3.5 - 5.1 mmol/L    Chloride 106 97 - 108 mmol/L    CO2 28 21 - 32 mmol/L    Anion gap 5 5 - 15 mmol/L    Glucose 126 (H) 65 - 100 mg/dL    BUN 13 6 - 20 MG/DL    Creatinine 0.84 0.55 - 1.02 MG/DL    BUN/Creatinine ratio 15 12 - 20      GFR est AA >60 >60 ml/min/1.73m2    GFR est non-AA >60 >60 ml/min/1.73m2    Calcium 8.1 (L) 8.5 - 10.1 MG/DL    Bilirubin, total 0.7 0.2 - 1.0 MG/DL    ALT (SGPT) 20 12 - 78 U/L    AST (SGOT) 24 15 - 37 U/L    Alk. phosphatase 41 (L) 45 - 117 U/L    Protein, total 7.4 6.4 - 8.2 g/dL    Albumin 3.6 3.5 - 5.0 g/dL    Globulin 3.8 2.0 - 4.0 g/dL    A-G Ratio 0.9 (L) 1.1 - 2.2     CBC WITH AUTOMATED DIFF    Collection Time: 08/14/21  6:49 PM   Result Value Ref Range    WBC 8.2 3.6 - 11.0 K/uL    RBC 2.54 (L) 3.80 - 5.20 M/uL    HGB 9.7 (L) 11.5 - 16.0 g/dL    HCT 28.4 (L) 35.0 - 47.0 %    .8 (H) 80.0 - 99.0 FL    MCH 38.2 (H) 26.0 - 34.0 PG    MCHC 34.2 30.0 - 36.5 g/dL    RDW 20.3 (H) 11.5 - 14.5 %    PLATELET 709 429 - 597 K/uL    MPV 11.9 8.9 - 12.9 FL    NRBC 0.2 (H) 0  WBC    ABSOLUTE NRBC 0.02 (H) 0.00 - 0.01 K/uL    NEUTROPHILS 56 32 - 75 %    LYMPHOCYTES 34 12 - 49 %    MONOCYTES 7 5 - 13 %    EOSINOPHILS 2 0 - 7 %    BASOPHILS 0 0 - 1 %    IMMATURE GRANULOCYTES 1 (H) 0.0 - 0.5 %    ABS. NEUTROPHILS 4.5 1.8 - 8.0 K/UL    ABS. LYMPHOCYTES 2.8 0.8 - 3.5 K/UL    ABS. MONOCYTES 0.6 0.0 - 1.0 K/UL    ABS. EOSINOPHILS 0.2 0.0 - 0.4 K/UL    ABS. BASOPHILS 0.0 0.0 - 0.1 K/UL    ABS. IMM. GRANS. 0.1 (H) 0.00 - 0.04 K/UL    DF SMEAR SCANNED      RBC COMMENTS ANISOCYTOSIS  2+        RBC COMMENTS MACROCYTOSIS  2+       NT-PRO BNP    Collection Time: 08/14/21  6:49 PM   Result Value Ref Range    NT pro-BNP 1,304 (H) <450 PG/ML   TROPONIN I    Collection Time: 08/14/21  6:49 PM   Result Value Ref Range    Troponin-I, Qt. <0.05 <0.05 ng/mL         Imaging:     Assessment:     Kizzy Mead is a 82 y.o. female with hx of dementia, mdd, niddm ii, hypertension, CVAs and TIAs, dyslipidemia who is admitted for, dizziness, ataxia and tremors.        Plan:       Dizziness / Tamika Jewett / RUE Tremors  -CT head unremarkable  -Patient with known, untreated B12 deficiency  -TSH unremarkable  -Obtain MRI brain to complete work-up  -Treatment of B12 deficiency as outlined below  -Check magnesium, phosphorus   -PT OT consult    B12 deficiency  -Initiate B12 replacement  -Outpatient follow-up with PCP/neurology    Insulin-dependent type 2 diabetes  -SSI plus hypoglycemic protocols    History of CVA and TIA   -continue aspirin, statin  -Repeat lipid panel    Hypertension  -continue home metoprolol and losartan    Dementia /MDD  -Continue Paxil and Aricept                  FEN/GI -  NS @ 75 ml/hr  Activity - as tolerated  DVT prophylaxis - as tolerated  GI prophylaxis -  NI  Disposition - Home    CODE STATUS:  Full code       Signed By: Jaylene Crane MD     August 14, 2021

## 2021-08-15 NOTE — DISCHARGE SUMMARY
Discharge Summary     Patient:  Vasquez Castillo       MRN: [id number]       YOB: 1935       Age: 82 y.o. Date of admission:  8/14/2021    Date of discharge:  8/15/2021    Primary care provider: Dr. Dawn Erwin, NP    Admitting provider:  Maxim Hayden MD    Discharging provider:  Jonny Callaway MD - [phone]  If unavailable, call [phone] and ask the  to page the triage hospitalist.    Consultations  · IP CONSULT TO HOSPITALIST  · IP CONSULT TO NEUROLOGY    Procedures  · * No surgery found *    Discharge destination: HOME with [facility]. The patient is stable for discharge. Admission diagnosis  · Dizziness [R42]    Current Discharge Medication List      START taking these medications    Details   cyanocobalamin (VITAMIN B12) 1,000 mcg/mL injection Give 1ml IM daily X 7 days then 1ml IM weekly X 4 weeks then 1ml IM monthly  Qty: 30 mL, Refills: 1  Start date: 8/15/2021         CONTINUE these medications which have NOT CHANGED    Details   traMADoL (ULTRAM) 50 mg tablet Take 50 mg by mouth every six (6) hours as needed for Pain.      simvastatin (ZOCOR) 40 mg tablet TAKE 1 TABLET BY MOUTH AT BEDTIME  Qty: 90 Tablet, Refills: 1    Associated Diagnoses: Mixed hyperlipidemia      baclofen (LIORESAL) 10 mg tablet TAKE 1 TABLET BY MOUTH TWICE DAILY AS NEEDED FOR MUSCLE SPASM  Qty: 30 Tablet, Refills: 0    Associated Diagnoses: Osteoarthritis of spine with radiculopathy, cervical region      donepeziL (ARICEPT) 5 mg tablet Take 1 Tablet by mouth nightly.   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 Qty: 90 Tablet, Refills: 1    Associated Diagnoses: Essential hypertension      ferrous sulfate (IRON) 325 mg (65 mg iron) EC tablet TAKE 1 TABLET BY MOUTH ONCE DAILY BEFORE BREAKFAST  Qty: 30 Tablet, Refills: 0      losartan (COZAAR) 50 mg tablet Take 1 tablet by mouth once daily  Qty: 90 Tablet, Refills: 0    Associated Diagnoses: Essential hypertension      glipiZIDE SR (Glucotrol XL) 2.5 mg CR tablet Take 1 Tab by mouth daily. Qty: 30 Tab, Refills: 4    Associated Diagnoses: Type 2 diabetes mellitus without complication, without long-term current use of insulin ([facility])      furosemide (LASIX) 20 mg tablet TAKE 1 TABLET BY MOUTH  Monday,wednesday Friday and saturday  Qty: 50 Tab, Refills: 3    Comments: Requesting 1 year supply  Associated Diagnoses: Bilateral swelling of feet      SITagliptin-metFORMIN (Janumet) 50-1,000 mg per tablet Take 1 tablet by mouth two  times daily with meals  Qty: 180 Tab, Refills: 1    Associated Diagnoses: Type 2 diabetes mellitus without complication ([facility] Type 2 diabetes mellitus without complication, without long-term current use of insulin ([facility])      cholestyramine (QUESTRAN) 4 gram packet Take 1 Packet by mouth daily (with dinner).   Qty: 30 Packet, Refills: 0    Associated Diagnoses: Diarrhea, unspecified type      meloxicam (MOBIC) 15 mg tablet TAKE 1 TABLET BY MOUTH  DAILY  Qty: 90 Tab, Refills: 3    Comments: Requesting 1 year supply  Associated Diagnoses: Primary osteoarthritis of both knees      PARoxetine (PAXIL) 20 mg tablet TAKE 1 TABLET BY MOUTH  EVERY NIGHT AT BEDTIME  Qty: 90 Tab, Refills: 3    Comments: Requesting 1 year supply  Associated Diagnoses: Other depression      metoprolol tartrate (LOPRESSOR) 100 mg IR tablet TAKE 1 TABLET BY MOUTH  TWICE DAILY  Qty: 180 Tab, Refills: 3    Comments: Requesting 1 year supply  Associated Diagnoses: Essential hypertension with goal blood pressure less than 130/85      acetaminophen (TYLENOL) 650 mg TbER Take 650 mg by mouth two (2) times daily as needed for Pain.      famotidine (PEPCID) 20 mg tablet Take 1 Tab by mouth two (2) times a day. Qty: 60 Tab, Refills: 5    Associated Diagnoses: Gastric ulcer, unspecified chronicity, unspecified whether gastric ulcer hemorrhage or perforation present      VITAMIN D2 50,000 unit capsule TAKE 1 CAPSULE BY MOUTH ONCE A WEEK  Refills: 2      aspirin 81 mg chewable tablet Take 1 Tab by mouth daily. Qty: 90 Tab, Refills: 4    Associated Diagnoses: Essential hypertension      cholecalciferol (VITAMIN D3) 1,000 unit cap Take 2,000 Units by mouth daily. menthol (BIOFREEZE, MENTHOL,) 4 % gel Apply top BID  Qty: 1 Tube, Refills: 2    Associated Diagnoses: Other osteoarthritis of spine, lumbar region; Osteoarthritis, unspecified osteoarthritis type, unspecified site      TENS unit and electrodes cmpk Use in lower back every day. DX;chronic lower back pain  Qty: 1 Each, Refills: 0    Associated Diagnoses: Chronic midline low back pain without sciatica         STOP taking these medications       doxycycline (ADOXA) 100 mg tablet Comments:   Reason for Stopping: Follow-up Information     Follow up With Specialties Details Why Contact Info    Tho Bain NP Nurse Practitioner In 2 weeks discharge follow up  [address] [facility] [address] [place] [address]  [phone]      Susan pSencer MD Neurology  follow up as scheduled [address] [place] [address]  [phone]            Final discharge diagnoses and brief hospital course  Please also refer to the admission H&P for details on the presenting problem. 83 yo female with PMhx of Dementia, MDD, DM, HTN, Hx of CVA, admitted for new onset of tremors dizziness noted by family. Patient has had a decline in past few months and recently saw Neurology Miley Gill and was diagnosed with Alzheimer's dementia.      Benign Essential Tremors, suspect from Generalized Deconditioning  - pt's neuro exam showed b/l intentional tremors, no stiffness, no resting tremors   - home with Home health PT/PT  - MRI brain negative for cva, hemorrhage, mass    Vit B12 def  - started on B12 injection, Rx given for 1000mcg daily X 7 days, then weekly then monthly  - RN to educate family and teach injection administration    DM   - c/w Glipizide and Janumet    Alzheimer's dementia, recently diagnosis  - c/w aricept, supportive care     HTN  - c/w Metoprolol and Amlodipine    HLD  - c/w Statins    Hx of CVA  - c/w ASA/Statins    C/w other meds    FOLLOW-UP TESTS recommended:   - Repeat B12 levels in 3 months with PCP      PENDING TEST RESULTS:  At the time of your discharge the following test results are still pending: NONE  Please make sure you review these results with your outpatient follow-up provider(s).     SYMPTOMS to watch for: chest pain, shortness of breath, fever, chills, nausea, vomiting, diarrhea, change in mentation, falling, weakness, bleeding.     DIET/what to eat:  Diabetic Diet     ACTIVITY:  Activity as tolerated     WOUND CARE: NONE     EQUIPMENT needed:  NONE  Physical examination at discharge  Visit Vitals  BP (!) 154/78 (BP 1 Location: Right arm, BP Patient Position: At rest)   Pulse 79   Temp 98.3 °F (36.8 °C)   Resp 16   SpO2 92%   Breastfeeding No     Awake and alert  Lung Clear  CVS: RRR  Abd: soft NT ND  Ext: no edema  Neuro: intentional tremors, Motor intact, sensory intact,     Pertinent imaging studies:    MRI brain  IMPRESSION  Atrophy and chronic small vessel ischemic disease the white matter. No acute  infarction demonstrated. Recent Labs     08/14/21 1849   WBC 8.2   HGB 9.7*   HCT 28.4*        Recent Labs     08/14/21 1849      K 4.1      CO2 28   BUN 13   CREA 0.84   *   CA 8.1*     Recent Labs     08/14/21 1849   AP 41*   TP 7.4   ALB 3.6   GLOB 3.8     No results for input(s): INR, PTP, APTT, INREXT in the last 72 hours.    No results for input(s): FE, TIBC, PSAT, FERR in the last 72 hours. No results for input(s): PH, PCO2, PO2 in the last 72 hours. No results for input(s): CPK, CKMB in the last 72 hours. No lab exists for component: TROPONINI  No components found for: [place]    Chronic Diagnoses:    Problem List as of 8/15/2021 Date Reviewed: 8/10/2021        Codes Class Noted - Resolved    Dizziness ICD-10-CM: R42  ICD-9-CM: 780.4  8/14/2021 - Present        Mild depression ([facility] ICD-10-CM: F32.0  ICD-9-CM: 027  10/29/2018 - Present        Acute delirium ICD-10-CM: R41.0  ICD-9-CM: 780.09  6/9/2018 - Present        Altered mental status ICD-10-CM: R41.82  ICD-9-CM: 780.97  6/4/2018 - Present        Type 2 diabetes with nephropathy ([facility] ICD-10-CM: E11.21  ICD-9-CM: 250.40, 583.81  3/9/2018 - Present        H/O: stroke ICD-10-CM: Z86.73  ICD-9-CM: V12.54  11/9/2017 - Present        Advance care planning ICD-10-CM: Z71.89  ICD-9-CM: V65.49  1/21/2016 - Present        Essential hypertension ICD-10-CM: I10  ICD-9-CM: 401.9  1/21/2016 - Present        Stomach ulcer ICD-10-CM: K25.9  ICD-9-CM: 531.90  3/12/2015 - Present        Diverticula of colon ICD-10-CM: K57.30  ICD-9-CM: 562.10  3/12/2015 - Present        DM (diabetes mellitus) ([facility] ICD-10-CM: E11.9  ICD-9-CM: 250.00  5/1/2014 - Present        Mixed hyperlipidemia ICD-10-CM: E78.2  ICD-9-CM: 272.2  5/1/2014 - Present        Chronic venous insufficiency ICD-10-CM: I87.2  ICD-9-CM: 459.81  5/1/2014 - Present        Depression ICD-10-CM: F32.9  ICD-9-CM: 008  5/1/2014 - Present              Time spent on discharge related activities today greater than 30 minutes.       Signed:  Amaury Sidhu MD                 Hospitalist, Internal Medicine      Cc: Burma Seip, NP

## 2021-08-15 NOTE — ROUTINE PROCESS
TRANSFER - OUT REPORT:    Verbal report given to RN Michael(name) on [address]  being transferred to (unit) for routine progression of care       Report consisted of patients Situation, Background, Assessment and   Recommendations(SBAR). Information from the following report(s) SBAR, Kardex and ED Summary was reviewed with the receiving nurse. Lines:   Peripheral IV 08/14/21 Left Antecubital (Active)   Site Assessment Clean, dry, & intact 08/14/21 1902   Phlebitis Assessment 0 08/14/21 1902   Infiltration Assessment 0 08/14/21 1902   Dressing Status Clean, dry, & intact 08/14/21 1902        Opportunity for questions and clarification was provided.       Patient transported with:   [employer]

## 2021-08-15 NOTE — PROGRESS NOTES
Orthopedics Spine Incoming Transfer Nursing Note        TRANSFER - IN REPORT:    Verbal and/or Written report received from Idania Black RN to Georgina Arredondo RN on [facility] a 82 y.o. female who was admitted on 8/14/2021  3:11 PM, with an admitting diagnosis of Dizziness [R42] and being transferred for routine progression of care. Surgery: * No surgery found *      Lines:   Peripheral IV 08/14/21 Left Antecubital (Active)   Site Assessment Clean, dry, & intact 08/14/21 1902   Phlebitis Assessment 0 08/14/21 1902   Infiltration Assessment 0 08/14/21 1902   Dressing Status Clean, dry, & intact 08/14/21 1902       Report consisted of the following information SBAR, Kardex, MAR and Recent Results and the information was reviewed with the reporting nurse. Opportunity for questions and clarifications were provided. Assessment to be completed when patient arrives on the unit.     Georgina Arredondo RN, BSN, V[facility]    8/15/2021 at 12:10 AM

## 2021-08-15 NOTE — PROGRESS NOTES
CM received consult to assist with home health. CM sent referral through [employer] to [facility]. [facility] can not accept pt's insurance. CM sent referrals to [facility], [facility], [facility], [facility], and [facility]. Observation notice provided in writing to patient and/or caregiver as well as verbal explanation of the policy. Patients who are in outpatient status also receive the Observation notice. Patient has received notice and or patient representative has received via secure email, fax, or certified mail based on patient representative's preference.    [facility], [place], [place]

## 2021-08-15 NOTE — PROGRESS NOTES
PHYSICAL THERAPY EVALUATION/DISCHARGE  Patient: Spohia Jarvis (28 y.o. female)  Date: 8/15/2021  Primary Diagnosis: Dizziness [R42]       Precautions:   Fall      ASSESSMENT  Based on the objective data described below, the patient presents with baseline mobility and function after hospitalization for dizziness and MRI. Patient's daughters present for session and provided information about home situation and PLOF. Daughter and grandson live with patient 24/7 and are always there for supervision-contact guard assistance for mobiity and ADLs as needed. Family observed PT session and report that patient remains at her baseline PLOF with no decline in functional mobility. Patient able to perform mobility with contact guard assistance and ambulated 65 ft with RW and gait belt. Patient is cleared for discharge from PT standpoint. Functional Outcome Measure: The patient scored 45/100 on the Barthel outcome measure which is indicative of moderate impaired ability to care for basic self-needs/dependency on others. Other factors to consider for discharge: Motivated/Dementia/Supportive Family/Contact guard-Supervision  PLOF     Further skilled acute physical therapy is not indicated at this time. PLAN :  Recommendation for discharge: (in order for the patient to meet his/her long term goals)  Physical therapy at least 2 days/week in the home and supervision for all ADLs and mobiity. This discharge recommendation:  Has been made in collaboration with the attending provider and/or case management    IF patient discharges home will need the following DME: patient owns DME required for discharge       SUBJECTIVE:   Patient stated Girish Rounds will get up with you. Shalonda Mathew    OBJECTIVE DATA SUMMARY:   HISTORY:    Past Medical History:   Diagnosis Date    Arthritis     Chronic pain     Depression     Diabetes ([facility]     Hypercholesterolemia     Hypertension     [facility]     TIA 10 yrs back    Stroke ([facility] 2003     Past Surgical History:   Procedure Laterality Date    HX CARPAL TUNNEL RELEASE  1990    HX CATARACT REMOVAL      bilateral    HX HYSTERECTOMY  1986    HX HYSTERECTOMY      HX ORTHOPAEDIC      HX ORTHOPAEDIC      carpel tunnel left    HX ORTHOPAEDIC      left foot heel spur    HX REFRACTIVE SURGERY  2006       Prior level of function: Contact guard-supervision PLOF/Family with patient 24/7  Personal factors and/or comorbidities impacting plan of care: Motivated/Dementia/Supportive Family/Contact guard-Supervision  PLOF    Home Situation  Home Environment: Private residence  # Steps to Enter:  (uses ramp)  Wheelchair Ramp: Yes (can walk on ramp with RW)  One/Two Story Residence: One story  Living Alone: No  Support Systems: Child(ty) (Adult Daughter & grandson 24/7)  Patient Expects to be Discharged to[de-identified] House  Current DME Used/Available at Home: Rayetta Carol Ann, rolling  Tub or Shower Type: Shower    EXAMINATION/PRESENTATION/DECISION MAKING:   Critical Behavior:      A & O x 2  Decreased Safety Awareness        Hearing: Auditory  Auditory Impairment: None    Range Of Motion:  AROM: Generally decreased, functional           PROM: Generally decreased, functional           Strength:    Strength: Generally decreased, functional                    Tone & Sensation:   Tone: Normal              Sensation: Impaired               Coordination:  Coordination: Generally decreased, functional  Vision:      Functional Mobility:  Bed Mobility:     Supine to Sit: Contact guard assistance  Sit to Supine: Contact guard assistance  Scooting: Contact guard assistance (cues)  Transfers:  Sit to Stand: Contact guard assistance  Stand to Sit: Contact guard assistance        Bed to Chair: Contact guard assistance              Balance:   Sitting: Intact  Standing: Impaired; Without support  Standing - Static: Good;Constant support  Standing - Dynamic : Fair;Constant support  Ambulation/Gait Training:  Distance (ft): 65 Feet (ft)  Assistive Device: Walker, rolling;Gait belt  Ambulation - Level of Assistance: Contact guard assistance;Minimal assistance        Gait Abnormalities: Decreased step clearance; Path deviations; Shuffling gait;Trunk sway increased (leaning forward)        Base of Support: Widened;Center of gravity altered (leaning forward)     Speed/Yumi: Shuffled; Slow  Step Length: Right shortened;Left shortened  Swing Pattern: Right asymmetrical;Left asymmetrical     Interventions: Safety awareness training;Verbal cues; Tactile cues               Functional Measure:  Barthel Index:    Bathin  Bladder: 0  Bowels: 10  Groomin  Dressin  Feeding: 10  Mobility: 0  Stairs: 0  Toilet Use: 5  Transfer (Bed to Chair and Back): 10  Total: 45/100       The Barthel ADL Index: Guidelines  1. The index should be used as a record of what a patient does, not as a record of what a patient could do. 2. The main aim is to establish degree of independence from any help, physical or verbal, however minor and for whatever reason. 3. The need for supervision renders the patient not independent. 4. A patient's performance should be established using the best available evidence. Asking the patient, friends/relatives and nurses are the usual sources, but direct observation and common sense are also important. However direct testing is not needed. 5. Usually the patient's performance over the preceding 24-48 hours is important, but occasionally longer periods will be relevant. 6. Middle categories imply that the patient supplies over 50 per cent of the effort. 7. Use of aids to be independent is allowed. Stacey Larkin., Barthel, D.W. (1851). Functional evaluation: the Barthel Index. [facility] (14)2. DHARMESH Johnston Jona June., Cathy Gomes, [address] ().  Measuring the change indisability after inpatient rehabilitation; comparison of the responsiveness of the Barthel Index and Functional [place] Measure. Journal of Neurology, Neurosurgery, and Psychiatry, 66(4), 634-632. AYSE Mcclendon, YESSICA Bernard, & Zulay Banerjee M.A. (2004.) Assessment of post-stroke quality of life in cost-effectiveness studies: The usefulness of the Barthel Index and the EuroQoL-5D. Quality of Life Research, 15, 063-69          Physical Therapy Evaluation Charge Determination   History Examination Presentation Decision-Making   MEDIUM  Complexity : 1-2 comorbidities / personal factors will impact the outcome/ POC  MEDIUM Complexity : 3 Standardized tests and measures addressing body structure, function, activity limitation and / or participation in recreation  LOW Complexity : Stable, uncomplicated  MEDIUM Complexity : FOTO score of 26-74      Based on the above components, the patient evaluation is determined to be of the following complexity level: LOW     Pain Rating:  None reported or observed    Activity Tolerance:   Fair      After treatment patient left in no apparent distress:   Supine in bed, Call bell within reach, Caregiver / family present, Side rails x 3 and nurse notified. COMMUNICATION/EDUCATION:   The patients plan of care was discussed with: Registered nurse and Physician. Fall prevention education was provided and the patient/caregiver indicated understanding., Patient/family have participated as able in goal setting and plan of care. and Patient/family agree to work toward stated goals and plan of care.     Thank you for this referral.  [facility]   Time Calculation: 40 mins

## 2021-08-15 NOTE — DISCHARGE INSTRUCTIONS
Please bring this form with you to show your primary care provider at your follow-up appointment. Primary care provider:  Dr. Gayla Patel NP    Discharging provider:  Randy Apley, MD    You have been admitted to the hospital with the following diagnoses:  · Dizziness [R42]    FOLLOW-UP CARE RECOMMENDATIONS:    APPOINTMENTS:  Future Appointments   Date Time Provider Hardik Castro   8/26/2021  5:00 PM [facility] ER 1 [facility]   9/28/2021  3:00 PM Gayla Patel NP [facility] BS AMB   3/4/2022  9:40 AM Bjorn Weir MD [facility] BS AMB     Follow-up Information     Follow up With Specialties Details Why Contact Info    Gayla Patel NP Nurse Practitioner In 2 weeks discharge follow up  [address] [place] [id number] [phone]      Bjorn Weir MD Neurology  follow up as scheduled [address]  [phone]              FOLLOW-UP TESTS recommended:   - Repeat B12 levels in 3 months with PCP     PENDING TEST RESULTS:  At the time of your discharge the following test results are still pending: NONE  Please make sure you review these results with your outpatient follow-up provider(s). SYMPTOMS to watch for: chest pain, shortness of breath, fever, chills, nausea, vomiting, diarrhea, change in mentation, falling, weakness, bleeding. DIET/what to eat:  Diabetic Diet    ACTIVITY:  Activity as tolerated    WOUND CARE: NONE    EQUIPMENT needed:  NONE      What to do if new or unexpected symptoms occur? If you experience any of the above symptoms (or should other concerns or questions arise after discharge) please call your primary care physician. Return to the emergency room if you cannot get hold of your doctor. · It is very important that you keep your follow-up appointment(s).   · Please bring discharge papers, medication list (and/or medication bottles) to your follow-up appointments for review by your outpatient provider(s). · Please check the list of medications and be sure it includes every medication (even non-prescription medications) that your provider wants you to take. · It is important that you take the medication exactly as they are prescribed. · Keep your medication in the bottles provided by the pharmacist and keep a list of the medication names, dosages, and times to be taken in your wallet. · Do not take other medications without consulting your doctor. · If you have any questions about your medications or other instructions, please talk to your nurse or care provider before you leave the hospital.    I understand that if any problems occur once I am at home I am to contact my physician. These instructions were explained to me and I had the opportunity to ask questions.

## 2021-08-16 ENCOUNTER — PATIENT OUTREACH (OUTPATIENT)
Dept: CASE MANAGEMENT | Age: 86
End: 2021-08-16

## 2021-08-16 RX ORDER — BUDESONIDE 3 MG/1
3 CAPSULE, COATED PELLETS ORAL 3 TIMES DAILY
COMMUNITY
End: 2022-05-31 | Stop reason: ALTCHOICE

## 2021-08-16 NOTE — PROGRESS NOTES
CM received call from [facility]. They will provide [place] for patient.     Gillian Quiñones, RN/CRM  [phone]

## 2021-08-17 ENCOUNTER — VIRTUAL VISIT (OUTPATIENT)
Dept: INTERNAL MEDICINE CLINIC | Age: 86
End: 2021-08-17
Payer: MEDICARE

## 2021-08-17 DIAGNOSIS — I10 ESSENTIAL HYPERTENSION: ICD-10-CM

## 2021-08-17 DIAGNOSIS — Z76.89 ENCOUNTER FOR SUPPORT AND COORDINATION OF TRANSITION OF CARE: Primary | ICD-10-CM

## 2021-08-17 DIAGNOSIS — R25.1 TREMOR: ICD-10-CM

## 2021-08-17 DIAGNOSIS — R41.3 MEMORY CHANGE: ICD-10-CM

## 2021-08-17 PROCEDURE — 99496 TRANSJ CARE MGMT HIGH F2F 7D: CPT | Performed by: INTERNAL MEDICINE

## 2021-08-17 PROCEDURE — G8427 DOCREV CUR MEDS BY ELIG CLIN: HCPCS | Performed by: INTERNAL MEDICINE

## 2021-08-17 RX ORDER — FERROUS SULFATE 325(65) MG
TABLET, DELAYED RELEASE (ENTERIC COATED) ORAL
Qty: 30 TABLET | Refills: 0 | Status: SHIPPED | OUTPATIENT
Start: 2021-08-17 | End: 2021-09-13

## 2021-08-17 RX ORDER — AMLODIPINE BESYLATE 5 MG/1
5 TABLET ORAL DAILY
Qty: 90 TABLET | Refills: 1 | Status: SHIPPED | OUTPATIENT
Start: 2021-08-17 | End: 2021-10-18

## 2021-08-17 NOTE — PROGRESS NOTES
Kennedy Hayden is a 82 y.o. female who was seen by synchronous (real-time) audio-video technology on 8/17/2021 for Hospital Follow Up (diziness), Fatigue, and Sore Throat        Assessment & Plan:   Diagnoses and all orders for this visit:    1. Encounter for support and coordination of transition of care        -    Daughter to contact [facility] for follow up and beginning PT  2. Tremor        -   Is current taking b12 injection to replace   3. Essential hypertension    4. Memory change       - will continue to follow up with neurology and reorient patient       Subjective:     Patient was seen for a transition of care. Had a recent hospital admission for weakness and tremors. B12 was low and is now being replaced. Her CT and MRI was negative. Reports that the tremors has decreased but still present. Daughter also reports that she is eating well, but needs to increase her water intake. Also reports that she is able to walk around ok, and without the wheelchair. No falls or trauma. Will see neurology soon for a follow up. Prior to Admission medications    Medication Sig Start Date End Date Taking? Authorizing Provider   budesonide (ENTOCORT EC) 3 mg capsule Take 3 mg by mouth three (3) times daily. Yes Provider, Historical   traMADoL (ULTRAM) 50 mg tablet Take 50 mg by mouth every six (6) hours as needed for Pain. Yes Provider, Historical   cyanocobalamin (VITAMIN B12) 1,000 mcg/mL injection Give 1ml IM daily X 7 days then 1ml IM weekly X 4 weeks then 1ml IM monthly 8/15/21  Yes Rivera Bynum MD   simvastatin (ZOCOR) 40 mg tablet TAKE 1 TABLET BY MOUTH AT BEDTIME 8/9/21  Yes Nevin Doyle NP   baclofen (LIORESAL) 10 mg tablet TAKE 1 TABLET BY MOUTH TWICE DAILY AS NEEDED FOR MUSCLE SPASM 8/9/21  Yes Nevin Doyle NP   donepeziL (ARICEPT) 5 mg tablet Take 1 Tablet by mouth nightly.  8/5/21  Yes Steffi Tello MD   Janumet XR 50-1,000 mg TM24 TAKE 1 TABLET BY MOUTH  TWICE DAILY 7/19/21 Yes Bryan Pace MD   amLODIPine (NORVASC) 5 mg tablet Take 1 Tablet by mouth daily. 7/19/21  Yes Bryan Pace MD   ferrous sulfate (IRON) 325 mg (65 mg iron) EC tablet TAKE 1 TABLET BY MOUTH ONCE DAILY BEFORE BREAKFAST 6/14/21  Yes Sherley Alvarez NP   losartan (COZAAR) 50 mg tablet Take 1 tablet by mouth once daily 6/7/21  Yes Bryan Pace MD   glipiZIDE SR (Glucotrol XL) 2.5 mg CR tablet Take 1 Tab by mouth daily. 4/12/21  Yes Bryan Pace MD   furosemide (LASIX) 20 mg tablet TAKE 1 TABLET BY MOUTH  Monday,wednesday Friday and saturday 4/12/21  Yes Bryan Pace MD   SITagliptin-metFORMIN (Janumet) 50-1,000 mg per tablet Take 1 tablet by mouth two  times daily with meals 12/14/20  Yes Bryan Pace MD   cholestyramine Kaitlin Nelly) 4 gram packet Take 1 Packet by mouth daily (with dinner). 11/6/20  Yes Bryan Pace MD   meloxicam (MOBIC) 15 mg tablet TAKE 1 TABLET BY MOUTH  DAILY 9/21/20  Yes Bryan Pace MD   PARoxetine (PAXIL) 20 mg tablet TAKE 1 TABLET BY MOUTH  EVERY NIGHT AT BEDTIME 9/21/20  Yes Bryan Pace MD   metoprolol tartrate (LOPRESSOR) 100 mg IR tablet TAKE 1 TABLET BY MOUTH  TWICE DAILY 9/21/20  Yes Sherley Alvarez NP   acetaminophen (TYLENOL) 650 mg TbER Take 650 mg by mouth two (2) times daily as needed for Pain. Yes Provider, Historical   famotidine (PEPCID) 20 mg tablet Take 1 Tab by mouth two (2) times a day. 12/4/19  Yes Bryan Pace MD   VITAMIN D2 50,000 unit capsule TAKE 1 CAPSULE BY MOUTH ONCE A WEEK 11/1/19  Yes Provider, Historical   menthol (BIOFREEZE, MENTHOL,) 4 % gel Apply top BID 7/11/19  Yes Bryan Pace MD   TENS unit and electrodes cmpk Use in lower back every day. DX;chronic lower back pain 3/7/19  Yes Bryan Pace MD   aspirin 81 mg chewable tablet Take 1 Tab by mouth daily. 8/29/18  Yes Bryan Pace MD   cholecalciferol (VITAMIN D3) 1,000 unit cap Take 2,000 Units by mouth daily.  11/28/16  Yes Bryan Pace MD     Patient Active Problem List   Diagnosis Code    DM [facility]diabetes mellitus) ([facility] E11.9    Mixed hyperlipidemia E78.2    Chronic venous insufficiency I87.2    Depression F32.9    Stomach ulcer K25.9    Diverticula of colon K57.30    Advance care planning Z71.89    Essential hypertension I10    H/O: stroke Z80.78    Type 2 diabetes with nephropathy (HCC) E11.21    Altered mental status R41.82    Acute delirium R41.0    Mild depression (HCC) F32.0    Dizziness R42     Patient Active Problem List    Diagnosis Date Noted    Dizziness 08/14/2021    Mild depression [facility] 10/29/2018    Acute delirium 06/09/2018    Altered mental status 06/04/2018    Type 2 diabetes with nephropathy ([facility] 03/09/2018    H/O: stroke 11/09/2017    Advance care planning 01/21/2016    Essential hypertension 01/21/2016    Stomach ulcer 03/12/2015    Diverticula of colon 03/12/2015    DM (diabetes mellitus) ([facility] 05/01/2014    Mixed hyperlipidemia 05/01/2014    Chronic venous insufficiency 05/01/2014    Depression 05/01/2014     Current Outpatient Medications   Medication Sig Dispense Refill    budesonide (ENTOCORT EC) 3 mg capsule Take 3 mg by mouth three (3) times daily.  traMADoL (ULTRAM) 50 mg tablet Take 50 mg by mouth every six (6) hours as needed for Pain.  cyanocobalamin (VITAMIN B12) 1,000 mcg/mL injection Give 1ml IM daily X 7 days then 1ml IM weekly X 4 weeks then 1ml IM monthly 30 mL 1    simvastatin (ZOCOR) 40 mg tablet TAKE 1 TABLET BY MOUTH AT BEDTIME 90 Tablet 1    baclofen (LIORESAL) 10 mg tablet TAKE 1 TABLET BY MOUTH TWICE DAILY AS NEEDED FOR MUSCLE SPASM 30 Tablet 0    donepeziL (ARICEPT) 5 mg tablet Take 1 Tablet by mouth nightly. 30 Tablet 1    Janumet XR 50-1,000 mg TM24 TAKE 1 TABLET BY MOUTH  TWICE DAILY 180 Tablet 3    amLODIPine (NORVASC) 5 mg tablet Take 1 Tablet by mouth daily.  90 Tablet 1    ferrous sulfate (IRON) 325 mg (65 mg iron) EC tablet TAKE 1 TABLET BY MOUTH ONCE DAILY BEFORE BREAKFAST 30 Tablet 0    losartan (COZAAR) 50 mg tablet Take 1 tablet by mouth once daily 90 Tablet 0    glipiZIDE SR (Glucotrol XL) 2.5 mg CR tablet Take 1 Tab by mouth daily. 30 Tab 4    furosemide (LASIX) 20 mg tablet TAKE 1 TABLET BY MOUTH  Monday,wednesday Friday and saturday 50 Tab 3    SITagliptin-metFORMIN (Janumet) 50-1,000 mg per tablet Take 1 tablet by mouth two  times daily with meals 180 Tab 1    cholestyramine (QUESTRAN) 4 gram packet Take 1 Packet by mouth daily (with dinner). 30 Packet 0    meloxicam (MOBIC) 15 mg tablet TAKE 1 TABLET BY MOUTH  DAILY 90 Tab 3    PARoxetine (PAXIL) 20 mg tablet TAKE 1 TABLET BY MOUTH  EVERY NIGHT AT BEDTIME 90 Tab 3    metoprolol tartrate (LOPRESSOR) 100 mg IR tablet TAKE 1 TABLET BY MOUTH  TWICE DAILY 180 Tab 3    acetaminophen (TYLENOL) 650 mg TbER Take 650 mg by mouth two (2) times daily as needed for Pain.  famotidine (PEPCID) 20 mg tablet Take 1 Tab by mouth two (2) times a day. 60 Tab 5    VITAMIN D2 50,000 unit capsule TAKE 1 CAPSULE BY MOUTH ONCE A WEEK  2    menthol (BIOFREEZE, MENTHOL,) 4 % gel Apply top BID 1 Tube 2    TENS unit and electrodes cmpk Use in lower back every day. DX;chronic lower back pain 1 Each 0    aspirin 81 mg chewable tablet Take 1 Tab by mouth daily. 90 Tab 4    cholecalciferol (VITAMIN D3) 1,000 unit cap Take 2,000 Units by mouth daily.        No Known Allergies  Past Medical History:   Diagnosis Date    Arthritis     Chronic pain     Depression     Diabetes [facility]     Hypercholesterolemia     Hypertension     Stroke ([facility]     TIA 10 yrs back    Stroke ([facility]     2003     Past Surgical History:   Procedure Laterality Date    HX CARPAL TUNNEL RELEASE  1990    HX CATARACT REMOVAL      bilateral    HX HYSTERECTOMY  1986    HX HYSTERECTOMY      HX ORTHOPAEDIC      HX ORTHOPAEDIC      carpel tunnel left    HX ORTHOPAEDIC      left foot heel spur    HX REFRACTIVE SURGERY  2006     Family History   Adopted: Yes     Social History     Tobacco Use    Smoking status: Never Smoker    Smokeless tobacco: Never Used   Substance Use Topics    Alcohol use: No       Review of Systems   Constitutional: Negative. Respiratory: Negative. Cardiovascular: Negative. Gastrointestinal: Negative. Genitourinary: Negative. Musculoskeletal: Positive for back pain. Negative for falls. Neurological: Positive for tremors. Negative for dizziness and tingling. Psychiatric/Behavioral: Positive for memory loss.        Objective:     Patient-Reported Vitals 8/17/2021   Patient-Reported Weight 182 lbs   Patient-Reported Height -   Patient-Reported Pulse 80   Patient-Reported Temperature 98.0   Patient-Reported SpO2 92   Patient-Reported Systolic  497   Patient-Reported Diastolic 57   Patient-Reported LMP Hysterectomy        [INSTRUCTIONS:  \"[x]\" Indicates a positive item  \"[]\" Indicates a negative item  -- DELETE ALL ITEMS NOT EXAMINED]    Constitutional: [x] Appears well-developed and well-nourished [x] No apparent distress      [] Abnormal -     Mental status: [x] Alert and awake  [x] Oriented to person/place/time [x] Able to follow commands    [] Abnormal -     Eyes:   EOM    [x]  Normal    [] Abnormal -   Sclera  [x]  Normal    [] Abnormal -          Discharge [x]  None visible   [] Abnormal -     HENT: [x] Normocephalic, atraumatic  [] Abnormal -   [x] Mouth/Throat: Mucous membranes are moist    External Ears [x] Normal  [] Abnormal -    Neck: [x] No visualized mass [] Abnormal -     Pulmonary/Chest: [x] Respiratory effort normal   [x] No visualized signs of difficulty breathing or respiratory distress        [] Abnormal -      Musculoskeletal:   [x] Normal gait with no signs of ataxia         [x] Normal range of motion of neck        [] Abnormal -     Neurological:        [x] No Facial Asymmetry (Cranial nerve 7 motor function) (limited exam due to video visit)          [x] No gaze palsy        [] Abnormal -          Skin:        [x] No significant exanthematous lesions or discoloration noted on facial skin         [] Abnormal -            Psychiatric:       [x] Normal Affect [] Abnormal -        [x] No Hallucinations    Other pertinent observable physical exam findings:-        We discussed the expected course, resolution and complications of the diagnosis(es) in detail. Medication risks, benefits, costs, interactions, and alternatives were discussed as indicated. I advised her to contact the office if her condition worsens, changes or fails to improve as anticipated. She expressed understanding with the diagnosis(es) and plan. [address] [place] [address], was evaluated through a synchronous (real-time) audio-video encounter. The patient (or guardian if applicable) is aware that this is a billable service. Verbal consent to proceed has been obtained within the past 12 months. The visit was conducted pursuant to the emergency declaration under the [address] [place] [address], [address] [place] [address] authority and the [employer] and [employer] Act. Patient identification was verified, and a caregiver was present when appropriate. The patient was located in a state where the provider was credentialed to provide care.       Jens Alvarez NP Yes

## 2021-08-17 NOTE — LETTER
NOTIFICATION RETURN TO WORK / SCHOOL    8/17/2021 3:50 PM    Ms. Patricia Rodriguez [place] [address]      To Whom It May Concern: [facility] is currently under the care of [address]. Please excuse her daughter for 8/16/21, 8/17/21, and 8/18/21. She will return back on 8/19/21. If there are questions or concerns please have the patient contact our office.         Sincerely,      Ashley Lawson NP

## 2021-08-17 NOTE — PROGRESS NOTES
Health Maintenance Due   Topic Date Due    DTaP/Tdap/Td series (1 - Tdap) Never done    Shingrix Vaccine Age 50> (1 of 2) Never done    MICROALBUMIN Q1  06/11/2021       Chief Complaint   Patient presents with   [facility] Follow Up     diziness    Fatigue    Sore Throat       1. Have you been to the ER, urgent care clinic since your last visit? Hospitalized since your last visit? Yes When: 8/14/21-8/15/21 , Dizziness   2. Have you seen or consulted any other health care providers outside of the [address] [place] [address] since your last visit? Include any pap smears or colon screening. No    3) Do you have an Advance Directive on file? no    4) Are you interested in receiving information on Advance Directives? NO      Patient is accompanied by daughter I have received verbal consent from [address] [place] [address] to discuss any/all medical information while they are present in the room.

## 2021-08-19 ENCOUNTER — DOCUMENTATION ONLY (OUTPATIENT)
Dept: INTERNAL MEDICINE CLINIC | Age: 86
End: 2021-08-19

## 2021-08-19 NOTE — PROGRESS NOTES
FMLA forms received for daughter/caregiver [employer] for intermittent leave 2-3 x a month lasting 1 day completed and faxed 8/2/2021    On 8/4/2021 received an addendum which was completed and faxed on 8/4/2021 and 8/5/2021

## 2021-08-20 LAB
Lab: ABNORMAL
METHYLMALONATE SERPL-SCNC: 611 NMOL/L (ref 0–378)

## 2021-08-24 ENCOUNTER — PATIENT OUTREACH (OUTPATIENT)
Dept: CASE MANAGEMENT | Age: 86
End: 2021-08-24

## 2021-08-25 NOTE — PROGRESS NOTES
Called patient x 2 on 8/24, no response, LM. My Chart message sent on 8/25. Will continue to try and reach patient.

## 2021-08-26 ENCOUNTER — HOSPITAL ENCOUNTER (OUTPATIENT)
Dept: CT IMAGING | Age: 86
Discharge: HOME | End: 2021-08-26
Attending: PSYCHIATRY & NEUROLOGY
Payer: MEDICARE

## 2021-08-26 DIAGNOSIS — R41.3 SHORT-TERM MEMORY LOSS: ICD-10-CM

## 2021-08-26 PROCEDURE — 70450 CT HEAD/BRAIN W/O DYE: CPT

## 2021-09-02 ENCOUNTER — TELEPHONE (OUTPATIENT)
Dept: INTERNAL MEDICINE CLINIC | Age: 86
End: 2021-09-02

## 2021-09-02 ENCOUNTER — TELEPHONE (OUTPATIENT)
Dept: NEUROLOGY | Age: 86
End: 2021-09-02

## 2021-09-02 NOTE — TELEPHONE ENCOUNTER
Dennis Posey returning phone call, I told her it was regarding test results. But she said she didn't understand why she was receiving a call about them because the patient has an upcoming appt for 9/9.

## 2021-09-02 NOTE — TELEPHONE ENCOUNTER
----- Message from Kelechi Lacey sent at 9/2/2021 12:37 PM EDT -----  Regarding: Karla/Telephone  General Message/Vendor Calls    Caller's first and last name: Aida Burkett from [address] [place] [address]      Reason for call: health questions      Callback required yes/no and why:yes      Best contact number(s):[phone]      Details to clarify the request:please call Aida Burkett from [place] [address]

## 2021-09-03 ENCOUNTER — PATIENT OUTREACH (OUTPATIENT)
Dept: CASE MANAGEMENT | Age: 86
End: 2021-09-03

## 2021-09-09 ENCOUNTER — OFFICE VISIT (OUTPATIENT)
Dept: NEUROLOGY | Age: 86
End: 2021-09-09
Payer: MEDICARE

## 2021-09-09 VITALS
WEIGHT: 182 LBS | BODY MASS INDEX: 28.56 KG/M2 | OXYGEN SATURATION: 94 % | SYSTOLIC BLOOD PRESSURE: 110 MMHG | RESPIRATION RATE: 20 BRPM | HEIGHT: 67 IN | DIASTOLIC BLOOD PRESSURE: 60 MMHG | HEART RATE: 78 BPM

## 2021-09-09 DIAGNOSIS — F01.50 MIXED CORTICAL AND SUBCORTICAL VASCULAR DEMENTIA WITHOUT BEHAVIORAL DISTURBANCE (HCC): Primary | ICD-10-CM

## 2021-09-09 DIAGNOSIS — E53.8 B12 DEFICIENCY: ICD-10-CM

## 2021-09-09 PROCEDURE — 1090F PRES/ABSN URINE INCON ASSESS: CPT | Performed by: PSYCHIATRY & NEUROLOGY

## 2021-09-09 PROCEDURE — G8417 CALC BMI ABV UP PARAM F/U: HCPCS | Performed by: PSYCHIATRY & NEUROLOGY

## 2021-09-09 PROCEDURE — 1101F PT FALLS ASSESS-DOCD LE1/YR: CPT | Performed by: PSYCHIATRY & NEUROLOGY

## 2021-09-09 PROCEDURE — G8427 DOCREV CUR MEDS BY ELIG CLIN: HCPCS | Performed by: PSYCHIATRY & NEUROLOGY

## 2021-09-09 PROCEDURE — G8399 PT W/DXA RESULTS DOCUMENT: HCPCS | Performed by: PSYCHIATRY & NEUROLOGY

## 2021-09-09 PROCEDURE — 99214 OFFICE O/P EST MOD 30 MIN: CPT | Performed by: PSYCHIATRY & NEUROLOGY

## 2021-09-09 PROCEDURE — G8536 NO DOC ELDER MAL SCRN: HCPCS | Performed by: PSYCHIATRY & NEUROLOGY

## 2021-09-09 PROCEDURE — G8752 SYS BP LESS 140: HCPCS | Performed by: PSYCHIATRY & NEUROLOGY

## 2021-09-09 PROCEDURE — G8754 DIAS BP LESS 90: HCPCS | Performed by: PSYCHIATRY & NEUROLOGY

## 2021-09-09 PROCEDURE — G9717 DOC PT DX DEP/BP F/U NT REQ: HCPCS | Performed by: PSYCHIATRY & NEUROLOGY

## 2021-09-09 NOTE — PROGRESS NOTES
Chief Complaint   Patient presents with    Neurologic Problem       HISTORY OF PRESENT ILLNESS  Anne Schmidt came back for follow-up. She was last seen by me in the clinic about 4 weeks ago and then she ended up in the hospital 2 weeks ago with symptoms of generalized weakness, tremors and dizziness. She had MRI scan of the brain which showed age-related atrophy and white matter changes. The tremors have subsided and these were suspected to be benign essential tremors. She was also found to have B12 deficiency and has been doing intramuscular injections. Her memory issues are unchanged    RECAP  She came along with her daughter. For the past couple years she has been having difficulties with short-term memory. She will often repeat herself, forgets conversations or forgets what she needs to do. Mostly stays at home very sedentary lifestyle. Needs supervision with most activities of daily living. She is able to feed herself, can take a shower herself and will sometimes walk outside of the house with a walker. Watches television while at home and sometimes she will do crossword puzzles, plays board games. No changes in vision, speech, swallowing ability. Denies any tremor, behavioral issues or hallucinations. Sleeps well at night. Sometimes she will wake up to go to the bathroom or other times because she just cannot stay asleep. No wandering episodes. She does not drive or handle money. She used to work as a  and has been retired for more than 10 years. Current Outpatient Medications   Medication Sig    ferrous sulfate (IRON) 325 mg (65 mg iron) EC tablet TAKE 1 TABLET BY MOUTH ONCE DAILY BEFORE BREAKFAST    amLODIPine (NORVASC) 5 mg tablet Take 1 Tablet by mouth daily.  budesonide (ENTOCORT EC) 3 mg capsule Take 3 mg by mouth three (3) times daily.  traMADoL (ULTRAM) 50 mg tablet Take 50 mg by mouth every six (6) hours as needed for Pain.     cyanocobalamin (VITAMIN B12) 1,000 mcg/mL injection Give 1ml IM daily X 7 days then 1ml IM weekly X 4 weeks then 1ml IM monthly    simvastatin (ZOCOR) 40 mg tablet TAKE 1 TABLET BY MOUTH AT BEDTIME    baclofen (LIORESAL) 10 mg tablet TAKE 1 TABLET BY MOUTH TWICE DAILY AS NEEDED FOR MUSCLE SPASM    donepeziL (ARICEPT) 5 mg tablet Take 1 Tablet by mouth nightly.  Janumet XR 50-1,000 mg TM24 TAKE 1 TABLET BY MOUTH  TWICE DAILY    losartan (COZAAR) 50 mg tablet Take 1 tablet by mouth once daily    furosemide (LASIX) 20 mg tablet TAKE 1 TABLET BY MOUTH  Monday,wednesday Friday and saturday    SITagliptin-metFORMIN (Janumet) 50-1,000 mg per tablet Take 1 tablet by mouth two  times daily with meals    cholestyramine (QUESTRAN) 4 gram packet Take 1 Packet by mouth daily (with dinner).  meloxicam (MOBIC) 15 mg tablet TAKE 1 TABLET BY MOUTH  DAILY    PARoxetine (PAXIL) 20 mg tablet TAKE 1 TABLET BY MOUTH  EVERY NIGHT AT BEDTIME    metoprolol tartrate (LOPRESSOR) 100 mg IR tablet TAKE 1 TABLET BY MOUTH  TWICE DAILY    acetaminophen (TYLENOL) 650 mg TbER Take 650 mg by mouth two (2) times daily as needed for Pain.  famotidine (PEPCID) 20 mg tablet Take 1 Tab by mouth two (2) times a day.  VITAMIN D2 50,000 unit capsule TAKE 1 CAPSULE BY MOUTH ONCE A WEEK    menthol (BIOFREEZE, MENTHOL,) 4 % gel Apply top BID    aspirin 81 mg chewable tablet Take 1 Tab by mouth daily.  cholecalciferol (VITAMIN D3) 1,000 unit cap Take 2,000 Units by mouth daily.  TENS unit and electrodes cmpk Use in lower back every day. DX;chronic lower back pain (Patient not taking: Reported on 9/9/2021)     No current facility-administered medications for this visit. PHYSICAL EXAMINATION:    Visit Vitals  /60   Pulse 78   Resp 20   Ht 5' 7\" (1.702 m)   Wt 182 lb (82.6 kg)   SpO2 94%   BMI 28.51 kg/m²       NEUROLOGICAL EXAMINATION:     Mental Status:   Alert and oriented to person, place.   She scored 16/30 on the [facility] assessment. She had difficulties with visuospatial/executive function. Delayed recall was 3/5. She had problem with calculations mentally but could do it on paper. Significantly impaired word recall. Cranial Nerves:    II, III, IV, VI:  Visual acuity grossly intact. Visual fields are normal.    Pupils are equal, round, and reactive to light and accommodation. Extra-ocular movements are full and fluid. V-XII: Hearing is grossly intact. Facial features are symmetric, with normal sensation and strength. The palate rises symmetrically and the tongue protrudes midline. Sternocleidomastoids 5/5. Motor Examination: Normal tone, bulk, and strength. 5/5 muscle strength throughout. No cogwheel rigidity or clonus present. Sensory exam:  Normal throughout to pinprick, temperature, and vibration sense. Normal proprioception. Coordination:  Finger to nose and rapid arm movement testing was normal.   No resting or intention tremor    Gait and Station: Able to walk with assistance only. Reflexes:  DTRs 2+ throughout. Toes downgoing. LABS / IMAGING  Lab Results   Component Value Date/Time    WBC 8.2 08/14/2021 06:49 PM    HGB 9.7 (L) 08/14/2021 06:49 PM    HCT 28.4 (L) 08/14/2021 06:49 PM    PLATELET 951 57/40/0950 06:49 PM    .8 (H) 08/14/2021 06:49 PM     Lab Results   Component Value Date/Time    Sodium 139 08/14/2021 06:49 PM    Potassium 4.1 08/14/2021 06:49 PM    Chloride 106 08/14/2021 06:49 PM    CO2 28 08/14/2021 06:49 PM    Anion gap 5 08/14/2021 06:49 PM    Glucose 126 (H) 08/14/2021 06:49 PM    BUN 13 08/14/2021 06:49 PM    Creatinine 0.84 08/14/2021 06:49 PM    BUN/Creatinine ratio 15 08/14/2021 06:49 PM    GFR est AA >60 08/14/2021 06:49 PM    GFR est non-AA >60 08/14/2021 06:49 PM    Calcium 8.1 (L) 08/14/2021 06:49 PM    Bilirubin, total 0.7 08/14/2021 06:49 PM    Alk.  phosphatase 41 (L) 08/14/2021 06:49 PM    Protein, total 7.4 08/14/2021 06:49 PM    Albumin 3.6 08/14/2021 06:49 PM    Globulin 3.8 08/14/2021 06:49 PM    A-G Ratio 0.9 (L) 08/14/2021 06:49 PM    ALT (SGPT) 20 08/14/2021 06:49 PM    AST (SGOT) 24 08/14/2021 06:49 PM     Lab Results   Component Value Date/Time    TSH 0.472 08/05/2021 09:05 AM     Lab Results   Component Value Date/Time    Vitamin B12 71 (L) 08/05/2021 09:05 AM    Folate 20.0 08/14/2021 06:49 PM     MRI Results (most recent):  Results from Hospital Encounter encounter on 08/14/21    MRI BRAIN WO CONT    Narrative  EXAM: MRI BRAIN WO CONT    INDICATION: dizziness    COMPARISON: CT 8/14/2021. CONTRAST: None. TECHNIQUE:  Multiplanar multisequence acquisition without contrast of the brain. FINDINGS:  The ventricles are normal in size and position. There is no acute is no  demonstration of acute infarction. There is no intracranial mass demonstrated. There is mild prominence of the ventricles and cortical sulci indicating  atrophy. Abundant nonspecific bilateral cerebral periventricular and centrum  semiovale white matter signal alteration. Though nonspecific, the findings could  be on the basis of chronic small vessel ischemic disease the white matter. The  vascular flow voids at the base the brain are normal in conspicuity. The  unenhanced sella, optic chiasm, orbits and paranasal sinuses appear within  normal limits. Impression  Atrophy and chronic small vessel ischemic disease the white matter. No acute  infarction demonstrated. Lab Results   Component Value Date/Time    Vitamin B12 71 (L) 08/05/2021 09:05 AM    Folate 20.0 08/14/2021 06:49 PM     Lab Results   Component Value Date/Time    TSH 0.472 08/05/2021 09:05 AM         ASSESSMENT    ICD-10-CM ICD-9-CM    1. 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  B12 deficiency  E53.8 266.2 VITAMIN B12       DISCUSSION  Ms. [address] has moderate dementia and it may be due to a combination of mixed Alzheimer's and vascular pathology along with B12 deficiency   She has now started taking B12 injections and has done 11 injections so far  We will repeat level and consider changing to sublingual tablets if adequate  Continue donepezil 10 mg daily  Her daughter currently lives with her and she is under supervision 24/7.   She does not drive or handle money/finances  We discussed that her needs may increase over time and we do not have any definitive treatment for dementia  I will continue to follow her periodically    Time spent 30 minutes      Marty oCats MD  Diplomate, American Board of Psychiatry & Neurology (Neurology)  Diplomate, American Board of Psychiatry & Neurology (Clinical Neurophysiology)  Diplomate, American Board of Electrodiagnostic Medicine

## 2021-09-09 NOTE — PATIENT INSTRUCTIONS
[facility]   Statement to Patients  April 1, 2014      In an effort to ensure the large volume of patient prescription refills is processed in the most efficient and expeditious manner, we are asking our patients to assist us by calling your Pharmacy for all prescription refills, this will include also your  Mail Order Pharmacy. The pharmacy will contact our office electronically to continue the refill process. Please do not wait until the last minute to call your pharmacy. We need at least 48 hours (2days) to fill prescriptions. We also encourage you to call your pharmacy before going to  your prescription to make sure it is ready. With regard to controlled substance prescription refill requests (narcotic refills) that need to be picked up at our office, we ask your cooperation by providing us with at least 72 hours (3days) notice that you will need a refill. We will not refill narcotic prescription refill requests after 4:00pm on any weekday, Monday through Thursday, or after 2:00pm on Fridays, or on the weekends. We encourage everyone to explore another way of getting your prescription refill request processed using Retrace, our patient web portal through our electronic medical record system. Retrace is an efficient and effective way to communicate your medication request directly to the office and  downloadable as an jose antonio on your smart phone . Retrace also features a review functionality that allows you to view your medication list as well as leave messages for your physician. Are you ready to get connected? If so please review the attatched instructions or speak to any of our staff to get you set up right away! Thank you so much for your cooperation. Should you have any questions please contact our Practice Administrator.     The Physicians and Staff,  [facility]

## 2021-09-10 LAB — VIT B12 SERPL-MCNC: 957 PG/ML (ref 232–1245)

## 2021-09-12 DIAGNOSIS — M47.22 OSTEOARTHRITIS OF SPINE WITH RADICULOPATHY, CERVICAL REGION: ICD-10-CM

## 2021-09-13 RX ORDER — FERROUS SULFATE 325(65) MG
TABLET, DELAYED RELEASE (ENTERIC COATED) ORAL
Qty: 30 TABLET | Refills: 0 | Status: SHIPPED | OUTPATIENT
Start: 2021-09-13 | End: 2021-10-18

## 2021-09-13 RX ORDER — BACLOFEN 10 MG/1
TABLET ORAL
Qty: 30 TABLET | Refills: 0 | Status: SHIPPED | OUTPATIENT
Start: 2021-09-13 | End: 2021-09-23

## 2021-09-14 ENCOUNTER — PATIENT OUTREACH (OUTPATIENT)
Dept: CASE MANAGEMENT | Age: 86
End: 2021-09-14

## 2021-09-14 NOTE — PROGRESS NOTES
Patient resolved from Transition of Care episode on 9/14/21. ACM/CTN was unsuccessful at contacting this patient today. Patient/family was provided the following resources and education related to COVID-19 during the initial call:                         Signs, symptoms and red flags related to COVID-19            CDC exposure and quarantine guidelines            Conduit exposure contact - [phone]            Contact for their local Department of Health                 Patient has not had any additional ED or hospital visits. No further outreach scheduled with this CTN/ACM. Episode of Care resolved. Patient has this CTN/ACM contact information if future needs arise.

## 2021-09-23 DIAGNOSIS — M47.22 OSTEOARTHRITIS OF SPINE WITH RADICULOPATHY, CERVICAL REGION: ICD-10-CM

## 2021-09-23 RX ORDER — BACLOFEN 10 MG/1
TABLET ORAL
Qty: 30 TABLET | Refills: 0 | Status: SHIPPED | OUTPATIENT
Start: 2021-09-23 | End: 2021-10-19

## 2021-09-28 ENCOUNTER — OFFICE VISIT (OUTPATIENT)
Dept: INTERNAL MEDICINE CLINIC | Age: 86
End: 2021-09-28
Payer: MEDICARE

## 2021-09-28 VITALS
TEMPERATURE: 98 F | OXYGEN SATURATION: 96 % | HEIGHT: 67 IN | DIASTOLIC BLOOD PRESSURE: 76 MMHG | SYSTOLIC BLOOD PRESSURE: 122 MMHG | WEIGHT: 175 LBS | RESPIRATION RATE: 20 BRPM | BODY MASS INDEX: 27.47 KG/M2 | HEART RATE: 79 BPM

## 2021-09-28 DIAGNOSIS — R41.3 MEMORY CHANGE: ICD-10-CM

## 2021-09-28 DIAGNOSIS — I10 ESSENTIAL HYPERTENSION: ICD-10-CM

## 2021-09-28 DIAGNOSIS — F32.A MILD DEPRESSION: ICD-10-CM

## 2021-09-28 DIAGNOSIS — Z86.73 H/O: STROKE: ICD-10-CM

## 2021-09-28 DIAGNOSIS — E11.21 TYPE 2 DIABETES WITH NEPHROPATHY (HCC): Primary | ICD-10-CM

## 2021-09-28 DIAGNOSIS — E78.2 MIXED HYPERLIPIDEMIA: ICD-10-CM

## 2021-09-28 PROCEDURE — G8399 PT W/DXA RESULTS DOCUMENT: HCPCS | Performed by: INTERNAL MEDICINE

## 2021-09-28 PROCEDURE — G9717 DOC PT DX DEP/BP F/U NT REQ: HCPCS | Performed by: INTERNAL MEDICINE

## 2021-09-28 PROCEDURE — 1101F PT FALLS ASSESS-DOCD LE1/YR: CPT | Performed by: INTERNAL MEDICINE

## 2021-09-28 PROCEDURE — G8427 DOCREV CUR MEDS BY ELIG CLIN: HCPCS | Performed by: INTERNAL MEDICINE

## 2021-09-28 PROCEDURE — 1090F PRES/ABSN URINE INCON ASSESS: CPT | Performed by: INTERNAL MEDICINE

## 2021-09-28 PROCEDURE — 99214 OFFICE O/P EST MOD 30 MIN: CPT | Performed by: INTERNAL MEDICINE

## 2021-09-28 PROCEDURE — G8754 DIAS BP LESS 90: HCPCS | Performed by: INTERNAL MEDICINE

## 2021-09-28 PROCEDURE — G8536 NO DOC ELDER MAL SCRN: HCPCS | Performed by: INTERNAL MEDICINE

## 2021-09-28 PROCEDURE — G8417 CALC BMI ABV UP PARAM F/U: HCPCS | Performed by: INTERNAL MEDICINE

## 2021-09-28 PROCEDURE — G8752 SYS BP LESS 140: HCPCS | Performed by: INTERNAL MEDICINE

## 2021-09-28 NOTE — PROGRESS NOTES
HISTORY OF PRESENT ILLNESS  Anne Schmidt is a 82 y.o. female. Patient was seen with her daughter. Was seen by neurology. Patient was b12 deficient. Has been started on injections. Last one showed that it is improving. Reports that she overall feels ok. Finished up the antibiotics. Reports that at her diet has improved and she gets in 2- 3 meals a day. Is walking, but with a walker. No falls. Reports some unsteadiness at times. DM: stable and continues to take all her medications. Visit Vitals  /76 (BP 1 Location: Right upper arm, BP Patient Position: Sitting, BP Cuff Size: Adult)   Pulse 79   Temp 98 °F (36.7 °C) (Oral)   Resp 20   Ht 5' 7\" (1.702 m)   Wt 175 lb (79.4 kg)   SpO2 96%   BMI 27.41 kg/m²     Past Medical History:   Diagnosis Date    Arthritis     Chronic pain     Depression     Diabetes ([address]     Hypercholesterolemia     Hypertension     [facility]     TIA 10 yrs back    Stroke ([facility] [address]     2003     Past Surgical History:   Procedure Laterality Date    HX CARPAL TUNNEL RELEASE  1990    HX CATARACT REMOVAL      bilateral    HX HYSTERECTOMY  1986    HX HYSTERECTOMY      HX ORTHOPAEDIC      HX ORTHOPAEDIC      carpel tunnel left    HX ORTHOPAEDIC      left foot heel spur    HX REFRACTIVE SURGERY  2006     Family History   Adopted: Yes     Outpatient Encounter Medications as of 9/28/2021   Medication Sig Dispense Refill    donepeziL (ARICEPT) 10 mg tablet Take 1 Tablet by mouth nightly. 90 Tablet 1    baclofen (LIORESAL) 10 mg tablet TAKE 1 TABLET BY MOUTH TWICE DAILY AS NEEDED FOR MUSCLE SPASM 30 Tablet 0    ferrous sulfate (IRON) 325 mg (65 mg iron) EC tablet TAKE 1 TABLET BY MOUTH ONCE DAILY BEFORE BREAKFAST 30 Tablet 0    amLODIPine (NORVASC) 5 mg tablet Take 1 Tablet by mouth daily. 90 Tablet 1    budesonide (ENTOCORT EC) 3 mg capsule Take 3 mg by mouth three (3) times daily.       traMADoL (ULTRAM) 50 mg tablet Take 50 mg by mouth every six (6) hours as needed for Pain.  cyanocobalamin (VITAMIN B12) 1,000 mcg/mL injection Give 1ml IM daily X 7 days then 1ml IM weekly X 4 weeks then 1ml IM monthly 30 mL 1    simvastatin (ZOCOR) 40 mg tablet TAKE 1 TABLET BY MOUTH AT BEDTIME 90 Tablet 1    Janumet XR 50-1,000 mg TM24 TAKE 1 TABLET BY MOUTH  TWICE DAILY 180 Tablet 3    losartan (COZAAR) 50 mg tablet Take 1 tablet by mouth once daily 90 Tablet 0    furosemide (LASIX) 20 mg tablet TAKE 1 TABLET BY MOUTH  Monday,wednesday Friday and saturday 50 Tab 3    SITagliptin-metFORMIN (Janumet) 50-1,000 mg per tablet Take 1 tablet by mouth two  times daily with meals 180 Tab 1    cholestyramine (QUESTRAN) 4 gram packet Take 1 Packet by mouth daily (with dinner). 30 Packet 0    meloxicam (MOBIC) 15 mg tablet TAKE 1 TABLET BY MOUTH  DAILY 90 Tab 3    PARoxetine (PAXIL) 20 mg tablet TAKE 1 TABLET BY MOUTH  EVERY NIGHT AT BEDTIME 90 Tab 3    metoprolol tartrate (LOPRESSOR) 100 mg IR tablet TAKE 1 TABLET BY MOUTH  TWICE DAILY 180 Tab 3    acetaminophen (TYLENOL) 650 mg TbER Take 650 mg by mouth two (2) times daily as needed for Pain.  famotidine (PEPCID) 20 mg tablet Take 1 Tab by mouth two (2) times a day. 60 Tab 5    VITAMIN D2 50,000 unit capsule TAKE 1 CAPSULE BY MOUTH ONCE A WEEK  2    menthol (BIOFREEZE, MENTHOL,) 4 % gel Apply top BID 1 Tube 2    TENS unit and electrodes cmpk Use in lower back every day. DX;chronic lower back pain 1 Each 0    aspirin 81 mg chewable tablet Take 1 Tab by mouth daily. 90 Tab 4    cholecalciferol (VITAMIN D3) 1,000 unit cap Take 2,000 Units by mouth daily. No facility-administered encounter medications on file as of 9/28/2021. HPI    Review of Systems   Constitutional: Negative. Respiratory: Negative. Cardiovascular: Negative. Gastrointestinal: Negative. Genitourinary: Negative. Musculoskeletal: Positive for joint pain. Negative for falls. Neurological: Negative.     Psychiatric/Behavioral: Positive for memory loss. Physical Exam  Vitals and nursing note reviewed. Cardiovascular:      Rate and Rhythm: Normal rate and regular rhythm. Pulmonary:      Effort: Pulmonary effort is normal.      Breath sounds: Normal breath sounds. Abdominal:      Palpations: Abdomen is soft. Musculoskeletal:      Right lower leg: Edema present. Left lower leg: Edema present. Skin:     General: Skin is warm. Neurological:      Mental Status: She is alert. Mental status is at baseline. Psychiatric:         Behavior: Behavior normal.         ASSESSMENT and PLAN  Diagnoses and all orders for this visit:    1. Type 2 diabetes with nephropathy ([facility]  - continue with low sugar intake. Continue to take medications   2. Mild depression ([facility] [address]    3. Mixed hyperlipidemia    4. Essential hypertension    5. H/O: stroke    6.  Memory change  - treating her B12 with injections for several weeks   -24 hours supervision     Follow-up and Dispositions    · Return in about 4 weeks (around 10/26/2021), or if symptoms worsen or fail to improve, for Follow up.       lab results and schedule of future lab studies reviewed with patient  reviewed diet, exercise and weight control  cardiovascular risk and specific lipid/LDL goals reviewed  reviewed medications and side effects in detail

## 2021-09-28 NOTE — PROGRESS NOTES
Health Maintenance Due   Topic Date Due    DTaP/Tdap/Td series (1 - Tdap) Never done    Shingrix Vaccine Age 50> (1 of 2) Never done    MICROALBUMIN Q1  06/11/2021    Flu Vaccine (1) 09/01/2021       Chief Complaint   Patient presents with    Hypertension    Memory Loss    Diabetes       1. Have you been to the ER, urgent care clinic since your last visit? Hospitalized since your last visit? No    2. Have you seen or consulted any other health care providers outside of the [address] [place] [address] since your last visit? Include any pap smears or colon screening. No    3) Do you have an Advance Directive on file? no    4) Are you interested in receiving information on Advance Directives? NO      Patient is accompanied by daughter I have received verbal consent from [address] [place] [address] to discuss any/all medical information while they are present in the room.

## 2021-10-17 DIAGNOSIS — I10 ESSENTIAL HYPERTENSION: ICD-10-CM

## 2021-10-18 DIAGNOSIS — M47.22 OSTEOARTHRITIS OF SPINE WITH RADICULOPATHY, CERVICAL REGION: ICD-10-CM

## 2021-10-18 RX ORDER — AMLODIPINE BESYLATE 5 MG/1
TABLET ORAL
Qty: 90 TABLET | Refills: 0 | Status: SHIPPED | OUTPATIENT
Start: 2021-10-18 | End: 2022-02-20

## 2021-10-19 RX ORDER — BACLOFEN 10 MG/1
TABLET ORAL
Qty: 30 TABLET | Refills: 0 | Status: SHIPPED | OUTPATIENT
Start: 2021-10-19 | End: 2021-11-22

## 2021-10-28 ENCOUNTER — OFFICE VISIT (OUTPATIENT)
Dept: INTERNAL MEDICINE CLINIC | Age: 86
End: 2021-10-28
Payer: MEDICARE

## 2021-10-28 VITALS
HEIGHT: 67 IN | DIASTOLIC BLOOD PRESSURE: 78 MMHG | RESPIRATION RATE: 20 BRPM | BODY MASS INDEX: 27.47 KG/M2 | OXYGEN SATURATION: 98 % | SYSTOLIC BLOOD PRESSURE: 124 MMHG | WEIGHT: 175 LBS | HEART RATE: 79 BPM | TEMPERATURE: 97.1 F

## 2021-10-28 DIAGNOSIS — E11.21 TYPE 2 DIABETES WITH NEPHROPATHY (HCC): ICD-10-CM

## 2021-10-28 DIAGNOSIS — F02.80 DEMENTIA ASSOCIATED WITH OTHER UNDERLYING DISEASE WITHOUT BEHAVIORAL DISTURBANCE (HCC): Primary | ICD-10-CM

## 2021-10-28 DIAGNOSIS — B37.2 SKIN YEAST INFECTION: ICD-10-CM

## 2021-10-28 DIAGNOSIS — R35.0 URINARY FREQUENCY: ICD-10-CM

## 2021-10-28 DIAGNOSIS — F32.A MILD DEPRESSION: ICD-10-CM

## 2021-10-28 LAB
BILIRUB UR QL STRIP: NORMAL
GLUCOSE UR-MCNC: NEGATIVE MG/DL
KETONES P FAST UR STRIP-MCNC: NEGATIVE MG/DL
PH UR STRIP: 5 [PH] (ref 4.6–8)
PROT UR QL STRIP: NORMAL
SP GR UR STRIP: 1.03 (ref 1–1.03)
UA UROBILINOGEN AMB POC: NORMAL (ref 0.2–1)
URINALYSIS CLARITY POC: CLEAR
URINALYSIS COLOR POC: NORMAL
URINE BLOOD POC: NEGATIVE
URINE LEUKOCYTES POC: NEGATIVE
URINE NITRITES POC: NEGATIVE

## 2021-10-28 PROCEDURE — G8417 CALC BMI ABV UP PARAM F/U: HCPCS | Performed by: INTERNAL MEDICINE

## 2021-10-28 PROCEDURE — 99214 OFFICE O/P EST MOD 30 MIN: CPT | Performed by: INTERNAL MEDICINE

## 2021-10-28 PROCEDURE — G9717 DOC PT DX DEP/BP F/U NT REQ: HCPCS | Performed by: INTERNAL MEDICINE

## 2021-10-28 PROCEDURE — 81003 URINALYSIS AUTO W/O SCOPE: CPT | Performed by: INTERNAL MEDICINE

## 2021-10-28 PROCEDURE — G8536 NO DOC ELDER MAL SCRN: HCPCS | Performed by: INTERNAL MEDICINE

## 2021-10-28 PROCEDURE — G8752 SYS BP LESS 140: HCPCS | Performed by: INTERNAL MEDICINE

## 2021-10-28 PROCEDURE — G8427 DOCREV CUR MEDS BY ELIG CLIN: HCPCS | Performed by: INTERNAL MEDICINE

## 2021-10-28 PROCEDURE — G8754 DIAS BP LESS 90: HCPCS | Performed by: INTERNAL MEDICINE

## 2021-10-28 PROCEDURE — 1101F PT FALLS ASSESS-DOCD LE1/YR: CPT | Performed by: INTERNAL MEDICINE

## 2021-10-28 PROCEDURE — G8399 PT W/DXA RESULTS DOCUMENT: HCPCS | Performed by: INTERNAL MEDICINE

## 2021-10-28 PROCEDURE — 1090F PRES/ABSN URINE INCON ASSESS: CPT | Performed by: INTERNAL MEDICINE

## 2021-10-28 RX ORDER — NYSTATIN 100000 [USP'U]/G
POWDER TOPICAL 4 TIMES DAILY
Qty: 1 EACH | Refills: 1 | Status: SHIPPED | OUTPATIENT
Start: 2021-10-28 | End: 2021-11-08 | Stop reason: SDUPTHER

## 2021-10-28 NOTE — PROGRESS NOTES
Health Maintenance Due   Topic Date Due    Shingrix Vaccine Age 49> (1 of 2) Never done    MICROALBUMIN Q1  06/11/2021    Flu Vaccine (1) 09/01/2021    Medicare Yearly Exam  11/07/2021       Chief Complaint   Patient presents with    Follow-up    Bladder Infection       1. Have you been to the ER, urgent care clinic since your last visit? Hospitalized since your last visit? No    2. Have you seen or consulted any other health care providers outside of the [address] [place] [address] since your last visit? Include any pap smears or colon screening. No    3) Do you have an Advance Directive on file? no    4) Are you interested in receiving information on Advance Directives? NO      Patient is accompanied by self I have received verbal consent from [address] [place] [address] to discuss any/all medical information while they are present in the room.

## 2021-10-28 NOTE — PROGRESS NOTES
HISTORY OF PRESENT ILLNESS  Anne ARITA 1981 Rhina Schmidt is a 82 y.o. female. Patient was seen for a follow up. Is with her daughter. Reports some urinary frequency, increased confusion and not drinking as much. Would like her to be checked for a UTI. Also reports that she has noticed some skin irritation to the belly area and groin area. Does hold a lot of moisture  to the area as well. Does not itch. Will see GI in November after she continues to have diarrhea. Was placed on Entocort but this does not seem to help. Also tried loperamide and is having to take 6 before anything happens. Was told by GI to increase her fiber. No stomach pain or blood. No fever. Visit Vitals  /78 (BP 1 Location: Right arm, BP Patient Position: Sitting, BP Cuff Size: Adult)   Pulse 79   Temp 97.1 °F (36.2 °C) (Oral)   Resp 20   Ht 5' 7\" (1.702 m)   Wt 175 lb (79.4 kg) Comment: declined patient in wheelchair   SpO2 98%   BMI 27.41 kg/m²     Past Medical History:   Diagnosis Date    Arthritis     Chronic pain     Depression     Diabetes ([address]     Hypercholesterolemia     Hypertension     Stroke ([address]     TIA 10 yrs back    Stroke ([address]     2003     Past Surgical History:   Procedure Laterality Date    HX CARPAL TUNNEL RELEASE  1990    HX CATARACT REMOVAL      bilateral    HX HYSTERECTOMY  1986    HX HYSTERECTOMY      HX ORTHOPAEDIC      HX ORTHOPAEDIC      carpel tunnel left    HX ORTHOPAEDIC      left foot heel spur    HX REFRACTIVE SURGERY  2006     Family History   Adopted: Yes     Outpatient Encounter Medications as of 10/28/2021   Medication Sig Dispense Refill    nystatin (MYCOSTATIN) powder Apply  to affected area four (4) times daily.  1 Each 1    baclofen (LIORESAL) 10 mg tablet TAKE 1 TABLET BY MOUTH TWICE DAILY AS NEEDED FOR MUSCLE SPASM 30 Tablet 0    ferrous sulfate (IRON) 325 mg (65 mg iron) EC tablet TAKE 1 TABLET BY MOUTH ONCE DAILY BEFORE BREAKFAST 90 Tablet 1    amLODIPine (NORVASC) 5 mg tablet TAKE 1 TABLET BY MOUTH ONCE DAILY . DISCONTINUE  NORVASC  2.5  MG 90 Tablet 0    PARoxetine (PAXIL) 20 mg tablet TAKE 1 TABLET BY MOUTH  EVERY NIGHT AT BEDTIME 90 Tablet 0    meloxicam (MOBIC) 15 mg tablet TAKE 1 TABLET BY MOUTH  DAILY 90 Tablet 0    metoprolol tartrate (LOPRESSOR) 100 mg IR tablet TAKE 1 TABLET BY MOUTH  TWICE DAILY 180 Tablet 1    donepeziL (ARICEPT) 10 mg tablet Take 1 Tablet by mouth nightly. 90 Tablet 1    budesonide (ENTOCORT EC) 3 mg capsule Take 3 mg by mouth three (3) times daily.  traMADoL (ULTRAM) 50 mg tablet Take 50 mg by mouth every six (6) hours as needed for Pain.  cyanocobalamin (VITAMIN B12) 1,000 mcg/mL injection Give 1ml IM daily X 7 days then 1ml IM weekly X 4 weeks then 1ml IM monthly 30 mL 1    simvastatin (ZOCOR) 40 mg tablet TAKE 1 TABLET BY MOUTH AT BEDTIME 90 Tablet 1    Janumet XR 50-1,000 mg TM24 TAKE 1 TABLET BY MOUTH  TWICE DAILY 180 Tablet 3    losartan (COZAAR) 50 mg tablet Take 1 tablet by mouth once daily 90 Tablet 0    furosemide (LASIX) 20 mg tablet TAKE 1 TABLET BY MOUTH  Monday,wednesday Friday and saturday 50 Tab 3    cholestyramine (QUESTRAN) 4 gram packet Take 1 Packet by mouth daily (with dinner). 30 Packet 0    acetaminophen (TYLENOL) 650 mg TbER Take 650 mg by mouth two (2) times daily as needed for Pain.  famotidine (PEPCID) 20 mg tablet Take 1 Tab by mouth two (2) times a day. 60 Tab 5    VITAMIN D2 50,000 unit capsule TAKE 1 CAPSULE BY MOUTH ONCE A WEEK  2    menthol (BIOFREEZE, MENTHOL,) 4 % gel Apply top BID 1 Tube 2    TENS unit and electrodes cmpk Use in lower back every day. DX;chronic lower back pain 1 Each 0    aspirin 81 mg chewable tablet Take 1 Tab by mouth daily. 90 Tab 4    cholecalciferol (VITAMIN D3) 1,000 unit cap Take 2,000 Units by mouth daily.       SITagliptin-metFORMIN (Janumet) 50-1,000 mg per tablet Take 1 tablet by mouth two  times daily with meals (Patient not taking: Reported on 10/28/2021) 180 Tab 1     No facility-administered encounter medications on file as of 10/28/2021. HPI    Review of Systems   Constitutional: Negative. Respiratory: Negative. Cardiovascular: Negative. Gastrointestinal: Negative. Genitourinary: Positive for frequency. Musculoskeletal: Positive for back pain. Neurological: Negative. Psychiatric/Behavioral: Negative. Physical Exam  Vitals and nursing note reviewed. Cardiovascular:      Rate and Rhythm: Normal rate and regular rhythm. Pulmonary:      Effort: Pulmonary effort is normal.      Breath sounds: Normal breath sounds. Abdominal:      General: Bowel sounds are normal.      Palpations: Abdomen is soft. Tenderness: There is right CVA tenderness. Musculoskeletal:         General: Normal range of motion. Skin:     General: Skin is warm. Neurological:      Mental Status: She is alert. Mental status is at baseline. Psychiatric:         Behavior: Behavior normal.         ASSESSMENT and PLAN  Diagnoses and all orders for this visit:    1. 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 Type 2 diabetes with nephropathy ([facility] [address]    3. Mild depression ([facility] [address]    4. Urinary frequency  -     AMB POC URINALYSIS DIP STICK MANUAL W/O MICRO    5. Skin yeast infection  -     nystatin (MYCOSTATIN) powder; Apply  to affected area four (4) times daily.       Follow-up and Dispositions    · Return in about 4 months (around 2/28/2022), or if symptoms worsen or fail to improve, for Follow up.       lab results and schedule of future lab studies reviewed with patient  reviewed diet, exercise and weight control  reviewed medications and side effects in detail

## 2021-11-08 DIAGNOSIS — B37.2 SKIN YEAST INFECTION: ICD-10-CM

## 2021-11-08 RX ORDER — NYSTATIN 100000 [USP'U]/G
POWDER TOPICAL 4 TIMES DAILY
Qty: 1 EACH | Refills: 1 | Status: SHIPPED | OUTPATIENT
Start: 2021-11-08 | End: 2022-04-11 | Stop reason: SDUPTHER

## 2021-11-21 DIAGNOSIS — M47.22 OSTEOARTHRITIS OF SPINE WITH RADICULOPATHY, CERVICAL REGION: ICD-10-CM

## 2021-11-21 DIAGNOSIS — I10 ESSENTIAL HYPERTENSION: ICD-10-CM

## 2021-11-22 RX ORDER — LOSARTAN POTASSIUM 50 MG/1
TABLET ORAL
Qty: 90 TABLET | Refills: 0 | OUTPATIENT
Start: 2021-11-22

## 2021-11-22 RX ORDER — BACLOFEN 10 MG/1
TABLET ORAL
Qty: 30 TABLET | Refills: 0 | Status: SHIPPED | OUTPATIENT
Start: 2021-11-22 | End: 2022-01-10

## 2021-11-29 DIAGNOSIS — I10 ESSENTIAL HYPERTENSION: ICD-10-CM

## 2021-11-29 RX ORDER — LOSARTAN POTASSIUM 50 MG/1
50 TABLET ORAL DAILY
Qty: 90 TABLET | Refills: 0 | Status: SHIPPED | OUTPATIENT
Start: 2021-11-29 | End: 2022-03-21

## 2022-01-10 DIAGNOSIS — M47.22 OSTEOARTHRITIS OF SPINE WITH RADICULOPATHY, CERVICAL REGION: ICD-10-CM

## 2022-01-10 RX ORDER — BACLOFEN 10 MG/1
TABLET ORAL
Qty: 30 TABLET | Refills: 0 | Status: SHIPPED | OUTPATIENT
Start: 2022-01-10 | End: 2022-01-24 | Stop reason: SDUPTHER

## 2022-01-18 ENCOUNTER — OFFICE VISIT (OUTPATIENT)
Dept: URGENT CARE | Age: 87
End: 2022-01-18
Payer: MEDICARE

## 2022-01-18 VITALS — HEART RATE: 74 BPM | RESPIRATION RATE: 16 BRPM | OXYGEN SATURATION: 100 % | TEMPERATURE: 98.9 F

## 2022-01-18 DIAGNOSIS — Z20.822 SUSPECTED COVID-19 VIRUS INFECTION: Primary | ICD-10-CM

## 2022-01-18 PROCEDURE — 1090F PRES/ABSN URINE INCON ASSESS: CPT | Performed by: FAMILY MEDICINE

## 2022-01-18 PROCEDURE — 99213 OFFICE O/P EST LOW 20 MIN: CPT | Performed by: FAMILY MEDICINE

## 2022-01-18 PROCEDURE — G8756 NO BP MEASURE DOC: HCPCS | Performed by: FAMILY MEDICINE

## 2022-01-18 PROCEDURE — G8419 CALC BMI OUT NRM PARAM NOF/U: HCPCS | Performed by: FAMILY MEDICINE

## 2022-01-18 PROCEDURE — G8399 PT W/DXA RESULTS DOCUMENT: HCPCS | Performed by: FAMILY MEDICINE

## 2022-01-18 PROCEDURE — G8536 NO DOC ELDER MAL SCRN: HCPCS | Performed by: FAMILY MEDICINE

## 2022-01-18 PROCEDURE — G9717 DOC PT DX DEP/BP F/U NT REQ: HCPCS | Performed by: FAMILY MEDICINE

## 2022-01-18 PROCEDURE — G8427 DOCREV CUR MEDS BY ELIG CLIN: HCPCS | Performed by: FAMILY MEDICINE

## 2022-01-18 PROCEDURE — 1101F PT FALLS ASSESS-DOCD LE1/YR: CPT | Performed by: FAMILY MEDICINE

## 2022-01-18 RX ORDER — AZITHROMYCIN 250 MG/1
TABLET, FILM COATED ORAL
Qty: 6 TABLET | Refills: 0 | Status: SHIPPED | OUTPATIENT
Start: 2022-01-18 | End: 2022-01-25 | Stop reason: ALTCHOICE

## 2022-01-18 NOTE — PROGRESS NOTES
This patient was seen at [address] [place] [address] Urgent Care while in their vehicle due to COVID-19 pandemic with PPE and focused examination in order to decrease community viral transmission. The patient/guardian gave verbal consent to treat. The history is provided by the patient. Cough  The history is provided by the patient. This is a new problem. The current episode started more than 2 days ago. The problem occurs every few minutes. The problem has not changed since onset. The cough is non-productive. There has been no fever. Associated symptoms include rhinorrhea. Pertinent negatives include no chest pain, no chills, no ear congestion, no headaches, no sore throat, no myalgias, no shortness of breath, no wheezing, no nausea and no vomiting. She has tried nothing for the symptoms. Risk factors: no covid exposure. She is not a smoker. Her past medical history is significant for bronchitis. Her past medical history does not include asthma. Past Medical History:   Diagnosis Date    Arthritis     Chronic pain     Depression     Diabetes ([facility]     Hypercholesterolemia     Hypertension     Stroke ([facility] [facility]     TIA 10 yrs back    Stroke ([facility] [address] [facility]     2003        Past Surgical History:   Procedure Laterality Date    HX CARPAL TUNNEL RELEASE  1990    HX CATARACT REMOVAL      bilateral    HX HYSTERECTOMY  1986    HX HYSTERECTOMY      HX ORTHOPAEDIC      HX ORTHOPAEDIC      carpel tunnel left    HX ORTHOPAEDIC      left foot heel spur    HX REFRACTIVE SURGERY  2006         Family History   Adopted: Yes        Social History     Socioeconomic History    Marital status:      Spouse name: Not on file    Number of children: Not on file    Years of education: Not on file    Highest education level: Not on file   Occupational History    Not on file   Tobacco Use    Smoking status: Never Smoker    Smokeless tobacco: Never Used   Substance and Sexual Activity    Alcohol use: No    Drug use:  No  Sexual activity: Never     Birth control/protection: None     Comment: retired,relocated from [place] ,[address] [place] [address] with daughter   Other Topics Concern    Not on file   Social History Narrative    ** Merged History Encounter **          Social Determinants of Health     Financial Resource Strain:     Difficulty of Paying Living Expenses: Not on file   Food Insecurity:     Worried About Running Out of Food in the Last Year: Not on file    Sergio of Food in the Last Year: Not on file   Transportation Needs:     Lack of Transportation (Medical): Not on file    Lack of Transportation (Non-Medical): Not on file   Physical Activity:     Days of Exercise per Week: Not on file    Minutes of Exercise per Session: Not on file   Stress:     Feeling of Stress : Not on file   Social Connections:     Frequency of Communication with Friends and Family: Not on file    Frequency of Social Gatherings with Friends and Family: Not on file    Attends Druze Services: Not on file    Active Member of [address] [place] [address] or Organizations: Not on file    Attends Club or Organization Meetings: Not on file    Marital Status: Not on file   Intimate Partner Violence:     Fear of Current or Ex-Partner: Not on file    Emotionally Abused: Not on file    Physically Abused: Not on file    Sexually Abused: Not on file   Housing Stability:     Unable to Pay for Housing in the Last Year: Not on file    Number of [place] in the Last Year: Not on file    Unstable Housing in the Last Year: Not on file                ALLERGIES: Patient has no known allergies. Review of Systems   Constitutional: Negative for chills. HENT: Positive for congestion and rhinorrhea. Negative for sore throat. Respiratory: Positive for cough. Negative for chest tightness, shortness of breath and wheezing. Cardiovascular: Negative for chest pain. Gastrointestinal: Negative for nausea and vomiting. Musculoskeletal: Negative for myalgias. Neurological: Negative for headaches. All other systems reviewed and are negative. Vitals:    01/18/22 1056   Pulse: 74   Resp: 16   Temp: 98.9 °F (37.2 °C)   SpO2: 100%       Physical Exam  Vitals and nursing note reviewed. Constitutional:       General: She is not in acute distress. Appearance: She is not ill-appearing. Pulmonary:      Effort: Pulmonary effort is normal. No respiratory distress. Breath sounds: No wheezing, rhonchi or rales. MDM    Procedures        ICD-10-CM ICD-9-CM    1. Suspected COVID-19 virus infection  Z20.822 V01.79 NOVEL CORONAVIRUS (COVID-19)       Take mucinex DM bid  And if sxs not resolved take z yoshi     Medications Ordered Today   Medications    azithromycin (ZITHROMAX) 250 mg tablet     Sig: Take two tablets today then one tablet daily     Dispense:  6 Tablet     Refill:  0     No results found for any visits on 01/18/22. The patients condition was discussed with the patient and they understand. The patient is to follow up with primary care doctor. If signs and symptoms become worse the pt is to go to the ER. The patient is to take medications as prescribed.

## 2022-01-21 LAB
SARS-COV-2, NAA 2 DAY TAT: NORMAL
SARS-COV-2, NAA: DETECTED

## 2022-01-21 NOTE — PROGRESS NOTES
Spoke with patient regarding positive COVID-19 PCR test. Informed pt to quarantine x 5 days from sx onset, wear a well fitting mask for additional 5 days after quarantine. Deep breathing exercises and ambulation throughout the day, tylenol PRN. Go to ER for evaluation if sx of SOB and lethargy worsen.  Pt verbalized understanding, no further questions at this time

## 2022-01-24 DIAGNOSIS — E78.2 MIXED HYPERLIPIDEMIA: ICD-10-CM

## 2022-01-24 DIAGNOSIS — M47.22 OSTEOARTHRITIS OF SPINE WITH RADICULOPATHY, CERVICAL REGION: ICD-10-CM

## 2022-01-25 ENCOUNTER — VIRTUAL VISIT (OUTPATIENT)
Dept: INTERNAL MEDICINE CLINIC | Age: 87
End: 2022-01-25
Payer: MEDICARE

## 2022-01-25 DIAGNOSIS — Z86.16 HISTORY OF COVID-19: ICD-10-CM

## 2022-01-25 DIAGNOSIS — E11.21 TYPE 2 DIABETES WITH NEPHROPATHY (HCC): ICD-10-CM

## 2022-01-25 DIAGNOSIS — F32.A MILD DEPRESSION: ICD-10-CM

## 2022-01-25 DIAGNOSIS — F02.80 DEMENTIA ASSOCIATED WITH OTHER UNDERLYING DISEASE WITHOUT BEHAVIORAL DISTURBANCE (HCC): ICD-10-CM

## 2022-01-25 DIAGNOSIS — Z00.00 MEDICARE ANNUAL WELLNESS VISIT, SUBSEQUENT: Primary | ICD-10-CM

## 2022-01-25 DIAGNOSIS — R19.7 DIARRHEA, UNSPECIFIED TYPE: ICD-10-CM

## 2022-01-25 PROCEDURE — G8419 CALC BMI OUT NRM PARAM NOF/U: HCPCS | Performed by: INTERNAL MEDICINE

## 2022-01-25 PROCEDURE — G8536 NO DOC ELDER MAL SCRN: HCPCS | Performed by: INTERNAL MEDICINE

## 2022-01-25 PROCEDURE — 1101F PT FALLS ASSESS-DOCD LE1/YR: CPT | Performed by: INTERNAL MEDICINE

## 2022-01-25 PROCEDURE — G0439 PPPS, SUBSEQ VISIT: HCPCS | Performed by: INTERNAL MEDICINE

## 2022-01-25 PROCEDURE — G9717 DOC PT DX DEP/BP F/U NT REQ: HCPCS | Performed by: INTERNAL MEDICINE

## 2022-01-25 PROCEDURE — G8756 NO BP MEASURE DOC: HCPCS | Performed by: INTERNAL MEDICINE

## 2022-01-25 PROCEDURE — G8427 DOCREV CUR MEDS BY ELIG CLIN: HCPCS | Performed by: INTERNAL MEDICINE

## 2022-01-25 PROCEDURE — G8399 PT W/DXA RESULTS DOCUMENT: HCPCS | Performed by: INTERNAL MEDICINE

## 2022-01-25 RX ORDER — BACLOFEN 10 MG/1
TABLET ORAL
Qty: 30 TABLET | Refills: 0 | Status: SHIPPED | OUTPATIENT
Start: 2022-01-25 | End: 2022-03-21

## 2022-01-25 RX ORDER — SIMVASTATIN 40 MG/1
TABLET, FILM COATED ORAL
Qty: 90 TABLET | Refills: 0 | Status: SHIPPED | OUTPATIENT
Start: 2022-01-25 | End: 2022-05-01

## 2022-01-25 NOTE — PROGRESS NOTES
Konstantin Goldman is a 82 y.o. female who was seen by synchronous (real-time) audio-video technology on 1/25/2022 for Positive For Covid-19 (01/21/22)        Assessment & Plan:   Diagnoses and all orders for this visit:    1. Medicare annual wellness visit, subsequent   - Refills sent to pharmacy     2. Dementia associated with other underlying disease without behavioral disturbance ([facility]    3. Mild depression ([facility]    4. Type 2 diabetes with nephropathy ([facility]    5. History of COVID-19   - Educated to maintain quarantine for 10 days   - Educated to use OTC Mucinex to thin cough secretions    - Follow up if cough does not improve over the next few days  6. Diarrhea, unspecified   - Patient's daughter will call GI to try to make appt virtual so they do not have to wait a month to have her f/u    - Educated to use Zinc based ointment on butt to create a barrier to reduce irritation     Follow-up and Dispositions    · Return in about 6 months (around 7/25/2022), or if symptoms worsen or fail to improve. Subjective:     Patient was also seen for her medicare wellness. Patient is COVID 19 positive. Her symptoms are getting better, but she does having a lingering productive cough. She is coughing up clear sputum. Her appetite is beginning to improve, she has not had any additional weakness /falls. She had a GI appt scheduled for tomorrow, the daughter will try to make this a virtual appt. The patient continues to have diarrhea that is not relieved with her current medication regiment. Daughter would like to know what she can put on her bottom as it is irritated. Lives with her daughter. No falls. Is assisted with making meals and dressing. Prior to Admission medications    Medication Sig Start Date End Date Taking?  Authorizing Provider   simvastatin (ZOCOR) 40 mg tablet TAKE 1 TABLET BY MOUTH AT BEDTIME 1/25/22  Yes Angel Alvarez, NP   baclofen (LIORESAL) 10 mg tablet TAKE 1 TABLET BY MOUTH TWICE DAILY AS NEEDED FOR MUSCLE SPASM 1/25/22  Yes Mellisa Alvarez NP   losartan (COZAAR) 50 mg tablet Take 1 Tablet by mouth daily. 11/29/21  Yes Mellisa Alvarez NP   nystatin (MYCOSTATIN) powder Apply  to affected area four (4) times daily. 11/8/21  Yes Gabi Metz NP   ferrous sulfate (IRON) 325 mg (65 mg iron) EC tablet TAKE 1 TABLET BY MOUTH ONCE DAILY BEFORE BREAKFAST 10/18/21  Yes Gabi Metz NP   amLODIPine (NORVASC) 5 mg tablet TAKE 1 TABLET BY MOUTH ONCE DAILY . DISCONTINUE  NORVASC  2.5  MG 10/18/21  Yes Gabi Metz NP   PARoxetine (PAXIL) 20 mg tablet TAKE 1 TABLET BY MOUTH  EVERY NIGHT AT BEDTIME 10/18/21  Yes Janice Antonio MD   meloxicam (MOBIC) 15 mg tablet TAKE 1 TABLET BY MOUTH  DAILY 10/18/21  Yes Janice Antonio MD   metoprolol tartrate (LOPRESSOR) 100 mg IR tablet TAKE 1 TABLET BY MOUTH  TWICE DAILY 10/18/21  Yes Gabi Metz NP   donepeziL (ARICEPT) 10 mg tablet Take 1 Tablet by mouth nightly. 9/24/21  Yes Darling Meredith MD   budesonide (ENTOCORT EC) 3 mg capsule Take 3 mg by mouth three (3) times daily. Yes Provider, Historical   traMADoL (ULTRAM) 50 mg tablet Take 50 mg by mouth every six (6) hours as needed for Pain. Yes Provider, Historical   cyanocobalamin (VITAMIN B12) 1,000 mcg/mL injection Give 1ml IM daily X 7 days then 1ml IM weekly X 4 weeks then 1ml IM monthly 8/15/21  Yes Jordyn Calderon MD   Janumet XR 50-1,000 mg TM24 TAKE 1 TABLET BY MOUTH  TWICE DAILY 7/19/21  Yes Janice Antonio MD   furosemide (LASIX) 20 mg tablet TAKE 1 TABLET BY MOUTH  Monday,wednesday Friday and saturday 4/12/21  Yes Janice Antonio MD   cholestyramine Franci Mac) 4 gram packet Take 1 Packet by mouth daily (with dinner). 11/6/20  Yes Janice Antonio MD   acetaminophen (TYLENOL) 650 mg TbER Take 650 mg by mouth two (2) times daily as needed for Pain. Yes Provider, Historical   famotidine (PEPCID) 20 mg tablet Take 1 Tab by mouth two (2) times a day.  12/4/19  Yes Atiya Garcia MD   VITAMIN D2 50,000 unit capsule TAKE 1 CAPSULE BY MOUTH ONCE A WEEK 11/1/19  Yes Provider, Historical   menthol (BIOFREEZE, MENTHOL,) 4 % gel Apply top BID 7/11/19  Yes Atiya Garcia MD   TENS unit and electrodes cmpk Use in lower back every day. DX;chronic lower back pain 3/7/19  Yes Atiya Garcia MD   aspirin 81 mg chewable tablet Take 1 Tab by mouth daily. 8/29/18  Yes Atiya Garcia MD   cholecalciferol (VITAMIN D3) 1,000 unit cap Take 2,000 Units by mouth daily. 11/28/16  Yes Atiya Garcia MD     Patient Active Problem List   Diagnosis Code    DM (diabetes mellitus) ([facility] E11.9    Mixed hyperlipidemia E78.2    Chronic venous insufficiency I87.2    Depression F32. A    Stomach ulcer K25.9    Diverticula of colon K57.30    Advance care planning Z71.89    Essential hypertension I10    H/O: stroke Z80.78    Type 2 diabetes with nephropathy ([facility]) E11.21    Altered mental status R41.82    Acute delirium R41.0    Mild depression ([facility] F32.0    Dizziness R42     Patient Active Problem List    Diagnosis Date Noted    Dizziness 08/14/2021    Mild depression ([facility] 10/29/2018    Acute delirium 06/09/2018    Altered mental status 06/04/2018    Type 2 diabetes with nephropathy ([facility] 03/09/2018    H/O: stroke 11/09/2017    Advance care planning 01/21/2016    Essential hypertension 01/21/2016    Stomach ulcer 03/12/2015    Diverticula of colon 03/12/2015    DM [facility]di[facility] mellitus) [facility] 05/01/2014    Mixed hyperlipidemia 05/01/2014    Chronic venous insufficiency 05/01/2014    Depression 05/01/2014     Current Outpatient Medications   Medication Sig Dispense Refill    simvastatin (ZOCOR) 40 mg tablet TAKE 1 TABLET BY MOUTH AT BEDTIME 90 Tablet 0    baclofen (LIORESAL) 10 mg tablet TAKE 1 TABLET BY MOUTH TWICE DAILY AS NEEDED FOR MUSCLE SPASM 30 Tablet 0    losartan (COZAAR) 50 mg tablet Take 1 Tablet by mouth daily.  90 Tablet 0    nystatin (MYCOSTATIN) powder Apply  to affected area four (4) times daily. 1 Each 1    ferrous sulfate (IRON) 325 mg (65 mg iron) EC tablet TAKE 1 TABLET BY MOUTH ONCE DAILY BEFORE BREAKFAST 90 Tablet 1    amLODIPine (NORVASC) 5 mg tablet TAKE 1 TABLET BY MOUTH ONCE DAILY . DISCONTINUE  NORVASC  2.5  MG 90 Tablet 0    PARoxetine (PAXIL) 20 mg tablet TAKE 1 TABLET BY MOUTH  EVERY NIGHT AT BEDTIME 90 Tablet 0    meloxicam (MOBIC) 15 mg tablet TAKE 1 TABLET BY MOUTH  DAILY 90 Tablet 0    metoprolol tartrate (LOPRESSOR) 100 mg IR tablet TAKE 1 TABLET BY MOUTH  TWICE DAILY 180 Tablet 1    donepeziL (ARICEPT) 10 mg tablet Take 1 Tablet by mouth nightly. 90 Tablet 1    budesonide (ENTOCORT EC) 3 mg capsule Take 3 mg by mouth three (3) times daily.  traMADoL (ULTRAM) 50 mg tablet Take 50 mg by mouth every six (6) hours as needed for Pain.  cyanocobalamin (VITAMIN B12) 1,000 mcg/mL injection Give 1ml IM daily X 7 days then 1ml IM weekly X 4 weeks then 1ml IM monthly 30 mL 1    Janumet XR 50-1,000 mg TM24 TAKE 1 TABLET BY MOUTH  TWICE DAILY 180 Tablet 3    furosemide (LASIX) 20 mg tablet TAKE 1 TABLET BY MOUTH  Monday,wednesday Friday and saturday 50 Tab 3    cholestyramine (QUESTRAN) 4 gram packet Take 1 Packet by mouth daily (with dinner). 30 Packet 0    acetaminophen (TYLENOL) 650 mg TbER Take 650 mg by mouth two (2) times daily as needed for Pain.  famotidine (PEPCID) 20 mg tablet Take 1 Tab by mouth two (2) times a day. 60 Tab 5    VITAMIN D2 50,000 unit capsule TAKE 1 CAPSULE BY MOUTH ONCE A WEEK  2    menthol (BIOFREEZE, MENTHOL,) 4 % gel Apply top BID 1 Tube 2    TENS unit and electrodes cmpk Use in lower back every day. DX;chronic lower back pain 1 Each 0    aspirin 81 mg chewable tablet Take 1 Tab by mouth daily. 90 Tab 4    cholecalciferol (VITAMIN D3) 1,000 unit cap Take 2,000 Units by mouth daily.        No Known Allergies  Past Medical History:   Diagnosis Date    Arthritis     Chronic pain     Depression     Diabetes [facility]     Hypercholesterolemia     Hypertension     Stroke [facility])     TIA 10 yrs back    Stroke [facility])     2003     Past Surgical History:   Procedure Laterality Date    HX CARPAL TUNNEL RELEASE  1990    HX CATARACT REMOVAL      bilateral    HX HYSTERECTOMY  1986    HX HYSTERECTOMY      HX ORTHOPAEDIC      HX ORTHOPAEDIC      carpel tunnel left    HX ORTHOPAEDIC      left foot heel spur    HX REFRACTIVE SURGERY  2006     Family History   Adopted: Yes     Social History     Tobacco Use    Smoking status: Never Smoker    Smokeless tobacco: Never Used   Substance Use Topics    Alcohol use: No       Review of Systems   Constitutional: Negative. HENT: Negative. Respiratory: Positive for cough. Cardiovascular: Negative. Gastrointestinal: Positive for diarrhea. Musculoskeletal: Negative. Neurological: Negative. Psychiatric/Behavioral: Negative.         Objective:     Patient-Reported Vitals 8/17/2021   Patient-Reported Weight 182 lbs   Patient-Reported Height -   Patient-Reported Pulse 80   Patient-Reported Temperature 98.0   Patient-Reported SpO2 92   Patient-Reported Systolic  975   Patient-Reported Diastolic 57   Patient-Reported LMP Hysterectomy        [INSTRUCTIONS:  \"[x]\" Indicates a positive item  \"[]\" Indicates a negative item  -- DELETE ALL ITEMS NOT EXAMINED]    Constitutional: [x] Appears well-developed and well-nourished [x] No apparent distress      [] Abnormal -     Mental status: [x] Alert and awake  [x] Oriented to person/place/time [x] Able to follow commands    [] Abnormal -     Eyes:   EOM    [x]  Normal    [] Abnormal -   Sclera  [x]  Normal    [] Abnormal -          Discharge [x]  None visible   [] Abnormal -     HENT: [x] Normocephalic, atraumatic  [] Abnormal -     Pulmonary/Chest: [x] Respiratory effort normal   [x] No visualized signs of difficulty breathing or respiratory distress        [] Abnormal -        Neurological:        [x] No Facial Asymmetry (Cranial nerve 7 motor function) (limited exam due to video visit)          [x] No gaze palsy        [] Abnormal -          Skin:        [x] No significant exanthematous lesions or discoloration noted on facial skin         [] Abnormal -            Psychiatric:       [x] Normal Affect [] Abnormal -        [x] No Hallucinations    Other pertinent observable physical exam findings:-        We discussed the expected course, resolution and complications of the diagnosis(es) in detail. Medication risks, benefits, costs, interactions, and alternatives were discussed as indicated. I advised her to contact the office if her condition worsens, changes or fails to improve as anticipated. She expressed understanding with the diagnosis(es) and plan. [address] [place] [address], was evaluated through a synchronous (real-time) audio-video encounter. The patient (or guardian if applicable) is aware that this is a billable service, which includes applicable co-pays. Verbal consent to proceed has been obtained. The visit was conducted pursuant to the emergency declaration under the [facility] [address] authority and the [employer] Act. Patient identification was verified, and a caregiver was present when appropriate. The patient was located at home in a state where the provider was licensed to provide care.       Jd Lovell

## 2022-02-14 NOTE — PROGRESS NOTES
Health Maintenance Due   Topic Date Due    Shingrix Vaccine Age 49> (1 of 2) Never done    COVID-19 Vaccine (2 - Booster for momondo series) 05/29/2021    MICROALBUMIN Q1  06/11/2021    Flu Vaccine (1) 09/01/2021    Medicare Yearly Exam  11/07/2021    Foot Exam Q1  12/14/2021    Lipid Screen  12/14/2021       Chief Complaint   Patient presents with    Positive For Covid-19     01/21/22       1. Have you been to the ER, urgent care clinic since your last visit? Hospitalized since your last visit? No    2. Have you seen or consulted any other health care providers outside of the [address] [place] [address] since your last visit? Include any pap smears or colon screening. No    3) Do you have an Advance Directive on file? no    4) Are you interested in receiving information on Advance Directives? NO      Patient is accompanied by daughter I have received verbal consent from [address] [place] [address] to discuss any/all medical information while they are present in the room. Start Flarex twice a day for 2 weeks then stop. Start Black [employer] Ann twice a day in both eyes.

## 2022-02-20 DIAGNOSIS — I10 ESSENTIAL HYPERTENSION: ICD-10-CM

## 2022-02-20 RX ORDER — AMLODIPINE BESYLATE 5 MG/1
TABLET ORAL
Qty: 90 TABLET | Refills: 0 | Status: SHIPPED | OUTPATIENT
Start: 2022-02-20 | End: 2022-03-07

## 2022-03-18 PROBLEM — R41.82 ALTERED MENTAL STATUS: Status: ACTIVE | Noted: 2018-06-04

## 2022-03-18 PROBLEM — R42 DIZZINESS: Status: ACTIVE | Noted: 2021-08-14

## 2022-03-18 PROBLEM — F32.A MILD DEPRESSION: Status: ACTIVE | Noted: 2018-10-29

## 2022-03-19 DIAGNOSIS — M47.22 OSTEOARTHRITIS OF SPINE WITH RADICULOPATHY, CERVICAL REGION: ICD-10-CM

## 2022-03-19 DIAGNOSIS — I10 ESSENTIAL HYPERTENSION: ICD-10-CM

## 2022-03-19 PROBLEM — R41.0 ACUTE DELIRIUM: Status: ACTIVE | Noted: 2018-06-09

## 2022-03-19 PROBLEM — Z86.73 H/O: STROKE: Status: ACTIVE | Noted: 2017-11-09

## 2022-03-20 PROBLEM — E11.21 TYPE 2 DIABETES WITH NEPHROPATHY (HCC): Status: ACTIVE | Noted: 2018-03-09

## 2022-03-21 DIAGNOSIS — F32.89 OTHER DEPRESSION: ICD-10-CM

## 2022-03-21 RX ORDER — BACLOFEN 10 MG/1
TABLET ORAL
Qty: 30 TABLET | Refills: 0 | Status: SHIPPED | OUTPATIENT
Start: 2022-03-21 | End: 2022-04-18

## 2022-03-21 RX ORDER — LOSARTAN POTASSIUM 50 MG/1
TABLET ORAL
Qty: 90 TABLET | Refills: 0 | Status: SHIPPED | OUTPATIENT
Start: 2022-03-21 | End: 2022-04-12

## 2022-03-21 RX ORDER — PAROXETINE HYDROCHLORIDE 20 MG/1
20 TABLET, FILM COATED ORAL
Qty: 90 TABLET | Refills: 0 | Status: SHIPPED | OUTPATIENT
Start: 2022-03-21 | End: 2022-05-23

## 2022-03-22 ENCOUNTER — APPOINTMENT (OUTPATIENT)
Dept: PHYSICAL THERAPY | Age: 87
End: 2022-03-22

## 2022-03-26 DIAGNOSIS — M79.89 BILATERAL SWELLING OF FEET: ICD-10-CM

## 2022-03-28 ENCOUNTER — TELEPHONE (OUTPATIENT)
Dept: INTERNAL MEDICINE CLINIC | Age: 87
End: 2022-03-28

## 2022-03-28 RX ORDER — FUROSEMIDE 20 MG/1
TABLET ORAL
Qty: 50 TABLET | Refills: 0 | Status: SHIPPED | OUTPATIENT
Start: 2022-03-28 | End: 2022-04-12 | Stop reason: DRUGHIGH

## 2022-03-28 NOTE — TELEPHONE ENCOUNTER
----- Message from Lukasz Woodruff sent at 3/28/2022  1:03 PM EDT -----  Subject: Message to Provider    QUESTIONS  Information for Provider? Wilfredoe Severe called in expressing patient is out of   her fluid pills and needs a refill today. I tried transferring but patient   disconnected due to having to go back to work. Please follow up.  ---------------------------------------------------------------------------  --------------  CALL BACK INFO  What is the best way for the office to contact you? OK to leave message on   voicemail  Preferred Call Back Phone Number?  [phone]  ---------------------------------------------------------------------------  --------------  SCRIPT ANSWERS  undefined

## 2022-04-01 NOTE — ROUTINE PROCESS
TRANSFER - OUT REPORT:    Verbal report given to [facility], RN(name) on [address] [place]  being transferred to [facility](unit) for routine progression of care       Report consisted of patients Situation, Background, Assessment and   Recommendations(SBAR). Information from the following report(s) SBAR, ED Summary, MAR, Recent Results and Cardiac Rhythm SR was reviewed with the receiving nurse. Lines:   Peripheral IV 06/04/18 Right Forearm (Active)   Site Assessment Clean, dry, & intact 6/4/2018 11:40 AM   Phlebitis Assessment 0 6/4/2018 11:40 AM   Infiltration Assessment 0 6/4/2018 11:40 AM   Dressing Status Clean, dry, & intact 6/4/2018 11:40 AM   Dressing Type Transparent 6/4/2018 11:40 AM   Hub Color/Line Status Flushed 6/4/2018 11:40 AM   Action Taken Blood drawn 6/4/2018 11:40 AM        Opportunity for questions and clarification was provided.       Jamison Choi RN normal (ped)...

## 2022-04-11 DIAGNOSIS — B37.2 SKIN YEAST INFECTION: ICD-10-CM

## 2022-04-12 ENCOUNTER — VIRTUAL VISIT (OUTPATIENT)
Dept: INTERNAL MEDICINE CLINIC | Age: 87
End: 2022-04-12
Payer: MEDICARE

## 2022-04-12 ENCOUNTER — APPOINTMENT (OUTPATIENT)
Dept: PHYSICAL THERAPY | Age: 87
End: 2022-04-12

## 2022-04-12 DIAGNOSIS — E11.21 TYPE 2 DIABETES WITH NEPHROPATHY (HCC): ICD-10-CM

## 2022-04-12 DIAGNOSIS — N18.9 CHRONIC KIDNEY DISEASE, UNSPECIFIED CKD STAGE: ICD-10-CM

## 2022-04-12 DIAGNOSIS — B37.2 SKIN YEAST INFECTION: ICD-10-CM

## 2022-04-12 DIAGNOSIS — R60.0 EDEMA OF BOTH LEGS: Primary | ICD-10-CM

## 2022-04-12 DIAGNOSIS — I10 ESSENTIAL HYPERTENSION: ICD-10-CM

## 2022-04-12 PROCEDURE — 1101F PT FALLS ASSESS-DOCD LE1/YR: CPT | Performed by: NURSE PRACTITIONER

## 2022-04-12 PROCEDURE — 99214 OFFICE O/P EST MOD 30 MIN: CPT | Performed by: NURSE PRACTITIONER

## 2022-04-12 PROCEDURE — G8419 CALC BMI OUT NRM PARAM NOF/U: HCPCS | Performed by: NURSE PRACTITIONER

## 2022-04-12 PROCEDURE — G8427 DOCREV CUR MEDS BY ELIG CLIN: HCPCS | Performed by: NURSE PRACTITIONER

## 2022-04-12 PROCEDURE — G8399 PT W/DXA RESULTS DOCUMENT: HCPCS | Performed by: NURSE PRACTITIONER

## 2022-04-12 PROCEDURE — G9717 DOC PT DX DEP/BP F/U NT REQ: HCPCS | Performed by: NURSE PRACTITIONER

## 2022-04-12 PROCEDURE — G8536 NO DOC ELDER MAL SCRN: HCPCS | Performed by: NURSE PRACTITIONER

## 2022-04-12 PROCEDURE — 1090F PRES/ABSN URINE INCON ASSESS: CPT | Performed by: NURSE PRACTITIONER

## 2022-04-12 PROCEDURE — G8756 NO BP MEASURE DOC: HCPCS | Performed by: NURSE PRACTITIONER

## 2022-04-12 RX ORDER — NYSTATIN 100000 [USP'U]/G
POWDER TOPICAL 4 TIMES DAILY
Qty: 1 EACH | Refills: 1 | Status: SHIPPED | OUTPATIENT
Start: 2022-04-12 | End: 2022-04-12 | Stop reason: SDUPTHER

## 2022-04-12 RX ORDER — NYSTATIN 100000 [USP'U]/G
POWDER TOPICAL 4 TIMES DAILY
Qty: 1 EACH | Refills: 1 | Status: SHIPPED | OUTPATIENT
Start: 2022-04-12 | End: 2022-10-15

## 2022-04-12 RX ORDER — FUROSEMIDE 20 MG/1
20 TABLET ORAL DAILY
Qty: 90 TABLET | Refills: 0 | Status: SHIPPED | OUTPATIENT
Start: 2022-04-12 | End: 2022-08-22 | Stop reason: SDUPTHER

## 2022-04-12 RX ORDER — METHYLPREDNISOLONE 4 MG/1
TABLET ORAL
COMMUNITY
Start: 2022-04-09 | End: 2022-04-21

## 2022-04-12 NOTE — PROGRESS NOTES
Thaddeus Rivera is a 82 y.o. female who was seen by synchronous (real-time) audio-video technology on 4/12/2022 for ED Follow-up (swelling to legs ), Hypertension, Diabetes, and Cholesterol Problem        Assessment & Plan:   Diagnoses and all orders for this visit:    1. Edema of both legs    2. Skin yeast infection    3. Chronic kidney disease, unspecified CKD stage    4. Essential hypertension    5. Type 2 diabetes with nephropathy [facility])    May continue, as recommended by ER provider,  -     furosemide (LASIX) 20 mg tablet; Take 1 Tablet by mouth daily. Recommend patient have repeat BMP in 2 weeks to re-check kidney function/ electrolytes on daily Lasix. Will order   METABOLIC PANEL, BASIC       Will refill, per request:   nystatin (MYCOSTATIN) powder     Sig: Apply  to affected area four (4) times daily. Dispense:  1 Each     Refill:  1       Patient encouraged to call or return to office if symptoms do not improve or worsen. If experiencing severe shortness of breath or chest pain, present to the ER. Reviewed medications and side effects in detail. Reviewed plan of care with patient who acknowledges understanding and agrees. Follow-up with PCP in 2 days, as scheduled, or sooner as needed. Subjective: This patient of Sara Prieto NP presents today for ER follow-up. The patient presented to the ER 04/08/22 related to swelling on the left side of her body, \"from her face down to her feet. \"  The patient's family state she was diagnosed with angioedema vs dependent edema. She was recommended to discontinue losartan. She was ordered medrol dose pack to assist with swelling of face and was advised to take lasix 20 mg daily, as opposed to previous order for four times weekly. It is reported that patient is taking medications as prescribed and swelling to left side is improved.   Per family, patient has had chronic lower extremity edema, but this does seem to be somewhat improved, as well. Patient's BP this morning was 132/70 on home meter, with pulse 68. Refill of nystatin powder is requested for rash under abdominal fold. Prior to Admission medications    Medication Sig Start Date End Date Taking? Authorizing Provider   nystatin (MYCOSTATIN) powder Apply  to affected area four (4) times daily. 4/12/22  Yes Carlos Alvarez NP   methylPREDNISolone (MEDROL DOSEPACK) 4 mg tablet TAKE BY MOUTH AS DIRECTED ON INSIDE OF PACKAGE 4/9/22  Yes Provider, Historical   donepeziL (ARICEPT) 10 mg tablet Take 1 tablet by mouth nightly 3/28/22  Yes Teo Andrew MD   furosemide (LASIX) 20 mg tablet TAKE 1 TABLET BY MOUTH ON MONDAY,WEDNESDAY,FRIDAY  AND  SATURDAY 3/28/22  Yes Abraham Guerrero MD   losartan (COZAAR) 50 mg tablet Take 1 tablet by mouth once daily 3/21/22  Yes Carlos Alvarez NP   baclofen (LIORESAL) 10 mg tablet TAKE 1 TABLET BY MOUTH TWICE DAILY AS NEEDED FOR MUSCLE SPASM 3/21/22  Yes Carlos Alvarez NP   PARoxetine (PAXIL) 20 mg tablet Take 1 Tablet by mouth nightly. 3/21/22  Yes Carlos Alvarez NP   simvastatin (ZOCOR) 40 mg tablet TAKE 1 TABLET BY MOUTH AT BEDTIME 1/25/22  Yes Carlos Alvarez NP   ferrous sulfate (IRON) 325 mg (65 mg iron) EC tablet TAKE 1 TABLET BY MOUTH ONCE DAILY BEFORE BREAKFAST 10/18/21  Yes Jamshid Jimenez NP   meloxicam (MOBIC) 15 mg tablet TAKE 1 TABLET BY MOUTH  DAILY 10/18/21  Yes Abraham Guerrero MD   metoprolol tartrate (LOPRESSOR) 100 mg IR tablet TAKE 1 TABLET BY MOUTH  TWICE DAILY 10/18/21  Yes Jamshid Jimenez NP   budesonide (ENTOCORT EC) 3 mg capsule Take 3 mg by mouth three (3) times daily. Yes Provider, Historical   traMADoL (ULTRAM) 50 mg tablet Take 50 mg by mouth every six (6) hours as needed for Pain.    Yes Provider, Historical   cyanocobalamin (VITAMIN B12) 1,000 mcg/mL injection Give 1ml IM daily X 7 days then 1ml IM weekly X 4 weeks then 1ml IM monthly 8/15/21  Yes Rai Sandoval MD   cholestyramine Iliana Simple) 4 gram packet Take 1 Packet by mouth daily (with dinner). 11/6/20  Yes Jessica Ferreira MD   acetaminophen (TYLENOL) 650 mg TbER Take 650 mg by mouth two (2) times daily as needed for Pain. Yes Provider, Historical   famotidine (PEPCID) 20 mg tablet Take 1 Tab by mouth two (2) times a day. 12/4/19  Yes Jessica Ferreira MD   VITAMIN D2 50,000 unit capsule TAKE 1 CAPSULE BY MOUTH ONCE A WEEK 11/1/19  Yes Provider, Historical   menthol (BIOFREEZE, MENTHOL,) 4 % gel Apply top BID 7/11/19  Yes Jessica Ferreira MD   TENS unit and electrodes cmpk Use in lower back every day. DX;chronic lower back pain 3/7/19  Yes Jessica Ferreira MD   aspirin 81 mg chewable tablet Take 1 Tab by mouth daily. 8/29/18  Yes Jessica Ferreira MD   cholecalciferol (VITAMIN D3) 1,000 unit cap Take 2,000 Units by mouth daily. 11/28/16  Yes Jessica Ferreira MD   amLODIPine (NORVASC) 5 mg tablet TAKE 1 TABLET BY MOUTH ONCE DAILY (DISCONTINUE  2.5  MG)  Patient not taking: Reported on 4/12/2022 3/7/22   Jeff Faye, ESTEBAN   Janumet XR 50-1,000 mg TM24 TAKE 1 TABLET BY MOUTH  TWICE DAILY  Patient not taking: Reported on 4/12/2022 7/19/21   Jessica Ferreira MD     No Known Allergies  Past Medical History:   Diagnosis Date    Arthritis     Chronic pain     Depression     [facility]     Hypercholesterolemia     Hypertension     Stroke ([facility]     TIA 10 yrs back    Stroke ([facility]     2003     Past Surgical History:   Procedure Laterality Date    HX CARPAL TUNNEL RELEASE  1990    HX CATARACT REMOVAL      bilateral    HX HYSTERECTOMY  1986    HX HYSTERECTOMY      HX ORTHOPAEDIC      HX ORTHOPAEDIC      carpel tunnel left    HX ORTHOPAEDIC      left foot heel spur    HX REFRACTIVE SURGERY  2006       Review of Systems   Constitutional: Negative for chills and fever. HENT: Negative. Respiratory: Negative. Cardiovascular: Positive for leg swelling. Negative for chest pain and palpitations. Gastrointestinal: Negative. Genitourinary: Negative. Musculoskeletal: Negative. Skin: Positive for rash. Neurological: Negative. Endo/Heme/Allergies: Negative. Psychiatric/Behavioral: Negative.         Objective:     Patient-Reported Vitals 8/17/2021   Patient-Reported Weight 182 lbs   Patient-Reported Height -   Patient-Reported Pulse 80   Patient-Reported Temperature 98.0   Patient-Reported SpO2 92   Patient-Reported Systolic  500   Patient-Reported Diastolic 57   Patient-Reported LMP Hysterectomy        [INSTRUCTIONS:  \"[x]\" Indicates a positive item  \"[]\" Indicates a negative item  -- DELETE ALL ITEMS NOT EXAMINED]    Constitutional: [x] Appears well-developed and well-nourished [x] No apparent distress      [] Abnormal -     Mental status: [x] Alert and awake  [x] Oriented to person/place/time [x] Able to follow commands    [] Abnormal -     Eyes:   EOM    [x]  Normal    [] Abnormal -   Sclera  [x]  Normal    [] Abnormal -          Discharge [x]  None visible   [] Abnormal -     HENT: [x] Normocephalic, atraumatic  [] Abnormal -   [x] Mouth/Throat: Mucous membranes are moist    Neck: [x] No visualized mass [] Abnormal -     Pulmonary/Chest: [x] Respiratory effort normal   [x] No visualized signs of difficulty breathing or respiratory distress        [] Abnormal -      Musculoskeletal:           [x] Normal range of motion of neck        [x] Abnormal - edema noted to bilateral lower extremities, with compression stockings in place    Neurological:        [x] No Facial Asymmetry (Cranial nerve 7 motor function) (limited exam due to video visit)          [x] No gaze palsy        [] Abnormal -          Skin:        [x] No significant exanthematous lesions or discoloration noted on facial skin         [] Abnormal -            Psychiatric:       [x] Normal Affect [] Abnormal -        [x] No Hallucinations    Other pertinent observable physical exam findings:-        We discussed the expected course, resolution and complications of the diagnosis(es) in detail. Medication risks, benefits, costs, interactions, and alternatives were discussed as indicated. I advised her to contact the office if her condition worsens, changes or fails to improve as anticipated. She expressed understanding with the diagnosis(es) and plan. [address] [place] [address], was evaluated through a synchronous (real-time) audio-video encounter. The patient (or guardian if applicable) is aware that this is a billable service, which includes applicable co-pays. Verbal consent to proceed has been obtained. The visit was conducted pursuant to the emergency declaration under the [address] [place] [address], [address] [place] [address] [facility] authority and the [employer] General Act. Patient identification was verified, and a caregiver was present when appropriate. The patient was located at home in a state where the provider was licensed to provide care.       Meg Epps NP

## 2022-04-12 NOTE — PROGRESS NOTES
ADVISED PATIENT OF THE FOLLOWING HEALTH MAINTAINCE DUE  Health Maintenance Due   Topic Date Due    Shingrix Vaccine Age 49> (1 of 2) Never done    COVID-19 Vaccine (2 - Booster for [employer] series) 05/29/2021    MICROALBUMIN Q1  06/11/2021    Foot Exam Q1  12/14/2021    Lipid Screen  12/14/2021    A1C test (Diabetic or Prediabetic)  02/14/2022      Chief Complaint   Patient presents with    ED Follow-up     swelling to legs     Hypertension    Diabetes    Cholesterol Problem       1. Have you been to the ER, urgent care clinic since your last visit? Hospitalized since your last visit? No    2. Have you seen or consulted any other health care providers outside of the [address] [place] [address] since your last visit? Include any DEXA scan, mammography  or colon screening. No    3. Do you have an Advance Directive on file? no    4. Do you have a DNR on file? no    Patient is accompanied by self I have received verbal consent from [address] [place] [address] to discuss any/all medical information while they are present in the room.       Advance Care Planning 8/15/2021   Patient's Healthcare Decision Maker is: Legal Next of Kin   Primary Decision Maker Name -   Primary Decision Maker Phone Number -   Primary Decision Maker Relationship to Patient -   Confirm Advance Directive None   Patient Would Like to Complete Advance Directive No   Does the patient have other document types -         [facility], [address]  [address] [place] [address]  Phone: [phone] Fax: [address]sey [phone]  [address]  [facility] [address]  Phone: [phone] Fax: [phone]

## 2022-04-14 ENCOUNTER — VIRTUAL VISIT (OUTPATIENT)
Dept: INTERNAL MEDICINE CLINIC | Age: 87
End: 2022-04-14
Payer: MEDICARE

## 2022-04-14 DIAGNOSIS — I10 ESSENTIAL HYPERTENSION: Primary | ICD-10-CM

## 2022-04-14 DIAGNOSIS — M79.89 BILATERAL SWELLING OF FEET: ICD-10-CM

## 2022-04-14 DIAGNOSIS — E78.2 MIXED HYPERLIPIDEMIA: ICD-10-CM

## 2022-04-14 DIAGNOSIS — E11.21 TYPE 2 DIABETES WITH NEPHROPATHY (HCC): ICD-10-CM

## 2022-04-14 PROCEDURE — 99442 PR PHYS/QHP TELEPHONE EVALUATION 11-20 MIN: CPT | Performed by: INTERNAL MEDICINE

## 2022-04-14 NOTE — PROGRESS NOTES
Health Maintenance Due   Topic Date Due    Shingrix Vaccine Age 49> (1 of 2) Never done    COVID-19 Vaccine (2 - Booster for [employer] series) 05/29/2021    MICROALBUMIN Q1  06/11/2021    Foot Exam Q1  12/14/2021    Lipid Screen  12/14/2021    A1C test (Diabetic or Prediabetic)  02/14/2022       No chief complaint on file. 1. Have you been to the ER, urgent care clinic since your last visit? Hospitalized since your last visit? April 8TH, SYMONE PATEL, LEFT SIDE SWELLING     2. Have you seen or consulted any other health care providers outside of the [address] [place] [address] since your last visit? Include any pap smears or colon screening. No    3) Do you have an Advance Directive on file? no    4) Are you interested in receiving information on Advance Directives? NO      Patient is accompanied by self I have received verbal consent from [address] [place] [address] to discuss any/all medical information while they are present in the room.

## 2022-04-14 NOTE — PROGRESS NOTES
Anika Boudreaux is a 82 y.o. female, evaluated via audio-only technology on 4/14/2022 for Diabetes, Cholesterol Problem, and Other (f/u)  . Assessment & Plan:   Diagnoses and all orders for this visit:    1. Essential hypertension       -    Continue to monitor BP at home     2. Type 2 diabetes with nephropathy (HCC)  -     MICROALBUMIN, UR, RAND W/ MICROALB/CREAT RATIO; Future  -     HEMOGLOBIN A1C WITH EAG; Future    3. Bilateral swelling of feet  -     METABOLIC PANEL, COMPREHENSIVE; Future  -     CBC WITH AUTOMATED DIFF; Future    4. Mixed hyperlipidemia  -     LIPID PANEL; Future        Follow-up and Dispositions    · Return in about 4 months (around 8/14/2022), or if symptoms worsen or fail to improve. Subjective:     Patient presents for a follow up. Her grandson is with her today. She states she continues to have swelling on her entire L side, the grandson states that the swelling on her face is getting better but it is the same in her lower extremities. /69 ; denies chest pain, SOB. She had a fall two weeks ago, denies trauma. She is using a walker to get around. Patient and grandson have no concerns today. Asked patient to get some lab work done. Is on diuretic after ER visit for edema v angioedema. Completed her steroid pack. Prior to Admission medications    Medication Sig Start Date End Date Taking? Authorizing Provider   methylPREDNISolone (MEDROL DOSEPACK) 4 mg tablet TAKE BY MOUTH AS DIRECTED ON INSIDE OF PACKAGE 4/9/22  Yes Provider, Historical   furosemide (LASIX) 20 mg tablet Take 1 Tablet by mouth daily. 4/12/22  Yes Jack Dias NP   nystatin (MYCOSTATIN) powder Apply  to affected area four (4) times daily.  4/12/22  Yes Jack Dias NP   donepeziL (ARICEPT) 10 mg tablet Take 1 tablet by mouth nightly 3/28/22  Yes Cleopatra Flores MD   baclofen (LIORESAL) 10 mg tablet TAKE 1 TABLET BY MOUTH TWICE DAILY AS NEEDED FOR MUSCLE SPASM 3/21/22  Yes Kim Alvarez NP   PARoxetine (PAXIL) 20 mg tablet Take 1 Tablet by mouth nightly. 3/21/22  Yes Sherley Alvarez NP   amLODIPine (NORVASC) 5 mg tablet TAKE 1 TABLET BY MOUTH ONCE DAILY (DISCONTINUE  2.5  MG) 3/7/22  Yes Joi Pearson NP   simvastatin (ZOCOR) 40 mg tablet TAKE 1 TABLET BY MOUTH AT BEDTIME 1/25/22  Yes Sherley Alvarez NP   ferrous sulfate (IRON) 325 mg (65 mg iron) EC tablet TAKE 1 TABLET BY MOUTH ONCE DAILY BEFORE BREAKFAST 10/18/21  Yes Joi Pearson NP   meloxicam (MOBIC) 15 mg tablet TAKE 1 TABLET BY MOUTH  DAILY 10/18/21  Yes Bryan Pace MD   metoprolol tartrate (LOPRESSOR) 100 mg IR tablet TAKE 1 TABLET BY MOUTH  TWICE DAILY 10/18/21  Yes Joi Pearson NP   budesonide (ENTOCORT EC) 3 mg capsule Take 3 mg by mouth three (3) times daily. Yes Provider, Historical   traMADoL (ULTRAM) 50 mg tablet Take 50 mg by mouth every six (6) hours as needed for Pain. Yes Provider, Historical   cyanocobalamin (VITAMIN B12) 1,000 mcg/mL injection Give 1ml IM daily X 7 days then 1ml IM weekly X 4 weeks then 1ml IM monthly 8/15/21  Yes Tavares Anaya MD   Janumet XR 50-1,000 mg TM24 TAKE 1 TABLET BY MOUTH  TWICE DAILY 7/19/21  Yes Bryan Pace MD   cholestyramine Kaitlin Nelly) 4 gram packet Take 1 Packet by mouth daily (with dinner). 11/6/20  Yes Bryan Pace MD   acetaminophen (TYLENOL) 650 mg TbER Take 650 mg by mouth two (2) times daily as needed for Pain. Yes Provider, Historical   famotidine (PEPCID) 20 mg tablet Take 1 Tab by mouth two (2) times a day. 12/4/19  Yes Bryan Pace MD   VITAMIN D2 50,000 unit capsule TAKE 1 CAPSULE BY MOUTH ONCE A WEEK 11/1/19  Yes Provider, Historical   menthol (BIOFREEZE, MENTHOL,) 4 % gel Apply top BID 7/11/19  Yes Bryan Pace MD   TENS unit and electrodes cmpk Use in lower back every day. DX;chronic lower back pain 3/7/19  Yes Bryan Pace MD   aspirin 81 mg chewable tablet Take 1 Tab by mouth daily.  8/29/18  Yes Bryan Pace MD cholecalciferol (VITAMIN D3) 1,000 unit cap Take 2,000 Units by mouth daily. 11/28/16  Yes Latha Carter MD     Patient Active Problem List   Diagnosis Code    DM (diabetes mellitus) ([facility] E11.9    Mixed hyperlipidemia E78.2    Chronic venous insufficiency I87.2    Depression F32. A    Stomach ulcer K25.9    Diverticula of colon K57.30    Advance care planning Z71.89    Essential hypertension I10    H/O: stroke Z80.78    Type 2 diabetes with nephropathy (HCC) E11.21    Altered mental status R41.82    Acute delirium R41.0    Mild depression F32. A    Dizziness R42     Patient Active Problem List    Diagnosis Date Noted    Dizziness 08/14/2021    Mild depression 10/29/2018    Acute delirium 06/09/2018    Altered mental status 06/04/2018    Type 2 diabetes with nephropathy ([facility] 03/09/2018    H/O: stroke 11/09/2017    Advance care planning 01/21/2016    Essential hypertension 01/21/2016    Stomach ulcer 03/12/2015    Diverticula of colon 03/12/2015    DM (diabetes mellitus) ([facility] 05/01/2014    Mixed hyperlipidemia 05/01/2014    Chronic venous insufficiency 05/01/2014    Depression 05/01/2014     Current Outpatient Medications   Medication Sig Dispense Refill    methylPREDNISolone (MEDROL DOSEPACK) 4 mg tablet TAKE BY MOUTH AS DIRECTED ON INSIDE OF PACKAGE      furosemide (LASIX) 20 mg tablet Take 1 Tablet by mouth daily. 90 Tablet 0    nystatin (MYCOSTATIN) powder Apply  to affected area four (4) times daily. 1 Each 1    donepeziL (ARICEPT) 10 mg tablet Take 1 tablet by mouth nightly 90 Tablet 1    baclofen (LIORESAL) 10 mg tablet TAKE 1 TABLET BY MOUTH TWICE DAILY AS NEEDED FOR MUSCLE SPASM 30 Tablet 0    PARoxetine (PAXIL) 20 mg tablet Take 1 Tablet by mouth nightly.  90 Tablet 0    amLODIPine (NORVASC) 5 mg tablet TAKE 1 TABLET BY MOUTH ONCE DAILY (DISCONTINUE  2.5  MG) 90 Tablet 1    simvastatin (ZOCOR) 40 mg tablet TAKE 1 TABLET BY MOUTH AT BEDTIME 90 Tablet 0    ferrous sulfate (IRON) 325 mg (65 mg iron) EC tablet TAKE 1 TABLET BY MOUTH ONCE DAILY BEFORE BREAKFAST 90 Tablet 1    meloxicam (MOBIC) 15 mg tablet TAKE 1 TABLET BY MOUTH  DAILY 90 Tablet 0    metoprolol tartrate (LOPRESSOR) 100 mg IR tablet TAKE 1 TABLET BY MOUTH  TWICE DAILY 180 Tablet 1    budesonide (ENTOCORT EC) 3 mg capsule Take 3 mg by mouth three (3) times daily.  traMADoL (ULTRAM) 50 mg tablet Take 50 mg by mouth every six (6) hours as needed for Pain.  cyanocobalamin (VITAMIN B12) 1,000 mcg/mL injection Give 1ml IM daily X 7 days then 1ml IM weekly X 4 weeks then 1ml IM monthly 30 mL 1    Janumet XR 50-1,000 mg TM24 TAKE 1 TABLET BY MOUTH  TWICE DAILY 180 Tablet 3    cholestyramine (QUESTRAN) 4 gram packet Take 1 Packet by mouth daily (with dinner). 30 Packet 0    acetaminophen (TYLENOL) 650 mg TbER Take 650 mg by mouth two (2) times daily as needed for Pain.  famotidine (PEPCID) 20 mg tablet Take 1 Tab by mouth two (2) times a day. 60 Tab 5    VITAMIN D2 50,000 unit capsule TAKE 1 CAPSULE BY MOUTH ONCE A WEEK  2    menthol (BIOFREEZE, MENTHOL,) 4 % gel Apply top BID 1 Tube 2    TENS unit and electrodes cmpk Use in lower back every day. DX;chronic lower back pain 1 Each 0    aspirin 81 mg chewable tablet Take 1 Tab by mouth daily. 90 Tab 4    cholecalciferol (VITAMIN D3) 1,000 unit cap Take 2,000 Units by mouth daily.        No Known Allergies  Past Medical History:   Diagnosis Date    Arthritis     Chronic pain     Depression     Diabetes ([facility]     Hypercholesterolemia     Hypertension     Stroke ([facility]     TIA 10 yrs back    Stroke ([facility]     2003     Past Surgical History:   Procedure Laterality Date    HX CARPAL TUNNEL RELEASE  1990    HX CATARACT REMOVAL      bilateral    HX HYSTERECTOMY  1986    HX HYSTERECTOMY      HX ORTHOPAEDIC      HX ORTHOPAEDIC      carpel tunnel left    HX ORTHOPAEDIC      left foot heel spur    HX REFRACTIVE SURGERY  2006     Family History Adopted: Yes     Social History     Tobacco Use    Smoking status: Never Smoker    Smokeless tobacco: Never Used   Substance Use Topics    Alcohol use: No       Review of Systems   Cardiovascular: Positive for leg swelling. Neurological: Positive for weakness. All other systems reviewed and are negative. No data recorded     [address] [place] [address] Avenue was evaluated through a patient-initiated, synchronous (real-time) audio only encounter. She (or guardian if applicable) is aware that it is a billable service, which includes applicable co-pays, with coverage as determined by her insurance carrier. This visit was conducted with the patient's (and/or Amy Lugo guardian's) verbal consent. She has not had a related appointment within my department in the past 7 days or scheduled within the next 24 hours. The patient was located in a state where the provider was licensed to provide care.     Total Time: minutes: 11-20 minutes

## 2022-04-16 DIAGNOSIS — M47.22 OSTEOARTHRITIS OF SPINE WITH RADICULOPATHY, CERVICAL REGION: ICD-10-CM

## 2022-04-18 RX ORDER — BACLOFEN 10 MG/1
TABLET ORAL
Qty: 30 TABLET | Refills: 0 | Status: SHIPPED | OUTPATIENT
Start: 2022-04-18 | End: 2022-05-09

## 2022-04-21 ENCOUNTER — VIRTUAL VISIT (OUTPATIENT)
Dept: NEUROLOGY | Age: 87
End: 2022-04-21
Payer: MEDICARE

## 2022-04-21 DIAGNOSIS — R41.3 SHORT-TERM MEMORY LOSS: ICD-10-CM

## 2022-04-21 DIAGNOSIS — F01.50 MIXED CORTICAL AND SUBCORTICAL VASCULAR DEMENTIA WITHOUT BEHAVIORAL DISTURBANCE (HCC): ICD-10-CM

## 2022-04-21 PROCEDURE — G8756 NO BP MEASURE DOC: HCPCS | Performed by: PSYCHIATRY & NEUROLOGY

## 2022-04-21 PROCEDURE — G9717 DOC PT DX DEP/BP F/U NT REQ: HCPCS | Performed by: PSYCHIATRY & NEUROLOGY

## 2022-04-21 PROCEDURE — G8427 DOCREV CUR MEDS BY ELIG CLIN: HCPCS | Performed by: PSYCHIATRY & NEUROLOGY

## 2022-04-21 PROCEDURE — 1090F PRES/ABSN URINE INCON ASSESS: CPT | Performed by: PSYCHIATRY & NEUROLOGY

## 2022-04-21 PROCEDURE — 1101F PT FALLS ASSESS-DOCD LE1/YR: CPT | Performed by: PSYCHIATRY & NEUROLOGY

## 2022-04-21 PROCEDURE — 99213 OFFICE O/P EST LOW 20 MIN: CPT | Performed by: PSYCHIATRY & NEUROLOGY

## 2022-04-21 PROCEDURE — G8399 PT W/DXA RESULTS DOCUMENT: HCPCS | Performed by: PSYCHIATRY & NEUROLOGY

## 2022-04-21 RX ORDER — DONEPEZIL HYDROCHLORIDE 10 MG/1
10 TABLET, FILM COATED ORAL
Qty: 90 TABLET | Refills: 1
Start: 2022-04-21 | End: 2022-10-17

## 2022-04-21 NOTE — PROGRESS NOTES
Ms. Mitzy Grady is following up dementia, via virtual visit. Her grandson, Breana Munoz, will be assisting with her visit today.

## 2022-04-21 NOTE — PROGRESS NOTES
Chief Complaint   Patient presents with    Dementia   This is a telemedicine visit that was performed with the originating site at [facility] and the distant site at patient's home. Verbal consent to participate in video visit was obtained. The patient was identified by name and date of birth. This visit occurred during the Coronavirus [phone]) [facility]. I discussed with the patient the nature of our telemedicine visits, that:     I would evaluate the patient and recommend diagnostics and treatments based on my assessment   Our sessions are not being recorded and that personal health information is protected   Our team would provide follow up care in person if/when the patient needs it    [address] was evaluated over video call today. Her grandson helped with the interview. Overall there is no change. There is has been no decline in her cognition or functional abilities. Remains independent with the basic activities of daily living. Lives with her daughter and grandson and they supervise her. She needs assistance with meals and medications. She still has tremors in her hands. Do not appear to be very bothersome  Still doing vitamin B12 injections. Her last B12 levels were therapeutic      RECAP  She came along with her daughter. For the past couple years she has been having difficulties with short-term memory. She will often repeat herself, forgets conversations or forgets what she needs to do. Mostly stays at home very sedentary lifestyle. Needs supervision with most activities of daily living. She is able to feed herself, can take a shower herself and will sometimes walk outside of the house with a walker. Watches television while at home and sometimes she will do crossword puzzles, plays board games. No changes in vision, speech, swallowing ability. Denies any tremor, behavioral issues or hallucinations.   Sleeps well at night. Sometimes she will wake up to go to the bathroom or other times because she just cannot stay asleep. No wandering episodes. She does not drive or handle money. She used to work as a  and has been retired for more than 10 years. Current Outpatient Medications   Medication Sig    donepeziL (ARICEPT) 10 mg tablet Take 1 Tablet by mouth nightly.  baclofen (LIORESAL) 10 mg tablet TAKE 1 TABLET BY MOUTH TWICE DAILY AS NEEDED FOR MUSCLE SPASM    furosemide (LASIX) 20 mg tablet Take 1 Tablet by mouth daily.  nystatin (MYCOSTATIN) powder Apply  to affected area four (4) times daily.  PARoxetine (PAXIL) 20 mg tablet Take 1 Tablet by mouth nightly.  amLODIPine (NORVASC) 5 mg tablet TAKE 1 TABLET BY MOUTH ONCE DAILY (DISCONTINUE  2.5  MG)    simvastatin (ZOCOR) 40 mg tablet TAKE 1 TABLET BY MOUTH AT BEDTIME    ferrous sulfate (IRON) 325 mg (65 mg iron) EC tablet TAKE 1 TABLET BY MOUTH ONCE DAILY BEFORE BREAKFAST    meloxicam (MOBIC) 15 mg tablet TAKE 1 TABLET BY MOUTH  DAILY    budesonide (ENTOCORT EC) 3 mg capsule Take 3 mg by mouth three (3) times daily.  traMADoL (ULTRAM) 50 mg tablet Take 50 mg by mouth every six (6) hours as needed for Pain.  cyanocobalamin (VITAMIN B12) 1,000 mcg/mL injection Give 1ml IM daily X 7 days then 1ml IM weekly X 4 weeks then 1ml IM monthly    cholestyramine (QUESTRAN) 4 gram packet Take 1 Packet by mouth daily (with dinner).  acetaminophen (TYLENOL) 650 mg TbER Take 650 mg by mouth two (2) times daily as needed for Pain.  famotidine (PEPCID) 20 mg tablet Take 1 Tab by mouth two (2) times a day.  VITAMIN D2 50,000 unit capsule TAKE 1 CAPSULE BY MOUTH ONCE A WEEK    menthol (BIOFREEZE, MENTHOL,) 4 % gel Apply top BID    aspirin 81 mg chewable tablet Take 1 Tab by mouth daily.  cholecalciferol (VITAMIN D3) 1,000 unit cap Take 2,000 Units by mouth daily. No current facility-administered medications for this visit. PHYSICAL EXAMINATION:    There were no vitals taken for this visit. Due to this being a TeleHealth evaluation, many elements of the physical examination are unable to be assessed. Exam:  NEUROLOGICAL EXAM:  General: Awake, alert, oriented x 3. Her last score on [place] cognitive assessment was 16/30. She had difficulties with visual-spatial/executive function. Delayed recall was 3/5. Had problems with calculations. Significantly impaired word recall. CN: EOMI, facial strength normal and symmetric, hearing is intact  Motor: AG x 4  Reflexes: deferred  Coordination: No ataxia. Sensation: LT intact throughout  Gait: Steady    LABS / IMAGING  Lab Results   Component Value Date/Time    WBC 8.2 08/14/2021 06:49 PM    HGB 9.7 (L) 08/14/2021 06:49 PM    HCT 28.4 (L) 08/14/2021 06:49 PM    PLATELET 655 62/05/2849 06:49 PM    .8 (H) 08/14/2021 06:49 PM     Lab Results   Component Value Date/Time    Sodium 139 08/14/2021 06:49 PM    Potassium 4.1 08/14/2021 06:49 PM    Chloride 106 08/14/2021 06:49 PM    CO2 28 08/14/2021 06:49 PM    Anion gap 5 08/14/2021 06:49 PM    Glucose 126 (H) 08/14/2021 06:49 PM    BUN 13 08/14/2021 06:49 PM    Creatinine 0.84 08/14/2021 06:49 PM    BUN/Creatinine ratio 15 08/14/2021 06:49 PM    GFR est AA >60 08/14/2021 06:49 PM    GFR est non-AA >60 08/14/2021 06:49 PM    Calcium 8.1 (L) 08/14/2021 06:49 PM    Bilirubin, total 0.7 08/14/2021 06:49 PM    Alk.  phosphatase 41 (L) 08/14/2021 06:49 PM    Protein, total 7.4 08/14/2021 06:49 PM    Albumin 3.6 08/14/2021 06:49 PM    Globulin 3.8 08/14/2021 06:49 PM    A-G Ratio 0.9 (L) 08/14/2021 06:49 PM    ALT (SGPT) 20 08/14/2021 06:49 PM    AST (SGOT) 24 08/14/2021 06:49 PM     Lab Results   Component Value Date/Time    TSH 0.472 08/05/2021 09:05 AM     Lab Results   Component Value Date/Time    Vitamin B12 957 09/09/2021 04:12 PM    Folate 20.0 08/14/2021 06:49 PM     MRI Results (most recent):  Results from [facility] encounter on 08/14/21    MRI BRAIN WO CONT    Narrative  EXAM: MRI BRAIN WO CONT    INDICATION: dizziness    COMPARISON: CT 8/14/2021. CONTRAST: None. TECHNIQUE:  Multiplanar multisequence acquisition without contrast of the brain. FINDINGS:  The ventricles are normal in size and position. There is no acute is no  demonstration of acute infarction. There is no intracranial mass demonstrated. There is mild prominence of the ventricles and cortical sulci indicating  atrophy. Abundant nonspecific bilateral cerebral periventricular and centrum  semiovale white matter signal alteration. Though nonspecific, the findings could  be on the basis of chronic small vessel ischemic disease the white matter. The  vascular flow voids at the base the brain are normal in conspicuity. The  unenhanced sella, optic chiasm, orbits and paranasal sinuses appear within  normal limits. Impression  Atrophy and chronic small vessel ischemic disease the white matter. No acute  infarction demonstrated. Lab Results   Component Value Date/Time    Vitamin B12 957 09/09/2021 04:12 PM    Folate 20.0 08/14/2021 06:49 PM     Lab Results   Component Value Date/Time    TSH 0.472 08/05/2021 09:05 AM         ASSESSMENT    ICD-10-CM ICD-9-CM    1. Short-term memory loss  R41.3 780.93 donepeziL (ARICEPT) 10 mg tablet   2. 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 Ramirez Santiago has moderate dementia and it may be due to a combination of mixed Alzheimer's and vascular pathology along with B12 deficiency   Her B12 deficiency has now resolved. May switch to sublingual B12 tablets 1000 mcg daily  Continue donepezil 10 mg daily  Her daughter/grand grandson currently lives with her and she is under supervision 24/7.   She does not drive or handle money/finances  Follow up in 6 months      Tonja Ramos MD  Diplomate, American Board of Psychiatry & Neurology (Neurology)  Karolyn Toro Board of Psychiatry & Neurology (Clinical Neurophysiology)  Diplomate, American Board of Electrodiagnostic Medicine

## 2022-04-30 DIAGNOSIS — E78.2 MIXED HYPERLIPIDEMIA: ICD-10-CM

## 2022-05-01 RX ORDER — SIMVASTATIN 40 MG/1
TABLET, FILM COATED ORAL
Qty: 90 TABLET | Refills: 0 | Status: SHIPPED | OUTPATIENT
Start: 2022-05-01 | End: 2022-06-27 | Stop reason: SDUPTHER

## 2022-05-07 DIAGNOSIS — M47.22 OSTEOARTHRITIS OF SPINE WITH RADICULOPATHY, CERVICAL REGION: ICD-10-CM

## 2022-05-09 ENCOUNTER — APPOINTMENT (OUTPATIENT)
Dept: GENERAL RADIOLOGY | Age: 87
DRG: 193 | End: 2022-05-09
Attending: EMERGENCY MEDICINE
Payer: MEDICARE

## 2022-05-09 ENCOUNTER — HOSPITAL ENCOUNTER (INPATIENT)
Age: 87
LOS: 9 days | Discharge: NURSING, REHAB OR LONG-TERM CARE | DRG: 193 | End: 2022-05-18
Attending: EMERGENCY MEDICINE | Admitting: INTERNAL MEDICINE
Payer: MEDICARE

## 2022-05-09 DIAGNOSIS — J96.01 ACUTE RESPIRATORY FAILURE WITH HYPOXIA (HCC): ICD-10-CM

## 2022-05-09 DIAGNOSIS — J81.0 ACUTE PULMONARY EDEMA (HCC): Primary | ICD-10-CM

## 2022-05-09 LAB
ALBUMIN SERPL-MCNC: 2.9 G/DL (ref 3.5–5)
ALBUMIN/GLOB SERPL: 0.7 {RATIO} (ref 1.1–2.2)
ALP SERPL-CCNC: 76 U/L (ref 45–117)
ALT SERPL-CCNC: 31 U/L (ref 12–78)
ANION GAP SERPL CALC-SCNC: 5 MMOL/L (ref 5–15)
AST SERPL-CCNC: 34 U/L (ref 15–37)
BASOPHILS # BLD: 0 K/UL (ref 0–0.1)
BASOPHILS NFR BLD: 0 % (ref 0–1)
BILIRUB SERPL-MCNC: 0.6 MG/DL (ref 0.2–1)
BNP SERPL-MCNC: 6962 PG/ML
BUN SERPL-MCNC: 14 MG/DL (ref 6–20)
BUN/CREAT SERPL: 13 (ref 12–20)
CALCIUM SERPL-MCNC: 8.8 MG/DL (ref 8.5–10.1)
CHLORIDE SERPL-SCNC: 107 MMOL/L (ref 97–108)
CO2 SERPL-SCNC: 26 MMOL/L (ref 21–32)
COMMENT, HOLDF: NORMAL
CREAT SERPL-MCNC: 1.12 MG/DL (ref 0.55–1.02)
D DIMER PPP FEU-MCNC: 1.59 MG/L FEU (ref 0–0.65)
DIFFERENTIAL METHOD BLD: ABNORMAL
EOSINOPHIL # BLD: 0.2 K/UL (ref 0–0.4)
EOSINOPHIL NFR BLD: 2 % (ref 0–7)
ERYTHROCYTE [DISTWIDTH] IN BLOOD BY AUTOMATED COUNT: 19.9 % (ref 11.5–14.5)
GLOBULIN SER CALC-MCNC: 4.4 G/DL (ref 2–4)
GLUCOSE SERPL-MCNC: 232 MG/DL (ref 65–100)
HCT VFR BLD AUTO: 27.7 % (ref 35–47)
HGB BLD-MCNC: 8.4 G/DL (ref 11.5–16)
IMM GRANULOCYTES # BLD AUTO: 0 K/UL (ref 0–0.04)
IMM GRANULOCYTES NFR BLD AUTO: 0 % (ref 0–0.5)
LYMPHOCYTES # BLD: 1.9 K/UL (ref 0.8–3.5)
LYMPHOCYTES NFR BLD: 19 % (ref 12–49)
MCH RBC QN AUTO: 28.5 PG (ref 26–34)
MCHC RBC AUTO-ENTMCNC: 30.3 G/DL (ref 30–36.5)
MCV RBC AUTO: 93.9 FL (ref 80–99)
MONOCYTES # BLD: 0.9 K/UL (ref 0–1)
MONOCYTES NFR BLD: 9 % (ref 5–13)
NEUTS SEG # BLD: 6.7 K/UL (ref 1.8–8)
NEUTS SEG NFR BLD: 70 % (ref 32–75)
NRBC # BLD: 0 K/UL (ref 0–0.01)
NRBC BLD-RTO: 0 PER 100 WBC
PLATELET # BLD AUTO: 329 K/UL (ref 150–400)
PMV BLD AUTO: 11.5 FL (ref 8.9–12.9)
POTASSIUM SERPL-SCNC: 4 MMOL/L (ref 3.5–5.1)
PROT SERPL-MCNC: 7.3 G/DL (ref 6.4–8.2)
RBC # BLD AUTO: 2.95 M/UL (ref 3.8–5.2)
SAMPLES BEING HELD,HOLD: NORMAL
SODIUM SERPL-SCNC: 138 MMOL/L (ref 136–145)
WBC # BLD AUTO: 9.8 K/UL (ref 3.6–11)

## 2022-05-09 PROCEDURE — 74011250636 HC RX REV CODE- 250/636: Performed by: EMERGENCY MEDICINE

## 2022-05-09 PROCEDURE — 36415 COLL VENOUS BLD VENIPUNCTURE: CPT

## 2022-05-09 PROCEDURE — 96374 THER/PROPH/DIAG INJ IV PUSH: CPT

## 2022-05-09 PROCEDURE — 85379 FIBRIN DEGRADATION QUANT: CPT

## 2022-05-09 PROCEDURE — 80053 COMPREHEN METABOLIC PANEL: CPT

## 2022-05-09 PROCEDURE — 83880 ASSAY OF NATRIURETIC PEPTIDE: CPT

## 2022-05-09 PROCEDURE — 71045 X-RAY EXAM CHEST 1 VIEW: CPT

## 2022-05-09 PROCEDURE — 65270000029 HC RM PRIVATE

## 2022-05-09 PROCEDURE — 93005 ELECTROCARDIOGRAM TRACING: CPT

## 2022-05-09 PROCEDURE — 99285 EMERGENCY DEPT VISIT HI MDM: CPT

## 2022-05-09 PROCEDURE — 85025 COMPLETE CBC W/AUTO DIFF WBC: CPT

## 2022-05-09 RX ORDER — ACETAMINOPHEN 325 MG/1
650 TABLET ORAL
Status: DISCONTINUED | OUTPATIENT
Start: 2022-05-09 | End: 2022-05-10

## 2022-05-09 RX ORDER — IPRATROPIUM BROMIDE AND ALBUTEROL SULFATE 2.5; .5 MG/3ML; MG/3ML
3 SOLUTION RESPIRATORY (INHALATION)
Status: DISCONTINUED | OUTPATIENT
Start: 2022-05-09 | End: 2022-05-18 | Stop reason: HOSPADM

## 2022-05-09 RX ORDER — FUROSEMIDE 10 MG/ML
40 INJECTION INTRAMUSCULAR; INTRAVENOUS
Status: COMPLETED | OUTPATIENT
Start: 2022-05-09 | End: 2022-05-09

## 2022-05-09 RX ORDER — BACLOFEN 10 MG/1
TABLET ORAL
Qty: 30 TABLET | Refills: 0 | Status: SHIPPED | OUTPATIENT
Start: 2022-05-09 | End: 2022-07-03

## 2022-05-09 RX ADMIN — FUROSEMIDE 40 MG: 10 INJECTION, SOLUTION INTRAMUSCULAR; INTRAVENOUS at 21:16

## 2022-05-09 NOTE — ED TRIAGE NOTES
Triage: Pt arrives via EMS from private residence with CC of SOB. Per EMS report when they arrived to the patient;s home she had sats in the 46s. They placed her on 4L NC and her oxygen saturations recovered into the 90s. Pt denies CP. BLE edema noted.

## 2022-05-09 NOTE — ED NOTES
Pt daughter at bedside asking for update, inquiring \"what all have you guys done and if we have checked for uti, stating UTI's have been going around and I'm just concerned and don't think my mother should come home\". Patient denies urinary frequency, burning or any other urine symptoms.

## 2022-05-09 NOTE — ED PROVIDER NOTES
This is an 46-year-old female who does not have any history of heart failure, emphysema or heart disease. She presents for increasing shortness of breath over the last 7 days. She has had no fever or chills and denies any chest pain, cough or congestion. She has had some swelling in both lower extremities. The patient's saturations are at 87% on room air. She denies any other acute symptomatology. She says she does have a history of anemia but is never been given blood and transfusion and has never been told why. She has had slight diarrhea but no blood in her stool. She voices no other acute complaint.          Past Medical History:   Diagnosis Date    Arthritis     Chronic pain     Depression     Diabetes ([facility]     Hypercholesterolemia     Hypertension     Stroke ([facility]     TIA 10 yrs back    Stroke ([facility]     2003       Past Surgical History:   Procedure Laterality Date    HX CARPAL TUNNEL RELEASE  1990    HX CATARACT REMOVAL      bilateral    HX HYSTERECTOMY  1986    HX HYSTERECTOMY      HX ORTHOPAEDIC      HX ORTHOPAEDIC      carpel tunnel left    HX ORTHOPAEDIC      left foot heel spur    HX REFRACTIVE SURGERY  2006         Family History:   Adopted: Yes       Social History     Socioeconomic History    Marital status:      Spouse name: Not on file    Number of children: Not on file    Years of education: Not on file    Highest education level: Not on file   Occupational History    Not on file   Tobacco Use    Smoking status: Never Smoker    Smokeless tobacco: Never Used   Substance and Sexual Activity    Alcohol use: No    Drug use: No    Sexual activity: Never     Birth control/protection: None     Comment: retired,relocated from [address] with daughter   Other Topics Concern    Not on file   Social History Narrative    ** Merged History Encounter **          Social Determinants of Health     Financial Resource Strain:     Difficulty of Paying Living Expenses: Not on file   Food Insecurity:     Worried About Running Out of Food in the Last Year: Not on file    Sergio of Food in the Last Year: Not on file   Transportation Needs:     Lack of Transportation (Medical): Not on file    Lack of Transportation (Non-Medical): Not on file   Physical Activity:     Days of Exercise per Week: Not on file    Minutes of Exercise per Session: Not on file   Stress:     Feeling of Stress : Not on file   Social Connections:     Frequency of Communication with Friends and Family: Not on file    Frequency of Social Gatherings with Friends and Family: Not on file    Attends Jehovah's witness Services: Not on file    Active Member of [address] [place] [address] [employer] or Organizations: Not on file    Attends Club or Organization Meetings: Not on file    Marital Status: Not on file   Intimate Partner Violence:     Fear of Current or Ex-Partner: Not on file    Emotionally Abused: Not on file    Physically Abused: Not on file    Sexually Abused: Not on file   Housing Stability:     Unable to Pay for Housing in the Last Year: Not on file    Number of [place] in the Last Year: Not on file    Unstable Housing in the Last Year: Not on file         ALLERGIES: Patient has no known allergies. Review of Systems   Constitutional:  Positive for fatigue. Negative for activity change, appetite change, chills and fever. HENT:  Negative for ear pain, facial swelling, sore throat and trouble swallowing. Eyes:  Negative for pain, discharge and visual disturbance. Respiratory:  Positive for shortness of breath. Negative for cough, chest tightness and wheezing. Cardiovascular:  Negative for chest pain and palpitations. Gastrointestinal:  Positive for diarrhea. Negative for abdominal pain, blood in stool, nausea and vomiting. Genitourinary:  Negative for difficulty urinating, flank pain and hematuria. Musculoskeletal:  Negative for arthralgias, joint swelling, myalgias and neck pain.    Skin: Negative for color change and rash. Neurological:  Negative for dizziness, weakness, numbness and headaches. Hematological:  Negative for adenopathy. Does not bruise/bleed easily. Psychiatric/Behavioral:  Negative for behavioral problems, confusion and sleep disturbance. All other systems reviewed and are negative. Vitals:    05/09/22 1755 05/09/22 1803 05/09/22 1817   BP: (!) 158/75     Pulse: 77     Resp: 18     Temp: 98.3 °F (36.8 °C)     SpO2: (!) 84% 93%    Weight:   68 kg (150 lb)   Height:   5' 7\" (1.702 m)            Physical Exam  Vitals and nursing note reviewed. Constitutional:       General: She is not in acute distress. Appearance: She is well-developed. Comments: This is an 42-year-old female with some paleness in appearance. Conjunctive ear not significantly pale. Vital signs are as noted. HENT:      Head: Normocephalic and atraumatic. Nose: Nose normal.   Eyes:      General: No scleral icterus. Conjunctiva/sclera: Conjunctivae normal.      Pupils: Pupils are equal, round, and reactive to light. Neck:      Thyroid: No thyromegaly. Vascular: No JVD. Trachea: No tracheal deviation. Comments: No carotid bruits noted. Cardiovascular:      Rate and Rhythm: Normal rate and regular rhythm. Heart sounds: Normal heart sounds. No murmur heard. No friction rub. No gallop. Pulmonary:      Effort: Pulmonary effort is normal. No respiratory distress. Breath sounds: Rales ( Patient does have some Rales in both bases more prominent on the left than the right posteriorly.) present. No wheezing. Chest:      Chest wall: No tenderness. Abdominal:      General: Bowel sounds are normal. There is no distension. Palpations: Abdomen is soft. There is no mass. Tenderness: There is no abdominal tenderness. There is no guarding or rebound. Musculoskeletal:         General: No tenderness. Normal range of motion.       Cervical back: Normal range of motion and neck supple. Right lower leg: Edema present. Left lower leg: Edema present. Comments: Patient has 1+ pitting edema on both lower extremities. Lymphadenopathy:      Cervical: No cervical adenopathy. Skin:     General: Skin is warm and dry. Capillary Refill: Capillary refill takes 2 to 3 seconds. Findings: No erythema or rash. Neurological:      Mental Status: She is alert and oriented to person, place, and time. Cranial Nerves: No cranial nerve deficit. Coordination: Coordination normal.      Deep Tendon Reflexes: Reflexes are normal and symmetric. Psychiatric:         Behavior: Behavior normal.         Thought Content: Thought content normal.         Judgment: Judgment normal.        MDM     Amount and/or Complexity of Data Reviewed  Clinical lab tests: ordered and reviewed  Tests in the radiology section of CPT®: ordered and reviewed  Decide to obtain previous medical records or to obtain history from someone other than the patient: yes  Review and summarize past medical records: yes  Discuss the patient with other providers: yes  Independent visualization of images, tracings, or specimens: yes    Risk of Complications, Morbidity, and/or Mortality  Presenting problems: high  Diagnostic procedures: high  Management options: high    Patient Progress  Patient progress: stable         Procedures    This is an 30-year-old female who presents with shortness of breath over the last week. She is more short of breath now than she has been. Her sats are running in the mid 80s. This is on room air. She denies any pain or fever. Will evaluate with labs and x-ray. Suspect she is probably fluid overloaded. ED MD EKG interpretation: There is a normal sinus rhythm at 71 beats a minute with a left axis shift. Occasional PAC with aberrancy is noted. Poor R wave progression. No other acute findings. me    Patient was given medication for CHF and was clinically improving with better oxygenation. Resting more comfortably at admission. Hospitalist is being consulted for admission. Perfect Serve Consult for Admission  9:16 PM    ED Room Number: ER05/05  Patient Name and age:  Porter Christensen 82 y.o.  female  Working Diagnosis:   1.  Acute pulmonary edema ([address]        COVID-19 Suspicion:  no  Sepsis present:  no  Reassessment needed: no  Code Status:  Full Code  Readmission: no  Isolation Requirements:  no  Recommended Level of Care:  telemetry  Department:[facility] Adult ED - 21   Other:      Critical care time: 40 minutes    Addendum 7/30/22  Total critical care time (not including time spent performing separately reportable procedures): 40 minutes

## 2022-05-10 ENCOUNTER — APPOINTMENT (OUTPATIENT)
Dept: VASCULAR SURGERY | Age: 87
DRG: 193 | End: 2022-05-10
Attending: INTERNAL MEDICINE
Payer: MEDICARE

## 2022-05-10 ENCOUNTER — APPOINTMENT (OUTPATIENT)
Dept: CT IMAGING | Age: 87
DRG: 193 | End: 2022-05-10
Attending: INTERNAL MEDICINE
Payer: MEDICARE

## 2022-05-10 LAB
ALBUMIN SERPL-MCNC: 2.9 G/DL (ref 3.5–5)
ALBUMIN/GLOB SERPL: 0.8 {RATIO} (ref 1.1–2.2)
ALP SERPL-CCNC: 59 U/L (ref 45–117)
ALT SERPL-CCNC: 24 U/L (ref 12–78)
ANION GAP SERPL CALC-SCNC: 6 MMOL/L (ref 5–15)
AST SERPL-CCNC: 26 U/L (ref 15–37)
ATRIAL RATE: 71 BPM
ATRIAL RATE: 75 BPM
BASOPHILS # BLD: 0 K/UL (ref 0–0.1)
BASOPHILS NFR BLD: 1 % (ref 0–1)
BILIRUB SERPL-MCNC: 0.6 MG/DL (ref 0.2–1)
BUN SERPL-MCNC: 12 MG/DL (ref 6–20)
BUN/CREAT SERPL: 12 (ref 12–20)
CALCIUM SERPL-MCNC: 8.6 MG/DL (ref 8.5–10.1)
CALCULATED P AXIS, ECG09: 94 DEGREES
CALCULATED P AXIS, ECG09: 98 DEGREES
CALCULATED R AXIS, ECG10: -39 DEGREES
CALCULATED R AXIS, ECG10: -40 DEGREES
CALCULATED T AXIS, ECG11: 89 DEGREES
CALCULATED T AXIS, ECG11: 97 DEGREES
CHLORIDE SERPL-SCNC: 107 MMOL/L (ref 97–108)
CHOLEST SERPL-MCNC: 110 MG/DL
CO2 SERPL-SCNC: 27 MMOL/L (ref 21–32)
COVID-19 RAPID TEST, COVR: NOT DETECTED
CREAT SERPL-MCNC: 1.02 MG/DL (ref 0.55–1.02)
DIAGNOSIS, 93000: NORMAL
DIAGNOSIS, 93000: NORMAL
DIFFERENTIAL METHOD BLD: ABNORMAL
EOSINOPHIL # BLD: 0.2 K/UL (ref 0–0.4)
EOSINOPHIL NFR BLD: 3 % (ref 0–7)
ERYTHROCYTE [DISTWIDTH] IN BLOOD BY AUTOMATED COUNT: 19.9 % (ref 11.5–14.5)
EST. AVERAGE GLUCOSE BLD GHB EST-MCNC: 143 MG/DL
FERRITIN SERPL-MCNC: 67 NG/ML (ref 26–388)
FOLATE SERPL-MCNC: 9.1 NG/ML (ref 5–21)
GLOBULIN SER CALC-MCNC: 3.7 G/DL (ref 2–4)
GLUCOSE BLD STRIP.AUTO-MCNC: 111 MG/DL (ref 65–117)
GLUCOSE BLD STRIP.AUTO-MCNC: 159 MG/DL (ref 65–117)
GLUCOSE BLD STRIP.AUTO-MCNC: 165 MG/DL (ref 65–117)
GLUCOSE BLD STRIP.AUTO-MCNC: 170 MG/DL (ref 65–117)
GLUCOSE SERPL-MCNC: 158 MG/DL (ref 65–100)
HBA1C MFR BLD: 6.6 % (ref 4–5.6)
HCT VFR BLD AUTO: 26.7 % (ref 35–47)
HDLC SERPL-MCNC: 63 MG/DL
HDLC SERPL: 1.7 {RATIO} (ref 0–5)
HEMOCCULT STL QL: NEGATIVE
HGB BLD-MCNC: 7.9 G/DL (ref 11.5–16)
IMM GRANULOCYTES # BLD AUTO: 0 K/UL (ref 0–0.04)
IMM GRANULOCYTES NFR BLD AUTO: 0 % (ref 0–0.5)
IRON SERPL-MCNC: 31 UG/DL (ref 35–150)
LDLC SERPL CALC-MCNC: 32.6 MG/DL (ref 0–100)
LYMPHOCYTES # BLD: 1.9 K/UL (ref 0.8–3.5)
LYMPHOCYTES NFR BLD: 26 % (ref 12–49)
MAGNESIUM SERPL-MCNC: 1.6 MG/DL (ref 1.6–2.4)
MCH RBC QN AUTO: 27.2 PG (ref 26–34)
MCHC RBC AUTO-ENTMCNC: 29.6 G/DL (ref 30–36.5)
MCV RBC AUTO: 92.1 FL (ref 80–99)
MONOCYTES # BLD: 0.7 K/UL (ref 0–1)
MONOCYTES NFR BLD: 9 % (ref 5–13)
NEUTS SEG # BLD: 4.6 K/UL (ref 1.8–8)
NEUTS SEG NFR BLD: 61 % (ref 32–75)
NRBC # BLD: 0 K/UL (ref 0–0.01)
NRBC BLD-RTO: 0 PER 100 WBC
P-R INTERVAL, ECG05: 152 MS
P-R INTERVAL, ECG05: 158 MS
PHOSPHATE SERPL-MCNC: 3.9 MG/DL (ref 2.6–4.7)
PLATELET # BLD AUTO: 301 K/UL (ref 150–400)
PMV BLD AUTO: 11.3 FL (ref 8.9–12.9)
POTASSIUM SERPL-SCNC: 4 MMOL/L (ref 3.5–5.1)
PROT SERPL-MCNC: 6.6 G/DL (ref 6.4–8.2)
Q-T INTERVAL, ECG07: 406 MS
Q-T INTERVAL, ECG07: 424 MS
QRS DURATION, ECG06: 106 MS
QRS DURATION, ECG06: 114 MS
QTC CALCULATION (BEZET), ECG08: 453 MS
QTC CALCULATION (BEZET), ECG08: 460 MS
RBC # BLD AUTO: 2.9 M/UL (ref 3.8–5.2)
SERVICE CMNT-IMP: ABNORMAL
SERVICE CMNT-IMP: NORMAL
SODIUM SERPL-SCNC: 140 MMOL/L (ref 136–145)
SOURCE, COVRS: NORMAL
TRIGL SERPL-MCNC: 72 MG/DL (ref ?–150)
TROPONIN-HIGH SENSITIVITY: 8 NG/L (ref 0–51)
TSH SERPL DL<=0.05 MIU/L-ACNC: 0.88 UIU/ML (ref 0.36–3.74)
VENTRICULAR RATE, ECG03: 71 BPM
VENTRICULAR RATE, ECG03: 75 BPM
VIT B12 SERPL-MCNC: >2000 PG/ML (ref 193–986)
VLDLC SERPL CALC-MCNC: 14.4 MG/DL
WBC # BLD AUTO: 7.4 K/UL (ref 3.6–11)

## 2022-05-10 PROCEDURE — 84100 ASSAY OF PHOSPHORUS: CPT

## 2022-05-10 PROCEDURE — 85025 COMPLETE CBC W/AUTO DIFF WBC: CPT

## 2022-05-10 PROCEDURE — 36415 COLL VENOUS BLD VENIPUNCTURE: CPT

## 2022-05-10 PROCEDURE — 87635 SARS-COV-2 COVID-19 AMP PRB: CPT

## 2022-05-10 PROCEDURE — 82607 VITAMIN B-12: CPT

## 2022-05-10 PROCEDURE — 82746 ASSAY OF FOLIC ACID SERUM: CPT

## 2022-05-10 PROCEDURE — 74011000250 HC RX REV CODE- 250: Performed by: HOSPITALIST

## 2022-05-10 PROCEDURE — 74011000636 HC RX REV CODE- 636

## 2022-05-10 PROCEDURE — 74011636637 HC RX REV CODE- 636/637: Performed by: INTERNAL MEDICINE

## 2022-05-10 PROCEDURE — 93970 EXTREMITY STUDY: CPT

## 2022-05-10 PROCEDURE — 80053 COMPREHEN METABOLIC PANEL: CPT

## 2022-05-10 PROCEDURE — 83036 HEMOGLOBIN GLYCOSYLATED A1C: CPT

## 2022-05-10 PROCEDURE — 82962 GLUCOSE BLOOD TEST: CPT

## 2022-05-10 PROCEDURE — 83735 ASSAY OF MAGNESIUM: CPT

## 2022-05-10 PROCEDURE — 74011250636 HC RX REV CODE- 250/636: Performed by: HOSPITALIST

## 2022-05-10 PROCEDURE — 74011250636 HC RX REV CODE- 250/636: Performed by: INTERNAL MEDICINE

## 2022-05-10 PROCEDURE — 74011250637 HC RX REV CODE- 250/637: Performed by: INTERNAL MEDICINE

## 2022-05-10 PROCEDURE — 80061 LIPID PANEL: CPT

## 2022-05-10 PROCEDURE — 65270000046 HC RM TELEMETRY

## 2022-05-10 PROCEDURE — 74011000250 HC RX REV CODE- 250: Performed by: INTERNAL MEDICINE

## 2022-05-10 PROCEDURE — 82272 OCCULT BLD FECES 1-3 TESTS: CPT

## 2022-05-10 PROCEDURE — 82728 ASSAY OF FERRITIN: CPT

## 2022-05-10 PROCEDURE — 71275 CT ANGIOGRAPHY CHEST: CPT

## 2022-05-10 PROCEDURE — 84443 ASSAY THYROID STIM HORMONE: CPT

## 2022-05-10 PROCEDURE — 74011000258 HC RX REV CODE- 258: Performed by: HOSPITALIST

## 2022-05-10 PROCEDURE — 84484 ASSAY OF TROPONIN QUANT: CPT

## 2022-05-10 PROCEDURE — 93005 ELECTROCARDIOGRAM TRACING: CPT

## 2022-05-10 PROCEDURE — 83540 ASSAY OF IRON: CPT

## 2022-05-10 PROCEDURE — 99222 1ST HOSP IP/OBS MODERATE 55: CPT | Performed by: SPECIALIST

## 2022-05-10 RX ORDER — MAGNESIUM SULFATE 100 %
4 CRYSTALS MISCELLANEOUS AS NEEDED
Status: DISCONTINUED | OUTPATIENT
Start: 2022-05-10 | End: 2022-05-18 | Stop reason: HOSPADM

## 2022-05-10 RX ORDER — INSULIN LISPRO 100 [IU]/ML
INJECTION, SOLUTION INTRAVENOUS; SUBCUTANEOUS
Status: DISCONTINUED | OUTPATIENT
Start: 2022-05-10 | End: 2022-05-18 | Stop reason: HOSPADM

## 2022-05-10 RX ORDER — ATORVASTATIN CALCIUM 20 MG/1
20 TABLET, FILM COATED ORAL
Status: DISCONTINUED | OUTPATIENT
Start: 2022-05-10 | End: 2022-05-18 | Stop reason: HOSPADM

## 2022-05-10 RX ORDER — PAROXETINE HYDROCHLORIDE 20 MG/1
20 TABLET, FILM COATED ORAL
Status: DISCONTINUED | OUTPATIENT
Start: 2022-05-10 | End: 2022-05-18 | Stop reason: HOSPADM

## 2022-05-10 RX ORDER — ACETAMINOPHEN 650 MG/1
650 SUPPOSITORY RECTAL
Status: DISCONTINUED | OUTPATIENT
Start: 2022-05-10 | End: 2022-05-18 | Stop reason: HOSPADM

## 2022-05-10 RX ORDER — ACETAMINOPHEN 325 MG/1
650 TABLET ORAL
Status: DISCONTINUED | OUTPATIENT
Start: 2022-05-10 | End: 2022-05-18 | Stop reason: HOSPADM

## 2022-05-10 RX ORDER — FERROUS SULFATE 325(65) MG
1 TABLET, DELAYED RELEASE (ENTERIC COATED) ORAL
Status: DISCONTINUED | OUTPATIENT
Start: 2022-05-10 | End: 2022-05-10 | Stop reason: CLARIF

## 2022-05-10 RX ORDER — SODIUM CHLORIDE 0.9 % (FLUSH) 0.9 %
5-40 SYRINGE (ML) INJECTION EVERY 8 HOURS
Status: DISCONTINUED | OUTPATIENT
Start: 2022-05-10 | End: 2022-05-18 | Stop reason: HOSPADM

## 2022-05-10 RX ORDER — FUROSEMIDE 10 MG/ML
40 INJECTION INTRAMUSCULAR; INTRAVENOUS DAILY
Status: DISCONTINUED | OUTPATIENT
Start: 2022-05-10 | End: 2022-05-12

## 2022-05-10 RX ORDER — GUAIFENESIN 100 MG/5ML
81 LIQUID (ML) ORAL DAILY
Status: DISCONTINUED | OUTPATIENT
Start: 2022-05-10 | End: 2022-05-18 | Stop reason: HOSPADM

## 2022-05-10 RX ORDER — SODIUM CHLORIDE 0.9 % (FLUSH) 0.9 %
5-40 SYRINGE (ML) INJECTION AS NEEDED
Status: DISCONTINUED | OUTPATIENT
Start: 2022-05-10 | End: 2022-05-18 | Stop reason: HOSPADM

## 2022-05-10 RX ORDER — ONDANSETRON 2 MG/ML
4 INJECTION INTRAMUSCULAR; INTRAVENOUS
Status: DISCONTINUED | OUTPATIENT
Start: 2022-05-10 | End: 2022-05-18 | Stop reason: HOSPADM

## 2022-05-10 RX ORDER — LANOLIN ALCOHOL/MO/W.PET/CERES
1 CREAM (GRAM) TOPICAL
Status: DISCONTINUED | OUTPATIENT
Start: 2022-05-10 | End: 2022-05-18 | Stop reason: HOSPADM

## 2022-05-10 RX ORDER — ENOXAPARIN SODIUM 100 MG/ML
40 INJECTION SUBCUTANEOUS DAILY
Status: DISCONTINUED | OUTPATIENT
Start: 2022-05-10 | End: 2022-05-18 | Stop reason: HOSPADM

## 2022-05-10 RX ORDER — TRAMADOL HYDROCHLORIDE 50 MG/1
50 TABLET ORAL
Status: DISCONTINUED | OUTPATIENT
Start: 2022-05-10 | End: 2022-05-18 | Stop reason: HOSPADM

## 2022-05-10 RX ORDER — AMLODIPINE BESYLATE 5 MG/1
5 TABLET ORAL DAILY
Status: DISCONTINUED | OUTPATIENT
Start: 2022-05-10 | End: 2022-05-18 | Stop reason: HOSPADM

## 2022-05-10 RX ORDER — FUROSEMIDE 10 MG/ML
40 INJECTION INTRAMUSCULAR; INTRAVENOUS DAILY
Status: DISCONTINUED | OUTPATIENT
Start: 2022-05-10 | End: 2022-05-10

## 2022-05-10 RX ORDER — DOXYCYCLINE HYCLATE 100 MG
100 TABLET ORAL EVERY 12 HOURS
Status: DISCONTINUED | OUTPATIENT
Start: 2022-05-10 | End: 2022-05-10

## 2022-05-10 RX ORDER — FAMOTIDINE 20 MG/1
20 TABLET, FILM COATED ORAL DAILY
Status: DISCONTINUED | OUTPATIENT
Start: 2022-05-10 | End: 2022-05-18 | Stop reason: HOSPADM

## 2022-05-10 RX ORDER — BACLOFEN 10 MG/1
10 TABLET ORAL
Status: DISCONTINUED | OUTPATIENT
Start: 2022-05-10 | End: 2022-05-18 | Stop reason: HOSPADM

## 2022-05-10 RX ORDER — ONDANSETRON 4 MG/1
4 TABLET, ORALLY DISINTEGRATING ORAL
Status: DISCONTINUED | OUTPATIENT
Start: 2022-05-10 | End: 2022-05-18 | Stop reason: HOSPADM

## 2022-05-10 RX ORDER — DONEPEZIL HYDROCHLORIDE 5 MG/1
10 TABLET, FILM COATED ORAL
Status: DISCONTINUED | OUTPATIENT
Start: 2022-05-10 | End: 2022-05-18 | Stop reason: HOSPADM

## 2022-05-10 RX ORDER — POLYETHYLENE GLYCOL 3350 17 G/17G
17 POWDER, FOR SOLUTION ORAL DAILY PRN
Status: DISCONTINUED | OUTPATIENT
Start: 2022-05-10 | End: 2022-05-18 | Stop reason: HOSPADM

## 2022-05-10 RX ADMIN — ENOXAPARIN SODIUM 40 MG: 100 INJECTION SUBCUTANEOUS at 09:36

## 2022-05-10 RX ADMIN — FUROSEMIDE 40 MG: 10 INJECTION, SOLUTION INTRAVENOUS at 07:26

## 2022-05-10 RX ADMIN — ASPIRIN 81 MG CHEWABLE TABLET 81 MG: 81 TABLET CHEWABLE at 09:36

## 2022-05-10 RX ADMIN — AMLODIPINE BESYLATE 5 MG: 5 TABLET ORAL at 09:36

## 2022-05-10 RX ADMIN — PAROXETINE HYDROCHLORIDE 20 MG: 20 TABLET, FILM COATED ORAL at 22:32

## 2022-05-10 RX ADMIN — IOPAMIDOL 80 ML: 755 INJECTION, SOLUTION INTRAVENOUS at 14:42

## 2022-05-10 RX ADMIN — DOXYCYCLINE 100 MG: 100 INJECTION, POWDER, LYOPHILIZED, FOR SOLUTION INTRAVENOUS at 21:11

## 2022-05-10 RX ADMIN — SODIUM CHLORIDE, PRESERVATIVE FREE 10 ML: 5 INJECTION INTRAVENOUS at 22:32

## 2022-05-10 RX ADMIN — SODIUM CHLORIDE, PRESERVATIVE FREE 1 G: 5 INJECTION INTRAVENOUS at 19:14

## 2022-05-10 RX ADMIN — SODIUM CHLORIDE, PRESERVATIVE FREE 10 ML: 5 INJECTION INTRAVENOUS at 14:00

## 2022-05-10 RX ADMIN — ATORVASTATIN CALCIUM 20 MG: 20 TABLET, FILM COATED ORAL at 22:32

## 2022-05-10 RX ADMIN — Medication 2 UNITS: at 18:03

## 2022-05-10 RX ADMIN — DONEPEZIL HYDROCHLORIDE 10 MG: 5 TABLET, FILM COATED ORAL at 22:32

## 2022-05-10 RX ADMIN — FERROUS SULFATE TAB 325 MG (65 MG ELEMENTAL FE) 325 MG: 325 (65 FE) TAB at 07:27

## 2022-05-10 RX ADMIN — Medication 2 UNITS: at 07:27

## 2022-05-10 RX ADMIN — FAMOTIDINE 20 MG: 20 TABLET ORAL at 09:36

## 2022-05-10 RX ADMIN — SODIUM CHLORIDE, PRESERVATIVE FREE 10 ML: 5 INJECTION INTRAVENOUS at 07:28

## 2022-05-10 NOTE — PROGRESS NOTES
[facility] Adult  Hospitalist Group                                                                                          Hospitalist Progress Note  Yonathan Fitzpatrick MD  Answering service: [phone] OR [phone] from in house phone        Date of Service:  5/10/2022  NAME:  Ramirez Santiago  :  1935  MRN:  [id number]    This documentation was facilitated by a Voice Recognition software and may contain inadvertent typographical errors. Admission Summary:   Patient came to the ED for shortness of breath. She is admitted for acute hypoxic respiratory failure. PMH is significant for type 2 diabetes with neuropathy, stomach ulcer, hyperlipidemia, depression, stroke. Interval history / Subjective:        Patient seen and examined this afternoon. She is lying in bed, on 3 L of oxygen via nasal cannula with SPO2 in the upper 90s. She reports feeling better, she presently denies shortness of breath or chest pressure. She told me she has this repetitive cough but she added is not new. She denied fever or chills. Assessment & Plan:   Acute hypoxic respiratory failure  CAP  ? CHF. No overt pulmonary edema. proBNP is elevated however. --She is already being diuresed with Lasix. -- CTA lung negative for PE, but showed clusters of micronodules compatible with small airway infection or inflammation. -- We will add treatment for CAP with ceftriaxone and doxycycline. Check rapid COVID test.  -- Continue supplemental oxygen to keep SPO2 above 92%. -- Cardiology following. Echocardiogram pending. Chronic normocytic anemia. Iron deficiency. -- Stool negative for blood. Iron supplement. Chronic anemia work-up as outpatient if not done so. Type 2 diabetes with peripheral neuropathy. A1c 6.6  -- Accu-Cheks AC&HS. Humalog correction scale. Hypertension, continue home regimen  Dyslipidemia: Continue home statin. Care Plan discussed with:  Independent full code  Code status: Full code  DVT prophylaxis: Enoxaparin  Anticipated Disposition: Home likely with home health in 2-3 days          Hospital Problems  Date Reviewed: 5/10/2022          Codes Class Noted POA    * (Principal) Acute respiratory failure with hypoxia [facility]) ICD-10-CM: J96.01  ICD-9-CM: 518.81  5/9/2022 Yes                Review of Systems:   A comprehensive review of systems was negative except for that written in the HPI. Vital Signs:    Last 24hrs VS reviewed since prior progress note. Most recent are:        Intake/Output Summary (Last 24 hours) at 5/10/2022 1755  Last data filed at 5/10/2022 1352  Gross per 24 hour   Intake 0 ml   Output 1800 ml   Net -1800 ml        Physical Examination:     I had a face to face encounter with this patient and independently examined them on5/10/2022 as outlined below:        Constitutional:  Alert and oriented. Not in distress. HEENT:  Atraumatic. Oral mucosa moist,. Non icteric sclera. No pallor. Resp:  On oxygen 3 L/min, SPO2 96%. Basal rates. Chest Wall: No deformity     CV:  Grade 3 murmur on the A2 area      GI:  Normoactive  Soft, non distended, non tender. No appreciable organomegaly      :  No CVA or suprapubic tenderness      Musculoskeletal:  Bilateral venous stasis with purplish discoloration, no ischemia. Both feet are sensitive. There is mild edema. Neurologic:  Mental status: Alert and oriented to PPT  Cranial nerves II-XII : WNL  Motor exam:Moves all extremities symmetrically. Peripheral neuropathy  Gait and balance: Not tested              Data Review:    Review and/or order of clinical lab test  Review and/or order of tests in the radiology section of CPT  Review and/or order of tests in the medicine section of CPT      Available Lab and Imaging results reviewed and used to formulate the current care plan.       Medications Reviewed:     Current Facility-Administered Medications   Medication Dose Route Frequency    amLODIPine (NORVASC) tablet 5 mg  5 mg Oral DAILY    aspirin chewable tablet 81 mg  81 mg Oral DAILY    baclofen (LIORESAL) tablet 10 mg  10 mg Oral BID PRN    donepeziL (ARICEPT) tablet 10 mg  10 mg Oral QHS    famotidine (PEPCID) tablet 20 mg  20 mg Oral DAILY    PARoxetine (PAXIL) tablet 20 mg  20 mg Oral QHS    atorvastatin (LIPITOR) tablet 20 mg  20 mg Oral QHS    traMADoL (ULTRAM) tablet 50 mg  50 mg Oral Q6H PRN    sodium chloride (NS) flush 5-40 mL  5-40 mL IntraVENous Q8H    sodium chloride (NS) flush 5-40 mL  5-40 mL IntraVENous PRN    acetaminophen (TYLENOL) tablet 650 mg  650 mg Oral Q6H PRN    Or    acetaminophen (TYLENOL) suppository 650 mg  650 mg Rectal Q6H PRN    polyethylene glycol (MIRALAX) packet 17 g  17 g Oral DAILY PRN    ondansetron (ZOFRAN ODT) tablet 4 mg  4 mg Oral Q8H PRN    Or    ondansetron (ZOFRAN) injection 4 mg  4 mg IntraVENous Q6H PRN    enoxaparin (LOVENOX) injection 40 mg  40 mg SubCUTAneous DAILY    insulin lispro (HUMALOG) injection   SubCUTAneous AC&HS    glucose chewable tablet 16 g  4 Tablet Oral PRN    glucagon (GLUCAGEN) injection 1 mg  1 mg IntraMUSCular PRN    dextrose 10 % infusion 0-250 mL  0-250 mL IntraVENous PRN    furosemide (LASIX) injection 40 mg  40 mg IntraVENous DAILY    ferrous sulfate tablet 325 mg  1 Tablet Oral ACB    albuterol-ipratropium (DUO-NEB) 2.5 MG-0.5 MG/3 ML  3 mL Nebulization Q4H PRN     ______________________________________________________________________  EXPECTED LENGTH OF STAY: - - -  ACTUAL LENGTH OF STAY:          1                 Yonathan Fitzpatrick MD

## 2022-05-10 NOTE — PROGRESS NOTES
Shift summary    0730 Bedside shift change report given to Chu Weeks (oncoming nurse) by Judy Dickey (offgoing nurse). Report included the following information SBAR, Kardex, MAR and Recent Results. Patient cooperative with plan of care. No complaints of pain. Calls out appropriately. Administered prescribed medications. Monitored I's and O's. Encouraged turns to promote skin integrity. Assisted with ADLs. Applied barrier cream to skin due to moisture incontinence. 1930 Bedside shift change report given to Mary (oncoming nurse) by Chu Weeks (offgoing nurse). Report included the following information SBAR, Kardex, Accordion and Recent Results.        Continue plan of care per MD order

## 2022-05-10 NOTE — H&P
[address]  HISTORY AND PHYSICAL    Name:  Rene Montero  MR#:  [id number]  :  1935  ACCOUNT #:  [de-identified]  ADMIT DATE:  2022      The patient was seen, evaluated and admitted by me on 2022. PRIMARY CARE PHYSICIAN:  ALY Howe    SOURCE OF INFORMATION:  The patient who is not a very good historian because of her dementia. Review of ED and old electronic medical records. CHIEF COMPLAINT:  Shortness of breath. HISTORY OF PRESENT ILLNESS:  This is an 73-year-old woman with past medical history significant for type 2 diabetes, dyslipidemia, dementia, and bilateral lower extremity venous stasis, who was in her usual state of health until the day of her presentation at the emergency room when it was reported that the patient developed shortness of breath. The shortness of breath is worse with mild exertion such as walking and also when the patient is lying down. No associated cough, fever, rigors or chills. EMS was called. When the EMS arrived at the [facility], the patient was found to have oxygen saturation in the 50s. She was then placed on 4 liters of oxygen and the oxygen saturation improved to the 90s. The patient is not on oxygen at home. The patient denies history of congestive heart failure. No history of COPD or asthma. When the patient arrived at the emergency room, the patient was found to have markedly elevated BNP level. Chest x-ray also shows evidence of vascular congestion. The patient was referred to the hospitalist service for evaluation for admission. The patient was last admitted to the hospital from 2021 to 08/15/2021. The patient was admitted for evaluation of dizziness. Stroke was rule out with negative MRI. The patient had echocardiogram done in 2018 that shows well-preserved ejection fraction at 55-60%. PAST MEDICAL HISTORY:  1. Dementia. 2.  Bilateral lower extremity venous stasis.   3. Dyslipidemia. 4.  Type 2 diabetes. 5.  Status post CVA. ALLERGIES:  NO KNOWN DRUG ALLERGIES. MEDICATIONS:  1. Tylenol 650 mg twice daily as needed for pain. 2.  Norvasc 5 mg daily. 3.  Aspirin 81 mg daily. 4.  Baclofen 10 mg twice daily as needed for muscle spasm. 5.  Aricept 10 mg daily at bedtime. 6.  Pepcid 20 mg twice daily. 7.  Ferrous sulfate 1 tablet daily. 8.  Lasix 20 mg daily. 9.  Mobic 15 mg daily. 10.  Paxil 20 mg daily at bedtime. 11.  Zocor 40 mg daily. 12.  Tramadol 50 mg every 8 hours as needed for pain. FAMILY HISTORY:  This was reviewed. The patient was adopted and she did not know anything about her biological parent. PAST SURGICAL HISTORY:  This is significant for hysterectomy, bilateral cataract extraction. SOCIAL HISTORY:  No history of alcohol or tobacco abuse. REVIEW OF SYSTEMS:  HEAD, EYES, EARS, NOSE, AND THROAT:  No headache, no dizziness, no blurring of vision, no photophobia. RESPIRATORY SYSTEM:  This is positive for shortness of breath. No cough, no hemoptysis. CARDIOVASCULAR SYSTEM:  This is positive for orthopnea. No chest pain, no palpitations. GASTROINTESTINAL SYSTEM:  This is positive for diarrhea. No nausea, no vomiting, no constipation. GENITOURINARY SYSTEM:  No dysuria, no urgency, no frequency. All other systems are reviewed and they are negative. PHYSICAL EXAMINATION:  GENERAL APPEARANCE:  The patient appeared ill, in moderate distress. VITAL SIGNS:  On arrival at the emergency room; temperature 98.3, pulse 77, respiratory rate 18, blood pressure 158/75, oxygen saturation 84% on room air. HEENT:  Head:  Normocephalic, atraumatic. Eyes:  Normal eye movement. No redness, no drainage, no discharge. Ears:  Normal external ears with no evidence of drainage. Nose:  No deformity, no drainage. Mouth and Throat:  No visible oral lesion. NECK:  Neck is supple. Mild JVD. No thyromegaly. CHEST:  Bilateral basilar crackles.   No wheezing. HEART:  Normal S1 and S2, regular. No clinically appreciable murmur. ABDOMEN:  Soft, nontender. Normal bowel sounds. CNS:  Alert and oriented x3. No gross focal neurological deficit. EXTREMITIES:  Edema 2+, pulses 2+ bilaterally. MUSCULOSKELETAL SYSTEM:  No evidence of joint deformity or swelling. SKIN:  Bilateral lower extremity venous stasis noted and present on admission. PSYCHIATRY:  Normal mood and affect. LYMPHATIC SYSTEM:  No cervical lymphadenopathy. DIAGNOSTIC DATA:  Chest x-ray shows mild interstitial edema with trace bilateral pleural effusions. LABORATORY DATA:  Hematology:  WBC 9.8, hemoglobin 8.4, hematocrit 27.7, platelets 721. Chemistry:  Sodium 138, potassium 4.0, chloride 107, CO2 26 , glucose 232, BUN 14, creatinine 1.12, calcium 8.8, total bilirubin 0.6, ALT 31, AST 34, alkaline phosphatase 76. Total protein 7.3, albumin level 2.9, globulin 4.4. Pro-BNP level 6962, D-dimer 1.59.    ASSESSMENT:  1. Acute respiratory failure with hypoxia. 2.  Anemia. 3.  Type 2 diabetes with hyperglycemia. 4.  Dyslipidemia. 5.  Bilateral lower extremity venous stasis. 6.  Dementia. 7.  Acute congestive heart failure. 8.  Elevated D-dimer. 9.  Hypertension. PLAN:  1. Acute respiratory failure with hypoxia: We will admit the patient for further evaluation and treatment. We will identify and the treat underlying etiological factors which include congestive heart failure. We will obtain CTA of the chest to evaluate the patient for pulmonary embolism as a possible cause of acute respiratory failure with hypoxia. We will check serial cardiac markers to rule out acute myocardial infarction as a possible cause of acute respiratory failure with hypoxia. We will continue with supplemental oxygen. 2.  Anemia: This is most likely due to chronic disease. We will carry out anemia workup including checking a stool guaiac to rule out occult GI bleed.   3.  Type 2 diabetes with hyperglycemia: We will start the patient on sliding scale. We will check hemoglobin A1c level if one has not been checked recently. We will hold oral agents till the time of discharge from the hospital.  4.  Dyslipidemia: We will continue with preadmission medication. 5.  Bilateral lower extremity venous stasis: This may be due to congestive heart failure but we will check ultrasound of the lower extremity for DVT. 6.  Dementia: We will continue preadmission medication and supportive treatment. 7.  Acute congestive heart failure: This is most likely acute heart failure with preserved ejection fraction given the fact that the patient echocardiogram in 06/2018 shows well-preserved ejection fraction at 55-60%. We will repeat echocardiogram.  We will start the patient on Lasix. Cardiology consult will be requested to assist in further evaluation and treatment. This is contributing to the patient's acute respiratory failure with hypoxia. 8.  Elevated D-dimer: We will obtain a CTA of the chest to rule out pulmonary embolism as stated above. We will also check an ultrasound of the lower extremity for DVT. 9.  Hypertension: We will resume preadmission medication. We will monitor the patient's blood pressure closely. We will check a TSH level. OTHER ISSUES:  Code status: The patient is a full code. We will place the patient on Lovenox for DVT prophylaxis. FUNCTIONAL STATUS PRIOR TO ADMISSION:  The patient came from home. The patient is ambulatory with assistive device. COVID PRECAUTION:  The patient was wearing a face mask. I was wearing a face mask and gloves for this patient's encounter.         Terrance Gordon MD      [id number]  D:  05/10/2022 5:15  T:  05/10/2022 6:33  JOB #:  [id number]  CC:  ALY Branch

## 2022-05-10 NOTE — NURSE NAVIGATOR
Chart reviewed by Heart Failure Nurse Navigator. Heart Failure database completed. EF:  pending    ACEi/ARB/ARNi: **    BB: **    Aldosterone Antagonist: **    Obstructive Sleep Apnea Screening: N/1   Referred to Sleep Medicine:     CRT **. NYHA Functional Class requested via provider message on [employer]      Heart Failure Teach Back in Patient Education. Heart Failure Avoiding Triggers on Discharge Instructions. Cardiologist: **      Post discharge follow up phone call to be made within 48-72 hours of discharge.

## 2022-05-10 NOTE — PROGRESS NOTES
[facility]  Cardiology Care Note                                Initial visit    Patient Name: Marsha Estes - :1935 - NDD:[id number]  Primary Cardiologist: none  Reason for Consult: Acute CHF     HPI: Ms. Mathieu Payne is a 82 y.o. female with PMH of DM type II, HLD, HTN, ?CVA (pt denies), BLE venous stasis, dementia, aortic stenosis  who presented to ER yesterday with chief c/o SOB. She reports  SOB off and on for 2-3 weeks. SOB worse at night and reports orthopnea and PND. SOB is worse with exertion, has SOB with walking short distances. States she has had fatigue and not felt well for months. Reports BLE edema L>R. She denies any history of chest pain, arm pain or jaw pain. She denies any history of dizziness/lightheadedness or syncope. She denies any prior history of cardiac disease, no history of CAD/stress test/cath. Evaluation here revealed elevated pro BNP (6,962), and CXR with mild intertital edema with trace bilateral pleural effusions. She also has notable anemia with hgb downtrending. Denies blood in stools or urine but reports dk stools (takes iron). HS troponin 8 on admission. Her O2 sats were 84 on Room air. CTA chest and  Echocardiogram pending. Of note: pt had echocardiogram in 2018 with EF 55-60% but also noted moderate Aortic stenosis. Subjective: Pt denies chest pain. Reports breathing is about the same, still a little SOB. Assessment and Plan     1. SOB/Acute hypoxic respiratory insufficiency/AGUILAR:  CXR with some pulmonary edema per read but not impressive per our review of image. NL EF  By echo in 2018,  Await echo. On IV diuretics ( diuresed 1200 mls since admission) but reports not much change in SOB. DDimer is elevated and CTA chest is pending. Certainly anemia could be playing a role as well. Further recommendations after echocardiogram.  Her HS troponin is nl. 2. History of aortic stenosis: moderate by echocardiogram 6/2018. No history of followup with cardiology. Repeat echo pending. She does have prominent murmur RUSB. 3. History of HTN: bp currently controlled. On home amlodipine 5 mg daily. 4. History of HLD: continue statin. Check lipids    5. Anemia: hgb downtrending (7.9). Stool for OB pending. 6. BLE edema:hx of BLE edema, on lasix 20 mg po daily PTA. No DVT on US but bilateral distal femoral veins not well visualized. Echo pending. 7. DM: A1C=6.6     Patient seen on the day of progress note and examined  and agree with Advance Practice Provider (CA, NP,PA)  assessment and plans. Her aortic stenosis murmur is present. S2 is slightly diminished. She may have worsening of the aortic stenosis. Proceed with echocardiogram.    Clearly anemia is also contributing to her shortness of breath. Lower extremity edema has improved continue with IV diuresis at this time. Close follow I's and O's. Elevation in D-dimer noted. Chest CT currently pending. Further recommendation after echocardiogram results are available. Cardiac testing history:  Echo 6/5/18: Left ventricle: Systolic function was normal. Ejection fraction was  estimated in the range of 55 % to 60 %. There were no regional wall motion  abnormalities. Mitral valve: There was moderate calcification of the posterior leaflet. Aortic valve: The valve was trileaflet. Leaflets exhibited moderately  increased thickness, moderate calcification, reduced mobility, and  sclerosis. There was moderate stenosis.        ____________________________________________________________      Patient Active Problem List   Diagnosis Code    DM (diabetes mellitus) ([facility] E11.9    Mixed hyperlipidemia E78.2    Chronic venous insufficiency I87.2    Depression F32. A    Stomach ulcer K25.9    Diverticula of colon K57.30    Advance care planning Z71.89    Essential hypertension I10    H/O: stroke Z86.73    Type 2 diabetes with nephropathy ([facility]) E11.21    Altered mental status R41.82    Acute delirium R41.0    Mild depression F32. A    Dizziness R42    Acute respiratory failure with hypoxia ([facility]) J96.01     No specialty comments available.       [] Patient unable to provide secondary to condition    CONSTITUTIONAL: fatigue   ENT/MOUTH: negative  EYES: negative  CARDIOVASCULAR: SOB, PND , AGUILAR, orthopnea, edema   RESPIRATORY: SOB  GASTROINTESTINAL: dark stools  GENITOURINARY: negative  MUSCULOSKELETAL: negative  SKIN: negative  NEUROLOGICAL: negative  PSYCHOLOGICAL: negative   HEME/LYMPH: negative  ENDOCRINE: negative        Past Medical History:   Diagnosis Date    Arthritis     Chronic pain     Depression     Diabetes [facility]     Hypercholesterolemia     Hypertension     [facility]     TIA 10 yrs back    Stroke ([facility]     2003     Past Surgical History:   Procedure Laterality Date    HX CARPAL TUNNEL RELEASE  1990    HX CATARACT REMOVAL      bilateral    HX HYSTERECTOMY  1986    HX HYSTERECTOMY      HX ORTHOPAEDIC      HX ORTHOPAEDIC      carpel tunnel left    HX ORTHOPAEDIC      left foot heel spur    HX REFRACTIVE SURGERY  2006       Current Facility-Administered Medications:     amLODIPine (NORVASC) tablet 5 mg, 5 mg, Oral, DAILY, Latha Crodova MD, 5 mg at 05/10/22 0936    aspirin chewable tablet 81 mg, 81 mg, Oral, DAILY, Latha Cordova MD, 81 mg at 05/10/22 0936    baclofen (LIORESAL) tablet 10 mg, 10 mg, Oral, BID PRN, Latha Cordova MD    donepeziL (ARICEPT) tablet 10 mg, 10 mg, Oral, QHS, Latha Cordova MD    famotidine (PEPCID) tablet 20 mg, 20 mg, Oral, DAILY, Latha Cordova MD, 20 mg at 05/10/22 0936    PARoxetine (PAXIL) tablet 20 mg, 20 mg, Oral, QHS, Latha Cordova MD    atorvastatin (LIPITOR) tablet 20 mg, 20 mg, Oral, QHS, Latha Cordova MD    traMADoL (ULTRAM) tablet 50 mg, 50 mg, Oral, Q6H PRN, Latha Taylor MD    sodium chloride (NS) flush 5-40 mL, 5-40 mL, IntraVENous, Q8H, Latha Cordova MD, 10 mL at 05/10/22 0728    sodium chloride (NS) flush 5-40 mL, 5-40 mL, IntraVENous, PRN, Latha Cordova MD    acetaminophen (TYLENOL) tablet 650 mg, 650 mg, Oral, Q6H PRN **OR** acetaminophen (TYLENOL) suppository 650 mg, 650 mg, Rectal, Q6H PRN, Latha Cordova MD    polyethylene glycol (MIRALAX) packet 17 g, 17 g, Oral, DAILY PRN, Latha Cordova MD    ondansetron (ZOFRAN ODT) tablet 4 mg, 4 mg, Oral, Q8H PRN **OR** ondansetron (ZOFRAN) injection 4 mg, 4 mg, IntraVENous, Q6H PRN, Latha Cordova MD    enoxaparin (LOVENOX) injection 40 mg, 40 mg, SubCUTAneous, DAILY, Latha Cordova MD, 40 mg at 05/10/22 0936    insulin lispro (HUMALOG) injection, , SubCUTAneous, AC&HS, Latha Cordova MD, 2 Units at 05/10/22 0727    glucose chewable tablet 16 g, 4 Tablet, Oral, PRN, Latha Cordova MD    glucagon (GLUCAGEN) injection 1 mg, 1 mg, IntraMUSCular, PRN, Latha Cordova MD    dextrose 10 % infusion 0-250 mL, 0-250 mL, IntraVENous, PRN, Latha Cordova MD    furosemide (LASIX) injection 40 mg, 40 mg, IntraVENous, DAILY, Latha Cordova MD, 40 mg at 05/10/22 3805    ferrous sulfate tablet 325 mg, 1 Tablet, Oral, ACB, Latha Cordova MD, 325 mg at 05/10/22 0727    albuterol-ipratropium (DUO-NEB) 2.5 MG-0.5 MG/3 ML, 3 mL, Nebulization, Q4H PRN, Latha Codrova MD    No Known Allergies     FmHx: family history is not on file. She was adopted. Social Hx :  reports that she has never smoked. She has never used smokeless tobacco. She reports that she does not drink alcohol and does not use drugs.        Objective:    Physical Exam    Vitals:   Vitals:    05/10/22 0447 05/10/22 0557 05/10/22 0734 05/10/22 1000   BP: (!) 110/45  (!) 122/36    Pulse: 71 74 77 81   Resp: 20  20    Temp: 98.6 °F (37 °C)  98.4 °F (36.9 °C)    SpO2: 100%  95%    Weight:       Height:           General:    Alert, cooperative, no distress, appears stated age. Neck:   Supple, no carotid bruit noted. Back:     Symmetric, . Lungs:     Crackles bilaterally basis. Heart[de-identified]    Regular rate and rhythm, S1, S2 normal, grade II-III/VI systolic murmur best at RUSB. no, click, rub or gallop. Abdomen:     Soft, non-tender. Bowel sounds normal.    Extremities:   1+ BLE edema L>R. Vascular:   Pulses - 2+   Skin:   Venous stasis skin changes,     Neurologic:   RAMÍREZ, alert, oriented. Telemetry: NSR, pacs, PVCs    ECG: NSR, nonspecific st abnormality lateral leads also present on EKG 8/2021  EKG Results     Procedure 720 Value Units Date/Time    EKG, 12 LEAD, INITIAL [[id number]] Collected: 05/10/22 0806    Order Status: Completed Updated: 05/10/22 0810     Ventricular Rate 75 BPM      Atrial Rate 75 BPM      P-R Interval 176 ms      QRS Duration 112 ms      Q-T Interval 404 ms      QTC Calculation (Bezet) 451 ms      Calculated P Axis 75 degrees      Calculated R Axis -39 degrees      Calculated T Axis 94 degrees      Diagnosis --     Sinus rhythm with premature atrial complexes  Left axis deviation  Incomplete right bundle branch block  Possible Anteroseptal infarct , age undetermined  ST & T wave abnormality, consider lateral ischemia  When compared with ECG of 09-MAY-2022 17:47,  MANUAL COMPARISON REQUIRED, DATA IS UNCONFIRMED      EKG, 12 LEAD, INITIAL [[id number]]     Order Status: Completed           Data Review:     Radiology:   XR Results (most recent):  Results from Hospital Encounter encounter on 05/09/22    XR CHEST PORT    Narrative  EXAM: XR CHEST PORT    INDICATION: Rule out chf    COMPARISON: 8/14/2021    FINDINGS: A portable AP radiograph of the chest was obtained at 1837 hours. There is pulmonary venous congestion with mild interstitial pulmonary edema. Blunting of the lateral costophrenic sulci bilaterally suggesting trace  effusions. Cardiac, mediastinal and hilar contours are unchanged. No cardiac  contour enlargement is shown. Minimal-mild thoracic aortic tortuosity is noted. The bones are moderately osteopenic. Impression  Mild interstitial port edema with trace bilateral pleural effusions. No results for input(s): CPK, TROIQ in the last 72 hours. No lab exists for component: CKQMB, CPKMB, BMPP  Recent Labs     05/10/22  0555 05/09/22  1800    138   K 4.0 4.0    107   CO2 27 26   BUN 12 14   CREA 1.02 1.12*   * 232*   PHOS 3.9  --    CA 8.6 8.8     Recent Labs     05/10/22  0555 05/09/22  1800   WBC 7.4 9.8   HGB 7.9* 8.4*   HCT 26.7* 27.7*    329     Recent Labs     05/10/22  0555 05/09/22  1800   AP 59 76     No results for input(s): CHOL, LDLC in the last 72 hours. No lab exists for component: TGL, HDLC,  HBA1C  Recent Labs     05/10/22  0555   TSH 0.88       Gian Sibley NP    [facility] [facility] [address]  [facility]  [phone]      [address] [place] [address] ESTEBAN Tabares

## 2022-05-10 NOTE — PROGRESS NOTES
2283 TRANSFER - IN REPORT:    Verbal report received from [place], [facility] (name) on [facility]  being received from ED(unit) for routine progression of care      Report consisted of patients Situation, Background, Assessment and   Recommendations(SBAR). Information from the following report(s) SBAR, Kardex, Intake/Output, MAR, Recent Results and Cardiac Rhythm NSR was reviewed with the receiving nurse. Opportunity for questions and clarification was provided. Assessment completed upon patients arrival to unit and care assumed. Problem: Falls - Risk of  Goal: *Absence of Falls  Description: Document Yady Schwab Fall Risk and appropriate interventions in the flowsheet.   Outcome: Progressing Towards Goal  Note: Fall Risk Interventions:       Mentation Interventions: Adequate sleep, hydration, pain control    Medication Interventions: Bed/chair exit alarm,Patient to call before getting OOB,Teach patient to arise slowly    Elimination Interventions: Bed/chair exit alarm,Call light in reach,Toileting schedule/hourly rounds,Patient to call for help with toileting needs

## 2022-05-10 NOTE — PROGRESS NOTES
Transition of Care    Reason for Admission:  Shortness of Breath                   RUR Score:   12%                  Plan for utilizing home health:  Mrs. [address] is willing to use home health if she needs it. She has used [facility] in the past and is willing to use them again. PCP: First and Last name:  Jhoan Smith NP     Name of Practice:    Are you a current Yes   Approximate date of last visit: 3 months ago   Can you participate in a virtual visit with your PCP: Yes                    Current Advanced Directive/Advance Care Plan: Full Code   Ms. Alejandro Schmidt does not have any advanced directives. She requested information about advanced directives. A referral was made to [facility]      Healthcare Decision Maker:   Click here to complete [address] including selection of the Healthcare Decision Maker Relationship (ie \"Primary\")           Mrs. Ojeda's decision maker is her daughter, Francis Mustafa. [phone]                   Transition of Care Plan:  Mrs. [address] lives with her daughter and grandson in a single family, one story home with a ramp to enter. It is one story. She uses a rolling walker to ambulate. She does not use any other DME. Her daughter and grandson assist her with her ADL's and IADLs. She does not drive. She is willing to use home health if it is needed. She has used [facility] in the past and is willing to use them again. Mrs. Alejandro Schmidt reported that she uses the [address] [facility] in [place] as her pharmacy. Care Management Interventions  PCP Verified by CM:  Yes  Palliative Care Criteria Met (RRAT>21 & CHF Dx)?: No  Mode of Transport at Discharge: Self  Transition of Care Consult (CM Consult): Discharge Planning  MyChart Signup: No  Discharge Durable Medical Equipment: No  Physical Therapy Consult: No  Occupational Therapy Consult: No  Speech Therapy Consult: No  Support Systems: Child(ty),Other Family Member(s)  Confirm Follow Up Transport: Family  The Patient and/or Patient Representative was Provided with a Choice of Provider and Agrees with the Discharge Plan?: No  Freedom of Choice List was Provided with Basic Dialogue that Supports the Patient's Individualized Plan of Care/Goals, Treatment Preferences and Shares the Quality Data Associated with the Providers?: No   Resource Information Provided?: No  Discharge Location  Patient Expects to be Discharged to[de-identified] Home  Will continue to follow for discharge planning.   Signed By: Gerri Denton LCSW     May 10, 2022

## 2022-05-11 LAB
ALBUMIN SERPL-MCNC: 2.4 G/DL (ref 3.5–5)
ALBUMIN/GLOB SERPL: 0.7 {RATIO} (ref 1.1–2.2)
ALP SERPL-CCNC: 52 U/L (ref 45–117)
ALT SERPL-CCNC: 17 U/L (ref 12–78)
ANION GAP SERPL CALC-SCNC: 5 MMOL/L (ref 5–15)
AST SERPL-CCNC: 21 U/L (ref 15–37)
BASOPHILS # BLD: 0 K/UL (ref 0–0.1)
BASOPHILS NFR BLD: 0 % (ref 0–1)
BILIRUB SERPL-MCNC: 0.5 MG/DL (ref 0.2–1)
BUN SERPL-MCNC: 12 MG/DL (ref 6–20)
BUN/CREAT SERPL: 12 (ref 12–20)
CALCIUM SERPL-MCNC: 8.3 MG/DL (ref 8.5–10.1)
CHLORIDE SERPL-SCNC: 105 MMOL/L (ref 97–108)
CO2 SERPL-SCNC: 28 MMOL/L (ref 21–32)
CREAT SERPL-MCNC: 0.98 MG/DL (ref 0.55–1.02)
DIFFERENTIAL METHOD BLD: ABNORMAL
EOSINOPHIL # BLD: 0.1 K/UL (ref 0–0.4)
EOSINOPHIL NFR BLD: 2 % (ref 0–7)
ERYTHROCYTE [DISTWIDTH] IN BLOOD BY AUTOMATED COUNT: 19.8 % (ref 11.5–14.5)
GLOBULIN SER CALC-MCNC: 3.6 G/DL (ref 2–4)
GLUCOSE BLD STRIP.AUTO-MCNC: 118 MG/DL (ref 65–117)
GLUCOSE BLD STRIP.AUTO-MCNC: 146 MG/DL (ref 65–117)
GLUCOSE BLD STRIP.AUTO-MCNC: 155 MG/DL (ref 65–117)
GLUCOSE BLD STRIP.AUTO-MCNC: 240 MG/DL (ref 65–117)
GLUCOSE SERPL-MCNC: 128 MG/DL (ref 65–100)
HCT VFR BLD AUTO: 25.3 % (ref 35–47)
HGB BLD-MCNC: 7.8 G/DL (ref 11.5–16)
IMM GRANULOCYTES # BLD AUTO: 0 K/UL (ref 0–0.04)
IMM GRANULOCYTES NFR BLD AUTO: 0 % (ref 0–0.5)
LYMPHOCYTES # BLD: 2.6 K/UL (ref 0.8–3.5)
LYMPHOCYTES NFR BLD: 28 % (ref 12–49)
MAGNESIUM SERPL-MCNC: 1.5 MG/DL (ref 1.6–2.4)
MCH RBC QN AUTO: 28.3 PG (ref 26–34)
MCHC RBC AUTO-ENTMCNC: 30.8 G/DL (ref 30–36.5)
MCV RBC AUTO: 91.7 FL (ref 80–99)
MONOCYTES # BLD: 0.9 K/UL (ref 0–1)
MONOCYTES NFR BLD: 9 % (ref 5–13)
NEUTS SEG # BLD: 5.5 K/UL (ref 1.8–8)
NEUTS SEG NFR BLD: 61 % (ref 32–75)
NRBC # BLD: 0 K/UL (ref 0–0.01)
NRBC BLD-RTO: 0 PER 100 WBC
PLATELET # BLD AUTO: 286 K/UL (ref 150–400)
PMV BLD AUTO: 11 FL (ref 8.9–12.9)
POTASSIUM SERPL-SCNC: 3.5 MMOL/L (ref 3.5–5.1)
PROT SERPL-MCNC: 6 G/DL (ref 6.4–8.2)
RBC # BLD AUTO: 2.76 M/UL (ref 3.8–5.2)
SERVICE CMNT-IMP: ABNORMAL
SODIUM SERPL-SCNC: 138 MMOL/L (ref 136–145)
WBC # BLD AUTO: 9.1 K/UL (ref 3.6–11)

## 2022-05-11 PROCEDURE — 74011250636 HC RX REV CODE- 250/636: Performed by: HOSPITALIST

## 2022-05-11 PROCEDURE — 74011250636 HC RX REV CODE- 250/636: Performed by: INTERNAL MEDICINE

## 2022-05-11 PROCEDURE — 74011250636 HC RX REV CODE- 250/636: Performed by: NURSE PRACTITIONER

## 2022-05-11 PROCEDURE — 74011636637 HC RX REV CODE- 636/637: Performed by: INTERNAL MEDICINE

## 2022-05-11 PROCEDURE — 83735 ASSAY OF MAGNESIUM: CPT

## 2022-05-11 PROCEDURE — 74011000250 HC RX REV CODE- 250: Performed by: HOSPITALIST

## 2022-05-11 PROCEDURE — 80053 COMPREHEN METABOLIC PANEL: CPT

## 2022-05-11 PROCEDURE — 74011250637 HC RX REV CODE- 250/637: Performed by: NURSE PRACTITIONER

## 2022-05-11 PROCEDURE — 65270000046 HC RM TELEMETRY

## 2022-05-11 PROCEDURE — 74011000258 HC RX REV CODE- 258: Performed by: HOSPITALIST

## 2022-05-11 PROCEDURE — 74011250637 HC RX REV CODE- 250/637: Performed by: HOSPITALIST

## 2022-05-11 PROCEDURE — 77010033678 HC OXYGEN DAILY

## 2022-05-11 PROCEDURE — 99233 SBSQ HOSP IP/OBS HIGH 50: CPT | Performed by: SPECIALIST

## 2022-05-11 PROCEDURE — 82962 GLUCOSE BLOOD TEST: CPT

## 2022-05-11 PROCEDURE — 74011250637 HC RX REV CODE- 250/637: Performed by: INTERNAL MEDICINE

## 2022-05-11 PROCEDURE — 74011000250 HC RX REV CODE- 250: Performed by: INTERNAL MEDICINE

## 2022-05-11 PROCEDURE — 85025 COMPLETE CBC W/AUTO DIFF WBC: CPT

## 2022-05-11 PROCEDURE — 36415 COLL VENOUS BLD VENIPUNCTURE: CPT

## 2022-05-11 RX ORDER — POTASSIUM CHLORIDE 750 MG/1
40 TABLET, FILM COATED, EXTENDED RELEASE ORAL DAILY
Status: DISCONTINUED | OUTPATIENT
Start: 2022-05-11 | End: 2022-05-13

## 2022-05-11 RX ORDER — MAGNESIUM SULFATE HEPTAHYDRATE 40 MG/ML
2 INJECTION, SOLUTION INTRAVENOUS ONCE
Status: COMPLETED | OUTPATIENT
Start: 2022-05-11 | End: 2022-05-11

## 2022-05-11 RX ADMIN — FERROUS SULFATE TAB 325 MG (65 MG ELEMENTAL FE) 325 MG: 325 (65 FE) TAB at 08:23

## 2022-05-11 RX ADMIN — Medication 2 UNITS: at 08:23

## 2022-05-11 RX ADMIN — SODIUM CHLORIDE, PRESERVATIVE FREE 10 ML: 5 INJECTION INTRAVENOUS at 16:23

## 2022-05-11 RX ADMIN — ENOXAPARIN SODIUM 40 MG: 100 INJECTION SUBCUTANEOUS at 09:06

## 2022-05-11 RX ADMIN — POTASSIUM CHLORIDE 40 MEQ: 750 TABLET, EXTENDED RELEASE ORAL at 09:20

## 2022-05-11 RX ADMIN — MAGNESIUM SULFATE IN WATER 2 G: 40 INJECTION, SOLUTION INTRAVENOUS at 09:20

## 2022-05-11 RX ADMIN — DONEPEZIL HYDROCHLORIDE 10 MG: 5 TABLET, FILM COATED ORAL at 23:05

## 2022-05-11 RX ADMIN — SODIUM CHLORIDE, PRESERVATIVE FREE 1 G: 5 INJECTION INTRAVENOUS at 18:47

## 2022-05-11 RX ADMIN — DOXYCYCLINE 100 MG: 100 INJECTION, POWDER, LYOPHILIZED, FOR SOLUTION INTRAVENOUS at 09:20

## 2022-05-11 RX ADMIN — SODIUM CHLORIDE, PRESERVATIVE FREE 10 ML: 5 INJECTION INTRAVENOUS at 23:20

## 2022-05-11 RX ADMIN — Medication 3 UNITS: at 16:23

## 2022-05-11 RX ADMIN — FAMOTIDINE 20 MG: 20 TABLET ORAL at 09:07

## 2022-05-11 RX ADMIN — ASPIRIN 81 MG CHEWABLE TABLET 81 MG: 81 TABLET CHEWABLE at 09:06

## 2022-05-11 RX ADMIN — FUROSEMIDE 40 MG: 10 INJECTION, SOLUTION INTRAVENOUS at 09:06

## 2022-05-11 RX ADMIN — ATORVASTATIN CALCIUM 20 MG: 20 TABLET, FILM COATED ORAL at 23:04

## 2022-05-11 RX ADMIN — PAROXETINE HYDROCHLORIDE 20 MG: 20 TABLET, FILM COATED ORAL at 23:04

## 2022-05-11 RX ADMIN — POTASSIUM BICARBONATE 20 MEQ: 782 TABLET, EFFERVESCENT ORAL at 09:06

## 2022-05-11 RX ADMIN — AMLODIPINE BESYLATE 5 MG: 5 TABLET ORAL at 09:06

## 2022-05-11 RX ADMIN — DOXYCYCLINE 100 MG: 100 INJECTION, POWDER, LYOPHILIZED, FOR SOLUTION INTRAVENOUS at 23:24

## 2022-05-11 RX ADMIN — SODIUM CHLORIDE, PRESERVATIVE FREE 10 ML: 5 INJECTION INTRAVENOUS at 08:23

## 2022-05-11 RX ADMIN — ACETAMINOPHEN 650 MG: 325 TABLET ORAL at 09:06

## 2022-05-11 NOTE — PROGRESS NOTES
[address] [facility] Adult  Hospitalist Group                                                                                          Hospitalist Progress Note  Laurier Fothergill, MD  Answering service: [phone] OR [phone] from in house phone        Date of Service:  2022  NAME:  Liang Bush  :  1935  MRN:  [id number]    This documentation was facilitated by a Voice Recognition software and may contain inadvertent typographical errors. Admission Summary:   Patient came to the ED for shortness of breath. She is admitted for acute hypoxic respiratory failure. PMH is significant for type 2 diabetes with neuropathy, stomach ulcer, hyperlipidemia, depression, stroke. Interval history / Subjective:        Patient seen and examined this afternoon, her daughters and grandson in the room. She says her breathing is better but her daughter is noted she seemed winded when she talks. She denied chest pain. She says she feels cold. Apparently her daughter is did not know about her COVID infection/positive result from January. It looks like she was seen at the [address] [facility] urgent care clinic  Assessment & Plan:   Acute hypoxic respiratory failure  CAP  ? CHF. No overt pulmonary edema. proBNP is elevated however. --She is already being diuresed with Lasix. -- CTA lung negative for PE, but showed clusters of micronodules compatible with small airway infection or inflammation. -- Treat for CAP  with ceftriaxone and doxycycline. Rapid COVID test negative. -- Continue supplemental oxygen to keep SPO2 above 92%. -- Cardiology following. Echocardiogram pending. Chronic normocytic anemia. Iron deficiency. -- Stool negative for blood. Iron supplement. Chronic anemia work-up as outpatient if not done so. Type 2 diabetes with peripheral neuropathy. A1c 6.6  -- Accu-Cheks AC&HS. Humalog correction scale.     Hypertension, continue home regimen  Dyslipidemia: Continue home Daughter has patient out in wheel chair off unit can be reached at  Romelia Burr Daughter [phone]   .   statin. Mild dementia per family: She is on [address] discussed with: Independent full code  Surrogate decision makers: Patient does not have POA. She stated her daughters Tony Donis and Nico Markham will be her medical decision makers. Code status: Full code  DVT prophylaxis: Enoxaparin  Anticipated Disposition: Home likely with home health in 2-3 days          Hospital Problems  Date Reviewed: 5/10/2022          Codes Class Noted POA    * (Principal) Acute respiratory failure with hypoxia [facility]) ICD-10-CM: J96.01  ICD-9-CM: 518.81  5/9/2022 Yes                Review of Systems:   A comprehensive review of systems was negative except for that written in the HPI. Vital Signs:    Last 24hrs VS reviewed since prior progress note. Most recent are:        Intake/Output Summary (Last 24 hours) at 5/11/2022 1846  Last data filed at 5/11/2022 1512  Gross per 24 hour   Intake 950 ml   Output 1950 ml   Net -1000 ml        Physical Examination:     I had a face to face encounter with this patient and independently examined them on5/11/2022 as outlined below:        Constitutional:  Alert and oriented. Not in distress. HEENT:  Atraumatic. Oral mucosa moist,. Non icteric sclera. No pallor. Resp:  On oxygen 3 L/min, SPO2 96%. Basal rates. Chest Wall: No deformity     CV:  Grade 3 murmur on the A2 area      GI:  Normoactive  Soft, non distended, non tender. No appreciable organomegaly      :  No CVA or suprapubic tenderness      Musculoskeletal:  Bilateral venous stasis with purplish discoloration, no ischemia. Both feet are sensitive. There is mild edema. Neurologic:  Mental status: Alert and oriented to PPT  Cranial nerves II-XII : WNL  Motor exam:Moves all extremities symmetrically.   Peripheral neuropathy  Gait and balance: Not tested              Data Review:    Review and/or order of clinical lab test  Review and/or order of tests in the radiology section of CPT  Review and/or order of tests in the medicine section of [facility]      Available Lab and Imaging results reviewed and used to formulate the current care plan.       Medications Reviewed:     Current Facility-Administered Medications   Medication Dose Route Frequency    potassium chloride SR (KLOR-CON 10) tablet 40 mEq  40 mEq Oral DAILY    amLODIPine (NORVASC) tablet 5 mg  5 mg Oral DAILY    aspirin chewable tablet 81 mg  81 mg Oral DAILY    baclofen (LIORESAL) tablet 10 mg  10 mg Oral BID PRN    donepeziL (ARICEPT) tablet 10 mg  10 mg Oral QHS    famotidine (PEPCID) tablet 20 mg  20 mg Oral DAILY    PARoxetine (PAXIL) tablet 20 mg  20 mg Oral QHS    atorvastatin (LIPITOR) tablet 20 mg  20 mg Oral QHS    traMADoL (ULTRAM) tablet 50 mg  50 mg Oral Q6H PRN    sodium chloride (NS) flush 5-40 mL  5-40 mL IntraVENous Q8H    sodium chloride (NS) flush 5-40 mL  5-40 mL IntraVENous PRN    acetaminophen (TYLENOL) tablet 650 mg  650 mg Oral Q6H PRN    Or    acetaminophen (TYLENOL) suppository 650 mg  650 mg Rectal Q6H PRN    polyethylene glycol (MIRALAX) packet 17 g  17 g Oral DAILY PRN    ondansetron (ZOFRAN ODT) tablet 4 mg  4 mg Oral Q8H PRN    Or    ondansetron (ZOFRAN) injection 4 mg  4 mg IntraVENous Q6H PRN    enoxaparin (LOVENOX) injection 40 mg  40 mg SubCUTAneous DAILY    insulin lispro (HUMALOG) injection   SubCUTAneous AC&HS    glucose chewable tablet 16 g  4 Tablet Oral PRN    glucagon (GLUCAGEN) injection 1 mg  1 mg IntraMUSCular PRN    dextrose 10 % infusion 0-250 mL  0-250 mL IntraVENous PRN    furosemide (LASIX) injection 40 mg  40 mg IntraVENous DAILY    ferrous sulfate tablet 325 mg  1 Tablet Oral ACB    cefTRIAXone (ROCEPHIN) 1 g in 0.9% sodium chloride 10 mL IV syringe  1 g IntraVENous Q24H    doxycycline (VIBRAMYCIN) 100 mg in 0.9% sodium chloride (MBP/ADV) 100 mL MBP  100 mg IntraVENous Q12H    albuterol-ipratropium (DUO-NEB) 2.5 MG-0.5 MG/3 ML  3 mL Nebulization Q4H PRN ______________________________________________________________________  EXPECTED LENGTH OF STAY: 3d 14h  ACTUAL LENGTH OF STAY:          2                 Debbie Lee MD

## 2022-05-11 NOTE — PROGRESS NOTES
Spiritual Care Assessment/Progress Note  [address]      NAME: Xochilt Suárez      MRN: [id number]  AGE: 82 y.o.  SEX: female  Mosque Affiliation: Synagogue   Language: English     5/11/2022     Total Time (in minutes): 31     Spiritual Assessment begun in [facility] CV SERVICES UNIT through conversation with:         [x]Patient        [] Family    [] Friend(s)        Reason for Consult: Advance medical directive consult,Initial/Spiritual assessment, patient floor     Spiritual beliefs: (Please include comment if needed)     [x] Identifies with a alessia tradition:  Synagogue       [] Supported by a alessia community:            [] Claims no spiritual orientation:           [] Seeking spiritual identity:                [] Adheres to an individual form of spirituality:           [] Not able to assess:                           Identified resources for coping:      [] Prayer                               [] Music                  [] Guided Imagery     [x] Family/friends                 [] Pet visits     [] Devotional reading                         [] Unknown     [] Other:                                               Interventions offered during this visit: (See comments for more details)    Patient Interventions: Advance medical directive consult,Affirmation of emotions/emotional suffering,Coping skills reviewed/reinforced,Affirmation of alessia,Initial/Spiritual assessment, patient floor,Life review/legacy           Plan of Care:     [x] Support spiritual and/or cultural needs    [x] Support AMD and/or advance care planning process      [] Support grieving process   [] Coordinate Rites and/or Rituals    [] Coordination with community clergy   [] No spiritual needs identified at this time   [] Detailed Plan of Care below (See Comments)  [] Make referral to Music Therapy  [] Make referral to Pet Therapy     [] Make referral to Addiction services  [] Make referral to [facility]  [] Make referral to Spiritual Care Partner  [] No future visits requested        [] Contact Spiritual Care for further referrals     Comments: Visit for Advance Directive consult: Ms Bossman Mock was lying in her bed after having a bath. She share she is a , has 4 living children, approximately 12 grandchildren, and approximately 10 great grands. She is a member of [place] in [place], [place]. Her Mormon is aware of her hospitalization and are supporting her and the family. Ms. Bossman Mock worked as a  and  and she now enjoys doing crossword puzzles. At this time, Ms Bossman Mock deferred completion of an Advance Directive because she does not have her glasses to read over the documentation. She is hoping her family will bring this evening. Provided Ms. Rodríguez copy of advance directive and booklet. Facilitated life review/storytelling and provided empathic listening. Visited by: Sherri Soria.  Wilbert Sumner, [address] [place] [address] paging Service [phone] ([phone])

## 2022-05-11 NOTE — PROGRESS NOTES
1930: Bedside and Verbal shift change report given to GO Hernnadez (oncoming nurse) by Jabier Agosto RN (offgoing nurse).  Report included the following information SBAR, Kardex, Intake/Output, MAR, Recent Results and Cardiac Rhythm SR.

## 2022-05-11 NOTE — PROGRESS NOTES
Problem: Falls - Risk of  Goal: *Absence of Falls  Description: Document Tristan Donato Fall Risk and appropriate interventions in the flowsheet. Outcome: Progressing Towards Goal  Note: Fall Risk Interventions:  Mobility Interventions: Bed/chair exit alarm,Communicate number of staff needed for ambulation/transfer,Patient to call before getting OOB    Mentation Interventions: Adequate sleep, hydration, pain control    Medication Interventions: Assess postural VS orthostatic hypotension,Evaluate medications/consider consulting pharmacy,Patient to call before getting OOB,Teach patient to arise slowly    Elimination Interventions: Call light in reach,Patient to call for help with toileting needs              Problem: Diabetes Self-Management  Goal: *Disease process and treatment process  Description: Define diabetes and identify own type of diabetes; list 3 options for treating diabetes. Outcome: Progressing Towards Goal  Goal: *Incorporating nutritional management into lifestyle  Description: Describe effect of type, amount and timing of food on blood glucose; list 3 methods for planning meals. Outcome: Progressing Towards Goal  Goal: *Using medications safely  Description: State effect of diabetes medications on diabetes; name diabetes medication taking, action and side effects. Outcome: Progressing Towards Goal  Goal: *Monitoring blood glucose, interpreting and using results  Description: Identify recommended blood glucose targets  and personal targets. Outcome: Progressing Towards Goal  Goal: *Prevention, detection, treatment of acute complications  Description: List symptoms of hyper- and hypoglycemia; describe how to treat low blood sugar and actions for lowering  high blood glucose level.   Outcome: Progressing Towards Goal  Goal: *Prevention, detection and treatment of chronic complications  Description: Define the natural course of diabetes and describe the relationship of blood glucose levels to long term complications of diabetes. Outcome: Progressing Towards Goal  Goal: *Sick day guidelines  Outcome: Progressing Towards Goal     Problem: Pressure Injury - Risk of  Goal: *Prevention of pressure injury  Description: Document Jens Scale and appropriate interventions in the flowsheet. Outcome: Progressing Towards Goal  Note: Pressure Injury Interventions:             Activity Interventions: Increase time out of bed,Pressure redistribution bed/mattress(bed type),PT/OT evaluation    Mobility Interventions: Pressure redistribution bed/mattress (bed type),PT/OT evaluation    Nutrition Interventions: Document food/fluid/supplement intake    Friction and Shear Interventions: HOB 30 degrees or less,Minimize layers

## 2022-05-11 NOTE — PROGRESS NOTES
[address] [place] [address]               Division of Cardiology  [phone]                       Progress note              Majo Stephens M.D., POLY. Ramses Chirinos   :   1935   MRN:   [id number]         ICD-10-CM ICD-9-CM    1. Acute pulmonary edema (HCC)  J81.0 518.4    2. Acute respiratory failure with hypoxia (HCC)  J96.01 518.81                  HPI  Ms. Padmini Costa is a 82 y.o. female with PMH of DM type II, HLD, HTN, ?CVA (pt denies), BLE venous stasis, dementia, aortic stenosis  who presented to ER yesterday with chief c/o SOB. She reports  SOB off and on for 2-3 weeks. SOB worse at night and reports orthopnea and PND. SOB is worse with exertion, has SOB with walking short distances. States she has had fatigue and not felt well for months. Reports BLE edema L>R. She denies any history of chest pain, arm pain or jaw pain. She denies any history of dizziness/lightheadedness or syncope. She denies any prior history of cardiac disease, no history of CAD/stress test/cath. Evaluation here revealed elevated pro BNP (6,962), and CXR with mild intertital edema with trace bilateral pleural effusions. She also has notable anemia with hgb downtrending. Denies blood in stools or urine but reports dk stools (takes iron). HS troponin 8 on admission. Her O2 sats were 84 on Room air. Echocardiogram pending. Of note: pt had echocardiogram in 2018 with EF 55-60% but also noted moderate Aortic stenosis. The patient feels nervous and jittery this morning may be some shortness of breath still ongoing no chest pain.         Assessment/Plan: 1. Shortness of breath: Some improvement. Echo still pending. Chest CT noted with no pulmonary embolism and small airway infection or inflammation. This may be contributing clearly to her shortness of breath. She has a normal troponin.     Her EKG was nonischemic with normal sinus rhythm PACs and nonspecific ST-T changes. 2.  Aortic stenosis: History of moderate June 2018. Prominent murmur with decreased S2.  Proceed with echo as well and this also of course may contribute to her shortness of breath. 3.  Hypertension: Continue amlodipine. 4.  Anemia: She remains anemic and clearly also this can contribute to her shortness of breath. 5.  Bilateral lower extremity 1: Currently better continue p.o. Lasix for now. CARDIAC STUDIES                     @LASTEKG      IMAGING      CT Results (most recent):  Results from Hospital Encounter encounter on 05/09/22    CTA CHEST W OR W WO CONT    Narrative  EXAM:  CTA CHEST W OR W WO CONT    INDICATION: Respiratory failure    COMPARISON: None. TECHNIQUE: Helical thin section chest CT following uneventful intravenous  administration of nonionic contrast 80 mL of isovue 370 according to  departmental PE protocol. Coronal and sagittal reformats were performed. 3D post  processing was performed. CT dose reduction was achieved through the use of a  standardized protocol tailored for this examination and automatic exposure  control for dose modulation. FINDINGS: This is a good quality study for the evaluation of pulmonary embolism  to the first subsegmental arterial level. There is no pulmonary embolism to this  level. THYROID: No nodule. MEDIASTINUM: No mass or lymphadenopathy. IBRAHIMA: No mass or lymphadenopathy. THORACIC AORTA: No aneurysm. HEART: Normal in size. Mitral annular calcification. ESOPHAGUS: No wall thickening or dilatation. TRACHEA/BRONCHI: Patent. PLEURA: Small bilateral pleural effusions. LUNGS: Clustered micronodules are present in the right upper lobe, with  scattered linear atelectasis and scarring. Nodule cluster is also present in the  left upper lobe. Dependent atelectasis is present in the bilateral lung bases. UPPER ABDOMEN: Partially imaged. No acute pathology.   BONES: No aggressive bone lesion or fracture. Impression  1. No pulmonary embolism, no acute vascular abnormality. 2. Clusters of micronodules compatible with small airway infection or  inflammation. Scattered foci of subsegmental atelectasis. 3. Small bilateral pleural effusions. XR Results (most recent):  Results from Hospital Encounter encounter on 05/09/22    XR CHEST PORT    Narrative  EXAM: XR CHEST PORT    INDICATION: Rule out chf    COMPARISON: 8/14/2021    FINDINGS: A portable AP radiograph of the chest was obtained at 1837 hours. There is pulmonary venous congestion with mild interstitial pulmonary edema. Blunting of the lateral costophrenic sulci bilaterally suggesting trace  effusions. Cardiac, mediastinal and hilar contours are unchanged. No cardiac  contour enlargement is shown. Minimal-mild thoracic aortic tortuosity is noted. The bones are moderately osteopenic. Impression  Mild interstitial port edema with trace bilateral pleural effusions. MRI Results (most recent):  Results from [place] encounter on 08/14/21    MRI BRAIN WO CONT    Narrative  EXAM: MRI BRAIN WO CONT    INDICATION: dizziness    COMPARISON: CT 8/14/2021. CONTRAST: None. TECHNIQUE:  Multiplanar multisequence acquisition without contrast of the brain. FINDINGS:  The ventricles are normal in size and position. There is no acute is no  demonstration of acute infarction. There is no intracranial mass demonstrated. There is mild prominence of the ventricles and cortical sulci indicating  atrophy. Abundant nonspecific bilateral cerebral periventricular and centrum  semiovale white matter signal alteration. Though nonspecific, the findings could  be on the basis of chronic small vessel ischemic disease the white matter. The  vascular flow voids at the base the brain are normal in conspicuity.  The  unenhanced sella, optic chiasm, orbits and paranasal sinuses appear within  normal limits. Impression  Atrophy and chronic small vessel ischemic disease the white matter. No acute  infarction demonstrated. Past Medical History:   Diagnosis Date    Arthritis     Chronic pain     Depression     Diabetes [facility]     Hypercholesterolemia     Hypertension     Stroke ([facility]     TIA 10 yrs back    Stroke ([facility]     2003           Past Surgical History:   Procedure Laterality Date    HX CARPAL TUNNEL RELEASE  1990    HX CATARACT REMOVAL      bilateral    HX HYSTERECTOMY  1986    HX HYSTERECTOMY      HX ORTHOPAEDIC      HX ORTHOPAEDIC      carpel tunnel left    HX ORTHOPAEDIC      left foot heel spur    HX REFRACTIVE SURGERY  2006               Visit Vitals  BP (!) 140/57 (BP 1 Location: Right upper arm, BP Patient Position: At rest)   Pulse 78   Temp 98.2 °F (36.8 °C)   Resp 14   Ht 5' 7\" (1.702 m)   Wt 175 lb 11.2 oz (79.7 kg)   SpO2 91%   BMI 27.52 kg/m²         Wt Readings from Last 3 Encounters:   05/11/22 175 lb 11.2 oz (79.7 kg)   10/28/21 175 lb (79.4 kg)   09/28/21 175 lb (79.4 kg)         Review of Systems:   Pertinent items are noted in the History of Present Illness. 05/11 0701 - 05/11 1900  In: 200 [P.O.:200]  Out: 200 [Urine:200]    05/09 1901 - 05/11 0700  In: 150 [P.O.:150]  Out: 2300 [Urine:2300]      Telemetry: normal sinus rhythm    Physical Exam:    Neck: no JVD  Heart: regular rate and rhythm, systolic murmur: systolic ejection 3/6, decrescendo at 2nd left intercostal space, at 2nd right intercostal space  Lungs: diminished breath sounds R base, L base  Abdomen: soft, non-tender.  Bowel sounds normal. No masses,  no organomegaly  Extremities: no edema    Current Facility-Administered Medications   Medication Dose Route Frequency    potassium chloride SR (KLOR-CON 10) tablet 40 mEq  40 mEq Oral DAILY    amLODIPine (NORVASC) tablet 5 mg  5 mg Oral DAILY    aspirin chewable tablet 81 mg  81 mg Oral DAILY    baclofen (LIORESAL) tablet 10 mg  10 mg Oral BID PRN    donepeziL (ARICEPT) tablet 10 mg  10 mg Oral QHS    famotidine (PEPCID) tablet 20 mg  20 mg Oral DAILY    PARoxetine (PAXIL) tablet 20 mg  20 mg Oral QHS    atorvastatin (LIPITOR) tablet 20 mg  20 mg Oral QHS    traMADoL (ULTRAM) tablet 50 mg  50 mg Oral Q6H PRN    sodium chloride (NS) flush 5-40 mL  5-40 mL IntraVENous Q8H    sodium chloride (NS) flush 5-40 mL  5-40 mL IntraVENous PRN    acetaminophen (TYLENOL) tablet 650 mg  650 mg Oral Q6H PRN    Or    acetaminophen (TYLENOL) suppository 650 mg  650 mg Rectal Q6H PRN    polyethylene glycol (MIRALAX) packet 17 g  17 g Oral DAILY PRN    ondansetron (ZOFRAN ODT) tablet 4 mg  4 mg Oral Q8H PRN    Or    ondansetron (ZOFRAN) injection 4 mg  4 mg IntraVENous Q6H PRN    enoxaparin (LOVENOX) injection 40 mg  40 mg SubCUTAneous DAILY    insulin lispro (HUMALOG) injection   SubCUTAneous AC&HS    glucose chewable tablet 16 g  4 Tablet Oral PRN    glucagon (GLUCAGEN) injection 1 mg  1 mg IntraMUSCular PRN    dextrose 10 % infusion 0-250 mL  0-250 mL IntraVENous PRN    furosemide (LASIX) injection 40 mg  40 mg IntraVENous DAILY    ferrous sulfate tablet 325 mg  1 Tablet Oral ACB    cefTRIAXone (ROCEPHIN) 1 g in 0.9% sodium chloride 10 mL IV syringe  1 g IntraVENous Q24H    doxycycline (VIBRAMYCIN) 100 mg in 0.9% sodium chloride (MBP/ADV) 100 mL MBP  100 mg IntraVENous Q12H    albuterol-ipratropium (DUO-NEB) 2.5 MG-0.5 MG/3 ML  3 mL Nebulization Q4H PRN         All Cardiac Markers in the last 24 hours:  No results found for: CPK, CK, CKMMB, CKMB, RCK3, CKMBT, CKMBPOC, CKNDX, CKND1, KIMBERLYN, TROPT, TROIQ, BROOK, TROPT, TNIPOC, BNP, BNPP, BNPNT     Lab Results   Component Value Date/Time    Creatinine 0.98 05/11/2022 04:08 AM          Lab Results   Component Value Date/Time    Troponin-I, Qt. <0.05 08/14/2021 06:49 PM         Lab Results   Component Value Date/Time    WBC 9.1 05/11/2022 04:08 AM    HGB 7.8 (L) 05/11/2022 04:08 AM    HCT 25.3 (L) 05/11/2022 04:08 AM    PLATELET 531 14/47/5357 04:08 AM    MCV 91.7 05/11/2022 04:08 AM         Lab Results   Component Value Date/Time    Sodium 138 05/11/2022 04:08 AM    Potassium 3.5 05/11/2022 04:08 AM    Chloride 105 05/11/2022 04:08 AM    CO2 28 05/11/2022 04:08 AM    Anion gap 5 05/11/2022 04:08 AM    Glucose 128 (H) 05/11/2022 04:08 AM    BUN 12 05/11/2022 04:08 AM    Creatinine 0.98 05/11/2022 04:08 AM    BUN/Creatinine ratio 12 05/11/2022 04:08 AM    GFR est AA >60 05/11/2022 04:08 AM    GFR est non-AA 54 (L) 05/11/2022 04:08 AM    Calcium 8.3 (L) 05/11/2022 04:08 AM         Lab Results   Component Value Date/Time    NT pro-BNP 6,962 (H) 05/09/2022 06:00 PM    NT pro-BNP 1,304 (H) 08/14/2021 06:49 PM         Lab Results   Component Value Date/Time    Cholesterol, total 110 05/10/2022 05:55 AM    HDL Cholesterol 63 05/10/2022 05:55 AM    LDL,Direct 65 11/14/2019 03:57 PM    LDL, calculated 32.6 05/10/2022 05:55 AM    VLDL, calculated 14.4 05/10/2022 05:55 AM    Triglyceride 72 05/10/2022 05:55 AM    CHOL/HDL Ratio 1.7 05/10/2022 05:55 AM         Lab Results   Component Value Date/Time    ALT (SGPT) 17 05/11/2022 04:08 AM    Alk.  phosphatase 52 05/11/2022 04:08 AM    Bilirubin, total 0.5 05/11/2022 04:08 AM

## 2022-05-11 NOTE — ACP (ADVANCE CARE PLANNING)
Advance Care Planning   Advance Care Planning Inpatient Note  [address] Department    Today's Date: 5/11/2022  Unit: [facility] 4 CV SERVICES UNIT    Received request from IDT member. Upon review of chart and communication with care team, patient's decision making abilities are not in question. Patient was/were present in the room during visit. Goals of ACP Conversation:  Discuss Advance Care planning documents      Health Care Decision Makers:    No healthcare decision makers have been documented. Click here to complete [place] including selection of the Healthcare Decision Maker Relationship (ie \"Primary\")    Summary:  No Decision Maker named by patient at this time    Advance Care Planning Documents (Patient Wishes) on file:  None     Assessment:    Visit for Advance Directive consult: Ms Adriel Jiménez was lying in her bed after having a bath. She share she is a , has 4 living children, approximately 12 grandchildren, and approximately 10 great grands. She is a member of [employer] in [place], [place]. Her [employer] is aware of her hospitalization and are supporting her and the family. Ms. Adriel Jiménez worked as a  and  and she now enjoys doing crossword puzzles. At this time, Ms Adriel Jiménez deferred completion of an Advance Directive because she does not have her glasses to read over the documentation. She is hoping her family will bring this evening. Provided Ms. Rodríguez copy of advance directive and booklet. Facilitated life review/storytelling and provided empathic listening. Interventions:  Deferred conversation as patient not interested in completing an advance directive at this time    Care Preferences Communicated:  No    Outcomes/Plan:  Deferred    Rev.  Santiago Moscoso, [facility] on 5/11/2022 at 3:54 PM

## 2022-05-11 NOTE — PROGRESS NOTES
0730: Bedside and Verbal shift change report given to Rachael Ryder RN (oncoming nurse) by Joi Gomez RN (offgoing nurse). Report included the following information SBAR, Kardex, Intake/Output, MAR, Recent Results, and Cardiac Rhythm Sinus Rhythm . [id number] Pollard NP updated about patient having runs of asymptomatic V-tach and patients heart murmur. No new orders at this time. 5294: Patient had asymptomatic run of V-tach     1930: Bedside and Verbal shift change report given to Jose Cruz Moreno RN/GO Burnette (oncoming nurse) by Rachael Ryder RN (offgoing nurse). Report included the following information SBAR, Kardex, Intake/Output, MAR, Recent Results and Cardiac Rhythm Sinus Rhythm. Care Plans:   Problem: Falls - Risk of  Goal: *Absence of Falls  Description: Document Marvin Fall Risk and appropriate interventions in the flowsheet. Outcome: Progressing Towards Goal  Note: Fall Risk Interventions:  Mobility Interventions: Bed/chair exit alarm,Communicate number of staff needed for ambulation/transfer,Patient to call before getting OOB    Mentation Interventions: Adequate sleep, hydration, pain control    Medication Interventions: Assess postural VS orthostatic hypotension,Bed/chair exit alarm,Patient to call before getting OOB,Teach patient to arise slowly    Elimination Interventions: Bed/chair exit alarm,Call light in reach,Patient to call for help with toileting needs,Stay With Me (per policy),Toilet paper/wipes in reach,Toileting schedule/hourly rounds              Problem: Patient Education: Go to Patient Education Activity  Goal: Patient/Family Education  Outcome: Progressing Towards Goal     Problem: Diabetes Self-Management  Goal: *Disease process and treatment process  Description: Define diabetes and identify own type of diabetes; list 3 options for treating diabetes.   Outcome: Progressing Towards Goal  Goal: *Incorporating nutritional management into lifestyle  Description: Describe effect of type, amount and timing of food on blood glucose; list 3 methods for planning meals. Outcome: Progressing Towards Goal  Goal: *Incorporating physical activity into lifestyle  Description: State effect of exercise on blood glucose levels. Outcome: Progressing Towards Goal  Goal: *Developing strategies to promote health/change behavior  Description: Define the ABC's of diabetes; identify appropriate screenings, schedule and personal plan for screenings. Outcome: Progressing Towards Goal  Goal: *Using medications safely  Description: State effect of diabetes medications on diabetes; name diabetes medication taking, action and side effects. Outcome: Progressing Towards Goal  Goal: *Monitoring blood glucose, interpreting and using results  Description: Identify recommended blood glucose targets  and personal targets. Outcome: Progressing Towards Goal  Goal: *Prevention, detection, treatment of acute complications  Description: List symptoms of hyper- and hypoglycemia; describe how to treat low blood sugar and actions for lowering  high blood glucose level. Outcome: Progressing Towards Goal  Goal: *Prevention, detection and treatment of chronic complications  Description: Define the natural course of diabetes and describe the relationship of blood glucose levels to long term complications of diabetes.   Outcome: Progressing Towards Goal  Goal: *Developing strategies to address psychosocial issues  Description: Describe feelings about living with diabetes; identify support needed and support network  Outcome: Progressing Towards Goal  Goal: *Insulin pump training  Outcome: Progressing Towards Goal  Goal: *Sick day guidelines  Outcome: Progressing Towards Goal  Goal: *Patient Specific Goal (EDIT GOAL, INSERT TEXT)  Outcome: Progressing Towards Goal     Problem: Patient Education: Go to Patient Education Activity  Goal: Patient/Family Education  Outcome: Progressing Towards Goal     Problem: Pressure Injury - Risk of  Goal: *Prevention of pressure injury  Description: Document Jens Scale and appropriate interventions in the flowsheet. Outcome: Progressing Towards Goal  Note: Pressure Injury Interventions: Activity Interventions: Assess need for specialty bed,Increase time out of bed,Pressure redistribution bed/mattress(bed type)    Mobility Interventions: Assess need for specialty bed,Pressure redistribution bed/mattress (bed type),Turn and reposition approx.  every two hours(pillow and wedges)    Nutrition Interventions: Document food/fluid/supplement intake    Friction and Shear Interventions: Lift sheet,Minimize layers                Problem: Patient Education: Go to Patient Education Activity  Goal: Patient/Family Education  Outcome: Progressing Towards Goal

## 2022-05-12 ENCOUNTER — APPOINTMENT (OUTPATIENT)
Dept: NON INVASIVE DIAGNOSTICS | Age: 87
DRG: 193 | End: 2022-05-12
Attending: INTERNAL MEDICINE
Payer: MEDICARE

## 2022-05-12 LAB
ANION GAP SERPL CALC-SCNC: 5 MMOL/L (ref 5–15)
BUN SERPL-MCNC: 10 MG/DL (ref 6–20)
BUN/CREAT SERPL: 9 (ref 12–20)
CALCIUM SERPL-MCNC: 8.5 MG/DL (ref 8.5–10.1)
CHLORIDE SERPL-SCNC: 104 MMOL/L (ref 97–108)
CO2 SERPL-SCNC: 30 MMOL/L (ref 21–32)
CREAT SERPL-MCNC: 1.09 MG/DL (ref 0.55–1.02)
ECHO AV AREA PEAK VELOCITY: 1 CM2
ECHO AV AREA PEAK VELOCITY: 1.5 CM2
ECHO AV AREA VTI: 1.2 CM2
ECHO AV AREA/BSA VTI: 0.7 CM2/M2
ECHO AV MEAN GRADIENT: 17 MMHG
ECHO AV MEAN VELOCITY: 1.9 M/S
ECHO AV PEAK GRADIENT: 28 MMHG
ECHO AV PEAK VELOCITY: 2.6 M/S
ECHO AV VTI: 51 CM
ECHO LV FRACTIONAL SHORTENING: 24 % (ref 28–44)
ECHO LV INTERNAL DIMENSION DIASTOLE INDEX: 2.03 CM/M2
ECHO LV INTERNAL DIMENSION DIASTOLIC: 3.7 CM (ref 3.9–5.3)
ECHO LV INTERNAL DIMENSION SYSTOLIC INDEX: 1.54 CM/M2
ECHO LV INTERNAL DIMENSION SYSTOLIC: 2.8 CM
ECHO LV IVSD: 0.9 CM (ref 0.6–0.9)
ECHO LV MASS 2D: 104.6 G (ref 67–162)
ECHO LV MASS INDEX 2D: 57.5 G/M2 (ref 43–95)
ECHO LV POSTERIOR WALL DIASTOLIC: 1 CM (ref 0.6–0.9)
ECHO LV RELATIVE WALL THICKNESS RATIO: 0.54
ECHO LVOT AREA: 2.5 CM2
ECHO LVOT AV VTI INDEX: 0.45
ECHO LVOT DIAM: 1.8 CM
ECHO LVOT MEAN GRADIENT: 3 MMHG
ECHO LVOT PEAK GRADIENT: 10 MMHG
ECHO LVOT PEAK GRADIENT: 4 MMHG
ECHO LVOT PEAK VELOCITY: 1 M/S
ECHO LVOT PEAK VELOCITY: 1.5 M/S
ECHO LVOT STROKE VOLUME INDEX: 32.4 ML/M2
ECHO LVOT SV: 59 ML
ECHO LVOT VTI: 23.2 CM
ECHO MV A VELOCITY: 0.91 M/S
ECHO MV E VELOCITY: 1.17 M/S
ECHO MV E/A RATIO: 1.29
ECHO PULMONARY ARTERY SYSTOLIC PRESSURE (PASP): 78 MMHG
ECHO RV TAPSE: 1.7 CM (ref 1.7–?)
ECHO TV REGURGITANT MAX VELOCITY: 4.33 M/S
ECHO TV REGURGITANT PEAK GRADIENT: 75 MMHG
ERYTHROCYTE [DISTWIDTH] IN BLOOD BY AUTOMATED COUNT: 19.9 % (ref 11.5–14.5)
GLUCOSE BLD STRIP.AUTO-MCNC: 128 MG/DL (ref 65–117)
GLUCOSE BLD STRIP.AUTO-MCNC: 142 MG/DL (ref 65–117)
GLUCOSE BLD STRIP.AUTO-MCNC: 222 MG/DL (ref 65–117)
GLUCOSE BLD STRIP.AUTO-MCNC: 223 MG/DL (ref 65–117)
GLUCOSE SERPL-MCNC: 135 MG/DL (ref 65–100)
HCT VFR BLD AUTO: 25.5 % (ref 35–47)
HGB BLD-MCNC: 7.7 G/DL (ref 11.5–16)
MAGNESIUM SERPL-MCNC: 1.8 MG/DL (ref 1.6–2.4)
MCH RBC QN AUTO: 28.1 PG (ref 26–34)
MCHC RBC AUTO-ENTMCNC: 30.2 G/DL (ref 30–36.5)
MCV RBC AUTO: 93.1 FL (ref 80–99)
NRBC # BLD: 0 K/UL (ref 0–0.01)
NRBC BLD-RTO: 0 PER 100 WBC
PLATELET # BLD AUTO: 286 K/UL (ref 150–400)
PMV BLD AUTO: 10.8 FL (ref 8.9–12.9)
POTASSIUM SERPL-SCNC: 4.7 MMOL/L (ref 3.5–5.1)
RBC # BLD AUTO: 2.74 M/UL (ref 3.8–5.2)
SERVICE CMNT-IMP: ABNORMAL
SODIUM SERPL-SCNC: 139 MMOL/L (ref 136–145)
WBC # BLD AUTO: 9.4 K/UL (ref 3.6–11)

## 2022-05-12 PROCEDURE — 93306 TTE W/DOPPLER COMPLETE: CPT | Performed by: SPECIALIST

## 2022-05-12 PROCEDURE — 83735 ASSAY OF MAGNESIUM: CPT

## 2022-05-12 PROCEDURE — 99233 SBSQ HOSP IP/OBS HIGH 50: CPT | Performed by: SPECIALIST

## 2022-05-12 PROCEDURE — 74011250637 HC RX REV CODE- 250/637: Performed by: HOSPITALIST

## 2022-05-12 PROCEDURE — 74011250636 HC RX REV CODE- 250/636: Performed by: INTERNAL MEDICINE

## 2022-05-12 PROCEDURE — 93306 TTE W/DOPPLER COMPLETE: CPT

## 2022-05-12 PROCEDURE — 77010033678 HC OXYGEN DAILY

## 2022-05-12 PROCEDURE — 74011000258 HC RX REV CODE- 258: Performed by: HOSPITALIST

## 2022-05-12 PROCEDURE — 94760 N-INVAS EAR/PLS OXIMETRY 1: CPT

## 2022-05-12 PROCEDURE — 77030038269 HC DRN EXT URIN PURWCK BARD -A

## 2022-05-12 PROCEDURE — 82962 GLUCOSE BLOOD TEST: CPT

## 2022-05-12 PROCEDURE — 85027 COMPLETE CBC AUTOMATED: CPT

## 2022-05-12 PROCEDURE — 74011250636 HC RX REV CODE- 250/636: Performed by: HOSPITALIST

## 2022-05-12 PROCEDURE — 36415 COLL VENOUS BLD VENIPUNCTURE: CPT

## 2022-05-12 PROCEDURE — 65270000046 HC RM TELEMETRY

## 2022-05-12 PROCEDURE — 74011000250 HC RX REV CODE- 250: Performed by: INTERNAL MEDICINE

## 2022-05-12 PROCEDURE — 74011636637 HC RX REV CODE- 636/637: Performed by: INTERNAL MEDICINE

## 2022-05-12 PROCEDURE — 80048 BASIC METABOLIC PNL TOTAL CA: CPT

## 2022-05-12 PROCEDURE — 74011000250 HC RX REV CODE- 250: Performed by: HOSPITALIST

## 2022-05-12 PROCEDURE — 74011250637 HC RX REV CODE- 250/637: Performed by: INTERNAL MEDICINE

## 2022-05-12 RX ORDER — LOPERAMIDE HYDROCHLORIDE 2 MG/1
2 CAPSULE ORAL
Status: DISCONTINUED | OUTPATIENT
Start: 2022-05-12 | End: 2022-05-18 | Stop reason: HOSPADM

## 2022-05-12 RX ORDER — FUROSEMIDE 40 MG/1
40 TABLET ORAL DAILY
Status: DISCONTINUED | OUTPATIENT
Start: 2022-05-13 | End: 2022-05-18 | Stop reason: HOSPADM

## 2022-05-12 RX ORDER — ALPRAZOLAM 0.25 MG/1
0.25 TABLET ORAL
Status: DISCONTINUED | OUTPATIENT
Start: 2022-05-12 | End: 2022-05-13

## 2022-05-12 RX ADMIN — ASPIRIN 81 MG CHEWABLE TABLET 81 MG: 81 TABLET CHEWABLE at 08:10

## 2022-05-12 RX ADMIN — FAMOTIDINE 20 MG: 20 TABLET ORAL at 08:10

## 2022-05-12 RX ADMIN — FUROSEMIDE 40 MG: 10 INJECTION, SOLUTION INTRAVENOUS at 08:10

## 2022-05-12 RX ADMIN — POTASSIUM CHLORIDE 40 MEQ: 750 TABLET, EXTENDED RELEASE ORAL at 08:10

## 2022-05-12 RX ADMIN — DONEPEZIL HYDROCHLORIDE 10 MG: 5 TABLET, FILM COATED ORAL at 22:21

## 2022-05-12 RX ADMIN — DOXYCYCLINE 100 MG: 100 INJECTION, POWDER, LYOPHILIZED, FOR SOLUTION INTRAVENOUS at 22:23

## 2022-05-12 RX ADMIN — SODIUM CHLORIDE, PRESERVATIVE FREE 10 ML: 5 INJECTION INTRAVENOUS at 15:06

## 2022-05-12 RX ADMIN — Medication 3 UNITS: at 17:48

## 2022-05-12 RX ADMIN — Medication 2 UNITS: at 22:38

## 2022-05-12 RX ADMIN — SODIUM CHLORIDE, PRESERVATIVE FREE 1 G: 5 INJECTION INTRAVENOUS at 19:01

## 2022-05-12 RX ADMIN — SODIUM CHLORIDE, PRESERVATIVE FREE 10 ML: 5 INJECTION INTRAVENOUS at 07:11

## 2022-05-12 RX ADMIN — ATORVASTATIN CALCIUM 20 MG: 20 TABLET, FILM COATED ORAL at 22:21

## 2022-05-12 RX ADMIN — ENOXAPARIN SODIUM 40 MG: 100 INJECTION SUBCUTANEOUS at 08:10

## 2022-05-12 RX ADMIN — Medication 2 UNITS: at 07:12

## 2022-05-12 RX ADMIN — FERROUS SULFATE TAB 325 MG (65 MG ELEMENTAL FE) 325 MG: 325 (65 FE) TAB at 07:12

## 2022-05-12 RX ADMIN — DOXYCYCLINE 100 MG: 100 INJECTION, POWDER, LYOPHILIZED, FOR SOLUTION INTRAVENOUS at 08:30

## 2022-05-12 RX ADMIN — LOPERAMIDE HYDROCHLORIDE 2 MG: 2 CAPSULE ORAL at 17:48

## 2022-05-12 RX ADMIN — AMLODIPINE BESYLATE 5 MG: 5 TABLET ORAL at 08:11

## 2022-05-12 RX ADMIN — PAROXETINE HYDROCHLORIDE 20 MG: 20 TABLET, FILM COATED ORAL at 22:21

## 2022-05-12 NOTE — PROGRESS NOTES
[facility] Adult  Hospitalist Group                                                                                          Hospitalist Progress Note  Vanetta Skiff, MD  Answering service: [phone] OR [phone] from in house phone        Date of Service:  2022  NAME:  Alma Gerardo  :  1935  MRN:  [id number]    This documentation was facilitated by a Voice Recognition software and may contain inadvertent typographical errors. Admission Summary:   Patient came to the ED for shortness of breath. She is admitted for acute hypoxic respiratory failure. PMH is significant for type 2 diabetes with neuropathy, stomach ulcer, hyperlipidemia, depression, stroke. Interval history / Subjective:        [place] is sitting in the chair by the window. She now tells me she has been having diarrhea alternating with constipation for over a year. She denies abdominal pain, nausea or vomiting. Assessment & Plan:   Acute hypoxic respiratory failure  CAP  ? CHF. No overt pulmonary edema. proBNP is elevated however. --She is already being diuresed with Lasix. -- CTA lung negative for PE, but showed clusters of micronodules compatible with small airway infection or inflammation. -- Treat for CAP  with ceftriaxone and doxycycline. Rapid COVID test negative. -- Continue supplemental oxygen to keep SPO2 above 92%. -- Cardiology following.    -- Repeat echocardiogram showed normal EF 65 to 22%, normal diastolic function. Moderate AAS. Severe PAH, large left pleural effusion. Chronic normocytic anemia. Iron deficiency. Hemoglobin declining since admission. -- Stool negative for blood. Iron supplement. -- We will give iron infusion  -- Ask GI eval.    Alternating constipation/diarrhea, suspect IBS  --No fever, leukocytosis, abdominal pain. ...> No suspicion for infectious etiology  -- Try Imodium. Type 2 diabetes with peripheral neuropathy. A1c 6.6  -- Accu-Cheks AC&HS. Humalog correction scale. Hypertension, continue home regimen  Dyslipidemia: Continue home statin. Mild dementia per family: She is on Aricept    Anxiety: Continue Paxil. Add low-dose as needed Xanax      Care Plan discussed with: Independent full code  Surrogate decision makers: Patient does not have POA. She stated her daughters Román Proctor and Dania Matson will be her medical decision makers. Code status: Full code  DVT prophylaxis: Enoxaparin  Anticipated Disposition: Home likely with home health in 2-3 days          Hospital Problems  Date Reviewed: 5/10/2022          Codes Class Noted POA    * (Principal) Acute respiratory failure with hypoxia [facility]) ICD-10-CM: J96.01  ICD-9-CM: 518.81  5/9/2022 Yes                Review of Systems:   A comprehensive review of systems was negative except for that written in the HPI. Vital Signs:    Last 24hrs VS reviewed since prior progress note. Most recent are:        Intake/Output Summary (Last 24 hours) at 5/12/2022 1755  Last data filed at 5/12/2022 1505  Gross per 24 hour   Intake 1300 ml   Output 3750 ml   Net -2450 ml        Physical Examination:     I had a face to face encounter with this patient and independently examined them on5/12/2022 as outlined below:        Constitutional:  Alert and oriented. Not in distress. HEENT:  Atraumatic. Oral mucosa moist,. Non icteric sclera. No pallor. Resp:  On oxygen 3 L/min, SPO2 96%. Basal rates. Chest Wall: No deformity     CV:  Grade 3 murmur on the A2 area      GI:  Normoactive  Soft, non distended, non tender. No appreciable organomegaly      :  No CVA or suprapubic tenderness      Musculoskeletal:  Bilateral venous stasis with purplish discoloration, no ischemia. Both feet are sensitive. There is mild edema. Neurologic:  Mental status: Alert and oriented to PPT  Cranial nerves II-XII : WNL  Motor exam:Moves all extremities symmetrically.   Peripheral neuropathy  Gait and balance: Not tested              Data Review:    Review and/or order of clinical lab test  Review and/or order of tests in the radiology section of CPT  Review and/or order of tests in the medicine section of CPT      Available Lab and Imaging results reviewed and used to formulate the current care plan.       Medications Reviewed:     Current Facility-Administered Medications   Medication Dose Route Frequency    [START ON 5/13/2022] furosemide (LASIX) tablet 40 mg  40 mg Oral DAILY    loperamide (IMODIUM) capsule 2 mg  2 mg Oral Q4H PRN    ALPRAZolam (XANAX) tablet 0.25 mg  0.25 mg Oral BID PRN    potassium chloride SR (KLOR-CON 10) tablet 40 mEq  40 mEq Oral DAILY    amLODIPine (NORVASC) tablet 5 mg  5 mg Oral DAILY    aspirin chewable tablet 81 mg  81 mg Oral DAILY    baclofen (LIORESAL) tablet 10 mg  10 mg Oral BID PRN    donepeziL (ARICEPT) tablet 10 mg  10 mg Oral QHS    famotidine (PEPCID) tablet 20 mg  20 mg Oral DAILY    PARoxetine (PAXIL) tablet 20 mg  20 mg Oral QHS    atorvastatin (LIPITOR) tablet 20 mg  20 mg Oral QHS    traMADoL (ULTRAM) tablet 50 mg  50 mg Oral Q6H PRN    sodium chloride (NS) flush 5-40 mL  5-40 mL IntraVENous Q8H    sodium chloride (NS) flush 5-40 mL  5-40 mL IntraVENous PRN    acetaminophen (TYLENOL) tablet 650 mg  650 mg Oral Q6H PRN    Or    acetaminophen (TYLENOL) suppository 650 mg  650 mg Rectal Q6H PRN    polyethylene glycol (MIRALAX) packet 17 g  17 g Oral DAILY PRN    ondansetron (ZOFRAN ODT) tablet 4 mg  4 mg Oral Q8H PRN    Or    ondansetron (ZOFRAN) injection 4 mg  4 mg IntraVENous Q6H PRN    enoxaparin (LOVENOX) injection 40 mg  40 mg SubCUTAneous DAILY    insulin lispro (HUMALOG) injection   SubCUTAneous AC&HS    glucose chewable tablet 16 g  4 Tablet Oral PRN    glucagon (GLUCAGEN) injection 1 mg  1 mg IntraMUSCular PRN    dextrose 10 % infusion 0-250 mL  0-250 mL IntraVENous PRN    ferrous sulfate tablet 325 mg  1 Tablet Oral ACB    cefTRIAXone (ROCEPHIN) 1 g in 0.9% sodium chloride 10 mL IV syringe  1 g IntraVENous Q24H    doxycycline (VIBRAMYCIN) 100 mg in 0.9% sodium chloride (MBP/ADV) 100 mL MBP  100 mg IntraVENous Q12H    albuterol-ipratropium (DUO-NEB) 2.5 MG-0.5 MG/3 ML  3 mL Nebulization Q4H PRN     ______________________________________________________________________  EXPECTED LENGTH OF STAY: 3d 14h  ACTUAL LENGTH OF STAY:          3                 Rufina Callaway MD

## 2022-05-12 NOTE — PROGRESS NOTES
[facility]  Cardiology Care Note                                Initial visit    Patient Name: Cata Clark - :1935 - KWU:[id number]  Primary Cardiologist: none  Reason for Consult: Acute CHF     HPI: Ms. Severiano Jc is a 82 y.o. female with PMH of DM type II, HLD, HTN, ?CVA (pt denies), BLE venous stasis, dementia, aortic stenosis  who presented to ER yesterday with chief c/o SOB. Of note: pt had echocardiogram in 2018 with EF 55-60% but also noted moderate Aortic stenosis. Subjective: Pt denies chest pain. Reports breathing is ok at rest in bed early this a.m. Had some SOB later AM.  22    ECHO ADULT COMPLETE 2022    Interpretation Summary    Left Ventricle: Normal left ventricular systolic function with a visually estimated EF of 65 - 70%. Left ventricle size is normal. Normal wall thickness. Normal wall motion. Normal diastolic function.   Aortic Valve: Mildly calcified cusp. Mild to moderate stenosis of the aortic valve. AV mean gradient is 17 mmHg. AV peak gradient is 28 mmHg. AV area by continuity VTI is 1.2 cm2.   Mitral Valve: Moderate annular calcification of the mitral valve.   Pulmonary Arteries: Severe pulmonary hypertension present.   Pericardium: Large left pleural effusion. Signed by: Adriana Chew MD on 2022 12:45 PM           Assessment and Plan     1. SOB/Acute hypoxic respiratory insufficiency/AGUILAR: admit  CXR with some pulmonary edema per read but not impressive per our review of image. Repeat echo with normalNL EF, mild-mod AS. On IV diuretics (diuresed 4 Liters since admission). Now off diuretic. CTA chest no pe but small airway infection niharika nflammation could be contributing. She does have severe pulmonary HTN as well. Certainly anemia could be playing a role as well.   Her HS troponin is nl.     2. History of aortic stenosis: moderate by echocardiogram 6/2018. Now mild- mod with AV mean gradient 17 mmhG,. Follow up as outpatient. She does have prominent murmur RUSB. 3. History of HTN: bp currently controlled. On home amlodipine 5 mg daily. 4. History of HLD: continue statin. Check lipids    5. Anemia: hgb downtrending (7.9). Stool for OB pending. 6. BLE edema:hx of BLE edema, on lasix 20 mg po daily PTA. No DVT on US but bilateral distal femoral veins not well visualized. Echo  NL EF. Diuretics on hold. 7. DM: A1C=6.6  8. Severe pulmonary HTN    EF normal, mild- mod AS- no need for intervention at this time, can follow AS as outpatient. Suspect pulmonary etiology, anemia and perhaps ? pulmonary htn playing a role in SOB. Further plan per Dr. Taiwo Batista.     Cardiac testing history:  Echo 6/5/18: Left ventricle: Systolic function was normal. Ejection fraction was  estimated in the range of 55 % to 60 %. There were no regional wall motion  abnormalities. Mitral valve: There was moderate calcification of the posterior leaflet. Aortic valve: The valve was trileaflet. Leaflets exhibited moderately  increased thickness, moderate calcification, reduced mobility, and  sclerosis. There was moderate stenosis.        ____________________________________________________________      Patient Active Problem List   Diagnosis Code    DM (diabetes mellitus) ([facility] [address] E11.9    Mixed hyperlipidemia E78.2    Chronic venous insufficiency I87.2    Depression F32. A    Stomach ulcer K25.9    Diverticula of colon K57.30    Advance care planning Z71.89    Essential hypertension I10    H/O: stroke Z80.78    Type 2 diabetes with nephropathy (HCC) E11.21    Altered mental status R41.82    Acute delirium R41.0    Mild depression F32. A    Dizziness R42    Acute respiratory failure with hypoxia (HCC) J96.01     No specialty comments available.       [] Patient unable to provide secondary to condition    CONSTITUTIONAL: fatigue   ENT/MOUTH: negative  EYES: negative  CARDIOVASCULAR: SOB, PND , AGUILAR, orthopnea, edema   RESPIRATORY: SOB  GASTROINTESTINAL: dark stools  GENITOURINARY: negative  MUSCULOSKELETAL: negative  SKIN: negative  NEUROLOGICAL: negative  PSYCHOLOGICAL: negative   HEME/LYMPH: negative  ENDOCRINE: negative        Past Medical History:   Diagnosis Date    Arthritis     Chronic pain     Depression     Diabetes ([facility] [address]     Hypercholesterolemia     Hypertension     Stroke ([facility] [address]     TIA 10 yrs back    St[facility] ([facility]     2003     Past Surgical History:   Procedure Laterality Date    HX CARPAL TUNNEL RELEASE  1990    HX CATARACT REMOVAL      bilateral    HX HYSTERECTOMY  1986    HX HYSTERECTOMY      HX ORTHOPAEDIC      HX ORTHOPAEDIC      carpel tunnel left    HX ORTHOPAEDIC      left foot heel spur    HX REFRACTIVE SURGERY  2006       Current Facility-Administered Medications:     potassium chloride SR (KLOR-CON 10) tablet 40 mEq, 40 mEq, Oral, DAILY, CHRISTINA Flores MD, 40 mEq at 05/12/22 0810    amLODIPine (NORVASC) tablet 5 mg, 5 mg, Oral, DAILY, Latha Cordova MD, 5 mg at 05/12/22 0811    aspirin chewable tablet 81 mg, 81 mg, Oral, DAILY, Latha Cordova MD, 81 mg at 05/12/22 0810    baclofen (LIORESAL) tablet 10 mg, 10 mg, Oral, BID PRN, Latha Cordova MD    donepeziL (ARICEPT) tablet 10 mg, 10 mg, Oral, QHS, Latha Cordova MD, 10 mg at 05/11/22 2305    famotidine (PEPCID) tablet 20 mg, 20 mg, Oral, DAILY, Latha Cordova MD, 20 mg at 05/12/22 0810    PARoxetine (PAXIL) tablet 20 mg, 20 mg, Oral, QHS, Latha Cordova MD, 20 mg at 05/11/22 2304    atorvastatin (LIPITOR) tablet 20 mg, 20 mg, Oral, QHS, Latha Cordova MD, 20 mg at 05/11/22 2304    traMADoL (ULTRAM) tablet 50 mg, 50 mg, Oral, Q6H PRN, Latha Cordova MD    sodium chloride (NS) flush 5-40 mL, 5-40 mL, IntraVENous, Q8H, Latha Cordova MD, 10 mL at 05/12/22 0711    sodium chloride (NS) flush 5-40 mL, 5-40 mL, IntraVENous, PRN, Mitzi Mathur MD    acetaminophen (TYLENOL) tablet 650 mg, 650 mg, Oral, Q6H PRN, 650 mg at 05/11/22 0906 **OR** acetaminophen (TYLENOL) suppository 650 mg, 650 mg, Rectal, Q6H PRN, Latha Cordova MD    polyethylene glycol (MIRALAX) packet 17 g, 17 g, Oral, DAILY PRN, Latha Cordova MD    ondansetron (ZOFRAN ODT) tablet 4 mg, 4 mg, Oral, Q8H PRN **OR** ondansetron (ZOFRAN) injection 4 mg, 4 mg, IntraVENous, Q6H PRN, Latha Cordova MD    enoxaparin (LOVENOX) injection 40 mg, 40 mg, SubCUTAneous, DAILY, Latha Cordova MD, 40 mg at 05/12/22 0810    insulin lispro (HUMALOG) injection, , SubCUTAneous, AC&HS, Latha Cordova MD, 2 Units at 05/12/22 0712    glucose chewable tablet 16 g, 4 Tablet, Oral, PRN, Latha Cordova MD    glucagon (GLUCAGEN) injection 1 mg, 1 mg, IntraMUSCular, PRN, Latha Cordova MD    dextrose 10 % infusion 0-250 mL, 0-250 mL, IntraVENous, PRN, Latha Cordova MD    furosemide (LASIX) injection 40 mg, 40 mg, IntraVENous, DAILY, Latha Cordova MD, 40 mg at 05/12/22 0810    ferrous sulfate tablet 325 mg, 1 Tablet, Oral, ACB, Latha Cordova MD, 325 mg at 05/12/22 0712    cefTRIAXone (ROCEPHIN) 1 g in 0.9% sodium chloride 10 mL IV syringe, 1 g, IntraVENous, Q24H, Darcy Flores MD, 1 g at 05/11/22 1847    doxycycline (VIBRAMYCIN) 100 mg in 0.9% sodium chloride (MBP/ADV) 100 mL MBP, 100 mg, IntraVENous, Q12H, Darcy Flores MD, Last Rate: 100 mL/hr at 05/12/22 0830, 100 mg at 05/12/22 0830    albuterol-ipratropium (DUO-NEB) 2.5 MG-0.5 MG/3 ML, 3 mL, Nebulization, Q4H PRN, Latha Cordova MD    No Known Allergies     FmHx: family history is not on file. She was adopted. Social Hx :  reports that she has never smoked. She has never used smokeless tobacco. She reports that she does not drink alcohol and does not use drugs.        Objective:    Physical Exam    Vitals:   Vitals:    05/12/22 0808 05/12/22 1000 05/12/22 1111 05/12/22 1200   BP:   114/68 Pulse: 93 97 87 99   Resp: 17  18    Temp:   97.8 °F (36.6 °C)    SpO2: 97%  96%    Weight: 71.2 kg (156 lb 15.5 oz)      Height:           General:    Alert, cooperative, no distress, appears stated age. Neck:   Supple, no carotid bruit noted. Back:     Symmetric, . Lungs:     Few Crackles bilaterally basis. Heart[de-identified]    Regular rate and rhythm, S1, S2 normal, grade III/VI systolic murmur best at RUSB, left apex. no, click, rub or gallop. Abdomen:     Soft, non-tender. Bowel sounds normal.    Extremities:  trace ed,ea   Vascular:   Pulses - 2+   Skin:   Venous stasis skin changes,     Neurologic:   RAMÍREZ, alert, oriented.         Telemetry: NSR, pacs, PVCs    ECG: NSR, nonspecific st abnormality lateral leads also present on EKG 8/2021  EKG Results     Procedure 720 Value Units Date/Time    EKG, 12 LEAD, INITIAL [[id number]] Collected: 05/10/22 0806    Order Status: Completed Updated: 05/10/22 1711     Ventricular Rate 75 BPM      Atrial Rate 75 BPM      P-R Interval 152 ms      QRS Duration 106 ms      Q-T Interval 406 ms      QTC Calculation (Bezet) 453 ms      Calculated P Axis 98 degrees      Calculated R Axis -39 degrees      Calculated T Axis 97 degrees      Diagnosis --     Sinus rhythm with premature supraventricular complexes  Left axis deviation  Nonspecific intraventricular conduction delay  Possible Anteroseptal infarct , age undetermined  Nonspecific ST and T wave abnormality  When compared with ECG of 10-MAY-2022 08:06,  No significant change was found  Confirmed by Sayda Walter M.D., [facility] ([id number]) on 5/10/2022 5:11:12 PM      EKG, 12 LEAD, INITIAL [[id number]] Collected: 05/09/22 1747    Order Status: Completed Updated: 05/10/22 1653     Ventricular Rate 71 BPM      Atrial Rate 71 BPM      P-R Interval 158 ms      QRS Duration 114 ms      Q-T Interval 424 ms      QTC Calculation (Bezet) 460 ms      Calculated P Axis 94 degrees      Calculated R Axis -40 degrees      Calculated T Axis 89 degrees Diagnosis --     Sinus rhythm with premature atrial complexes  Left axis deviation  Pulmonary disease pattern Nonspecific ST and T wave abnormality  Nonspecific intraventricular conduction delay  Left ventricular hypertrophy  Abnormal ECG  When compared with ECG of 14-AUG-2021 21:19,  Non-specific change in ST segment in Lateral leads  Confirmed by Rodger Menendez M.D., [facility]hart ([id number]) on 5/10/2022 4:53:30 PM            Data Review:     Radiology:   XR Results (most recent):  Results from Hospital Encounter encounter on 05/09/22    XR CHEST PORT    Narrative  EXAM: XR CHEST PORT    INDICATION: Rule out chf    COMPARISON: 8/14/2021    FINDINGS: A portable AP radiograph of the chest was obtained at 1837 hours. There is pulmonary venous congestion with mild interstitial pulmonary edema. Blunting of the lateral costophrenic sulci bilaterally suggesting trace  effusions. Cardiac, mediastinal and hilar contours are unchanged. No cardiac  contour enlargement is shown. Minimal-mild thoracic aortic tortuosity is noted. The bones are moderately osteopenic. Impression  Mild interstitial port edema with trace bilateral pleural effusions. No results for input(s): CPK, TROIQ in the last 72 hours. No lab exists for component: CKQMB, CPKMB, BMPP  Recent Labs     05/12/22  0717 05/11/22  0408 05/10/22  0555 05/10/22  0555    138   < > 140   K 4.7 3.5   < > 4.0    105   < > 107   CO2 30 28   < > 27   BUN 10 12   < > 12   CREA 1.09* 0.98   < > 1.02   * 128*   < > 158*   PHOS  --   --   --  3.9   CA 8.5 8.3*   < > 8.6    < > = values in this interval not displayed. Recent Labs     05/12/22  0717 05/11/22  0408   WBC 9.4 9.1   HGB 7.7* 7.8*   HCT 25.5* 25.3*    286     Recent Labs     05/11/22  0408 05/10/22  0555   AP 52 59     Recent Labs     05/10/22  0555   CHOL 110   LDLC 32.6     Recent Labs     05/10/22  0555   TSH 0.88       Alesha Sibley, ESTEBAN    [facility], [address] [facility],[address]  [address]  [phone]      [address] [place] [address], Thad Rueda NP

## 2022-05-12 NOTE — PROGRESS NOTES
0730: Bedside and Verbal shift change report given to Ade Garzon RN (oncoming nurse) by Lawyer Blas RN (offgoing nurse). Report included the following information SBAR, Kardex, Intake/Output, MAR, Recent Results, and Cardiac Rhythm Sinus Rhythm . 5112: Patient placed in recliner at this time. 1400: MD Sandra updated about patients frequent loose bowl movements that are dark green. Per MD Sandra he will assess patient and place orders as needed. 1900: MD Sandra updated about patients HR being around 110 for about 1 hr. No new orders at this time. 1930: Bedside and Verbal shift change report given to Rei Mcdonough RN (oncoming nurse) by Ade Garzon RN (offgoing nurse). Report included the following information SBAR, Kardex, Intake/Output, MAR, Recent Results and Cardiac Rhythm Sinus Rhythm-Sinus Tach. Care Plans:   Problem: Falls - Risk of  Goal: *Absence of Falls  Description: Document Marvin Fall Risk and appropriate interventions in the flowsheet. Outcome: Progressing Towards Goal  Note: Fall Risk Interventions:  Mobility Interventions: Communicate number of staff needed for ambulation/transfer,Patient to call before getting OOB,Bed/chair exit alarm    Mentation Interventions: Adequate sleep, hydration, pain control,Bed/chair exit alarm,Door open when patient unattended    Medication Interventions: Patient to call before getting OOB,Teach patient to arise slowly,Bed/chair exit alarm    Elimination Interventions: Bed/chair exit alarm,Call light in reach,Patient to call for help with toileting needs              Problem: Patient Education: Go to Patient Education Activity  Goal: Patient/Family Education  Outcome: Progressing Towards Goal     Problem: Diabetes Self-Management  Goal: *Disease process and treatment process  Description: Define diabetes and identify own type of diabetes; list 3 options for treating diabetes.   Outcome: Progressing Towards Goal  Goal: *Incorporating nutritional management into lifestyle  Description: Describe effect of type, amount and timing of food on blood glucose; list 3 methods for planning meals. Outcome: Progressing Towards Goal  Goal: *Incorporating physical activity into lifestyle  Description: State effect of exercise on blood glucose levels. Outcome: Progressing Towards Goal  Goal: *Developing strategies to promote health/change behavior  Description: Define the ABC's of diabetes; identify appropriate screenings, schedule and personal plan for screenings. Outcome: Progressing Towards Goal  Goal: *Using medications safely  Description: State effect of diabetes medications on diabetes; name diabetes medication taking, action and side effects. Outcome: Progressing Towards Goal  Goal: *Monitoring blood glucose, interpreting and using results  Description: Identify recommended blood glucose targets  and personal targets. Outcome: Progressing Towards Goal  Goal: *Prevention, detection, treatment of acute complications  Description: List symptoms of hyper- and hypoglycemia; describe how to treat low blood sugar and actions for lowering  high blood glucose level. Outcome: Progressing Towards Goal  Goal: *Prevention, detection and treatment of chronic complications  Description: Define the natural course of diabetes and describe the relationship of blood glucose levels to long term complications of diabetes.   Outcome: Progressing Towards Goal  Goal: *Developing strategies to address psychosocial issues  Description: Describe feelings about living with diabetes; identify support needed and support network  Outcome: Progressing Towards Goal  Goal: *Insulin pump training  Outcome: Progressing Towards Goal  Goal: *Sick day guidelines  Outcome: Progressing Towards Goal  Goal: *Patient Specific Goal (EDIT GOAL, INSERT TEXT)  Outcome: Progressing Towards Goal     Problem: Patient Education: Go to Patient Education Activity  Goal: Patient/Family Education  Outcome: Progressing Towards Goal     Problem: Pressure Injury - Risk of  Goal: *Prevention of pressure injury  Description: Document Jens Scale and appropriate interventions in the flowsheet. Outcome: Progressing Towards Goal  Note: Pressure Injury Interventions:             Activity Interventions: Pressure redistribution bed/mattress(bed type)    Mobility Interventions: HOB 30 degrees or less,Pressure redistribution bed/mattress (bed type)    Nutrition Interventions: Document food/fluid/supplement intake,Offer support with meals,snacks and hydration    Friction and Shear Interventions: Lift sheet,Minimize layers                Problem: Patient Education: Go to Patient Education Activity  Goal: Patient/Family Education  Outcome: Progressing Towards Goal

## 2022-05-12 NOTE — PROGRESS NOTES
Bedside shift change report given to David Suárez (oncoming nurse) by Ashli Landin RN (offgoing nurse).  Report included the following information SBAR, Kardex, ED Summary, Intake/Output, MAR and Cardiac Rhythm SR.

## 2022-05-12 NOTE — PROGRESS NOTES
0815: Preceptor Review of RN Work    5/12/2022  - Shift times - 1930  to 0815    The RN documentation of patient care for [facility] has been reviewed and approved. All medications have been administered under the direct supervision of the preceptor.     Reina Montoya RN

## 2022-05-13 ENCOUNTER — TELEPHONE (OUTPATIENT)
Dept: CARDIOLOGY CLINIC | Age: 87
End: 2022-05-13

## 2022-05-13 LAB
ALBUMIN SERPL-MCNC: 2.4 G/DL (ref 3.5–5)
ALBUMIN/GLOB SERPL: 0.6 {RATIO} (ref 1.1–2.2)
ALP SERPL-CCNC: 53 U/L (ref 45–117)
ALT SERPL-CCNC: 14 U/L (ref 12–78)
ANION GAP SERPL CALC-SCNC: 3 MMOL/L (ref 5–15)
AST SERPL-CCNC: 17 U/L (ref 15–37)
BASOPHILS # BLD: 0 K/UL (ref 0–0.1)
BASOPHILS NFR BLD: 0 % (ref 0–1)
BILIRUB SERPL-MCNC: 0.3 MG/DL (ref 0.2–1)
BUN SERPL-MCNC: 15 MG/DL (ref 6–20)
BUN/CREAT SERPL: 13 (ref 12–20)
CALCIUM SERPL-MCNC: 8.8 MG/DL (ref 8.5–10.1)
CHLORIDE SERPL-SCNC: 101 MMOL/L (ref 97–108)
CO2 SERPL-SCNC: 32 MMOL/L (ref 21–32)
CREAT SERPL-MCNC: 1.14 MG/DL (ref 0.55–1.02)
DIFFERENTIAL METHOD BLD: ABNORMAL
EOSINOPHIL # BLD: 0.4 K/UL (ref 0–0.4)
EOSINOPHIL NFR BLD: 4 % (ref 0–7)
ERYTHROCYTE [DISTWIDTH] IN BLOOD BY AUTOMATED COUNT: 19.5 % (ref 11.5–14.5)
GLOBULIN SER CALC-MCNC: 3.9 G/DL (ref 2–4)
GLUCOSE BLD STRIP.AUTO-MCNC: 135 MG/DL (ref 65–117)
GLUCOSE BLD STRIP.AUTO-MCNC: 158 MG/DL (ref 65–117)
GLUCOSE BLD STRIP.AUTO-MCNC: 181 MG/DL (ref 65–117)
GLUCOSE BLD STRIP.AUTO-MCNC: 183 MG/DL (ref 65–117)
GLUCOSE SERPL-MCNC: 130 MG/DL (ref 65–100)
HCT VFR BLD AUTO: 26.7 % (ref 35–47)
HGB BLD-MCNC: 8.1 G/DL (ref 11.5–16)
IMM GRANULOCYTES # BLD AUTO: 0 K/UL (ref 0–0.04)
IMM GRANULOCYTES NFR BLD AUTO: 0 % (ref 0–0.5)
IRON SATN MFR SERPL: 56 % (ref 20–50)
IRON SERPL-MCNC: 253 UG/DL (ref 35–150)
LYMPHOCYTES # BLD: 2.2 K/UL (ref 0.8–3.5)
LYMPHOCYTES NFR BLD: 23 % (ref 12–49)
MCH RBC QN AUTO: 28.3 PG (ref 26–34)
MCHC RBC AUTO-ENTMCNC: 30.3 G/DL (ref 30–36.5)
MCV RBC AUTO: 93.4 FL (ref 80–99)
MONOCYTES # BLD: 1.1 K/UL (ref 0–1)
MONOCYTES NFR BLD: 12 % (ref 5–13)
NEUTS SEG # BLD: 5.7 K/UL (ref 1.8–8)
NEUTS SEG NFR BLD: 61 % (ref 32–75)
NRBC # BLD: 0 K/UL (ref 0–0.01)
NRBC BLD-RTO: 0 PER 100 WBC
PLATELET # BLD AUTO: 297 K/UL (ref 150–400)
PMV BLD AUTO: 11.1 FL (ref 8.9–12.9)
POTASSIUM SERPL-SCNC: 5.2 MMOL/L (ref 3.5–5.1)
PROT SERPL-MCNC: 6.3 G/DL (ref 6.4–8.2)
RBC # BLD AUTO: 2.86 M/UL (ref 3.8–5.2)
SERVICE CMNT-IMP: ABNORMAL
SODIUM SERPL-SCNC: 136 MMOL/L (ref 136–145)
TIBC SERPL-MCNC: 450 UG/DL (ref 250–450)
WBC # BLD AUTO: 9.5 K/UL (ref 3.6–11)

## 2022-05-13 PROCEDURE — 74011636637 HC RX REV CODE- 636/637: Performed by: INTERNAL MEDICINE

## 2022-05-13 PROCEDURE — 74011250636 HC RX REV CODE- 250/636: Performed by: HOSPITALIST

## 2022-05-13 PROCEDURE — 74011250637 HC RX REV CODE- 250/637: Performed by: INTERNAL MEDICINE

## 2022-05-13 PROCEDURE — 74011000250 HC RX REV CODE- 250: Performed by: INTERNAL MEDICINE

## 2022-05-13 PROCEDURE — 80053 COMPREHEN METABOLIC PANEL: CPT

## 2022-05-13 PROCEDURE — 99232 SBSQ HOSP IP/OBS MODERATE 35: CPT | Performed by: SPECIALIST

## 2022-05-13 PROCEDURE — 82962 GLUCOSE BLOOD TEST: CPT

## 2022-05-13 PROCEDURE — 97530 THERAPEUTIC ACTIVITIES: CPT

## 2022-05-13 PROCEDURE — 74011250636 HC RX REV CODE- 250/636: Performed by: INTERNAL MEDICINE

## 2022-05-13 PROCEDURE — 74011000258 HC RX REV CODE- 258: Performed by: HOSPITALIST

## 2022-05-13 PROCEDURE — 97161 PT EVAL LOW COMPLEX 20 MIN: CPT

## 2022-05-13 PROCEDURE — 36415 COLL VENOUS BLD VENIPUNCTURE: CPT

## 2022-05-13 PROCEDURE — 85025 COMPLETE CBC W/AUTO DIFF WBC: CPT

## 2022-05-13 PROCEDURE — 74011250637 HC RX REV CODE- 250/637: Performed by: NURSE PRACTITIONER

## 2022-05-13 PROCEDURE — 65270000046 HC RM TELEMETRY

## 2022-05-13 PROCEDURE — 83540 ASSAY OF IRON: CPT

## 2022-05-13 PROCEDURE — 74011250637 HC RX REV CODE- 250/637: Performed by: SPECIALIST

## 2022-05-13 PROCEDURE — 74011250637 HC RX REV CODE- 250/637: Performed by: HOSPITALIST

## 2022-05-13 PROCEDURE — 97116 GAIT TRAINING THERAPY: CPT

## 2022-05-13 PROCEDURE — 74011000250 HC RX REV CODE- 250: Performed by: HOSPITALIST

## 2022-05-13 RX ORDER — ALPRAZOLAM 0.5 MG/1
0.5 TABLET ORAL
Status: DISCONTINUED | OUTPATIENT
Start: 2022-05-13 | End: 2022-05-18 | Stop reason: HOSPADM

## 2022-05-13 RX ORDER — CHOLESTYRAMINE 4 G/4.8G
4 POWDER, FOR SUSPENSION ORAL
Status: DISCONTINUED | OUTPATIENT
Start: 2022-05-13 | End: 2022-05-18 | Stop reason: HOSPADM

## 2022-05-13 RX ADMIN — CHOLESTYRAMINE 4 G: 4 POWDER, FOR SUSPENSION ORAL at 12:47

## 2022-05-13 RX ADMIN — DONEPEZIL HYDROCHLORIDE 10 MG: 5 TABLET, FILM COATED ORAL at 21:21

## 2022-05-13 RX ADMIN — ALPRAZOLAM 0.25 MG: 0.25 TABLET ORAL at 09:32

## 2022-05-13 RX ADMIN — ATORVASTATIN CALCIUM 20 MG: 20 TABLET, FILM COATED ORAL at 21:22

## 2022-05-13 RX ADMIN — FERROUS SULFATE TAB 325 MG (65 MG ELEMENTAL FE) 325 MG: 325 (65 FE) TAB at 06:50

## 2022-05-13 RX ADMIN — PAROXETINE HYDROCHLORIDE 20 MG: 20 TABLET, FILM COATED ORAL at 21:21

## 2022-05-13 RX ADMIN — DOXYCYCLINE 100 MG: 100 INJECTION, POWDER, LYOPHILIZED, FOR SOLUTION INTRAVENOUS at 21:26

## 2022-05-13 RX ADMIN — SODIUM CHLORIDE, PRESERVATIVE FREE 10 ML: 5 INJECTION INTRAVENOUS at 21:25

## 2022-05-13 RX ADMIN — FUROSEMIDE 40 MG: 40 TABLET ORAL at 09:32

## 2022-05-13 RX ADMIN — CHOLESTYRAMINE 4 G: 4 POWDER, FOR SUSPENSION ORAL at 16:41

## 2022-05-13 RX ADMIN — DOXYCYCLINE 100 MG: 100 INJECTION, POWDER, LYOPHILIZED, FOR SOLUTION INTRAVENOUS at 10:07

## 2022-05-13 RX ADMIN — IRON SUCROSE 100 MG: 20 INJECTION, SOLUTION INTRAVENOUS at 09:33

## 2022-05-13 RX ADMIN — SODIUM CHLORIDE, PRESERVATIVE FREE 1 G: 5 INJECTION INTRAVENOUS at 19:04

## 2022-05-13 RX ADMIN — FAMOTIDINE 20 MG: 20 TABLET ORAL at 09:32

## 2022-05-13 RX ADMIN — AMLODIPINE BESYLATE 5 MG: 5 TABLET ORAL at 09:32

## 2022-05-13 RX ADMIN — Medication 2 UNITS: at 16:41

## 2022-05-13 RX ADMIN — Medication 2 UNITS: at 12:47

## 2022-05-13 RX ADMIN — SODIUM CHLORIDE, PRESERVATIVE FREE 10 ML: 5 INJECTION INTRAVENOUS at 19:04

## 2022-05-13 RX ADMIN — ENOXAPARIN SODIUM 40 MG: 100 INJECTION SUBCUTANEOUS at 09:32

## 2022-05-13 RX ADMIN — ASPIRIN 81 MG CHEWABLE TABLET 81 MG: 81 TABLET CHEWABLE at 09:32

## 2022-05-13 NOTE — PROGRESS NOTES
[address] [facility]                                                                                          Hospitalist Progress Note  Aaron Callaway MD  Answering service: [phone] OR [phone] from in house phone        Date of Service:  2022  NAME:  Jered Smith  :  1935  MRN:  [id number]    This documentation was facilitated by a Voice Recognition software and may contain inadvertent typographical errors. Admission Summary:   Patient came to the ED for shortness of breath. She is admitted for acute hypoxic respiratory failure. PMH is significant for type 2 diabetes with neuropathy, stomach ulcer, hyperlipidemia, depression, stroke. Interval history / Subjective:        She was being changed by PCT when I saw her. She had a loose bowel movement  Patient has no complaints. Assessment & Plan:   Acute hypoxic respiratory failure  CAP  ? CHF. No overt pulmonary edema. proBNP is elevated however. --She is already being diuresed with Lasix. -- CTA lung negative for PE, but showed clusters of micronodules compatible with small airway infection or inflammation. -- Treat for CAP  with ceftriaxone and doxycycline. Rapid COVID test negative. -- Continue supplemental oxygen to keep SPO2 above 92%. -- Cardiology following.    -- Repeat echocardiogram showed normal EF 65 to 97%, normal diastolic function. Moderate AAS. Severe PAH, large left pleural effusion. Continue oral furosemide. -- Mild hyperkalemia with K of 5.2, not on potassium supplement, should improve with ongoing Lasix use. Chronic normocytic anemia. Iron deficiency. Hemoglobin declining since admission. -- Stool negative for blood. Iron supplement. -- Iron level was 31 on 5/10 and up to 253 today, . DC IV iron. -- GI evaluated, no endoscopic intervention planned. Alternating constipation/diarrhea, suspect IBS  --No fever, leukocytosis, abdominal pain. ...> No suspicion for infectious etiology  -- Try Imodium. GI added Questran, Metamucil    Type 2 diabetes with peripheral neuropathy. A1c 6.6  -- Accu-Cheks AC&HS. Humalog correction scale. Hypertension, continue home regimen  Dyslipidemia: Continue home statin. Mild dementia per family: She is on Aricept    Anxiety: Continue Paxil. Added low-dose as needed Xanax      Care Plan discussed with: Independent full code  Surrogate decision makers: Patient does not have POA. She stated her daughters Broderick Morrison and Kartik Phillips will be her medical decision makers. Code status: Full code  DVT prophylaxis: Enoxaparin  Anticipated Disposition: She will likely need SNF rehab. Hospital Problems  Date Reviewed: 5/10/2022          Codes Class Noted POA    * (Principal) Acute respiratory failure with hypoxia [facility]) ICD-10-CM: J96.01  ICD-9-CM: 518.81  5/9/2022 Yes                Review of Systems:   A comprehensive review of systems was negative except for that written in the HPI. Vital Signs:    Last 24hrs VS reviewed since prior progress note. Most recent are:        Intake/Output Summary (Last 24 hours) at 5/13/2022 1756  Last data filed at 5/13/2022 1550  Gross per 24 hour   Intake 900 ml   Output 1200 ml   Net -300 ml        Physical Examination:     I had a face to face encounter with this patient and independently examined them on5/13/2022 as outlined below:        Constitutional:  Alert and oriented. Not in distress. HEENT:  Atraumatic. Oral mucosa moist,. Non icteric sclera. No pallor. Resp:  On oxygen 3 L/min, SPO2 96%. Basal rates. Chest Wall: No deformity     CV:  Grade 3 murmur on the A2 area      GI:  Normoactive  Soft, non distended, non tender. No appreciable organomegaly      :  No CVA or suprapubic tenderness      Musculoskeletal:  Bilateral venous stasis with purplish discoloration, no ischemia. Both feet are sensitive. There is mild edema.       Neurologic:  Mental status: Alert and oriented to PPT  Cranial nerves II-XII : WNL  Motor exam:Moves all extremities symmetrically. Peripheral neuropathy  Gait and balance: Not tested              Data Review:    Review and/or order of clinical lab test  Review and/or order of tests in the radiology section of CPT  Review and/or order of tests in the medicine section of CPT      Available Lab and Imaging results reviewed and used to formulate the current care plan.       Medications Reviewed:     Current Facility-Administered Medications   Medication Dose Route Frequency    cholestyramine-aspartame (QUESTRAN LIGHT) packet 4 g  4 g Oral TID WITH MEALS    [START ON 5/14/2022] psyllium husk-aspartame (METAMUCIL FIBER) packet 1 Packet  1 Packet Oral DAILY WITH BREAKFAST    furosemide (LASIX) tablet 40 mg  40 mg Oral DAILY    loperamide (IMODIUM) capsule 2 mg  2 mg Oral Q4H PRN    ALPRAZolam (XANAX) tablet 0.25 mg  0.25 mg Oral BID PRN    iron sucrose (VENOFER) injection 100 mg  100 mg IntraVENous DAILY    amLODIPine (NORVASC) tablet 5 mg  5 mg Oral DAILY    aspirin chewable tablet 81 mg  81 mg Oral DAILY    baclofen (LIORESAL) tablet 10 mg  10 mg Oral BID PRN    donepeziL (ARICEPT) tablet 10 mg  10 mg Oral QHS    famotidine (PEPCID) tablet 20 mg  20 mg Oral DAILY    PARoxetine (PAXIL) tablet 20 mg  20 mg Oral QHS    atorvastatin (LIPITOR) tablet 20 mg  20 mg Oral QHS    traMADoL (ULTRAM) tablet 50 mg  50 mg Oral Q6H PRN    sodium chloride (NS) flush 5-40 mL  5-40 mL IntraVENous Q8H    sodium chloride (NS) flush 5-40 mL  5-40 mL IntraVENous PRN    acetaminophen (TYLENOL) tablet 650 mg  650 mg Oral Q6H PRN    Or    acetaminophen (TYLENOL) suppository 650 mg  650 mg Rectal Q6H PRN    polyethylene glycol (MIRALAX) packet 17 g  17 g Oral DAILY PRN    ondansetron (ZOFRAN ODT) tablet 4 mg  4 mg Oral Q8H PRN    Or    ondansetron (ZOFRAN) injection 4 mg  4 mg IntraVENous Q6H PRN    enoxaparin (LOVENOX) injection 40 mg  40 mg SubCUTAneous DAILY    insulin lispro (HUMALOG) injection   SubCUTAneous AC&HS    glucose chewable tablet 16 g  4 Tablet Oral PRN    glucagon (GLUCAGEN) injection 1 mg  1 mg IntraMUSCular PRN    dextrose 10 % infusion 0-250 mL  0-250 mL IntraVENous PRN    ferrous sulfate tablet 325 mg  1 Tablet Oral ACB    cefTRIAXone (ROCEPHIN) 1 g in 0.9% sodium chloride 10 mL IV syringe  1 g IntraVENous Q24H    doxycycline (VIBRAMYCIN) 100 mg in 0.9% sodium chloride (MBP/ADV) 100 mL MBP  100 mg IntraVENous Q12H    albuterol-ipratropium (DUO-NEB) 2.5 MG-0.5 MG/3 ML  3 mL Nebulization Q4H PRN     ______________________________________________________________________  EXPECTED LENGTH OF STAY: 3d 14h  ACTUAL LENGTH OF STAY:          4                 Tg Carlos MD

## 2022-05-13 NOTE — PROGRESS NOTES
[facility]  Cardiology Care Note                                Initial visit    Patient Name: Renny Perez - :1935 - MPP:[id number]  Primary Cardiologist: none  Reason for Consult: Acute CHF     HPI: Ms. Nick Nixon is a 82 y.o. female with PMH of DM type II, HLD, HTN, ?CVA (pt denies), BLE venous stasis, dementia, aortic stenosis  who presented to ER yesterday with chief c/o SOB. Of note: pt had echocardiogram in 2018 with EF 55-60% but also noted moderate Aortic stenosis. Subjective: Reports she still feels anxious and nervous but has no chest pain she has moderate shortness of breath. Her echocardiogram shows moderate aortic stenosis with little change from previous echocardiogram.  22    ECHO ADULT COMPLETE 2022    Interpretation Summary    Left Ventricle: Normal left ventricular systolic function with a visually estimated EF of 65 - 70%. Left ventricle size is normal. Normal wall thickness. Normal wall motion. Normal diastolic function.   Aortic Valve: Mildly calcified cusp. Mild to moderate stenosis of the aortic valve. AV mean gradient is 17 mmHg. AV peak gradient is 28 mmHg. AV area by continuity VTI is 1.2 cm2.   Mitral Valve: Moderate annular calcification of the mitral valve.   Pulmonary Arteries: Severe pulmonary hypertension present.   Pericardium: Large left pleural effusion. Signed by: Mushtaq Michelle MD on 2022 12:45 PM           Assessment and Plan   Discussed her echo results with her this am, she will fu with Dr Nimisha Villanueva in office for aortic stenosis -we will arrange fu visit  See back PRN    1. SOB/Acute hypoxic respiratory insufficiency/AGUILAR: admit  CXR with some pulmonary edema per read but not impressive per our review of image. Repeat echo with normalNL EF, mild-mod AS. On IV diuretics (diuresed 4 Liters since admission).  Now off diuretic. CTA chest no pe but small airway infection niharika nflammation could be contributing. She does have severe pulmonary HTN as well. Certainly anemia could be playing a role as well. Her HS troponin is nl. 2.  Moderate aortic stenosis: moderate by echocardiogram 6/2018. Now mild- mod with AV mean gradient 17 mmhG,. Follow up as outpatient. She does have prominent murmur RUSB. 3.HTN: bp currently controlled. On home amlodipine 5 mg daily. Patient Vitals for the past 12 hrs:   Temp Pulse Resp BP SpO2   05/13/22 1142 -- (!) 111 -- -- 98 %   05/13/22 1132 -- (!) 127 -- (!) 111/54 94 %   05/13/22 1100 98.1 °F (36.7 °C) 100 -- 131/70 98 %   05/13/22 1000 -- 97 -- -- --   05/13/22 0932 -- 91 -- (!) 141/67 --   05/13/22 0731 98 °F (36.7 °C) 90 18 (!) 127/53 99 %   05/13/22 0600 -- 93 -- -- --   05/13/22 0359 -- 83 -- -- --   05/13/22 0256 98.4 °F (36.9 °C) 96 14 130/60 100 %   05/13/22 0200 -- 82 -- -- --      4. History of HLD: continue statin. Check lipids  LDL at goal  Lab Results   Component Value Date/Time    LDL,Direct 65 11/14/2019 03:57 PM    LDL, calculated 32.6 05/10/2022 05:55 AM      5. Anemia: hgb downtrending (7.9). Stool for OB pending. Lab Results   Component Value Date/Time    HGB 8.1 (L) 05/13/2022 02:54 AM      6. BLE edema:hx of BLE edema, on lasix 20 mg po daily PTA. No DVT on US but bilateral distal femoral veins not well visualized. Echo  NL EF. Diuretics on hold. 7. DM: A1C=6.6  8. Severe pulmonary HTN    . Cardiac testing history:  Echo 6/5/18: Left ventricle: Systolic function was normal. Ejection fraction was  estimated in the range of 55 % to 60 %. There were no regional wall motion  abnormalities. Mitral valve: There was moderate calcification of the posterior leaflet. Aortic valve: The valve was trileaflet. Leaflets exhibited moderately  increased thickness, moderate calcification, reduced mobility, and  sclerosis.  There was moderate stenosis.        ____________________________________________________________      Patient Active Problem List   Diagnosis Code    DM (diabetes mellitus) ([facility] E11.9    Mixed hyperlipidemia E78.2    Chronic venous insufficiency I87.2    Depression F32. A    Stomach ulcer K25.9    Diverticula of colon K57.30    Advance care planning Z71.89    Essential hypertension I10    H/O: stroke Z80.78    Type 2 diabetes with nephropathy (HCC) E11.21    Altered mental status R41.82    Acute delirium R41.0    Mild depression F32. A    Dizziness R42    Acute respiratory failure with hypoxia (HCC) J96.01     No specialty comments available.       [] Patient unable to provide secondary to condition    CONSTITUTIONAL: fatigue   ENT/MOUTH: negative  EYES: negative  CARDIOVASCULAR: SOB, PND , AGUILAR, orthopnea, edema   RESPIRATORY: SOB  GASTROINTESTINAL: dark stools  GENITOURINARY: negative  MUSCULOSKELETAL: negative  SKIN: negative  NEUROLOGICAL: negative  PSYCHOLOGICAL: negative   HEME/LYMPH: negative  ENDOCRINE: negative        Past Medical History:   Diagnosis Date    Arthritis     Chronic pain     Depression     [facility]     Hypercholesterolemia     Hypertension     Stroke ([facility]     TIA 10 yrs back    St[facility] ([facility]     2003     Past Surgical History:   Procedure Laterality Date    HX CARPAL TUNNEL RELEASE  1990    HX CATARACT REMOVAL      bilateral    HX HYSTERECTOMY  1986    HX HYSTERECTOMY      HX ORTHOPAEDIC      HX ORTHOPAEDIC      carpel tunnel left    HX ORTHOPAEDIC      left foot heel spur    HX REFRACTIVE SURGERY  2006       Current Facility-Administered Medications:     cholestyramine-aspartame (QUESTRAN LIGHT) packet 4 g, 4 g, Oral, TID WITH MEALS, Myrna Glez NP    furosemide (LASIX) tablet 40 mg, 40 mg, Oral, DAILY, Devang Courtney MD, 40 mg at 05/13/22 0932    loperamide (IMODIUM) capsule 2 mg, 2 mg, Oral, Q4H PRN, LORI Flores MD, 2 mg at 05/12/22 5285    ALPRAZolam Genetta [place]) tablet 0.25 mg, 0.25 mg, Oral, BID PRN, NILAY Flores MD, 0.25 mg at 05/13/22 0932    iron sucrose (VENOFER) injection 100 mg, 100 mg, IntraVENous, DAILY, LEYDI Flores MD, 100 mg at 05/13/22 0933    amLODIPine (NORVASC) tablet 5 mg, 5 mg, Oral, DAILY, Latha Cordova MD, 5 mg at 05/13/22 0932    aspirin chewable tablet 81 mg, 81 mg, Oral, DAILY, Latha Cordova MD, 81 mg at 05/13/22 0932    baclofen (LIORESAL) tablet 10 mg, 10 mg, Oral, BID PRN, Latha Davey MD    donepeziL (ARICEPT) tablet 10 mg, 10 mg, Oral, QHS, Latha Cordova MD, 10 mg at 05/12/22 2221    famotidine (PEPCID) tablet 20 mg, 20 mg, Oral, DAILY, Latha Cordova MD, 20 mg at 05/13/22 0932    PARoxetine (PAXIL) tablet 20 mg, 20 mg, Oral, QHS, Latha Cordova MD, 20 mg at 05/12/22 2221    atorvastatin (LIPITOR) tablet 20 mg, 20 mg, Oral, QHS, Latha Cordova MD, 20 mg at 05/12/22 2221    traMADoL (ULTRAM) tablet 50 mg, 50 mg, Oral, Q6H PRN, Latha Cordova MD    sodium chloride (NS) flush 5-40 mL, 5-40 mL, IntraVENous, Q8H, Latha Cordova MD, 10 mL at 05/12/22 1506    sodium chloride (NS) flush 5-40 mL, 5-40 mL, IntraVENous, PRN, Latha Cordova MD    acetaminophen (TYLENOL) tablet 650 mg, 650 mg, Oral, Q6H PRN, 650 mg at 05/11/22 0906 **OR** acetaminophen (TYLENOL) suppository 650 mg, 650 mg, Rectal, Q6H PRN, Tasha, Razaak A, MD    polyethylene glycol (MIRALAX) packet 17 g, 17 g, Oral, DAILY PRN, Latha Cordova MD    ondansetron (ZOFRAN ODT) tablet 4 mg, 4 mg, Oral, Q8H PRN **OR** ondansetron (ZOFRAN) injection 4 mg, 4 mg, IntraVENous, Q6H PRN, Latha Cordova MD    enoxaparin (LOVENOX) injection 40 mg, 40 mg, SubCUTAneous, DAILY, Latha Cordova MD, 40 mg at 05/13/22 0932    insulin lispro (HUMALOG) injection, , SubCUTAneous, AC&HS, Latha Cordova MD, 2 Units at 05/12/22 2238    glucose chewable tablet 16 g, 4 Tablet, Oral, PRN, Latha Davey MD    glucagon (GLUCAGEN) injection 1 mg, 1 mg, IntraMUSCular, PRN, Latha Cordova MD    dextrose 10 % infusion 0-250 mL, 0-250 mL, IntraVENous, PRN, Latha Cordova MD    ferrous sulfate tablet 325 mg, 1 Tablet, Oral, ACB, Latha Cordova MD, 325 mg at 05/13/22 0650    cefTRIAXone (ROCEPHIN) 1 g in 0.9% sodium chloride 10 mL IV syringe, 1 g, IntraVENous, Q24H, Darcy Flores MD, 1 g at 05/12/22 1901    doxycycline (VIBRAMYCIN) 100 mg in 0.9% sodium chloride (MBP/ADV) 100 mL MBP, 100 mg, IntraVENous, Q12H, LIANNE Flores MD, Last Rate: 100 mL/hr at 05/13/22 1007, 100 mg at 05/13/22 1007    albuterol-ipratropium (DUO-NEB) 2.5 MG-0.5 MG/3 ML, 3 mL, Nebulization, Q4H PRN, Latha Cordova MD    No Known Allergies     FmHx: family history is not on file. She was adopted. Social Hx :  reports that she has never smoked. She has never used smokeless tobacco. She reports that she does not drink alcohol and does not use drugs. Objective:    Physical Exam    Vitals:   Vitals:    05/13/22 1000 05/13/22 1100 05/13/22 1132 05/13/22 1142   BP:  131/70 (!) 111/54    Pulse: 97 100 (!) 127 (!) 111   Resp:       Temp:  98.1 °F (36.7 °C)     SpO2:  98% 94% 98%   Weight:       Height:           General:    Alert, cooperative, no distress, appears stated age. Neck:   Supple, no carotid bruit noted. Back:     Symmetric, . Lungs:     Few Crackles bilaterally basis. Heart[de-identified]    Regular rate and rhythm, S1, S2 normal, grade III/VI systolic murmur best at RUSB, left apex. no, click, rub or gallop. Abdomen:     Soft, non-tender. Bowel sounds normal.    Extremities:  trace ed,ea   Vascular:   Pulses - 2+   Skin:   Venous stasis skin changes,     Neurologic:   RAMÍREZ, alert, oriented.         Telemetry: NSR, pacs, PVCs    ECG: NSR, nonspecific st abnormality lateral leads also present on EKG 8/2021  EKG Results     Procedure 720 Value Units Date/Time    EKG, 12 LEAD, INITIAL [[id number]] Collected: 05/10/22 0806    Order Status: Completed Updated: 05/10/22 1711     Ventricular Rate 75 BPM      Atrial Rate 75 BPM      P-R Interval 152 ms      QRS Duration 106 ms      Q-T Interval 406 ms      QTC Calculation (Bezet) 453 ms      Calculated P Axis 98 degrees      Calculated R Axis -39 degrees      Calculated T Axis 97 degrees      Diagnosis --     Sinus rhythm with premature supraventricular complexes  Left axis deviation  Nonspecific intraventricular conduction delay  Possible Anteroseptal infarct , age undetermined  Nonspecific ST and T wave abnormality  When compared with ECG of 10-MAY-2022 08:06,  No significant change was found  Confirmed by Taya Jackson M.D., Minetta Holstein ([id number]) on 5/10/2022 5:11:12 PM      EKG, 12 LEAD, INITIAL [[id number]] Collected: 05/09/22 1747    Order Status: Completed Updated: 05/10/22 1653     Ventricular Rate 71 BPM      Atrial Rate 71 BPM      P-R Interval 158 ms      QRS Duration 114 ms      Q-T Interval 424 ms      QTC Calculation (Bezet) 460 ms      Calculated P Axis 94 degrees      Calculated R Axis -40 degrees      Calculated T Axis 89 degrees      Diagnosis --     Sinus rhythm with premature atrial complexes  Left axis deviation  Pulmonary disease pattern Nonspecific ST and T wave abnormality  Nonspecific intraventricular conduction delay  Left ventricular hypertrophy  Abnormal ECG  When compared with ECG of 14-AUG-2021 21:19,  Non-specific change in ST segment in Lateral leads  Confirmed by Taya Jackson M.D., Minetta Holstein ([id number]) on 5/10/2022 4:53:30 PM            Data Review:     Radiology:   XR Results (most recent):  Results from Hospital Encounter encounter on 05/09/22    XR CHEST PORT    Narrative  EXAM: XR CHEST PORT    INDICATION: Rule out chf    COMPARISON: 8/14/2021    FINDINGS: A portable AP radiograph of the chest was obtained at 1837 hours. There is pulmonary venous congestion with mild interstitial pulmonary edema. Blunting of the lateral costophrenic sulci bilaterally suggesting trace  effusions.  Cardiac, mediastinal and hilar contours are unchanged. No cardiac  contour enlargement is shown. Minimal-mild thoracic aortic tortuosity is noted. The bones are moderately osteopenic. Impression  Mild interstitial port edema with trace bilateral pleural effusions. No results for input(s): CPK, TROIQ in the last 72 hours. No lab exists for component: CKQMB, CPKMB, BMPP  Recent Labs     05/13/22 0254 05/12/22  0717    139   K 5.2* 4.7    104   CO2 32 30   BUN 15 10   CREA 1.14* 1.09*   * 135*   CA 8.8 8.5     Recent Labs     05/13/22 0254 05/12/22  0717   WBC 9.5 9.4   HGB 8.1* 7.7*   HCT 26.7* 25.5*    286     Recent Labs     05/13/22 0254 05/11/22  0408   AP 53 52     No results for input(s): CHOL, LDLC in the last 72 hours. No lab exists for component: TGL, HDLC,  HBA1C  No results for input(s): CRP, TSH, TSHEXT, TSHEXT in the last 72 hours.     No lab exists for component: ESR    Wesley Romero MD    [facility], [address] [facility] [address]  [facility]  [phone]      [address] [place] [address] Andrea, NP

## 2022-05-13 NOTE — PROGRESS NOTES
Problem: Mobility Impaired (Adult and Pediatric)  Goal: *Acute Goals and Plan of Care (Insert Text)  Description: FUNCTIONAL STATUS PRIOR TO ADMISSION: Patient was modified independent using a rolling walker for functional mobility. Patient reports independently toileting, transferring, dressing herself but reports all of those activities took longer to do lately. HOME SUPPORT PRIOR TO ADMISSION: The patient lived with daughter and grandson and required minimal assistance/contact guard assist for bathing from daughter. Did not drive or cook for herself. Patient reports her grandson does not work and would be available to assist in anything she needed him to. Physical Therapy Goals  Initiated 5/13/2022  1. Patient will move from supine to sit and sit to supine , scoot up and down, and roll side to side in bed with modified independence within 7 day(s). 2.  Patient will transfer from bed to chair and chair to bed with modified independence using the least restrictive device within 7 day(s). 3.  Patient will perform sit to stand with modified independence within 7 day(s). 4.  Patient will ambulate with supervision/set-up for 50 feet with the least restrictive device within 7 day(s). Outcome: Progressing Towards Goal     PHYSICAL THERAPY EVALUATION  Patient: [address] [place] [address] (72 y.o. female)  Date: 5/13/2022  Primary Diagnosis: Acute respiratory failure with hypoxia (HCC) [J96.01]        Precautions: fall       ASSESSMENT  Based on the objective data described below, the patient presents with decline in transfers, ambulation, reduced standing balance, increased need for supplemental O2 at all times, elevated HR with exertion, increased anxiety, high FOF, and overall reduced functional activity tolerance. This is an 82year old female who presented to the ER with complaints of SOB and found to be in acute hypoxic respiratory failure.  Today, she is received in the chair on 3L via nasal cannula and agreeable to treatment. Patient reports that she has a lot of anxiety right now and is very fearful of falling. Patient noted to have a 20 point orthostatic drop from sitting to standing but remains asymptomatic. O2 saturation 94-96% on 3L (baseline no oxygen needs). Patient's standing tolerance is poor, stands at RW with BUE support for 2 minutes before heart rate elevates to the 120s and requires a seated rest. In standing patient noted to have significant forward trunk flexion and head forward posture, with cues able to stand slightly more upright. Patient states that is normal for her. During gait training, she is noted to have a very slow gait pattern with RW, HR elevated to 133 at the highest, only able to walk from recliner to edge of bed (~5ft) before requiring a rest break. She required min A to manage AD and maintain balance during gait. After sitting EOB for several minutes, HR returns to 100-110. Patient requests to return to chair. Focus on anxiety throughout session, no complaints of chest pain but does state she feels slightly SOB. Patient is far from baseline, will need to clarify with family regarding how much support she will have as patient reports she would like to return home vs. SNF placement. Goal to advance her gait training and improve prior to discharging home. If adamant about returning home, would required 24/7 assistance of one person and equipment noted below for safe discharge. Will continue to follow in acute setting to address deficits. Current Level of Function Impacting Discharge (mobility/balance): CGA for transfers, min A for gait with RW up to 5ft x 2 (rest break in between), poor balance without constant support, unable to assess bed mobility as she was received in chair    Functional Outcome Measure: The patient scored 30/100 on the Barthel outcome measure which is indicative of significant disability.       Other factors to consider for discharge: reports strong family support      Patient will benefit from skilled therapy intervention to address the above noted impairments. PLAN :  Recommendations and Planned Interventions: bed mobility training, transfer training, gait training, therapeutic exercises, neuromuscular re-education, patient and family training/education, and therapeutic activities      Frequency/Duration: Patient will be followed by physical therapy:  5 times a week to address goals. Recommendation for discharge: (in order for the patient to meet his/her long term goals)  To be determined: Patient requests home with [facility] pending family support/equipment vs SNF placement     This discharge recommendation:  Has not yet been discussed the attending provider and/or case management    IF patient discharges home will need the following DME: bedside commode, gait belt, and portable oxygen         SUBJECTIVE:   Patient stated I am so anxious, so much anxiety.     OBJECTIVE DATA SUMMARY:   HISTORY:    Past Medical History:   Diagnosis Date    Arthritis     Chronic pain     Depression     [facility]     Hypercholesterolemia     Hypertension     Stroke ([facility]     TIA 10 yrs back    Stroke ([facility] [address] [facility]     2003     Past Surgical History:   Procedure Laterality Date    HX CARPAL TUNNEL RELEASE  1990    HX CATARACT REMOVAL      bilateral    HX HYSTERECTOMY  1986    HX HYSTERECTOMY      HX ORTHOPAEDIC      HX ORTHOPAEDIC      carpel tunnel left    HX ORTHOPAEDIC      left foot heel spur    HX REFRACTIVE SURGERY  2006       Personal factors and/or comorbidities impacting plan of care: agreeable, oriented x 4    Home Situation  Home Environment: Private residence  # Steps to Enter: 3  Wheelchair Ramp: Yes  One/Two Story Residence: One story  Living Alone: No  Support Systems: Child(ty),Other Family Member(s) (daughter and grandson live with her)  Patient Expects to be Discharged to[de-identified] Skilled nursing facility  Current DME Used/Available at Home: Ga Mcghee rolling,Raised toilet seat  Tub or Shower Type: Tub/Shower combination    EXAMINATION/PRESENTATION/DECISION MAKING:   Critical Behavior:  Neurologic State: Alert  Orientation Level: Oriented X4  Cognition: Follows commands     Hearing: Auditory  Auditory Impairment: None    Range Of Motion:  AROM: Generally decreased, functional    PROM: Within functional limits    Strength:    Strength: Generally decreased, functional    Tone & Sensation:   Tone: Normal     Sensation: Intact    Coordination:  Coordination: Within functional limits  Vision:      Functional Mobility:  Bed Mobility:      Not assessed, received and left in chair        Transfers:  Sit to Stand: Contact guard assistance;Assist x1  Stand to Sit: Contact guard assistance;Assist x1  Stand Pivot Transfers: Contact guard assistance;Assist x1          Balance:   Sitting: Intact; Without support  Standing: Impaired; Without support  Standing - Static: Good;Constant support  Standing - Dynamic : Fair;Constant support  Ambulation/Gait Training:  Distance (ft): 5 Feet (ft)x 2  Assistive Device: Walker, rolling;Gait belt  Ambulation - Level of Assistance: Minimal assistance;Assist x1     Gait Abnormalities: Decreased step clearance    Base of Support: Widened  Stance: Weight shift  Speed/Yumi: Slow;Shuffled;Pace decreased (<100 feet/min)  Step Length: Left shortened;Right shortened         Functional Measure:  Barthel Index:    Bathin  Bladder: 0  Bowels: 10  Groomin  Dressin  Feeding: 10  Mobility: 0  Stairs: 0  Toilet Use: 0  Transfer (Bed to Chair and Back): 10  Total: 30/100       The Barthel ADL Index: Guidelines  1. The index should be used as a record of what a patient does, not as a record of what a patient could do. 2. The main aim is to establish degree of independence from any help, physical or verbal, however minor and for whatever reason. 3. The need for supervision renders the patient not independent.   4. A patient's performance should be established using the best available evidence. Asking the patient, friends/relatives and nurses are the usual sources, but direct observation and common sense are also important. However direct testing is not needed. 5. Usually the patient's performance over the preceding 24-48 hours is important, but occasionally longer periods will be relevant. 6. Middle categories imply that the patient supplies over 50 per cent of the effort. 7. Use of aids to be independent is allowed. Score Interpretation (from [address] [facility])    Independent   60-79 Minimally independent   40-59 Partially dependent   20-39 Very dependent   <20 Totally dependent     -Hector Carranza., Barthel, D.W. (1965). Functional evaluation: the Barthel Index. [address] ([address] (1997). The Barthel activities of daily living index: self-reporting versus actual performance in the old (> or = 75 years). Journal of [address] [place] [address] 45(7), [facility], DHARMESH, Mariel Luke., [place]. (1999). Measuring the change in disability after inpatient rehabilitation; comparison of the responsiveness of the Barthel Index and Functional Smyth Measure. Journal of Neurology, Neurosurgery, and Psychiatry, 66(4), 056-999. Raven Mancia, N.J.A, YESSICA Bernard, & Hannah Vazquez MVladimirA. (2004) Assessment of post-stroke quality of life in cost-effectiveness studies: The usefulness of the Barthel Index and the EuroQoL-5D.  Quality of Life Research, 15, 184-20           Physical Therapy Evaluation Charge Determination   History Examination Presentation Decision-Making   HIGH Complexity :3+ comorbidities / personal factors will impact the outcome/ POC  LOW Complexity : 1-2 Standardized tests and measures addressing body structure, function, activity limitation and / or participation in recreation  LOW Complexity : Stable, uncomplicated  LOW Complexity : FOTO score of       Based on the above components, the patient evaluation is determined to be of the following complexity level: LOW     Pain Rating:  No pain reported     Activity Tolerance:   Poor, requires rest breaks, observed SOB with activity, and elevated HR, decreased BP with exertion     Vitals:    05/13/22 1132 05/13/22 1142 05/13/22 1143 05/13/22 1200   BP: (!) 111/54  116/67    BP 1 Location:   Right upper arm    BP Patient Position: Standing  Sitting    Pulse: (!) 127 (!) 111 94 (!) 104   Temp:   98.2 °F (36.8 °C)    Resp:   18    Height:       Weight:       SpO2: 94% 98% 94%        After treatment patient left in no apparent distress:   Sitting in chair, Heels elevated for pressure relief, Call bell within reach, and RN present    COMMUNICATION/EDUCATION:   The patients plan of care was discussed with: Registered nurse. Fall prevention education was provided and the patient/caregiver indicated understanding. and Patient/family have participated as able in goal setting and plan of care.     Thank you for this referral.  Jean-Paul Willard, PT   Time Calculation: 31 mins

## 2022-05-13 NOTE — PROGRESS NOTES
0730: Bedside and Verbal shift change report given to Deb Kaplan RN (oncoming nurse) by Clotilde Momin RN (offgoing nurse). Report included the following information SBAR, Kardex, Intake/Output, MAR, Recent Results, and Cardiac Rhythm Sinus Rhythm . 1050: Patient placed in recliner at this time by PCT. 1150: MD Sandra updated about patients HR going to 130s while working with PT. Per MD Sandra he will place orders if needed. 1930: Bedside and Verbal shift change report given to Mary RN (oncoming nurse) by Deb Kaplan RN (offgoing nurse). Report included the following information SBAR, Kardex, Intake/Output, MAR, Recent Results and Cardiac Rhythm Sinus Rhythm. Care Plans:   Problem: Falls - Risk of  Goal: *Absence of Falls  Description: Document Marvin Fall Risk and appropriate interventions in the flowsheet. Outcome: Progressing Towards Goal  Note: Fall Risk Interventions:  Mobility Interventions: Bed/chair exit alarm,Communicate number of staff needed for ambulation/transfer,Patient to call before getting OOB    Mentation Interventions: Adequate sleep, hydration, pain control,Bed/chair exit alarm,Door open when patient unattended    Medication Interventions: Assess postural VS orthostatic hypotension,Bed/chair exit alarm,Patient to call before getting OOB,Teach patient to arise slowly    Elimination Interventions: Bed/chair exit alarm,Call light in reach,Patient to call for help with toileting needs,Stay With Me (per policy),Toilet paper/wipes in reach,Toileting schedule/hourly rounds    History of Falls Interventions: Bed/chair exit alarm         Problem: Patient Education: Go to Patient Education Activity  Goal: Patient/Family Education  Outcome: Progressing Towards Goal     Problem: Diabetes Self-Management  Goal: *Disease process and treatment process  Description: Define diabetes and identify own type of diabetes; list 3 options for treating diabetes.   Outcome: Progressing Towards Goal  Goal: *Incorporating nutritional management into lifestyle  Description: Describe effect of type, amount and timing of food on blood glucose; list 3 methods for planning meals. Outcome: Progressing Towards Goal  Goal: *Incorporating physical activity into lifestyle  Description: State effect of exercise on blood glucose levels. Outcome: Progressing Towards Goal  Goal: *Developing strategies to promote health/change behavior  Description: Define the ABC's of diabetes; identify appropriate screenings, schedule and personal plan for screenings. Outcome: Progressing Towards Goal  Goal: *Using medications safely  Description: State effect of diabetes medications on diabetes; name diabetes medication taking, action and side effects. Outcome: Progressing Towards Goal  Goal: *Monitoring blood glucose, interpreting and using results  Description: Identify recommended blood glucose targets  and personal targets. Outcome: Progressing Towards Goal  Goal: *Prevention, detection, treatment of acute complications  Description: List symptoms of hyper- and hypoglycemia; describe how to treat low blood sugar and actions for lowering  high blood glucose level. Outcome: Progressing Towards Goal  Goal: *Prevention, detection and treatment of chronic complications  Description: Define the natural course of diabetes and describe the relationship of blood glucose levels to long term complications of diabetes.   Outcome: Progressing Towards Goal  Goal: *Developing strategies to address psychosocial issues  Description: Describe feelings about living with diabetes; identify support needed and support network  Outcome: Progressing Towards Goal  Goal: *Insulin pump training  Outcome: Progressing Towards Goal  Goal: *Sick day guidelines  Outcome: Progressing Towards Goal  Goal: *Patient Specific Goal (EDIT GOAL, INSERT TEXT)  Outcome: Progressing Towards Goal     Problem: Patient Education: Go to Patient Education Activity  Goal: Patient/Family Education  Outcome: Progressing Towards Goal     Problem: Pressure Injury - Risk of  Goal: *Prevention of pressure injury  Description: Document Jens Scale and appropriate interventions in the flowsheet. Outcome: Progressing Towards Goal  Note: Pressure Injury Interventions:       Moisture Interventions: Absorbent underpads,Internal/External urinary devices,Limit adult briefs,Maintain skin hydration (lotion/cream),Minimize layers,Moisture barrier    Activity Interventions: Assess need for specialty bed,Increase time out of bed,Pressure redistribution bed/mattress(bed type)    Mobility Interventions: Assess need for specialty bed,Pressure redistribution bed/mattress (bed type),Turn and reposition approx.  every two hours(pillow and wedges)    Nutrition Interventions: Document food/fluid/supplement intake    Friction and Shear Interventions: Lift sheet,Minimize layers                Problem: Patient Education: Go to Patient Education Activity  Goal: Patient/Family Education  Outcome: Progressing Towards Goal

## 2022-05-13 NOTE — PROGRESS NOTES
0800 Bedside shift change report given to Delgado Johnson   (oncoming nurse) by Shalonda Castañeda (offgoing nurse).  Report included the following information SBAR, Kardex, ED Summary, , Procedure Summary, Intake/Output, MAR, Recent Results, Med Rec Status, and Cardiac Rhythm  SR/SA

## 2022-05-13 NOTE — CONSULTS
[address]  [address]  E [place], [address]  [phone]                     GI CONSULTATION NOTE      NAME:  Stepan Linn   :   1935   MRN:   [id number]     Consult Date: 2022     Chief Complaint: iron deficiency anemia, declining hemoglobin     History of Present Illness:  Patient is a 82 y. o. who is seen in consultation at the request of Dr. Gina Raman for the above mentioned problem. Ms. Reina Ann has a past medical history significant for microscopic colitis, gastric ulcer, DM2, HTN,  HLD, AS, dementia, and CVA. Patient presented to [facility] ED with increased SOB for the past week. CTA lung neg for PE, revealed clusters of micronodules compatible with small airway infection or inflammation. Scattered foci of subsegmental atelectasis. Found to have large left pleural effusion and mild tomod AS  With severe PAH on ECHO. GI has been consulted for evaluation of MICHAEL and declining hgb. Hgb 8.4 >7.7> 8.1 today. FOC negative (5/10) denies hematochezia or melena. Chronic diarrhea with incontinence, typically on colestid at home in which she crushes to swallow in addition to Budesonide. PMH:  Past Medical History:   Diagnosis Date    Arthritis     Chronic pain     Depression     Diabetes ([address]     Hypercholesterolemia     Hypertension     [address] [address]     TIA 10 yrs back    Stroke ([facility] [address]            PSH:  Past Surgical History:   Procedure Laterality Date    HX CARPAL TUNNEL RELEASE      HX CATARACT REMOVAL      bilateral    HX HYSTERECTOMY      HX HYSTERECTOMY      HX ORTHOPAEDIC      HX ORTHOPAEDIC      carpel tunnel left    HX ORTHOPAEDIC      left foot heel spur    HX REFRACTIVE SURGERY  2006       Allergies:  No Known Allergies    Home Medications:  Prior to Admission Medications   Prescriptions Last Dose Informant Patient Reported? Taking?    PARoxetine (PAXIL) 20 mg tablet 2022 at Unknown time  No Yes   Sig: Take 1 Tablet by mouth nightly. VITAMIN D2 50,000 unit capsule 5/9/2022 at Unknown time  Yes Yes   Sig: TAKE 1 CAPSULE BY MOUTH ONCE A WEEK   acetaminophen (TYLENOL) 650 mg TbER 5/9/2022 at Unknown time  Yes Yes   Sig: Take 650 mg by mouth two (2) times daily as needed for Pain. amLODIPine (NORVASC) 5 mg tablet 5/9/2022 at Unknown time  No Yes   Sig: TAKE 1 TABLET BY MOUTH ONCE DAILY (DISCONTINUE  2.5  MG)   aspirin 81 mg chewable tablet 5/9/2022 at Unknown time  No Yes   Sig: Take 1 Tab by mouth daily. baclofen (LIORESAL) 10 mg tablet 5/9/2022 at Unknown time  No Yes   Sig: TAKE 1 TABLET BY MOUTH TWICE DAILY AS NEEDED FOR MUSCLE SPASM   budesonide (ENTOCORT EC) 3 mg capsule Unknown at Unknown time  Yes No   Sig: Take 3 mg by mouth three (3) times daily. cholecalciferol (VITAMIN D3) 1,000 unit cap 5/9/2022 at Unknown time  Yes Yes   Sig: Take 2,000 Units by mouth daily. cholestyramine (QUESTRAN) 4 gram packet Unknown at Unknown time  No No   Sig: Take 1 Packet by mouth daily (with dinner). cyanocobalamin (VITAMIN B12) 1,000 mcg/mL injection 5/9/2022 at Unknown time  No Yes   Sig: Give 1ml IM daily X 7 days then 1ml IM weekly X 4 weeks then 1ml IM monthly   donepeziL (ARICEPT) 10 mg tablet Unknown at Unknown time  No No   Sig: Take 1 Tablet by mouth nightly. famotidine (PEPCID) 20 mg tablet 5/9/2022 at Unknown time  No Yes   Sig: Take 1 Tab by mouth two (2) times a day. ferrous sulfate (IRON) 325 mg (65 mg iron) EC tablet 5/9/2022 at Unknown time  No Yes   Sig: TAKE 1 TABLET BY MOUTH ONCE DAILY BEFORE BREAKFAST   furosemide (LASIX) 20 mg tablet 5/9/2022 at Unknown time  No Yes   Sig: Take 1 Tablet by mouth daily. meloxicam (MOBIC) 15 mg tablet 5/9/2022 at Unknown time  No Yes   Sig: TAKE 1 TABLET BY MOUTH  DAILY   menthol (BIOFREEZE, MENTHOL,) 4 % gel Unknown at Unknown time  No No   Sig: Apply top BID   nystatin (MYCOSTATIN) powder Unknown at Unknown time  No No   Sig: Apply  to affected area four (4) times daily. simvastatin (ZOCOR) 40 mg tablet Unknown at Unknown time  No No   Sig: TAKE 1 TABLET BY MOUTH AT BEDTIME   traMADoL (ULTRAM) 50 mg tablet 5/9/2022 at Unknown time  Yes Yes   Sig: Take 50 mg by mouth every six (6) hours as needed for Pain.       Facility-Administered Medications: None       Hospital Medications:  Current Facility-Administered Medications   Medication Dose Route Frequency    cholestyramine-aspartame (QUESTRAN LIGHT) packet 4 g  4 g Oral TID WITH MEALS    furosemide (LASIX) tablet 40 mg  40 mg Oral DAILY    loperamide (IMODIUM) capsule 2 mg  2 mg Oral Q4H PRN    ALPRAZolam (XANAX) tablet 0.25 mg  0.25 mg Oral BID PRN    iron sucrose (VENOFER) injection 100 mg  100 mg IntraVENous DAILY    amLODIPine (NORVASC) tablet 5 mg  5 mg Oral DAILY    aspirin chewable tablet 81 mg  81 mg Oral DAILY    baclofen (LIORESAL) tablet 10 mg  10 mg Oral BID PRN    donepeziL (ARICEPT) tablet 10 mg  10 mg Oral QHS    famotidine (PEPCID) tablet 20 mg  20 mg Oral DAILY    PARoxetine (PAXIL) tablet 20 mg  20 mg Oral QHS    atorvastatin (LIPITOR) tablet 20 mg  20 mg Oral QHS    traMADoL (ULTRAM) tablet 50 mg  50 mg Oral Q6H PRN    sodium chloride (NS) flush 5-40 mL  5-40 mL IntraVENous Q8H    sodium chloride (NS) flush 5-40 mL  5-40 mL IntraVENous PRN    acetaminophen (TYLENOL) tablet 650 mg  650 mg Oral Q6H PRN    Or    acetaminophen (TYLENOL) suppository 650 mg  650 mg Rectal Q6H PRN    polyethylene glycol (MIRALAX) packet 17 g  17 g Oral DAILY PRN    ondansetron (ZOFRAN ODT) tablet 4 mg  4 mg Oral Q8H PRN    Or    ondansetron (ZOFRAN) injection 4 mg  4 mg IntraVENous Q6H PRN    enoxaparin (LOVENOX) injection 40 mg  40 mg SubCUTAneous DAILY    insulin lispro (HUMALOG) injection   SubCUTAneous AC&HS    glucose chewable tablet 16 g  4 Tablet Oral PRN    glucagon (GLUCAGEN) injection 1 mg  1 mg IntraMUSCular PRN    dextrose 10 % infusion 0-250 mL  0-250 mL IntraVENous PRN    ferrous sulfate tablet 325 mg  1 Tablet Oral ACB    cefTRIAXone (ROCEPHIN) 1 g in 0.9% sodium chloride 10 mL IV syringe  1 g IntraVENous Q24H    doxycycline (VIBRAMYCIN) 100 mg in 0.9% sodium chloride (MBP/ADV) 100 mL MBP  100 mg IntraVENous Q12H    albuterol-ipratropium (DUO-NEB) 2.5 MG-0.5 MG/3 ML  3 mL Nebulization Q4H PRN       Social History:  Social History     Tobacco Use    Smoking status: Never Smoker    Smokeless tobacco: Never Used   Substance Use Topics    Alcohol use: No       Family History:  Family History   Adopted: Yes       Review of Systems:    Constitutional: negative fever, negative chills, negative weight loss  Eyes:   negative visual changes  ENT:   negative sore throat, tongue or lip swelling  Respiratory:  negative cough, +dyspnea  Cards:  negative for chest pain, palpitations, lower extremity edema  GI:   See HPI  :  negative for frequency, dysuria  Integument:  negative for rash and pruritus  Heme:  negative for easy bruising and gum/nose bleeding  Musculoskel: negative for myalgias, back pain and muscle weakness  Neuro: negative for headaches, dizziness, vertigo  Psych:  negative for feelings of anxiety, depression      Objective:     Patient Vitals for the past 8 hrs:   BP Temp Pulse Resp SpO2   05/13/22 1400 -- -- 99 -- --   05/13/22 1200 -- -- (!) 104 -- --   05/13/22 1143 116/67 98.2 °F (36.8 °C) 94 18 94 %   05/13/22 1142 -- -- (!) 111 -- 98 %   05/13/22 1132 (!) 111/54 -- (!) 127 -- 94 %   05/13/22 1100 131/70 98.1 °F (36.7 °C) 100 -- 98 %   05/13/22 1000 -- -- 97 -- --   05/13/22 0932 (!) 141/67 -- 91 -- --   05/13/22 0731 (!) 127/53 98 °F (36.7 °C) 90 18 99 %     05/13 0701 - 05/13 1900  In: 600 [P.O.:600]  Out: 100 [Urine:100]  05/11 1901 - 05/13 0700  In: 1300 [P.O.:1200; I.V.:100]  Out: 4850 [Urine:4850]      PHYSICAL EXAM:  General appearance: thin elderly AAF   Skin: Extremities and face reveal no rashes. HEENT: Sclerae anicteric. Extra-occular muscles are intact.    Cardiovascular: Regular rate and rhythm. +systolic murmur, no gallops, or rubs. Respiratory: diminished breath sounds with no wheezes, rales, or rhonchi. GI: Abdomen nondistended, soft, and nontender. Normal active bowel sounds. Rectal: Deferred   Musculoskeletal: No pitting edema of the lower legs. Neurological: Patient is alert and oriented. Psychiatric:   No anxiety or agitation. Data Review     Recent Labs     05/13/22 0254 05/12/22  0717   WBC 9.5 9.4   HGB 8.1* 7.7*   HCT 26.7* 25.5*    286     Recent Labs     05/13/22 0254 05/12/22  0717    139   K 5.2* 4.7    104   CO2 32 30   BUN 15 10   CREA 1.14* 1.09*   * 135*   CA 8.8 8.5     Recent Labs     05/13/22 0254 05/11/22  0408   AP 53 52   TP 6.3* 6.0*   ALB 2.4* 2.4*   GLOB 3.9 3.6     No results for input(s): INR, PTP, APTT, INREXT in the last 72 hours. Imaging studies reviewed    Assessment / Plan :     Anemia, iron deficient   Diarrhea     83 yo female with h/o of microscopic colitis, DM2, HTN,  HLD, AS, dementia, and CVA? We have been asked to see for evaluation of MICHAEL and declining hgb. Hgb 8.4 on admission, 8.1 today. FOC negative (5/10) denies hematochezia or melena. Chronic diarrhea with incontinence. Labs (5/10),Iron 31 Ferritin 67- has been on IV Venofer and po supplementation    - continue iron supplementation   - supportive care per hospitalist  - Pepcid BID  - start cholestyramine 4g TID for diarrhea  - Metamucil 1 packet with breakfast - give with yogurt  - May consider outpatient endoscopic evaluation once cardiac issues have settled         Patient Active Hospital Problem List:   Principal Problem:    Acute respiratory failure with hypoxia ([facility] [address] (5/9/2022)    Brianna Vaz NP  I have personally reviewed the history and independently examined the patient.  I have reviewed the chart and agree with the documentation recorded by the Mid Level Provider, including the assessment, treatment plan, and disposition. ASSESSMENT AND PLAN:  Anemia in the background of multiple comorbidities and microscopic colitis. Suspect this is iron deficiency and an ideal situation would need to repeat colonoscopy. However she is very frail at this time and would continue conservative management and monitoring H&H. Would plan endoscopy procedures once cardiac issues are stable.   Call partners to round over the weekend    Wendy Smith MD

## 2022-05-14 LAB
ANION GAP SERPL CALC-SCNC: 4 MMOL/L (ref 5–15)
ANION GAP SERPL CALC-SCNC: 7 MMOL/L (ref 5–15)
BASOPHILS # BLD: 0.1 K/UL (ref 0–0.1)
BASOPHILS NFR BLD: 1 % (ref 0–1)
BUN SERPL-MCNC: 15 MG/DL (ref 6–20)
BUN SERPL-MCNC: 17 MG/DL (ref 6–20)
BUN/CREAT SERPL: 13 (ref 12–20)
BUN/CREAT SERPL: 14 (ref 12–20)
CALCIUM SERPL-MCNC: 8.5 MG/DL (ref 8.5–10.1)
CALCIUM SERPL-MCNC: 8.8 MG/DL (ref 8.5–10.1)
CHLORIDE SERPL-SCNC: 103 MMOL/L (ref 97–108)
CHLORIDE SERPL-SCNC: 106 MMOL/L (ref 97–108)
CO2 SERPL-SCNC: 23 MMOL/L (ref 21–32)
CO2 SERPL-SCNC: 29 MMOL/L (ref 21–32)
CREAT SERPL-MCNC: 1.12 MG/DL (ref 0.55–1.02)
CREAT SERPL-MCNC: 1.24 MG/DL (ref 0.55–1.02)
DIFFERENTIAL METHOD BLD: ABNORMAL
EOSINOPHIL # BLD: 0.3 K/UL (ref 0–0.4)
EOSINOPHIL NFR BLD: 5 % (ref 0–7)
ERYTHROCYTE [DISTWIDTH] IN BLOOD BY AUTOMATED COUNT: 19.9 % (ref 11.5–14.5)
GLUCOSE BLD STRIP.AUTO-MCNC: 120 MG/DL (ref 65–117)
GLUCOSE BLD STRIP.AUTO-MCNC: 122 MG/DL (ref 65–117)
GLUCOSE BLD STRIP.AUTO-MCNC: 148 MG/DL (ref 65–117)
GLUCOSE BLD STRIP.AUTO-MCNC: 164 MG/DL (ref 65–117)
GLUCOSE SERPL-MCNC: 115 MG/DL (ref 65–100)
GLUCOSE SERPL-MCNC: 154 MG/DL (ref 65–100)
HCT VFR BLD AUTO: 28.5 % (ref 35–47)
HGB BLD-MCNC: 8.4 G/DL (ref 11.5–16)
IMM GRANULOCYTES # BLD AUTO: 0.1 K/UL (ref 0–0.04)
IMM GRANULOCYTES NFR BLD AUTO: 1 % (ref 0–0.5)
LYMPHOCYTES # BLD: 1.6 K/UL (ref 0.8–3.5)
LYMPHOCYTES NFR BLD: 25 % (ref 12–49)
MCH RBC QN AUTO: 28.7 PG (ref 26–34)
MCHC RBC AUTO-ENTMCNC: 29.5 G/DL (ref 30–36.5)
MCV RBC AUTO: 97.3 FL (ref 80–99)
MONOCYTES # BLD: 0.8 K/UL (ref 0–1)
MONOCYTES NFR BLD: 13 % (ref 5–13)
NEUTS SEG # BLD: 3.6 K/UL (ref 1.8–8)
NEUTS SEG NFR BLD: 55 % (ref 32–75)
NRBC # BLD: 0 K/UL (ref 0–0.01)
NRBC BLD-RTO: 0 PER 100 WBC
PLATELET # BLD AUTO: 236 K/UL (ref 150–400)
PMV BLD AUTO: 10.7 FL (ref 8.9–12.9)
POTASSIUM SERPL-SCNC: 4.7 MMOL/L (ref 3.5–5.1)
POTASSIUM SERPL-SCNC: 6 MMOL/L (ref 3.5–5.1)
RBC # BLD AUTO: 2.93 M/UL (ref 3.8–5.2)
RBC MORPH BLD: ABNORMAL
SERVICE CMNT-IMP: ABNORMAL
SODIUM SERPL-SCNC: 136 MMOL/L (ref 136–145)
SODIUM SERPL-SCNC: 136 MMOL/L (ref 136–145)
WBC # BLD AUTO: 6.5 K/UL (ref 3.6–11)

## 2022-05-14 PROCEDURE — 82962 GLUCOSE BLOOD TEST: CPT

## 2022-05-14 PROCEDURE — 74011000250 HC RX REV CODE- 250: Performed by: INTERNAL MEDICINE

## 2022-05-14 PROCEDURE — 65270000046 HC RM TELEMETRY

## 2022-05-14 PROCEDURE — 74011250637 HC RX REV CODE- 250/637: Performed by: NURSE PRACTITIONER

## 2022-05-14 PROCEDURE — 80048 BASIC METABOLIC PNL TOTAL CA: CPT

## 2022-05-14 PROCEDURE — 77030038269 HC DRN EXT URIN PURWCK BARD -A

## 2022-05-14 PROCEDURE — 36415 COLL VENOUS BLD VENIPUNCTURE: CPT

## 2022-05-14 PROCEDURE — 74011250637 HC RX REV CODE- 250/637: Performed by: SPECIALIST

## 2022-05-14 PROCEDURE — 74011250636 HC RX REV CODE- 250/636: Performed by: HOSPITALIST

## 2022-05-14 PROCEDURE — 85025 COMPLETE CBC W/AUTO DIFF WBC: CPT

## 2022-05-14 PROCEDURE — 74011636637 HC RX REV CODE- 636/637: Performed by: INTERNAL MEDICINE

## 2022-05-14 PROCEDURE — 74011000258 HC RX REV CODE- 258: Performed by: HOSPITALIST

## 2022-05-14 PROCEDURE — 74011000250 HC RX REV CODE- 250: Performed by: HOSPITALIST

## 2022-05-14 PROCEDURE — 74011250637 HC RX REV CODE- 250/637: Performed by: INTERNAL MEDICINE

## 2022-05-14 PROCEDURE — 74011250636 HC RX REV CODE- 250/636: Performed by: INTERNAL MEDICINE

## 2022-05-14 RX ADMIN — SODIUM CHLORIDE, PRESERVATIVE FREE 10 ML: 5 INJECTION INTRAVENOUS at 21:25

## 2022-05-14 RX ADMIN — FAMOTIDINE 20 MG: 20 TABLET ORAL at 08:51

## 2022-05-14 RX ADMIN — SODIUM CHLORIDE, PRESERVATIVE FREE 10 ML: 5 INJECTION INTRAVENOUS at 14:24

## 2022-05-14 RX ADMIN — DONEPEZIL HYDROCHLORIDE 10 MG: 5 TABLET, FILM COATED ORAL at 21:21

## 2022-05-14 RX ADMIN — CHOLESTYRAMINE 4 G: 4 POWDER, FOR SUSPENSION ORAL at 17:06

## 2022-05-14 RX ADMIN — AMLODIPINE BESYLATE 5 MG: 5 TABLET ORAL at 08:51

## 2022-05-14 RX ADMIN — FUROSEMIDE 40 MG: 40 TABLET ORAL at 08:51

## 2022-05-14 RX ADMIN — PAROXETINE HYDROCHLORIDE 20 MG: 20 TABLET, FILM COATED ORAL at 21:22

## 2022-05-14 RX ADMIN — CHOLESTYRAMINE 4 G: 4 POWDER, FOR SUSPENSION ORAL at 11:59

## 2022-05-14 RX ADMIN — SODIUM CHLORIDE, PRESERVATIVE FREE 10 ML: 5 INJECTION INTRAVENOUS at 09:05

## 2022-05-14 RX ADMIN — DOXYCYCLINE 100 MG: 100 INJECTION, POWDER, LYOPHILIZED, FOR SOLUTION INTRAVENOUS at 09:03

## 2022-05-14 RX ADMIN — DOXYCYCLINE 100 MG: 100 INJECTION, POWDER, LYOPHILIZED, FOR SOLUTION INTRAVENOUS at 21:25

## 2022-05-14 RX ADMIN — Medication 2 UNITS: at 17:05

## 2022-05-14 RX ADMIN — ATORVASTATIN CALCIUM 20 MG: 20 TABLET, FILM COATED ORAL at 21:22

## 2022-05-14 RX ADMIN — FERROUS SULFATE TAB 325 MG (65 MG ELEMENTAL FE) 325 MG: 325 (65 FE) TAB at 06:58

## 2022-05-14 RX ADMIN — SODIUM CHLORIDE, PRESERVATIVE FREE 1 G: 5 INJECTION INTRAVENOUS at 19:32

## 2022-05-14 RX ADMIN — Medication 2 UNITS: at 11:59

## 2022-05-14 RX ADMIN — ASPIRIN 81 MG CHEWABLE TABLET 81 MG: 81 TABLET CHEWABLE at 08:51

## 2022-05-14 RX ADMIN — ENOXAPARIN SODIUM 40 MG: 100 INJECTION SUBCUTANEOUS at 09:04

## 2022-05-14 RX ADMIN — CHOLESTYRAMINE 4 G: 4 POWDER, FOR SUSPENSION ORAL at 08:56

## 2022-05-14 RX ADMIN — PSYLLIUM HUSK 1 PACKET: 3.4 POWDER ORAL at 08:56

## 2022-05-14 NOTE — PROGRESS NOTES
0730: Bedside shift change report given to Ranulfo Kirkpatrick (oncoming nurse) by Kang Gallagher RN (offgoing nurse). Report included the following information SBAR, MAR and Recent Results. 1930: Bedside shift change report given to GO Hernandez (oncoming nurse) by Mary Quesada RN (offgoing nurse). Report included the following information SBAR, MAR and Recent Results.

## 2022-05-14 NOTE — PROGRESS NOTES
Occupational Therapy:     Orders received and chart reviewed in preparation for OT evaluation/intervention. Attempted to see pt, however politely declining participation at this time and requesting to finish eating lunch. Will defer and follow up this PM as able and appropriate.      Thank you,   Hakeem Blanco, OTD, OTR/L

## 2022-05-14 NOTE — PROGRESS NOTES
Bedside and Verbal shift change report given to Shantel Cole RN (oncoming nurse) by Broderick Irizarry (offgoing nurse). Report included the following information SBAR, Kardex, ED Summary, Procedure Summary and Cardiac Rhythm ST / PACS. Bedside and Verbal shift change report given to  Helen Sim RN (oncoming nurse) by Shantel Cole RN (offgoing nurse). Report included the following information SBAR, Kardex, ED Summary and Cardiac Rhythm ST / PACS.

## 2022-05-14 NOTE — PROGRESS NOTES
Physician Progress Note      PATIENT:               Kimberly Estevez  CSN #:                  [id number]  :                       1935  ADMIT DATE:       2022 5:41 PM  [address] DATE:  RESPONDING  PROVIDER #:        Brennan Regalado MD          QUERY TEXT:    Pt admitted with Acute Respiratory Failure with Hypoxia. Pt noted to have elevated BNP and patient was given IV Lasix and diuresed 4L. If possible, please document in the progress notes and discharge summary if you are evaluating and or treating any of the following: The medical record reflects the following:  Risk Factors: 86F pneumonia DMII HTN Dementia Large Left Pleural Effusion  Clinical Indicators:     CXR  There is pulmonary venous congestion with mild interstitial pulmonary edema. Blunting of the lateral costophrenic sulci bilaterally suggesting trace  effusions. ECHO  Left? Ventricle: Normal left ventricular systolic function with a visually estimated EF of 65 - 70%. Left ventricle size is normal. Normal wall thickness. Normal wall motion. Normal diastolic function. Aortic? Valve: Mildly calcified cusp. Mild to moderate stenosis of the aortic valve. AV mean gradient is 17 mmHg. AV peak gradient is 28 mmHg. AV area by continuity VTI is 1.2 cm2. Mitral?Valve: Moderate annular calcification of the mitral valve. Pulmonary? Arteries: Severe pulmonary hypertension present. Pericardium: Large left pleural effusion.     2022 18:00  NT pro-BNP: 6,962 (H)    Treatment: Lab Monitoring, I&Os, CXR, ECHO, Cards Consult, IV Diuretic s, IV abx  Options provided:  -- Acute pulmonary edema due to heart disease  -- Acute pulmonary edema due to heart failure  -- Noncardiogenic acute pulmonary edema due to, please document etiology of pulmonary edema  -- Other - I will add my own diagnosis  -- Disagree - Not applicable / Not valid  -- Disagree - Clinically unable to determine / Unknown  -- Refer to Clinical Documentation Reviewer    PROVIDER RESPONSE TEXT:    This patient has noncardiogenic acute pulmonary edema due to    Query created by: Lainey Mendenhall on 5/13/2022 2:27 PM      QUERY TEXT:    Pt admitted with pneumonia. . Please specify the type of pneumonia being treated. Note: CAP and HCAP indicate where the pneumonia was acquired, not a specific type. The medical record reflects the following:  Risk Factors: 86F dementia  Clinical Indicators:  5/12 Sandra SORENSEN  CAP  ? CHF. No overt pulmonary edema. proBNP is elevated however. --She is already being diuresed with Lasix. -- CTA lung negative for PE, but showed clusters of micronodules compatible with small airway infection or inflammation. -- Treat for CAP  with ceftriaxone and doxycycline. Rapid COVID test negative. -- Continue supplemental oxygen to keep SPO2 above 92%. -- Cardiology following.  -- Repeat echocardiogram showed normal EF 65 to 54%, normal diastolic function. Moderate AAS. Severe PAH, large left pleural effusion. Treatment: As in the above quoted documentation  Options provided:  -- Gram negative pneumonia  -- Gram positive pneumonia  -- MRSA pneumonia  -- MSSA pneumonia  -- Bacterial pneumonia  -- Viral pneumonia  -- Aspiration pneumonia  -- Hypostatic pneumonia  -- Other - I will add my own diagnosis  -- Disagree - Not applicable / Not valid  -- Disagree - Clinically unable to determine / Unknown  -- Refer to Clinical Documentation Reviewer    PROVIDER RESPONSE TEXT:    This patient has bacterial pneumonia.     Query created by: Lainey Mendenhall on 5/13/2022 2:31 PM      Electronically signed by:  Jcarlos Austin MD 5/13/2022 9:45 PM

## 2022-05-14 NOTE — PROGRESS NOTES
[facility]                                                                                          Hospitalist Progress Note  Roxana More MD  Answering service: [phone] OR [phone] from in house phone        Date of Service:  2022  NAME:  Ama Arnold  :  1935  MRN:  [id number]    This documentation was facilitated by a Voice Recognition software and may contain inadvertent typographical errors. Admission Summary:   Patient came to the ED for shortness of breath. She is admitted for acute hypoxic respiratory failure. PMH is significant for type 2 diabetes with neuropathy, stomach ulcer, hyperlipidemia, depression, stroke. Interval history / Subjective:        NO new complaints    Assessment & Plan:   Acute hypoxic respiratory failure  CAP  ? CHF. No overt pulmonary edema. proBNP is elevated. --She is being diuresed with Lasix. -- CTA lung negative for PE, but showed clusters of micronodules compatible with small airway infection or inflammation. -- Treat for CAP  with ceftriaxone and doxycycline. Rapid COVID test negative. -- Continue supplemental oxygen to keep SPO2 above 92%. -- Cardiology following.    -- Repeat echocardiogram showed normal EF 65 to 87%, normal diastolic function. Moderate AAS. Severe PAH, large left pleural effusion. Continue oral furosemide. -- Hyperkalemia, not on potassium supplement, should improve with ongoing Lasix use. Chronic normocytic anemia. Iron deficiency. Hemoglobin declining since admission. -- Stool negative for blood. Iron supplement. -- Iron level was 31 on 5/10 and up to 253 today, . DC IV iron. -- GI evaluated, no endoscopic intervention planned. Alternating constipation/diarrhea, suspect IBS  --No fever, leukocytosis, abdominal pain; No suspicion for infectious etiology  -- Try Imodium. GI added Questran, Metamucil    Type 2 diabetes with peripheral neuropathy.   A1c 6.6  -- Accu-Cheks AC&HS. Humalog correction scale. Hypertension, continue home regimen  Dyslipidemia: Continue home statin. Mild dementia per family: She is on Aricept    Anxiety: Continue Paxil. Added low-dose as needed Xanax      Care Plan discussed with: Independent full code  Surrogate decision makers: Patient does not have POA. She stated her daughters Broderick Morrison and Kartik Phillips will be her medical decision makers. Code status: Full code  DVT prophylaxis: Enoxaparin  Anticipated Disposition: She will likely need SNF rehab. Hospital Problems  Date Reviewed: 5/10/2022          Codes Class Noted POA    * (Principal) Acute respiratory failure with hypoxia [facility]) ICD-10-CM: J96.01  ICD-9-CM: 518.81  5/9/2022 Yes                Review of Systems:   A comprehensive review of systems was negative except for that written in the HPI. Vital Signs:    Last 24hrs VS reviewed since prior progress note. Most recent are:        Intake/Output Summary (Last 24 hours) at 5/14/2022 1649  Last data filed at 5/14/2022 1153  Gross per 24 hour   Intake 640 ml   Output 1400 ml   Net -760 ml        Physical Examination:     I had a face to face encounter with this patient and independently examined them on5/14/2022 as outlined below:        Constitutional:  Alert and oriented. Not in distress. HEENT:  Atraumatic. Oral mucosa moist,. Non icteric sclera. No pallor. Resp:  On oxygen 3 L/min, SPO2 96%. Basal rates. Chest Wall: No deformity     CV:  Grade 3 murmur on the A2 area      GI:  Normoactive  Soft, non distended, non tender. No appreciable organomegaly      :  No CVA or suprapubic tenderness      Musculoskeletal:  Bilateral venous stasis with purplish discoloration, no ischemia. Both feet are sensitive. There is mild edema. Neurologic:  Mental status: Alert and oriented to PPT  Moves all extremities symmetrically.   Peripheral neuropathy  Gait and balance: Not tested Data Review:    Review and/or order of clinical lab test  Review and/or order of tests in the radiology section of CPT  Review and/or order of tests in the medicine section of CPT      Available Lab and Imaging results reviewed and used to formulate the current care plan.       Medications Reviewed:     Current Facility-Administered Medications   Medication Dose Route Frequency    cholestyramine-aspartame (QUESTRAN LIGHT) packet 4 g  4 g Oral TID WITH MEALS    psyllium husk-aspartame (METAMUCIL FIBER) packet 1 Packet  1 Packet Oral DAILY WITH BREAKFAST    ALPRAZolam (XANAX) tablet 0.5 mg  0.5 mg Oral BID PRN    furosemide (LASIX) tablet 40 mg  40 mg Oral DAILY    loperamide (IMODIUM) capsule 2 mg  2 mg Oral Q4H PRN    amLODIPine (NORVASC) tablet 5 mg  5 mg Oral DAILY    aspirin chewable tablet 81 mg  81 mg Oral DAILY    baclofen (LIORESAL) tablet 10 mg  10 mg Oral BID PRN    donepeziL (ARICEPT) tablet 10 mg  10 mg Oral QHS    famotidine (PEPCID) tablet 20 mg  20 mg Oral DAILY    PARoxetine (PAXIL) tablet 20 mg  20 mg Oral QHS    atorvastatin (LIPITOR) tablet 20 mg  20 mg Oral QHS    traMADoL (ULTRAM) tablet 50 mg  50 mg Oral Q6H PRN    sodium chloride (NS) flush 5-40 mL  5-40 mL IntraVENous Q8H    sodium chloride (NS) flush 5-40 mL  5-40 mL IntraVENous PRN    acetaminophen (TYLENOL) tablet 650 mg  650 mg Oral Q6H PRN    Or    acetaminophen (TYLENOL) suppository 650 mg  650 mg Rectal Q6H PRN    polyethylene glycol (MIRALAX) packet 17 g  17 g Oral DAILY PRN    ondansetron (ZOFRAN ODT) tablet 4 mg  4 mg Oral Q8H PRN    Or    ondansetron (ZOFRAN) injection 4 mg  4 mg IntraVENous Q6H PRN    enoxaparin (LOVENOX) injection 40 mg  40 mg SubCUTAneous DAILY    insulin lispro (HUMALOG) injection   SubCUTAneous AC&HS    glucose chewable tablet 16 g  4 Tablet Oral PRN    glucagon (GLUCAGEN) injection 1 mg  1 mg IntraMUSCular PRN    dextrose 10 % infusion 0-250 mL  0-250 mL IntraVENous PRN    ferrous sulfate tablet 325 mg  1 Tablet Oral ACB    cefTRIAXone (ROCEPHIN) 1 g in 0.9% sodium chloride 10 mL IV syringe  1 g IntraVENous Q24H    doxycycline (VIBRAMYCIN) 100 mg in 0.9% sodium chloride (MBP/ADV) 100 mL MBP  100 mg IntraVENous Q12H    albuterol-ipratropium (DUO-NEB) 2.5 MG-0.5 MG/3 ML  3 mL Nebulization Q4H PRN     ______________________________________________________________________  EXPECTED LENGTH OF STAY: 3d 14h  ACTUAL LENGTH OF STAY:          5                 Aba Calvillo MD

## 2022-05-15 LAB
ANION GAP SERPL CALC-SCNC: 6 MMOL/L (ref 5–15)
BASOPHILS # BLD: 0.1 K/UL (ref 0–0.1)
BASOPHILS NFR BLD: 1 % (ref 0–1)
BUN SERPL-MCNC: 15 MG/DL (ref 6–20)
BUN/CREAT SERPL: 16 (ref 12–20)
CALCIUM SERPL-MCNC: 8.5 MG/DL (ref 8.5–10.1)
CHLORIDE SERPL-SCNC: 106 MMOL/L (ref 97–108)
CO2 SERPL-SCNC: 28 MMOL/L (ref 21–32)
CREAT SERPL-MCNC: 0.96 MG/DL (ref 0.55–1.02)
DIFFERENTIAL METHOD BLD: ABNORMAL
EOSINOPHIL # BLD: 0.4 K/UL (ref 0–0.4)
EOSINOPHIL NFR BLD: 5 % (ref 0–7)
ERYTHROCYTE [DISTWIDTH] IN BLOOD BY AUTOMATED COUNT: 19.1 % (ref 11.5–14.5)
GLUCOSE BLD STRIP.AUTO-MCNC: 112 MG/DL (ref 65–117)
GLUCOSE BLD STRIP.AUTO-MCNC: 131 MG/DL (ref 65–117)
GLUCOSE BLD STRIP.AUTO-MCNC: 166 MG/DL (ref 65–117)
GLUCOSE BLD STRIP.AUTO-MCNC: 190 MG/DL (ref 65–117)
GLUCOSE SERPL-MCNC: 105 MG/DL (ref 65–100)
HCT VFR BLD AUTO: 27.8 % (ref 35–47)
HGB BLD-MCNC: 8.3 G/DL (ref 11.5–16)
IMM GRANULOCYTES # BLD AUTO: 0 K/UL (ref 0–0.04)
IMM GRANULOCYTES NFR BLD AUTO: 0 % (ref 0–0.5)
LYMPHOCYTES # BLD: 2.4 K/UL (ref 0.8–3.5)
LYMPHOCYTES NFR BLD: 31 % (ref 12–49)
MCH RBC QN AUTO: 28.1 PG (ref 26–34)
MCHC RBC AUTO-ENTMCNC: 29.9 G/DL (ref 30–36.5)
MCV RBC AUTO: 94.2 FL (ref 80–99)
MONOCYTES # BLD: 1 K/UL (ref 0–1)
MONOCYTES NFR BLD: 12 % (ref 5–13)
NEUTS SEG # BLD: 4 K/UL (ref 1.8–8)
NEUTS SEG NFR BLD: 51 % (ref 32–75)
NRBC # BLD: 0 K/UL (ref 0–0.01)
NRBC BLD-RTO: 0 PER 100 WBC
PLATELET # BLD AUTO: 288 K/UL (ref 150–400)
PMV BLD AUTO: 10.9 FL (ref 8.9–12.9)
POTASSIUM SERPL-SCNC: 4.3 MMOL/L (ref 3.5–5.1)
RBC # BLD AUTO: 2.95 M/UL (ref 3.8–5.2)
SERVICE CMNT-IMP: ABNORMAL
SERVICE CMNT-IMP: NORMAL
SODIUM SERPL-SCNC: 140 MMOL/L (ref 136–145)
WBC # BLD AUTO: 7.8 K/UL (ref 3.6–11)

## 2022-05-15 PROCEDURE — 77010033678 HC OXYGEN DAILY

## 2022-05-15 PROCEDURE — 65270000046 HC RM TELEMETRY

## 2022-05-15 PROCEDURE — 74011636637 HC RX REV CODE- 636/637: Performed by: INTERNAL MEDICINE

## 2022-05-15 PROCEDURE — 74011000250 HC RX REV CODE- 250: Performed by: INTERNAL MEDICINE

## 2022-05-15 PROCEDURE — 74011250637 HC RX REV CODE- 250/637: Performed by: INTERNAL MEDICINE

## 2022-05-15 PROCEDURE — 74011250636 HC RX REV CODE- 250/636: Performed by: INTERNAL MEDICINE

## 2022-05-15 PROCEDURE — 80048 BASIC METABOLIC PNL TOTAL CA: CPT

## 2022-05-15 PROCEDURE — 74011250636 HC RX REV CODE- 250/636: Performed by: HOSPITALIST

## 2022-05-15 PROCEDURE — 85025 COMPLETE CBC W/AUTO DIFF WBC: CPT

## 2022-05-15 PROCEDURE — 82962 GLUCOSE BLOOD TEST: CPT

## 2022-05-15 PROCEDURE — 36415 COLL VENOUS BLD VENIPUNCTURE: CPT

## 2022-05-15 PROCEDURE — 74011000258 HC RX REV CODE- 258: Performed by: HOSPITALIST

## 2022-05-15 PROCEDURE — 74011250637 HC RX REV CODE- 250/637: Performed by: NURSE PRACTITIONER

## 2022-05-15 PROCEDURE — 74011250637 HC RX REV CODE- 250/637: Performed by: SPECIALIST

## 2022-05-15 RX ADMIN — PSYLLIUM HUSK 1 PACKET: 3.4 POWDER ORAL at 09:54

## 2022-05-15 RX ADMIN — ATORVASTATIN CALCIUM 20 MG: 20 TABLET, FILM COATED ORAL at 22:11

## 2022-05-15 RX ADMIN — SODIUM CHLORIDE, PRESERVATIVE FREE 10 ML: 5 INJECTION INTRAVENOUS at 08:29

## 2022-05-15 RX ADMIN — CHOLESTYRAMINE 4 G: 4 POWDER, FOR SUSPENSION ORAL at 12:32

## 2022-05-15 RX ADMIN — AMLODIPINE BESYLATE 5 MG: 5 TABLET ORAL at 09:55

## 2022-05-15 RX ADMIN — CHOLESTYRAMINE 4 G: 4 POWDER, FOR SUSPENSION ORAL at 09:54

## 2022-05-15 RX ADMIN — DONEPEZIL HYDROCHLORIDE 10 MG: 5 TABLET, FILM COATED ORAL at 22:11

## 2022-05-15 RX ADMIN — FAMOTIDINE 20 MG: 20 TABLET ORAL at 09:54

## 2022-05-15 RX ADMIN — FUROSEMIDE 40 MG: 40 TABLET ORAL at 09:54

## 2022-05-15 RX ADMIN — PAROXETINE HYDROCHLORIDE 20 MG: 20 TABLET, FILM COATED ORAL at 22:11

## 2022-05-15 RX ADMIN — ASPIRIN 81 MG CHEWABLE TABLET 81 MG: 81 TABLET CHEWABLE at 09:54

## 2022-05-15 RX ADMIN — Medication 2 UNITS: at 12:32

## 2022-05-15 RX ADMIN — DOXYCYCLINE 100 MG: 100 INJECTION, POWDER, LYOPHILIZED, FOR SOLUTION INTRAVENOUS at 09:54

## 2022-05-15 RX ADMIN — FERROUS SULFATE TAB 325 MG (65 MG ELEMENTAL FE) 325 MG: 325 (65 FE) TAB at 06:53

## 2022-05-15 RX ADMIN — SODIUM CHLORIDE, PRESERVATIVE FREE 10 ML: 5 INJECTION INTRAVENOUS at 22:13

## 2022-05-15 RX ADMIN — SODIUM CHLORIDE, PRESERVATIVE FREE 10 ML: 5 INJECTION INTRAVENOUS at 14:00

## 2022-05-15 RX ADMIN — ENOXAPARIN SODIUM 40 MG: 100 INJECTION SUBCUTANEOUS at 09:54

## 2022-05-15 RX ADMIN — CHOLESTYRAMINE 4 G: 4 POWDER, FOR SUSPENSION ORAL at 17:21

## 2022-05-15 NOTE — PROGRESS NOTES
Care plan reviewed. Problem: Falls - Risk of  Goal: *Absence of Falls  Description: Document Yady Schwab Fall Risk and appropriate interventions in the flowsheet. Outcome: Progressing Towards Goal  Note: Fall Risk Interventions:  Mobility Interventions: Bed/chair exit alarm,Communicate number of staff needed for ambulation/transfer,Patient to call before getting OOB,Utilize walker, cane, or other assistive device    Mentation Interventions: Adequate sleep, hydration, pain control,Bed/chair exit alarm,Door open when patient unattended,Eyeglasses and hearing aids,More frequent rounding,Update white board    Medication Interventions: Assess postural VS orthostatic hypotension,Bed/chair exit alarm,Patient to call before getting OOB,Teach patient to arise slowly    Elimination Interventions: Bed/chair exit alarm,Call light in reach,Patient to call for help with toileting needs    History of Falls Interventions: Bed/chair exit alarm,Door open when patient unattended         Problem: Patient Education: Go to Patient Education Activity  Goal: Patient/Family Education  Outcome: Progressing Towards Goal     Problem: Diabetes Self-Management  Goal: *Disease process and treatment process  Description: Define diabetes and identify own type of diabetes; list 3 options for treating diabetes. Outcome: Progressing Towards Goal  Goal: *Incorporating nutritional management into lifestyle  Description: Describe effect of type, amount and timing of food on blood glucose; list 3 methods for planning meals. Outcome: Progressing Towards Goal  Goal: *Incorporating physical activity into lifestyle  Description: State effect of exercise on blood glucose levels. Outcome: Progressing Towards Goal  Goal: *Developing strategies to promote health/change behavior  Description: Define the ABC's of diabetes; identify appropriate screenings, schedule and personal plan for screenings.   Outcome: Progressing Towards Goal  Goal: *Using medications safely  Description: State effect of diabetes medications on diabetes; name diabetes medication taking, action and side effects. Outcome: Progressing Towards Goal  Goal: *Monitoring blood glucose, interpreting and using results  Description: Identify recommended blood glucose targets  and personal targets. Outcome: Progressing Towards Goal  Goal: *Prevention, detection, treatment of acute complications  Description: List symptoms of hyper- and hypoglycemia; describe how to treat low blood sugar and actions for lowering  high blood glucose level. Outcome: Progressing Towards Goal  Goal: *Prevention, detection and treatment of chronic complications  Description: Define the natural course of diabetes and describe the relationship of blood glucose levels to long term complications of diabetes. Outcome: Progressing Towards Goal  Goal: *Developing strategies to address psychosocial issues  Description: Describe feelings about living with diabetes; identify support needed and support network  Outcome: Progressing Towards Goal  Goal: *Insulin pump training  Outcome: Progressing Towards Goal  Goal: *Sick day guidelines  Outcome: Progressing Towards Goal  Goal: *Patient Specific Goal (EDIT GOAL, INSERT TEXT)  Outcome: Progressing Towards Goal     Problem: Patient Education: Go to Patient Education Activity  Goal: Patient/Family Education  Outcome: Progressing Towards Goal     Problem: Pressure Injury - Risk of  Goal: *Prevention of pressure injury  Description: Document Jens Scale and appropriate interventions in the flowsheet. Outcome: Progressing Towards Goal  Note: Pressure Injury Interventions:  Sensory Interventions: Assess changes in LOC,Assess need for specialty bed,Check visual cues for pain,Keep linens dry and wrinkle-free,Maintain/enhance activity level,Minimize linen layers,Pressure redistribution bed/mattress (bed type),Turn and reposition approx.  every two hours (pillows and wedges if needed)    Moisture Interventions: Absorbent underpads,Apply protective barrier, creams and emollients,Check for incontinence Q2 hours and as needed,Internal/External urinary devices,Maintain skin hydration (lotion/cream),Minimize layers    Activity Interventions: Increase time out of bed,Pressure redistribution bed/mattress(bed type),PT/OT evaluation    Mobility Interventions: Pressure redistribution bed/mattress (bed type),PT/OT evaluation,Turn and reposition approx.  every two hours(pillow and wedges)    Nutrition Interventions: Document food/fluid/supplement intake,Offer support with meals,snacks and hydration    Friction and Shear Interventions: Minimize layers,Transferring/repositioning devices                Problem: Patient Education: Go to Patient Education Activity  Goal: Patient/Family Education  Outcome: Progressing Towards Goal     Problem: Patient Education: Go to Patient Education Activity  Goal: Patient/Family Education  Outcome: Progressing Towards Goal

## 2022-05-15 NOTE — PROGRESS NOTES
[address] [facility] Adult  Hospitalist Group                                                                                          Hospitalist Progress Note  Juancho Dalton MD  Answering service: [phone] from in house phone        Date of Service:  5/15/2022  NAME:  Porter Christensen  :  1935  MRN:  [id number]    This documentation was facilitated by a Voice Recognition software and may contain inadvertent typographical errors. Admission Summary:   Patient came to the ED for shortness of breath. She is admitted for acute hypoxic respiratory failure. PMH is significant for type 2 diabetes with neuropathy, stomach ulcer, hyperlipidemia, depression, stroke. Interval history / Subjective:        No specific complaints feeling weak  Otherwise no active issue    Assessment & Plan:   Acute hypoxic respiratory failure  CAP  ? CHF. No overt pulmonary edema. proBNP is elevated. --She is being diuresed with Lasix. -- CTA lung negative for PE, but showed clusters of micronodules compatible with small airway infection or inflammation. -- Treat for CAP  with ceftriaxone and doxycycline. Rapid COVID test negative. -- Continue supplemental oxygen to keep SPO2 above 92%. -- Cardiology following.    -- Repeat echocardiogram showed normal EF 65 to 17%, normal diastolic function. Moderate AAS. Severe PAH, large left pleural effusion. Continue oral furosemide. -- Hyperkalemia,--resolved    Chronic normocytic anemia. Iron deficiency. Hemoglobin declining since admission. -- Stool negative for blood. Iron supplement. -- Iron level was 31 on 5/10 and up to 253 today, . DC IV iron. -- GI evaluated, no endoscopic intervention planned. Alternating constipation/diarrhea, suspect IBS  --No fever, leukocytosis, abdominal pain; No suspicion for infectious etiology  -- Try Imodium. GI added Questran, Metamucil    Type 2 diabetes with peripheral neuropathy.   A1c 6.6  -- Accu-Cheks AC&HS. Humalog correction scale. Hypertension, continue home regimen  Dyslipidemia: Continue home statin. Mild dementia per family: She is on Aricept    Anxiety: Continue Paxil. Added low-dose as needed Xanax      Care Plan discussed with: Independent full code  Surrogate decision makers: Patient does not have POA. She stated her daughters Eric Tolentino and Darling Boles will be her medical decision makers. Code status: Full code  DVT prophylaxis: Enoxaparin  Anticipated Disposition: She will likely need SNF rehab. Hospital Problems  Date Reviewed: 5/10/2022          Codes Class Noted POA    * (Principal) Acute respiratory failure with hypoxia [facility]) ICD-10-CM: J96.01  ICD-9-CM: 518.81  5/9/2022 Yes                Review of Systems:   A comprehensive review of systems was negative except for that written in the HPI. Vital Signs:    Last 24hrs VS reviewed since prior progress note. Most recent are:        Intake/Output Summary (Last 24 hours) at 5/15/2022 1237  Last data filed at 5/15/2022 0328  Gross per 24 hour   Intake 480 ml   Output 1100 ml   Net -620 ml        Physical Examination:     I had a face to face encounter with this patient and independently examined them on5/15/2022 as outlined below:        Constitutional:  Alert and oriented. Not in distress. HEENT:  Atraumatic. Oral mucosa moist,. Non icteric sclera. No pallor. Resp:  On oxygen 3 L/min, SPO2 96%. Basal rates. Chest Wall: No deformity     CV:  Grade 3 murmur on the A2 area      GI:  Normoactive  Soft, non distended, non tender. No appreciable organomegaly      :  No CVA or suprapubic tenderness      Musculoskeletal:  Bilateral venous stasis with purplish discoloration, no ischemia. Both feet are sensitive. There is mild edema. Neurologic:  Mental status: Alert and oriented to PPT  Moves all extremities symmetrically.   Peripheral neuropathy  Gait and balance: Not tested              Data Review:    Review and/or order of clinical lab test  Review and/or order of tests in the radiology section of CPT  Review and/or order of tests in the medicine section of CPT      Available Lab and Imaging results reviewed and used to formulate the current care plan.       Medications Reviewed:     Current Facility-Administered Medications   Medication Dose Route Frequency    cholestyramine-aspartame (QUESTRAN LIGHT) packet 4 g  4 g Oral TID WITH MEALS    psyllium husk-aspartame (METAMUCIL FIBER) packet 1 Packet  1 Packet Oral DAILY WITH BREAKFAST    ALPRAZolam (XANAX) tablet 0.5 mg  0.5 mg Oral BID PRN    furosemide (LASIX) tablet 40 mg  40 mg Oral DAILY    loperamide (IMODIUM) capsule 2 mg  2 mg Oral Q4H PRN    amLODIPine (NORVASC) tablet 5 mg  5 mg Oral DAILY    aspirin chewable tablet 81 mg  81 mg Oral DAILY    baclofen (LIORESAL) tablet 10 mg  10 mg Oral BID PRN    donepeziL (ARICEPT) tablet 10 mg  10 mg Oral QHS    famotidine (PEPCID) tablet 20 mg  20 mg Oral DAILY    PARoxetine (PAXIL) tablet 20 mg  20 mg Oral QHS    atorvastatin (LIPITOR) tablet 20 mg  20 mg Oral QHS    traMADoL (ULTRAM) tablet 50 mg  50 mg Oral Q6H PRN    sodium chloride (NS) flush 5-40 mL  5-40 mL IntraVENous Q8H    sodium chloride (NS) flush 5-40 mL  5-40 mL IntraVENous PRN    acetaminophen (TYLENOL) tablet 650 mg  650 mg Oral Q6H PRN    Or    acetaminophen (TYLENOL) suppository 650 mg  650 mg Rectal Q6H PRN    polyethylene glycol (MIRALAX) packet 17 g  17 g Oral DAILY PRN    ondansetron (ZOFRAN ODT) tablet 4 mg  4 mg Oral Q8H PRN    Or    ondansetron (ZOFRAN) injection 4 mg  4 mg IntraVENous Q6H PRN    enoxaparin (LOVENOX) injection 40 mg  40 mg SubCUTAneous DAILY    insulin lispro (HUMALOG) injection   SubCUTAneous AC&HS    glucose chewable tablet 16 g  4 Tablet Oral PRN    glucagon (GLUCAGEN) injection 1 mg  1 mg IntraMUSCular PRN    dextrose 10 % infusion 0-250 mL  0-250 mL IntraVENous PRN    ferrous sulfate tablet 325 mg  1 Tablet Oral ACB    albuterol-ipratropium (DUO-NEB) 2.5 MG-0.5 MG/3 ML  3 mL Nebulization Q4H PRN     ______________________________________________________________________  EXPECTED LENGTH OF STAY: 3d 14h  ACTUAL LENGTH OF STAY:          Osker Scheuermann, MD

## 2022-05-15 NOTE — PROGRESS NOTES
Bedside and Verbal shift change report given to Deidre Pino RN (oncoming nurse) by Jocelyn Epps RN (offgoing nurse). Report included the following information SBAR, Kardex and Cardiac Rhythm ST / SR. Bedside and Verbal shift change report given to Miguelina Seth RN (oncoming nurse) by Deidre Pino RN (offgoing nurse). Report included the following information SBAR, Kardex, ED Summary and Cardiac Rhythm ST / SR / SA. Fall Risk Interventions:  Mobility Interventions: Bed/chair exit alarm,Communicate number of staff needed for ambulation/transfer,Patient to call before getting OOB,Utilize walker, cane, or other assistive device     Mentation Interventions: Adequate sleep, hydration, pain control,Bed/chair exit alarm,Door open when patient unattended,Eyeglasses and hearing aids,More frequent rounding,Update white board     Medication Interventions: Assess postural VS orthostatic hypotension,Bed/chair exit alarm,Patient to call before getting OOB,Teach patient to arise slowly     Elimination Interventions: Bed/chair exit alarm,Call light in reach,Patient to call for help with toileting needs     History of Falls Interventions: Bed/chair exit alarm,Door open when patient unattended    Pressure Injury Interventions:  Sensory Interventions: Assess changes in LOC,Assess need for specialty bed,Check visual cues for pain,Keep linens dry and wrinkle-free,Maintain/enhance activity level,Minimize linen layers,Pressure redistribution bed/mattress (bed type),Turn and reposition approx.  every two hours (pillows and wedges if needed)     Moisture Interventions: Absorbent underpads,Apply protective barrier, creams and emollients,Check for incontinence Q2 hours and as needed,Internal/External urinary devices,Maintain skin hydration (lotion/cream),Minimize layers     Activity Interventions: Increase time out of bed,Pressure redistribution bed/mattress(bed type),PT/OT evaluation     Mobility Interventions: Pressure redistribution bed/mattress (bed type),PT/OT evaluation,Turn and reposition approx.  every two hours(pillow and wedges)     Nutrition Interventions: Document food/fluid/supplement intake,Offer support with meals,snacks and hydration     Friction and Shear Interventions: Minimize layers,Transferring/repositioning devices

## 2022-05-16 ENCOUNTER — APPOINTMENT (OUTPATIENT)
Dept: GENERAL RADIOLOGY | Age: 87
DRG: 193 | End: 2022-05-16
Attending: HOSPITALIST
Payer: MEDICARE

## 2022-05-16 LAB
ATRIAL RATE: 91 BPM
CALCULATED P AXIS, ECG09: 72 DEGREES
CALCULATED R AXIS, ECG10: -50 DEGREES
CALCULATED T AXIS, ECG11: 103 DEGREES
DIAGNOSIS, 93000: NORMAL
GLUCOSE BLD STRIP.AUTO-MCNC: 135 MG/DL (ref 65–117)
GLUCOSE BLD STRIP.AUTO-MCNC: 147 MG/DL (ref 65–117)
GLUCOSE BLD STRIP.AUTO-MCNC: 151 MG/DL (ref 65–117)
GLUCOSE BLD STRIP.AUTO-MCNC: 163 MG/DL (ref 65–117)
GLUCOSE BLD STRIP.AUTO-MCNC: 176 MG/DL (ref 65–117)
P-R INTERVAL, ECG05: 164 MS
Q-T INTERVAL, ECG07: 382 MS
QRS DURATION, ECG06: 112 MS
QTC CALCULATION (BEZET), ECG08: 469 MS
SERVICE CMNT-IMP: ABNORMAL
TROPONIN-HIGH SENSITIVITY: 8 NG/L (ref 0–51)
VENTRICULAR RATE, ECG03: 91 BPM

## 2022-05-16 PROCEDURE — 84484 ASSAY OF TROPONIN QUANT: CPT

## 2022-05-16 PROCEDURE — 93005 ELECTROCARDIOGRAM TRACING: CPT

## 2022-05-16 PROCEDURE — 97535 SELF CARE MNGMENT TRAINING: CPT

## 2022-05-16 PROCEDURE — 97165 OT EVAL LOW COMPLEX 30 MIN: CPT

## 2022-05-16 PROCEDURE — 77010033678 HC OXYGEN DAILY

## 2022-05-16 PROCEDURE — 36415 COLL VENOUS BLD VENIPUNCTURE: CPT

## 2022-05-16 PROCEDURE — 74011636637 HC RX REV CODE- 636/637: Performed by: INTERNAL MEDICINE

## 2022-05-16 PROCEDURE — 74011000250 HC RX REV CODE- 250: Performed by: INTERNAL MEDICINE

## 2022-05-16 PROCEDURE — 97116 GAIT TRAINING THERAPY: CPT

## 2022-05-16 PROCEDURE — 97530 THERAPEUTIC ACTIVITIES: CPT

## 2022-05-16 PROCEDURE — 94760 N-INVAS EAR/PLS OXIMETRY 1: CPT

## 2022-05-16 PROCEDURE — 65270000046 HC RM TELEMETRY

## 2022-05-16 PROCEDURE — 82962 GLUCOSE BLOOD TEST: CPT

## 2022-05-16 PROCEDURE — 74011250637 HC RX REV CODE- 250/637: Performed by: NURSE PRACTITIONER

## 2022-05-16 PROCEDURE — 71045 X-RAY EXAM CHEST 1 VIEW: CPT

## 2022-05-16 PROCEDURE — 74011250637 HC RX REV CODE- 250/637: Performed by: INTERNAL MEDICINE

## 2022-05-16 PROCEDURE — 74011250636 HC RX REV CODE- 250/636: Performed by: INTERNAL MEDICINE

## 2022-05-16 RX ADMIN — ASPIRIN 81 MG CHEWABLE TABLET 81 MG: 81 TABLET CHEWABLE at 10:30

## 2022-05-16 RX ADMIN — DONEPEZIL HYDROCHLORIDE 10 MG: 5 TABLET, FILM COATED ORAL at 22:13

## 2022-05-16 RX ADMIN — ENOXAPARIN SODIUM 40 MG: 100 INJECTION SUBCUTANEOUS at 10:30

## 2022-05-16 RX ADMIN — AMLODIPINE BESYLATE 5 MG: 5 TABLET ORAL at 10:30

## 2022-05-16 RX ADMIN — PAROXETINE HYDROCHLORIDE 20 MG: 20 TABLET, FILM COATED ORAL at 22:13

## 2022-05-16 RX ADMIN — SODIUM CHLORIDE, PRESERVATIVE FREE 10 ML: 5 INJECTION INTRAVENOUS at 18:09

## 2022-05-16 RX ADMIN — ATORVASTATIN CALCIUM 20 MG: 20 TABLET, FILM COATED ORAL at 22:13

## 2022-05-16 RX ADMIN — FERROUS SULFATE TAB 325 MG (65 MG ELEMENTAL FE) 325 MG: 325 (65 FE) TAB at 07:26

## 2022-05-16 RX ADMIN — Medication 2 UNITS: at 13:11

## 2022-05-16 RX ADMIN — SODIUM CHLORIDE, PRESERVATIVE FREE 10 ML: 5 INJECTION INTRAVENOUS at 22:13

## 2022-05-16 RX ADMIN — CHOLESTYRAMINE 4 G: 4 POWDER, FOR SUSPENSION ORAL at 18:08

## 2022-05-16 RX ADMIN — FAMOTIDINE 20 MG: 20 TABLET ORAL at 10:30

## 2022-05-16 RX ADMIN — CHOLESTYRAMINE 4 G: 4 POWDER, FOR SUSPENSION ORAL at 13:11

## 2022-05-16 RX ADMIN — Medication 2 UNITS: at 18:08

## 2022-05-16 RX ADMIN — SODIUM CHLORIDE, PRESERVATIVE FREE 10 ML: 5 INJECTION INTRAVENOUS at 07:26

## 2022-05-16 NOTE — PROGRESS NOTES
Bedside and Verbal shift change report given to Sami Green RN (oncoming nurse) by Lizbeth Azar RN (offgoing nurse). Report included the following information SBAR, Kardex and Cardiac Rhythm ST / SR. Bedside and Verbal shift change report given to Cynthia Brito RN (oncoming nurse) by Sami Green RN (offgoing nurse).  Report included the following information SBAR, Kardex, ED Summary, Procedure Summary and Cardiac Rhythm ST / SR.

## 2022-05-16 NOTE — PROGRESS NOTES
+                                                                                                                                                                                     Critical Care Documentation    Name: Liang Bush  YOB: 1935  MRN: [id number]  Admission Date: 5/9/2022  5:41 PM    Date of service: 5/16/2022    Active Diagnoses:    Hospital Problems  Date Reviewed: 5/10/2022          Codes Class Noted POA    * (Principal) Acute respiratory failure with hypoxia [facility]) ICD-10-CM: J96.01  ICD-9-CM: 518.81  5/9/2022 Yes              Chief Complaint: Status post RRT for sudden hypotension and feeling of passing out      Clinical Presentation:Patient came to the ED for shortness of breath. She is admitted for acute hypoxic respiratory failure. PMH is significant for type 2 diabetes with neuropathy, stomach ulcer, hyperlipidemia, depression, stroke.   Today while working with physical therapy patient suddenly become hypotensive and had to sit in the chair blood pressure came back after few minutes of supine position EKG did not say show any new changes and troponin was normal    Physical Exam:     Elderly female lying in bed not in acute distress can speak in full sentences  Alert oriented to times place and person  Chest CTA bilateral  S1-S2 normal irregular heart rhythm  Abdomen soft bowel sounds positive  Cranial nerves not tested alert oriented to times place and      Data Reviewed:   EKG and labs      Assessment and Plan:    Sudden drop in blood pressure in the setting of aortic stenosis patient was on diuretics for last few days  Clinically look little bit euvolemic plus minus dehydrated  Suspect blood pressure drop in the setting of low volume intravascular volume we will hold Lasix give a little bit of IV fluid support  Ordered EKG and troponin reviewed within normal limit  Continue measuring orthostatic blood pressure discussed with RN      Medications Administered:   Sedation: [ ] yes Maximian.Leonel ] no   Anxiolytics: [ ] yes Maximian.Leonel ] no   Antiarrhythmics: [ ] yes Maximian.Leonel ] no   Antihypertensives: [ ] yes Maximian.Leonel ] no   Pressors: [ ] yes [ Thayer Mcardle no   IVF's: Maximian.Leonel ] yes [ ] no       Critical Care Attestation: This patient is unstable and critically ill. Due to a high probability of clinically significant, life threatening deterioration, the patient required my highest level of preparedness to intervene emergently and I personally spent this critical care time directly and personally managing the patient. This critical care time included obtaining a history; examining the patient; pulse oximetry; ordering and review of studies; arranging urgent treatment with development of a management plan; evaluation of patient's response to treatment; frequent reassessment; and, discussions with other providers and/or family. This critical care time was performed to assess and manage the high probability of imminent, life-threatening deterioration that could result in multi-organ failure and death. It was exclusive of separately billable procedures, treating other patients, and teaching time. Time Spent:     I personally spent 30  minutes in providing critical care time.     Kristine Gandhi MD  5/16/2022  2:20 PM

## 2022-05-16 NOTE — PROGRESS NOTES
0730: Bedside shift change report given to Michael Clark (oncoming nurse) by Gregg Olszewski (offgoing nurse). Report included the following information SBAR, Kardex, MAR and Cardiac Rhythm Sinus Tach. 0148: RN called to bedside for RRT called on patient. Upon entry, pt found on bedside commode with OT & Charge RN. Pt appears minimally unresponsive with a pulse. Per OT, pt was ambulating when she became diaphoretic, pale, and dizzy. Pt placed back in bed, BP 60s/40s. . Pt placed back in bed. o2 increased to 3 L NC. Second /103. Pt responsive once in bed. EKG obtained. MD Mandy Vaz paged. 0945: MD Chiang at bedside. Verbal orders for troponin and CXR.     0957: Troponin collected and sent to lab via tube station.  /53. Pt responsive. Pt appears anxious, but A&Ox4.     1140: Family contact updated via telephone. Family verified on chart. Care plan:    Problem: Falls - Risk of  Goal: *Absence of Falls  Description: Document Marvin Fall Risk and appropriate interventions in the flowsheet.   Outcome: Progressing Towards Goal  Note: Fall Risk Interventions:  Mobility Interventions: Bed/chair exit alarm,Communicate number of staff needed for ambulation/transfer,OT consult for ADLs,Patient to call before getting OOB,PT Consult for mobility concerns,PT Consult for assist device competence    Mentation Interventions: Bed/chair exit alarm,Door open when patient unattended    Medication Interventions: Teach patient to arise slowly,Patient to call before getting OOB,Bed/chair exit alarm    Elimination Interventions: Call light in reach,Bed/chair exit alarm    History of Falls Interventions: Bed/chair exit alarm    Problem: Patient Education: Go to Patient Education Activity  Goal: Patient/Family Education  Outcome: Progressing Towards Goal     Problem: Patient Education: Go to Patient Education Activity  Goal: Patient/Family Education  Outcome: Progressing Towards Goal     Problem: Patient Education: Go to Patient Education Activity  Goal: Patient/Family Education  Outcome: Progressing Towards Goal

## 2022-05-16 NOTE — PROGRESS NOTES
Problem: Self Care Deficits Care Plan (Adult)  Goal: *Acute Goals and Plan of Care (Insert Text)  Description: FUNCTIONAL STATUS PRIOR TO ADMISSION: Patient was modified independent using a rolling walker for functional mobility. HOME SUPPORT: The patient lived with her daughter and grandson but did not require assist with basic ADLs. Pt reports family completing all IADL tasks. Occupational Therapy Goals  Initiated 5/16/2022  1. Patient will perform seated grooming routine with supervision/set-up within 7 day(s). 2.  Patient will perform seated anterior upper and lower body bathing with supervision/set-up within 7 day(s). 3.  Patient will perform lower body dressing with supervision/set-up within 7 day(s). 4.  Patient will perform toilet transfers with supervision/set-up within 7 day(s). 5.  Patient will perform all aspects of toileting with supervision/set-up within 7 day(s). 6.  Patient will participate in upper extremity therapeutic exercise/activities with supervision/set-up for 5 minutes within 7 day(s). Outcome: Not Met   OCCUPATIONAL THERAPY EVALUATION  Patient: Tsering Oliva (37 y.o. female)  Date: 5/16/2022  Primary Diagnosis: Acute respiratory failure with hypoxia (HCC) [J96.01]        Precautions: fall      ASSESSMENT  Based on the objective data described below, the patient presents with orthostatic hypotension, fatigue/generalized weakness, decreased activity tolerance/endurance, impaired cardiopulmonary endurance, and impaired balance s/p admission with SOB and acute respiratory failure. Pt reports PTA she was modified independent using a RW for mobility and completing basic ADLs seated, with supervision for safety. Pt received supine, A&Ox4, on 1L NC, and agreeable to participation in therapy session. Supine /50. Bed mobility demonstrated with CGA and additional time. While seated, pt's 's/50's and no reports of dizziness/lightheadedness.  In prep for mobility to bedside chair, pt found to be incontinent of stool and requiring up to mod A to complete hygiene. Following seated rest break, pt agreeable to ambulate to bedside chair. When ambulating, increased forward flexed posture noted and despite manual cues pt unable to correct. Pt reporting fatigue but despite prompting, did not report signs/symptoms of orthostatic hypotension. Pt becoming diaphoretic, emergently placed in bedside chair with assist from PCT, pt unresponsive. RR called. BP 60's/40's and pt promptly returned to supine. BP increased to 200's/100s. Noted pt to be incontinent of stool. Following RRT bedside assessment, assisted RN with johann/bowel hygiene, LB/UB dressing, and bed linen change. Pt left semi-supine, BP stable and set-up with breakfast.     Current Level of Function Impacting Discharge (ADLs/self-care): seated LB dressing CGA, toileting mod A     Functional Outcome Measure: The patient scored Total: 40/100 on the Barthel Index outcome measure which is indicative of being partially dependent in basic self-care. Other factors to consider for discharge: PLOF, orthostatic hypotension      Patient will benefit from skilled therapy intervention to address the above noted impairments. PLAN :  Recommendations and Planned Interventions: self care training, functional mobility training, therapeutic exercise, balance training, therapeutic activities, endurance activities, patient education and home safety training    Frequency/Duration: Patient will be followed by occupational therapy 5 times a week to address goals.     Recommendation for discharge: (in order for the patient to meet his/her long term goals)  Therapy up to 5 days/week in SNF setting    This discharge recommendation:  Has been made in collaboration with the attending provider and/or case management    IF patient discharges home will need the following DME: TBD       SUBJECTIVE:   Patient stated I just feel tired.    OBJECTIVE DATA SUMMARY:   HISTORY:   Past Medical History:   Diagnosis Date    Arthritis     Chronic pain     Depression     [facility] [address] [address]     Hypercholesterolemia     Hypertension     Stroke [facility] [address]     TIA 10 yrs back    Stroke ([facility] [address]     2003     Past Surgical History:   Procedure Laterality Date    HX CARPAL TUNNEL RELEASE  1990    HX CATARACT REMOVAL      bilateral    HX HYSTERECTOMY  1986    HX HYSTERECTOMY      HX ORTHOPAEDIC      HX ORTHOPAEDIC      carpel tunnel left    HX ORTHOPAEDIC      left foot heel spur    HX REFRACTIVE SURGERY  2006       Expanded or extensive additional review of patient history:     Home Situation  Home Environment: Private residence  # Steps to Enter: 3  Wheelchair Ramp: Yes  One/Two Story Residence: One story  Living Alone: No  Support Systems: Child(ty),Other Family Member(s) (daughter and grandson live with her)  Patient Expects to be Discharged to[de-identified] Skilled nursing facility  Current DME Used/Available at Home: [employer]oom[employer] Curio, rolling,Raised toilet seat  Tub or Shower Type: Tub/Shower combination      EXAMINATION OF PERFORMANCE DEFICITS:  Cognitive/Behavioral Status:  Neurologic State: Alert  Orientation Level: Oriented X4  Cognition: Follows commands  Perception: Appears intact  Perseveration: No perseveration noted  Safety/Judgement: Awareness of environment    Skin: sacral skin break down, RN aware and addressing     Edema: none noted     Hearing: Auditory  Auditory Impairment: None    Vision/Perceptual:                           Acuity: Within Defined Limits;Able to read clock/calendar on wall without difficulty    Corrective Lenses: Glasses    Range of Motion:    AROM: Generally decreased, functional                         Strength:    Strength: Generally decreased, functional                Coordination:  Coordination: Generally decreased, functional  Fine Motor Skills-Upper: Left Intact; Right Intact    Gross Motor Skills-Upper: Left Intact; Right Intact    Tone & Sensation:    Tone: Normal  Sensation: Intact                      Balance:  Sitting: Intact  Standing: Impaired; With support  Standing - Static: Good;Constant support  Standing - Dynamic : Fair;Constant support    Functional Mobility and Transfers for ADLs:  Bed Mobility:  Supine to Sit: Contact guard assistance  Scooting: Contact guard assistance (to EOB)    Transfers:  Sit to Stand: Contact guard assistance; Additional time  Stand to Sit: Contact guard assistance; Additional time    ADL Assessment:  Feeding: Independent    Oral Facial Hygiene/Grooming: Setup (inferred, seated)    Bathing: Contact guard assistance (inferred, seated, for distal LB)    Upper Body Dressing: Setup (inferred, seated)    Lower Body Dressing: Contact guard assistance    Toileting: Moderate assistance                ADL Intervention and task modifications:                 Lower Body Bathing  Bathing Assistance: Total assistance(dependent)  Perineal  : Total assistance (dependent)  Position Performed: Supine         Lower Body Dressing Assistance  Underpants: Contact guard assistance  Position Performed: Seated edge of bed  Cues: Verbal cues provided    Toileting  Toileting Assistance: Moderate assistance  Bladder Hygiene: Maximum assistance  Bowel Hygiene: Maximum assistance  Clothing Management: Contact guard assistance  Cues: Verbal cues provided  Adaptive Equipment: Walker    Cognitive Retraining  Safety/Judgement: Awareness of environment    Functional Measure:    Barthel Index:  Bathin  Bladder: 5  Bowels: 5  Groomin  Dressin  Feeding: 10  Mobility: 0  Stairs: 0  Toilet Use: 5  Transfer (Bed to Chair and Back): 10  Total: 40/100      The Barthel ADL Index: Guidelines  1. The index should be used as a record of what a patient does, not as a record of what a patient could do. 2. The main aim is to establish degree of independence from any help, physical or verbal, however minor and for whatever reason.   3. The need for supervision renders the patient not independent. 4. A patient's performance should be established using the best available evidence. Asking the patient, friends/relatives and nurses are the usual sources, but direct observation and common sense are also important. However direct testing is not needed. 5. Usually the patient's performance over the preceding 24-48 hours is important, but occasionally longer periods will be relevant. 6. Middle categories imply that the patient supplies over 50 per cent of the effort. 7. Use of aids to be independent is allowed. Score Interpretation (from [address] [address])    Independent   60-79 Minimally independent   40-59 Partially dependent   20-39 Very dependent   <20 Totally dependent     -Hector Carranza., Barthel, DVladimirW. (1965). Functional evaluation: the Barthel Index. [address] [facility] ([address] (1997). The Barthel activities of daily living index: self-reporting versus actual performance in the old (> or = 75 years). Journal of [address] [place] [address] 45(7), [address] [facility], JIDAF, Aileen Coronel., Usama Alexis. (1999). Measuring the change in disability after inpatient rehabilitation; comparison of the responsiveness of the Barthel Index and Functional [place] Measure. Journal of Neurology, Neurosurgery, and Psychiatry, 66(4), 592-346. Justyn White, [place].ELIJAH, YESSICA Bernard, & Felicitas Cha, MVladimirA. (2004) Assessment of post-stroke quality of life in cost-effectiveness studies: The usefulness of the Barthel Index and the EuroQoL-5D.  Quality of Life Research, 15, 112-83     Occupational Therapy Evaluation Charge Determination   History Examination Decision-Making   LOW Complexity : Brief history review  LOW Complexity : 1-3 performance deficits relating to physical, cognitive , or psychosocial skils that result in activity limitations and / or participation restrictions  MEDIUM Complexity : Patient may present with comorbidities that affect occupational performnce. Miniml to moderate modification of tasks or assistance (eg, physical or verbal ) with assesment(s) is necessary to enable patient to complete evaluation       Based on the above components, the patient evaluation is determined to be of the following complexity level: LOW   Pain Rating:  None reported - pt only reporting increased fatigue     Activity Tolerance:   Fair, requires rest breaks and signs and symptoms of orthostatic hypotension    After treatment patient left in no apparent distress:    Supine in bed, Call bell within reach, Bed / chair alarm activated and Side rails x 3    COMMUNICATION/EDUCATION:   The patients plan of care was discussed with: Registered nurse. Patient/family have participated as able in goal setting and plan of care. and Patient/family agree to work toward stated goals and plan of care. This patients plan of care is appropriate for delegation to LUCIANO.     Thank you for this referral.  Hakeem Blanco OT  Time Calculation: 73 mins

## 2022-05-16 NOTE — PROGRESS NOTES
[address]  [address] [facility] [address] Jonathon Tellez, [address]  [phone]                GI PROGRESS NOTE      NAME:   Delbert Suarez   :    1935   MRN:    [id number]     Subjective:     Pt states she is doing ok. States diarrhea alternates - estimates 2-3 BMs in 24 hour time period. Objective:     VITALS:   Last 24hrs VS reviewed since prior hospitalist progress note. Most recent are:  Visit Vitals  /65 (BP 1 Location: Right upper arm, BP Patient Position: Sitting)   Pulse (!) 106   Temp 97.4 °F (36.3 °C)   Resp 17   Ht 5' 7\" (1.702 m)   Wt 67 kg (147 lb 12.8 oz)   SpO2 100%   BMI 23.15 kg/m²       Intake/Output Summary (Last 24 hours) at 2022 1701  Last data filed at 2022 0536  Gross per 24 hour   Intake 520 ml   Output 1700 ml   Net -1180 ml        PHYSICAL EXAM:  General   NAD  EENT  Normocephalic, Atraumatic, PERRLA, EOMI, sclera clear  Neck   Supple, No thyromegaly or bruit  Respiratory   Clear To Auscultation bilaterally - no wheezes, rales, rhonchi, or crackles  Cardiology  Regular Rate and Rythmn - no murmurs, rubs or gallops  Abdominal  Soft, non-tender, non-distended  Lab Data Reviewed     Medications: Reviewed    Assessment/Plan     Anne Ojeda is a 82 y.o.  female  With history of microscopic colitis and CVA whom we are asked to see re: anemia and diarrhea. She states she is stable and is eager to go home. Continue current regimen of questran, iron, metamucil, BID pepcid. We will arrange outpatient follow up and she states she will consider endoscopic evaluation at this time.      Attending Physician: Siri Wells MD   Date/Time:  2022

## 2022-05-16 NOTE — PROGRESS NOTES
Rapid Response called over head at this time, RRT responding. Per Gayla Barba RN pateint was up working with PT when she became diaphoretic, pale, and dizzy, Patient placed back in bed. BP 60's over 40s. Second /103. EKG ordered and Dr. Bandar Carrillo contacted. Patient's BP slowly dropping. Dr. Bandar Carrillo at bedside, labs ordered and obtained at this time.      Patient's VSS at this time, RRT will continue to follow

## 2022-05-16 NOTE — PROGRESS NOTES
Problem: Mobility Impaired (Adult and Pediatric)  Goal: *Acute Goals and Plan of Care (Insert Text)  Description: FUNCTIONAL STATUS PRIOR TO ADMISSION: Patient was modified independent using a rolling walker for functional mobility. Patient reports independently toileting, transferring, dressing herself but reports all of those activities took longer to do lately. HOME SUPPORT PRIOR TO ADMISSION: The patient lived with daughter and grandson and required minimal assistance/contact guard assist for bathing from daughter. Did not drive or cook for herself. Patient reports her grandson does not work and would be available to assist in anything she needed him to. Physical Therapy Goals  Initiated 5/13/2022  1. Patient will move from supine to sit and sit to supine , scoot up and down, and roll side to side in bed with modified independence within 7 day(s). 2.  Patient will transfer from bed to chair and chair to bed with modified independence using the least restrictive device within 7 day(s). 3.  Patient will perform sit to stand with modified independence within 7 day(s). 4.  Patient will ambulate with supervision/set-up for 50 feet with the least restrictive device within 7 day(s). Outcome: Progressing Towards Goal     PHYSICAL THERAPY TREATMENT  Patient: Ashley Guillaume (89 y.o. female)  Date: 5/16/2022  Diagnosis: Acute respiratory failure with hypoxia (HCC) [J96.01] Acute respiratory failure with hypoxia (HCC)       Precautions:    Chart, physical therapy assessment, plan of care and goals were reviewed. ASSESSMENT  Patient continues with skilled PT services and is progressing towards goals. Pt agreeable to therapy with encouragement. Pt was able to ambulate around the bed. Pt expressing anxiety and fear with gait.  Will continue to encourage mobility as able     Vitals:    05/16/22 1225 05/16/22 1232 05/16/22 1239 05/16/22 1241   BP: (!) 123/56 125/66 (!) 103/48 129/62   BP 1 Location: BP Patient Position: Supine Sitting Standing Sitting  Comment: post gait   Pulse: 97 (!) 112 (!) 123 (!) 116   Temp:       Resp:       Height:       Weight:       SpO2:          Current Level of Function Impacting Discharge (mobility/balance): CGA    Other factors to consider for discharge: decrease activity tolerance, anxiety          PLAN :  Patient continues to benefit from skilled intervention to address the above impairments. Continue treatment per established plan of care. to address goals. Recommendation for discharge: (in order for the patient to meet his/her long term goals)  Increase assistance at home with HHPT vs SNF    This discharge recommendation:  Has not yet been discussed the attending provider and/or case management    IF patient discharges home will need the following DME: patient owns DME required for discharge       SUBJECTIVE:   Patient stated I just get so anxious.     OBJECTIVE DATA SUMMARY:   Critical Behavior:  Neurologic State: Alert  Orientation Level: Oriented X4  Cognition: Follows commands  Safety/Judgement: Awareness of environment  Functional Mobility Training:  Bed Mobility:     Supine to Sit: Stand-by assistance; Additional time     Scooting: Contact guard assistance (to EOB)        Transfers:  Sit to Stand: Contact guard assistance; Additional time  Stand to Sit: Contact guard assistance; Additional time                             Balance:  Sitting: Intact  Standing: Impaired; With support  Standing - Static: Good;Constant support  Standing - Dynamic : Fair;Constant support  Ambulation/Gait Training:  Distance (ft): 12 Feet (ft)  Assistive Device: Gait belt;Walker, rolling  Ambulation - Level of Assistance: Contact guard assistance        Gait Abnormalities: Decreased step clearance        Base of Support: Widened        Step Length: Left shortened;Right shortened                   Stairs:               Therapeutic Exercises:     Pain Rating:  No complaints     Activity Tolerance:   requires rest breaks    After treatment patient left in no apparent distress:   Sitting in chair and Call bell within reach    COMMUNICATION/COLLABORATION:   The patients plan of care was discussed with: Registered nurse.      Ronen Kaur PTA   Time Calculation: 29 mins

## 2022-05-17 LAB
GLUCOSE BLD STRIP.AUTO-MCNC: 128 MG/DL (ref 65–117)
GLUCOSE BLD STRIP.AUTO-MCNC: 144 MG/DL (ref 65–117)
GLUCOSE BLD STRIP.AUTO-MCNC: 147 MG/DL (ref 65–117)
GLUCOSE BLD STRIP.AUTO-MCNC: 164 MG/DL (ref 65–117)
SERVICE CMNT-IMP: ABNORMAL

## 2022-05-17 PROCEDURE — 74011250637 HC RX REV CODE- 250/637: Performed by: NURSE PRACTITIONER

## 2022-05-17 PROCEDURE — 82962 GLUCOSE BLOOD TEST: CPT

## 2022-05-17 PROCEDURE — 74011250636 HC RX REV CODE- 250/636: Performed by: INTERNAL MEDICINE

## 2022-05-17 PROCEDURE — 77010033678 HC OXYGEN DAILY

## 2022-05-17 PROCEDURE — 74011000250 HC RX REV CODE- 250: Performed by: INTERNAL MEDICINE

## 2022-05-17 PROCEDURE — 74011250637 HC RX REV CODE- 250/637: Performed by: INTERNAL MEDICINE

## 2022-05-17 PROCEDURE — 65270000046 HC RM TELEMETRY

## 2022-05-17 PROCEDURE — 74011636637 HC RX REV CODE- 636/637: Performed by: INTERNAL MEDICINE

## 2022-05-17 PROCEDURE — 97535 SELF CARE MNGMENT TRAINING: CPT

## 2022-05-17 RX ORDER — LORAZEPAM 0.5 MG/1
0.5 TABLET ORAL ONCE
Status: COMPLETED | OUTPATIENT
Start: 2022-05-17 | End: 2022-05-17

## 2022-05-17 RX ADMIN — SODIUM CHLORIDE, PRESERVATIVE FREE 10 ML: 5 INJECTION INTRAVENOUS at 05:55

## 2022-05-17 RX ADMIN — ACETAMINOPHEN 650 MG: 325 TABLET ORAL at 21:57

## 2022-05-17 RX ADMIN — FERROUS SULFATE TAB 325 MG (65 MG ELEMENTAL FE) 325 MG: 325 (65 FE) TAB at 06:30

## 2022-05-17 RX ADMIN — ATORVASTATIN CALCIUM 20 MG: 20 TABLET, FILM COATED ORAL at 21:58

## 2022-05-17 RX ADMIN — CHOLESTYRAMINE 4 G: 4 POWDER, FOR SUSPENSION ORAL at 17:55

## 2022-05-17 RX ADMIN — Medication 2 UNITS: at 11:51

## 2022-05-17 RX ADMIN — DONEPEZIL HYDROCHLORIDE 10 MG: 5 TABLET, FILM COATED ORAL at 21:57

## 2022-05-17 RX ADMIN — ASPIRIN 81 MG CHEWABLE TABLET 81 MG: 81 TABLET CHEWABLE at 08:47

## 2022-05-17 RX ADMIN — ENOXAPARIN SODIUM 40 MG: 100 INJECTION SUBCUTANEOUS at 08:47

## 2022-05-17 RX ADMIN — CHOLESTYRAMINE 4 G: 4 POWDER, FOR SUSPENSION ORAL at 11:50

## 2022-05-17 RX ADMIN — PAROXETINE HYDROCHLORIDE 20 MG: 20 TABLET, FILM COATED ORAL at 21:57

## 2022-05-17 RX ADMIN — AMLODIPINE BESYLATE 5 MG: 5 TABLET ORAL at 08:46

## 2022-05-17 RX ADMIN — FAMOTIDINE 20 MG: 20 TABLET ORAL at 08:47

## 2022-05-17 RX ADMIN — PSYLLIUM HUSK 1 PACKET: 3.4 POWDER ORAL at 09:12

## 2022-05-17 RX ADMIN — Medication 2 UNITS: at 17:55

## 2022-05-17 RX ADMIN — CHOLESTYRAMINE 4 G: 4 POWDER, FOR SUSPENSION ORAL at 08:47

## 2022-05-17 RX ADMIN — LORAZEPAM 0.5 MG: 0.5 TABLET ORAL at 21:58

## 2022-05-17 NOTE — PROGRESS NOTES
Physical Therapy    Reviewed chart and attempted to treat pt. Pt expressing pain in genitals due to cleaning from incontinence and bowel movements. RN is addressing. Pt reports increase discomfort in chair. Will defer at this time and continue to follow.

## 2022-05-17 NOTE — PROGRESS NOTES
Problem: Self Care Deficits Care Plan (Adult)  Goal: *Acute Goals and Plan of Care (Insert Text)  Description: FUNCTIONAL STATUS PRIOR TO ADMISSION: Patient was modified independent using a rolling walker for functional mobility. HOME SUPPORT: The patient lived with her daughter and grandson but did not require assist with basic ADLs. Pt reports family completing all IADL tasks. Occupational Therapy Goals  Initiated 5/16/2022  1. Patient will perform seated grooming routine with supervision/set-up within 7 day(s). 2.  Patient will perform seated anterior upper and lower body bathing with supervision/set-up within 7 day(s). 3.  Patient will perform lower body dressing with supervision/set-up within 7 day(s). 4.  Patient will perform toilet transfers with supervision/set-up within 7 day(s). 5.  Patient will perform all aspects of toileting with supervision/set-up within 7 day(s). 6.  Patient will participate in upper extremity therapeutic exercise/activities with supervision/set-up for 5 minutes within 7 day(s). Outcome: Progressing Towards Goal   OCCUPATIONAL THERAPY TREATMENT  Patient: Xochilt Suárez (60 y.o. female)  Date: 5/17/2022  Diagnosis: Acute respiratory failure with hypoxia (HCC) [J96.01] Acute respiratory failure with hypoxia (HCC)       Precautions:  fall   Chart, occupational therapy assessment, plan of care, and goals were reviewed. ASSESSMENT  Patient continues with skilled OT services and is slowly progressing towards goals. Pt's performance of ADL/IADL tasks continues to be limited at this time by impaired balance, generalized weakness, decreased activity tolerance/endurance, and sacral pain. Pt received semi-supine and agreeable to participation in therapy session. Pt found to be incontinent of stool and requiring total A for bed level hygiene. RN present and requesting pt to remain in bed this morning d/t frequent episodes of incontinence.  Following hygiene, pt Pt resting with eyes closed in chair with RR reg. Pt denied pain at this time.       Mono Flyod RN  02/28/21 4607 indicating quick fatigue and declining further ADL task performance. Pt set-up with breakfast tray and assisted with container management. BP stable supine and with HOB elevation to modified chair position. Pt left with all needs met. Current Level of Function Impacting Discharge (ADLs): total A bowel/johann hygiene, independent to set-up feeding    Other factors to consider for discharge: PLOF, orthostatic hypotension          PLAN :  Patient continues to benefit from skilled intervention to address the above impairments. Continue treatment per established plan of care to address goals. Recommendation for discharge: (in order for the patient to meet his/her long term goals)  Therapy up to 5 days/week in SNF setting    This discharge recommendation:  Has been made in collaboration with the attending provider and/or case management    IF patient discharges home will need the following DME: TBD       SUBJECTIVE:   Patient stated I just don't feel like myself.     OBJECTIVE DATA SUMMARY:   Cognitive/Behavioral Status:  Neurologic State: Drowsy  Orientation Level: Oriented X4  Cognition: Follows commands             Functional Mobility and Transfers for ADLs:  Bed Mobility:  Rolling: Moderate assistance  Supine to Sit: Total assistance (bed mechanics used)    Transfers:             Balance:  Sitting: Intact (bed mechanics used)  Standing:  (not tested)    ADL Intervention:  Feeding  Feeding Assistance: Independent  Container Management: Set-up  Cutting Food: Independent  Utensil Management: Independent  Food to Mouth: Independent  Drink to Mouth: Independent  Cues: Verbal cues provided                             Toileting  Toileting Assistance: Total assistance(dependent) (d/t pain and incontinence )  Bladder Hygiene: Total assistance (dependent)  Bowel Hygiene:  Total assistance (dependent)  Clothing Management: Total assistance (dependent)  Cues: Verbal cues provided             Pain:  Sacral pain, RN aware and addressing     Activity Tolerance:   Fair and Poor    After treatment patient left in no apparent distress:   Call bell within reach, Bed / chair alarm activated and Side rails x 3, bed in modified chair position     COMMUNICATION/COLLABORATION:   The patients plan of care was discussed with: Registered nurse.      Natalia Petersen, OT  Time Calculation: 23 mins

## 2022-05-17 NOTE — PROGRESS NOTES
[address] [facility] Adult  Hospitalist Group                                                                                          Hospitalist Progress Note  Alcira Batres MD  Answering service: [phone] from in house phone        Date of Service:  2022  NAME:  Rico Sullivan  :  1935  MRN:  [id number]    This documentation was facilitated by a Voice Recognition software and may contain inadvertent typographical errors. Admission Summary:   Patient came to the ED for shortness of breath. She is admitted for acute hypoxic respiratory failure. PMH is significant for type 2 diabetes with neuropathy, stomach ulcer, hyperlipidemia, depression, stroke. Interval history / Subjective:        No specific complaints feeling weak  Otherwise no active issue discussed with RN as well  She is awaiting authorization for SNF discussed with     Assessment & Plan:   Acute hypoxic respiratory failure  CAP  ? CHF. No overt pulmonary edema. proBNP is elevated. --She is being diuresed with Lasix. -- CTA lung negative for PE, but showed clusters of micronodules compatible with small airway infection or inflammation. -- Treat for CAP  with ceftriaxone and doxycycline. Rapid COVID test negative. -- Continue supplemental oxygen to keep SPO2 above 92%. -- Cardiology following.    -- Repeat echocardiogram showed normal EF 65 to 26%, normal diastolic function. Moderate AAS. Severe PAH, large left pleural effusion. Continue oral furosemide. -- Hyperkalemia,--resolved    Chronic normocytic anemia. Iron deficiency. Hemoglobin declining since admission. -- Stool negative for blood. Iron supplement. -- Iron level was 31 on 5/10 and up to 253 today, . DC IV iron. -- GI evaluated, no endoscopic intervention planned. Alternating constipation/diarrhea, suspect IBS  --No fever, leukocytosis, abdominal pain; No suspicion for infectious etiology  -- Try Imodium.   GI added Questran, Metamucil    Type 2 diabetes with peripheral neuropathy. A1c 6.6  -- Accu-Cheks AC&HS. Humalog correction scale. Hypertension, continue home regimen  Dyslipidemia: Continue home statin. Mild dementia per family: She is on Aricept    Anxiety: Continue Paxil. Added low-dose as needed Xanax      Care Plan discussed with: Independent full code  Surrogate decision makers: Patient does not have POA. She stated her daughters David Taylor and Gretta Martinez will be her medical decision makers. Code status: Full code  DVT prophylaxis: Enoxaparin  Anticipated Disposition: She will likely need SNF rehab. Hospital Problems  Date Reviewed: 5/10/2022          Codes Class Noted POA    * (Principal) Acute respiratory failure with hypoxia [facility]) ICD-10-CM: J96.01  ICD-9-CM: 518.81  5/9/2022 Yes                Review of Systems:   A comprehensive review of systems was negative except for that written in the HPI. Vital Signs:    Last 24hrs VS reviewed since prior progress note. Most recent are:        Intake/Output Summary (Last 24 hours) at 5/17/2022 1348  Last data filed at 5/17/2022 0930  Gross per 24 hour   Intake 240 ml   Output 4600 ml   Net -4360 ml        Physical Examination:     I had a face to face encounter with this patient and independently examined them on5/17/2022 as outlined below:        Constitutional:  Alert and oriented. Not in distress. HEENT:  Atraumatic. Oral mucosa moist,. Non icteric sclera. No pallor. Resp:  On oxygen 3 L/min, SPO2 96%. Basal rates. Chest Wall: No deformity     CV:  Grade 3 murmur on the A2 area      GI:  Normoactive  Soft, non distended, non tender. No appreciable organomegaly      :  No CVA or suprapubic tenderness      Musculoskeletal:  Bilateral venous stasis with purplish discoloration, no ischemia. Both feet are sensitive. There is mild edema.       Neurologic:  Mental status: Alert and oriented to PPT  Moves all extremities symmetrically. Peripheral neuropathy  Gait and balance: Not tested              Data Review:    Review and/or order of clinical lab test  Review and/or order of tests in the radiology section of CPT  Review and/or order of tests in the medicine section of CPT      Available Lab and Imaging results reviewed and used to formulate the current care plan.       Medications Reviewed:     Current Facility-Administered Medications   Medication Dose Route Frequency    cholestyramine-aspartame (QUESTRAN LIGHT) packet 4 g  4 g Oral TID WITH MEALS    psyllium husk-aspartame (METAMUCIL FIBER) packet 1 Packet  1 Packet Oral DAILY WITH BREAKFAST    ALPRAZolam (XANAX) tablet 0.5 mg  0.5 mg Oral BID PRN    [Held by provider] furosemide (LASIX) tablet 40 mg  40 mg Oral DAILY    loperamide (IMODIUM) capsule 2 mg  2 mg Oral Q4H PRN    amLODIPine (NORVASC) tablet 5 mg  5 mg Oral DAILY    aspirin chewable tablet 81 mg  81 mg Oral DAILY    baclofen (LIORESAL) tablet 10 mg  10 mg Oral BID PRN    donepeziL (ARICEPT) tablet 10 mg  10 mg Oral QHS    famotidine (PEPCID) tablet 20 mg  20 mg Oral DAILY    PARoxetine (PAXIL) tablet 20 mg  20 mg Oral QHS    atorvastatin (LIPITOR) tablet 20 mg  20 mg Oral QHS    traMADoL (ULTRAM) tablet 50 mg  50 mg Oral Q6H PRN    sodium chloride (NS) flush 5-40 mL  5-40 mL IntraVENous Q8H    sodium chloride (NS) flush 5-40 mL  5-40 mL IntraVENous PRN    acetaminophen (TYLENOL) tablet 650 mg  650 mg Oral Q6H PRN    Or    acetaminophen (TYLENOL) suppository 650 mg  650 mg Rectal Q6H PRN    polyethylene glycol (MIRALAX) packet 17 g  17 g Oral DAILY PRN    ondansetron (ZOFRAN ODT) tablet 4 mg  4 mg Oral Q8H PRN    Or    ondansetron (ZOFRAN) injection 4 mg  4 mg IntraVENous Q6H PRN    enoxaparin (LOVENOX) injection 40 mg  40 mg SubCUTAneous DAILY    insulin lispro (HUMALOG) injection   SubCUTAneous AC&HS    glucose chewable tablet 16 g  4 Tablet Oral PRN    glucagon (GLUCAGEN) injection 1 mg  1 mg IntraMUSCular PRN    dextrose 10 % infusion 0-250 mL  0-250 mL IntraVENous PRN    ferrous sulfate tablet 325 mg  1 Tablet Oral ACB    albuterol-ipratropium (DUO-NEB) 2.5 MG-0.5 MG/3 ML  3 mL Nebulization Q4H PRN     ______________________________________________________________________  EXPECTED LENGTH OF STAY: 4d 2h  ACTUAL LENGTH OF STAY:          Jerlyn Kawasaki, MD

## 2022-05-17 NOTE — PROGRESS NOTES
Comprehensive Nutrition Assessment    Type and Reason for Visit: Initial (LOS)    Nutrition Recommendations/Plan:   1. Will liberalize diet order to 4 Carb Choice  2. Will order Glucerna daily       Malnutrition Assessment:  Malnutrition Status:  Severe malnutrition (05/17/22 1221)    Context:  Chronic illness     Findings of the 6 clinical characteristics of malnutrition:   Energy Intake:  No significant decrease in energy intake  Weight Loss:  Greater than 10% over 6 months     Body Fat Loss:  Severe body fat loss, Triceps,Buccal region   Muscle Mass Loss:  Severe muscle mass loss, Temples (temporalis),Clavicles (pectoralis &deltoids)  Fluid Accumulation:  Mild, Extremities   Strength:  Not performed        Nutrition Assessment:    83 yo female admitted for acute respiratory failure with hypoxia. PMhx: DM, dyslipidemia, CVA, dementia, colitis. Spoke with pt at bedside. Reports that she is eating well and has a good appetite at home. PO intakes documented as % since admission. Denies weight loss PTA however pt with severe muscle and fat loss per visual assessment. Weight hx in EMR indicates 18% loss over last 8 months which is significant for time frame. Discussed ONS options, pt willing to try Glucerna. States she was told years ago that she cannot have chocolate as it causes her to have severe diarrhea. Admits that she has not tried it recently to know if that is still the case but still avoids chocolate. Noted that she has alternating diarrhea and constipation for the last year. Receiving Questran and Metamucil. Labs reviewed.       Nutritionally Significant Medications:  Questran, Pepcid, FeSu, Metamucil       Estimated Daily Nutrient Needs:  Energy Requirements Based On: Kcal/kg  Weight Used for Energy Requirements: Current  Energy (kcal/day): 3844-9566 (25-30 kcals/kg)  Weight Used for Protein Requirements: Current  Protein (g/day): 88 (1.3 gm/kg)  Method Used for Fluid Requirements: 1 ml/kcal  Fluid (ml/day): 1700    Nutrition Related Findings:   Edema: non-pitting BLE  Last BM: 05/17/22, Soft    Wounds: None      Current Nutrition Therapies:  Diet: 3 Carb Choices  Supplements: none   Meal intake: Patient Vitals for the past 168 hrs:   % Diet Eaten   05/17/22 0800 76 - 100%   05/15/22 1722 76 - 100%   05/15/22 1443 51 - 75%   05/14/22 1932 76 - 100%   05/14/22 1300 51 - 75%   05/14/22 0848 76 - 100%   05/13/22 1550 51 - 75%   05/13/22 1143 51 - 75%   05/13/22 0731 51 - 75%   05/12/22 1505 51 - 75%   05/12/22 1111 26 - 50%   05/12/22 0808 51 - 75%   05/11/22 1512 51 - 75%   05/11/22 1158 51 - 75%   05/11/22 0856 51 - 75%     Supplement intake:   Nutrition Support: none      Anthropometric Measures:  Height: 5' 7\" (170.2 cm)  Ideal Body Weight (IBW): 135 lbs (61 kg)     Current Body Wt:  68 kg (149 lb 14.6 oz), 111 % IBW. Bed scale  Current BMI (kg/m2): 23.5        Weight Adjustment: No adjustment                 BMI Category: Normal weight (BMI 18.5-24. 9)    Wt Readings from Last 10 Encounters:   05/17/22 68 kg (150 lb)   10/28/21 79.4 kg (175 lb)   09/28/21 79.4 kg (175 lb)   09/09/21 82.6 kg (182 lb)   07/27/21 82.6 kg (182 lb)   04/12/21 82.6 kg (182 lb)   12/14/20 89.7 kg (197 lb 11.2 oz)   11/14/19 83.9 kg (185 lb)   08/31/19 84.8 kg (187 lb)   07/11/19 84.9 kg (187 lb 3.2 oz)           Nutrition Diagnosis:   · Unintended weight loss related to inadequate protein-energy intake as evidenced by weight loss greater than or equal to 10% in 6 months      Nutrition Interventions:   Food and/or Nutrient Delivery: Continue current diet,Start oral nutrition supplement  Nutrition Education/Counseling: No recommendations at this time  Coordination of Nutrition Care: Continue to monitor while inpatient       Goals:     Goals: other (specify)  Specify Other Goals: PO intakes > 75% meals + ONS to promote weight stability +/- 2kg over next 5-7 days    Nutrition Monitoring and Evaluation: Behavioral-Environmental Outcomes: None identified  Food/Nutrient Intake Outcomes: Food and nutrient intake,Supplement intake  Physical Signs/Symptoms Outcomes: Biochemical data,GI status,Weight    Discharge Planning:    Continue current diet,Continue oral nutrition supplement    Francesca Ballesteros RD  Available via [email or web address]

## 2022-05-17 NOTE — PROGRESS NOTES
Transitions of Care Plan   RUR: 12% - low   Clinical Update:  Admitted for hypoxic respiratory failure - on oxygen; stable for placement   Consults: Therapy   Baseline: ambulates with RW; resides with daughter  [facility] Barriers to Discharge: insurance authorization   Disposition: SNF - [facility] Estimated Discharge Date: 5/18/22    CM updated by attending MD patient will be medically stable for discharge tomorrow. CM spoke with patient regarding placement - offered choice for SNF for disposition - patient agreeable to [facility] and [facility]. CM sent referrals to [facility]. CM advised patient that after facility accepts for medical stability; we will have to await on insurance authorization for approval. CM attempted to call patient's daughter, Román Proctor (p: [phone]), no response and VM box full. Transition of Care Plan:     The Plan for Transition of Care is related to the following treatment goals: SNF    The Patient and/or patient representative PATIENT was provided with a choice of provider and agrees  with the discharge plan. Yes [x] No []    A Freedom of choice list was provided with basic dialogue that supports the patient's individualized plan of care/goals and shares the quality data associated with the providers. Yes [x] No []    CM will continue to follow.     Disposition:  SNF: [address] [place] [address] Avenue: [address] [place] [address] started today  Transportation: w/c lizbeth Guzman, [address] [place] [address],[address]  Available via [address] or  56

## 2022-05-18 VITALS
HEIGHT: 67 IN | TEMPERATURE: 97.3 F | DIASTOLIC BLOOD PRESSURE: 76 MMHG | RESPIRATION RATE: 18 BRPM | BODY MASS INDEX: 23.2 KG/M2 | SYSTOLIC BLOOD PRESSURE: 110 MMHG | HEART RATE: 105 BPM | OXYGEN SATURATION: 93 % | WEIGHT: 147.8 LBS

## 2022-05-18 LAB
ANION GAP SERPL CALC-SCNC: 3 MMOL/L (ref 5–15)
ANION GAP SERPL CALC-SCNC: 4 MMOL/L (ref 5–15)
BUN SERPL-MCNC: 23 MG/DL (ref 6–20)
BUN SERPL-MCNC: 24 MG/DL (ref 6–20)
BUN/CREAT SERPL: 23 (ref 12–20)
BUN/CREAT SERPL: 24 (ref 12–20)
CALCIUM SERPL-MCNC: 8.5 MG/DL (ref 8.5–10.1)
CALCIUM SERPL-MCNC: 8.7 MG/DL (ref 8.5–10.1)
CHLORIDE SERPL-SCNC: 104 MMOL/L (ref 97–108)
CHLORIDE SERPL-SCNC: 105 MMOL/L (ref 97–108)
CO2 SERPL-SCNC: 27 MMOL/L (ref 21–32)
CO2 SERPL-SCNC: 28 MMOL/L (ref 21–32)
CREAT SERPL-MCNC: 0.98 MG/DL (ref 0.55–1.02)
CREAT SERPL-MCNC: 0.99 MG/DL (ref 0.55–1.02)
GLUCOSE BLD STRIP.AUTO-MCNC: 115 MG/DL (ref 65–117)
GLUCOSE BLD STRIP.AUTO-MCNC: 140 MG/DL (ref 65–117)
GLUCOSE SERPL-MCNC: 118 MG/DL (ref 65–100)
GLUCOSE SERPL-MCNC: 122 MG/DL (ref 65–100)
MAGNESIUM SERPL-MCNC: 1.8 MG/DL (ref 1.6–2.4)
MAGNESIUM SERPL-MCNC: 1.8 MG/DL (ref 1.6–2.4)
POTASSIUM SERPL-SCNC: 4.5 MMOL/L (ref 3.5–5.1)
POTASSIUM SERPL-SCNC: ABNORMAL MMOL/L (ref 3.5–5.1)
SARS-COV-2, COV2: NORMAL
SARS-COV-2, XPLCVT: NOT DETECTED
SERVICE CMNT-IMP: ABNORMAL
SERVICE CMNT-IMP: NORMAL
SODIUM SERPL-SCNC: 134 MMOL/L (ref 136–145)
SODIUM SERPL-SCNC: 137 MMOL/L (ref 136–145)
SOURCE, COVRS: NORMAL

## 2022-05-18 PROCEDURE — 97116 GAIT TRAINING THERAPY: CPT

## 2022-05-18 PROCEDURE — 74011250637 HC RX REV CODE- 250/637: Performed by: INTERNAL MEDICINE

## 2022-05-18 PROCEDURE — 74011250637 HC RX REV CODE- 250/637: Performed by: NURSE PRACTITIONER

## 2022-05-18 PROCEDURE — 83735 ASSAY OF MAGNESIUM: CPT

## 2022-05-18 PROCEDURE — 36415 COLL VENOUS BLD VENIPUNCTURE: CPT

## 2022-05-18 PROCEDURE — U0005 INFEC AGEN DETEC AMPLI PROBE: HCPCS

## 2022-05-18 PROCEDURE — 97535 SELF CARE MNGMENT TRAINING: CPT

## 2022-05-18 PROCEDURE — 82962 GLUCOSE BLOOD TEST: CPT

## 2022-05-18 PROCEDURE — 74011636637 HC RX REV CODE- 636/637: Performed by: INTERNAL MEDICINE

## 2022-05-18 PROCEDURE — 77010033678 HC OXYGEN DAILY

## 2022-05-18 PROCEDURE — 74011250636 HC RX REV CODE- 250/636: Performed by: INTERNAL MEDICINE

## 2022-05-18 PROCEDURE — 80048 BASIC METABOLIC PNL TOTAL CA: CPT

## 2022-05-18 PROCEDURE — 74011000250 HC RX REV CODE- 250: Performed by: INTERNAL MEDICINE

## 2022-05-18 RX ADMIN — ASPIRIN 81 MG CHEWABLE TABLET 81 MG: 81 TABLET CHEWABLE at 09:13

## 2022-05-18 RX ADMIN — PSYLLIUM HUSK 1 PACKET: 3.4 POWDER ORAL at 09:57

## 2022-05-18 RX ADMIN — FERROUS SULFATE TAB 325 MG (65 MG ELEMENTAL FE) 325 MG: 325 (65 FE) TAB at 07:35

## 2022-05-18 RX ADMIN — Medication 2 UNITS: at 11:50

## 2022-05-18 RX ADMIN — AMLODIPINE BESYLATE 5 MG: 5 TABLET ORAL at 09:13

## 2022-05-18 RX ADMIN — CHOLESTYRAMINE 4 G: 4 POWDER, FOR SUSPENSION ORAL at 09:57

## 2022-05-18 RX ADMIN — ENOXAPARIN SODIUM 40 MG: 100 INJECTION SUBCUTANEOUS at 09:13

## 2022-05-18 RX ADMIN — FAMOTIDINE 20 MG: 20 TABLET ORAL at 09:13

## 2022-05-18 RX ADMIN — SODIUM CHLORIDE, PRESERVATIVE FREE 10 ML: 5 INJECTION INTRAVENOUS at 07:35

## 2022-05-18 RX ADMIN — CHOLESTYRAMINE 4 G: 4 POWDER, FOR SUSPENSION ORAL at 11:51

## 2022-05-18 NOTE — PROGRESS NOTES
Transitions of Care Plan  · RUR: 11% - low  · Clinical Update:  Admitted for hypoxic respiratory failure - on oxygen; stable for placement  · Consults: Therapy  · Baseline: ambulates with RW; resides with daughter  · Barriers to Discharge: insurance authorization  · Disposition: SNF - [facility]  · Estimated Discharge Date: 5/18/22     CM spoke with attending MD. Patient medically stable for hospital discharge today. 36 - CM received insurance authorization for patient to discharge to [address] [place] [address]. CM spoke with admissions liaisonGenna p: [phone], and confirmed medical and insurance approval for patient to transfer today to [place]. Request earlier transport time and new RAPID COVID screening for placement. 2846 - CM updated provider. Confirmed all set for discharge. 1000 - CM requested [facility] transportation for 1330. [facility] RN updated of disposition plan.     Disposition:  SNF:  [facility]   Authorization: [employer] Medicare  Transportation: DENEEN Taveras, [address]  Available via [address] [place] [address] or  [phone]

## 2022-05-18 NOTE — PROGRESS NOTES
Problem: Mobility Impaired (Adult and Pediatric)  Goal: *Acute Goals and Plan of Care (Insert Text)  Description: FUNCTIONAL STATUS PRIOR TO ADMISSION: Patient was modified independent using a rolling walker for functional mobility. Patient reports independently toileting, transferring, dressing herself but reports all of those activities took longer to do lately. HOME SUPPORT PRIOR TO ADMISSION: The patient lived with daughter and grandson and required minimal assistance/contact guard assist for bathing from daughter. Did not drive or cook for herself. Patient reports her grandson does not work and would be available to assist in anything she needed him to. Physical Therapy Goals  Initiated 5/13/2022  1. Patient will move from supine to sit and sit to supine , scoot up and down, and roll side to side in bed with modified independence within 7 day(s). 2.  Patient will transfer from bed to chair and chair to bed with modified independence using the least restrictive device within 7 day(s). 3.  Patient will perform sit to stand with modified independence within 7 day(s). 4.  Patient will ambulate with supervision/set-up for 50 feet with the least restrictive device within 7 day(s). Outcome: Progressing Towards Goal     PHYSICAL THERAPY TREATMENT  Patient: Anika Boudreaux (55 y.o. female)  Date: 5/18/2022  Diagnosis: Acute respiratory failure with hypoxia (HCC) [J96.01] Acute respiratory failure with hypoxia (HCC)       Precautions:    Chart, physical therapy assessment, plan of care and goals were reviewed. ASSESSMENT  Patient continues with skilled PT services and is progressing towards goals. Pt sitting in the chair with a right lateral trunk lean. Pt reports weight shifting due to rectal pain. Pt was agreeable to ambulate a short distance with rolling walker. V.C. to stay with in rolling walker. Pt appeared more confidence with rolling walker .      Current Level of Function Impacting Discharge (mobility/balance): mod A     Other factors to consider for discharge: decrease mobility and indepdence         PLAN :  Patient continues to benefit from skilled intervention to address the above impairments. Continue treatment per established plan of care. to address goals. Recommendation for discharge: (in order for the patient to meet his/her long term goals)  SNF vs HHPT with increase assistance     This discharge recommendation:  Has not yet been discussed the attending provider and/or case management    IF patient discharges home will need the following DME: patient owns DME required for discharge       SUBJECTIVE:   Patient stated What ever you want to do.     OBJECTIVE DATA SUMMARY:   Critical Behavior:  Neurologic State: Alert,Eyes open spontaneously  Orientation Level: Oriented X4  Cognition: Follows commands  Safety/Judgement: Awareness of environment  Functional Mobility Training:  Bed Mobility:        Sit to Supine: Moderate assistance           Transfers:  Sit to Stand: Contact guard assistance  Stand to Sit: Contact guard assistance                             Balance:  Sitting: Intact  Standing: Impaired  Standing - Static: Good  Standing - Dynamic : Fair  Ambulation/Gait Training:  Distance (ft): 20 Feet (ft)  Assistive Device: Gait belt;Walker, rolling  Ambulation - Level of Assistance: Contact guard assistance        Gait Abnormalities: Decreased step clearance        Base of Support: Widened        Step Length: Left shortened;Right shortened                    Stairs: Therapeutic Exercises:     Pain Rating:  rectal pain     Activity Tolerance:   requires rest breaks    After treatment patient left in no apparent distress:   Supine in bed, Call bell within reach and Bed / chair alarm activated    COMMUNICATION/COLLABORATION:   The patients plan of care was discussed with: Registered nurse.      Roberth Marie PTA   Time Calculation: 18 mins

## 2022-05-18 NOTE — PROGRESS NOTES
0730: Bedside and Verbal shift change report given to Toni RN and Michael Clark RN (oncoming nurse) by Ulises Cortes RN (offgoing nurse). Report included the following information SBAR, Kardex, MAR, Med Rec Status and Cardiac Rhythm NSR. Problem: Falls - Risk of  Goal: *Absence of Falls  Description: Document Jennifer Gonsalves Fall Risk and appropriate interventions in the flowsheet. Outcome: Progressing Towards Goal  Note: Fall Risk Interventions:  Mobility Interventions: Bed/chair exit alarm,Communicate number of staff needed for ambulation/transfer,Patient to call before getting OOB    Mentation Interventions: Adequate sleep, hydration, pain control    Medication Interventions: Patient to call before getting OOB,Teach patient to arise slowly    Elimination Interventions: Call light in reach,Patient to call for help with toileting needs    History of Falls Interventions: Evaluate medications/consider consulting pharmacy         Problem: Patient Education: Go to Patient Education Activity  Goal: Patient/Family Education  Outcome: Progressing Towards Goal     Problem: Diabetes Self-Management  Goal: *Disease process and treatment process  Description: Define diabetes and identify own type of diabetes; list 3 options for treating diabetes. Outcome: Progressing Towards Goal  Goal: *Incorporating nutritional management into lifestyle  Description: Describe effect of type, amount and timing of food on blood glucose; list 3 methods for planning meals. Outcome: Progressing Towards Goal  Goal: *Incorporating physical activity into lifestyle  Description: State effect of exercise on blood glucose levels. Outcome: Progressing Towards Goal  Goal: *Developing strategies to promote health/change behavior  Description: Define the ABC's of diabetes; identify appropriate screenings, schedule and personal plan for screenings.   Outcome: Progressing Towards Goal  Goal: *Using medications safely  Description: State effect of diabetes medications on diabetes; name diabetes medication taking, action and side effects. Outcome: Progressing Towards Goal  Goal: *Monitoring blood glucose, interpreting and using results  Description: Identify recommended blood glucose targets  and personal targets. Outcome: Progressing Towards Goal  Goal: *Prevention, detection, treatment of acute complications  Description: List symptoms of hyper- and hypoglycemia; describe how to treat low blood sugar and actions for lowering  high blood glucose level. Outcome: Progressing Towards Goal  Goal: *Prevention, detection and treatment of chronic complications  Description: Define the natural course of diabetes and describe the relationship of blood glucose levels to long term complications of diabetes. Outcome: Progressing Towards Goal  Goal: *Developing strategies to address psychosocial issues  Description: Describe feelings about living with diabetes; identify support needed and support network  Outcome: Progressing Towards Goal  Goal: *Insulin pump training  Outcome: Progressing Towards Goal  Goal: *Sick day guidelines  Outcome: Progressing Towards Goal  Goal: *Patient Specific Goal (EDIT GOAL, INSERT TEXT)  Outcome: Progressing Towards Goal    TRANSFER - OUT REPORT:    Verbal report given to Diego Thrasher RN (name) on [address] [place] [address]  being transferred to [facility] (unit) for routine progression of care       Report consisted of patients Situation, Background, Assessment and   Recommendations(SBAR). Information from the following report(s) SBAR, Kardex, MAR, Med Rec Status and Cardiac Rhythm NSR was reviewed with the receiving nurse.     Lines:   Peripheral IV 05/15/22 Left Antecubital (Active)   Site Assessment Clean, dry, & intact 05/18/22 0800   Phlebitis Assessment 0 05/18/22 0800   Infiltration Assessment 0 05/18/22 0800   Dressing Status Clean, dry, & intact 05/18/22 0800   Dressing Type Transparent 05/18/22 0800   Hub Color/Line Status Blue;Capped 05/18/22 0800   Action Taken Open ports on tubing capped 05/18/22 0800   Alcohol Cap Used Yes 05/18/22 0800        Opportunity for questions and clarification was provided.       Patient transported with:   O2 @ 1 liters

## 2022-05-18 NOTE — PROGRESS NOTES
Problem: Self Care Deficits Care Plan (Adult)  Goal: *Acute Goals and Plan of Care (Insert Text)  Description: FUNCTIONAL STATUS PRIOR TO ADMISSION: Patient was modified independent using a rolling walker for functional mobility. HOME SUPPORT: The patient lived with her daughter and grandson but did not require assist with basic ADLs. Pt reports family completing all IADL tasks. Occupational Therapy Goals  Initiated 5/16/2022  1. Patient will perform seated grooming routine with supervision/set-up within 7 day(s). 2.  Patient will perform seated anterior upper and lower body bathing with supervision/set-up within 7 day(s). 3.  Patient will perform lower body dressing with supervision/set-up within 7 day(s). 4.  Patient will perform toilet transfers with supervision/set-up within 7 day(s). 5.  Patient will perform all aspects of toileting with supervision/set-up within 7 day(s). 6.  Patient will participate in upper extremity therapeutic exercise/activities with supervision/set-up for 5 minutes within 7 day(s). Outcome: Progressing Towards Goal   OCCUPATIONAL THERAPY TREATMENT  Patient: Kizzy Mead (64 y.o. female)  Date: 5/18/2022  Diagnosis: Acute respiratory failure with hypoxia ([facility] [J96.01] Acute respiratory failure with hypoxia ([facility]       Precautions:  fall   Chart, occupational therapy assessment, plan of care, and goals were reviewed. ASSESSMENT  Patient continues with skilled OT services and is progressing towards goals. Pt's performance of ADL/IADL tasks continues to be limited at this time by impaired balance, decreased activity tolerance/endurance, generalized weakness, and sacral pain. Pt received semi-supine and amenable to participation in therapy session. She continues to require additional time for mobility/functional transfers/seated ADL tasks.  BP much improved this date with slight drop following short periods of standing, however remaining stable while seated in bedside chair. Pt left set-up with breakfast tray and RN notified of session. Current Level of Function Impacting Discharge (ADLs): CGA to min A seated ADLs    Other factors to consider for discharge: PLOF         PLAN :  Patient continues to benefit from skilled intervention to address the above impairments. Continue treatment per established plan of care to address goals. Recommendation for discharge: (in order for the patient to meet his/her long term goals)  Therapy up to 5 days/week in SNF setting    This discharge recommendation:  Has been made in collaboration with the attending provider and/or case management    IF patient discharges home will need the following DME: TBD       SUBJECTIVE:   Patient stated I just don't feel like myself.     OBJECTIVE DATA SUMMARY:   Cognitive/Behavioral Status:  Neurologic State: Alert  Orientation Level: Oriented X4  Cognition: Follows commands        Safety/Judgement: Awareness of environment    Functional Mobility and Transfers for ADLs:  Bed Mobility:  Supine to Sit: Contact guard assistance; Additional time  Sit to Supine: Moderate assistance  Scooting: Contact guard assistance (to EOB)    Transfers:  Sit to Stand: Contact guard assistance     Bed to Chair: Contact guard assistance;Minimum assistance    Balance:  Sitting: Intact  Standing: Impaired  Standing - Static: Good  Standing - Dynamic : Fair    ADL Intervention:  Feeding  Feeding Assistance: Independent  Container Management: Independent  Cutting Food: Independent  Utensil Management: Independent  Food to Mouth: Independent  Drink to Mouth:  Independent                        Lower Body Dressing Assistance  Underpants: Contact guard assistance (brief simulated as underwear)  Position Performed: Seated edge of bed  Cues: Verbal cues provided         Cognitive Retraining  Safety/Judgement: Awareness of environment      Pain:  Sacral pain reported, RN aware     Activity Tolerance:   Fair and requires rest breaks    After treatment patient left in no apparent distress:   Sitting in chair, Call bell within reach and Bed / chair alarm activated    COMMUNICATION/COLLABORATION:   The patients plan of care was discussed with: Registered nurse.      Bessy Gonsalez OT  Time Calculation: 30 mins

## 2022-05-18 NOTE — DISCHARGE SUMMARY
Discharge Summary       PATIENT ID: Thaddeus Rivera  MRN: [id number]   YOB: 1935    DATE OF ADMISSION: 5/9/2022  5:41 PM    DATE OF DISCHARGE: 5/18/22   PRIMARY CARE PROVIDER: Jhoan Smith NP     ATTENDING PHYSICIAN: Saadia Sharpe  DISCHARGING PROVIDER: Eder Spears MD    To contact this individual call [phone] and ask the  to page. If unavailable ask to be transferred the Adult Hospitalist Department. CONSULTATIONS: IP CONSULT TO CARDIOLOGY  IP CONSULT TO GASTROENTEROLOGY    PROCEDURES/SURGERIES: * No surgery found *    DISCHARGE DIAGNOSES:  AHRF due to CAP    Patient came to the ED for shortness of breath. She is admitted for acute hypoxic respiratory failure. PMH is significant for type 2 diabetes with neuropathy, stomach ulcer, hyperlipidemia, depression, stroke. [address] [facility] COURSE:   Patient admitted with respiratory failure requiring nasal cannula oxygen eventually weaned off treated for pneumonia with antibiotic course completed patient has hypertension hyperlipidemia mild dementia continue home medication patient is elderly unable to take care of her so patient was sent to SNF for physical therapy    DISCHARGE DIAGNOSES / PLAN:      Discharge to SNF resume home medication    BMI: Body mass index is 23.15 kg/m². . This patient: Has a BMI within normal limits. PENDING TEST RESULTS:   At the time of discharge the following test results are still pending:      ADDITIONAL CARE RECOMMENDATIONS:        NOTIFY YOUR PHYSICIAN FOR ANY OF THE FOLLOWING:   Fever over 101 degrees for 24 hours. Chest pain, shortness of breath, fever, chills, nausea, vomiting, diarrhea, change in mentation, falling, weakness, bleeding. Severe pain or pain not relieved by medications, as well as any other signs or symptoms that you may have questions about.     FOLLOW UP APPOINTMENTS:    Follow-up Information     Follow up With Specialties Details Why Contact Info Lisa Gordon MD Specialist Undefined, Cardiovascular Disease Physician On 5/23/2022 9AM [place] [address]  [address] [facility] [phone]      Karlos Mueller NP Nurse Practitioner, [facility] In 1 week  [address] [address]  [phone]               DIET: Cardiac Diet    ACTIVITY: Activity as tolerated    EQUIPMENT needed:     DISCHARGE MEDICATIONS:  Current Discharge Medication List      CONTINUE these medications which have NOT CHANGED    Details   baclofen (LIORESAL) 10 mg tablet TAKE 1 TABLET BY MOUTH TWICE DAILY AS NEEDED FOR MUSCLE SPASM  Qty: 30 Tablet, Refills: 0  Start date: 5/9/2022    Associated Diagnoses: Osteoarthritis of spine with radiculopathy, cervical region      furosemide (LASIX) 20 mg tablet Take 1 Tablet by mouth daily. Qty: 90 Tablet, Refills: 0      PARoxetine (PAXIL) 20 mg tablet Take 1 Tablet by mouth nightly. Qty: 90 Tablet, Refills: 0    Comments: Requesting 1 year supply  Associated Diagnoses: Other depression      amLODIPine (NORVASC) 5 mg tablet TAKE 1 TABLET BY MOUTH ONCE DAILY (DISCONTINUE  2.5  MG)  Qty: 90 Tablet, Refills: 1    Associated Diagnoses: Essential hypertension      ferrous sulfate (IRON) 325 mg (65 mg iron) EC tablet TAKE 1 TABLET BY MOUTH ONCE DAILY BEFORE BREAKFAST  Qty: 90 Tablet, Refills: 1      meloxicam (MOBIC) 15 mg tablet TAKE 1 TABLET BY MOUTH  DAILY  Qty: 90 Tablet, Refills: 0    Comments: Requesting 1 year supply  Associated Diagnoses: Primary osteoarthritis of both knees      traMADoL (ULTRAM) 50 mg tablet Take 50 mg by mouth every six (6) hours as needed for Pain. cyanocobalamin (VITAMIN B12) 1,000 mcg/mL injection Give 1ml IM daily X 7 days then 1ml IM weekly X 4 weeks then 1ml IM monthly  Qty: 30 mL, Refills: 1      acetaminophen (TYLENOL) 650 mg TbER Take 650 mg by mouth two (2) times daily as needed for Pain.      famotidine (PEPCID) 20 mg tablet Take 1 Tab by mouth two (2) times a day.   Qty: 60 Tab, Refills: 5    Associated Diagnoses: Gastric ulcer, unspecified chronicity, unspecified whether gastric ulcer hemorrhage or perforation present      VITAMIN D2 50,000 unit capsule TAKE 1 CAPSULE BY MOUTH ONCE A WEEK  Refills: 2      aspirin 81 mg chewable tablet Take 1 Tab by mouth daily. Qty: 90 Tab, Refills: 4    Associated Diagnoses: Essential hypertension      cholecalciferol (VITAMIN D3) 1,000 unit cap Take 2,000 Units by mouth daily. simvastatin (ZOCOR) 40 mg tablet TAKE 1 TABLET BY MOUTH AT BEDTIME  Qty: 90 Tablet, Refills: 0    Associated Diagnoses: Mixed hyperlipidemia      donepeziL (ARICEPT) 10 mg tablet Take 1 Tablet by mouth nightly. 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 Qty: 1 Each, Refills: 1    Associated Diagnoses: Skin yeast infection      budesonide (ENTOCORT EC) 3 mg capsule Take 3 mg by mouth three (3) times daily. cholestyramine (QUESTRAN) 4 gram packet Take 1 Packet by mouth daily (with dinner).   Qty: 30 Packet, Refills: 0    Associated Diagnoses: Diarrhea, unspecified type      menthol (BIOFREEZE, MENTHOL,) 4 % gel Apply top BID  Qty: 1 Tube, Refills: 2    Associated Diagnoses: Other osteoarthritis of spine, lumbar region; Osteoarthritis, unspecified osteoarthritis type, unspecified site             DISPOSITION:    Home With:   OT  PT  HH  RN      x Long term SNF/Inpatient Rehab    Independent/assisted living    Hospice    Other:       PATIENT CONDITION AT DISCHARGE:     Functional status    Poor    x Deconditioned     Independent      Cognition   x  Lucid    x Forgetful     Dementia      Catheters/lines (plus indication)    Mark     PICC     PEG    x None      Code status   x  Full code     DNR      PHYSICAL EXAMINATION AT DISCHARGE:  General:          Alert, cooperative, no distress, appears stated age. HEENT:           Atraumatic, anicteric sclerae, pink conjunctivae                          No oral ulcers, mucosa moist, throat clear, dentition fair  Neck:               Supple, symmetrical  Lungs:             Clear to auscultation bilaterally. No Wheezing or Rhonchi. No rales. Chest wall:      No tenderness  No Accessory muscle use. Heart:              Regular  rhythm,  No  murmur   No edema  Abdomen:        Soft, non-tender. Not distended. Bowel sounds normal  Extremities:     No cyanosis. No clubbing,                            Skin turgor normal, Capillary refill normal  Skin:                Not pale. Not Jaundiced  No rashes   Psych:             Not anxious or agitated. Neurologic:      Alert, moves all extremities, answers questions appropriately and responds to commands       CHRONIC MEDICAL DIAGNOSES:  Problem List as of 5/18/2022 Date Reviewed: 5/10/2022          Codes Class Noted - Resolved    * (Principal) Acute respiratory failure with hypoxia ([facility] ICD-10-CM: J96.01  ICD-9-CM: 518.81  5/9/2022 - Present        Dizziness ICD-10-CM: R42  ICD-9-CM: 780.4  8/14/2021 - Present        Mild depression ICD-10-CM: F32. A  ICD-9-CM: 861  10/29/2018 - Present        Acute delirium ICD-10-CM: R41.0  ICD-9-CM: 780.09  6/9/2018 - Present        Altered mental status ICD-10-CM: R41.82  ICD-9-CM: 780.97  6/4/2018 - Present        Type 2 diabetes with nephropathy ([facility] ICD-10-CM: E11.21  ICD-9-CM: 250.40, 583.81  3/9/2018 - Present        H/O: stroke ICD-10-CM: Z86.73  ICD-9-CM: V12.54  11/9/2017 - Present        Advance care planning ICD-10-CM: Z71.89  ICD-9-CM: V65.49  1/21/2016 - Present        Essential hypertension ICD-10-CM: I10  ICD-9-CM: 401.9  1/21/2016 - Present        Stomach ulcer ICD-10-CM: K25.9  ICD-9-CM: 531.90  3/12/2015 - Present        Diverticula of colon ICD-10-CM: K57.30  ICD-9-CM: 562.10  3/12/2015 - Present        DM (diabetes mellitus) ([facility] ICD-10-CM: E11.9  ICD-9-CM: 250.00  5/1/2014 - Present        Mixed hyperlipidemia ICD-10-CM: E78.2  ICD-9-CM: 272.2  5/1/2014 - Present        Chronic venous insufficiency ICD-10-CM: I87.2  ICD-9-CM: 459.81  5/1/2014 - Present        Depression ICD-10-CM: F32. A  ICD-9-CM: 224  5/1/2014 - Present              Greater than 30  minutes were spent with the patient on counseling and coordination of care    Signed:   Octavio Monsalve MD  5/18/2022  10:11 AM

## 2022-05-18 NOTE — DISCHARGE INSTRUCTIONS
Discharge Instructions       PATIENT ID: Porter Christensen  MRN: [id number]   YOB: 1935    DATE OF ADMISSION: 5/9/2022  5:41 PM    DATE OF DISCHARGE: 5/18/2022    PRIMARY CARE PROVIDER: Bishop Migdalia NP     ATTENDING PHYSICIAN: Luz Gibson MD  DISCHARGING PROVIDER: Juancho Dalton MD    To contact this individual call [phone] and ask the  to page. If unavailable ask to be transferred the Adult Hospitalist Department. DISCHARGE DIAGNOSES AHRF due to CAP     CONSULTATIONS: IP CONSULT TO CARDIOLOGY  IP CONSULT TO GASTROENTEROLOGY    PROCEDURES/SURGERIES: * No surgery found *    PENDING TEST RESULTS:   At the time of discharge the following test results are still pending:     FOLLOW UP APPOINTMENTS:   Follow-up Information     Follow up With Specialties Details Why Leighann Booker MD Specialist Undefined, Cardiovascular Disease Physician On 5/23/2022 9AM [address] [place] [phone]      Bishop Migdalia NP Nurse Practitioner, Bariatrics In 1 week  [address] [place] [address]  [phone]             ADDITIONAL CARE RECOMMENDATIONS:     DIET: Cardiac Diet    ACTIVITY: Activity as tolerated    WOUND CARE:     EQUIPMENT needed:       Radiology      DISCHARGE MEDICATIONS:   See Medication Reconciliation Form    · It is important that you take the medication exactly as they are prescribed. · Keep your medication in the bottles provided by the pharmacist and keep a list of the medication names, dosages, and times to be taken in your wallet. · Do not take other medications without consulting your doctor. NOTIFY YOUR PHYSICIAN FOR ANY OF THE FOLLOWING:   Fever over 101 degrees for 24 hours. Chest pain, shortness of breath, fever, chills, nausea, vomiting, diarrhea, change in mentation, falling, weakness, bleeding. Severe pain or pain not relieved by medications.   Or, any other signs or symptoms that you may have questions about.       DISPOSITION:    Home With:   OT  PT  HH  RN      x SNF/Inpatient Rehab/LTAC    Independent/assisted living    Hospice    Other:     CDMP Checked:   Yes x     PROBLEM LIST Updated:  Yes x       Signed:   Arthur Mendez MD  5/18/2022  10:10 AM

## 2022-05-19 ENCOUNTER — PATIENT OUTREACH (OUTPATIENT)
Dept: CASE MANAGEMENT | Age: 87
End: 2022-05-19

## 2022-05-19 NOTE — PROGRESS NOTES
Per EMR patient discharged to [facility]. Transition of care outreach postponed for 14 days due to patient's discharge to SNF. Will continue to monitor patient progress.

## 2022-05-31 ENCOUNTER — VIRTUAL VISIT (OUTPATIENT)
Dept: INTERNAL MEDICINE CLINIC | Age: 87
End: 2022-05-31
Payer: MEDICARE

## 2022-05-31 DIAGNOSIS — R19.8 ALTERNATING CONSTIPATION AND DIARRHEA: ICD-10-CM

## 2022-05-31 DIAGNOSIS — I35.0 AORTIC VALVE STENOSIS, ETIOLOGY OF CARDIAC VALVE DISEASE UNSPECIFIED: ICD-10-CM

## 2022-05-31 DIAGNOSIS — F32.A ANXIETY AND DEPRESSION: ICD-10-CM

## 2022-05-31 DIAGNOSIS — J18.9 PNEUMONIA DUE TO INFECTIOUS ORGANISM, UNSPECIFIED LATERALITY, UNSPECIFIED PART OF LUNG: Primary | ICD-10-CM
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
## 2022-05-31 DIAGNOSIS — E11.21 TYPE 2 DIABETES WITH NEPHROPATHY (HCC): ICD-10-CM

## 2022-05-31 DIAGNOSIS — I10 ESSENTIAL HYPERTENSION: ICD-10-CM

## 2022-05-31 DIAGNOSIS — F03.90 DEMENTIA WITHOUT BEHAVIORAL DISTURBANCE, UNSPECIFIED DEMENTIA TYPE: ICD-10-CM

## 2022-05-31 DIAGNOSIS — E78.5 HYPERLIPIDEMIA, UNSPECIFIED HYPERLIPIDEMIA TYPE: ICD-10-CM

## 2022-05-31 DIAGNOSIS — I27.20 PULMONARY HYPERTENSION (HCC): ICD-10-CM

## 2022-05-31 DIAGNOSIS — F41.9 ANXIETY AND DEPRESSION: ICD-10-CM

## 2022-05-31 DIAGNOSIS — D64.9 ANEMIA, UNSPECIFIED TYPE: ICD-10-CM

## 2022-05-31 DIAGNOSIS — Z86.73 HISTORY OF CVA (CEREBROVASCULAR ACCIDENT): ICD-10-CM

## 2022-05-31 DIAGNOSIS — I87.2 VENOUS INSUFFICIENCY: ICD-10-CM

## 2022-05-31 DIAGNOSIS — R53.1 WEAKNESS GENERALIZED: ICD-10-CM

## 2022-05-31 PROBLEM — N18.30 CHRONIC RENAL DISEASE, STAGE III (HCC): Status: ACTIVE | Noted: 2022-05-31

## 2022-05-31 PROCEDURE — 99443 PR PHYS/QHP TELEPHONE EVALUATION 21-30 MIN: CPT | Performed by: NURSE PRACTITIONER

## 2022-05-31 NOTE — PROGRESS NOTES
Wei Birch is a 82 y.o. female, evaluated via audio technology on 5/31/2022 for Hospital Follow Up and Cough  . Assessment & Plan:   Diagnoses and all orders for this visit:    1. Pneumonia due to infectious organism, unspecified laterality, unspecified part of lung    2. Aortic valve stenosis, etiology of cardiac valve disease unspecified    3. Pulmonary hypertension ([address]    4. Weakness generalized    5. Fall, initial encounter    6. Essential hypertension    7. Type 2 diabetes with nephropathy ([address]    8. History of CVA (cerebrovascular accident)    9. Dementia without behavioral disturbance, unspecified dementia type ([address]    10. Hyperlipidemia, unspecified hyperlipidemia type    11. Anxiety and depression    12. Alternating constipation and diarrhea    13. Venous insufficiency    14. Anemia, unspecified type    Will order:  - Repeat,    XR CHEST PA LAT     Will order  CBC WITH AUTOMATED DIFF   METABOLIC PANEL, COMPREHENSIVE   TSH 3RD GENERATION   LIPID PANEL   HEMOGLOBIN A1C WITH EAG     Refer,   REFERRAL TO HOME HEALTH   SN, PT, OT      12Will order  Variable height semi electric hospital bed  This patient requires variable height semi electric bed, with history of CVA, generalized weakness, severe pulmonary hypertension, and pleural effusion, which as a result,causes significant diminished functionality. She requires assistance with bed mobility. The patients medical condition requires positioning of the body in ways not feasible with an ordinary bed, requires frequent changes in body position and requires the head of the bed to be elevated more than 30 degrees most of the time, to decrease shortness of breath and also to prevent skin breakdown. Siderails on the bed will prevent harm and keep patient safe while sleeping.      Will order  Rollator Walker  Patient requires rollator walker with seat due to CVA resulting in mobility limitations and generalized weakness.  Patient is unable to use a cane or standard walker due to weakness of lower extremities and need for rest breaks due to pulmonary hypertension and pleural effusion which cause shortness of breath. The patients home provides adequate room for a rollator walker to maneuver in spaces and between rooms. Patient is available and willing to safely use rollator walker for its purpose of getting the patient around the home safely and more independently to perform ADLs, and to and from medical appointments. Patient encouraged to call or return to office if symptoms do not improve or worsen. If experiencing severe shortness of breath or chest pain, present to the ER. Reviewed medications and side effects in detail. Reviewed plan of care with patient who acknowledges understanding and agrees.     Follow-up with PCP in 2 weeks, or sooner as needed. Subjective: This patient of Taco Dickson NP is seen today for hospital follow-up with her daughter, Jamil Laureano. The patient was admitted to [facility] 05/09/22-05/18/22. She presented to the ED with complaints of shortness of breath. She was treated for acute hypoxic respiratory failure secondary to pneumonia. Echo during hospitalization indicated aortic stenosis, severe pulmonary hypertension, and large left pleural effusion, as well. Her past medical history includes type 2 diabetes with neuropathy, stomach ulcer, hyperlipidemia, depression, stroke, anemia, alternating constipation/ diarrhea, hypertension, dementia, venous insufficiency, and anxiety. The patient was discharged from [facility] to [facility]. The patient's daughter expresses some concern regarding her care there (including incontinence care, food, and a fall from bed while at facility). She states she picked her mother up from the facility 5/22/22, prior to expected discharge date, due to these concerns.   The patient was discharged from the hospital on O2 via nasal cannula; per daughter, she had weaned to room air just prior to discharge from SNF. The patient's family has been monitoring her SpO2 since arrival home regularly; it has been in the 90s range. The patient denies shortness of breath and chest pain at this time. She states her appetite is good. The patient does have some generalized weakness and difficulty with ambulation. Consideration of rollator walker and hospital bed is requested by patient and family. Patient's daughter also notes that patient resumed budesonide and meloxicam while in SNF (per hospital discharge summary). Patient's daughter says her gastroenterologist stopped these medications. She believes resuming them may have led to loose stools and increased concerns with incontinence/ skin care. Prior to Admission medications    Medication Sig Start Date End Date Taking? Authorizing Provider   PARoxetine (PAXIL) 20 mg tablet TAKE 1 TABLET BY MOUTH AT  NIGHT 5/23/22  Yes Shay Alba NP   baclofen (LIORESAL) 10 mg tablet TAKE 1 TABLET BY MOUTH TWICE DAILY AS NEEDED FOR MUSCLE SPASM 5/9/22  Yes Eriberto Alvarez NP   simvastatin (ZOCOR) 40 mg tablet TAKE 1 TABLET BY MOUTH AT BEDTIME 5/1/22  Yes Eriberto Alvarez NP   donepeziL (ARICEPT) 10 mg tablet Take 1 Tablet by mouth nightly. 4/21/22  Yes Brock Richards MD   furosemide (LASIX) 20 mg tablet Take 1 Tablet by mouth daily. 4/12/22  Yes Shay Alba NP   nystatin (MYCOSTATIN) powder Apply  to affected area four (4) times daily. 4/12/22  Yes Shay Alba NP   amLODIPine (NORVASC) 5 mg tablet TAKE 1 TABLET BY MOUTH ONCE DAILY (DISCONTINUE  2.5  MG) 3/7/22  Yes Shay Alba NP   ferrous sulfate (IRON) 325 mg (65 mg iron) EC tablet TAKE 1 TABLET BY MOUTH ONCE DAILY BEFORE BREAKFAST 10/18/21  Yes Shay Alba NP   traMADoL (ULTRAM) 50 mg tablet Take 50 mg by mouth every six (6) hours as needed for Pain.    Yes Provider, Historical   cyanocobalamin (VITAMIN B12) 1,000 mcg/mL injection Give 1ml IM daily X 7 days then 1ml IM weekly X 4 weeks then 1ml IM monthly 8/15/21  Yes Stew Balderas MD   cholestyramine (QUESTRAN) 4 gram packet Take 1 Packet by mouth daily (with dinner). 11/6/20  Yes Stepan Cano MD   acetaminophen (TYLENOL) 650 mg TbER Take 650 mg by mouth two (2) times daily as needed for Pain. Yes Provider, Historical   famotidine (PEPCID) 20 mg tablet Take 1 Tab by mouth two (2) times a day. 12/4/19  Yes Stepan Cano MD   VITAMIN D2 50,000 unit capsule TAKE 1 CAPSULE BY MOUTH ONCE A WEEK 11/1/19  Yes Provider, Historical   menthol (BIOFREEZE, MENTHOL,) 4 % gel Apply top BID 7/11/19  Yes Stepan Cano MD   aspirin 81 mg chewable tablet Take 1 Tab by mouth daily. 8/29/18  Yes Stepan Cano MD   cholecalciferol (VITAMIN D3) 1,000 unit cap Take 2,000 Units by mouth daily. 11/28/16  Yes Stepan Cano MD     No Known Allergies  Past Medical History:   Diagnosis Date    Arthritis     Chronic pain     Depression     [facility]     Hypercholesterolemia     Hypertension     Stroke ([facility]     TIA 10 yrs back    Stroke [facility])     2003     Past Surgical History:   Procedure Laterality Date    HX CARPAL TUNNEL RELEASE  1990    HX CATARACT REMOVAL      bilateral    HX HYSTERECTOMY  1986    HX HYSTERECTOMY      HX ORTHOPAEDIC      HX ORTHOPAEDIC      carpel tunnel left    HX ORTHOPAEDIC      left foot heel spur    HX REFRACTIVE SURGERY  2006       Review of Systems   Constitutional: Negative for chills and fever. HENT: Negative. Respiratory: Negative for cough, shortness of breath and wheezing. Cardiovascular: Negative for chest pain. Gastrointestinal: Negative. Genitourinary: Negative. Skin: Negative. Neurological: Negative. Psychiatric/Behavioral: Negative. No data recorded     [facility] was evaluated through a patient-initiated, synchronous (real-time) audio only encounter.  She (or guardian if applicable) is aware that it is a billable service, which includes applicable co-pays, with coverage as determined by her insurance carrier. This visit was conducted with the patient's (and/or Darline Lieberman guardian's) verbal consent. She has not had a related appointment within my department in the past 7 days or scheduled within the next 24 hours. The patient was located in a state where the provider was licensed to provide care.   The patient was located at: Home: [address] [address]  The provider was located at: Home:     Total Time: minutes: 21-30 minutes    Trinna Dakin, NP

## 2022-06-10 ENCOUNTER — PATIENT OUTREACH (OUTPATIENT)
Dept: CASE MANAGEMENT | Age: 87
End: 2022-06-10

## 2022-06-14 ENCOUNTER — PATIENT OUTREACH (OUTPATIENT)
Dept: CASE MANAGEMENT | Age: 87
End: 2022-06-14

## 2022-06-17 ENCOUNTER — HOSPITAL ENCOUNTER (OUTPATIENT)
Dept: GENERAL RADIOLOGY | Age: 87
Discharge: HOME | End: 2022-06-17
Payer: MEDICARE

## 2022-06-17 DIAGNOSIS — J18.9 PNEUMONIA DUE TO INFECTIOUS ORGANISM, UNSPECIFIED LATERALITY, UNSPECIFIED PART OF LUNG: ICD-10-CM

## 2022-06-17 PROCEDURE — 71046 X-RAY EXAM CHEST 2 VIEWS: CPT

## 2022-06-18 LAB
ALBUMIN SERPL-MCNC: 3.6 G/DL (ref 3.6–4.6)
ALBUMIN/GLOB SERPL: 1.3 {RATIO} (ref 1.2–2.2)
ALP SERPL-CCNC: 67 IU/L (ref 44–121)
ALT SERPL-CCNC: 18 IU/L (ref 0–32)
AST SERPL-CCNC: 15 IU/L (ref 0–40)
BASOPHILS # BLD AUTO: 0.1 X10E3/UL (ref 0–0.2)
BASOPHILS NFR BLD AUTO: 1 %
BILIRUB SERPL-MCNC: <0.2 MG/DL (ref 0–1.2)
BUN SERPL-MCNC: 26 MG/DL (ref 8–27)
BUN/CREAT SERPL: 21 (ref 12–28)
CALCIUM SERPL-MCNC: 9.2 MG/DL (ref 8.7–10.3)
CHLORIDE SERPL-SCNC: 100 MMOL/L (ref 96–106)
CHOLEST SERPL-MCNC: 124 MG/DL (ref 100–199)
CO2 SERPL-SCNC: 22 MMOL/L (ref 20–29)
CREAT SERPL-MCNC: 1.25 MG/DL (ref 0.57–1)
EGFR: 43 ML/MIN/1.73
EOSINOPHIL # BLD AUTO: 0.2 X10E3/UL (ref 0–0.4)
EOSINOPHIL NFR BLD AUTO: 4 %
ERYTHROCYTE [DISTWIDTH] IN BLOOD BY AUTOMATED COUNT: 14.5 % (ref 11.7–15.4)
EST. AVERAGE GLUCOSE BLD GHB EST-MCNC: 166 MG/DL
GLOBULIN SER CALC-MCNC: 2.7 G/DL (ref 1.5–4.5)
GLUCOSE SERPL-MCNC: 202 MG/DL (ref 65–99)
HBA1C MFR BLD: 7.4 % (ref 4.8–5.6)
HCT VFR BLD AUTO: 31.2 % (ref 34–46.6)
HDLC SERPL-MCNC: 62 MG/DL
HGB BLD-MCNC: 9.8 G/DL (ref 11.1–15.9)
IMM GRANULOCYTES # BLD AUTO: 0 X10E3/UL (ref 0–0.1)
IMM GRANULOCYTES NFR BLD AUTO: 0 %
IMP & REVIEW OF LAB RESULTS: NORMAL
INTERPRETATION: NORMAL
LDLC SERPL CALC-MCNC: 44 MG/DL (ref 0–99)
LYMPHOCYTES # BLD AUTO: 2.6 X10E3/UL (ref 0.7–3.1)
LYMPHOCYTES NFR BLD AUTO: 42 %
MCH RBC QN AUTO: 28.6 PG (ref 26.6–33)
MCHC RBC AUTO-ENTMCNC: 31.4 G/DL (ref 31.5–35.7)
MCV RBC AUTO: 91 FL (ref 79–97)
MONOCYTES # BLD AUTO: 0.7 X10E3/UL (ref 0.1–0.9)
MONOCYTES NFR BLD AUTO: 11 %
NEUTROPHILS # BLD AUTO: 2.6 X10E3/UL (ref 1.4–7)
NEUTROPHILS NFR BLD AUTO: 42 %
PLATELET # BLD AUTO: 190 X10E3/UL (ref 150–450)
POTASSIUM SERPL-SCNC: 4.7 MMOL/L (ref 3.5–5.2)
PROT SERPL-MCNC: 6.3 G/DL (ref 6–8.5)
RBC # BLD AUTO: 3.43 X10E6/UL (ref 3.77–5.28)
SODIUM SERPL-SCNC: 136 MMOL/L (ref 134–144)
TRIGL SERPL-MCNC: 96 MG/DL (ref 0–149)
TSH SERPL DL<=0.005 MIU/L-ACNC: 1.27 UIU/ML (ref 0.45–4.5)
VLDLC SERPL CALC-MCNC: 18 MG/DL (ref 5–40)
WBC # BLD AUTO: 6.1 X10E3/UL (ref 3.4–10.8)

## 2022-06-20 ENCOUNTER — DOCUMENTATION ONLY (OUTPATIENT)
Dept: INTERNAL MEDICINE CLINIC | Age: 87
End: 2022-06-20

## 2022-06-20 ENCOUNTER — TELEPHONE (OUTPATIENT)
Dept: INTERNAL MEDICINE CLINIC | Age: 87
End: 2022-06-20

## 2022-06-20 DIAGNOSIS — I35.0 AORTIC VALVE STENOSIS, ETIOLOGY OF CARDIAC VALVE DISEASE UNSPECIFIED: ICD-10-CM

## 2022-06-20 DIAGNOSIS — Z86.73 H/O: STROKE: ICD-10-CM

## 2022-06-20 DIAGNOSIS — I27.20 PULMONARY HYPERTENSION (HCC): ICD-10-CM

## 2022-06-20 DIAGNOSIS — M47.22 OSTEOARTHRITIS OF SPINE WITH RADICULOPATHY, CERVICAL REGION: ICD-10-CM

## 2022-06-20 DIAGNOSIS — F32.A DEPRESSION, UNSPECIFIED DEPRESSION TYPE: Primary | ICD-10-CM

## 2022-06-20 DIAGNOSIS — R53.1 WEAKNESS GENERALIZED: ICD-10-CM

## 2022-06-20 DIAGNOSIS — R41.82 ALTERED MENTAL STATUS, UNSPECIFIED ALTERED MENTAL STATUS TYPE: ICD-10-CM
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
## 2022-06-20 DIAGNOSIS — Z86.73 HISTORY OF CVA (CEREBROVASCULAR ACCIDENT): ICD-10-CM

## 2022-06-20 DIAGNOSIS — F03.90 DEMENTIA WITHOUT BEHAVIORAL DISTURBANCE, UNSPECIFIED DEMENTIA TYPE: ICD-10-CM

## 2022-06-20 DIAGNOSIS — M79.89 BILATERAL SWELLING OF FEET: ICD-10-CM

## 2022-06-20 NOTE — PROGRESS NOTES
Chief Complaint   Patient presents with    Other     DME          Rollator and and hospital bed orders sent to [facility] on 6/20/2022

## 2022-06-20 NOTE — TELEPHONE ENCOUNTER
Patients grandson called and states that his grandmothers pox for the last 2 weeks has been in the 79-80. Only goes to 92 at lunch and then drops back down. He states pt is not having any signs or symptoms of SOB difficultly breathing. Pt has CXR on 6/17 and was stable. Spoke with provider and advised him to take pt to the ER for further evaluation. He voiced understanding. I attempted to call pts daughter, phone goes to  and  is full.

## 2022-06-21 ENCOUNTER — VIRTUAL VISIT (OUTPATIENT)
Dept: INTERNAL MEDICINE CLINIC | Age: 87
End: 2022-06-21
Payer: MEDICARE

## 2022-06-21 DIAGNOSIS — R26.81 GAIT INSTABILITY: ICD-10-CM

## 2022-06-21 DIAGNOSIS — E78.2 MIXED HYPERLIPIDEMIA: ICD-10-CM

## 2022-06-21 DIAGNOSIS — R79.81 BORDERLINE LOW OXYGEN SATURATION LEVEL: Primary | ICD-10-CM

## 2022-06-21 DIAGNOSIS — E11.21 TYPE 2 DIABETES WITH NEPHROPATHY (HCC): ICD-10-CM

## 2022-06-21 DIAGNOSIS — M47.22 OSTEOARTHRITIS OF SPINE WITH RADICULOPATHY, CERVICAL REGION: ICD-10-CM

## 2022-06-21 DIAGNOSIS — I10 ESSENTIAL HYPERTENSION: ICD-10-CM

## 2022-06-21 PROCEDURE — 99214 OFFICE O/P EST MOD 30 MIN: CPT | Performed by: INTERNAL MEDICINE

## 2022-06-21 PROCEDURE — G8420 CALC BMI NORM PARAMETERS: HCPCS | Performed by: INTERNAL MEDICINE

## 2022-06-21 PROCEDURE — 1090F PRES/ABSN URINE INCON ASSESS: CPT | Performed by: INTERNAL MEDICINE

## 2022-06-21 PROCEDURE — G8756 NO BP MEASURE DOC: HCPCS | Performed by: INTERNAL MEDICINE

## 2022-06-21 PROCEDURE — G9717 DOC PT DX DEP/BP F/U NT REQ: HCPCS | Performed by: INTERNAL MEDICINE

## 2022-06-21 PROCEDURE — G8399 PT W/DXA RESULTS DOCUMENT: HCPCS | Performed by: INTERNAL MEDICINE

## 2022-06-21 PROCEDURE — 1101F PT FALLS ASSESS-DOCD LE1/YR: CPT | Performed by: INTERNAL MEDICINE

## 2022-06-21 PROCEDURE — G8427 DOCREV CUR MEDS BY ELIG CLIN: HCPCS | Performed by: INTERNAL MEDICINE

## 2022-06-21 PROCEDURE — G8536 NO DOC ELDER MAL SCRN: HCPCS | Performed by: INTERNAL MEDICINE

## 2022-06-21 NOTE — PROGRESS NOTES
Enrique Murrieta is a 82 y.o. female who was seen by synchronous (real-time) audio-video technology on 6/21/2022 for Hospital Follow Up (05/09/22 pneumonia)        Assessment & Plan:   Diagnoses and all orders for this visit:    1. Borderline low oxygen saturation level  - home health for skilled nursing assessment with oxygen saturation   -asked grandson to monitor levels and if this drops below 90 for more than 5 minutes she needs to evaluated in the ER  2. Type 2 diabetes with nephropathy ([facility]  -continue plan. a1c 6.5   3. Mixed hyperlipidemia    4. Essential hypertension  - continue medications as directed   -DASH diet   5. Osteoarthritis of spine with radiculopathy, cervical region  - home health for PT assessment   6. Gait instability    Follow-up and Dispositions    · Return in about 1 month (around 7/21/2022), or if symptoms worsen or fail to improve. Subjective:     Patient was seen with her grandson. Had a recent ER visit in the last month for respiratory failure. Was given diuretics to help. ECH was stable, and EF was 60-70%. It did show MR. Patient was then sent to SNF but was removed by daughter after the care was not great. Grandson reports that patients oxygen level drops often. Reporting sometimes being in the 70s at times. Reports that this can take place at rest and patient is no in any distress. Describes that it takes 30-40 minutes to resolve. No increase in AMS. Oxygen on visit is 95% on room air. No signs of distress and mental at baseline. Prior to Admission medications    Medication Sig Start Date End Date Taking?  Authorizing Provider   OTHER Rollator becca 6/20/22  Yes Manjit Alvarez Neither, NP   PARoxetine (PAXIL) 20 mg tablet TAKE 1 TABLET BY MOUTH AT  NIGHT 5/23/22  Yes Effie Alvarado, NP   baclofen (LIORESAL) 10 mg tablet TAKE 1 TABLET BY MOUTH TWICE DAILY AS NEEDED FOR MUSCLE SPASM 5/9/22  Yes Manjit Alvarez Neither, NP   simvastatin (ZOCOR) 40 mg tablet TAKE 1 TABLET BY MOUTH AT BEDTIME 5/1/22  Yes Kerwin Alvarez NP   donepeziL (ARICEPT) 10 mg tablet Take 1 Tablet by mouth nightly. 4/21/22  Yes Ronak Heart MD   furosemide (LASIX) 20 mg tablet Take 1 Tablet by mouth daily. 4/12/22  Yes Denice Sims NP   nystatin (MYCOSTATIN) powder Apply  to affected area four (4) times daily. 4/12/22  Yes Denice Sims NP   amLODIPine (NORVASC) 5 mg tablet TAKE 1 TABLET BY MOUTH ONCE DAILY (DISCONTINUE  2.5  MG) 3/7/22  Yes Denice Sims NP   ferrous sulfate (IRON) 325 mg (65 mg iron) EC tablet TAKE 1 TABLET BY MOUTH ONCE DAILY BEFORE BREAKFAST 10/18/21  Yes Denice Sims NP   traMADoL (ULTRAM) 50 mg tablet Take 50 mg by mouth every six (6) hours as needed for Pain. Yes Provider, Historical   cyanocobalamin (VITAMIN B12) 1,000 mcg/mL injection Give 1ml IM daily X 7 days then 1ml IM weekly X 4 weeks then 1ml IM monthly 8/15/21  Yes Silvino Becerra MD   cholestyramine (QUESTRAN) 4 gram packet Take 1 Packet by mouth daily (with dinner). 11/6/20  Yes Adriel Johnson MD   acetaminophen (TYLENOL) 650 mg TbER Take 650 mg by mouth two (2) times daily as needed for Pain. Yes Provider, Historical   famotidine (PEPCID) 20 mg tablet Take 1 Tab by mouth two (2) times a day. 12/4/19  Yes Adriel Johnson MD   VITAMIN D2 50,000 unit capsule TAKE 1 CAPSULE BY MOUTH ONCE A WEEK 11/1/19  Yes Provider, Historical   menthol (BIOFREEZE, MENTHOL,) 4 % gel Apply top BID 7/11/19  Yes Adriel Johnson MD   aspirin 81 mg chewable tablet Take 1 Tab by mouth daily. 8/29/18  Yes Adriel Johnson MD   cholecalciferol (VITAMIN D3) 1,000 unit cap Take 2,000 Units by mouth daily.  11/28/16  Yes Adriel Johnson MD   OTHER variable height semi electric hospital bed  Patient not taking: Reported on 6/21/2022 6/20/22 6/21/22  Burma Seip, NP     Patient Active Problem List   Diagnosis Code    DM (diabetes mellitus) ([facility] E11.9    Mixed hyperlipidemia E78.2    Chronic venous insufficiency I87.2    Depression F32. A    Stomach ulcer K25.9    Diverticula of colon K57.30    Advance care planning Z71.89    Essential hypertension I10    H/O: stroke Z80.78    Type 2 diabetes with nephropathy (HCC) E11.21    Altered mental status R41.82    Acute delirium R41.0    Mild depression F32. A    Dizziness R42    Acute respiratory failure with hypoxia ([facility]) J96.01    Chronic renal disease, stage III N18.30     Patient Active Problem List    Diagnosis Date Noted    Chronic renal disease, stage III 05/31/2022    Acute respiratory failure with hypoxia (HCC) 05/09/2022    Dizziness 08/14/2021    Mild depression 10/29/2018    Acute delirium 06/09/2018    Altered mental status 06/04/2018    Type 2 diabetes with nephropathy ([facility] 03/09/2018    H/O: stroke 11/09/2017    Advance care planning 01/21/2016    Essential hypertension 01/21/2016    Stomach ulcer 03/12/2015    Diverticula of colon 03/12/2015    DM (diabetes mellitus) ([facility] 05/01/2014    Mixed hyperlipidemia 05/01/2014    Chronic venous insufficiency 05/01/2014    Depression 05/01/2014     Current Outpatient Medications   Medication Sig Dispense Refill    OTHER Rollator walker 1 Each 0    PARoxetine (PAXIL) 20 mg tablet TAKE 1 TABLET BY MOUTH AT  NIGHT 90 Tablet 1    baclofen (LIORESAL) 10 mg tablet TAKE 1 TABLET BY MOUTH TWICE DAILY AS NEEDED FOR MUSCLE SPASM 30 Tablet 0    simvastatin (ZOCOR) 40 mg tablet TAKE 1 TABLET BY MOUTH AT BEDTIME 90 Tablet 0    donepeziL (ARICEPT) 10 mg tablet Take 1 Tablet by mouth nightly. 90 Tablet 1    furosemide (LASIX) 20 mg tablet Take 1 Tablet by mouth daily. 90 Tablet 0    nystatin (MYCOSTATIN) powder Apply  to affected area four (4) times daily.  1 Each 1    amLODIPine (NORVASC) 5 mg tablet TAKE 1 TABLET BY MOUTH ONCE DAILY (DISCONTINUE  2.5  MG) 90 Tablet 1    ferrous sulfate (IRON) 325 mg (65 mg iron) EC tablet TAKE 1 TABLET BY MOUTH ONCE DAILY BEFORE BREAKFAST 90 Tablet 1    traMADoL (ULTRAM) 50 mg tablet Take 50 mg by mouth every six (6) hours as needed for Pain.  cyanocobalamin (VITAMIN B12) 1,000 mcg/mL injection Give 1ml IM daily X 7 days then 1ml IM weekly X 4 weeks then 1ml IM monthly 30 mL 1    cholestyramine (QUESTRAN) 4 gram packet Take 1 Packet by mouth daily (with dinner). 30 Packet 0    acetaminophen (TYLENOL) 650 mg TbER Take 650 mg by mouth two (2) times daily as needed for Pain.  famotidine (PEPCID) 20 mg tablet Take 1 Tab by mouth two (2) times a day. 60 Tab 5    VITAMIN D2 50,000 unit capsule TAKE 1 CAPSULE BY MOUTH ONCE A WEEK  2    menthol (BIOFREEZE, MENTHOL,) 4 % gel Apply top BID 1 Tube 2    aspirin 81 mg chewable tablet Take 1 Tab by mouth daily. 90 Tab 4    cholecalciferol (VITAMIN D3) 1,000 unit cap Take 2,000 Units by mouth daily. No Known Allergies  Past Medical History:   Diagnosis Date    Arthritis     Chronic pain     Depression     Diabetes ([facility]     Hypercholesterolemia     Hypertension     Stroke ([facility]     TIA 10 yrs back    Stroke ([facility]     2003     Past Surgical History:   Procedure Laterality Date    HX CARPAL TUNNEL RELEASE  1990    HX CATARACT REMOVAL      bilateral    HX HYSTERECTOMY  1986    HX HYSTERECTOMY      HX ORTHOPAEDIC      HX ORTHOPAEDIC      carpel tunnel left    HX ORTHOPAEDIC      left foot heel spur    HX REFRACTIVE SURGERY  2006     Family History   Adopted: Yes     Social History     Tobacco Use    Smoking status: Never Smoker    Smokeless tobacco: Never Used   Substance Use Topics    Alcohol use: No       Review of Systems   Constitutional: Negative. Respiratory: Negative. Negative for cough and shortness of breath. Cardiovascular: Negative. Neurological: Negative. Negative for speech change.        Objective:     Patient-Reported Vitals 6/21/2022   Patient-Reported Weight 162   Patient-Reported Height -   Patient-Reported Pulse 61   Patient-Reported Temperature 97.8   Patient-Reported SpO2 - Patient-Reported Systolic  538   Patient-Reported Diastolic 57   Patient-Reported LMP -        [INSTRUCTIONS:  \"[x]\" Indicates a positive item  \"[]\" Indicates a negative item  -- DELETE ALL ITEMS NOT EXAMINED]    Constitutional: [x] Appears well-developed and well-nourished [x] No apparent distress      [] Abnormal -     Mental status: [x] Alert and awake  [x] Oriented to person/place/time [x] Able to follow commands    [] Abnormal -     Eyes:   EOM    [x]  Normal    [] Abnormal -   Sclera  [x]  Normal    [] Abnormal -          Discharge [x]  None visible   [] Abnormal -     HENT: [x] Normocephalic, atraumatic  [] Abnormal -   [x] Mouth/Throat: Mucous membranes are moist    External Ears [x] Normal  [] Abnormal -    Neck: [x] No visualized mass [] Abnormal -     Pulmonary/Chest: [x] Respiratory effort normal   [x] No visualized signs of difficulty breathing or respiratory distress        [] Abnormal -      Musculoskeletal:   [x] Normal gait with no signs of ataxia         [x] Normal range of motion of neck        [] Abnormal -     Neurological:        [x] No Facial Asymmetry (Cranial nerve 7 motor function) (limited exam due to video visit)          [x] No gaze palsy        [] Abnormal -          Skin:        [x] No significant exanthematous lesions or discoloration noted on facial skin         [] Abnormal -            Psychiatric:       [x] Normal Affect [] Abnormal -        [x] No Hallucinations    Other pertinent observable physical exam findings:-        We discussed the expected course, resolution and complications of the diagnosis(es) in detail. Medication risks, benefits, costs, interactions, and alternatives were discussed as indicated. I advised her to contact the office if her condition worsens, changes or fails to improve as anticipated. She expressed understanding with the diagnosis(es) and plan. [address] [place] [address], was evaluated through a synchronous (real-time) audio-video encounter.  The patient (or guardian if applicable) is aware that this is a billable service, which includes applicable co-pays. This Virtual Visit was conducted with patient's (and/or legal guardian's) consent. The visit was conducted pursuant to the emergency declaration under the [address] [place] [address] authority and the [employer] General Act. Patient identification was verified, and a caregiver was present when appropriate. The patient was located at: Home: [address] [place] [id number]  The provider was located at:  Other: office        Brianna Maldonado NP

## 2022-06-23 ENCOUNTER — TELEPHONE (OUTPATIENT)
Dept: INTERNAL MEDICINE CLINIC | Age: 87
End: 2022-06-23

## 2022-06-23 ENCOUNTER — PATIENT OUTREACH (OUTPATIENT)
Dept: CASE MANAGEMENT | Age: 87
End: 2022-06-23

## 2022-06-23 ENCOUNTER — HOME HEALTH ADMISSION (OUTPATIENT)
Dept: HOME HEALTH SERVICES | Facility: HOME HEALTH | Age: 87
End: 2022-06-23
Payer: MEDICARE

## 2022-06-23 NOTE — TELEPHONE ENCOUNTER
[facility] called requesting a call back at [phone] to discuss patient's orders and start of care.

## 2022-06-27 ENCOUNTER — HOME CARE VISIT (OUTPATIENT)
Dept: HOME HEALTH SERVICES | Facility: HOME HEALTH | Age: 87
End: 2022-06-27

## 2022-07-01 ENCOUNTER — HOME CARE VISIT (OUTPATIENT)
Dept: SCHEDULING | Facility: HOME HEALTH | Age: 87
End: 2022-07-01
Payer: MEDICARE

## 2022-07-01 VITALS
HEIGHT: 67 IN | OXYGEN SATURATION: 96 % | WEIGHT: 160 LBS | RESPIRATION RATE: 16 BRPM | TEMPERATURE: 98 F | DIASTOLIC BLOOD PRESSURE: 64 MMHG | BODY MASS INDEX: 25.11 KG/M2 | HEART RATE: 65 BPM | SYSTOLIC BLOOD PRESSURE: 118 MMHG

## 2022-07-01 PROCEDURE — 400013 HH SOC

## 2022-07-01 PROCEDURE — G0151 HHCP-SERV OF PT,EA 15 MIN: HCPCS

## 2022-07-02 DIAGNOSIS — I10 ESSENTIAL HYPERTENSION WITH GOAL BLOOD PRESSURE LESS THAN 130/85: ICD-10-CM

## 2022-07-03 DIAGNOSIS — M47.22 OSTEOARTHRITIS OF SPINE WITH RADICULOPATHY, CERVICAL REGION: ICD-10-CM

## 2022-07-03 RX ORDER — BACLOFEN 10 MG/1
TABLET ORAL
Qty: 30 TABLET | Refills: 0 | Status: SHIPPED | OUTPATIENT
Start: 2022-07-03 | End: 2022-08-20

## 2022-07-03 RX ORDER — METOPROLOL TARTRATE 100 MG/1
TABLET ORAL
Qty: 180 TABLET | Refills: 3 | Status: SHIPPED | OUTPATIENT
Start: 2022-07-03

## 2022-07-06 ENCOUNTER — HOME CARE VISIT (OUTPATIENT)
Dept: SCHEDULING | Facility: HOME HEALTH | Age: 87
End: 2022-07-06
Payer: MEDICARE

## 2022-07-06 VITALS
TEMPERATURE: 97.6 F | OXYGEN SATURATION: 94 % | RESPIRATION RATE: 16 BRPM | SYSTOLIC BLOOD PRESSURE: 122 MMHG | HEART RATE: 67 BPM | DIASTOLIC BLOOD PRESSURE: 70 MMHG

## 2022-07-06 PROCEDURE — G0152 HHCP-SERV OF OT,EA 15 MIN: HCPCS

## 2022-07-06 PROCEDURE — G0151 HHCP-SERV OF PT,EA 15 MIN: HCPCS

## 2022-07-07 VITALS
TEMPERATURE: 98 F | SYSTOLIC BLOOD PRESSURE: 130 MMHG | DIASTOLIC BLOOD PRESSURE: 70 MMHG | OXYGEN SATURATION: 90 % | HEART RATE: 70 BPM

## 2022-07-08 ENCOUNTER — HOME CARE VISIT (OUTPATIENT)
Dept: SCHEDULING | Facility: HOME HEALTH | Age: 87
End: 2022-07-08
Payer: MEDICARE

## 2022-07-08 VITALS
HEART RATE: 60 BPM | SYSTOLIC BLOOD PRESSURE: 120 MMHG | TEMPERATURE: 97.5 F | DIASTOLIC BLOOD PRESSURE: 66 MMHG | OXYGEN SATURATION: 93 % | RESPIRATION RATE: 16 BRPM

## 2022-07-08 VITALS — TEMPERATURE: 98.2 F

## 2022-07-08 PROCEDURE — G0158 HHC OT ASSISTANT EA 15: HCPCS

## 2022-07-08 PROCEDURE — G0151 HHCP-SERV OF PT,EA 15 MIN: HCPCS

## 2022-07-12 ENCOUNTER — HOME CARE VISIT (OUTPATIENT)
Dept: SCHEDULING | Facility: HOME HEALTH | Age: 87
End: 2022-07-12
Payer: MEDICARE

## 2022-07-12 VITALS
SYSTOLIC BLOOD PRESSURE: 110 MMHG | HEART RATE: 60 BPM | RESPIRATION RATE: 16 BRPM | TEMPERATURE: 97.8 F | DIASTOLIC BLOOD PRESSURE: 60 MMHG | OXYGEN SATURATION: 96 %

## 2022-07-12 PROCEDURE — G0151 HHCP-SERV OF PT,EA 15 MIN: HCPCS

## 2022-07-14 ENCOUNTER — HOME CARE VISIT (OUTPATIENT)
Dept: SCHEDULING | Facility: HOME HEALTH | Age: 87
End: 2022-07-14
Payer: MEDICARE

## 2022-07-14 VITALS
HEART RATE: 60 BPM | RESPIRATION RATE: 18 BRPM | TEMPERATURE: 97.2 F | OXYGEN SATURATION: 93 % | DIASTOLIC BLOOD PRESSURE: 60 MMHG | SYSTOLIC BLOOD PRESSURE: 112 MMHG

## 2022-07-14 VITALS
TEMPERATURE: 97.5 F | DIASTOLIC BLOOD PRESSURE: 70 MMHG | HEART RATE: 65 BPM | SYSTOLIC BLOOD PRESSURE: 120 MMHG | OXYGEN SATURATION: 93 % | RESPIRATION RATE: 16 BRPM

## 2022-07-14 PROCEDURE — G0151 HHCP-SERV OF PT,EA 15 MIN: HCPCS

## 2022-07-14 PROCEDURE — G0158 HHC OT ASSISTANT EA 15: HCPCS

## 2022-07-15 ENCOUNTER — HOME CARE VISIT (OUTPATIENT)
Dept: SCHEDULING | Facility: HOME HEALTH | Age: 87
End: 2022-07-15
Payer: MEDICARE

## 2022-07-15 PROCEDURE — G0158 HHC OT ASSISTANT EA 15: HCPCS

## 2022-07-16 VITALS
RESPIRATION RATE: 18 BRPM | BODY MASS INDEX: 24.25 KG/M2 | HEART RATE: 67 BPM | OXYGEN SATURATION: 93 % | SYSTOLIC BLOOD PRESSURE: 118 MMHG | DIASTOLIC BLOOD PRESSURE: 62 MMHG | WEIGHT: 154.8 LBS | TEMPERATURE: 97.8 F

## 2022-07-18 ENCOUNTER — HOME CARE VISIT (OUTPATIENT)
Dept: SCHEDULING | Facility: HOME HEALTH | Age: 87
End: 2022-07-18
Payer: MEDICARE

## 2022-07-18 VITALS
HEART RATE: 62 BPM | TEMPERATURE: 97.9 F | DIASTOLIC BLOOD PRESSURE: 60 MMHG | SYSTOLIC BLOOD PRESSURE: 118 MMHG | RESPIRATION RATE: 16 BRPM | OXYGEN SATURATION: 95 %

## 2022-07-18 VITALS — TEMPERATURE: 97.8 F | RESPIRATION RATE: 18 BRPM | OXYGEN SATURATION: 98 % | HEART RATE: 87 BPM

## 2022-07-18 PROCEDURE — G0158 HHC OT ASSISTANT EA 15: HCPCS

## 2022-07-18 PROCEDURE — G0151 HHCP-SERV OF PT,EA 15 MIN: HCPCS

## 2022-07-20 ENCOUNTER — HOME CARE VISIT (OUTPATIENT)
Dept: HOME HEALTH SERVICES | Facility: HOME HEALTH | Age: 87
End: 2022-07-20
Payer: MEDICARE

## 2022-07-20 ENCOUNTER — HOME CARE VISIT (OUTPATIENT)
Dept: SCHEDULING | Facility: HOME HEALTH | Age: 87
End: 2022-07-20
Payer: MEDICARE

## 2022-07-20 VITALS
TEMPERATURE: 97.7 F | RESPIRATION RATE: 16 BRPM | OXYGEN SATURATION: 98 % | DIASTOLIC BLOOD PRESSURE: 62 MMHG | HEART RATE: 63 BPM | SYSTOLIC BLOOD PRESSURE: 106 MMHG

## 2022-07-20 PROCEDURE — G0151 HHCP-SERV OF PT,EA 15 MIN: HCPCS

## 2022-07-21 ENCOUNTER — HOME CARE VISIT (OUTPATIENT)
Dept: SCHEDULING | Facility: HOME HEALTH | Age: 87
End: 2022-07-21
Payer: MEDICARE

## 2022-07-21 PROCEDURE — G0158 HHC OT ASSISTANT EA 15: HCPCS

## 2022-07-25 ENCOUNTER — HOME CARE VISIT (OUTPATIENT)
Dept: SCHEDULING | Facility: HOME HEALTH | Age: 87
End: 2022-07-25
Payer: MEDICARE

## 2022-07-25 VITALS
BODY MASS INDEX: 24.4 KG/M2 | TEMPERATURE: 97.8 F | OXYGEN SATURATION: 95 % | DIASTOLIC BLOOD PRESSURE: 62 MMHG | SYSTOLIC BLOOD PRESSURE: 118 MMHG | WEIGHT: 155.8 LBS | RESPIRATION RATE: 18 BRPM | HEART RATE: 55 BPM

## 2022-07-25 VITALS
OXYGEN SATURATION: 96 % | RESPIRATION RATE: 16 BRPM | TEMPERATURE: 97.9 F | SYSTOLIC BLOOD PRESSURE: 100 MMHG | HEART RATE: 67 BPM | DIASTOLIC BLOOD PRESSURE: 60 MMHG

## 2022-07-25 PROCEDURE — G0151 HHCP-SERV OF PT,EA 15 MIN: HCPCS

## 2022-07-26 ENCOUNTER — HOME CARE VISIT (OUTPATIENT)
Dept: SCHEDULING | Facility: HOME HEALTH | Age: 87
End: 2022-07-26
Payer: MEDICARE

## 2022-07-26 ENCOUNTER — HOME CARE VISIT (OUTPATIENT)
Dept: HOME HEALTH SERVICES | Facility: HOME HEALTH | Age: 87
End: 2022-07-26
Payer: MEDICARE

## 2022-07-26 VITALS
SYSTOLIC BLOOD PRESSURE: 109 MMHG | TEMPERATURE: 98 F | OXYGEN SATURATION: 94 % | HEART RATE: 77 BPM | DIASTOLIC BLOOD PRESSURE: 67 MMHG

## 2022-07-26 PROCEDURE — G0152 HHCP-SERV OF OT,EA 15 MIN: HCPCS

## 2022-07-27 ENCOUNTER — HOME CARE VISIT (OUTPATIENT)
Dept: SCHEDULING | Facility: HOME HEALTH | Age: 87
End: 2022-07-27
Payer: MEDICARE

## 2022-07-27 VITALS
DIASTOLIC BLOOD PRESSURE: 62 MMHG | RESPIRATION RATE: 18 BRPM | SYSTOLIC BLOOD PRESSURE: 100 MMHG | HEART RATE: 62 BPM | OXYGEN SATURATION: 96 % | TEMPERATURE: 97.7 F

## 2022-07-27 PROCEDURE — G0151 HHCP-SERV OF PT,EA 15 MIN: HCPCS

## 2022-08-09 ENCOUNTER — TELEPHONE (OUTPATIENT)
Dept: INTERNAL MEDICINE CLINIC | Age: 87
End: 2022-08-09

## 2022-08-09 NOTE — TELEPHONE ENCOUNTER
Per 6/27/2022 refill request    Keyla Moore NP   losartan (COZAAR) 50 mg tablet 90 Tablet 0 6/27/2022     Request refused: Medication Discontinued    Sig - Route: Take 1 Tablet by mouth daily. - Oral        Per 4/12/2022 OV Keyla Moore NP  The patient presented to the ER 04/08/22 related to swelling on the left side of her body, \"from her face down to her feet. \"  The patient's family state she was diagnosed with angioedema vs dependent edema. She was recommended to discontinue losartan. She was ordered medrol dose pack to assist with swelling of face and was advised to take lasix 20 mg daily, as opposed to previous order for four times weekly. It is reported that patient is taking medications as prescribed and swelling to left side is improved. Per family, patient has had chronic lower extremity edema, but this does seem to be somewhat improved, as well.

## 2022-08-09 NOTE — TELEPHONE ENCOUNTER
[facility] [place] [facility]: ADHERENCE REVIEW  Identified care gap per [facility]: fills at [facility]: ACE/ARB, Diabetes, and Statin adherence    Last Visit: 07/21/2022    Patient identified as LIS = 1, therefore patient may be able to receive a 90-day supply for the same cost as a 30-day supply        ASSESSMENT  ACE/ARB ADHERENCE    Insurance Records claims through 07/23/2022 (2021 [place] = n/a%; YTD [facility] [place] = Filled only once; Potential Fail Date: 08/15/2022 ):   Losartan 50mg last filled on 03/21/2022 for 90 day supply. Next refill due: 06/19/2022    Per  [facility] Portal:   Losartan 50mg last filled on 03/21/2022 for 90 day supply. Per [facility]:   Losartan 50mg last picked up on 03/21/2022 for 90 day supply. 0 refills remaining. Billed through [facility]    BP Readings from Last 3 Encounters:   07/27/22 100/62   07/26/22 109/67   07/25/22 100/60     Estimated Creatinine Clearance: 32.6 mL/min (A) (by C-G formula based on SCr of 1.25 mg/dL (H)). DIABETES ADHERENCE    Insurance Records claims through 07/23/2022 ([place] = n/a%; SHAR [facility] [place] =  93%; Potential Fail Date: 12/11/2022 ):   Januvia 100mg last filled on 07/26/2022 for 90 day supply. Next refill due: 10/24/2022    Per  [facility] Portal:   Januvia 100mg last filled on 07/26/2022 for 90 day supply. Per [facility]:   Januvia 100mg last picked up on 07/26/2022 for 90 day supply. 1 refills remaining. Billed through [email or web address] Rx Value Date/Time    Hemoglobin A1c 7.4 (H) 06/17/2022 01:09 PM    Hemoglobin A1c 6.6 (H) 05/10/2022 05:55 AM    Hemoglobin A1c 8.2 (H) 08/14/2021 06:49 PM    Hemoglobin A1c, External 6.1 02/13/2016 12:00 AM     NOTE A1c <9%    [id number] [address] Records claims through 07/23/2022 (2021 [place] = n/a%; SHAR [place] =  96%; Potential Fail Date: 07/03/2022 ):   Simvastatin 40mg last filled on 07/03/2022 for 90 day supply.  Next refill due: 10/01/2022    Per  [facility] Portal:   Simvastatin 40mg last filled on 07/03/2022 for 90 day supply. Per [facility]:   Simvastatin 40mg last picked up on 07/03/2022 for 90 day supply. 1 refills remaining. Billed through [facility] Rx Value Date/Time    Cholesterol, total 124 06/17/2022 01:09 PM    HDL Cholesterol 62 06/17/2022 01:09 PM    LDL,Direct 65 11/14/2019 03:57 PM    LDL-C, External 75 02/13/2016 12:00 AM    LDL, calculated 44 06/17/2022 01:09 PM    LDL, calculated 32.6 05/10/2022 05:55 AM    VLDL, calculated 18 06/17/2022 01:09 PM    VLDL, calculated 14.4 05/10/2022 05:55 AM    Triglyceride 96 06/17/2022 01:09 PM    CHOL/HDL Ratio 1.7 05/10/2022 05:55 AM     ALT (SGPT)   Date Value Ref Range Status   06/17/2022 18 0 - 32 IU/L Final     AST (SGOT)   Date Value Ref Range Status   06/17/2022 15 0 - 40 IU/L Final     The ASCVD Risk score (Leanna Healy, et al., 2013) failed to calculate for the following reasons: The 2013 ASCVD risk score is only valid for ages 36 to 78     PLAN  The following are interventions that have been identified:  - Patient overdue refilling Losartan and active on home medication list  - Patient needs refills for Losartan    Reached patient to review. Called to determine what pharmacy patient prefers to get medications from. Stated that [address] were to come from [facility] and Simvastatin was to come from [facility]. Please reach out to MD for new Rx for losartan and [facility]. No future appointments.     Dulce Knight  Direct: [phone]  Department, toll free:[phone], Option 2     For Pharmacy Admin Tracking Only    CPA in place: No  Gap Closed?: Yes  Time Spent (min): 30

## 2022-08-20 DIAGNOSIS — M47.22 OSTEOARTHRITIS OF SPINE WITH RADICULOPATHY, CERVICAL REGION: ICD-10-CM

## 2022-08-20 DIAGNOSIS — E78.2 MIXED HYPERLIPIDEMIA: ICD-10-CM

## 2022-08-20 RX ORDER — SIMVASTATIN 40 MG/1
TABLET, FILM COATED ORAL
Qty: 90 TABLET | Refills: 0 | Status: SHIPPED | OUTPATIENT
Start: 2022-08-20 | End: 2022-09-03

## 2022-08-20 RX ORDER — BACLOFEN 10 MG/1
TABLET ORAL
Qty: 30 TABLET | Refills: 0 | Status: SHIPPED | OUTPATIENT
Start: 2022-08-20 | End: 2022-09-04

## 2022-08-22 RX ORDER — FUROSEMIDE 20 MG/1
TABLET ORAL
Qty: 90 TABLET | Refills: 0 | Status: SHIPPED | OUTPATIENT
Start: 2022-08-22 | End: 2022-10-17

## 2022-09-03 DIAGNOSIS — M47.22 OSTEOARTHRITIS OF SPINE WITH RADICULOPATHY, CERVICAL REGION: ICD-10-CM

## 2022-09-03 DIAGNOSIS — E78.2 MIXED HYPERLIPIDEMIA: ICD-10-CM

## 2022-09-03 RX ORDER — SIMVASTATIN 40 MG/1
TABLET, FILM COATED ORAL
Qty: 90 TABLET | Refills: 3 | Status: SHIPPED | OUTPATIENT
Start: 2022-09-03

## 2022-09-04 RX ORDER — BACLOFEN 10 MG/1
TABLET ORAL
Qty: 30 TABLET | Refills: 0 | Status: SHIPPED | OUTPATIENT
Start: 2022-09-04 | End: 2022-10-15

## 2022-10-15 DIAGNOSIS — B37.2 SKIN YEAST INFECTION: ICD-10-CM

## 2022-10-15 DIAGNOSIS — M47.22 OSTEOARTHRITIS OF SPINE WITH RADICULOPATHY, CERVICAL REGION: ICD-10-CM

## 2022-10-15 RX ORDER — BACLOFEN 10 MG/1
TABLET ORAL
Qty: 30 TABLET | Refills: 0 | Status: SHIPPED | OUTPATIENT
Start: 2022-10-15

## 2022-10-15 RX ORDER — NYSTATIN 100000 [USP'U]/G
POWDER TOPICAL
Qty: 120 G | Refills: 1 | Status: SHIPPED | OUTPATIENT
Start: 2022-10-15

## 2022-10-25 ENCOUNTER — DOCUMENTATION ONLY (OUTPATIENT)
Dept: INTERNAL MEDICINE CLINIC | Age: 87
End: 2022-10-25

## 2022-10-25 ENCOUNTER — NURSE TRIAGE (OUTPATIENT)
Dept: OTHER | Facility: CLINIC | Age: 87
End: 2022-10-25

## 2022-10-25 NOTE — PROGRESS NOTES
Nurse triage sent message to contact pt daughter. Pt has cough and is unable to swallow. Called daughter # 2x and left a voicemail.  Called alternate # no voicemail unable to leave message

## 2022-10-25 NOTE — TELEPHONE ENCOUNTER
Received call from Geraldine Souza at [facility], caller not on line. Complaint: Caller (patient's Daughter, Recardo Drivers) states cough, cold symptoms and difficulty swallowing.     Practice Name: Janeth Zhang telephone number verified as [phone]    Unsuccessful attempt to re-connect with caller via phone, left message for return call to office    Reason for Disposition   Message left on identified voicemail    Protocols used: No Contact or Duplicate Contact Call-ADULT-[place]

## 2022-10-26 ENCOUNTER — VIRTUAL VISIT (OUTPATIENT)
Dept: INTERNAL MEDICINE CLINIC | Age: 87
End: 2022-10-26
Payer: MEDICARE

## 2022-10-26 DIAGNOSIS — J06.9 URTI (ACUTE UPPER RESPIRATORY INFECTION): Primary | ICD-10-CM

## 2022-10-26 DIAGNOSIS — E11.21 TYPE 2 DIABETES WITH NEPHROPATHY (HCC): ICD-10-CM

## 2022-10-26 DIAGNOSIS — Z86.73 H/O: STROKE: ICD-10-CM

## 2022-10-26 DIAGNOSIS — D64.9 ANEMIA, UNSPECIFIED TYPE: ICD-10-CM

## 2022-10-26 DIAGNOSIS — I27.20 PULMONARY HYPERTENSION (HCC): ICD-10-CM

## 2022-10-26 DIAGNOSIS — I10 ESSENTIAL HYPERTENSION: ICD-10-CM

## 2022-10-26 DIAGNOSIS — N18.31 STAGE 3A CHRONIC KIDNEY DISEASE (HCC): ICD-10-CM

## 2022-10-26 DIAGNOSIS — E53.8 B12 DEFICIENCY: ICD-10-CM

## 2022-10-26 DIAGNOSIS — I50.22 CHRONIC SYSTOLIC (CONGESTIVE) HEART FAILURE (HCC): ICD-10-CM

## 2022-10-26 PROCEDURE — G9717 DOC PT DX DEP/BP F/U NT REQ: HCPCS | Performed by: INTERNAL MEDICINE

## 2022-10-26 PROCEDURE — 99214 OFFICE O/P EST MOD 30 MIN: CPT | Performed by: INTERNAL MEDICINE

## 2022-10-26 PROCEDURE — G8427 DOCREV CUR MEDS BY ELIG CLIN: HCPCS | Performed by: INTERNAL MEDICINE

## 2022-10-26 PROCEDURE — G8536 NO DOC ELDER MAL SCRN: HCPCS | Performed by: INTERNAL MEDICINE

## 2022-10-26 PROCEDURE — G8420 CALC BMI NORM PARAMETERS: HCPCS | Performed by: INTERNAL MEDICINE

## 2022-10-26 PROCEDURE — G8756 NO BP MEASURE DOC: HCPCS | Performed by: INTERNAL MEDICINE

## 2022-10-26 PROCEDURE — 1090F PRES/ABSN URINE INCON ASSESS: CPT | Performed by: INTERNAL MEDICINE

## 2022-10-26 PROCEDURE — G8399 PT W/DXA RESULTS DOCUMENT: HCPCS | Performed by: INTERNAL MEDICINE

## 2022-10-26 PROCEDURE — 1101F PT FALLS ASSESS-DOCD LE1/YR: CPT | Performed by: INTERNAL MEDICINE

## 2022-10-26 RX ORDER — DOXYCYCLINE 100 MG/1
100 TABLET ORAL 2 TIMES DAILY
Qty: 14 TABLET | Refills: 0 | Status: SHIPPED | OUTPATIENT
Start: 2022-10-26

## 2022-10-26 RX ORDER — GUAIFENESIN 600 MG/1
600 TABLET, EXTENDED RELEASE ORAL
Qty: 20 TABLET | Refills: 0 | Status: SHIPPED | OUTPATIENT
Start: 2022-10-26

## 2022-10-26 NOTE — PROGRESS NOTES
Davi David is a 82 y.o. female who was seen by synchronous (real-time) audio-video technology on 10/26/2022 for Cold, Cold Symptoms, and Cough        Assessment & Plan:   Diagnoses and all orders for this visit:    1. URTI (acute upper respiratory infection)  Rest and fluids. We will call in,    -     CBC WITH AUTOMATED DIFF  -     doxycycline (ADOXA) 100 mg tablet; Take 1 Tablet by mouth two (2) times a day. -     guaiFENesin ER (MUCINEX) 600 mg ER tablet; Take 1 Tablet by mouth two (2) times daily as needed for Congestion. 2. Essential hypertension    Stable blood pressure. On amlodipine and metoprolol. Will check,  -     CBC WITH AUTOMATED DIFF  -     METABOLIC PANEL, COMPREHENSIVE    3. Type 2 diabetes with nephropathy ([facility]  Was on Janumet, not taking it. Diet controlled. Will check,  -     METABOLIC PANEL, COMPREHENSIVE  -     HEMOGLOBIN A1C WITH EAG    4. Pulmonary hypertension ([facility]  She feels short of breath with exertion. Need to see pulmonary. 5. H/O: stroke  Doing well. On aspirin and statin. 6. Anemia, unspecified type    She is taking B12 3000 mcg a day. Advised her to reduce to 2000 mcg a day since Dr. Shu Teague want her to be on only 1000 mcg a day. She is also on iron supplement. We will recheck,  -     CBC WITH AUTOMATED DIFF  -     IRON  -     FERRITIN    7. Chronic systolic (congestive) heart failure  Compensated. On Lasix and Toprol-XL. 8. Stage 3a chronic kidney disease ([facility]  Will recheck CMP. 9. B12 deficiency  B12 level was normal last time. Taking 3000 mcg of B12. Advised her to change to 1000 mg a day. I spent at least 32 minutes on this visit with this established patient. Subjective:     Ms. Nick Nixon is here for follow-up. She is accompanied by her grandson. She is staying at home. She has been coughing and having nasal congestion and a lot of mucus production for last 4 days. Cough is progressively getting worse.   Pulse oximeter showing oxygen level 91% in room air. Sometimes she feels short of breath with exertion. He had a hospitalization several months back for acute respiratory failure. Has history of congestive heart failure. Taking Lasix. Weight seems stable. No orthopnea or shortness of breath. She is diabetic, not taking Janumet anymore. Need lab work. Had anemia, taking iron supplement. Need lab work. All labs and imaging reviewed. Prior to Admission medications    Medication Sig Start Date End Date Taking? Authorizing Provider   doxycycline (ADOXA) 100 mg tablet Take 1 Tablet by mouth two (2) times a day. 10/26/22  Yes Leslie Chan MD   guaiFENesin ER [facility]) 600 mg ER tablet Take 1 Tablet by mouth two (2) times daily as needed for Congestion. 10/26/22  Yes Leslie Chan MD   donepeziL (ARICEPT) 10 mg tablet Take 1 tablet by mouth nightly 10/17/22  Yes Smith Bhatt MD   furosemide (LASIX) 20 mg tablet Take 1 tablet by mouth once daily 10/17/22  Yes Ac Bynum NP   amLODIPine (NORVASC) 5 mg tablet TAKE 1 TABLET BY MOUTH ONCE DAILY (DISCONTINUE  2.5  MG) 10/17/22  Yes Ac Bynum NP   PARoxetine (PAXIL) 20 mg tablet TAKE 1 TABLET BY MOUTH AT  NIGHT 10/17/22  Yes Ac Bynum NP   baclofen (LIORESAL) 10 mg tablet TAKE 1 TABLET BY MOUTH TWICE DAILY AS NEEDED FOR MUSCLE SPASM 10/15/22  Yes Ida Alvarez NP   nystatin (MYCOSTATIN) powder APPLY TO AFFECTED AREA(S)  TOPICALLY 4 TIMES DAILY 10/15/22  Yes Ida Alvarez NP   simvastatin (ZOCOR) 40 mg tablet TAKE 1 TABLET BY MOUTH AT  NIGHT 9/3/22  Yes Ida Alvarez NP   metoprolol tartrate (LOPRESSOR) 100 mg IR tablet TAKE 1 TABLET BY MOUTH  TWICE DAILY 7/3/22  Yes Ida Alvarez NP   loperamide (IMODIUM) 2 mg capsule Take 2 mg by mouth as needed for Diarrhea. 2 caplet after first loose stool, 1 caplet after each subsequent loose stool but no more than 4 caplets in 24 hours.    Yes Ida Alvarez NP   ibuprofen (MOTRIN) 200 mg tablet Take 200 mg by mouth daily as needed for Pain. Yes Provider, Historical   cyanocobalamin (VITAMIN B12) 500 mcg tablet Take 3,000 mcg by mouth daily. Yes Provider, Historical   metoprolol succinate (TOPROL-XL) 100 mg tablet Take 100 mg by mouth two (2) times a day. Yes Provider, Historical   OTHER Rollator walker 6/20/22  Yes Michelle Alvarez NP   ferrous sulfate (IRON) 325 mg (65 mg iron) EC tablet TAKE 1 TABLET BY MOUTH ONCE DAILY BEFORE BREAKFAST 10/18/21  Yes Nahomy Alanis NP   traMADoL (ULTRAM) 50 mg tablet Take 50 mg by mouth every six (6) hours as needed for Pain. Yes Provider, Historical   cholestyramine (QUESTRAN) 4 gram packet Take 1 Packet by mouth daily (with dinner). 11/6/20  Yes Cecile Camacho MD   acetaminophen (TYLENOL) 650 mg TbER Take 650 mg by mouth two (2) times daily as needed for Pain. Yes Provider, Historical   famotidine (PEPCID) 20 mg tablet Take 1 Tab by mouth two (2) times a day. Patient taking differently: Take 20 mg by mouth daily. 12/4/19  Yes Cecile Camacho MD   VITAMIN D2 50,000 unit capsule TAKE 1 CAPSULE BY MOUTH ONCE A WEEK 11/1/19  Yes Provider, Historical   menthol (BIOFREEZE, MENTHOL,) 4 % gel Apply top BID 7/11/19  Yes Cecile Camacho MD   aspirin 81 mg chewable tablet Take 1 Tab by mouth daily. 8/29/18  Yes Cecile Camacho MD   cholecalciferol (VITAMIN D3) 25 mcg (1,000 unit) cap Take 2,000 Units by mouth daily. 11/28/16  Yes Cecile Camacho MD   SITagliptin-metFORMIN [facility]) 50-1,000 mg per tablet Take 1 Tablet by mouth two (2) times a day.   10/26/22  Provider, Historical   cyanocobalamin (VITAMIN B12) 1,000 mcg/mL injection Give 1ml IM daily X 7 days then 1ml IM weekly X 4 weeks then 1ml IM monthly 8/15/21 10/26/22  Will MD Rashid     Past Medical History:   Diagnosis Date    Arthritis     Chronic pain     Depression     Diabetes ([facility]     Hypercholesterolemia     Hypertension     Stroke [facility])     TIA 10 yrs back    Stroke ([facility]     2003       ROS significant for cough, nasal congestion and mild shortness of breath. Objective:     Patient-Reported Vitals 10/26/2022   Patient-Reported Weight -   Patient-Reported Height -   Patient-Reported Pulse -   Patient-Reported Temperature -   Patient-Reported SpO2 91%room air   Patient-Reported Systolic  347   Patient-Reported Diastolic 72   Patient-Reported LMP -            Constitutional: [x] Appears well-developed and well-nourished [x] No apparent distress      [] Abnormal -     Mental status: [x] Alert and awake  [x] Oriented to person/place/time [x] Able to follow commands    [] Abnormal -     Eyes:   EOM    [x]  Normal    [] Abnormal -   Sclera  [x]  Normal    [] Abnormal -          Discharge [x]  None visible   [] Abnormal -     HENT: [x] Normocephalic, atraumatic  [] Abnormal -   [x] Mouth/Throat: Mucous membranes are moist    External Ears [x] Normal  [] Abnormal -    Neck: [x] No visualized mass [] Abnormal -   Nasal congestion present. Pulmonary/Chest: [x] Respiratory effort normal   [x] No visualized signs of difficulty breathing or respiratory distress        [] Abnormal -      Musculoskeletal:   [x] Normal gait with no signs of ataxia         [x] Normal range of motion of neck        [] Abnormal -     Neurological:        [x] No Facial Asymmetry (Cranial nerve 7 motor function) (limited exam due to video visit)          [x] No gaze palsy        [] Abnormal -          Skin:        [x] No significant exanthematous lesions or discoloration noted on facial skin         [] Abnormal -            Psychiatric:       [x] Normal Affect [] Abnormal -        [x] No Hallucinations    Other pertinent observable physical exam findings:-        We discussed the expected course, resolution and complications of the diagnosis(es) in detail. Medication risks, benefits, costs, interactions, and alternatives were discussed as indicated.   I advised her to contact the office if her condition worsens, changes or fails to improve as anticipated. She expressed understanding with the diagnosis(es) and plan. [address] [place] [address], was evaluated through a synchronous (real-time) audio-video encounter. The patient (or guardian if applicable) is aware that this is a billable service, which includes applicable co-pays. This Virtual Visit was conducted with patient's (and/or legal guardian's) consent. The visit was conducted pursuant to the emergency declaration under the [address] [place] [address], [address] [place] [address] authority and the [employer] General Act. Patient identification was verified, and a caregiver was present when appropriate. The patient was located at: Home: [address] [facility] [address]  The provider was located at:  Facility (Appt Department): [address] [place] [id number]  Ky Mead MD

## 2022-11-08 RX ORDER — FERROUS SULFATE 325(65) MG
325 TABLET, DELAYED RELEASE (ENTERIC COATED) ORAL
Qty: 90 TABLET | Refills: 1 | Status: SHIPPED | OUTPATIENT
Start: 2022-11-08

## 2022-11-08 NOTE — TELEPHONE ENCOUNTER
Requested Prescriptions     Pending Prescriptions Disp Refills    ferrous sulfate (IRON) 325 mg (65 mg iron) EC tablet 90 Tablet 1     Sig: Take 1 Tablet by mouth Daily (before breakfast).    10/26/2022    02/08/2023  [facility]

## 2022-11-27 DIAGNOSIS — M47.22 OSTEOARTHRITIS OF SPINE WITH RADICULOPATHY, CERVICAL REGION: ICD-10-CM

## 2022-11-28 RX ORDER — BACLOFEN 10 MG/1
TABLET ORAL
Qty: 30 TABLET | Refills: 0 | Status: SHIPPED | OUTPATIENT
Start: 2022-11-28

## 2022-12-01 ENCOUNTER — PATIENT OUTREACH (OUTPATIENT)
Dept: CASE MANAGEMENT | Age: 87
End: 2022-12-01

## 2022-12-01 NOTE — PROGRESS NOTES
Ambulatory Care Management Note    Date/Time:  12/1/2022 4:31 PM    This patient was received as a referral from Daily assignment. Ambulatory Care Manager outreached to patient today to offer care management services. Introduction to self and role of care manager provided. Patient accepted care management services at this time. Follow up call scheduled at this time. Patient has Ambulatory Care Manager's contact number for any questions or concerns.

## 2022-12-27 DIAGNOSIS — M47.22 OSTEOARTHRITIS OF SPINE WITH RADICULOPATHY, CERVICAL REGION: ICD-10-CM

## 2022-12-27 RX ORDER — BACLOFEN 10 MG/1
TABLET ORAL
Qty: 30 TABLET | Refills: 0 | Status: SHIPPED | OUTPATIENT
Start: 2022-12-27

## 2022-12-27 NOTE — TELEPHONE ENCOUNTER
Requested Prescriptions     Pending Prescriptions Disp Refills    baclofen (LIORESAL) 10 mg tablet 30 Tablet 0     10/26/2022  02/08/2023  [facility]

## 2022-12-27 NOTE — TELEPHONE ENCOUNTER
Requested Prescriptions     Pending Prescriptions Disp Refills    baclofen (LIORESAL) 10 mg tablet 30 Tablet 0     Si tablet by mouth twice daily as needed for pain         [address], [address] [address] [place] [address]  Phone: [phone] Fax: [phone]       2022 is LAST OFFICE VISIT     Future Appointments   Date Time Provider Hardik Castro   2023  2:45 PM Joselo Ennis MD [facility] BS AMB   8/3/2023  1:40 PM Jamee Andrew MD [facility] BS AMB

## 2022-12-31 NOTE — PROGRESS NOTES
CBC stable. Iron was WDL now  Patient with CKD. And also on Lasix. Has urine output decreased? K slightly high. Repeat CMP this week if able     Random glucose is high, but patient is DM. Is not on any DM. Reduce white carb and sugar intake.

## 2023-01-09 ENCOUNTER — PATIENT OUTREACH (OUTPATIENT)
Dept: CASE MANAGEMENT | Age: 88
End: 2023-01-09

## 2023-01-09 NOTE — PROGRESS NOTES
Ambulatory Care Management Note    Date/Time:  1/9/2023 1:40 PM    Patient outreach by this ACM today. Two patient identifiers verified with the patient. Patient requests that this ACM speak to her grandson, Kale Al, today to complete screenings and assessments. This Ambulatory Care Manager (ACM) reviewed and updated the following screenings during this call; general assessment, disease specific assessment , SDOH assessments, ACP assessment and note, medication reconciliation , and health maintenance review. Patient's challenges to self-management identified:   functional cognitive ability, functional physical ability. The patient's grandson reports that the patient has 24/7 care/supervision at home that is provided by family (daughter and grandson). Grandson denies the need for additional resources/support to maintain patient in home setting at this time. Medication Management:  good adherence and good understanding  Med Rec during this call  Current Outpatient Medications   Medication Sig    CALCIUM CITRATE-VITAMIN D3 PO Take 2 Tablets by mouth two (2) times a day. baclofen (LIORESAL) 10 mg tablet 1 tablet by mouth twice daily as needed for pain    ferrous sulfate (IRON) 325 mg (65 mg iron) EC tablet Take 1 Tablet by mouth Daily (before breakfast). guaiFENesin ER (MUCINEX) 600 mg ER tablet Take 1 Tablet by mouth two (2) times daily as needed for Congestion.     donepeziL (ARICEPT) 10 mg tablet Take 1 tablet by mouth nightly    furosemide (LASIX) 20 mg tablet Take 1 tablet by mouth once daily    amLODIPine (NORVASC) 5 mg tablet TAKE 1 TABLET BY MOUTH ONCE DAILY (DISCONTINUE  2.5  MG)    PARoxetine (PAXIL) 20 mg tablet TAKE 1 TABLET BY MOUTH AT  NIGHT    nystatin (MYCOSTATIN) powder APPLY TO AFFECTED AREA(S)  TOPICALLY 4 TIMES DAILY    simvastatin (ZOCOR) 40 mg tablet TAKE 1 TABLET BY MOUTH AT  NIGHT    metoprolol tartrate (LOPRESSOR) 100 mg IR tablet TAKE 1 TABLET BY MOUTH  TWICE DAILY    loperamide (IMODIUM) 2 mg capsule Take 2 mg by mouth as needed for Diarrhea. 2 caplet after first loose stool, 1 caplet after each subsequent loose stool but no more than 4 caplets in 24 hours. ibuprofen (MOTRIN) 200 mg tablet Take 200 mg by mouth daily as needed for Pain. cyanocobalamin (VITAMIN B12) 500 mcg tablet Take 1,000 mcg by mouth daily. OTHER Rollator walker    traMADoL (ULTRAM) 50 mg tablet Take 50 mg by mouth every six (6) hours as needed for Pain. cholestyramine (QUESTRAN) 4 gram packet Take 1 Packet by mouth daily (with dinner). acetaminophen (TYLENOL) 650 mg TbER Take 650 mg by mouth two (2) times daily as needed for Pain.    famotidine (PEPCID) 20 mg tablet Take 1 Tab by mouth two (2) times a day. (Patient taking differently: Take 20 mg by mouth daily.)    VITAMIN D2 50,000 unit capsule TAKE 1 CAPSULE BY MOUTH ONCE A WEEK    menthol (BIOFREEZE, MENTHOL,) 4 % gel Apply top BID    aspirin 81 mg chewable tablet Take 1 Tab by mouth daily. cholecalciferol (VITAMIN D3) 25 mcg (1,000 unit) cap Take 2,000 Units by mouth daily. No current facility-administered medications for this visit. Medications Discontinued During This Encounter   Medication Reason    doxycycline (ADOXA) 100 mg tablet Therapy Completed    metoprolol succinate (TOPROL-XL) 100 mg tablet Not A Current Medication       Doxycycline discontinued because the patient's grandson reports that therapy has been completed. Med rec updated today. Metoprolol succinate discontinued because the patient's grandson reports that the patient is currently taking metoprolol tartrate as ordered. Med rec updated today. Advance Care Planning:   Does patient have an Advance Directive:  currently not on file; ACM will review/assess during future outreach encounter(s).      [employer], Referrals, and Durable Medical Equipment:  No needs reported and/or identified at this time; ACM will continue to assess during future outreach encounters. Health Maintenance Due   Topic Date Due    DTaP/Tdap/Td series (1 - Tdap) Never done    Shingles Vaccine (1 of 2) Never done    COVID-19 Vaccine (2 - Booster for [employer] series) 05/29/2021    Flu Vaccine (1) 08/01/2022    Medicare Yearly Exam  01/26/2023     Health Maintenance Reviewed:   DTaP/Tdap/Td series (1 - Tdap): Unknown, per grandson. Shingles Vaccine (1 of 2): Unknown, per grandson. COVID-19 Vaccine (2 - Booster for [employer] series): Per grandson, patient has not received this vaccine. Flu Vaccine (1): Per grandson, patient has not received her annual flu vaccine yet. Medicare Yearly Exam: Next scheduled appointment with PCP is 2/8/2023. Patient was asked to consider health care goals that they would like to focus on with this ACM. ACM will follow up with patient to discuss goals and establish care plan in the next 7-14 days.        PCP/Specialist follow up:   Future Appointments   Date Time Provider Hardik Castro   2/8/2023  2:45 PM Sara Monahan MD [facility] BS AMB   8/3/2023  1:40 PM David Haywood MD [facility] BS AMB

## 2023-01-09 NOTE — ACP (ADVANCE CARE PLANNING)
1/9/2023  2:20 PM    ACP document(s) currently not on file; ACM will review/assess during future outreach encounter(s).

## 2023-01-16 ENCOUNTER — OFFICE VISIT (OUTPATIENT)
Dept: URGENT CARE | Age: 88
End: 2023-01-16
Payer: MEDICARE

## 2023-01-16 VITALS
SYSTOLIC BLOOD PRESSURE: 142 MMHG | OXYGEN SATURATION: 95 % | RESPIRATION RATE: 16 BRPM | WEIGHT: 160 LBS | DIASTOLIC BLOOD PRESSURE: 69 MMHG | HEART RATE: 53 BPM | TEMPERATURE: 98.2 F | BODY MASS INDEX: 25.06 KG/M2

## 2023-01-16 DIAGNOSIS — H61.22 LEFT EAR IMPACTED CERUMEN: Primary | ICD-10-CM

## 2023-01-16 PROCEDURE — 1101F PT FALLS ASSESS-DOCD LE1/YR: CPT | Performed by: NURSE PRACTITIONER

## 2023-01-16 PROCEDURE — 3078F DIAST BP <80 MM HG: CPT | Performed by: NURSE PRACTITIONER

## 2023-01-16 PROCEDURE — G9717 DOC PT DX DEP/BP F/U NT REQ: HCPCS | Performed by: NURSE PRACTITIONER

## 2023-01-16 PROCEDURE — 99213 OFFICE O/P EST LOW 20 MIN: CPT | Performed by: NURSE PRACTITIONER

## 2023-01-16 PROCEDURE — G8417 CALC BMI ABV UP PARAM F/U: HCPCS | Performed by: NURSE PRACTITIONER

## 2023-01-16 PROCEDURE — 1090F PRES/ABSN URINE INCON ASSESS: CPT | Performed by: NURSE PRACTITIONER

## 2023-01-16 PROCEDURE — G8536 NO DOC ELDER MAL SCRN: HCPCS | Performed by: NURSE PRACTITIONER

## 2023-01-16 PROCEDURE — G8427 DOCREV CUR MEDS BY ELIG CLIN: HCPCS | Performed by: NURSE PRACTITIONER

## 2023-01-16 PROCEDURE — G8399 PT W/DXA RESULTS DOCUMENT: HCPCS | Performed by: NURSE PRACTITIONER

## 2023-01-16 PROCEDURE — 69209 REMOVE IMPACTED EAR WAX UNI: CPT | Performed by: NURSE PRACTITIONER

## 2023-01-16 PROCEDURE — 3077F SYST BP >= 140 MM HG: CPT | Performed by: NURSE PRACTITIONER

## 2023-01-16 PROCEDURE — 1123F ACP DISCUSS/DSCN MKR DOCD: CPT | Performed by: NURSE PRACTITIONER

## 2023-01-16 RX ORDER — BUDESONIDE 3 MG/1
CAPSULE, COATED PELLETS ORAL
COMMUNITY

## 2023-01-16 NOTE — PROGRESS NOTES
The history is provided by the patient. Ear Fullness  This is a recurrent problem. The current episode started more than 1 week ago (1 month). The problem occurs constantly. The problem has not changed since onset. Pertinent negatives include no headaches. Nothing aggravates the symptoms. Nothing relieves the symptoms. She has tried nothing for the symptoms. Past Medical History:   Diagnosis Date    Arthritis     Chronic pain     Depression     Diabetes ([address]     Hypercholesterolemia     Hypertension     Stroke [facility])     TIA 10 yrs back    Stroke [facility])     2003        Past Surgical History:   Procedure Laterality Date    HX CARPAL TUNNEL RELEASE  1990    HX CATARACT REMOVAL      bilateral    HX HYSTERECTOMY  1986    HX HYSTERECTOMY      HX ORTHOPAEDIC      HX ORTHOPAEDIC      carpel tunnel left    HX ORTHOPAEDIC      left foot heel spur    HX REFRACTIVE SURGERY  2006         Family History   Adopted: Yes        Social History     Socioeconomic History    Marital status:      Spouse name: Not on file    Number of children: 7    Years of education: Not on file    Highest education level: Not on file   Occupational History    Not on file   Tobacco Use    Smoking status: Never    Smokeless tobacco: Never   Vaping Use    Vaping Use: Never used   Substance and Sexual Activity    Alcohol use: No    Drug use: No    Sexual activity: Never     Birth control/protection: None     Comment: retired,relocated from [address] with daughter   Other Topics Concern    Not on file   Social History Narrative    ** Merged History Encounter **          Social Determinants of Health     Financial Resource Strain: Low Risk     Difficulty of Paying Living Expenses: Not hard at all   Food Insecurity: No Food Insecurity    Worried About [address] in the Last Year: Never true    [address] in the Last Year: Never true   Transportation Needs: No Transportation Needs    Lack of Transportation (Medical):  No Lack of Transportation (Non-Medical): No   Physical Activity: Sufficiently Active    Days of Exercise per Week: 7 days    Minutes of Exercise per Session: 50 min   Stress: Not on file   Social Connections: Moderately Isolated    Frequency of Communication with Friends and Family: More than three times a week    Frequency of Social Gatherings with Friends and Family: Twice a week    Attends [employer] Services: More than 4 times per year    Active Member of [employer] or Organizations: No    Attends Club or Organization Meetings: Never    Marital Status:    Intimate Partner Violence: Not on file   Housing Stability: Low Risk     Unable to Pay for Housing in the Last Year: No    Number of Places Lived in the Last Year: 1    Unstable Housing in the Last Year: No                ALLERGIES: Sitagliptin phos-metformin    Review of Systems   Constitutional: Negative. Negative for activity change. HENT:          Bilateral ear fullness   Eyes: Negative. Respiratory: Negative. Cardiovascular: Negative. Neurological: Negative. Negative for headaches. Vitals:    01/16/23 1522 01/16/23 1530   BP:  (!) 142/69   Pulse: (!) 53    Resp: 16    Temp: 98.2 °F (36.8 °C)    SpO2: 95%    Weight: 160 lb (72.6 kg)        Physical Exam  Constitutional:       Appearance: Normal appearance. HENT:      Right Ear: Tympanic membrane and ear canal normal.      Left Ear: There is impacted cerumen. Nose: No congestion or rhinorrhea. Eyes:      Pupils: Pupils are equal, round, and reactive to light. Cardiovascular:      Rate and Rhythm: Normal rate and regular rhythm. Heart sounds: Normal heart sounds. Pulmonary:      Effort: Pulmonary effort is normal.      Breath sounds: Normal breath sounds. Lymphadenopathy:      Cervical: No cervical adenopathy. Neurological:      Mental Status: She is alert.        [facility]     Differential Diagnosis; Clinical Impression; Plan:     (H61.22) Left ear impacted cerumen  (primary encounter diagnosis)    Patient education: recommend otc use of Debrox  Medication: none at this time  F/U: as needed for new or worsening symptoms    REMOVAL IMPACTED CERUMEN IRRIGATION/LVG UNILAT    Date/Time: 1/16/2023 3:49 PM  Performed by: Ezra Langford NP  Authorized by: Ezra Langford NP     Procedure details:     Location:  L ear    Procedure type: irrigation    Post-procedure details: Inspection:  TM intact    Hearing quality:  Normal    Patient tolerance of procedure:   Tolerated well, no immediate complications

## 2023-01-17 LAB — HBA1C MFR BLD HPLC: 7.7 %

## 2023-01-31 ENCOUNTER — PATIENT OUTREACH (OUTPATIENT)
Dept: CASE MANAGEMENT | Age: 88
End: 2023-01-31

## 2023-02-02 NOTE — PROGRESS NOTES
2/2/2023  4:23 PM    Patient outreach attempt by this ACM today to perform care management follow-up (Advance Care Planning assessment/note, summary of top problems, establish patient goals). ACM was unable to reach the patient by telephone today.

## 2023-02-05 DIAGNOSIS — M47.22 OSTEOARTHRITIS OF SPINE WITH RADICULOPATHY, CERVICAL REGION: ICD-10-CM

## 2023-02-05 RX ORDER — BACLOFEN 10 MG/1
TABLET ORAL
Qty: 30 TABLET | Refills: 0 | Status: SHIPPED | OUTPATIENT
Start: 2023-02-05

## 2023-02-20 DIAGNOSIS — F01.50 MIXED CORTICAL AND SUBCORTICAL VASCULAR DEMENTIA WITHOUT BEHAVIORAL DISTURBANCE (HCC): ICD-10-CM

## 2023-02-20 DIAGNOSIS — R41.3 SHORT-TERM MEMORY LOSS: ICD-10-CM

## 2023-02-20 RX ORDER — DONEPEZIL HYDROCHLORIDE 10 MG/1
TABLET, FILM COATED ORAL
Qty: 90 TABLET | Refills: 3 | Status: SHIPPED | OUTPATIENT
Start: 2023-02-20

## 2023-02-22 ENCOUNTER — PATIENT OUTREACH (OUTPATIENT)
Dept: CASE MANAGEMENT | Age: 88
End: 2023-02-22

## 2023-02-24 NOTE — PROGRESS NOTES
2/24/2023  4:18 PM    Patient outreach attempt by this ACM today to perform care management follow-up (Advance Care Planning assessment/note, summary of top problems, establish patient goals). [facility] was unable to reach the patient by telephone today; lvm requesting a return phone call to this ACM.

## 2023-02-26 DIAGNOSIS — M47.22 OSTEOARTHRITIS OF SPINE WITH RADICULOPATHY, CERVICAL REGION: ICD-10-CM

## 2023-02-27 RX ORDER — BACLOFEN 10 MG/1
TABLET ORAL
Qty: 30 TABLET | Refills: 0 | Status: SHIPPED | OUTPATIENT
Start: 2023-02-27

## 2023-03-08 DIAGNOSIS — F32.89 OTHER DEPRESSION: ICD-10-CM

## 2023-03-08 RX ORDER — PAROXETINE HYDROCHLORIDE 20 MG/1
TABLET, FILM COATED ORAL
Qty: 90 TABLET | Refills: 1 | Status: SHIPPED | OUTPATIENT
Start: 2023-03-08

## 2023-03-08 NOTE — TELEPHONE ENCOUNTER
Requested Prescriptions     Pending Prescriptions Disp Refills    PARoxetine (PAXIL) 20 mg tablet 90 Tablet 1         [facility] - [place] [address] [place] [address]  Phone: [phone] Fax: [phone]       10/26//2022 is LAST OFFICE VISIT     Future Appointments   Date Time Provider Hardik Castro   8/3/2023  1:40 PM Liza Garces MD [facility] BS AMB

## 2023-03-13 ENCOUNTER — PATIENT OUTREACH (OUTPATIENT)
Dept: CASE MANAGEMENT | Age: 88
End: 2023-03-13

## 2023-03-13 NOTE — PROGRESS NOTES
Ambulatory Care Management Note    Date/Time:  3/13/2023 1:24 PM    Patient Current Location:  Unknown    No return communication from the patient/caregiver has been received by this ACM in response to most recent patient outreach attempts by this ACM. Patient has graduated from the Complex Case Management  program on 3/13/2023 due to ACM inability to reach and/or successfully follow-up with the patient/caregiver to provide continued care management services. No further Ambulatory Care Manager follow up scheduled. Goals Addressed    None         Patient has Ambulatory Care Manager's contact information for any further questions, concerns, or needs.   Patients upcoming visits:    Future Appointments   Date Time Provider Hardik Castro   8/3/2023  1:40 PM MD NADEEM Clemons BS AMB

## 2023-03-23 DIAGNOSIS — I10 ESSENTIAL HYPERTENSION WITH GOAL BLOOD PRESSURE LESS THAN 130/85: ICD-10-CM

## 2023-03-23 RX ORDER — METOPROLOL TARTRATE 100 MG/1
TABLET ORAL
Qty: 180 TABLET | Refills: 3 | Status: SHIPPED | OUTPATIENT
Start: 2023-03-23

## 2023-03-23 NOTE — PROGRESS NOTES
Damien Friedman is a 82 y.o. female who was seen by synchronous (real-time) audio-video technology on 11/6/2020 for Diarrhea (3-4 x a day for past 3-4 weeks, denies abdominal pain, no bleeding) and Annual Wellness Visit        Assessment & Plan:   Diagnoses and all orders for this visit:    1. Diarrhea, unspecified type    She has been having frequent soft/loose stool at least 3-4 times a day for past several months. She had antibiotic usage 3 or 4 months back. Still has some odor. Would like to get stool for C. difficile but patient is not able to go to lab to do blood work or stool culture right now since her daughter works full-time. I will refer her to Dr. Aime Red. For the time being I would like her to take,  -     cholestyramine (QUESTRAN) 4 gram packet; Take 1 Packet by mouth daily (with dinner). Need to drink more fluid. 2. Type 2 diabetes mellitus without complication, without long-term current use of insulin ([address]  On Janumet. Need A1c. Need need lab work. 3. Essential hypertension  Stable blood pressure. On amlodipine, metoprolol and losartan. 4. Medicare annual wellness visit, subsequent    5. ACP (advance care planning)        I spent at least 25 minutes on this visit with this established patient. Subjective:   Ms. Be Reaves here for follow-up. She has been suffering from frequent loose stool for past 2 to 3 months according to her daughter. No abdominal cramp but her stool has odor. Daughter mentioned that she had antibiotics used approximately 4 months back. Mild cramping. She would like to see her GI specialist Dr. Mychal Mathew again. Has diabetes, on Janumet. Blood sugar seems stable. Need A1c. Need eye checkup. Has hypertension, compliant with medicine. Denies chest pain palpitation or shortness of breath. Here for Medicare wellness visit. Has living will. Prior to Admission medications    Medication Sig Start Date End Date Taking?  Authorizing Provider   cholestyramine Maribeth Hyman) 4 gram packet Take 1 Packet by mouth daily (with dinner). 11/6/20  Yes Babar Walters MD   meloxicam (MOBIC) 15 mg tablet TAKE 1 TABLET BY MOUTH  DAILY 9/21/20  Yes Babar Walters MD   PARoxetine (PAXIL) 20 mg tablet TAKE 1 TABLET BY MOUTH  EVERY NIGHT AT BEDTIME 9/21/20  Yes Babar Walters MD   furosemide (LASIX) 20 mg tablet TAKE 1 TABLET BY MOUTH  EVERY OTHER DAY 9/21/20  Yes Babar Walters MD   metoprolol tartrate (LOPRESSOR) 100 mg IR tablet TAKE 1 TABLET BY MOUTH  TWICE DAILY 9/21/20  Yes David Alvarez NP   acetaminophen (TYLENOL) 650 mg TbER Take 650 mg by mouth two (2) times daily as needed for Pain. Yes Provider, Historical   baclofen (LIORESAL) 10 mg tablet TAKE 1 TABLET BY MOUTH TWICE DAILY AS NEEDED FOR MUSCLE SPASM 8/31/20  Yes Babar Walters MD   simvastatin (ZOCOR) 40 mg tablet Take 1 tablet by mouth nightly 7/27/20  Yes David Alvarez NP   losartan (COZAAR) 50 mg tablet Take 1 tablet by mouth once daily 7/27/20  Yes Babar Walters MD   amLODIPine [facility]) 2.5 mg tablet Take 1 tablet by mouth once daily 7/27/20  Yes Babar Walters MD   famotidine (PEPCID) 20 mg tablet Take 1 Tab by mouth two (2) times a day. 12/4/19  Yes Babar Walters MD   VITAMIN D2 50,000 unit capsule TAKE 1 CAPSULE BY MOUTH ONCE A WEEK 11/1/19  Yes Provider, Historical   menthol (BIOFREEZE, MENTHOL,) 4 % gel Apply top BID 7/11/19  Yes Babar Walters MD   TENS unit and electrodes cmpk Use in lower back every day. DX;chronic lower back pain 3/7/19  Yes Babar Walters MD   aspirin 81 mg chewable tablet Take 1 Tab by mouth daily. 8/29/18  Yes Babar Walters MD   JANUMET 50-1,000 mg per tablet Take 1 tablet by mouth two  times daily with meals 8/28/17  Yes Babar Walters MD   cholecalciferol (VITAMIN D3) 1,000 unit cap Take 2,000 Units by mouth daily.  11/28/16  Yes Babar Walters MD     Past Medical History:   Diagnosis Date    Arthritis     Chronic pain     Depression     [facility]     Hypercholesterolemia     Hypertension  Stroke [facility])     TIA 10 yrs back    Stroke ([address]     2003       ROS significant for diarrhea. Objective:     Patient-Reported Vitals 8/11/2020   Patient-Reported Height 5f7      Constitutional: [x] Appears well-developed and well-nourished [x] No apparent distress      [] Abnormal -     Mental status: [x] Alert and awake  [x] Oriented to person/place/time [x] Able to follow commands    [] Abnormal -   HENT: [x] Normocephalic, atraumatic  [] Abnormal -   [x] Mouth/Throat: Mucous membranes are moist    External Ears [x] Normal  [] Abnormal -    Neck: [x] No visualized mass [] Abnormal -     Pulmonary/Chest: [x] Respiratory effort normal   [x] No visualized signs of difficulty breathing or respiratory distress        [] Abnormal -      Musculoskeletal:   [x] Normal gait with no signs of ataxia         [x] Normal range of motion of neck        [] Abnormal -     Neurological:        [x] No Facial Asymmetry (Cranial nerve 7 motor function) (limited exam due to video visit)          [x] No gaze palsy        [] Abnormal -          Skin:        [x] No significant exanthematous lesions or discoloration noted on facial skin         [] Abnormal -            Psychiatric:       [x] Normal Affect [] Abnormal -        [x] No Hallucinations    Other pertinent observable physical exam findings:-        We discussed the expected course, resolution and complications of the diagnosis(es) in detail. Medication risks, benefits, costs, interactions, and alternatives were discussed as indicated. I advised her to contact the office if her condition worsens, changes or fails to improve as anticipated. She expressed understanding with the diagnosis(es) and plan. [facility], who was evaluated through a patient-initiated, synchronous (real-time) audio-video encounter, and/or her healthcare decision maker, is aware that it is a billable service, with coverage as determined by her insurance carrier.  She provided verbal consent to proceed: Yes, and patient identification was verified. It was conducted pursuant to the emergency declaration under the [address] [place] [address] and the [employer] and [employer]. A caregiver was present when appropriate. Ability to conduct physical exam was limited. I was in the office. The patient was at home.       Hollis Suarez MD Detail Level: Zone

## 2023-03-25 DIAGNOSIS — M47.22 OSTEOARTHRITIS OF SPINE WITH RADICULOPATHY, CERVICAL REGION: ICD-10-CM

## 2023-03-25 RX ORDER — BACLOFEN 10 MG/1
TABLET ORAL
Qty: 30 TABLET | Refills: 0 | Status: SHIPPED | OUTPATIENT
Start: 2023-03-25

## 2023-05-06 DIAGNOSIS — D64.9 ANEMIA, UNSPECIFIED TYPE: Primary | ICD-10-CM

## 2023-05-08 RX ORDER — LANOLIN ALCOHOL/MO/W.PET/CERES
CREAM (GRAM) TOPICAL
Qty: 90 TABLET | Refills: 0 | OUTPATIENT
Start: 2023-05-08

## 2023-05-11 DIAGNOSIS — D64.9 ANEMIA, UNSPECIFIED TYPE: Primary | ICD-10-CM

## 2023-05-11 RX ORDER — LANOLIN ALCOHOL/MO/W.PET/CERES
CREAM (GRAM) TOPICAL
Qty: 90 TABLET | Refills: 0 | OUTPATIENT
Start: 2023-05-11

## 2023-05-19 DIAGNOSIS — E78.2 MIXED HYPERLIPIDEMIA: Primary | ICD-10-CM

## 2023-05-22 RX ORDER — SIMVASTATIN 40 MG
TABLET ORAL
Qty: 90 TABLET | Refills: 1 | OUTPATIENT
Start: 2023-05-22

## 2023-05-24 DIAGNOSIS — D64.9 ANEMIA, UNSPECIFIED TYPE: Primary | ICD-10-CM

## 2023-05-24 DIAGNOSIS — M47.22 OTHER SPONDYLOSIS WITH RADICULOPATHY, CERVICAL REGION: ICD-10-CM

## 2023-05-25 RX ORDER — BACLOFEN 10 MG/1
TABLET ORAL
Qty: 30 TABLET | Refills: 0 | OUTPATIENT
Start: 2023-05-25

## 2023-05-25 RX ORDER — LANOLIN ALCOHOL/MO/W.PET/CERES
CREAM (GRAM) TOPICAL
Qty: 90 TABLET | Refills: 0 | OUTPATIENT
Start: 2023-05-25

## 2023-05-26 ENCOUNTER — TELEPHONE (OUTPATIENT)
Age: 88
End: 2023-05-26

## 2023-05-26 DIAGNOSIS — I10 ESSENTIAL HYPERTENSION: Primary | ICD-10-CM

## 2023-05-26 DIAGNOSIS — D64.9 ANEMIA, UNSPECIFIED TYPE: ICD-10-CM

## 2023-05-26 DIAGNOSIS — I50.22 CHRONIC SYSTOLIC (CONGESTIVE) HEART FAILURE (HCC): ICD-10-CM

## 2023-05-26 DIAGNOSIS — M47.22 OTHER SPONDYLOSIS WITH RADICULOPATHY, CERVICAL REGION: ICD-10-CM

## 2023-05-26 RX ORDER — FUROSEMIDE 20 MG/1
20 TABLET ORAL DAILY
Qty: 60 TABLET | Refills: 0 | Status: SHIPPED | OUTPATIENT
Start: 2023-05-26

## 2023-05-26 RX ORDER — LANOLIN ALCOHOL/MO/W.PET/CERES
325 CREAM (GRAM) TOPICAL
Qty: 90 TABLET | Refills: 1 | Status: SHIPPED | OUTPATIENT
Start: 2023-05-26

## 2023-05-26 RX ORDER — AMLODIPINE BESYLATE 5 MG/1
TABLET ORAL
Qty: 30 TABLET | Refills: 0 | Status: SHIPPED | OUTPATIENT
Start: 2023-05-26

## 2023-05-26 RX ORDER — BACLOFEN 10 MG/1
10 TABLET ORAL 2 TIMES DAILY
Qty: 60 TABLET | Refills: 2 | Status: SHIPPED | OUTPATIENT
Start: 2023-05-26

## 2023-05-26 NOTE — TELEPHONE ENCOUNTER
----- Message from Layne Parada sent at 5/26/2023 11:43 AM EDT -----  Subject: Refill Request    QUESTIONS  Name of Medication? baclofen (LIORESAL) 10 MG tablet  Patient-reported dosage and instructions? 10 mg  How many days do you have left? 0  Preferred Pharmacy? [facility]  Pharmacy phone number (if available)? [phone]  ---------------------------------------------------------------------------  --------------,  Name of Medication? ferrous sulfate (FE TABS 325) 325 (65 Fe) MG EC tablet  Patient-reported dosage and instructions? 325mg tab  How many days do you have left? 0  Preferred Pharmacy? [facility]  Pharmacy phone number (if available)? [phone]  ---------------------------------------------------------------------------  --------------  [facility] INFO  What is the best way for the office to contact you? Do not leave any   message, patient will call back for answer  Preferred Call Back Phone Number? [phone]  ---------------------------------------------------------------------------  --------------  SCRIPT ANSWERS  Relationship to Patient? Other/Third Party  Representative Name? Rashmi Bar  Is the representative on the Communication Release of Information (GENEVIEVE)   form in Epic?  Yes

## 2023-05-26 NOTE — TELEPHONE ENCOUNTER
furosemide (LASIX) 20 MG tablet     Last OV: 10/26/2022  No upcoming     Called back and left voicemail to inform patients daughter that she is due for a 6 month follow up.      [facility] [address] [place] [address] [phone] - F [phone]

## 2023-06-02 DIAGNOSIS — I10 ESSENTIAL HYPERTENSION: ICD-10-CM

## 2023-06-02 RX ORDER — AMLODIPINE BESYLATE 5 MG/1
TABLET ORAL
Qty: 90 TABLET | Refills: 0 | Status: SHIPPED | OUTPATIENT
Start: 2023-06-02

## 2023-06-12 ENCOUNTER — HOSPITAL ENCOUNTER (INPATIENT)
Facility: HOSPITAL | Age: 88
LOS: 4 days | Discharge: HOME HEALTH | DRG: 177 | End: 2023-06-16
Attending: STUDENT IN AN ORGANIZED HEALTH CARE EDUCATION/TRAINING PROGRAM | Admitting: STUDENT IN AN ORGANIZED HEALTH CARE EDUCATION/TRAINING PROGRAM
Payer: MEDICARE

## 2023-06-12 ENCOUNTER — APPOINTMENT (OUTPATIENT)
Facility: HOSPITAL | Age: 88
DRG: 177 | End: 2023-06-12
Payer: MEDICARE

## 2023-06-12 DIAGNOSIS — I50.22 CHRONIC SYSTOLIC (CONGESTIVE) HEART FAILURE (HCC): ICD-10-CM

## 2023-06-12 DIAGNOSIS — R09.02 HYPOXIA: Primary | ICD-10-CM

## 2023-06-12 PROBLEM — J96.90 RESPIRATORY FAILURE (HCC): Status: ACTIVE | Noted: 2023-06-12

## 2023-06-12 LAB
ALBUMIN SERPL-MCNC: 3.8 G/DL (ref 3.5–5)
ALBUMIN/GLOB SERPL: 0.9 (ref 1.1–2.2)
ALP SERPL-CCNC: 96 U/L (ref 45–117)
ALT SERPL-CCNC: 36 U/L (ref 12–78)
ANION GAP SERPL CALC-SCNC: 3 MMOL/L (ref 5–15)
AST SERPL-CCNC: 24 U/L (ref 15–37)
BASOPHILS # BLD: 0.1 K/UL (ref 0–0.1)
BASOPHILS NFR BLD: 1 % (ref 0–1)
BILIRUB SERPL-MCNC: 0.3 MG/DL (ref 0.2–1)
BUN SERPL-MCNC: 43 MG/DL (ref 6–20)
BUN/CREAT SERPL: 28 (ref 12–20)
CALCIUM SERPL-MCNC: 10 MG/DL (ref 8.5–10.1)
CHLORIDE SERPL-SCNC: 101 MMOL/L (ref 97–108)
CO2 SERPL-SCNC: 32 MMOL/L (ref 21–32)
COMMENT:: NORMAL
CREAT SERPL-MCNC: 1.53 MG/DL (ref 0.55–1.02)
DIFFERENTIAL METHOD BLD: ABNORMAL
EOSINOPHIL # BLD: 0.4 K/UL (ref 0–0.4)
EOSINOPHIL NFR BLD: 5 % (ref 0–7)
ERYTHROCYTE [DISTWIDTH] IN BLOOD BY AUTOMATED COUNT: 15.4 % (ref 11.5–14.5)
GLOBULIN SER CALC-MCNC: 4.1 G/DL (ref 2–4)
GLUCOSE SERPL-MCNC: 178 MG/DL (ref 65–100)
HCT VFR BLD AUTO: 40.2 % (ref 35–47)
HGB BLD-MCNC: 12.4 G/DL (ref 11.5–16)
IMM GRANULOCYTES # BLD AUTO: 0 K/UL (ref 0–0.04)
IMM GRANULOCYTES NFR BLD AUTO: 0 % (ref 0–0.5)
LYMPHOCYTES # BLD: 2.1 K/UL (ref 0.8–3.5)
LYMPHOCYTES NFR BLD: 33 % (ref 12–49)
MCH RBC QN AUTO: 29 PG (ref 26–34)
MCHC RBC AUTO-ENTMCNC: 30.8 G/DL (ref 30–36.5)
MCV RBC AUTO: 94.1 FL (ref 80–99)
MONOCYTES # BLD: 0.8 K/UL (ref 0–1)
MONOCYTES NFR BLD: 12 % (ref 5–13)
NEUTS SEG # BLD: 3.1 K/UL (ref 1.8–8)
NEUTS SEG NFR BLD: 49 % (ref 32–75)
NRBC # BLD: 0 K/UL (ref 0–0.01)
NRBC BLD-RTO: 0 PER 100 WBC
NT PRO BNP: 1542 PG/ML
PLATELET # BLD AUTO: 181 K/UL (ref 150–400)
PMV BLD AUTO: 11.3 FL (ref 8.9–12.9)
POTASSIUM SERPL-SCNC: 4.6 MMOL/L (ref 3.5–5.1)
PROT SERPL-MCNC: 7.9 G/DL (ref 6.4–8.2)
RBC # BLD AUTO: 4.27 M/UL (ref 3.8–5.2)
SODIUM SERPL-SCNC: 136 MMOL/L (ref 136–145)
SPECIMEN HOLD: NORMAL
TROPONIN I SERPL HS-MCNC: 5 NG/L (ref 0–37)
WBC # BLD AUTO: 6.5 K/UL (ref 3.6–11)

## 2023-06-12 PROCEDURE — 93005 ELECTROCARDIOGRAM TRACING: CPT | Performed by: STUDENT IN AN ORGANIZED HEALTH CARE EDUCATION/TRAINING PROGRAM

## 2023-06-12 PROCEDURE — 71250 CT THORAX DX C-: CPT

## 2023-06-12 PROCEDURE — 96374 THER/PROPH/DIAG INJ IV PUSH: CPT

## 2023-06-12 PROCEDURE — 2060000000 HC ICU INTERMEDIATE R&B

## 2023-06-12 PROCEDURE — 71045 X-RAY EXAM CHEST 1 VIEW: CPT

## 2023-06-12 PROCEDURE — 84484 ASSAY OF TROPONIN QUANT: CPT

## 2023-06-12 PROCEDURE — 99285 EMERGENCY DEPT VISIT HI MDM: CPT

## 2023-06-12 PROCEDURE — 85025 COMPLETE CBC W/AUTO DIFF WBC: CPT

## 2023-06-12 PROCEDURE — 83880 ASSAY OF NATRIURETIC PEPTIDE: CPT

## 2023-06-12 PROCEDURE — 1100000000 HC RM PRIVATE

## 2023-06-12 PROCEDURE — 80053 COMPREHEN METABOLIC PANEL: CPT

## 2023-06-12 PROCEDURE — 6360000002 HC RX W HCPCS: Performed by: STUDENT IN AN ORGANIZED HEALTH CARE EDUCATION/TRAINING PROGRAM

## 2023-06-12 PROCEDURE — 36415 COLL VENOUS BLD VENIPUNCTURE: CPT

## 2023-06-12 RX ORDER — POLYETHYLENE GLYCOL 3350 17 G/17G
17 POWDER, FOR SOLUTION ORAL DAILY PRN
Status: DISCONTINUED | OUTPATIENT
Start: 2023-06-12 | End: 2023-06-16 | Stop reason: HOSPADM

## 2023-06-12 RX ORDER — FUROSEMIDE 10 MG/ML
20 INJECTION INTRAMUSCULAR; INTRAVENOUS ONCE
Status: COMPLETED | OUTPATIENT
Start: 2023-06-12 | End: 2023-06-12

## 2023-06-12 RX ORDER — ONDANSETRON 2 MG/ML
4 INJECTION INTRAMUSCULAR; INTRAVENOUS EVERY 6 HOURS PRN
Status: DISCONTINUED | OUTPATIENT
Start: 2023-06-12 | End: 2023-06-16 | Stop reason: HOSPADM

## 2023-06-12 RX ORDER — ACETAMINOPHEN 325 MG/1
650 TABLET ORAL EVERY 6 HOURS PRN
Status: DISCONTINUED | OUTPATIENT
Start: 2023-06-12 | End: 2023-06-16 | Stop reason: HOSPADM

## 2023-06-12 RX ORDER — ENOXAPARIN SODIUM 100 MG/ML
30 INJECTION SUBCUTANEOUS DAILY
Status: DISCONTINUED | OUTPATIENT
Start: 2023-06-13 | End: 2023-06-13

## 2023-06-12 RX ORDER — ONDANSETRON 4 MG/1
4 TABLET, ORALLY DISINTEGRATING ORAL EVERY 8 HOURS PRN
Status: DISCONTINUED | OUTPATIENT
Start: 2023-06-12 | End: 2023-06-16 | Stop reason: HOSPADM

## 2023-06-12 RX ORDER — ACETAMINOPHEN 650 MG/1
650 SUPPOSITORY RECTAL EVERY 6 HOURS PRN
Status: DISCONTINUED | OUTPATIENT
Start: 2023-06-12 | End: 2023-06-16 | Stop reason: HOSPADM

## 2023-06-12 RX ORDER — SODIUM CHLORIDE 0.9 % (FLUSH) 0.9 %
5-40 SYRINGE (ML) INJECTION PRN
Status: DISCONTINUED | OUTPATIENT
Start: 2023-06-12 | End: 2023-06-16 | Stop reason: HOSPADM

## 2023-06-12 RX ORDER — SODIUM CHLORIDE 0.9 % (FLUSH) 0.9 %
5-40 SYRINGE (ML) INJECTION EVERY 12 HOURS SCHEDULED
Status: DISCONTINUED | OUTPATIENT
Start: 2023-06-12 | End: 2023-06-16 | Stop reason: HOSPADM

## 2023-06-12 RX ORDER — SODIUM CHLORIDE 9 MG/ML
INJECTION, SOLUTION INTRAVENOUS PRN
Status: DISCONTINUED | OUTPATIENT
Start: 2023-06-12 | End: 2023-06-16 | Stop reason: HOSPADM

## 2023-06-12 RX ADMIN — FUROSEMIDE 20 MG: 10 INJECTION, SOLUTION INTRAMUSCULAR; INTRAVENOUS at 22:49

## 2023-06-12 ASSESSMENT — ENCOUNTER SYMPTOMS
DIARRHEA: 0
ABDOMINAL PAIN: 0
RHINORRHEA: 0
COUGH: 0
VOMITING: 0
EYE REDNESS: 0
NAUSEA: 0
SHORTNESS OF BREATH: 0
EYE DISCHARGE: 0

## 2023-06-12 ASSESSMENT — LIFESTYLE VARIABLES
HOW MANY STANDARD DRINKS CONTAINING ALCOHOL DO YOU HAVE ON A TYPICAL DAY: PATIENT DOES NOT DRINK
HOW OFTEN DO YOU HAVE A DRINK CONTAINING ALCOHOL: NEVER

## 2023-06-12 ASSESSMENT — PAIN - FUNCTIONAL ASSESSMENT: PAIN_FUNCTIONAL_ASSESSMENT: 0-10

## 2023-06-12 ASSESSMENT — PAIN SCALES - GENERAL: PAINLEVEL_OUTOF10: 0

## 2023-06-12 NOTE — ED TRIAGE NOTES
Pt arrives with family from [facility] for low O2 and UTI. Pt BG have been running in 300s as well, hx DM. 85% in triage, placed on 3L. Pt does not typically wear O2.

## 2023-06-13 ENCOUNTER — APPOINTMENT (OUTPATIENT)
Facility: HOSPITAL | Age: 88
DRG: 177 | End: 2023-06-13
Payer: MEDICARE

## 2023-06-13 LAB
ANION GAP SERPL CALC-SCNC: 5 MMOL/L (ref 5–15)
APPEARANCE UR: CLEAR
B PERT DNA SPEC QL NAA+PROBE: NOT DETECTED
BACTERIA URNS QL MICRO: ABNORMAL /HPF
BASOPHILS # BLD: 0 K/UL (ref 0–0.1)
BASOPHILS NFR BLD: 1 % (ref 0–1)
BILIRUB UR QL: NEGATIVE
BORDETELLA PARAPERTUSSIS BY PCR: NOT DETECTED
BUN SERPL-MCNC: 38 MG/DL (ref 6–20)
BUN/CREAT SERPL: 27 (ref 12–20)
C PNEUM DNA SPEC QL NAA+PROBE: NOT DETECTED
CALCIUM SERPL-MCNC: 9.9 MG/DL (ref 8.5–10.1)
CHLORIDE SERPL-SCNC: 100 MMOL/L (ref 97–108)
CO2 SERPL-SCNC: 32 MMOL/L (ref 21–32)
COLOR UR: ABNORMAL
CREAT SERPL-MCNC: 1.39 MG/DL (ref 0.55–1.02)
DIFFERENTIAL METHOD BLD: ABNORMAL
EKG ATRIAL RATE: 63 BPM
EKG DIAGNOSIS: NORMAL
EKG P AXIS: 80 DEGREES
EKG P-R INTERVAL: 204 MS
EKG Q-T INTERVAL: 424 MS
EKG QRS DURATION: 122 MS
EKG QTC CALCULATION (BAZETT): 433 MS
EKG R AXIS: -48 DEGREES
EKG T AXIS: 83 DEGREES
EKG VENTRICULAR RATE: 63 BPM
EOSINOPHIL # BLD: 0.3 K/UL (ref 0–0.4)
EOSINOPHIL NFR BLD: 4 % (ref 0–7)
EPITH CASTS URNS QL MICRO: ABNORMAL /LPF
ERYTHROCYTE [DISTWIDTH] IN BLOOD BY AUTOMATED COUNT: 15 % (ref 11.5–14.5)
FLUAV SUBTYP SPEC NAA+PROBE: NOT DETECTED
FLUBV RNA SPEC QL NAA+PROBE: NOT DETECTED
GLUCOSE BLD STRIP.AUTO-MCNC: 157 MG/DL (ref 65–117)
GLUCOSE BLD STRIP.AUTO-MCNC: 172 MG/DL (ref 65–117)
GLUCOSE BLD STRIP.AUTO-MCNC: 206 MG/DL (ref 65–117)
GLUCOSE BLD STRIP.AUTO-MCNC: 207 MG/DL (ref 65–117)
GLUCOSE SERPL-MCNC: 200 MG/DL (ref 65–100)
GLUCOSE UR STRIP.AUTO-MCNC: NEGATIVE MG/DL
HADV DNA SPEC QL NAA+PROBE: NOT DETECTED
HCOV 229E RNA SPEC QL NAA+PROBE: NOT DETECTED
HCOV HKU1 RNA SPEC QL NAA+PROBE: NOT DETECTED
HCOV NL63 RNA SPEC QL NAA+PROBE: NOT DETECTED
HCOV OC43 RNA SPEC QL NAA+PROBE: NOT DETECTED
HCT VFR BLD AUTO: 38.7 % (ref 35–47)
HGB BLD-MCNC: 12.3 G/DL (ref 11.5–16)
HGB UR QL STRIP: NEGATIVE
HMPV RNA SPEC QL NAA+PROBE: NOT DETECTED
HPIV1 RNA SPEC QL NAA+PROBE: NOT DETECTED
HPIV2 RNA SPEC QL NAA+PROBE: NOT DETECTED
HPIV3 RNA SPEC QL NAA+PROBE: NOT DETECTED
HPIV4 RNA SPEC QL NAA+PROBE: NOT DETECTED
HYALINE CASTS URNS QL MICRO: ABNORMAL /LPF (ref 0–5)
IMM GRANULOCYTES # BLD AUTO: 0 K/UL (ref 0–0.04)
IMM GRANULOCYTES NFR BLD AUTO: 0 % (ref 0–0.5)
KETONES UR QL STRIP.AUTO: NEGATIVE MG/DL
LEUKOCYTE ESTERASE UR QL STRIP.AUTO: ABNORMAL
LYMPHOCYTES # BLD: 2.2 K/UL (ref 0.8–3.5)
LYMPHOCYTES NFR BLD: 26 % (ref 12–49)
M PNEUMO DNA SPEC QL NAA+PROBE: NOT DETECTED
MCH RBC QN AUTO: 29.3 PG (ref 26–34)
MCHC RBC AUTO-ENTMCNC: 31.8 G/DL (ref 30–36.5)
MCV RBC AUTO: 92.1 FL (ref 80–99)
MONOCYTES # BLD: 1 K/UL (ref 0–1)
MONOCYTES NFR BLD: 12 % (ref 5–13)
NEUTS SEG # BLD: 4.8 K/UL (ref 1.8–8)
NEUTS SEG NFR BLD: 57 % (ref 32–75)
NITRITE UR QL STRIP.AUTO: NEGATIVE
NRBC # BLD: 0 K/UL (ref 0–0.01)
NRBC BLD-RTO: 0 PER 100 WBC
PH UR STRIP: 7.5 (ref 5–8)
PLATELET # BLD AUTO: 172 K/UL (ref 150–400)
PMV BLD AUTO: 11.4 FL (ref 8.9–12.9)
POTASSIUM SERPL-SCNC: 3.9 MMOL/L (ref 3.5–5.1)
PROT UR STRIP-MCNC: NEGATIVE MG/DL
RBC # BLD AUTO: 4.2 M/UL (ref 3.8–5.2)
RBC #/AREA URNS HPF: ABNORMAL /HPF (ref 0–5)
RSV RNA SPEC QL NAA+PROBE: NOT DETECTED
RV+EV RNA SPEC QL NAA+PROBE: NOT DETECTED
SARS-COV-2 RNA RESP QL NAA+PROBE: NOT DETECTED
SERVICE CMNT-IMP: ABNORMAL
SODIUM SERPL-SCNC: 137 MMOL/L (ref 136–145)
SP GR UR REFRACTOMETRY: 1.01 (ref 1–1.03)
URINE CULTURE IF INDICATED: ABNORMAL
UROBILINOGEN UR QL STRIP.AUTO: 0.2 EU/DL (ref 0.2–1)
WBC # BLD AUTO: 8.3 K/UL (ref 3.6–11)
WBC URNS QL MICRO: ABNORMAL /HPF (ref 0–4)

## 2023-06-13 PROCEDURE — 6370000000 HC RX 637 (ALT 250 FOR IP): Performed by: STUDENT IN AN ORGANIZED HEALTH CARE EDUCATION/TRAINING PROGRAM

## 2023-06-13 PROCEDURE — 2580000003 HC RX 258: Performed by: STUDENT IN AN ORGANIZED HEALTH CARE EDUCATION/TRAINING PROGRAM

## 2023-06-13 PROCEDURE — 2500000003 HC RX 250 WO HCPCS: Performed by: HOSPITALIST

## 2023-06-13 PROCEDURE — 97116 GAIT TRAINING THERAPY: CPT

## 2023-06-13 PROCEDURE — A9540 TC99M MAA: HCPCS | Performed by: STUDENT IN AN ORGANIZED HEALTH CARE EDUCATION/TRAINING PROGRAM

## 2023-06-13 PROCEDURE — 80048 BASIC METABOLIC PNL TOTAL CA: CPT

## 2023-06-13 PROCEDURE — 6360000002 HC RX W HCPCS: Performed by: HOSPITALIST

## 2023-06-13 PROCEDURE — 81001 URINALYSIS AUTO W/SCOPE: CPT

## 2023-06-13 PROCEDURE — 97535 SELF CARE MNGMENT TRAINING: CPT

## 2023-06-13 PROCEDURE — 85025 COMPLETE CBC W/AUTO DIFF WBC: CPT

## 2023-06-13 PROCEDURE — 78580 LUNG PERFUSION IMAGING: CPT

## 2023-06-13 PROCEDURE — 2060000000 HC ICU INTERMEDIATE R&B

## 2023-06-13 PROCEDURE — 83036 HEMOGLOBIN GLYCOSYLATED A1C: CPT

## 2023-06-13 PROCEDURE — 0202U NFCT DS 22 TRGT SARS-COV-2: CPT

## 2023-06-13 PROCEDURE — 82962 GLUCOSE BLOOD TEST: CPT

## 2023-06-13 PROCEDURE — 6360000002 HC RX W HCPCS: Performed by: STUDENT IN AN ORGANIZED HEALTH CARE EDUCATION/TRAINING PROGRAM

## 2023-06-13 PROCEDURE — 97165 OT EVAL LOW COMPLEX 30 MIN: CPT

## 2023-06-13 PROCEDURE — 97161 PT EVAL LOW COMPLEX 20 MIN: CPT

## 2023-06-13 PROCEDURE — 36415 COLL VENOUS BLD VENIPUNCTURE: CPT

## 2023-06-13 PROCEDURE — 2580000003 HC RX 258: Performed by: HOSPITALIST

## 2023-06-13 PROCEDURE — 3430000000 HC RX DIAGNOSTIC RADIOPHARMACEUTICAL: Performed by: STUDENT IN AN ORGANIZED HEALTH CARE EDUCATION/TRAINING PROGRAM

## 2023-06-13 RX ORDER — FERROUS SULFATE 325(65) MG
325 TABLET ORAL
Status: DISCONTINUED | OUTPATIENT
Start: 2023-06-13 | End: 2023-06-16 | Stop reason: HOSPADM

## 2023-06-13 RX ORDER — AMLODIPINE BESYLATE 5 MG/1
5 TABLET ORAL DAILY
Status: DISCONTINUED | OUTPATIENT
Start: 2023-06-13 | End: 2023-06-16 | Stop reason: HOSPADM

## 2023-06-13 RX ORDER — ATORVASTATIN CALCIUM 40 MG/1
40 TABLET, FILM COATED ORAL DAILY
Status: DISCONTINUED | OUTPATIENT
Start: 2023-06-13 | End: 2023-06-16 | Stop reason: HOSPADM

## 2023-06-13 RX ORDER — TRAMADOL HYDROCHLORIDE 50 MG/1
50 TABLET ORAL EVERY 6 HOURS PRN
Status: DISCONTINUED | OUTPATIENT
Start: 2023-06-13 | End: 2023-06-16 | Stop reason: HOSPADM

## 2023-06-13 RX ORDER — INSULIN LISPRO 100 [IU]/ML
0-4 INJECTION, SOLUTION INTRAVENOUS; SUBCUTANEOUS NIGHTLY
Status: DISCONTINUED | OUTPATIENT
Start: 2023-06-13 | End: 2023-06-16 | Stop reason: HOSPADM

## 2023-06-13 RX ORDER — ASPIRIN 81 MG/1
81 TABLET, CHEWABLE ORAL DAILY
Status: DISCONTINUED | OUTPATIENT
Start: 2023-06-13 | End: 2023-06-16 | Stop reason: HOSPADM

## 2023-06-13 RX ORDER — ENOXAPARIN SODIUM 100 MG/ML
40 INJECTION SUBCUTANEOUS DAILY
Status: DISCONTINUED | OUTPATIENT
Start: 2023-06-14 | End: 2023-06-15

## 2023-06-13 RX ORDER — BACLOFEN 10 MG/1
10 TABLET ORAL 2 TIMES DAILY
Status: DISCONTINUED | OUTPATIENT
Start: 2023-06-13 | End: 2023-06-16 | Stop reason: HOSPADM

## 2023-06-13 RX ORDER — DONEPEZIL HYDROCHLORIDE 10 MG/1
10 TABLET, FILM COATED ORAL NIGHTLY
Status: DISCONTINUED | OUTPATIENT
Start: 2023-06-13 | End: 2023-06-16 | Stop reason: HOSPADM

## 2023-06-13 RX ORDER — FUROSEMIDE 10 MG/ML
40 INJECTION INTRAMUSCULAR; INTRAVENOUS 2 TIMES DAILY
Status: DISCONTINUED | OUTPATIENT
Start: 2023-06-13 | End: 2023-06-14

## 2023-06-13 RX ORDER — INSULIN LISPRO 100 [IU]/ML
0-8 INJECTION, SOLUTION INTRAVENOUS; SUBCUTANEOUS
Status: DISCONTINUED | OUTPATIENT
Start: 2023-06-13 | End: 2023-06-16 | Stop reason: HOSPADM

## 2023-06-13 RX ORDER — FUROSEMIDE 10 MG/ML
20 INJECTION INTRAMUSCULAR; INTRAVENOUS ONCE
Status: DISCONTINUED | OUTPATIENT
Start: 2023-06-13 | End: 2023-06-13 | Stop reason: ALTCHOICE

## 2023-06-13 RX ORDER — DEXTROSE MONOHYDRATE 100 MG/ML
INJECTION, SOLUTION INTRAVENOUS CONTINUOUS PRN
Status: DISCONTINUED | OUTPATIENT
Start: 2023-06-13 | End: 2023-06-16 | Stop reason: HOSPADM

## 2023-06-13 RX ORDER — PAROXETINE HYDROCHLORIDE 20 MG/1
20 TABLET, FILM COATED ORAL NIGHTLY
Status: DISCONTINUED | OUTPATIENT
Start: 2023-06-13 | End: 2023-06-16 | Stop reason: HOSPADM

## 2023-06-13 RX ORDER — FAMOTIDINE 20 MG/1
20 TABLET, FILM COATED ORAL DAILY
Status: DISCONTINUED | OUTPATIENT
Start: 2023-06-14 | End: 2023-06-16 | Stop reason: HOSPADM

## 2023-06-13 RX ORDER — METOPROLOL TARTRATE 50 MG/1
100 TABLET, FILM COATED ORAL 2 TIMES DAILY
Status: DISCONTINUED | OUTPATIENT
Start: 2023-06-13 | End: 2023-06-16 | Stop reason: HOSPADM

## 2023-06-13 RX ORDER — INSULIN GLARGINE 100 [IU]/ML
10 INJECTION, SOLUTION SUBCUTANEOUS DAILY
Status: DISCONTINUED | OUTPATIENT
Start: 2023-06-13 | End: 2023-06-14

## 2023-06-13 RX ORDER — FAMOTIDINE 20 MG/1
20 TABLET, FILM COATED ORAL 2 TIMES DAILY
Status: DISCONTINUED | OUTPATIENT
Start: 2023-06-13 | End: 2023-06-13

## 2023-06-13 RX ADMIN — KIT FOR THE PREPARATION OF TECHNETIUM TC 99M ALBUMIN AGGREGATED 4.3 MILLICURIE: 2.5 INJECTION, POWDER, FOR SOLUTION INTRAVENOUS at 13:00

## 2023-06-13 RX ADMIN — METOPROLOL TARTRATE 100 MG: 50 TABLET, FILM COATED ORAL at 09:18

## 2023-06-13 RX ADMIN — BACLOFEN 10 MG: 10 TABLET ORAL at 22:53

## 2023-06-13 RX ADMIN — DOXYCYCLINE 100 MG: 100 INJECTION, POWDER, LYOPHILIZED, FOR SOLUTION INTRAVENOUS at 10:58

## 2023-06-13 RX ADMIN — METOPROLOL TARTRATE 100 MG: 50 TABLET, FILM COATED ORAL at 22:36

## 2023-06-13 RX ADMIN — WATER 1000 MG: 1 INJECTION INTRAMUSCULAR; INTRAVENOUS; SUBCUTANEOUS at 10:58

## 2023-06-13 RX ADMIN — DONEPEZIL HYDROCHLORIDE 10 MG: 10 TABLET ORAL at 22:36

## 2023-06-13 RX ADMIN — AMLODIPINE BESYLATE 5 MG: 5 TABLET ORAL at 09:18

## 2023-06-13 RX ADMIN — FUROSEMIDE 40 MG: 10 INJECTION, SOLUTION INTRAMUSCULAR; INTRAVENOUS at 17:59

## 2023-06-13 RX ADMIN — PAROXETINE HYDROCHLORIDE 20 MG: 20 TABLET, FILM COATED ORAL at 22:36

## 2023-06-13 RX ADMIN — SODIUM CHLORIDE, PRESERVATIVE FREE 10 ML: 5 INJECTION INTRAVENOUS at 22:37

## 2023-06-13 RX ADMIN — FERROUS SULFATE TAB 325 MG (65 MG ELEMENTAL FE) 325 MG: 325 (65 FE) TAB at 09:18

## 2023-06-13 RX ADMIN — BACLOFEN 10 MG: 10 TABLET ORAL at 09:18

## 2023-06-13 RX ADMIN — ATORVASTATIN CALCIUM 40 MG: 40 TABLET, FILM COATED ORAL at 09:18

## 2023-06-13 RX ADMIN — Medication 2 UNITS: at 17:59

## 2023-06-13 RX ADMIN — INSULIN GLARGINE 10 UNITS: 100 INJECTION, SOLUTION SUBCUTANEOUS at 09:18

## 2023-06-13 RX ADMIN — DOXYCYCLINE 100 MG: 100 INJECTION, POWDER, LYOPHILIZED, FOR SOLUTION INTRAVENOUS at 22:36

## 2023-06-13 RX ADMIN — ASPIRIN 81 MG CHEWABLE TABLET 81 MG: 81 TABLET CHEWABLE at 09:18

## 2023-06-13 RX ADMIN — FUROSEMIDE 40 MG: 10 INJECTION, SOLUTION INTRAMUSCULAR; INTRAVENOUS at 09:19

## 2023-06-13 RX ADMIN — FAMOTIDINE 20 MG: 20 TABLET, FILM COATED ORAL at 09:18

## 2023-06-13 RX ADMIN — SODIUM CHLORIDE, PRESERVATIVE FREE 10 ML: 5 INJECTION INTRAVENOUS at 09:19

## 2023-06-13 RX ADMIN — ENOXAPARIN SODIUM 30 MG: 100 INJECTION SUBCUTANEOUS at 09:18

## 2023-06-13 ASSESSMENT — PAIN SCALES - GENERAL: PAINLEVEL_OUTOF10: 0

## 2023-06-13 NOTE — ED PROVIDER NOTES
Date    CARPAL TUNNEL RELEASE  1990    CATARACT REMOVAL      bilateral    HYSTERECTOMY (CERVIX STATUS UNKNOWN)  1986    HYSTERECTOMY (CERVIX STATUS UNKNOWN)      ORTHOPEDIC SURGERY      ORTHOPEDIC SURGERY      carpel tunnel left    ORTHOPEDIC SURGERY      left foot heel spur    REFRACTIVE SURGERY  2006         CURRENT MEDICATIONS       Previous Medications    ACETAMINOPHEN (TYLENOL) 650 MG EXTENDED RELEASE TABLET    Take 650 mg by mouth 2 times daily as needed    AMLODIPINE (NORVASC) 5 MG TABLET    TAKE 1 TABLET BY MOUTH ONCE DAILY (DISCONTINUE  2.5  MG)    ASPIRIN 81 MG CHEWABLE TABLET    Take 81 mg by mouth daily    BACLOFEN (LIORESAL) 10 MG TABLET    Take 1 tablet by mouth 2 times daily    BUDESONIDE (ENTOCORT EC) 3 MG EXTENDED RELEASE CAPSULE    ceived the following from [facility]: Outside name: budesonide (ENTOCORT EC) 3 mg capsule    CALCIUM CITRATE-VITAMIN D3 PO    Take 2 tablets by mouth 2 times daily    CHOLESTYRAMINE (QUESTRAN) 4 G PACKET    Take 4 g by mouth Daily with supper    COBALAMIN COMBINATIONS (VITAMIN B12-FOLIC ACID) 652-101 MCG TABS    ceived the following from [facility]: Outside name: vitamin b12-folic acid 3.0-3 mg tab    CYANOCOBALAMIN 500 MCG TABLET    Take 1,000 mcg by mouth daily    DONEPEZIL (ARICEPT) 10 MG TABLET    Take 1 tablet by mouth nightly    ERGOCALCIFEROL (ERGOCALCIFEROL) 1.25 MG (03316 UT) CAPSULE    Take 1 capsule by mouth once a week    FAMOTIDINE (PEPCID) 20 MG TABLET    Take 20 mg by mouth 2 times daily    FERROUS SULFATE (FE TABS 325) 325 (65 FE) MG EC TABLET    Take 1 tablet by mouth every morning (before breakfast)    FUROSEMIDE (LASIX) 20 MG TABLET    Take 1 tablet by mouth daily    GUAIFENESIN (MUCINEX) 600 MG EXTENDED RELEASE TABLET    Take 600 mg by mouth 2 times daily as needed    IBUPROFEN (ADVIL;MOTRIN) 200 MG TABLET    Take 200 mg by mouth daily as needed    LOPERAMIDE (IMODIUM) 2 MG CAPSULE    Take 2 mg by mouth as needed

## 2023-06-13 NOTE — CARE COORDINATION
Care Management Initial Assessment       RUR: 12% Low  Readmission? No  1st IM letter given? Yes - 06/13  1st  letter given: No, N/A    CM met with pt to introduce self and CM role in DC and RIK planning and scheduling. Pt reported having rw that she uses for all ambulation. Pt also has toilet seat, shower chair, cane, and wc. No hx of o2 at home. Pt had SNF rehab stay at [facility] after d/c from hospital. Pt expressed wish to go to a different SNF if that is recommended. Pt reported no hx of HH. Auth will be needed for SNF or HH. Pt resides in a one story home with a ramp to/from front door, with her daughter and grandson. CM to follow for RIK and DC needs. 06/13/23 0913   Service Assessment   Patient Orientation Alert and Oriented   Cognition Alert   History Provided By Patient   Primary Caregiver Family   Accompanied By/Relationship None   Support Systems Children;Family Members   Patient's Healthcare Decision Maker is: Legal Next of Kin  (Uriel Luis daughter)   PCP Verified by CM Yes   Last Visit to PCP Within last 3 months   Prior Functional Level Assistance with the following:;Bathing;Cooking;Housework; Shopping;Mobility   Current Functional Level Assistance with the following:;Bathing;Dressing; Toileting;Cooking;Housework; Shopping;Mobility   Can patient return to prior living arrangement Yes   Ability to make needs known: Good   Family able to assist with home care needs: Yes   Would you like for me to discuss the discharge plan with any other family members/significant others, and if so, who?  Yes  (Uriel Luis daughter [phone])   Financial Resources Medicare   Community Resources None     TEE Seo

## 2023-06-13 NOTE — H&P
History & Physical    Primary Care Provider: FEDERICO Triana NP  Source of Information: Patient and acn review    History of Presenting Illness:   Ginna Mejía is a 82 y.o. female with hx of pulm htn, htn, dm ii, hx of cva, anemia who presents to hospital with family with complaints of hypoxia and hyperglycemia. Patient is seen and examined at bedside. She states she feels fine and has no acute complaints. Family was at bedside state over the last week, she has had increasing weakness and some dyspnea. Family took her to urgent care where she was noted to be hypoxic with sats in the [de-identified]. Of note, she was admitted at [facility] and discharged to rehab. Family notes she had intermittent hypoxia at her rehab facility but was discharged home without supplemental oxygen. The patient denies any fever, chills, chest or abdominal pain, nausea, vomiting, cough, congestion, recent illness, palpitations, or dysuria. Remarkable vitals on ER Presentation: bp to 172/82  Labs Remarkable for: cr 1.53, bnp 1542  ER Images: cxr: no acute process  ER Rx: lasix 20mg     Review of Systems:  Pertinent items are noted in the History of Present Illness. Past Medical History:   Diagnosis Date    Arthritis     Chronic pain     Depression     Diabetes ([facility]     Hypercholesterolemia     Hypertension     Stroke [facility])     TIA 10 yrs back    Stroke [facility])     2003      Past Surgical History:   Procedure Laterality Date    CARPAL TUNNEL RELEASE  1990    CATARACT REMOVAL      bilateral    HYSTERECTOMY (CERVIX STATUS UNKNOWN)  1986    HYSTERECTOMY (CERVIX STATUS UNKNOWN)      ORTHOPEDIC SURGERY      ORTHOPEDIC SURGERY      carpel tunnel left    ORTHOPEDIC SURGERY      left foot heel spur    REFRACTIVE SURGERY  2006     Prior to Admission medications    Medication Sig Start Date End Date Taking?  Authorizing Provider   amLODIPine (NORVASC) 5 MG tablet TAKE 1 TABLET BY MOUTH ONCE DAILY (DISCONTINUE  2.5  MG)

## 2023-06-14 ENCOUNTER — APPOINTMENT (OUTPATIENT)
Facility: HOSPITAL | Age: 88
DRG: 177 | End: 2023-06-14
Attending: STUDENT IN AN ORGANIZED HEALTH CARE EDUCATION/TRAINING PROGRAM
Payer: MEDICARE

## 2023-06-14 LAB
ALBUMIN SERPL-MCNC: 3.4 G/DL (ref 3.5–5)
ALBUMIN/GLOB SERPL: 0.8 (ref 1.1–2.2)
ALP SERPL-CCNC: 51 U/L (ref 45–117)
ALT SERPL-CCNC: 34 U/L (ref 12–78)
ANION GAP SERPL CALC-SCNC: 7 MMOL/L (ref 5–15)
AST SERPL-CCNC: 27 U/L (ref 15–37)
BILIRUB SERPL-MCNC: 0.4 MG/DL (ref 0.2–1)
BUN SERPL-MCNC: 44 MG/DL (ref 6–20)
BUN/CREAT SERPL: 28 (ref 12–20)
CALCIUM SERPL-MCNC: 9.4 MG/DL (ref 8.5–10.1)
CHLORIDE SERPL-SCNC: 99 MMOL/L (ref 97–108)
CO2 SERPL-SCNC: 32 MMOL/L (ref 21–32)
CREAT SERPL-MCNC: 1.59 MG/DL (ref 0.55–1.02)
ECHO AO ROOT DIAM: 2.7 CM
ECHO AO ROOT INDEX: 1.44 CM/M2
ECHO AV AREA PEAK VELOCITY: 1.2 CM2
ECHO AV AREA VTI: 1.3 CM2
ECHO AV AREA/BSA PEAK VELOCITY: 0.6 CM2/M2
ECHO AV AREA/BSA VTI: 0.7 CM2/M2
ECHO AV MEAN GRADIENT: 10 MMHG
ECHO AV MEAN VELOCITY: 1.5 M/S
ECHO AV PEAK GRADIENT: 20 MMHG
ECHO AV PEAK VELOCITY: 2.2 M/S
ECHO AV VELOCITY RATIO: 0.41
ECHO AV VTI: 42.1 CM
ECHO BSA: 1.9 M2
ECHO EST RA PRESSURE: 3 MMHG
ECHO LA DIAMETER INDEX: 1.54 CM/M2
ECHO LA DIAMETER: 2.9 CM
ECHO LA TO AORTIC ROOT RATIO: 1.07
ECHO LA VOL 2C: 42 ML (ref 22–52)
ECHO LA VOL 2C: 45 ML (ref 22–52)
ECHO LA VOL 4C: 41 ML (ref 22–52)
ECHO LA VOL 4C: 46 ML (ref 22–52)
ECHO LA VOLUME AREA LENGTH: 46 ML
ECHO LA VOLUME INDEX AREA LENGTH: 24 ML/M2 (ref 16–34)
ECHO LV E' LATERAL VELOCITY: 10 CM/S
ECHO LV E' SEPTAL VELOCITY: 6 CM/S
ECHO LV FRACTIONAL SHORTENING: 36 % (ref 28–44)
ECHO LV INTERNAL DIMENSION DIASTOLE INDEX: 2.34 CM/M2
ECHO LV INTERNAL DIMENSION DIASTOLIC: 4.4 CM (ref 3.9–5.3)
ECHO LV INTERNAL DIMENSION SYSTOLIC INDEX: 1.49 CM/M2
ECHO LV INTERNAL DIMENSION SYSTOLIC: 2.8 CM
ECHO LV IVSD: 1.2 CM (ref 0.6–0.9)
ECHO LV MASS 2D: 168.9 G (ref 67–162)
ECHO LV MASS INDEX 2D: 89.9 G/M2 (ref 43–95)
ECHO LV POSTERIOR WALL DIASTOLIC: 1 CM (ref 0.6–0.9)
ECHO LV RELATIVE WALL THICKNESS RATIO: 0.45
ECHO LVOT AREA: 2.8 CM2
ECHO LVOT AV VTI INDEX: 0.45
ECHO LVOT DIAM: 1.9 CM
ECHO LVOT MEAN GRADIENT: 2 MMHG
ECHO LVOT PEAK GRADIENT: 3 MMHG
ECHO LVOT PEAK VELOCITY: 0.9 M/S
ECHO LVOT STROKE VOLUME INDEX: 28.8 ML/M2
ECHO LVOT SV: 54.1 ML
ECHO LVOT VTI: 19.1 CM
ECHO MV A VELOCITY: 0.95 M/S
ECHO MV AREA PHT: 2.5 CM2
ECHO MV AREA VTI: 1.7 CM2
ECHO MV E DECELERATION TIME (DT): 302.9 MS
ECHO MV E VELOCITY: 0.81 M/S
ECHO MV E/A RATIO: 0.85
ECHO MV E/E' LATERAL: 8.1
ECHO MV E/E' RATIO (AVERAGED): 10.8
ECHO MV E/E' SEPTAL: 13.5
ECHO MV LVOT VTI INDEX: 1.69
ECHO MV MAX VELOCITY: 1.1 M/S
ECHO MV MEAN GRADIENT: 2 MMHG
ECHO MV MEAN VELOCITY: 0.7 M/S
ECHO MV PEAK GRADIENT: 5 MMHG
ECHO MV PRESSURE HALF TIME (PHT): 87.9 MS
ECHO MV VTI: 32.3 CM
ECHO RIGHT VENTRICULAR SYSTOLIC PRESSURE (RVSP): 40 MMHG
ECHO RV INTERNAL DIMENSION: 3.2 CM
ECHO RV TAPSE: 1.5 CM (ref 1.7–?)
ECHO TV REGURGITANT MAX VELOCITY: 3.03 M/S
ECHO TV REGURGITANT PEAK GRADIENT: 37 MMHG
ERYTHROCYTE [DISTWIDTH] IN BLOOD BY AUTOMATED COUNT: 15 % (ref 11.5–14.5)
EST. AVERAGE GLUCOSE BLD GHB EST-MCNC: 209 MG/DL
GLOBULIN SER CALC-MCNC: 4.1 G/DL (ref 2–4)
GLUCOSE BLD STRIP.AUTO-MCNC: 132 MG/DL (ref 65–117)
GLUCOSE BLD STRIP.AUTO-MCNC: 195 MG/DL (ref 65–117)
GLUCOSE BLD STRIP.AUTO-MCNC: 217 MG/DL (ref 65–117)
GLUCOSE BLD STRIP.AUTO-MCNC: 239 MG/DL (ref 65–117)
GLUCOSE SERPL-MCNC: 120 MG/DL (ref 65–100)
HBA1C MFR BLD: 8.9 % (ref 4–5.6)
HCT VFR BLD AUTO: 38.6 % (ref 35–47)
HGB BLD-MCNC: 12.1 G/DL (ref 11.5–16)
MCH RBC QN AUTO: 29.2 PG (ref 26–34)
MCHC RBC AUTO-ENTMCNC: 31.3 G/DL (ref 30–36.5)
MCV RBC AUTO: 93.2 FL (ref 80–99)
NRBC # BLD: 0 K/UL (ref 0–0.01)
NRBC BLD-RTO: 0 PER 100 WBC
NT PRO BNP: 958 PG/ML
PLATELET # BLD AUTO: 170 K/UL (ref 150–400)
PMV BLD AUTO: 11.1 FL (ref 8.9–12.9)
POTASSIUM SERPL-SCNC: 3.8 MMOL/L (ref 3.5–5.1)
PROCALCITONIN SERPL-MCNC: <0.05 NG/ML
PROT SERPL-MCNC: 7.5 G/DL (ref 6.4–8.2)
RBC # BLD AUTO: 4.14 M/UL (ref 3.8–5.2)
SERVICE CMNT-IMP: ABNORMAL
SODIUM SERPL-SCNC: 138 MMOL/L (ref 136–145)
WBC # BLD AUTO: 7.2 K/UL (ref 3.6–11)

## 2023-06-14 PROCEDURE — 97535 SELF CARE MNGMENT TRAINING: CPT

## 2023-06-14 PROCEDURE — 2700000000 HC OXYGEN THERAPY PER DAY

## 2023-06-14 PROCEDURE — 82962 GLUCOSE BLOOD TEST: CPT

## 2023-06-14 PROCEDURE — 6360000002 HC RX W HCPCS: Performed by: HOSPITALIST

## 2023-06-14 PROCEDURE — 84145 PROCALCITONIN (PCT): CPT

## 2023-06-14 PROCEDURE — 6370000000 HC RX 637 (ALT 250 FOR IP): Performed by: STUDENT IN AN ORGANIZED HEALTH CARE EDUCATION/TRAINING PROGRAM

## 2023-06-14 PROCEDURE — 85027 COMPLETE CBC AUTOMATED: CPT

## 2023-06-14 PROCEDURE — 2060000000 HC ICU INTERMEDIATE R&B

## 2023-06-14 PROCEDURE — 80053 COMPREHEN METABOLIC PANEL: CPT

## 2023-06-14 PROCEDURE — 2580000003 HC RX 258: Performed by: STUDENT IN AN ORGANIZED HEALTH CARE EDUCATION/TRAINING PROGRAM

## 2023-06-14 PROCEDURE — 97530 THERAPEUTIC ACTIVITIES: CPT

## 2023-06-14 PROCEDURE — 93306 TTE W/DOPPLER COMPLETE: CPT

## 2023-06-14 PROCEDURE — 2500000003 HC RX 250 WO HCPCS: Performed by: HOSPITALIST

## 2023-06-14 PROCEDURE — 6370000000 HC RX 637 (ALT 250 FOR IP): Performed by: HOSPITALIST

## 2023-06-14 PROCEDURE — 83880 ASSAY OF NATRIURETIC PEPTIDE: CPT

## 2023-06-14 PROCEDURE — 2580000003 HC RX 258: Performed by: HOSPITALIST

## 2023-06-14 RX ORDER — DOXYCYCLINE HYCLATE 100 MG
100 TABLET ORAL EVERY 12 HOURS SCHEDULED
Status: DISCONTINUED | OUTPATIENT
Start: 2023-06-14 | End: 2023-06-16 | Stop reason: HOSPADM

## 2023-06-14 RX ORDER — FUROSEMIDE 10 MG/ML
40 INJECTION INTRAMUSCULAR; INTRAVENOUS DAILY
Status: DISCONTINUED | OUTPATIENT
Start: 2023-06-14 | End: 2023-06-16 | Stop reason: HOSPADM

## 2023-06-14 RX ORDER — INSULIN GLARGINE 100 [IU]/ML
12 INJECTION, SOLUTION SUBCUTANEOUS DAILY
Status: DISCONTINUED | OUTPATIENT
Start: 2023-06-15 | End: 2023-06-16 | Stop reason: HOSPADM

## 2023-06-14 RX ADMIN — ATORVASTATIN CALCIUM 40 MG: 40 TABLET, FILM COATED ORAL at 10:03

## 2023-06-14 RX ADMIN — ASPIRIN 81 MG CHEWABLE TABLET 81 MG: 81 TABLET CHEWABLE at 10:04

## 2023-06-14 RX ADMIN — SODIUM CHLORIDE, PRESERVATIVE FREE 10 ML: 5 INJECTION INTRAVENOUS at 20:56

## 2023-06-14 RX ADMIN — BACLOFEN 10 MG: 10 TABLET ORAL at 20:54

## 2023-06-14 RX ADMIN — DOXYCYCLINE 100 MG: 100 INJECTION, POWDER, LYOPHILIZED, FOR SOLUTION INTRAVENOUS at 10:04

## 2023-06-14 RX ADMIN — WATER 1000 MG: 1 INJECTION INTRAMUSCULAR; INTRAVENOUS; SUBCUTANEOUS at 10:02

## 2023-06-14 RX ADMIN — SODIUM CHLORIDE, PRESERVATIVE FREE 10 ML: 5 INJECTION INTRAVENOUS at 10:04

## 2023-06-14 RX ADMIN — INSULIN GLARGINE 10 UNITS: 100 INJECTION, SOLUTION SUBCUTANEOUS at 10:03

## 2023-06-14 RX ADMIN — ENOXAPARIN SODIUM 40 MG: 100 INJECTION SUBCUTANEOUS at 10:03

## 2023-06-14 RX ADMIN — AMLODIPINE BESYLATE 5 MG: 5 TABLET ORAL at 10:03

## 2023-06-14 RX ADMIN — Medication 2 UNITS: at 13:11

## 2023-06-14 RX ADMIN — DONEPEZIL HYDROCHLORIDE 10 MG: 10 TABLET ORAL at 20:54

## 2023-06-14 RX ADMIN — FAMOTIDINE 20 MG: 20 TABLET, FILM COATED ORAL at 10:03

## 2023-06-14 RX ADMIN — BACLOFEN 10 MG: 10 TABLET ORAL at 10:09

## 2023-06-14 RX ADMIN — METOPROLOL TARTRATE 100 MG: 50 TABLET, FILM COATED ORAL at 10:03

## 2023-06-14 RX ADMIN — PAROXETINE HYDROCHLORIDE 20 MG: 20 TABLET, FILM COATED ORAL at 20:54

## 2023-06-14 RX ADMIN — FUROSEMIDE 40 MG: 10 INJECTION, SOLUTION INTRAMUSCULAR; INTRAVENOUS at 10:03

## 2023-06-14 RX ADMIN — FERROUS SULFATE TAB 325 MG (65 MG ELEMENTAL FE) 325 MG: 325 (65 FE) TAB at 07:52

## 2023-06-14 ASSESSMENT — PAIN SCALES - GENERAL
PAINLEVEL_OUTOF10: 0

## 2023-06-14 NOTE — CARE COORDINATION
Transition of Care Plan:    RUR: 14% Moderate   Prior Level of Functioning: Assistance with the following:;Bathing;Cooking;Housework; Shopping;Mobility  Disposition: Home with Gilesrt: Date FOC offered: 06/13  Date [address] received: 06/13  Accepting facility: TBD  Follow up appointments: TBD  DME needed: TBD  Transportation at discharge: Daughter Sophia Cartagena   IM/IMM Medicare/ letter given: 06/13   Caregiver Contact: Sophia Cartagena [phone]  Discharge Caregiver contacted prior to discharge? Yes, will transport pt after work, 6:00 pm  Care Conference needed? No   Barriers to discharge:  Medical readiness    Daughter called for updates, medical and DRAGAN. CM provided DARGAN and HH referral update and perfect served dr asking attending to call daughter with medical update. Attending will call daughter. CM called HORACIO VALENTIN [phone] to follow up on [facility] referral. Intake gone for day, message left for a call back tomorrow. CM sent referrals for [facility] OT/PT to HORACIO VALENTIN, Gerardo Purvis and [address] [place] [address]/[facility]. OT/PT notes attached to referrals. All are OON, except [facility] pending. Referral sent in [facility],  to follow up by phone. Sophia Cartagena called CM to discuss RIK and DC details. Daughter will transport pt home at dc if pt medically able to ride in car. Pt has hx of . Daughter requested new [facility] provider . Referral sent through [facility] to [facility] and [facility]. [facility] declined due to being OON. CM will sent additional referrals and update EMR and family/pt and follow for RIK needs.      TEE Ku

## 2023-06-15 ENCOUNTER — HOME HEALTH ADMISSION (OUTPATIENT)
Dept: HOME HEALTH SERVICES | Facility: HOME HEALTH | Age: 88
End: 2023-06-15
Payer: MEDICARE

## 2023-06-15 LAB
ALBUMIN SERPL-MCNC: 3.1 G/DL (ref 3.5–5)
ALBUMIN/GLOB SERPL: 0.9 (ref 1.1–2.2)
ALP SERPL-CCNC: 66 U/L (ref 45–117)
ALT SERPL-CCNC: 33 U/L (ref 12–78)
ANION GAP SERPL CALC-SCNC: 4 MMOL/L (ref 5–15)
AST SERPL-CCNC: 24 U/L (ref 15–37)
BILIRUB SERPL-MCNC: 0.3 MG/DL (ref 0.2–1)
BUN SERPL-MCNC: 49 MG/DL (ref 6–20)
BUN/CREAT SERPL: 29 (ref 12–20)
CALCIUM SERPL-MCNC: 8.9 MG/DL (ref 8.5–10.1)
CHLORIDE SERPL-SCNC: 106 MMOL/L (ref 97–108)
CO2 SERPL-SCNC: 35 MMOL/L (ref 21–32)
CREAT SERPL-MCNC: 1.71 MG/DL (ref 0.55–1.02)
ERYTHROCYTE [DISTWIDTH] IN BLOOD BY AUTOMATED COUNT: 15.3 % (ref 11.5–14.5)
GLOBULIN SER CALC-MCNC: 3.6 G/DL (ref 2–4)
GLUCOSE BLD STRIP.AUTO-MCNC: 190 MG/DL (ref 65–117)
GLUCOSE BLD STRIP.AUTO-MCNC: 208 MG/DL (ref 65–117)
GLUCOSE BLD STRIP.AUTO-MCNC: 216 MG/DL (ref 65–117)
GLUCOSE BLD STRIP.AUTO-MCNC: 226 MG/DL (ref 65–117)
GLUCOSE SERPL-MCNC: 314 MG/DL (ref 65–100)
HCT VFR BLD AUTO: 37.8 % (ref 35–47)
HGB BLD-MCNC: 11.8 G/DL (ref 11.5–16)
MCH RBC QN AUTO: 29 PG (ref 26–34)
MCHC RBC AUTO-ENTMCNC: 31.2 G/DL (ref 30–36.5)
MCV RBC AUTO: 92.9 FL (ref 80–99)
NRBC # BLD: 0 K/UL (ref 0–0.01)
NRBC BLD-RTO: 0 PER 100 WBC
NT PRO BNP: 532 PG/ML
PLATELET # BLD AUTO: 161 K/UL (ref 150–400)
PMV BLD AUTO: 10.9 FL (ref 8.9–12.9)
POTASSIUM SERPL-SCNC: 4.1 MMOL/L (ref 3.5–5.1)
PROT SERPL-MCNC: 6.7 G/DL (ref 6.4–8.2)
RBC # BLD AUTO: 4.07 M/UL (ref 3.8–5.2)
SERVICE CMNT-IMP: ABNORMAL
SODIUM SERPL-SCNC: 145 MMOL/L (ref 136–145)
WBC # BLD AUTO: 7 K/UL (ref 3.6–11)

## 2023-06-15 PROCEDURE — 85027 COMPLETE CBC AUTOMATED: CPT

## 2023-06-15 PROCEDURE — 82962 GLUCOSE BLOOD TEST: CPT

## 2023-06-15 PROCEDURE — 36415 COLL VENOUS BLD VENIPUNCTURE: CPT

## 2023-06-15 PROCEDURE — 6370000000 HC RX 637 (ALT 250 FOR IP): Performed by: STUDENT IN AN ORGANIZED HEALTH CARE EDUCATION/TRAINING PROGRAM

## 2023-06-15 PROCEDURE — 6370000000 HC RX 637 (ALT 250 FOR IP): Performed by: HOSPITALIST

## 2023-06-15 PROCEDURE — 97530 THERAPEUTIC ACTIVITIES: CPT

## 2023-06-15 PROCEDURE — 80053 COMPREHEN METABOLIC PANEL: CPT

## 2023-06-15 PROCEDURE — 2700000000 HC OXYGEN THERAPY PER DAY

## 2023-06-15 PROCEDURE — 2580000003 HC RX 258: Performed by: HOSPITALIST

## 2023-06-15 PROCEDURE — 97535 SELF CARE MNGMENT TRAINING: CPT

## 2023-06-15 PROCEDURE — 2060000000 HC ICU INTERMEDIATE R&B

## 2023-06-15 PROCEDURE — 6360000002 HC RX W HCPCS: Performed by: HOSPITALIST

## 2023-06-15 PROCEDURE — 83880 ASSAY OF NATRIURETIC PEPTIDE: CPT

## 2023-06-15 PROCEDURE — 2580000003 HC RX 258: Performed by: STUDENT IN AN ORGANIZED HEALTH CARE EDUCATION/TRAINING PROGRAM

## 2023-06-15 PROCEDURE — 97116 GAIT TRAINING THERAPY: CPT

## 2023-06-15 RX ORDER — CALCIUM CARBONATE 500 MG/1
500 TABLET, CHEWABLE ORAL 3 TIMES DAILY PRN
Status: DISCONTINUED | OUTPATIENT
Start: 2023-06-15 | End: 2023-06-16 | Stop reason: HOSPADM

## 2023-06-15 RX ORDER — ENOXAPARIN SODIUM 100 MG/ML
30 INJECTION SUBCUTANEOUS DAILY
Status: DISCONTINUED | OUTPATIENT
Start: 2023-06-16 | End: 2023-06-16 | Stop reason: HOSPADM

## 2023-06-15 RX ADMIN — SODIUM CHLORIDE, PRESERVATIVE FREE 10 ML: 5 INJECTION INTRAVENOUS at 09:00

## 2023-06-15 RX ADMIN — Medication 2 UNITS: at 13:25

## 2023-06-15 RX ADMIN — WATER 1000 MG: 1 INJECTION INTRAMUSCULAR; INTRAVENOUS; SUBCUTANEOUS at 09:00

## 2023-06-15 RX ADMIN — FERROUS SULFATE TAB 325 MG (65 MG ELEMENTAL FE) 325 MG: 325 (65 FE) TAB at 06:40

## 2023-06-15 RX ADMIN — AMLODIPINE BESYLATE 5 MG: 5 TABLET ORAL at 09:00

## 2023-06-15 RX ADMIN — DONEPEZIL HYDROCHLORIDE 10 MG: 10 TABLET ORAL at 21:57

## 2023-06-15 RX ADMIN — INSULIN GLARGINE 12 UNITS: 100 INJECTION, SOLUTION SUBCUTANEOUS at 09:08

## 2023-06-15 RX ADMIN — DOXYCYCLINE HYCLATE 100 MG: 100 TABLET, COATED ORAL at 09:00

## 2023-06-15 RX ADMIN — DOXYCYCLINE HYCLATE 100 MG: 100 TABLET, COATED ORAL at 00:24

## 2023-06-15 RX ADMIN — FAMOTIDINE 20 MG: 20 TABLET, FILM COATED ORAL at 09:00

## 2023-06-15 RX ADMIN — METOPROLOL TARTRATE 100 MG: 50 TABLET, FILM COATED ORAL at 21:57

## 2023-06-15 RX ADMIN — BACLOFEN 10 MG: 10 TABLET ORAL at 09:00

## 2023-06-15 RX ADMIN — Medication 2 UNITS: at 18:20

## 2023-06-15 RX ADMIN — ENOXAPARIN SODIUM 40 MG: 100 INJECTION SUBCUTANEOUS at 08:59

## 2023-06-15 RX ADMIN — SODIUM CHLORIDE, PRESERVATIVE FREE 10 ML: 5 INJECTION INTRAVENOUS at 21:58

## 2023-06-15 RX ADMIN — METOPROLOL TARTRATE 100 MG: 50 TABLET, FILM COATED ORAL at 08:59

## 2023-06-15 RX ADMIN — ATORVASTATIN CALCIUM 40 MG: 40 TABLET, FILM COATED ORAL at 09:00

## 2023-06-15 RX ADMIN — ASPIRIN 81 MG CHEWABLE TABLET 81 MG: 81 TABLET CHEWABLE at 08:59

## 2023-06-15 RX ADMIN — PAROXETINE HYDROCHLORIDE 20 MG: 20 TABLET, FILM COATED ORAL at 21:57

## 2023-06-15 RX ADMIN — DOXYCYCLINE HYCLATE 100 MG: 100 TABLET, COATED ORAL at 21:57

## 2023-06-15 RX ADMIN — BACLOFEN 10 MG: 10 TABLET ORAL at 21:57

## 2023-06-15 ASSESSMENT — PAIN SCALES - GENERAL
PAINLEVEL_OUTOF10: 0

## 2023-06-15 NOTE — NURSE NAVIGATOR
Heart Failure Nurse Navigator Chart Review performed. Echo pending. Full note to follow.
AHA/ACC/HRS guideline for management of patients with ventricular arrhythmias and the prevention of sudden cardiac death: A Report of the [employer] of Cardiology/American Heart Association Task Force on Clinical Practice Guidelines and the Heart Rhythm Society.  Heart Rhythm, Vol 15, No 10, October 2018 *Class of Recommendation: Class 1 (strong), Class 2a (moderate), Class 2b (weak), Class 3 (not recommended, potentially harmful)

## 2023-06-15 NOTE — CARE COORDINATION
Transition of Care Plan:    RUR: 14% Moderate   Prior Level of Functioning: Assistance with the following:;Bathing;Cooking;Housework; Shopping;Mobility  Disposition: Home with Rosanna: Date FOC offered: 06/13  Date [address] received: 06/13  Accepting facility: TBD  Follow up appointments: TBD  DME needed: TBD  Transportation at discharge: Daughter Tabatha Tyson   IM/IMM Medicare/ letter given: 06/13   Caregiver Contact: Tabatha Tyson [phone]  Discharge Caregiver contacted prior to discharge? Yes, will transport pt after work, 6:00 pm  Care Conference needed? No   Barriers to discharge:  Medical readiness    [facility] for OT/PT. Pt's community based NP will follow pt. CM waiting for completion of referral details from [address] [place] [address]. CM updated [facility] of DRAGAN for tomorrow. CM will update pt's daughter, Tabatha Tyson, after 15:30 when she is out of work for the day. CM will also advise Frances Orozco that attending was not able to reach or leave medical update message for her yesterday evening. Attending notified CM by perfect serve Wednesday evening that daughter's cell number had a \"calls restricted\" message when attending called. No message could be left. CM called that cell number this morning and got same message and was unable to leave a message. CM called a second cell number that daughter called CM on yesterday, no answer and unable to leave a message. CM will update daughter of communication barriers when daughter calls CM today. Daughter usually calls after her work shift ends, after 15:30. Tabatha Tyson  Cell on chart [phone]  Cell Frances Orozco called CM from [phone]    Daughter called Thursday afternoon for updates, medical and DRAGAN. CM provided DRAGAN and HH referral update and nataly lopez dr asking attending to call daughter with medical update. Attending will call daughter.      CM called [facility] [phone] to follow up on [facility] referral. Left message for a call

## 2023-06-16 VITALS
SYSTOLIC BLOOD PRESSURE: 105 MMHG | RESPIRATION RATE: 12 BRPM | DIASTOLIC BLOOD PRESSURE: 62 MMHG | OXYGEN SATURATION: 93 % | TEMPERATURE: 98 F | BODY MASS INDEX: 26.81 KG/M2 | WEIGHT: 170.8 LBS | HEART RATE: 65 BPM | HEIGHT: 67 IN

## 2023-06-16 LAB
ALBUMIN SERPL-MCNC: 3.3 G/DL (ref 3.5–5)
ALBUMIN/GLOB SERPL: 0.8 (ref 1.1–2.2)
ALP SERPL-CCNC: 46 U/L (ref 45–117)
ALT SERPL-CCNC: 38 U/L (ref 12–78)
ANION GAP SERPL CALC-SCNC: 7 MMOL/L (ref 5–15)
AST SERPL-CCNC: 30 U/L (ref 15–37)
BILIRUB SERPL-MCNC: 0.3 MG/DL (ref 0.2–1)
BUN SERPL-MCNC: 51 MG/DL (ref 6–20)
BUN/CREAT SERPL: 36 (ref 12–20)
CALCIUM SERPL-MCNC: 8.9 MG/DL (ref 8.5–10.1)
CHLORIDE SERPL-SCNC: 100 MMOL/L (ref 97–108)
CO2 SERPL-SCNC: 30 MMOL/L (ref 21–32)
CREAT SERPL-MCNC: 1.41 MG/DL (ref 0.55–1.02)
ERYTHROCYTE [DISTWIDTH] IN BLOOD BY AUTOMATED COUNT: 15.2 % (ref 11.5–14.5)
GLOBULIN SER CALC-MCNC: 4.2 G/DL (ref 2–4)
GLUCOSE BLD STRIP.AUTO-MCNC: 126 MG/DL (ref 65–117)
GLUCOSE SERPL-MCNC: 130 MG/DL (ref 65–100)
HCT VFR BLD AUTO: 40 % (ref 35–47)
HGB BLD-MCNC: 12.6 G/DL (ref 11.5–16)
MCH RBC QN AUTO: 29.1 PG (ref 26–34)
MCHC RBC AUTO-ENTMCNC: 31.5 G/DL (ref 30–36.5)
MCV RBC AUTO: 92.4 FL (ref 80–99)
NRBC # BLD: 0 K/UL (ref 0–0.01)
NRBC BLD-RTO: 0 PER 100 WBC
NT PRO BNP: 281 PG/ML
PLATELET # BLD AUTO: 172 K/UL (ref 150–400)
PMV BLD AUTO: 11.7 FL (ref 8.9–12.9)
POTASSIUM SERPL-SCNC: 3.7 MMOL/L (ref 3.5–5.1)
PROT SERPL-MCNC: 7.5 G/DL (ref 6.4–8.2)
RBC # BLD AUTO: 4.33 M/UL (ref 3.8–5.2)
SERVICE CMNT-IMP: ABNORMAL
SODIUM SERPL-SCNC: 137 MMOL/L (ref 136–145)
WBC # BLD AUTO: 7.8 K/UL (ref 3.6–11)

## 2023-06-16 PROCEDURE — 2700000000 HC OXYGEN THERAPY PER DAY

## 2023-06-16 PROCEDURE — 97116 GAIT TRAINING THERAPY: CPT

## 2023-06-16 PROCEDURE — 2580000003 HC RX 258: Performed by: HOSPITALIST

## 2023-06-16 PROCEDURE — 6370000000 HC RX 637 (ALT 250 FOR IP): Performed by: STUDENT IN AN ORGANIZED HEALTH CARE EDUCATION/TRAINING PROGRAM

## 2023-06-16 PROCEDURE — 97530 THERAPEUTIC ACTIVITIES: CPT

## 2023-06-16 PROCEDURE — 6360000002 HC RX W HCPCS: Performed by: HOSPITALIST

## 2023-06-16 PROCEDURE — 36415 COLL VENOUS BLD VENIPUNCTURE: CPT

## 2023-06-16 PROCEDURE — 85027 COMPLETE CBC AUTOMATED: CPT

## 2023-06-16 PROCEDURE — 6370000000 HC RX 637 (ALT 250 FOR IP): Performed by: HOSPITALIST

## 2023-06-16 PROCEDURE — 82962 GLUCOSE BLOOD TEST: CPT

## 2023-06-16 PROCEDURE — 6360000002 HC RX W HCPCS: Performed by: INTERNAL MEDICINE

## 2023-06-16 PROCEDURE — 80053 COMPREHEN METABOLIC PANEL: CPT

## 2023-06-16 PROCEDURE — 94621 CARDIOPULM EXERCISE TESTING: CPT

## 2023-06-16 PROCEDURE — 94618 PULMONARY STRESS TESTING: CPT

## 2023-06-16 PROCEDURE — 83880 ASSAY OF NATRIURETIC PEPTIDE: CPT

## 2023-06-16 PROCEDURE — 2580000003 HC RX 258: Performed by: STUDENT IN AN ORGANIZED HEALTH CARE EDUCATION/TRAINING PROGRAM

## 2023-06-16 RX ORDER — DOXYCYCLINE HYCLATE 100 MG
100 TABLET ORAL EVERY 12 HOURS SCHEDULED
Qty: 4 TABLET | Refills: 0 | Status: SHIPPED | OUTPATIENT
Start: 2023-06-16 | End: 2023-06-18

## 2023-06-16 RX ORDER — DOXYCYCLINE HYCLATE 100 MG
100 TABLET ORAL EVERY 12 HOURS SCHEDULED
Qty: 4 TABLET | Refills: 0 | Status: SHIPPED | OUTPATIENT
Start: 2023-06-16 | End: 2023-06-16 | Stop reason: SDUPTHER

## 2023-06-16 RX ORDER — CEPHALEXIN 500 MG/1
500 CAPSULE ORAL 2 TIMES DAILY
Qty: 4 CAPSULE | Refills: 0 | Status: SHIPPED | OUTPATIENT
Start: 2023-06-16 | End: 2023-06-16 | Stop reason: SDUPTHER

## 2023-06-16 RX ORDER — CEPHALEXIN 500 MG/1
500 CAPSULE ORAL 2 TIMES DAILY
Qty: 4 CAPSULE | Refills: 0 | Status: SHIPPED | OUTPATIENT
Start: 2023-06-16

## 2023-06-16 RX ADMIN — ASPIRIN 81 MG CHEWABLE TABLET 81 MG: 81 TABLET CHEWABLE at 09:38

## 2023-06-16 RX ADMIN — BACLOFEN 10 MG: 10 TABLET ORAL at 09:38

## 2023-06-16 RX ADMIN — DOXYCYCLINE HYCLATE 100 MG: 100 TABLET, COATED ORAL at 11:55

## 2023-06-16 RX ADMIN — ATORVASTATIN CALCIUM 40 MG: 40 TABLET, FILM COATED ORAL at 09:40

## 2023-06-16 RX ADMIN — INSULIN GLARGINE 12 UNITS: 100 INJECTION, SOLUTION SUBCUTANEOUS at 09:39

## 2023-06-16 RX ADMIN — ENOXAPARIN SODIUM 30 MG: 100 INJECTION SUBCUTANEOUS at 09:39

## 2023-06-16 RX ADMIN — AMLODIPINE BESYLATE 5 MG: 5 TABLET ORAL at 09:38

## 2023-06-16 RX ADMIN — FAMOTIDINE 20 MG: 20 TABLET, FILM COATED ORAL at 09:38

## 2023-06-16 RX ADMIN — WATER 1000 MG: 1 INJECTION INTRAMUSCULAR; INTRAVENOUS; SUBCUTANEOUS at 11:55

## 2023-06-16 RX ADMIN — FERROUS SULFATE TAB 325 MG (65 MG ELEMENTAL FE) 325 MG: 325 (65 FE) TAB at 09:43

## 2023-06-16 RX ADMIN — SODIUM CHLORIDE, PRESERVATIVE FREE 10 ML: 5 INJECTION INTRAVENOUS at 09:40

## 2023-06-16 NOTE — PROGRESS NOTES
0100: restarted doxycycline slowed the rate down to 50 infused with NS rate 25 patient had no more complaints of burning. Tristan Mann NP is aware    0780: patient is complaining that the doxycycline infusion is burning and that she had the same issue earlier today. The med was stopped and IV flushed. Hospitalist Tristan Mann NP was notified.
0400: Bedside shift change report given to Eric Loya (oncoming nurse) by Matthew Rice (offgoing nurse). Report included the following information Nurse Handoff Report, Index, Intake/Output, and MAR.       0730: Bedside shift change report given to RN  (oncoming nurse) by Eric Loya  (offgoing nurse). Report included the following information Nurse Handoff Report, Index, Intake/Output, and MAR.
[address] [facility] Adult  Hospitalist Group                                      Advance Care Planning Note    Name: Darryn Zimmer  YOB: 1935  MRN: [id number]  Admission Date: 6/12/2023  7:55 PM    Date of discussion: 6/12/2023    Active Diagnoses: These active diagnoses are of sufficient risk that focused discussion on advance care planning is indicated in order to allow the patient to thoughtfully consider personal goals of care, and if situations arise that prevent the ability to personally give input, to ensure appropriate representation of their personal desires for different levels and aggressiveness of care. Discussion:     Persons present and participating in discussion: Tyler Mendoza MD, Daughter    Topics Discussed:  Patient's medical condition and diagnosis: [ x ] yes [  ] no   Surrogate decision maker: [  x] yes [  ] no   Patient's current physical function/cognitive function/frailty: [  x] yes [  ] no   Code Status: [  x] yes [  ] no   Artificial Nutrition / Dialysis / Non-Invasive Ventilation / Blood Transfusion: [  x] yes [  ] no  Potential Resources for home (durable medical equipment, home nursing, home O2): [ x ] yes [  ] no    Overview of Discussion:   I had extensive conversation with patient's family at bedside about her CODE STATUS. Currently she has no advanced directives or living will. Family at this time expresses wishes for all medication and resuscitative measures to be taken if needed. I explained in detail to process of cardiopulmonary resuscitation and chance of survivability and advanced age and chronic medical conditions. Discussed options of full code, partial code and DNR. Family understands likely poor outcome of survivability given patient's severe pulmonary hypertension and even poorer quality of life and even severe pulm hypertension.   Family plans to further conversation about patient's CODE
[address] [facility] Adult  Hospitalist Group                                                                                          Hospitalist Progress Note  Ramsey Cowart MD  Answering service: [phone] from in house phone        Date of Service:  6/15/2023  NAME:  Kanika Sabillon  :  1935  MRN:  [id number]       Admission Summary:     Kanika Sabillon is a 82 y.o. female with hx of pulm htn, htn, dm ii, hx of cva, anemia who presents to hospital with family with complaints of hypoxia and hyperglycemia. Patient is seen and examined at bedside. She states she feels fine and has no acute complaints. Family was at bedside state over the last week, she has had increasing weakness and some dyspnea. Family took her to urgent care where she was noted to be hypoxic with sats in the [de-identified]. Of note, she was admitted at [facility] and discharged to rehab. Family notes she had intermittent hypoxia at her rehab facility but was discharged home without supplemental oxygen. The patient denies any fever, chills, chest or abdominal pain, nausea, vomiting, cough, congestion, recent illness, palpitations, or dysuria. Remarkable vitals on ER Presentation: bp to 172/82  Labs Remarkable for: cr 1.53, bnp 1542  ER Images: cxr: no acute process  ER Rx: lasix 20mg     Interval history / Subjective:     Patient is seen and examined at the bedside this AM. She feels ok. C/w heart burn - added tums. Explained worsening cr - hold lasix  Discussed with nursing, cm     Spoke with daughter Gayatri Perez on phone and updated patient's status - lasix held  due to elevated cr. She requesting home oxygen - explained that she does not qualify for it as her ruy are above 88% on ambulation - she is upset and refusing discharge for tomorrow      Assessment & Plan:     Acute Hypoxic Respiratory Failure - improved   Possible aspiration pneumonia   ? ? fluid overload  -CT chest: bilateral multifocal tree
[address] [facility] Adult  Hospitalist Group                                                                                          Hospitalist Progress Note  Lisseth Reynolds MD  Answering service: [phone] from in house phone        Date of Service:  2023  NAME:  Georgiana Rosen  :  1935  MRN:  [id number]       Admission Summary:     Georgiana Rosen is a 82 y.o. female with hx of pulm htn, htn, dm ii, hx of cva, anemia who presents to hospital with family with complaints of hypoxia and hyperglycemia. Patient is seen and examined at bedside. She states she feels fine and has no acute complaints. Family was at bedside state over the last week, she has had increasing weakness and some dyspnea. Family took her to urgent care where she was noted to be hypoxic with sats in the [de-identified]. Of note, she was admitted at [facility] and discharged to rehab. Family notes she had intermittent hypoxia at her rehab facility but was discharged home without supplemental oxygen. The patient denies any fever, chills, chest or abdominal pain, nausea, vomiting, cough, congestion, recent illness, palpitations, or dysuria. Remarkable vitals on ER Presentation: bp to 172/82  Labs Remarkable for: cr 1.53, bnp 1542  ER Images: cxr: no acute process  ER Rx: lasix 20mg     Interval history / Subjective:     Patient is seen and examined at the bedside. She stated that she feels better today. No left-sided chest pain, palpitation, or difficulty of breathing.      Assessment & Plan:     Acute Hypoxic Respiratory Failure due to pneumonia with possible fluid overload  -CT chest noncontrast bilateral multifocal tree in bud opacities, likely infectious in etiology,  recommended short interval follow   -proBNP was 1542  -start iv ceftriaxone and doxycyline, Lasix 40 mg IV twice daily  -Chest x ray no acute disease   -afebrile, no leukocytosis, SpO2 98% on nasal canula  -V/Q  pending to
Clinical Pharmacy Note: Re: IV to PO Automatic Conversion - Antibiotic    Please note: Elba Calderon medication- Doxycycline has been changed from IV to PO based on the following criteria:    The patient:  Received IV therapy for at least 24 hours   Is tolerating diet more advanced than clear liquids  Is tolerating oral medications  Is not on vasopressor blood pressure support (i.e. no signs of shock)      This IV to PO conversion is based on the P&T approved automatic conversion policy for eligible patients. Please call with questions.
Consult received, chart reviewed. Pt was admitted with hypoxia and has an order for a V/Q scan to rule out PE. Anticoagulation started today with lovenox at 0918. PT will defer eval at this time, follow, and see for the eval as able and appropriate.  Thank you, Earline Douglas, PT
Hospital follow-up PCP transitional care appointment has been scheduled with [facility]  for Thursday, June 29th, 2023 at 9:15 a.m. Pending patient discharge.   Michael Sanchez Care Management Assistant
Lovenox Monitoring  Indication: DVT Prophylaxis  Recent Labs     06/12/23 1957 06/13/23  0655   HGB 12.4 12.3    172     Current Weight: 78.3 kg  Est. CrCl = 32 ml/min  Current Dose: 30 mg subcutaneously every 24 hours. Plan: Change to 40 mg subcutaneously every 24 hours per [facility] P&T Committee Protocol with respect to renal function. Pharmacy will continue to monitor patient daily and will make dosage adjustments based upon changing renal function.
Lovenox Monitoring  Indication: DVT Prophylaxis  Recent Labs     06/13/23  0655 06/14/23  0441 06/15/23  0026   HGB 12.3 12.1 11.8    170 161     Current Weight: 76.8 kg  Est. CrCl = 26 ml/min  Current Dose: 40 mg subcutaneously every 24 hours. Plan: Change to 30 mg subcutaneously every 24 hours per [facility] P&T Committee Protocol with respect to renal function. Pharmacy will continue to monitor patient daily and will make dosage adjustments based upon changing renal function.
Nursing Transfer Note    Data:  Summary of patients progress: New admission  Reason for transfer: Hypoxia telemonitor    Action:  Explained reason for transfer to Patient and Family. Report given to: Teena, using RN Handoff Navigator.   Mode of transportation: bed    Response:  RN Recommendations: no
Occupational Therapy    Chart reviewed, patient received Lovenox at 9am, will follow up per protocol in afternoon.      Bhavin Tay MS, OTR/L
Physician Progress Note      PATIENT:               Sara Pompa  CSN #:                  [id number]  :                       1935  ADMIT DATE:       2023 7:55 PM  [address] [place] DATE:  RESPONDING  PROVIDER #:        Aundrea Carr MD          QUERY TEXT:    Pt admitted with Acute resp failure. Pt noted to have PNA documented. If   possible, please document in the progress notes and discharge summary if you   are evaluating and/or treating any of the following:    Note: CAP and HCAP indicate where the pneumonia was acquired, not a specific   type. The medical record reflects the following:  Risk Factors:    Clinical Indicators: SOB, CT chest non-contrast bilateral multifocal tree in   bud opacities, \"likely infectious in etiology,\" CXR No Acute Disease. Treatment: start iv ceftriaxone and doxycyline, Lasix 40 mg IV,  -Repeat echocardiogram  -CXR  ? Please call if you have any questions or need assistance. I can also be   reached via [facility] or [facility] # [phone]. Thank you,  Juan R Avila, [facility]  O: [phone]  Options provided:  -- Gram negative pneumonia  -- Gram positive pneumonia  -- Viral pneumonia  -- Aspiration pneumonia  -- Other - I will add my own diagnosis  -- Disagree - Not applicable / Not valid  -- Disagree - Clinically unable to determine / Unknown  -- Refer to Clinical Documentation Reviewer    PROVIDER RESPONSE TEXT:    This patient has aspiration pneumonia. Query created by:  Tonya Chen on 2023 1:54 PM      Electronically signed by:  Aundrea Carr MD 2023 3:15 PM
Pt A&Ox4, vital signs stable, and IV removed. Discharge instructions given to pt and grandson, opportunity for questions was provided. Pt left the unit via wheelchair with grandson, pt discharged with home health to her daughter who was her ride home.
ROOM AIR    AT REST   O2 SATS  90 HR  66   ROOM AIR WITH ACTIVITY 02 SATS  91 HR  72   Pt amb 96 feet in 6 minutes               Full note to follow.  Earline Douglas, PT
Renal Dosing/Monitoring  Medication: Famotidine   Current regimen:  20 mg PO every 12 hr  Recent Labs     06/12/23 1957 06/13/23  0655   CREATININE 1.53* 1.39*   BUN 43* 38*     Estimated CrCl:  32 ml/min  Plan: Change to 20 mg PO every 24 hoursper [facility] P&T Committee Protocol with respect to renal function. Pharmacy will continue to monitor patient daily and will make dosage adjustments based upon changing renal function.
 170       Recent Labs     06/12/23 1957 06/13/23  0655 06/14/23 0441    137 138   K 4.6 3.9 3.8    100 99   CO2 32 32 32   BUN 43* 38* 44*       Recent Labs     06/12/23 1957 06/14/23 0441   ALT 36 34   GLOB 4.1* 4.1*       No results for input(s): INR, APTT in the last 72 hours. Invalid input(s): PTP   No results for input(s): TIBC, FERR in the last 72 hours. Invalid input(s): FE, PSAT   No results found for: FOL, RBCF   No results for input(s): PH, PCO2, PO2 in the last 72 hours. No results for input(s): CPK in the last 72 hours. Invalid input(s): CPKMB, CKNDX, TROIQ  Lab Results   Component Value Date/Time    CHOL 124 06/17/2022 01:09 PM    HDL 62 06/17/2022 01:09 PM     No results found for: 4295  [place]  [unfilled]    Notes reviewed from all clinical/nonclinical/nursing services involved in patient's clinical care. Care coordination discussions were held with appropriate clinical/nonclinical/ nursing providers based on care coordination needs. Patients current active Medications were reviewed, considered, added and adjusted based on the clinical condition today. Home Medications were reconciled to the best of my ability given all available resources at the time of admission. Route is PO if not otherwise noted.       Admission Status:[id number]:::1}      Medications Reviewed:     Current Facility-Administered Medications   Medication Dose Route Frequency    furosemide (LASIX) injection 40 mg  40 mg IntraVENous BID    amLODIPine (NORVASC) tablet 5 mg  5 mg Oral Daily    aspirin chewable tablet 81 mg  81 mg Oral Daily    baclofen (LIORESAL) tablet 10 mg  10 mg Oral BID    donepezil (ARICEPT) tablet 10 mg  10 mg Oral Nightly    ferrous sulfate (IRON 325) tablet 325 mg  325 mg Oral QAM AC    metoprolol tartrate (LOPRESSOR) tablet 100 mg  100 mg Oral BID    PARoxetine (PAXIL) tablet 20 mg  20 mg Oral Nightly    atorvastatin (LIPITOR) tablet 40 mg  40 mg Oral Daily    traMADol

## 2023-06-16 NOTE — CARE COORDINATION
Transition of Care Plan:    RUR: 14% Moderate   Prior Level of Functioning: Assistance with the following:;Bathing;Cooking;Housework; Shopping;Mobility  Disposition: Home with [facility]: Date FOC offered: 06/13  Date [address] received: 06/13  Accepting facility: TBD  Follow up appointments: TBD  DME needed: TBD  Transportation at discharge: Daughter Selina Michael   IM/IMM Medicare/ letter given: 06/13   2nd IM 06/16  Caregiver Contact: Selina Michael [phone]  Shahla oconnor [phone]  Discharge Caregiver contacted prior to discharge? Yes, will transport pt after work, 6:00 pm  Care Conference needed? No   Barriers to discharge:  Medical readiness    Disposition: Home today with [place]. Family will drive pt home. P's daughter, Foye Quinton called CM to vent frustrations. Altagracia Pierson reported pt's other daughter Katherine Jackson) and Roma's son Shahla Garcia [phone] will pick-up pt for dc today. HORACIO Pérez [phone] referral status, accepted - pt's community based NP will follow pt. CM spoke to pt's grandson at bedside. Enrrique updated that [facility] will call family to set [facility]. CM advised [facility] pt will dc today and requested confirmation of HH acceptance ASAPRuby De Souza with [facility] advised she would share with RN Kristin, who is completing the referral, that dc is today.        Selina Michael [phone]      TEE Portillo

## 2023-06-16 NOTE — PLAN OF CARE
Problem: Discharge Planning  Goal: Discharge to home or other facility with appropriate resources  Outcome: Progressing     Problem: Pain  Goal: Verbalizes/displays adequate comfort level or baseline comfort level  Outcome: Progressing     Problem: Safety - Adult  Goal: Free from fall injury  Outcome: Progressing     Problem: Chronic Conditions and Co-morbidities  Goal: Patient's chronic conditions and co-morbidity symptoms are monitored and maintained or improved  Outcome: Progressing     Problem: Skin/Tissue Integrity  Goal: Absence of new skin breakdown  Description: 1. Monitor for areas of redness and/or skin breakdown  2. Assess vascular access sites hourly  3. Every 4-6 hours minimum:  Change oxygen saturation probe site  4. Every 4-6 hours:  If on nasal continuous positive airway pressure, respiratory therapy assess nares and determine need for appliance change or resting period.   Outcome: Progressing     Problem: ABCDS Injury Assessment  Goal: Absence of physical injury  Outcome: Progressing
Problem: Discharge Planning  Goal: Discharge to home or other facility with appropriate resources  Outcome: Progressing     Problem: Pain  Goal: Verbalizes/displays adequate comfort level or baseline comfort level  Outcome: Progressing     Problem: Safety - Adult  Goal: Free from fall injury  Outcome: Progressing     Problem: Occupational Therapy - Adult  Goal: By Discharge: Performs self-care activities at highest level of function for planned discharge setting. See evaluation for individualized goals. Description: FUNCTIONAL STATUS PRIOR TO ADMISSION:  Patient lived at home with daughter and grandson. Patient able to dress and complete grooming, family assisted with transfers with RW, has transport chair for community. Daughter assists transfer into shower but patient able to shower herself. RA baseline, has received therapy in the past.     HOME SUPPORT: Patient lived daughter and grandson. Occupational Therapy Goals:  Initiated 6/13/2023  1. Patient will perform seated grooming with Supervision within 7 day(s). 2.  Patient will perform LE dressing with Supervision within 7 day(s). 3.  Patient will perform UE dressing with Supervision within 7 day(s). 4.  Patient will perform toilet transfers with Supervision  within 7 day(s). 5.  Patient will perform all aspects of toileting with Supervision within 7 day(s). 6.  Patient will participate in upper extremity therapeutic exercise/activities with Supervision for 10 minutes within 7 day(s). 7.  Patient will utilize energy conservation techniques during functional activities with verbal cues within 7 day(s). 6/13/2023 1521 by Morenita Vasquez OT  Outcome: Progressing     Problem: Physical Therapy - Adult  Goal: By Discharge: Performs mobility at highest level of function for planned discharge setting. See evaluation for individualized goals.   Description: FUNCTIONAL STATUS PRIOR TO ADMISSION: Patient was modified independent using a rolling
Problem: Occupational Therapy - Adult  Goal: By Discharge: Performs self-care activities at highest level of function for planned discharge setting. See evaluation for individualized goals. Description: FUNCTIONAL STATUS PRIOR TO ADMISSION:  Patient lived at home with daughter and grandson. Patient able to dress and complete grooming, family assisted with transfers with RW, has transport chair for community. Daughter assists transfer into shower but patient able to shower herself. RA baseline, has received therapy in the past.     HOME SUPPORT: Patient lived daughter and grandson. Occupational Therapy Goals:  Initiated 6/13/2023  1. Patient will perform seated grooming with Supervision within 7 day(s). 2.  Patient will perform LE dressing with Supervision within 7 day(s). 3.  Patient will perform UE dressing with Supervision within 7 day(s). 4.  Patient will perform toilet transfers with Supervision  within 7 day(s). 5.  Patient will perform all aspects of toileting with Supervision within 7 day(s). 6.  Patient will participate in upper extremity therapeutic exercise/activities with Supervision for 10 minutes within 7 day(s). 7.  Patient will utilize energy conservation techniques during functional activities with verbal cues within 7 day(s). Outcome: Progressing   OCCUPATIONAL THERAPY EVALUATION    Patient: Florencia Ray (50 y.o. female)  Date: 6/13/2023  Primary Diagnosis: Respiratory failure (HCC) [J96.90]  Hypoxia [R09.02]         Precautions:                    ASSESSMENT :  The patient is limited by decreased functional mobility, independence in ADLs, ROM, strength, sensation, activity tolerance, endurance, safety awareness, cognition, command following, attention/concentration, coordination, balance, fine-motor control. Patient received reclined in bed, amenable to session with family present. Received with NC doffed, SpO2 91-92%, replaced NC and SpO2 97% on 2L O2 via NC.  Patient
Problem: Occupational Therapy - Adult  Goal: By Discharge: Performs self-care activities at highest level of function for planned discharge setting. See evaluation for individualized goals. Description: FUNCTIONAL STATUS PRIOR TO ADMISSION:  Patient lived at home with daughter and grandson. Patient able to dress and complete grooming, family assisted with transfers with RW, has transport chair for community. Daughter assists transfer into shower but patient able to shower herself. RA baseline, has received therapy in the past.     HOME SUPPORT: Patient lived daughter and grandson. Occupational Therapy Goals:  Initiated 6/13/2023  1. Patient will perform seated grooming with Supervision within 7 day(s). 2.  Patient will perform LE dressing with Supervision within 7 day(s). 3.  Patient will perform UE dressing with Supervision within 7 day(s). 4.  Patient will perform toilet transfers with Supervision  within 7 day(s). 5.  Patient will perform all aspects of toileting with Supervision within 7 day(s). 6.  Patient will participate in upper extremity therapeutic exercise/activities with Supervision for 10 minutes within 7 day(s). 7.  Patient will utilize energy conservation techniques during functional activities with verbal cues within 7 day(s). Outcome: Progressing   OCCUPATIONAL THERAPY TREATMENT  Patient: Jennifer Mariano (43 y.o. female)  Date: 6/15/2023  Primary Diagnosis: Respiratory failure ([facility] [address] [J96.90]  Hypoxia [R09.02]       Precautions: Fall Risk                Chart, occupational therapy assessment, plan of care, and goals were reviewed. ASSESSMENT  Patient continues to benefit from skilled OT services and is progressing towards goals. Patient continues to be motivated to progress with goals of furthering independence and returning home with grandson and daughter.   Completed bed mobility at SBA-supervision assist level and able to mobilize with RW towards chair to sit up for ADLs
Problem: Physical Therapy - Adult  Goal: By Discharge: Performs mobility at highest level of function for planned discharge setting. See evaluation for individualized goals. Description: FUNCTIONAL STATUS PRIOR TO ADMISSION: Patient was modified independent using a rolling walker for functional mobility. Baseline is room air    HOME SUPPORT PRIOR TO ADMISSION: The patient lived with her daughter and grandchild. They provided assist prn (primarily for sit to stand). Physical Therapy Goals  Initiated 6/13/2023  1. Patient will move from supine to sit and sit to supine, scoot up and down, and roll side to side in bed with independence within 7 day(s). 2.  Patient will perform sit to stand with contact guard assist within 7 day(s). 3.  Patient will transfer from bed to chair and chair to bed with supervision/set-up using the least restrictive device within 7 day(s). 4.  Patient will ambulate with supervision/set-up for 100 feet with the least restrictive device within 7 day(s). No stairs goal, pt utilizes a ramp to access her home.   6/16/2023 0934 by Lukas Herrera PT  Outcome: Progressing
Problem: Physical Therapy - Adult  Goal: By Discharge: Performs mobility at highest level of function for planned discharge setting. See evaluation for individualized goals. Description: FUNCTIONAL STATUS PRIOR TO ADMISSION: Patient was modified independent using a rolling walker for functional mobility. Baseline is room air    HOME SUPPORT PRIOR TO ADMISSION: The patient lived with her daughter and grandchild. They provided assist prn (primarily for sit to stand). Physical Therapy Goals  Initiated 6/13/2023  1. Patient will move from supine to sit and sit to supine, scoot up and down, and roll side to side in bed with independence within 7 day(s). 2.  Patient will perform sit to stand with contact guard assist within 7 day(s). 3.  Patient will transfer from bed to chair and chair to bed with supervision/set-up using the least restrictive device within 7 day(s). 4.  Patient will ambulate with supervision/set-up for 100 feet with the least restrictive device within 7 day(s). No stairs goal, pt utilizes a ramp to access her home. Outcome: Progressing   PHYSICAL THERAPY TREATMENT    Patient: Linda Kaiser (18 y.o. female)  Date: 6/15/2023  Diagnosis: Respiratory failure ([facility]) [J96.90]  Hypoxia [R09.02] Respiratory failure ([facility] [address]      Precautions: Fall Risk                    ASSESSMENT:  Patient continues to benefit from skilled PT services and is progressing towards goals. Pt is limited by some impaired sit to stand but once she was standing with a walker for support, she was able to amb 40 feet. She was received on 1 liters of 02 and her Sp02 was 95%. On room air her Sp02 was 92-93% at rest. While amb on room air her lowest Sp02 was 89%, did not have to provide any supplemental 02. Post session she remained up to the chair on room air in NAD. She remains on track for discharge to home with family assist prn.          PLAN:  Patient continues to benefit from skilled intervention to address the
and pt with no complaints. She performed grooming ADLs in sitting with setup. Returned to bed with min A to lift Les. O2 sats stable throughout on 2L O2 (92-96%). /63 in semisupine at end of session. Patient continues to benefit from skilled intervention to address the above impairments. PLAN :  Patient continues to benefit from skilled intervention to address the above impairments. Continue treatment per established plan of care to address goals. Recommendation for discharge: (in order for the patient to meet his/her long term goals): Therapy 2 days/week in the home and assist for safety    Other factors to consider for discharge: not safe to be alone and good family support    IF patient discharges home will need the following DME:  TBD       SUBJECTIVE:   Patient stated I feel ok.     OBJECTIVE DATA SUMMARY:   Cognitive/Behavioral Status:   Alert       Functional Mobility and Transfers for ADLs:  Bed Mobility:  Bed Mobility Training  Supine to Sit: Contact-guard assistance  Sit to Supine: Minimum assistance (to lift LEs)     Transfers:          Balance:   Sitting: Good         ADL Intervention:         Grooming: Setup   Grooming Skilled Clinical Factors: seated        Pain Rating:  Pt reported no pain during session    Activity Tolerance:   Fair   Please refer to the flowsheet for vital signs taken during this treatment.     After treatment:   Patient left in no apparent distress in bed, Call bell within reach, Bed/ chair alarm activated, and Side rails x3    COMMUNICATION/EDUCATION:   The patient's plan of care was discussed with: physical therapist and registered nurse       Thank you for this referral.  Zaira Arroyo OT  Minutes: 82
track for discharge to home. ROOM AIR     AT REST    O2 SATS  90 HR  66   ROOM AIR WITH ACTIVITY 02 SATS  91 HR  72   Pt amb 96 feet in 6 minutes                  PLAN:  Patient continues to benefit from skilled intervention to address the above impairments. Continue treatment per established plan of care. Recommendation for discharge: (in order for the patient to meet his/her long term goals): Therapy 2 days/week in the home and assist for safety    Other factors to consider for discharge: high risk for falls and not safe to be alone    IF patient discharges home will need the following DME: patient owns DME required for discharge       SUBJECTIVE:   Patient stated, \"OK,\" regarding amb    OBJECTIVE DATA SUMMARY:   Chart checked, pt cleared by nursing  Critical Behavior:      Alert and oriented X 4  Fully followed commands    Functional Mobility Training:  Bed Mobility:     Transfers:  Transfer Training  Sit to Stand: Minimum assistance (from EOB)  Stand to Sit: Contact-guard assistance  Balance:  Balance  Sitting: Intact  Standing: Impaired  Standing - Static: Constant support;Good  Standing - Dynamic: Constant support;Good   Ambulation/Gait Training:     Gait  Overall Level of Assistance: Contact-guard assistance  Speed/Ingrid: Slow  Step Length: Left shortened;Right shortened  Gait Abnormalities: Decreased step clearance  Distance (ft): 105 Feet  Assistive Device: Gait belt;Walker, rolling  Neuro Re-Education:                    Pain Rating:  None rated   Pain Intervention(s):        Activity Tolerance:   Good, requires rest breaks, SpO2 stable on room air, and see assessment    After treatment:   Patient left in no apparent distress sitting up in chair, Call bell within reach, and Bed/ chair alarm activated      COMMUNICATION/EDUCATION:   The patient's plan of care was discussed with: occupational therapist, registered nurse, and physician    Patient Education  Education Given To:
Post-Acute Care \"6-Clicks\" Basic Mobility Scores Predict Discharge Destination After Acute Care Hospitalization in Select Patient Groups: A Retrospective, Observational Study. Arch Rehabil Res Clin Transl. 2022 Jul 16;4(3):916775. doi: 10.1016/j.arrct. 5539.790994. PMID: 20193534; PMCID: WPY2897299. 4. Osiel Morales Ni P. AM-PAC Short Forms Manual 4.0. Revised 2/2020. Pain Rating:  None rated, reported some back pain that she reports is chronic in nature. Pain Intervention(s):        Activity Tolerance:   Fair , requires rest breaks, and see assessment    After treatment:   Patient left in no apparent distress sitting up in chair, Call bell within reach, and Caregiver / family present    COMMUNICATION/EDUCATION:   The patient's plan of care was discussed with: occupational therapist and speech therapist    Patient Education  Education Given To: Patient  Education Provided: Role of Therapy;Transfer Training  Education Method: Verbal  Barriers to Learning:  (Juan Antonio W Mable Noel)  Education Outcome: Verbalized understanding;Continued education needed    Thank you for this referral.  Terell Carrillo, PT  Minutes: 40      Physical Therapy Evaluation Charge Determination   History Examination Presentation Decision-Making   MEDIUM  Complexity : 1-2 comorbidities / personal factors will impact the outcome/ POC  MEDIUM Complexity : 3 Standardized tests and measures addressin body structure, function, activity limitation and / or participation in recreation  LOW Complexity : Stable, uncomplicated  AM-PAC  LOW    Based on the above components, the patient evaluation is determined to be of the following complexity level: Low

## 2023-06-16 NOTE — DISCHARGE INSTRUCTIONS
Discharge Instructions       PATIENT ID: Adam Jara  MRN: [id number]   YOB: 1935    DATE OF ADMISSION: [unfilled]    DATE OF DISCHARGE: 6/16/2023    PRIMARY CARE PROVIDER: @PCP@     ATTENDING PHYSICIAN: [unfilled]  DISCHARGING PROVIDER: Zuri Canela MD    To contact this individual call [phone] and ask the  to page. If unavailable ask to be transferred the Adult Hospitalist Department. DISCHARGE DIAGNOSES Pneumonia    CONSULTATIONS: [unfilled]    PROCEDURES/SURGERIES: * No surgery found *    PENDING TEST RESULTS:   At the time of discharge the following test results are still pending: none     FOLLOW UP APPOINTMENTS:   [unfilled]   PCP in 1 week     ADDITIONAL CARE RECOMMENDATIONS:   Cbc, bmp in 1 week     DIET: diabetic diet  Oral Nutritional Supplements:     ACTIVITY: activity as tolerated    Radiology      DISCHARGE MEDICATIONS:   See Medication Reconciliation Form    It is important that you take the medication exactly as they are prescribed. Keep your medication in the bottles provided by the pharmacist and keep a list of the medication names, dosages, and times to be taken in your wallet. Do not take other medications without consulting your doctor. NOTIFY YOUR PHYSICIAN FOR ANY OF THE FOLLOWING:   Fever over 101 degrees for 24 hours. Chest pain, shortness of breath, fever, chills, nausea, vomiting, diarrhea, change in mentation, falling, weakness, bleeding. Severe pain or pain not relieved by medications. Or, any other signs or symptoms that you may have questions about.       DISPOSITION:  X  Home With:   OT  PT X HH  RN       SNF/Inpatient Rehab/LTAC    Independent/assisted living    Hospice    Other:           Signed:   Zuri Canela MD  6/16/2023  11:16 AM

## 2023-06-16 NOTE — DISCHARGE SUMMARY
Independent      Cognition     Lucid     Forgetful     Dementia      Catheters/lines (plus indication)    Delvalle     PICC     PEG     None      Code status    X Full code     DNR      PHYSICAL EXAMINATION AT DISCHARGE:    General : alert x 3, awake, no acute distress,   HEENT: PEERL, EOMI, moist mucus membrane  Neck: supple, no JVD, no meningeal signs  Chest: Clear to auscultation bilaterally   CVS: S1 S2 heard, Capillary refill less than 2 seconds  Abd: soft/ Non tender, non distended, BS physiological,   Ext: no clubbing, no cyanosis, no edema, brisk 2+ DP pulses  Neuro/Psych: pleasant mood and affect, CN 2-12 grossly intact, sensory grossly within normal limit, Strength 5/5 in all extremities  Skin: warm     CHRONIC MEDICAL DIAGNOSES:      Greater than 31 minutes were spent with the patient on counseling and coordination of care    Signed:   Tootie Hewitt MD  6/16/2023  11:17 AM

## 2023-06-16 NOTE — CARE COORDINATION
6:24 PM  On call CM received a call from patient's Nanette Cisneros [phone] and daughter, Lenin Youssef in regards to patient's medications. Patient at this time has been discharged. They were in formed that prescriptions were not provided or sent to pharmacy for patient. Call placed to unit RN. Attending to be notified by RN to send prescriptions to [facility] located at [address] [place] [address], [address] . Family notified of updates. No other CM needs at this time.     TEE Dickerson/CAMILLA    [phone]

## 2023-06-20 DIAGNOSIS — I50.22 CHRONIC SYSTOLIC (CONGESTIVE) HEART FAILURE (HCC): ICD-10-CM

## 2023-06-20 RX ORDER — FUROSEMIDE 20 MG/1
TABLET ORAL
Qty: 60 TABLET | Refills: 0 | Status: SHIPPED | OUTPATIENT
Start: 2023-06-20

## 2023-06-22 ENCOUNTER — HOME CARE VISIT (OUTPATIENT)
Facility: HOME HEALTH | Age: 88
End: 2023-06-22
Payer: MEDICARE

## 2023-06-22 VITALS
HEART RATE: 70 BPM | SYSTOLIC BLOOD PRESSURE: 110 MMHG | DIASTOLIC BLOOD PRESSURE: 66 MMHG | RESPIRATION RATE: 16 BRPM | TEMPERATURE: 96.9 F | OXYGEN SATURATION: 98 %

## 2023-06-22 PROCEDURE — G0151 HHCP-SERV OF PT,EA 15 MIN: HCPCS

## 2023-06-22 ASSESSMENT — ENCOUNTER SYMPTOMS
BOWEL INCONTINENCE: 1
PAIN LOCATION - PAIN QUALITY: STIFF
DYSPNEA ACTIVITY LEVEL: AFTER AMBULATING MORE THAN 20 FT

## 2023-06-22 NOTE — HOME HEALTH
Skilled reason for admission/summary of clinical condition: Ms. Cipriano Mcadams is an 81 yo female s/p hospitalization at [facility] from 6/12-6/16/23 due to acute respiratory failure, UTI. 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 She lives with her dtr and grandson in a one level home with ramp to enter. At baseline she is at a supervision level for limited mobility with use of RW. Hipolito Flood is full time CG during the day while dtr is at work and patient uses a w/c outside the home. She is known to this clinician from a course of [facility] last year around the same time. She is at risk for falls based on gait speed of 0.15 m/s, forward lean, inability to stay close to RW due to low back pain. She does not like to exercise and does not like prior HEP and hasn't been compliant with it. She walks down the story and back about 20' 2-3x/day only and walks up/down her ramp 2x/week and other than that she sits in her chair. She is incontinent of urine and bowel and therefore doesn't use commode. O2 sats drop into upper 80s with short distance ambulation but rebound into mid-upper 90s with 1-2 mins of rest which is normal for her. Skilled PT is recommended 1w3 to encourage/instruct a plan for more activity during the course of the day and ensure she does not decline and return to the hospital. OT to eval.   Subjective: patient rolls her eyes when PT brings up HEP and walking more during the day  Caregiver: relative. Caregiver assists with: all care Caregiver unable to assist with: none. Caregiver is available 24 hours/day Caregiver is  present at this visit and did participate with clinician. Medications reconciled and all medications are available in the home this visit.  The following education was provided regarding medications: medication interactions and look alike medications: High alert medication teaching on aspirin, antiplatelet therapy education;signs and symptoms of abnormal bleeding

## 2023-06-26 ENCOUNTER — HOME CARE VISIT (OUTPATIENT)
Dept: HOME HEALTH SERVICES | Facility: HOME HEALTH | Age: 88
End: 2023-06-26
Payer: MEDICARE

## 2023-06-26 ENCOUNTER — HOME CARE VISIT (OUTPATIENT)
Facility: HOME HEALTH | Age: 88
End: 2023-06-26
Payer: MEDICARE

## 2023-06-26 PROCEDURE — G0152 HHCP-SERV OF OT,EA 15 MIN: HCPCS

## 2023-06-27 VITALS
OXYGEN SATURATION: 83 % | SYSTOLIC BLOOD PRESSURE: 116 MMHG | RESPIRATION RATE: 19 BRPM | TEMPERATURE: 98.2 F | HEART RATE: 89 BPM | DIASTOLIC BLOOD PRESSURE: 70 MMHG

## 2023-06-28 ENCOUNTER — TELEPHONE (OUTPATIENT)
Age: 88
End: 2023-06-28

## 2023-06-28 ENCOUNTER — HOME CARE VISIT (OUTPATIENT)
Facility: HOME HEALTH | Age: 88
End: 2023-06-28
Payer: MEDICARE

## 2023-06-28 VITALS
OXYGEN SATURATION: 98 % | RESPIRATION RATE: 16 BRPM | HEART RATE: 62 BPM | TEMPERATURE: 97.7 F | DIASTOLIC BLOOD PRESSURE: 60 MMHG | SYSTOLIC BLOOD PRESSURE: 110 MMHG

## 2023-06-28 PROCEDURE — G0151 HHCP-SERV OF PT,EA 15 MIN: HCPCS

## 2023-06-29 ENCOUNTER — OFFICE VISIT (OUTPATIENT)
Age: 88
End: 2023-06-29

## 2023-06-29 VITALS
HEIGHT: 67 IN | OXYGEN SATURATION: 94 % | DIASTOLIC BLOOD PRESSURE: 64 MMHG | WEIGHT: 172 LBS | RESPIRATION RATE: 18 BRPM | SYSTOLIC BLOOD PRESSURE: 122 MMHG | HEART RATE: 99 BPM | BODY MASS INDEX: 27 KG/M2

## 2023-06-29 DIAGNOSIS — N18.31 CHRONIC KIDNEY DISEASE, STAGE 3A (HCC): ICD-10-CM

## 2023-06-29 DIAGNOSIS — Z76.89 ENCOUNTER FOR SUPPORT AND COORDINATION OF TRANSITION OF CARE: Primary | ICD-10-CM

## 2023-06-29 DIAGNOSIS — F03.90 DEMENTIA WITHOUT BEHAVIORAL DISTURBANCE (HCC): ICD-10-CM

## 2023-06-29 DIAGNOSIS — F32.0 MAJOR DEPRESSIVE DISORDER, SINGLE EPISODE, MILD (HCC): ICD-10-CM

## 2023-06-29 DIAGNOSIS — E11.21 TYPE 2 DIABETES MELLITUS WITH DIABETIC NEPHROPATHY, WITHOUT LONG-TERM CURRENT USE OF INSULIN (HCC): ICD-10-CM

## 2023-06-29 SDOH — ECONOMIC STABILITY: INCOME INSECURITY: HOW HARD IS IT FOR YOU TO PAY FOR THE VERY BASICS LIKE FOOD, HOUSING, MEDICAL CARE, AND HEATING?: NOT HARD AT ALL

## 2023-06-29 SDOH — ECONOMIC STABILITY: HOUSING INSECURITY
IN THE LAST 12 MONTHS, WAS THERE A TIME WHEN YOU DID NOT HAVE A STEADY PLACE TO SLEEP OR SLEPT IN A SHELTER (INCLUDING NOW)?: NO

## 2023-06-29 SDOH — ECONOMIC STABILITY: FOOD INSECURITY: WITHIN THE PAST 12 MONTHS, YOU WORRIED THAT YOUR FOOD WOULD RUN OUT BEFORE YOU GOT MONEY TO BUY MORE.: NEVER TRUE

## 2023-06-29 SDOH — ECONOMIC STABILITY: FOOD INSECURITY: WITHIN THE PAST 12 MONTHS, THE FOOD YOU BOUGHT JUST DIDN'T LAST AND YOU DIDN'T HAVE MONEY TO GET MORE.: NEVER TRUE

## 2023-06-29 ASSESSMENT — ANXIETY QUESTIONNAIRES
5. BEING SO RESTLESS THAT IT IS HARD TO SIT STILL: 0
3. WORRYING TOO MUCH ABOUT DIFFERENT THINGS: 0
GAD7 TOTAL SCORE: 0
6. BECOMING EASILY ANNOYED OR IRRITABLE: 0
7. FEELING AFRAID AS IF SOMETHING AWFUL MIGHT HAPPEN: 0
1. FEELING NERVOUS, ANXIOUS, OR ON EDGE: 0
2. NOT BEING ABLE TO STOP OR CONTROL WORRYING: 0
IF YOU CHECKED OFF ANY PROBLEMS ON THIS QUESTIONNAIRE, HOW DIFFICULT HAVE THESE PROBLEMS MADE IT FOR YOU TO DO YOUR WORK, TAKE CARE OF THINGS AT HOME, OR GET ALONG WITH OTHER PEOPLE: NOT DIFFICULT AT ALL
4. TROUBLE RELAXING: 0

## 2023-06-29 ASSESSMENT — PATIENT HEALTH QUESTIONNAIRE - PHQ9
SUM OF ALL RESPONSES TO PHQ9 QUESTIONS 1 & 2: 0
2. FEELING DOWN, DEPRESSED OR HOPELESS: 0
8. MOVING OR SPEAKING SO SLOWLY THAT OTHER PEOPLE COULD HAVE NOTICED. OR THE OPPOSITE, BEING SO FIGETY OR RESTLESS THAT YOU HAVE BEEN MOVING AROUND A LOT MORE THAN USUAL: 0
SUM OF ALL RESPONSES TO PHQ QUESTIONS 1-9: 0
4. FEELING TIRED OR HAVING LITTLE ENERGY: 0
SUM OF ALL RESPONSES TO PHQ QUESTIONS 1-9: 0
6. FEELING BAD ABOUT YOURSELF - OR THAT YOU ARE A FAILURE OR HAVE LET YOURSELF OR YOUR FAMILY DOWN: 0
3. TROUBLE FALLING OR STAYING ASLEEP: 0
SUM OF ALL RESPONSES TO PHQ QUESTIONS 1-9: 0
9. THOUGHTS THAT YOU WOULD BE BETTER OFF DEAD, OR OF HURTING YOURSELF: 0
7. TROUBLE CONCENTRATING ON THINGS, SUCH AS READING THE NEWSPAPER OR WATCHING TELEVISION: 0
5. POOR APPETITE OR OVEREATING: 0
10. IF YOU CHECKED OFF ANY PROBLEMS, HOW DIFFICULT HAVE THESE PROBLEMS MADE IT FOR YOU TO DO YOUR WORK, TAKE CARE OF THINGS AT HOME, OR GET ALONG WITH OTHER PEOPLE: 0
SUM OF ALL RESPONSES TO PHQ QUESTIONS 1-9: 0
1. LITTLE INTEREST OR PLEASURE IN DOING THINGS: 0

## 2023-06-29 ASSESSMENT — ENCOUNTER SYMPTOMS
GASTROINTESTINAL NEGATIVE: 1
COUGH: 0
CHEST TIGHTNESS: 0
WHEEZING: 0
SHORTNESS OF BREATH: 0

## 2023-07-03 ENCOUNTER — HOME CARE VISIT (OUTPATIENT)
Facility: HOME HEALTH | Age: 88
End: 2023-07-03
Payer: MEDICARE

## 2023-07-03 VITALS
BODY MASS INDEX: 27.56 KG/M2 | HEART RATE: 76 BPM | TEMPERATURE: 98 F | SYSTOLIC BLOOD PRESSURE: 107 MMHG | OXYGEN SATURATION: 90 % | DIASTOLIC BLOOD PRESSURE: 67 MMHG | WEIGHT: 176 LBS

## 2023-07-03 PROCEDURE — G0152 HHCP-SERV OF OT,EA 15 MIN: HCPCS

## 2023-07-03 NOTE — HOME HEALTH
Subjective: My back is hurting me today       Falls since last visit (if yes complete the Fall Tracking Form and include . bsrifallreport):Denies      Caregiver involvement changes: No changes reported or noted by clinician   Home health supplies by type and quantity ordered/delivered this visit include: N/A for OT      Clinician asked if patient has had any physician contact since last home care visit and patient states: PCP last week      Clinician asked if patient has any new or changed medications and patient states: Pt/cg deny any med changes       If Yes, were medications reconciled? N/A      Was the certifying physician notified of changes in medications? N/A     Clinical assessment (what this visit means for the patient overall and need for ongoing skilled care) and progress/lack of progress towards SPECIFIC goals-Mrs Lazaro has been seen x2 OT visits with focus on pt/cg training, HEP, fall prevention and energy cons/breathing strategies. She has made steady progress and returned to her baseline level. All pt/cg training is completed. Pts O2 levels do flucuate however pt is very sedentary and when instructed on PLB and proper exhalation pt immediately returns to the 90s.  Pt has no further skilled OT needs and is discharged from OT services today     Written Teaching Material Utilized: HOMERO leiva OT dc instructions    Interdisciplinary communication with:ALEXANDRIA Luevano     Discharge planning as follows: OT dc today    Specific plan for next visit:CHELLE

## 2023-07-05 ENCOUNTER — HOME CARE VISIT (OUTPATIENT)
Facility: HOME HEALTH | Age: 88
End: 2023-07-05
Payer: MEDICARE

## 2023-07-05 VITALS
DIASTOLIC BLOOD PRESSURE: 60 MMHG | RESPIRATION RATE: 16 BRPM | TEMPERATURE: 97.9 F | OXYGEN SATURATION: 97 % | SYSTOLIC BLOOD PRESSURE: 110 MMHG | HEART RATE: 68 BPM

## 2023-07-05 PROCEDURE — G0151 HHCP-SERV OF PT,EA 15 MIN: HCPCS

## 2023-07-05 ASSESSMENT — ENCOUNTER SYMPTOMS
DYSPNEA ACTIVITY LEVEL: AFTER AMBULATING MORE THAN 20 FT
BOWEL INCONTINENCE: 1

## 2023-07-05 NOTE — HOME HEALTH
Subjective: no complaints, states she went to [employer] on Sunday  Falls since last visit No(if yes complete the Fall Tracking Form and include bsrifallreport):   Caregiver involvement changes: none  Home health supplies by type and quantity ordered/delivered this visit include: n/a    Clinician asked if patient has had any physician contact since last home care visit and patient states: YES  Clinician asked if patient has any new or changed medications and patient states:  NO   If Yes, were medications reconciled? YES   Was the certifying physician notified of changes in medications? N/A     Clinical assessment (what this visit means for the patient overall and need for ongoing skilled care) and progress or lack of progress towards SPECIFIC goals: Ms. [address] Noel has completed 2 HH OT and 3 HH PT visits as ordered. She remains at her baseline level and has 24 hour assistance from family. Her gait speed has not changed and she remains at risk for falls and must use a RW and have assistance for all mobility. She is independent with HEP for walking, LE strength and breathing exercises. Medications were reconciled and she has been d/c'd from the agency today.

## 2023-07-16 DIAGNOSIS — I50.22 CHRONIC SYSTOLIC (CONGESTIVE) HEART FAILURE (HCC): ICD-10-CM

## 2023-07-17 RX ORDER — FUROSEMIDE 20 MG/1
TABLET ORAL
Qty: 60 TABLET | Refills: 0 | Status: SHIPPED | OUTPATIENT
Start: 2023-07-17

## 2023-07-17 NOTE — TELEPHONE ENCOUNTER
Requested Prescriptions     Pending Prescriptions Disp Refills    furosemide (LASIX) 20 MG tablet [Pharmacy Med Name: Furosemide 20 MG Oral Tablet] 60 tablet 0     Sig: Take 1 tablet by mouth once daily       [address] [address] [address], [address] [facility] [phone] [place] [phone]  [address] [address]  Phone: [phone] Fax: [phone]       Last appt 6/29/2023      Future Appointments   Date Time Provider [address] [place] [address]   8/3/2023  1:40 PM MD KAILEY Finney BS AMB   9/29/2023  3:00 PM Symone Verner Rams, APRN - [facility] BS AMB

## 2023-07-24 ENCOUNTER — TELEPHONE (OUTPATIENT)
Age: 88
End: 2023-07-24

## 2023-07-24 NOTE — TELEPHONE ENCOUNTER
Patient called, verified and advised of 08/03/2023 appointment needing to be rescheduled. Patient stated \" Go ahead and cancel and and we will call back at a later time to reschedule. \"

## 2023-08-02 LAB — HBA1C MFR BLD HPLC: 8.7 %

## 2023-08-03 DIAGNOSIS — E78.2 MIXED HYPERLIPIDEMIA: Primary | ICD-10-CM

## 2023-08-04 RX ORDER — SIMVASTATIN 40 MG
TABLET ORAL
Qty: 90 TABLET | Refills: 1 | Status: SHIPPED | OUTPATIENT
Start: 2023-08-04 | End: 2023-08-30 | Stop reason: SDUPTHER

## 2023-08-22 DIAGNOSIS — F32.0 MAJOR DEPRESSIVE DISORDER, SINGLE EPISODE, MILD (HCC): Primary | ICD-10-CM

## 2023-08-22 RX ORDER — PAROXETINE HYDROCHLORIDE 20 MG/1
TABLET, FILM COATED ORAL
Qty: 90 TABLET | Refills: 3 | Status: SHIPPED | OUTPATIENT
Start: 2023-08-22

## 2023-08-30 DIAGNOSIS — F32.0 MAJOR DEPRESSIVE DISORDER, SINGLE EPISODE, MILD (HCC): ICD-10-CM

## 2023-08-30 DIAGNOSIS — E78.2 MIXED HYPERLIPIDEMIA: ICD-10-CM

## 2023-08-30 DIAGNOSIS — I10 ESSENTIAL HYPERTENSION: Primary | ICD-10-CM

## 2023-08-30 RX ORDER — PAROXETINE HYDROCHLORIDE 20 MG/1
20 TABLET, FILM COATED ORAL NIGHTLY
Qty: 90 TABLET | Refills: 1 | Status: SHIPPED | OUTPATIENT
Start: 2023-08-30

## 2023-08-30 RX ORDER — SIMVASTATIN 40 MG
40 TABLET ORAL NIGHTLY
Qty: 90 TABLET | Refills: 1 | Status: SHIPPED | OUTPATIENT
Start: 2023-08-30

## 2023-08-30 RX ORDER — METOPROLOL TARTRATE 100 MG/1
100 TABLET ORAL 2 TIMES DAILY
Qty: 180 TABLET | Refills: 0 | Status: SHIPPED | OUTPATIENT
Start: 2023-08-30 | End: 2023-11-28

## 2023-08-30 NOTE — TELEPHONE ENCOUNTER
LOV: 06/29/2023  NOV: 09/29/2023    Medication: simvastatin (ZOCOR) 40 MG tablet     Quantity: ?    Medication: metoprolol (LOPRESSOR) 100 MG tablet    Quantity: ?    Medication: PARoxetine (PAXIL) 20 MG tablet     Quantity: ?

## 2023-09-29 ENCOUNTER — OFFICE VISIT (OUTPATIENT)
Age: 88
End: 2023-09-29
Payer: MEDICARE

## 2023-09-29 VITALS
OXYGEN SATURATION: 98 % | BODY MASS INDEX: 27.57 KG/M2 | TEMPERATURE: 98 F | HEIGHT: 67 IN | HEART RATE: 88 BPM | SYSTOLIC BLOOD PRESSURE: 115 MMHG | RESPIRATION RATE: 17 BRPM | DIASTOLIC BLOOD PRESSURE: 70 MMHG

## 2023-09-29 DIAGNOSIS — Z00.00 MEDICARE ANNUAL WELLNESS VISIT, SUBSEQUENT: Primary | ICD-10-CM

## 2023-09-29 DIAGNOSIS — E11.21 TYPE 2 DIABETES WITH NEPHROPATHY (HCC): ICD-10-CM

## 2023-09-29 DIAGNOSIS — Z23 NEEDS FLU SHOT: ICD-10-CM

## 2023-09-29 DIAGNOSIS — I50.22 CHRONIC SYSTOLIC (CONGESTIVE) HEART FAILURE (HCC): ICD-10-CM

## 2023-09-29 DIAGNOSIS — I10 ESSENTIAL HYPERTENSION: ICD-10-CM

## 2023-09-29 DIAGNOSIS — Z71.89 ADVANCE CARE PLANNING: ICD-10-CM

## 2023-09-29 PROCEDURE — G0439 PPPS, SUBSEQ VISIT: HCPCS | Performed by: INTERNAL MEDICINE

## 2023-09-29 PROCEDURE — 1123F ACP DISCUSS/DSCN MKR DOCD: CPT | Performed by: INTERNAL MEDICINE

## 2023-09-29 PROCEDURE — 3052F HG A1C>EQUAL 8.0%<EQUAL 9.0%: CPT | Performed by: INTERNAL MEDICINE

## 2023-09-29 PROCEDURE — PBSHW INFLUENZA, FLUAD, (AGE 65 Y+), IM, PF, 0.5 ML: Performed by: INTERNAL MEDICINE

## 2023-09-29 PROCEDURE — 90694 VACC AIIV4 NO PRSRV 0.5ML IM: CPT | Performed by: INTERNAL MEDICINE

## 2023-09-29 RX ORDER — GLIMEPIRIDE 1 MG/1
1 TABLET ORAL
COMMUNITY

## 2023-09-29 ASSESSMENT — PATIENT HEALTH QUESTIONNAIRE - PHQ9
SUM OF ALL RESPONSES TO PHQ9 QUESTIONS 1 & 2: 0
SUM OF ALL RESPONSES TO PHQ QUESTIONS 1-9: 0
8. MOVING OR SPEAKING SO SLOWLY THAT OTHER PEOPLE COULD HAVE NOTICED. OR THE OPPOSITE, BEING SO FIGETY OR RESTLESS THAT YOU HAVE BEEN MOVING AROUND A LOT MORE THAN USUAL: 0
SUM OF ALL RESPONSES TO PHQ QUESTIONS 1-9: 0
7. TROUBLE CONCENTRATING ON THINGS, SUCH AS READING THE NEWSPAPER OR WATCHING TELEVISION: 0
4. FEELING TIRED OR HAVING LITTLE ENERGY: 0
6. FEELING BAD ABOUT YOURSELF - OR THAT YOU ARE A FAILURE OR HAVE LET YOURSELF OR YOUR FAMILY DOWN: 0
10. IF YOU CHECKED OFF ANY PROBLEMS, HOW DIFFICULT HAVE THESE PROBLEMS MADE IT FOR YOU TO DO YOUR WORK, TAKE CARE OF THINGS AT HOME, OR GET ALONG WITH OTHER PEOPLE: 0
3. TROUBLE FALLING OR STAYING ASLEEP: 0
5. POOR APPETITE OR OVEREATING: 0
SUM OF ALL RESPONSES TO PHQ QUESTIONS 1-9: 0
SUM OF ALL RESPONSES TO PHQ QUESTIONS 1-9: 0
1. LITTLE INTEREST OR PLEASURE IN DOING THINGS: 0
9. THOUGHTS THAT YOU WOULD BE BETTER OFF DEAD, OR OF HURTING YOURSELF: 0
2. FEELING DOWN, DEPRESSED OR HOPELESS: 0

## 2023-09-29 NOTE — PROGRESS NOTES
1. Have you been to the ER, urgent care clinic since your last visit? Hospitalized since your last visit? YES  URGENT CARE  BLOOD GLUCOSE    2. Have you seen or consulted any other health care providers outside of the [address] [place] [address] since your last visit? Include any pap smears or colon screening.      NO
TABLET BY MOUTH ONCE DAILY (DISCONTINUE  2.5  MG)  Patient taking differently: Take 1 tablet by mouth daily TAKE 1 TABLET BY MOUTH ONCE DAILY (DISCONTINUE  2.5  MG) Yes FEDERICO Lundberg NP   baclofen (LIORESAL) 10 MG tablet Take 1 tablet by mouth 2 times daily Yes FEDERICO Lundberg NP   ferrous sulfate (FE TABS 325) 325 (65 Fe) MG EC tablet Take 1 tablet by mouth every morning (before breakfast) Yes FEDERICO Lundberg NP   CALCIUM CITRATE-VITAMIN D3 PO Take 2 tablets by mouth 2 times daily Yes Ar Automatic Reconciliation   acetaminophen (TYLENOL) 650 MG extended release tablet Take 500 mg by mouth 2 times daily as needed for Pain Yes Ar Automatic Reconciliation   aspirin 81 MG chewable tablet Take 1 tablet by mouth daily Yes Ar Automatic Reconciliation   budesonide (ENTOCORT EC) 3 MG extended release capsule ceived the following from [facility]: Outside name: budesonide (ENTOCORT EC) 3 mg capsule Yes Ar Automatic Reconciliation   vitamin D 25 MCG (1000 UT) CAPS Take 2 capsules by mouth daily Yes Ar Automatic Reconciliation   cholestyramine (QUESTRAN) 4 g packet Take 1 packet by mouth Daily with supper Yes Ar Automatic Reconciliation   Cobalamin Combinations (VITAMIN B12-FOLIC ACID) 389-078 MCG TABS ceived the following from [facility]: Outside name: vitamin b12-folic acid 5.7-8 mg tab Yes Ar Automatic Reconciliation   donepezil (ARICEPT) 10 MG tablet Take 1 tablet by mouth nightly Yes Ar Automatic Reconciliation   ergocalciferol (ERGOCALCIFEROL) 1.25 MG (82855 UT) capsule Take 1 capsule by mouth once a week Yes Ar Automatic Reconciliation   famotidine (PEPCID) 20 MG tablet Take 1 tablet by mouth 2 times daily Yes Ar Automatic Reconciliation   guaiFENesin (MUCINEX) 600 MG extended release tablet Take 1 tablet by mouth 2 times daily as needed Yes Ar Automatic Reconciliation   loperamide (IMODIUM) 2 MG capsule Take 1 capsule by mouth as needed Yes Ar Automatic

## 2023-11-10 ENCOUNTER — OFFICE VISIT (OUTPATIENT)
Age: 88
End: 2023-11-10

## 2023-11-10 VITALS
SYSTOLIC BLOOD PRESSURE: 110 MMHG | OXYGEN SATURATION: 95 % | DIASTOLIC BLOOD PRESSURE: 71 MMHG | HEART RATE: 58 BPM | TEMPERATURE: 98.3 F

## 2023-11-10 DIAGNOSIS — H61.23 BILATERAL IMPACTED CERUMEN: Primary | ICD-10-CM

## 2023-11-10 NOTE — PROGRESS NOTES
Subjective: (As above and below)     The patient/guardian gave verbal consent to treat. Chief Complaint   Patient presents with    Taco Awan is a 82 y.o. female who presents for evaluation of : ear irrigation. Wax clogged with decreased hearing for past few weeks. No pain, fever or chills. Feels well otherwise. Review of Systems    Review of Systems - negative except as listed above    Reviewed PmHx, RxHx, FmHx, SocHx, AllgHx and updated in chart. Family History   Adopted: Yes       Past Medical History:   Diagnosis Date    Arthritis     Chronic pain     Depression     Diabetes ([address] )     Hypercholesterolemia     Hypertension     Stroke [facility])     TIA 10 yrs back    Stroke [facility])     2003      Social History     Socioeconomic History    Marital status:      Spouse name: None    Number of children: None    Years of education: None    Highest education level: None   Tobacco Use    Smoking status: Never    Smokeless tobacco: Never   Vaping Use    Vaping Use: Never used   Substance and Sexual Activity    Alcohol use: No    Drug use: No    Sexual activity: Not Currently     Partners: Male     Birth control/protection: None     Comment: retired,relocated from [place] [address] [address] with daughter   Social History Narrative         ** Merged History Encounter **     Social Determinants of Health     Financial Resource Strain: Low Risk  (6/29/2023)    Overall Financial Resource Strain (CARDIA)     Difficulty of Paying Living Expenses: Not hard at all   Food Insecurity: No Food Insecurity (6/29/2023)    Hunger Vital Sign     Worried About Running Out of Food in the Last Year: Never true     [address] in the Last Year: Never true   Transportation Needs: No Transportation Needs (7/5/2023)    OASIS : Transportation     Lack of Transportation (Medical): No     Lack of Transportation (Non-Medical):  No     Patient Unable or Declines to Respond: No   Physical Activity:

## 2023-11-12 DIAGNOSIS — M47.22 OTHER SPONDYLOSIS WITH RADICULOPATHY, CERVICAL REGION: ICD-10-CM

## 2023-11-12 DIAGNOSIS — I50.22 CHRONIC SYSTOLIC (CONGESTIVE) HEART FAILURE (HCC): ICD-10-CM

## 2023-11-12 DIAGNOSIS — D64.9 ANEMIA, UNSPECIFIED TYPE: ICD-10-CM

## 2023-11-12 DIAGNOSIS — I10 ESSENTIAL HYPERTENSION: ICD-10-CM

## 2023-11-13 ENCOUNTER — TELEPHONE (OUTPATIENT)
Age: 88
End: 2023-11-13

## 2023-11-13 RX ORDER — BACLOFEN 10 MG/1
10 TABLET ORAL 2 TIMES DAILY
Qty: 60 TABLET | Refills: 0 | Status: SHIPPED | OUTPATIENT
Start: 2023-11-13

## 2023-11-13 RX ORDER — AMLODIPINE BESYLATE 5 MG/1
TABLET ORAL
Qty: 90 TABLET | Refills: 0 | Status: SHIPPED | OUTPATIENT
Start: 2023-11-13

## 2023-11-13 RX ORDER — PNV NO.95/FERROUS FUM/FOLIC AC 28MG-0.8MG
TABLET ORAL
Qty: 90 TABLET | Refills: 0 | Status: SHIPPED | OUTPATIENT
Start: 2023-11-13

## 2023-11-13 RX ORDER — FUROSEMIDE 20 MG/1
TABLET ORAL
Qty: 60 TABLET | Refills: 0 | Status: SHIPPED | OUTPATIENT
Start: 2023-11-13

## 2023-11-13 NOTE — TELEPHONE ENCOUNTER
Patient's grandson is calling to ask for a refill of his grandmother's donepezil to be sent to the [facility] on file going forward. Due to changes in her insurance she will not be using the mail order pharmacy.

## 2023-11-14 DIAGNOSIS — I10 ESSENTIAL HYPERTENSION: ICD-10-CM

## 2023-11-14 DIAGNOSIS — E78.2 MIXED HYPERLIPIDEMIA: ICD-10-CM

## 2023-11-14 RX ORDER — METOPROLOL TARTRATE 100 MG/1
100 TABLET ORAL 2 TIMES DAILY
Qty: 180 TABLET | Refills: 0 | Status: SHIPPED | OUTPATIENT
Start: 2023-11-14 | End: 2024-02-12

## 2023-11-14 RX ORDER — SIMVASTATIN 40 MG
40 TABLET ORAL NIGHTLY
Qty: 90 TABLET | Refills: 1 | Status: SHIPPED | OUTPATIENT
Start: 2023-11-14

## 2023-11-14 NOTE — TELEPHONE ENCOUNTER
----- Message from Johann Allen sent at 11/14/2023 10:02 AM EST -----  Subject: Refill Request    QUESTIONS  Name of Medication? simvastatin (ZOCOR) 40 MG tablet  Patient-reported dosage and instructions? ONCE A DAY 40 mg  How many days do you have left? 0  [email or web address]  Pharmacy phone number (if available)? [phone]  Additional Information for Provider? PLEASE SEND ALL MEDS TO [facility] FROM   NOW ON OUT. PATIENT UNAWARE OF AMOUNT LEFT  ---------------------------------------------------------------------------  --------------,  Name of Medication? metoprolol (LOPRESSOR) 100 MG tablet  Patient-reported dosage and instructions? ONCE A  mg  How many days do you have left? 0  [email or web address]  Pharmacy phone number (if available)? [phone]  Additional Information for Provider? PLEASE SEND ALL MEDS TO [facility] FROM   NOW ON OUT. PATIENT UNAWARE OF AMOUNT LEFT  ---------------------------------------------------------------------------  --------------  CALL BACK INFO  What is the best way for the office to contact you? OK to leave message on   voicemail  Preferred Call Back Phone Number? [phone]  ---------------------------------------------------------------------------  --------------  SCRIPT ANSWERS  Relationship to Patient?  Self

## 2023-11-19 DIAGNOSIS — M47.22 OTHER SPONDYLOSIS WITH RADICULOPATHY, CERVICAL REGION: ICD-10-CM

## 2023-11-20 RX ORDER — BACLOFEN 10 MG/1
10 TABLET ORAL 2 TIMES DAILY
Qty: 60 TABLET | Refills: 0 | Status: SHIPPED | OUTPATIENT
Start: 2023-11-20

## 2023-12-02 LAB — HBA1C MFR BLD HPLC: 7.7 %

## 2023-12-08 ENCOUNTER — OFFICE VISIT (OUTPATIENT)
Age: 88
End: 2023-12-08
Payer: MEDICARE

## 2023-12-08 VITALS
HEART RATE: 57 BPM | OXYGEN SATURATION: 98 % | SYSTOLIC BLOOD PRESSURE: 114 MMHG | BODY MASS INDEX: 27.62 KG/M2 | WEIGHT: 176 LBS | DIASTOLIC BLOOD PRESSURE: 71 MMHG | RESPIRATION RATE: 17 BRPM | HEIGHT: 67 IN

## 2023-12-08 DIAGNOSIS — F01.50 MIXED CORTICAL AND SUBCORTICAL VASCULAR DEMENTIA WITHOUT BEHAVIORAL DISTURBANCE (HCC): Primary | ICD-10-CM

## 2023-12-08 DIAGNOSIS — R41.3 SHORT-TERM MEMORY LOSS: ICD-10-CM

## 2023-12-08 DIAGNOSIS — R41.3 OTHER AMNESIA: ICD-10-CM

## 2023-12-08 PROCEDURE — 99215 OFFICE O/P EST HI 40 MIN: CPT

## 2023-12-08 PROCEDURE — 1123F ACP DISCUSS/DSCN MKR DOCD: CPT

## 2023-12-08 RX ORDER — DONEPEZIL HYDROCHLORIDE 10 MG/1
10 TABLET, FILM COATED ORAL NIGHTLY
Qty: 90 TABLET | Refills: 3 | Status: SHIPPED | OUTPATIENT
Start: 2023-12-08

## 2023-12-08 ASSESSMENT — PATIENT HEALTH QUESTIONNAIRE - PHQ9
SUM OF ALL RESPONSES TO PHQ QUESTIONS 1-9: 0
SUM OF ALL RESPONSES TO PHQ QUESTIONS 1-9: 0
2. FEELING DOWN, DEPRESSED OR HOPELESS: 0
SUM OF ALL RESPONSES TO PHQ QUESTIONS 1-9: 0
1. LITTLE INTEREST OR PLEASURE IN DOING THINGS: 0
SUM OF ALL RESPONSES TO PHQ QUESTIONS 1-9: 0
SUM OF ALL RESPONSES TO PHQ9 QUESTIONS 1 & 2: 0

## 2023-12-08 NOTE — PROGRESS NOTES
Chief Complaint   Patient presents with    Follow-up     dementia      Vitals:    12/08/23 1139   BP: 114/71   Pulse: 57   Resp: 17   SpO2: 98%
normal size and there is no pericardial effusion. Atherosclerotic calcifications are noted within multiple coronary arteries and  within the thoracic aorta. The thoracic aorta is normal in caliber. Mitral valve  annulus and aortic valve calcifications are noted. Pleura: There is no pleural fluid. Bones: There is no acute fracture or subluxation. The osseous structures are  diffusely demineralized. No lytic or sclerotic osseous lesion is seen. Upper abdomen: The visualized abdominal structures are normal.    Impression  Bilateral multifocal tree-in-bud opacities, likely infectious in  etiology. Recommend short interval follow-up to confirm resolution. CTA CHEST W WO CONTRAST 05/10/2022    Narrative  EXAM:  CTA CHEST W OR W WO CONT    INDICATION: Respiratory failure    COMPARISON: None. TECHNIQUE: Helical thin section chest CT following uneventful intravenous  administration of nonionic contrast 80 mL of isovue 370 according to  departmental PE protocol. Coronal and sagittal reformats were performed. 3D post  processing was performed. CT dose reduction was achieved through the use of a  standardized protocol tailored for this examination and automatic exposure  control for dose modulation. FINDINGS: This is a good quality study for the evaluation of pulmonary embolism  to the first subsegmental arterial level. There is no pulmonary embolism to this  level. THYROID: No nodule. MEDIASTINUM: No mass or lymphadenopathy. IGNACIO: No mass or lymphadenopathy. THORACIC AORTA: No aneurysm. HEART: Normal in size. Mitral annular calcification. ESOPHAGUS: No wall thickening or dilatation. TRACHEA/BRONCHI: Patent. PLEURA: Small bilateral pleural effusions. LUNGS: Clustered micronodules are present in the right upper lobe, with  scattered linear atelectasis and scarring. Nodule cluster is also present in the  left upper lobe. Dependent atelectasis is present in the bilateral lung bases.   UPPER

## 2024-02-05 DIAGNOSIS — I50.22 CHRONIC SYSTOLIC (CONGESTIVE) HEART FAILURE (HCC): ICD-10-CM

## 2024-02-05 RX ORDER — FUROSEMIDE 20 MG/1
TABLET ORAL
Qty: 90 TABLET | Refills: 0 | Status: SHIPPED | OUTPATIENT
Start: 2024-02-05

## 2024-02-05 NOTE — TELEPHONE ENCOUNTER
Last appt 9/29/2023      Next Apt:   4/5/2024 10:45 AM Renita Rodriguez, APRN - NP [facility] [address] [place] [address] - [address] - P [phone] - F [phone]  [address]  [facility] [address]  Phone: [phone] Fax: [phone]

## 2024-02-13 ENCOUNTER — TELEPHONE (OUTPATIENT)
Age: 89
End: 2024-02-13

## 2024-02-13 NOTE — TELEPHONE ENCOUNTER
Daughter is calling on status of FMLA forms   
These were filled out and faxed successfully on 01/29/2024. Attempted to call patient daughter to let her know. She did not answer, left a vm. Need to know where this needs to be sent to as we have faxed these to [employer] successfully   
no

## 2024-02-20 DIAGNOSIS — I10 ESSENTIAL HYPERTENSION: ICD-10-CM

## 2024-02-20 DIAGNOSIS — M47.22 OTHER SPONDYLOSIS WITH RADICULOPATHY, CERVICAL REGION: ICD-10-CM

## 2024-02-21 RX ORDER — AMLODIPINE BESYLATE 5 MG/1
TABLET ORAL
Qty: 90 TABLET | Refills: 0 | Status: SHIPPED | OUTPATIENT
Start: 2024-02-21

## 2024-02-21 RX ORDER — BACLOFEN 10 MG/1
10 TABLET ORAL 2 TIMES DAILY
Qty: 60 TABLET | Refills: 0 | Status: SHIPPED | OUTPATIENT
Start: 2024-02-21

## 2024-03-17 DIAGNOSIS — M47.22 OTHER SPONDYLOSIS WITH RADICULOPATHY, CERVICAL REGION: ICD-10-CM

## 2024-03-18 RX ORDER — BACLOFEN 10 MG/1
10 TABLET ORAL 2 TIMES DAILY
Qty: 60 TABLET | Refills: 0 | Status: SHIPPED | OUTPATIENT
Start: 2024-03-18

## 2024-03-30 DIAGNOSIS — D64.9 ANEMIA, UNSPECIFIED TYPE: ICD-10-CM

## 2024-03-30 DIAGNOSIS — I50.22 CHRONIC SYSTOLIC (CONGESTIVE) HEART FAILURE (HCC): ICD-10-CM

## 2024-04-01 RX ORDER — PNV NO.95/FERROUS FUM/FOLIC AC 28MG-0.8MG
TABLET ORAL
Qty: 90 TABLET | Refills: 0 | Status: SHIPPED | OUTPATIENT
Start: 2024-04-01

## 2024-04-01 RX ORDER — FUROSEMIDE 20 MG/1
TABLET ORAL
Qty: 90 TABLET | Refills: 0 | Status: SHIPPED | OUTPATIENT
Start: 2024-04-01

## 2024-04-05 ENCOUNTER — OFFICE VISIT (OUTPATIENT)
Age: 89
End: 2024-04-05
Payer: MEDICARE

## 2024-04-05 VITALS
OXYGEN SATURATION: 75 % | DIASTOLIC BLOOD PRESSURE: 60 MMHG | RESPIRATION RATE: 18 BRPM | TEMPERATURE: 97.5 F | HEIGHT: 67 IN | HEART RATE: 59 BPM | WEIGHT: 197.2 LBS | BODY MASS INDEX: 30.95 KG/M2 | SYSTOLIC BLOOD PRESSURE: 100 MMHG

## 2024-04-05 DIAGNOSIS — E11.21 TYPE 2 DIABETES WITH NEPHROPATHY (HCC): ICD-10-CM

## 2024-04-05 DIAGNOSIS — I50.22 CHRONIC SYSTOLIC (CONGESTIVE) HEART FAILURE (HCC): ICD-10-CM

## 2024-04-05 DIAGNOSIS — F32.0 MAJOR DEPRESSIVE DISORDER, SINGLE EPISODE, MILD (HCC): ICD-10-CM

## 2024-04-05 DIAGNOSIS — F01.50 MIXED CORTICAL AND SUBCORTICAL VASCULAR DEMENTIA WITHOUT BEHAVIORAL DISTURBANCE (HCC): ICD-10-CM

## 2024-04-05 DIAGNOSIS — E78.2 MIXED HYPERLIPIDEMIA: ICD-10-CM

## 2024-04-05 DIAGNOSIS — Z00.00 MEDICARE ANNUAL WELLNESS VISIT, SUBSEQUENT: Primary | ICD-10-CM

## 2024-04-05 DIAGNOSIS — N18.31 CHRONIC KIDNEY DISEASE, STAGE 3A (HCC): ICD-10-CM

## 2024-04-05 PROBLEM — J96.01 ACUTE RESPIRATORY FAILURE WITH HYPOXIA (HCC): Status: RESOLVED | Noted: 2022-05-09 | Resolved: 2024-04-05

## 2024-04-05 PROBLEM — J96.90 RESPIRATORY FAILURE (HCC): Status: RESOLVED | Noted: 2023-06-12 | Resolved: 2024-04-05

## 2024-04-05 PROCEDURE — G0439 PPPS, SUBSEQ VISIT: HCPCS | Performed by: INTERNAL MEDICINE

## 2024-04-05 PROCEDURE — 1123F ACP DISCUSS/DSCN MKR DOCD: CPT | Performed by: INTERNAL MEDICINE

## 2024-04-05 ASSESSMENT — PATIENT HEALTH QUESTIONNAIRE - PHQ9
1. LITTLE INTEREST OR PLEASURE IN DOING THINGS: NOT AT ALL
5. POOR APPETITE OR OVEREATING: NOT AT ALL
8. MOVING OR SPEAKING SO SLOWLY THAT OTHER PEOPLE COULD HAVE NOTICED. OR THE OPPOSITE, BEING SO FIGETY OR RESTLESS THAT YOU HAVE BEEN MOVING AROUND A LOT MORE THAN USUAL: NOT AT ALL
10. IF YOU CHECKED OFF ANY PROBLEMS, HOW DIFFICULT HAVE THESE PROBLEMS MADE IT FOR YOU TO DO YOUR WORK, TAKE CARE OF THINGS AT HOME, OR GET ALONG WITH OTHER PEOPLE: NOT DIFFICULT AT ALL
9. THOUGHTS THAT YOU WOULD BE BETTER OFF DEAD, OR OF HURTING YOURSELF: NOT AT ALL
4. FEELING TIRED OR HAVING LITTLE ENERGY: NOT AT ALL
2. FEELING DOWN, DEPRESSED OR HOPELESS: NOT AT ALL
7. TROUBLE CONCENTRATING ON THINGS, SUCH AS READING THE NEWSPAPER OR WATCHING TELEVISION: NOT AT ALL
SUM OF ALL RESPONSES TO PHQ QUESTIONS 1-9: 0
3. TROUBLE FALLING OR STAYING ASLEEP: NOT AT ALL
6. FEELING BAD ABOUT YOURSELF - OR THAT YOU ARE A FAILURE OR HAVE LET YOURSELF OR YOUR FAMILY DOWN: NOT AT ALL
SUM OF ALL RESPONSES TO PHQ QUESTIONS 1-9: 0
SUM OF ALL RESPONSES TO PHQ9 QUESTIONS 1 & 2: 0
SUM OF ALL RESPONSES TO PHQ QUESTIONS 1-9: 0
SUM OF ALL RESPONSES TO PHQ QUESTIONS 1-9: 0

## 2024-04-05 ASSESSMENT — ANXIETY QUESTIONNAIRES
GAD7 TOTAL SCORE: 0
2. NOT BEING ABLE TO STOP OR CONTROL WORRYING: NOT AT ALL
1. FEELING NERVOUS, ANXIOUS, OR ON EDGE: NOT AT ALL
6. BECOMING EASILY ANNOYED OR IRRITABLE: NOT AT ALL
7. FEELING AFRAID AS IF SOMETHING AWFUL MIGHT HAPPEN: NOT AT ALL
3. WORRYING TOO MUCH ABOUT DIFFERENT THINGS: NOT AT ALL
IF YOU CHECKED OFF ANY PROBLEMS ON THIS QUESTIONNAIRE, HOW DIFFICULT HAVE THESE PROBLEMS MADE IT FOR YOU TO DO YOUR WORK, TAKE CARE OF THINGS AT HOME, OR GET ALONG WITH OTHER PEOPLE: NOT DIFFICULT AT ALL
5. BEING SO RESTLESS THAT IT IS HARD TO SIT STILL: NOT AT ALL
4. TROUBLE RELAXING: NOT AT ALL

## 2024-04-05 NOTE — PROGRESS NOTES
Addended by: LEIGHA FERRARA on: 3/24/2022 11:34 PM     Modules accepted: Orders     Chief Complaint   Patient presents with    6 Month Follow-Up    Diabetes     \"Have you been to the ER, urgent care clinic since your last visit?  Hospitalized since your last visit?\"    NO    “Have you seen or consulted any other health care providers outside of [facility] since your last visit?”    NO            Click Here for Release of Records Request     OCCUPATIONAL THERAPY Treatment:    Date: 6/15/2018  Recorded diagnosis/complaint at time of admission:  Active Hospital Problems    Diagnosis Date Noted   • Status post total bilateral knee replacement 06/13/2018     Priority: Low   • Gastroesophageal reflux disease without esophagitis 05/31/2018     Priority: Low     EGD done by Dr. Herve Walker August 2014     • Primary osteoarthritis of knees, bilateral 03/05/2018     Priority: Low     Co-morbidities:   Patient Active Problem List   Diagnosis   • Bilateral chronic knee pain   • Primary osteoarthritis of knees, bilateral   • FH: colon cancer   • Gastroesophageal reflux disease without esophagitis   • Major depressive disorder with single episode, in full remission (CMS/[facility])   • Status post total bilateral knee replacement     Task Modification: clinical decision making of low complexity, no task modification  Treatment Diagnosis: Pain, Impaired Gait/Locomotion Deficits and Impaired Mobility    S/p deng TKA on 6/13/2018    Therapy Precautions:  Weight Bearing Status WBAT deng LE    Activity: As tolerated    Cognition: Alert and [place] x3      SUBJECTIVE:   Pt. reported agreeable to therapy. Patient reported \"I may have been a little overzealous\" referring to physical therapy session earlier in AM. Patient reported having more pain now but still tolerable for participation in OT session.  Pt's personal goal for therapy: return home. Patient reported in a prior session she will have assist of her son and friends at discharge but will not have 24 hour assist.    Pain:  At Rest: 5/10  With Activity: \"Sore\"/10  Intervention: premedicated, nursing aware, repositioned, ice applied and activity  Location: bilateral, knee  Quality: sore     OBSERVATION:   Pt is utilizing Capped IV  Skin Integrity: deng knee surgical incisions, covered with aquacel dressings  Edema: post-surgical bilateral, lower extremity  Collaboration with: Nurse Wagner      Vital Signs: not warranted at this  time    ROM (degrees):  within functional limits    Strength (out of 5 in available AROM):  UE: within functional limits    Neurological:  Coordination: intact    Balance:   Static Sitting: intact  Dynamic Sitting: intact  Static Standing: impaired supv with deng UE supported on walker  Dynamic Standing: impaired contact guard assist for safety due to post-op weakness, pain, fatigue       ADLs:   Toileting:  Level of Assistance: Stand By Assistance (SBA)  Reason for Assistance: requires increased time to complete, safety due to clothing management in stand, post-op weakness, pain, fatigue  Adaptive Equipment: commode over toilet    Upper Body Bathing:   Level of Assistance: Stand By Assistance (SBA)  Bathing Position: standing sinkside  Reason for Assistance: requires increased time to complete, safety due to post-op weakness, pain, fatigue    Upper Body Dressing:   Level of Assistance: Stand By Assistance (SBA)  Dressing Position: standing in front of recliner, to don personal nightgown  Reason for Assistance: requires increased time to complete, safety due to post-op weakness, pain, fatigue    Lower Body Bathing:   Level of Assistance: Contact Guard Assistance (CGA)  Bathing Position: standing sinkside  Reason for Assistance: requires increased time to complete, safety due to pain, fatigue, weakness    Lower Body Dressing:    Level of Assistance: Contact Guard Assistance (CGA)  Dressing Position: sitting in chair, standing, to don underwear  Reason for Assistance: requires increased time to complete, safety due to pain, fatigue, weakness, verbal cues for sequencing and technique    Hygiene/Grooming:   Level of Assistance: Stand By Assistance (SBA)  Dressing Position: standing sinkside, to brush teeth, use deodorant  Reason for Assistance: requires increased time to complete, safety due to post-op weakness, pain, fatigue    Self Feeding:  Patient at baseline status at this time.  OT will not address this area unless  change in functional status occurs throughout hospital stay.     ADL Functional Mobility:  Item Retrieval: assistance level: Contact Guard Assistance (CGA)  verbal cues required for positioning  assistive device used: gait belt, 2 wheeled walker  Transporting Items: assistance level: Contact Guard Assistance (CGA)  verbal cues required for technique to use front bar of walker for clothing transport  adaptive equipment used: none, educated patient on walker accessories to maximize safety during small item transport  assistive device used: gait belt, 2 wheeled walker  Reason for assistance: requires increased time to complete, safety due to post-op weakness, pain, fatigue       MOBILITY:    Bed:   Deferred secondary to patient sitting up in recliner at start and end of session    Transfers:   Device Used: gait belt, 2 wheeled walker  Sit to Stand: Contact Guard Assistance (CGA)  Stand to Sit: Stand By Assistance (SBA)  Toilet: Contact Guard Assistance (CGA), progressing to SBA  Reason for Assistance: requires increased time to complete, safety due to pain, weakness, verbal cues for increased eccentric control to sit, fatigue    Ambulation:   Assistance Level: Contact Guard Assistance (CGA)  Distance: 45 feet  Device Used: gait belt and 2 wheeled walker  Reason for Assistance: requires increased time to complete, safety due to post-op weakness, pain, verbal cues for breathing, fatigue    Exercises:  Not addressed this session     Activity Tolerance: no limits in patient's ability to participate in session     GOALS:     Rehab potential is good due to positive factors age, family support, post - surgical  and negative factors pain level, activity tolerance    Review Date: 6/21/2018  1. Patient will complete upper body bathing with Modified [place] (mod I).  Progressing toward  2. Patient will complete upper body dressing with Modified [place] (mod I).  Progressing toward  3. Patient will complete lower body  bathing with Modified [place] (mod I).  Progressing toward  4. Patient will complete lower body dressing with Modified [place] (mod I).  Progressing toward  5. Patient will complete toileting with Modified [place] (mod I).  Progressing toward  6. Patient will complete tub/shower transfer with Modified [place] (mod I).  Progressing toward  7. Patient will complete hygiene/grooming with Modified [place] (mod I).  Progressing toward  8. Patient will complete item retrieval with Modified [place] (mod I). Progressing toward      PLAN OF CARE:    OT Frequency: Twice a day  Duration: until goals met or D/C  Treatment Interventions: ADL retraining, UE strengthening/ROM, Functional transfer training, Endurance training, Patient/Family training, Equipment eval/education, Compensatory technique education, Continued evaluation     RECOMMENDATIONS/EDUCATION:    Learner: patient  Readiness to Learn: acceptance  Learning Method: Verbal  Barriers to Learning: no barriers apparent at this time  Learning Evaluation: Verbalizes understanding  Teaching Content: Positioning, Equipment, Functional Mobility and ADL training  Please reference the patient education activity for further information regarding the patient's learning assessment.  Equipment for discharge: to be determined - continuing to assess needs at this time, patient reported having a commode for over toilet at home and shower chair     See doc flowsheet (OT assess/treat/goals/charges) for specific information regarding time spent with patient.     Treatment Plan for Next Session: TEDs education, lower body dressing (socks, shoes), bring AE    The plan of care and goals were established with the patient and she is in agreement.     ASSESSMENT:      Patient presents to occupational therapy on POD 2 s/p bilateral TKR.  Patient is demonstrating decreased strength, pain, decreased activity tolerance which is limiting the completion of all ADLs and  all functional mobility.  Further skilled occupational therapy is required to address these limitations in attempt to maximize the patient's independence.    Recommendations for Discharge: OT: Home        Recommendation for Discharge: PT: Home, OP therapy (and assist of family)           After today's session this therapist is recommending the above location for optimal discharge.  This recommendation is supported by at initial evaluation  patient scored a 40.22 on the Am-Pac Activity domain, which indicates patient is safe to return home . Patient has a high prior level of function and is progressing well towards her functional goals.

## 2024-04-05 NOTE — PROGRESS NOTES
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)  Major depressive disorder, single episode, mild (HCC)  -     CBC with Auto Differential; Future  -     Comprehensive Metabolic Panel; Future  Type 2 diabetes with nephropathy (HCC)  -     Hemoglobin A1C; Future  -     CBC with Auto Differential; Future  -     Comprehensive Metabolic Panel; Future  -     Microalbumin / Creatinine Urine Ratio; Future  Chronic kidney disease, stage 3a (HCC)  -     Microalbumin / Creatinine Urine Ratio; Future  Mixed hyperlipidemia  -     Lipid Panel; Future  -     Mansoor Baptiste MD, [facility] ([address])    Reviewed with patient medication side effects in detail   I answered all patient questions and concerns  Labs and testing done and/or upcoming labs/test were reviewed and discussed   Reviewed and discussed daily activity, exercise and diet    Recommendations for Preventive Services Due: see orders and patient instructions/AVS.  Recommended screening schedule for the next 5-10 years is provided to the patient in written form: see Patient Instructions/AVS.     Return in about 6 months (around 10/5/2024) for follow up if symptoms persist, follow up for routine visit or before if needed.     Subjective     Patient was seen with her daughter for a follow up and medicare wellness.     She has been doing very well. Is followed by neurology for her changes in memory. Is currently on Aricept. Tolerating this well.     He lives at home with her daughter. No falls and uses a walker to get around, but does not walk a lot. Will consider vaccination up dates.     Will need

## 2024-04-06 LAB
ALBUMIN SERPL-MCNC: 4.2 G/DL (ref 3.7–4.7)
ALBUMIN/CREAT UR: 32 MG/G CREAT (ref 0–29)
ALBUMIN/GLOB SERPL: 1.4 {RATIO} (ref 1.2–2.2)
ALP SERPL-CCNC: 72 IU/L (ref 44–121)
ALT SERPL-CCNC: 18 IU/L (ref 0–32)
AST SERPL-CCNC: 20 IU/L (ref 0–40)
BILIRUB SERPL-MCNC: 0.3 MG/DL (ref 0–1.2)
BUN SERPL-MCNC: 29 MG/DL (ref 8–27)
BUN/CREAT SERPL: 20 (ref 12–28)
CALCIUM SERPL-MCNC: 9.8 MG/DL (ref 8.7–10.3)
CHLORIDE SERPL-SCNC: 99 MMOL/L (ref 96–106)
CHOLEST SERPL-MCNC: 151 MG/DL (ref 100–199)
CO2 SERPL-SCNC: 29 MMOL/L (ref 20–29)
CREAT SERPL-MCNC: 1.42 MG/DL (ref 0.57–1)
CREAT UR-MCNC: 61.7 MG/DL
EGFRCR SERPLBLD CKD-EPI 2021: 36 ML/MIN/1.73
GLOBULIN SER CALC-MCNC: 2.9 G/DL (ref 1.5–4.5)
GLUCOSE SERPL-MCNC: 165 MG/DL (ref 70–99)
HBA1C MFR BLD: 6.3 % (ref 4.8–5.6)
HDLC SERPL-MCNC: 70 MG/DL
LDLC SERPL CALC-MCNC: 68 MG/DL (ref 0–99)
MICROALBUMIN UR-MCNC: 19.8 UG/ML
POTASSIUM SERPL-SCNC: 4.6 MMOL/L (ref 3.5–5.2)
PROT SERPL-MCNC: 7.1 G/DL (ref 6–8.5)
SODIUM SERPL-SCNC: 141 MMOL/L (ref 134–144)
TRIGL SERPL-MCNC: 62 MG/DL (ref 0–149)
VLDLC SERPL CALC-MCNC: 13 MG/DL (ref 5–40)

## 2024-04-07 LAB
BASOPHILS # BLD AUTO: 0 X10E3/UL (ref 0–0.2)
BASOPHILS NFR BLD AUTO: 1 %
EOSINOPHIL # BLD AUTO: 0.1 X10E3/UL (ref 0–0.4)
EOSINOPHIL NFR BLD AUTO: 2 %
ERYTHROCYTE [DISTWIDTH] IN BLOOD BY AUTOMATED COUNT: 13.5 % (ref 11.7–15.4)
HCT VFR BLD AUTO: 34.1 % (ref 34–46.6)
HGB BLD-MCNC: 12.6 G/DL (ref 11.1–15.9)
IMM GRANULOCYTES # BLD AUTO: 0 X10E3/UL (ref 0–0.1)
IMM GRANULOCYTES NFR BLD AUTO: 0 %
IMP & REVIEW OF LAB RESULTS: NORMAL
LYMPHOCYTES # BLD AUTO: 1.6 X10E3/UL (ref 0.7–3.1)
LYMPHOCYTES NFR BLD AUTO: 22 %
Lab: NORMAL
MCH RBC QN AUTO: 36.4 PG (ref 26.6–33)
MCHC RBC AUTO-ENTMCNC: 37 G/DL (ref 31.5–35.7)
MCV RBC AUTO: 99 FL (ref 79–97)
MONOCYTES # BLD AUTO: 0.4 X10E3/UL (ref 0.1–0.9)
MONOCYTES NFR BLD AUTO: 6 %
MORPHOLOGY BLD-IMP: ABNORMAL
NEUTROPHILS # BLD AUTO: 5 X10E3/UL (ref 1.4–7)
NEUTROPHILS NFR BLD AUTO: 69 %
PLATELET # BLD AUTO: 278 X10E3/UL (ref 150–450)
RBC # BLD AUTO: 3.46 X10E6/UL (ref 3.77–5.28)
REPORT: NORMAL
REPORT: NORMAL
WBC # BLD AUTO: 7.2 X10E3/UL (ref 3.4–10.8)

## 2024-04-08 ENCOUNTER — TELEPHONE (OUTPATIENT)
Age: 89
End: 2024-04-08

## 2024-04-08 NOTE — TELEPHONE ENCOUNTER
Called 08:52 AM  Lvm  Regarding labs    ----- Message from FEDERICO Lundberg NP sent at 4/7/2024  3:43 PM EDT -----  DM is improved. Continue with plan    Cr is elevated. Patient patient has CKD and has for some time now. Ask daughter to have patient see a nephrologist

## 2024-04-12 NOTE — ROUTINE PROCESS
TRANSFER - IN REPORT:    Verbal report received from [place] (name) on [address]  being received from ED (unit) for routine progression of care      Report consisted of patients Situation, Background, Assessment and   Recommendations(SBAR). Information from the following report(s) SBAR, Kardex, Intake/Output, MAR, Accordion, Recent Results and Cardiac Rhythm NSR was reviewed with the receiving nurse. Opportunity for questions and clarification was provided. Assessment completed upon patients arrival to unit and care assumed. Bedside shift change report given to Marquez Fernandez (oncoming nurse) by Courtney Aggarwal (offgoing nurse). Report included the following information SBAR, Kardex, ED Summary, Intake/Output, MAR, Accordion, Recent Results and Cardiac Rhythm NSR. none

## 2024-04-15 DIAGNOSIS — M47.22 OTHER SPONDYLOSIS WITH RADICULOPATHY, CERVICAL REGION: ICD-10-CM

## 2024-04-15 RX ORDER — BACLOFEN 10 MG/1
10 TABLET ORAL 2 TIMES DAILY
Qty: 60 TABLET | Refills: 0 | Status: SHIPPED | OUTPATIENT
Start: 2024-04-15

## 2024-05-04 DIAGNOSIS — I10 ESSENTIAL HYPERTENSION: ICD-10-CM

## 2024-05-04 DIAGNOSIS — D64.9 ANEMIA, UNSPECIFIED TYPE: ICD-10-CM

## 2024-05-06 RX ORDER — AMLODIPINE BESYLATE 5 MG/1
TABLET ORAL
Qty: 90 TABLET | Refills: 0 | Status: SHIPPED | OUTPATIENT
Start: 2024-05-06

## 2024-05-06 RX ORDER — PNV NO.95/FERROUS FUM/FOLIC AC 28MG-0.8MG
TABLET ORAL
Qty: 90 TABLET | Refills: 0 | Status: SHIPPED | OUTPATIENT
Start: 2024-05-06

## 2024-05-06 RX ORDER — METOPROLOL TARTRATE 100 MG/1
100 TABLET ORAL 2 TIMES DAILY
Qty: 180 TABLET | Refills: 0 | Status: SHIPPED | OUTPATIENT
Start: 2024-05-06

## 2024-05-10 DIAGNOSIS — F32.0 MAJOR DEPRESSIVE DISORDER, SINGLE EPISODE, MILD (HCC): ICD-10-CM

## 2024-05-10 RX ORDER — PAROXETINE HYDROCHLORIDE 20 MG/1
20 TABLET, FILM COATED ORAL NIGHTLY
Qty: 90 TABLET | Refills: 0 | Status: SHIPPED | OUTPATIENT
Start: 2024-05-10

## 2024-05-16 DIAGNOSIS — I10 ESSENTIAL HYPERTENSION: ICD-10-CM

## 2024-05-17 RX ORDER — AMLODIPINE BESYLATE 5 MG/1
TABLET ORAL
Qty: 90 TABLET | Refills: 0 | OUTPATIENT
Start: 2024-05-17

## 2024-05-24 DIAGNOSIS — F32.0 MAJOR DEPRESSIVE DISORDER, SINGLE EPISODE, MILD (HCC): ICD-10-CM

## 2024-05-24 RX ORDER — PAROXETINE HYDROCHLORIDE 20 MG/1
20 TABLET, FILM COATED ORAL NIGHTLY
Qty: 90 TABLET | Refills: 0 | OUTPATIENT
Start: 2024-05-24

## 2024-06-06 DIAGNOSIS — M47.22 OTHER SPONDYLOSIS WITH RADICULOPATHY, CERVICAL REGION: ICD-10-CM

## 2024-06-06 RX ORDER — BACLOFEN 10 MG/1
10 TABLET ORAL 2 TIMES DAILY
Qty: 60 TABLET | Refills: 0 | Status: SHIPPED | OUTPATIENT
Start: 2024-06-06

## 2024-07-10 DIAGNOSIS — M47.22 OTHER SPONDYLOSIS WITH RADICULOPATHY, CERVICAL REGION: ICD-10-CM

## 2024-07-10 RX ORDER — BACLOFEN 10 MG/1
10 TABLET ORAL 2 TIMES DAILY
Qty: 60 TABLET | Refills: 0 | Status: SHIPPED | OUTPATIENT
Start: 2024-07-10

## 2024-08-02 DIAGNOSIS — I10 ESSENTIAL HYPERTENSION: ICD-10-CM

## 2024-08-02 DIAGNOSIS — I50.22 CHRONIC SYSTOLIC (CONGESTIVE) HEART FAILURE (HCC): ICD-10-CM

## 2024-08-02 RX ORDER — FUROSEMIDE 20 MG/1
TABLET ORAL
Qty: 90 TABLET | Refills: 0 | Status: SHIPPED | OUTPATIENT
Start: 2024-08-02

## 2024-08-02 RX ORDER — AMLODIPINE BESYLATE 5 MG/1
TABLET ORAL
Qty: 90 TABLET | Refills: 0 | Status: SHIPPED | OUTPATIENT
Start: 2024-08-02

## 2024-08-05 DIAGNOSIS — I10 ESSENTIAL HYPERTENSION: ICD-10-CM

## 2024-08-05 DIAGNOSIS — D64.9 ANEMIA, UNSPECIFIED TYPE: ICD-10-CM

## 2024-08-05 RX ORDER — METOPROLOL TARTRATE 100 MG/1
100 TABLET ORAL 2 TIMES DAILY
Qty: 180 TABLET | Refills: 0 | Status: SHIPPED | OUTPATIENT
Start: 2024-08-05

## 2024-08-06 RX ORDER — PNV NO.95/FERROUS FUM/FOLIC AC 28MG-0.8MG
TABLET ORAL
Qty: 90 TABLET | Refills: 0 | Status: SHIPPED | OUTPATIENT
Start: 2024-08-06

## 2024-08-31 DIAGNOSIS — M47.22 OTHER SPONDYLOSIS WITH RADICULOPATHY, CERVICAL REGION: ICD-10-CM

## 2024-09-02 RX ORDER — BACLOFEN 10 MG/1
10 TABLET ORAL 2 TIMES DAILY
Qty: 60 TABLET | Refills: 0 | Status: SHIPPED | OUTPATIENT
Start: 2024-09-02

## 2024-09-13 ENCOUNTER — OFFICE VISIT (OUTPATIENT)
Age: 89
End: 2024-09-13
Payer: MEDICARE

## 2024-09-13 VITALS
HEIGHT: 67 IN | BODY MASS INDEX: 30.29 KG/M2 | SYSTOLIC BLOOD PRESSURE: 102 MMHG | HEART RATE: 55 BPM | RESPIRATION RATE: 16 BRPM | DIASTOLIC BLOOD PRESSURE: 62 MMHG | OXYGEN SATURATION: 91 % | WEIGHT: 193 LBS

## 2024-09-13 VITALS
SYSTOLIC BLOOD PRESSURE: 120 MMHG | TEMPERATURE: 98 F | DIASTOLIC BLOOD PRESSURE: 60 MMHG | HEIGHT: 67 IN | WEIGHT: 193 LBS | BODY MASS INDEX: 30.29 KG/M2 | RESPIRATION RATE: 18 BRPM | HEART RATE: 50 BPM | OXYGEN SATURATION: 91 %

## 2024-09-13 DIAGNOSIS — R44.0 AUDITORY HALLUCINATIONS: ICD-10-CM

## 2024-09-13 DIAGNOSIS — R41.3 SHORT-TERM MEMORY LOSS: ICD-10-CM

## 2024-09-13 DIAGNOSIS — F01.50 MIXED CORTICAL AND SUBCORTICAL VASCULAR DEMENTIA WITHOUT BEHAVIORAL DISTURBANCE (HCC): Primary | ICD-10-CM

## 2024-09-13 DIAGNOSIS — E11.21 TYPE 2 DIABETES WITH NEPHROPATHY (HCC): ICD-10-CM

## 2024-09-13 DIAGNOSIS — E78.2 MIXED HYPERLIPIDEMIA: ICD-10-CM

## 2024-09-13 DIAGNOSIS — I10 ESSENTIAL HYPERTENSION: ICD-10-CM

## 2024-09-13 DIAGNOSIS — T88.7XXA MEDICATION SIDE EFFECTS: ICD-10-CM

## 2024-09-13 DIAGNOSIS — R21 GROIN RASH: Primary | ICD-10-CM

## 2024-09-13 PROCEDURE — 3044F HG A1C LEVEL LT 7.0%: CPT | Performed by: INTERNAL MEDICINE

## 2024-09-13 PROCEDURE — 1123F ACP DISCUSS/DSCN MKR DOCD: CPT | Performed by: INTERNAL MEDICINE

## 2024-09-13 PROCEDURE — 99214 OFFICE O/P EST MOD 30 MIN: CPT | Performed by: INTERNAL MEDICINE

## 2024-09-13 PROCEDURE — 99215 OFFICE O/P EST HI 40 MIN: CPT

## 2024-09-13 PROCEDURE — 1123F ACP DISCUSS/DSCN MKR DOCD: CPT

## 2024-09-13 RX ORDER — DONEPEZIL HYDROCHLORIDE 5 MG/1
5 TABLET, FILM COATED ORAL NIGHTLY
Qty: 90 TABLET | Refills: 1 | Status: SHIPPED | OUTPATIENT
Start: 2024-09-13

## 2024-09-13 RX ORDER — MEMANTINE HYDROCHLORIDE 5 MG/1
5 TABLET ORAL 2 TIMES DAILY
Qty: 180 TABLET | Refills: 1 | Status: SHIPPED | OUTPATIENT
Start: 2024-09-13

## 2024-09-13 RX ORDER — NYSTATIN 100000 U/G
CREAM TOPICAL
Qty: 1 EACH | Refills: 1 | Status: SHIPPED | OUTPATIENT
Start: 2024-09-13

## 2024-09-13 RX ORDER — GLUCOSAMINE HCL/CHONDROITIN SU 500-400 MG
CAPSULE ORAL
Qty: 200 STRIP | Refills: 1 | Status: SHIPPED | OUTPATIENT
Start: 2024-09-13

## 2024-09-13 RX ORDER — NYSTATIN 100000 [USP'U]/G
POWDER TOPICAL
Refills: 0 | Status: CANCELLED | OUTPATIENT
Start: 2024-09-13

## 2024-09-13 SDOH — ECONOMIC STABILITY: FOOD INSECURITY: WITHIN THE PAST 12 MONTHS, YOU WORRIED THAT YOUR FOOD WOULD RUN OUT BEFORE YOU GOT MONEY TO BUY MORE.: NEVER TRUE

## 2024-09-13 SDOH — ECONOMIC STABILITY: FOOD INSECURITY: WITHIN THE PAST 12 MONTHS, THE FOOD YOU BOUGHT JUST DIDN'T LAST AND YOU DIDN'T HAVE MONEY TO GET MORE.: NEVER TRUE

## 2024-09-13 SDOH — ECONOMIC STABILITY: INCOME INSECURITY: HOW HARD IS IT FOR YOU TO PAY FOR THE VERY BASICS LIKE FOOD, HOUSING, MEDICAL CARE, AND HEATING?: NOT HARD AT ALL

## 2024-09-13 ASSESSMENT — PATIENT HEALTH QUESTIONNAIRE - PHQ9
SUM OF ALL RESPONSES TO PHQ9 QUESTIONS 1 & 2: 0
2. FEELING DOWN, DEPRESSED OR HOPELESS: NOT AT ALL
SUM OF ALL RESPONSES TO PHQ QUESTIONS 1-9: 0
SUM OF ALL RESPONSES TO PHQ QUESTIONS 1-9: 0
1. LITTLE INTEREST OR PLEASURE IN DOING THINGS: NOT AT ALL
SUM OF ALL RESPONSES TO PHQ QUESTIONS 1-9: 0
SUM OF ALL RESPONSES TO PHQ QUESTIONS 1-9: 0

## 2024-09-13 ASSESSMENT — ENCOUNTER SYMPTOMS
DIARRHEA: 1
RESPIRATORY NEGATIVE: 1

## 2024-09-28 DIAGNOSIS — M47.22 OTHER SPONDYLOSIS WITH RADICULOPATHY, CERVICAL REGION: ICD-10-CM

## 2024-09-30 RX ORDER — BACLOFEN 10 MG/1
10 TABLET ORAL 2 TIMES DAILY
Qty: 60 TABLET | Refills: 0 | Status: SHIPPED | OUTPATIENT
Start: 2024-09-30

## 2024-10-04 LAB
ALBUMIN SERPL-MCNC: 4 G/DL (ref 3.7–4.7)
ALP SERPL-CCNC: 53 IU/L (ref 44–121)
ALT SERPL-CCNC: 14 IU/L (ref 0–32)
AST SERPL-CCNC: 24 IU/L (ref 0–40)
BILIRUB SERPL-MCNC: 0.3 MG/DL (ref 0–1.2)
BUN SERPL-MCNC: 26 MG/DL (ref 8–27)
BUN/CREAT SERPL: 17 (ref 12–28)
CALCIUM SERPL-MCNC: 9.7 MG/DL (ref 8.7–10.3)
CHLORIDE SERPL-SCNC: 98 MMOL/L (ref 96–106)
CO2 SERPL-SCNC: 27 MMOL/L (ref 20–29)
CREAT SERPL-MCNC: 1.51 MG/DL (ref 0.57–1)
EGFRCR SERPLBLD CKD-EPI 2021: 33 ML/MIN/1.73
GLOBULIN SER CALC-MCNC: 3.1 G/DL (ref 1.5–4.5)
GLUCOSE SERPL-MCNC: 125 MG/DL (ref 70–99)
POTASSIUM SERPL-SCNC: 5.3 MMOL/L (ref 3.5–5.2)
PROT SERPL-MCNC: 7.1 G/DL (ref 6–8.5)
SODIUM SERPL-SCNC: 140 MMOL/L (ref 134–144)

## 2024-10-04 RX ORDER — TRAMADOL HYDROCHLORIDE 50 MG/1
50 TABLET ORAL EVERY 8 HOURS PRN
Qty: 30 TABLET | Refills: 0 | OUTPATIENT
Start: 2024-10-04 | End: 2024-11-03

## 2024-10-04 RX ORDER — ERGOCALCIFEROL 1.25 MG/1
1 CAPSULE ORAL WEEKLY
Qty: 4 CAPSULE | Refills: 2 | Status: SHIPPED | OUTPATIENT
Start: 2024-10-04

## 2024-10-04 NOTE — TELEPHONE ENCOUNTER
[facility] [address] [place] [address] [address] - P [phone] - F [phone]     ergocalciferol (ERGOCALCIFEROL) 1.25 MG (25170 UT) capsule     traMADol (ULTRAM) 50 MG tablet     09/13/2024  No upcoming

## 2024-10-08 LAB
Lab: NORMAL
REPORT: NORMAL

## 2024-10-11 ENCOUNTER — OFFICE VISIT (OUTPATIENT)
Age: 89
End: 2024-10-11
Payer: MEDICARE

## 2024-10-11 VITALS
HEIGHT: 67 IN | HEART RATE: 55 BPM | DIASTOLIC BLOOD PRESSURE: 64 MMHG | WEIGHT: 179 LBS | BODY MASS INDEX: 28.09 KG/M2 | SYSTOLIC BLOOD PRESSURE: 103 MMHG

## 2024-10-11 DIAGNOSIS — E11.21 TYPE 2 DIABETES WITH NEPHROPATHY (HCC): ICD-10-CM

## 2024-10-11 DIAGNOSIS — E66.3 OVERWEIGHT: ICD-10-CM

## 2024-10-11 DIAGNOSIS — E11.21 TYPE 2 DIABETES WITH NEPHROPATHY (HCC): Primary | ICD-10-CM

## 2024-10-11 LAB
HBA1C MFR BLD: 7 %
LIPASE SERPL-CCNC: 22 U/L (ref 13–75)

## 2024-10-11 PROCEDURE — 1123F ACP DISCUSS/DSCN MKR DOCD: CPT | Performed by: GENERAL ACUTE CARE HOSPITAL

## 2024-10-11 PROCEDURE — 83036 HEMOGLOBIN GLYCOSYLATED A1C: CPT | Performed by: GENERAL ACUTE CARE HOSPITAL

## 2024-10-11 PROCEDURE — 99204 OFFICE O/P NEW MOD 45 MIN: CPT | Performed by: GENERAL ACUTE CARE HOSPITAL

## 2024-10-11 PROCEDURE — 3044F HG A1C LEVEL LT 7.0%: CPT | Performed by: GENERAL ACUTE CARE HOSPITAL

## 2024-10-11 RX ORDER — ERGOCALCIFEROL 1.25 MG/1
50000 CAPSULE, LIQUID FILLED ORAL WEEKLY
Qty: 12 CAPSULE | Refills: 0 | Status: SHIPPED | OUTPATIENT
Start: 2024-10-11

## 2024-10-11 RX ORDER — SITAGLIPTIN 25 MG/1
TABLET, FILM COATED ORAL
Qty: 90 TABLET | Refills: 3 | Status: SHIPPED | OUTPATIENT
Start: 2024-10-11

## 2024-10-11 RX ORDER — BLOOD-GLUCOSE METER
EACH MISCELLANEOUS
COMMUNITY

## 2024-10-11 RX ORDER — GLIMEPIRIDE 1 MG/1
1 TABLET ORAL
Qty: 90 TABLET | Refills: 1 | Status: SHIPPED | OUTPATIENT
Start: 2024-10-11

## 2024-10-11 NOTE — PATIENT INSTRUCTIONS
PLAN FOR TODAY    Blood sugar targets:    -Fasting morning blood sugar = 80 to 150  -Rest of the day = Less than 200    We will plan to make the following changes to your diabetes medications:    Continue on Januvia 25 mg daily and Glimepiride 1 mg daily    It will be important to continue checking your glucose just as you did previously.   I would like you at the very least to check you glucose during:    AM fasting before breakfast  Any other time that you are not feeling well.     Always provide a glucose log that is completed at every visit so that we can review the results of your home glucose together. Without this, it is not possible to make accurate changes to your diabetes regimen.    MD [facility]       Diabetes and Meal Planning    Meal planning can be a key part of managing diabetes. Planning meals and snacks with the right balance of carbohydrate, protein, and fat can help you keep your blood sugar at the target level.  You don't have to eat special foods. You can eat what your family eats, including sweets once in a while. But you do have to pay attention to how often you eat and how much you eat of certain foods.    Your plate  The plate format is a simple way to help you manage how you eat. You plan meals by learning how much space each food should take on a plate. It can make it easier to keep your blood sugar level within your target range. It also helps you see if you're eating healthy portion sizes.  To use the plate format, you put non-starchy vegetables on half your plate. Add lean protein foods, such as fish, lean meats and poultry, or soy products, on one-quarter of the plate. Put a grain or starchy vegetable (such as brown rice or a potato) on the final quarter of the plate. You can add a small piece of fruit and some low-fat or fat-free milk or yogurt, depending on your carbohydrate goal for each meal.  Make sure that you are not using an oversized plate.

## 2024-10-11 NOTE — PROGRESS NOTES
ankle.    ---------------------------------------------------------------------------    Hypoglycemia:    Hypoglycemia means that your blood sugar is low and your body is not getting enough fuel. Some people get low blood sugar from not eating often enough. Some medicines to treat diabetes can cause low blood sugar. People who have had surgery on their stomachs or intestines may get hypoglycemia. Problems with the pancreas, kidneys, or liver also can cause low blood sugar.  A snack or drink with sugar in it will raise your blood sugar and should ease your symptoms right away.  How can you care for yourself at home?  Learn your signs of low blood sugar. They are different for everyone. Some common early signs include:  Nausea.  Hunger.  Feeling nervous, irritable, or shaky.  Cold, clammy skin.  Sweating (when you're not exercising).  Use the \"rule of 15\" to treat low blood sugar. This includes eating 15 grams of carbohydrate from a quick-sugar food, such as 3 or 4 glucose tablets or ½ cup of juice. Wait 15 minutes and check your blood sugar. If it is still below 70 mg/dL, eat another 15 grams of carbohydrate. Repeat this every 15 minutes until your blood sugar is in a safe target range.  Once your blood sugar is in a safe range, eat a snack or meal to prevent recurrent low blood sugar.  Make sure family, friends, and coworkers know the symptoms of low blood sugar and know how to get your sugar level up.  If you were prescribed glucagon, always have it with you. Make sure friends and family know how to use it.  When should you call for help?     Call 911 anytime you think you may need emergency care. For example, call if:    You passed out (lost consciousness).     You are confused or cannot think clearly.     Your blood sugar is very high or very low.     Watch closely for changes in your health, and be sure to contact your doctor if:    Your blood sugar stays outside the level your doctor set for you.     You have

## 2024-10-27 DIAGNOSIS — F32.0 MAJOR DEPRESSIVE DISORDER, SINGLE EPISODE, MILD (HCC): ICD-10-CM

## 2024-10-27 DIAGNOSIS — I50.22 CHRONIC SYSTOLIC (CONGESTIVE) HEART FAILURE (HCC): ICD-10-CM

## 2024-10-28 RX ORDER — PAROXETINE 20 MG/1
20 TABLET, FILM COATED ORAL NIGHTLY
Qty: 90 TABLET | Refills: 1 | Status: SHIPPED | OUTPATIENT
Start: 2024-10-28

## 2024-10-28 RX ORDER — FUROSEMIDE 20 MG/1
TABLET ORAL
Qty: 90 TABLET | Refills: 1 | Status: SHIPPED | OUTPATIENT
Start: 2024-10-28

## 2024-10-29 DIAGNOSIS — I10 ESSENTIAL HYPERTENSION: ICD-10-CM

## 2024-10-29 RX ORDER — METOPROLOL TARTRATE 100 MG/1
100 TABLET ORAL 2 TIMES DAILY
Qty: 180 TABLET | Refills: 1 | Status: SHIPPED | OUTPATIENT
Start: 2024-10-29

## 2024-10-29 RX ORDER — AMLODIPINE BESYLATE 5 MG/1
TABLET ORAL
Qty: 90 TABLET | Refills: 1 | Status: SHIPPED | OUTPATIENT
Start: 2024-10-29

## 2024-10-30 DIAGNOSIS — M47.22 OTHER SPONDYLOSIS WITH RADICULOPATHY, CERVICAL REGION: ICD-10-CM

## 2024-10-30 RX ORDER — BACLOFEN 10 MG/1
10 TABLET ORAL 2 TIMES DAILY
Qty: 60 TABLET | Refills: 0 | Status: SHIPPED | OUTPATIENT
Start: 2024-10-30

## 2024-11-07 DIAGNOSIS — E78.2 MIXED HYPERLIPIDEMIA: ICD-10-CM

## 2024-11-08 RX ORDER — SIMVASTATIN 40 MG
40 TABLET ORAL NIGHTLY
Qty: 90 TABLET | Refills: 0 | Status: SHIPPED | OUTPATIENT
Start: 2024-11-08

## 2024-11-27 DIAGNOSIS — M47.22 OTHER SPONDYLOSIS WITH RADICULOPATHY, CERVICAL REGION: ICD-10-CM

## 2024-11-27 RX ORDER — BACLOFEN 10 MG/1
10 TABLET ORAL 2 TIMES DAILY
Qty: 60 TABLET | Refills: 0 | Status: SHIPPED | OUTPATIENT
Start: 2024-11-27

## 2024-12-13 ENCOUNTER — OFFICE VISIT (OUTPATIENT)
Age: 89
End: 2024-12-13

## 2024-12-13 VITALS
HEART RATE: 55 BPM | OXYGEN SATURATION: 95 % | DIASTOLIC BLOOD PRESSURE: 70 MMHG | SYSTOLIC BLOOD PRESSURE: 110 MMHG | TEMPERATURE: 98.7 F | BODY MASS INDEX: 29.76 KG/M2 | WEIGHT: 190 LBS | RESPIRATION RATE: 18 BRPM

## 2024-12-13 DIAGNOSIS — H00.015 HORDEOLUM EXTERNUM LEFT LOWER EYELID: ICD-10-CM

## 2024-12-13 DIAGNOSIS — H00.035 CELLULITIS OF LEFT LOWER EYELID: Primary | ICD-10-CM

## 2024-12-13 RX ORDER — CEPHALEXIN 500 MG/1
500 CAPSULE ORAL 2 TIMES DAILY
Qty: 20 CAPSULE | Refills: 0 | Status: SHIPPED | OUTPATIENT
Start: 2024-12-13 | End: 2024-12-23

## 2024-12-13 RX ORDER — ERYTHROMYCIN 5 MG/G
1 OINTMENT OPHTHALMIC EVERY 6 HOURS
Qty: 3.5 G | Refills: 0 | Status: SHIPPED | OUTPATIENT
Start: 2024-12-13 | End: 2024-12-20

## 2024-12-13 NOTE — PROGRESS NOTES
Patient Name: Elba Donald   YOB: 1935   Patient Status: Established patient,   Chief Complaint: Eye Problem (Right eye pain and swelling. Both eyes are itching. /X 1 day)      ____________________________________________________________________________________________    External Records Reviewed: None    Limitation to History: None    Outside Historian: None    SUBJECTIVE/OBJECTIVE:  Elba Donald is a 89 y.o. female presents with complaint of lower lid of left eye swelling with redness and both eyes itching.  Symptoms began 1 day(s) ago and are worsening since onset.  The patient denies fever, headache, change in vision, vomiting, or eye drainage.  No other acute symptoms reported at this time.          PAST MEDICAL HISTORY:   Medical: Pt  has a past medical history of Arthritis, Chronic pain, Depression, Diabetes (HCC), Hypercholesterolemia, Hypertension, Stroke ([facility]), and Stroke ([facility]).  Surgical: Pt  has a past surgical history that includes Cataract removal; Hysterectomy (1986); orthopedic surgery; Carpal tunnel release (1990); Refractive surgery (2006); Hysterectomy; orthopedic surgery; and orthopedic surgery.  Family: Pt family history is not on file. She was adopted.  Social: Pt   Social History     Socioeconomic History    Marital status:      Spouse name: Not on file    Number of children: Not on file    Years of education: Not on file    Highest education level: Not on file   Occupational History    Not on file   Tobacco Use    Smoking status: Never    Smokeless tobacco: Never   Vaping Use    Vaping status: Never Used   Substance and Sexual Activity    Alcohol use: No    Drug use: No    Sexual activity: Not Currently     Partners: Male     Birth control/protection: None     Comment: retired,relocated from [place], [place],living with daughter   Other Topics Concern    Not on file   Social History Narrative         ** Merged History Encounter **     Social Determinants of  Vtama Pregnancy And Lactation Text: It is unknown if this medication can cause problems during pregnancy and breastfeeding.

## 2024-12-13 NOTE — PATIENT INSTRUCTIONS
Take oral medication and use topical medication as directed.  Recommend daily warm compresses.  Use baby shampoo to clean lashes daily - usually in the mornings.  Follow up with eye care provider early next week.  Go to ER immediately if significant swelling / redness around eye, fever, change in vision, severe headache or any new or concerning symptoms.

## 2024-12-27 ENCOUNTER — TELEPHONE (OUTPATIENT)
Age: 89
End: 2024-12-27

## 2024-12-27 NOTE — TELEPHONE ENCOUNTER
Daughter of Elba Donald was called and verbalized understanding on note below. Cannot get mother in today, will Covid test.

## 2024-12-27 NOTE — TELEPHONE ENCOUNTER
Yara Blevins, APRN - CNP  You6 minutes ago (10:58 AM)       Please COVID test. Can offer a 220 appointment. if symptoms worsen in the interim, she should go to the ED or urgent care.

## 2024-12-27 NOTE — TELEPHONE ENCOUNTER
E[facility] transferred call due to wheezing and SOB.     Patient's daughter Roma is on the line.     Patient is experiencing:  SOB x1-2 days  Wheezing x1 week     Daughter states there are no other symptoms that are concerning to her.     Patient has not been tested for Covid or Flu.     Patient has been on oxygen in the past.    Has CFH, denies any swelling in the limbs.

## 2024-12-30 ENCOUNTER — OFFICE VISIT (OUTPATIENT)
Age: 89
End: 2024-12-30

## 2024-12-30 VITALS
OXYGEN SATURATION: 93 % | DIASTOLIC BLOOD PRESSURE: 59 MMHG | SYSTOLIC BLOOD PRESSURE: 106 MMHG | TEMPERATURE: 97.8 F | HEART RATE: 63 BPM | RESPIRATION RATE: 16 BRPM

## 2024-12-30 DIAGNOSIS — R06.02 SHORTNESS OF BREATH: Primary | ICD-10-CM

## 2024-12-30 DIAGNOSIS — J18.9 PNEUMONIA DUE TO INFECTIOUS ORGANISM, UNSPECIFIED LATERALITY, UNSPECIFIED PART OF LUNG: ICD-10-CM

## 2024-12-30 ASSESSMENT — ENCOUNTER SYMPTOMS: SHORTNESS OF BREATH: 1

## 2024-12-31 RX ORDER — AZITHROMYCIN 250 MG/1
TABLET, FILM COATED ORAL
Qty: 6 TABLET | Refills: 0 | Status: SHIPPED | OUTPATIENT
Start: 2024-12-31 | End: 2025-01-10

## 2024-12-31 NOTE — PATIENT INSTRUCTIONS
Patient was seen today for evaluation of shortness of breath  I do not see anything obvious on her chest x-ray but I am awaiting the radiologist final read, I will contact you with results if abnormal  EKG appears consistent with prior EKGs  I suspect congestive heart failure as a cause of her shortness of breath due to her new murmur and increased lower extremity edema  Her vital signs are all stable, physical exam is benign, I have low suspicion for emergent conditions today  Please contact your primary care provider for an appointment as soon as possible  If you develop any worsening shortness of breath, chest pain or if acutely worsening in any way, please go immediately to the emergency department

## 2024-12-31 NOTE — PROGRESS NOTES
Elba Donald (:  1935) is a 89 y.o. female,Established patient, here for evaluation of the following chief complaint(s):  Shortness of Breath (SOB, wheezing, for \"weeks\" intermittently)      Assessment & Plan :  Visit Diagnoses and Associated Orders       Shortness of breath    -  Primary    EKG 12 lead [79809 Custom]   - Future Order    XR CHEST PA/LAT [37431 CPT(R)]   - Future Order               : xray results show pneumonia d/t significant comordbidities will use both augmentin and azithromycin. Creatine clearance based on last metabolic panel is 36 so no adjustment needed for either medication. Called patient and spoke to anastasia Gastelum (emergency contact on file). He will  medications to have her start and f/u with PCP.     Patient was seen today for evaluation of shortness of breath  I do not see anything obvious on her chest x-ray but I am awaiting the radiologist final read, I will contact you with results if abnormal  EKG appears consistent with prior EKGs  I suspect congestive heart failure as a cause of her shortness of breath due to her new murmur and increased lower extremity edema  Her vital signs are all stable, physical exam is benign, I have low suspicion for emergent conditions today  Please contact your primary care provider for an appointment as soon as possible  If you develop any worsening shortness of breath, chest pain or if acutely worsening in any way, please go immediately to the emergency department       Subjective :    Shortness of Breath         89 y.o. female presents with symptoms of shortness of breath intermittently for the past several weeks.  She presents with family members who states that the patient is feeling some shortness of breath with exertion for some time now.  She denies any pain, no chest pain or palpitations.  She is currently being worked up for a new murmur.  She does report some mild worsening of edema in her lower extremities, right

## 2025-01-02 ENCOUNTER — TELEPHONE (OUTPATIENT)
Age: 89
End: 2025-01-02

## 2025-01-02 RX ORDER — ERGOCALCIFEROL 1.25 MG/1
50000 CAPSULE, LIQUID FILLED ORAL WEEKLY
Qty: 12 CAPSULE | Refills: 0 | Status: SHIPPED | OUTPATIENT
Start: 2025-01-02

## 2025-01-02 NOTE — TELEPHONE ENCOUNTER
Pt anastasia on phone. Pt went to [facility] urgent care 2 days ago,  dx: pneumonia. Pt still very short of breath and may be in beginning stage of Congestive HF  Please advise

## 2025-01-03 NOTE — TELEPHONE ENCOUNTER
Returned call to relay Renita's message:  (Will need to be seen at the ER if SOB is increasing )  Son verbalized understanding and appreciation.

## 2025-01-06 ENCOUNTER — APPOINTMENT (OUTPATIENT)
Facility: HOSPITAL | Age: 89
End: 2025-01-06
Payer: MEDICARE

## 2025-01-06 ENCOUNTER — HOSPITAL ENCOUNTER (INPATIENT)
Facility: HOSPITAL | Age: 89
LOS: 4 days | Discharge: HOME HEALTH | End: 2025-01-10
Attending: EMERGENCY MEDICINE | Admitting: INTERNAL MEDICINE
Payer: MEDICARE

## 2025-01-06 DIAGNOSIS — N17.9 AKI (ACUTE KIDNEY INJURY) (HCC): ICD-10-CM

## 2025-01-06 DIAGNOSIS — R79.89 ELEVATED BRAIN NATRIURETIC PEPTIDE (BNP) LEVEL: ICD-10-CM

## 2025-01-06 DIAGNOSIS — J81.0 ACUTE PULMONARY EDEMA: ICD-10-CM

## 2025-01-06 DIAGNOSIS — R06.02 SHORTNESS OF BREATH: ICD-10-CM

## 2025-01-06 DIAGNOSIS — R55 SYNCOPE, UNSPECIFIED SYNCOPE TYPE: ICD-10-CM

## 2025-01-06 DIAGNOSIS — R41.82 ALTERED MENTAL STATUS, UNSPECIFIED ALTERED MENTAL STATUS TYPE: Primary | ICD-10-CM

## 2025-01-06 PROBLEM — G93.41 ACUTE METABOLIC ENCEPHALOPATHY: Status: ACTIVE | Noted: 2025-01-06

## 2025-01-06 LAB
ALBUMIN SERPL-MCNC: 3.7 G/DL (ref 3.5–5)
ALBUMIN/GLOB SERPL: 0.9 (ref 1.1–2.2)
ALP SERPL-CCNC: 54 U/L (ref 45–117)
ALT SERPL-CCNC: 23 U/L (ref 12–78)
AMMONIA PLAS-SCNC: 18 UMOL/L
ANION GAP SERPL CALC-SCNC: 6 MMOL/L (ref 2–12)
APPEARANCE UR: CLEAR
AST SERPL-CCNC: 29 U/L (ref 15–37)
B PERT DNA SPEC QL NAA+PROBE: NOT DETECTED
BACTERIA URNS QL MICRO: NEGATIVE /HPF
BASOPHILS # BLD: 0.1 K/UL (ref 0–0.1)
BASOPHILS NFR BLD: 1 % (ref 0–1)
BILIRUB SERPL-MCNC: 0.4 MG/DL (ref 0.2–1)
BILIRUB UR QL: NEGATIVE
BORDETELLA PARAPERTUSSIS BY PCR: NOT DETECTED
BUN SERPL-MCNC: 25 MG/DL (ref 6–20)
BUN/CREAT SERPL: 11 (ref 12–20)
C PNEUM DNA SPEC QL NAA+PROBE: NOT DETECTED
CALCIUM SERPL-MCNC: 10.6 MG/DL (ref 8.5–10.1)
CHLORIDE SERPL-SCNC: 99 MMOL/L (ref 97–108)
CO2 SERPL-SCNC: 28 MMOL/L (ref 21–32)
COLOR UR: YELLOW
COMMENT:: NORMAL
COMMENT:: NORMAL
CREAT SERPL-MCNC: 2.19 MG/DL (ref 0.55–1.02)
DIFFERENTIAL METHOD BLD: ABNORMAL
EKG ATRIAL RATE: 65 BPM
EKG DIAGNOSIS: NORMAL
EKG P AXIS: 86 DEGREES
EKG P-R INTERVAL: 200 MS
EKG Q-T INTERVAL: 452 MS
EKG QRS DURATION: 128 MS
EKG QTC CALCULATION (BAZETT): 470 MS
EKG R AXIS: -50 DEGREES
EKG T AXIS: 89 DEGREES
EKG VENTRICULAR RATE: 65 BPM
EOSINOPHIL # BLD: 0.4 K/UL (ref 0–0.4)
EOSINOPHIL NFR BLD: 4 % (ref 0–7)
EPITH CASTS URNS QL MICRO: ABNORMAL /LPF
ERYTHROCYTE [DISTWIDTH] IN BLOOD BY AUTOMATED COUNT: 15.2 % (ref 11.5–14.5)
FLUAV SUBTYP SPEC NAA+PROBE: NOT DETECTED
FLUBV RNA SPEC QL NAA+PROBE: NOT DETECTED
FOLATE SERPL-MCNC: 17.6 NG/ML (ref 5–21)
GLOBULIN SER CALC-MCNC: 4.2 G/DL (ref 2–4)
GLUCOSE BLD-MCNC: 111 MG/DL (ref 74–99)
GLUCOSE SERPL-MCNC: 111 MG/DL (ref 65–100)
GLUCOSE UR STRIP.AUTO-MCNC: NEGATIVE MG/DL
HADV DNA SPEC QL NAA+PROBE: NOT DETECTED
HCOV 229E RNA SPEC QL NAA+PROBE: NOT DETECTED
HCOV HKU1 RNA SPEC QL NAA+PROBE: NOT DETECTED
HCOV NL63 RNA SPEC QL NAA+PROBE: NOT DETECTED
HCOV OC43 RNA SPEC QL NAA+PROBE: NOT DETECTED
HCT VFR BLD AUTO: 41.2 % (ref 35–47)
HGB BLD-MCNC: 12.6 G/DL (ref 11.5–16)
HGB UR QL STRIP: NEGATIVE
HMPV RNA SPEC QL NAA+PROBE: NOT DETECTED
HPIV1 RNA SPEC QL NAA+PROBE: NOT DETECTED
HPIV2 RNA SPEC QL NAA+PROBE: NOT DETECTED
HPIV3 RNA SPEC QL NAA+PROBE: NOT DETECTED
HPIV4 RNA SPEC QL NAA+PROBE: NOT DETECTED
IMM GRANULOCYTES # BLD AUTO: 0 K/UL (ref 0–0.04)
IMM GRANULOCYTES NFR BLD AUTO: 0 % (ref 0–0.5)
KETONES UR QL STRIP.AUTO: NEGATIVE MG/DL
LACTATE SERPL-SCNC: 1.3 MMOL/L (ref 0.4–2)
LEUKOCYTE ESTERASE UR QL STRIP.AUTO: NEGATIVE
LYMPHOCYTES # BLD: 2.4 K/UL (ref 0.8–3.5)
LYMPHOCYTES NFR BLD: 24 % (ref 12–49)
M PNEUMO DNA SPEC QL NAA+PROBE: NOT DETECTED
M PNEUMO IGM SER IA-ACNC: NONREACTIVE
MCH RBC QN AUTO: 29.8 PG (ref 26–34)
MCHC RBC AUTO-ENTMCNC: 30.6 G/DL (ref 30–36.5)
MCV RBC AUTO: 97.4 FL (ref 80–99)
MONOCYTES # BLD: 0.8 K/UL (ref 0–1)
MONOCYTES NFR BLD: 8 % (ref 5–13)
NEUTS SEG # BLD: 6.1 K/UL (ref 1.8–8)
NEUTS SEG NFR BLD: 63 % (ref 32–75)
NITRITE UR QL STRIP.AUTO: NEGATIVE
NRBC # BLD: 0 K/UL (ref 0–0.01)
NRBC BLD-RTO: 0 PER 100 WBC
NT PRO BNP: 3164 PG/ML
PH UR STRIP: 5.5 (ref 5–8)
PLATELET # BLD AUTO: 143 K/UL (ref 150–400)
PMV BLD AUTO: 11.5 FL (ref 8.9–12.9)
POTASSIUM SERPL-SCNC: 4.9 MMOL/L (ref 3.5–5.1)
PROCALCITONIN SERPL-MCNC: <0.05 NG/ML
PROT SERPL-MCNC: 7.9 G/DL (ref 6.4–8.2)
PROT UR STRIP-MCNC: 30 MG/DL
RBC # BLD AUTO: 4.23 M/UL (ref 3.8–5.2)
RBC #/AREA URNS HPF: ABNORMAL /HPF (ref 0–5)
RBC MORPH BLD: ABNORMAL
RBC MORPH BLD: ABNORMAL
RPR SER QL: NONREACTIVE
RSV RNA SPEC QL NAA+PROBE: NOT DETECTED
RV+EV RNA SPEC QL NAA+PROBE: NOT DETECTED
SARS-COV-2 RNA RESP QL NAA+PROBE: NOT DETECTED
SERVICE CMNT-IMP: ABNORMAL
SODIUM SERPL-SCNC: 133 MMOL/L (ref 136–145)
SP GR UR REFRACTOMETRY: 1.02 (ref 1–1.03)
SPECIMEN HOLD: NORMAL
SPECIMEN HOLD: NORMAL
T4 FREE SERPL-MCNC: 1.4 NG/DL (ref 0.8–1.5)
TROPONIN I SERPL HS-MCNC: 11 NG/L (ref 0–51)
TSH SERPL DL<=0.05 MIU/L-ACNC: 0.47 UIU/ML (ref 0.36–3.74)
URINE CULTURE IF INDICATED: ABNORMAL
UROBILINOGEN UR QL STRIP.AUTO: 0.2 EU/DL (ref 0.2–1)
VIT B12 SERPL-MCNC: >2000 PG/ML (ref 193–986)
WBC # BLD AUTO: 9.8 K/UL (ref 3.6–11)
WBC URNS QL MICRO: ABNORMAL /HPF (ref 0–4)

## 2025-01-06 PROCEDURE — 99285 EMERGENCY DEPT VISIT HI MDM: CPT

## 2025-01-06 PROCEDURE — 85025 COMPLETE CBC W/AUTO DIFF WBC: CPT

## 2025-01-06 PROCEDURE — 84439 ASSAY OF FREE THYROXINE: CPT

## 2025-01-06 PROCEDURE — 84484 ASSAY OF TROPONIN QUANT: CPT

## 2025-01-06 PROCEDURE — 80053 COMPREHEN METABOLIC PANEL: CPT

## 2025-01-06 PROCEDURE — 86738 MYCOPLASMA ANTIBODY: CPT

## 2025-01-06 PROCEDURE — 83880 ASSAY OF NATRIURETIC PEPTIDE: CPT

## 2025-01-06 PROCEDURE — 6360000002 HC RX W HCPCS: Performed by: INTERNAL MEDICINE

## 2025-01-06 PROCEDURE — 36415 COLL VENOUS BLD VENIPUNCTURE: CPT

## 2025-01-06 PROCEDURE — 6360000002 HC RX W HCPCS: Performed by: EMERGENCY MEDICINE

## 2025-01-06 PROCEDURE — 2580000003 HC RX 258: Performed by: EMERGENCY MEDICINE

## 2025-01-06 PROCEDURE — 2500000003 HC RX 250 WO HCPCS: Performed by: EMERGENCY MEDICINE

## 2025-01-06 PROCEDURE — 6370000000 HC RX 637 (ALT 250 FOR IP): Performed by: INTERNAL MEDICINE

## 2025-01-06 PROCEDURE — 82746 ASSAY OF FOLIC ACID SERUM: CPT

## 2025-01-06 PROCEDURE — 70450 CT HEAD/BRAIN W/O DYE: CPT

## 2025-01-06 PROCEDURE — 82607 VITAMIN B-12: CPT

## 2025-01-06 PROCEDURE — 93010 ELECTROCARDIOGRAM REPORT: CPT | Performed by: INTERNAL MEDICINE

## 2025-01-06 PROCEDURE — 84145 PROCALCITONIN (PCT): CPT

## 2025-01-06 PROCEDURE — 86592 SYPHILIS TEST NON-TREP QUAL: CPT

## 2025-01-06 PROCEDURE — 82140 ASSAY OF AMMONIA: CPT

## 2025-01-06 PROCEDURE — 81001 URINALYSIS AUTO W/SCOPE: CPT

## 2025-01-06 PROCEDURE — 83605 ASSAY OF LACTIC ACID: CPT

## 2025-01-06 PROCEDURE — 93005 ELECTROCARDIOGRAM TRACING: CPT | Performed by: EMERGENCY MEDICINE

## 2025-01-06 PROCEDURE — 71045 X-RAY EXAM CHEST 1 VIEW: CPT

## 2025-01-06 PROCEDURE — 87449 NOS EACH ORGANISM AG IA: CPT

## 2025-01-06 PROCEDURE — 96375 TX/PRO/DX INJ NEW DRUG ADDON: CPT

## 2025-01-06 PROCEDURE — 96365 THER/PROPH/DIAG IV INF INIT: CPT

## 2025-01-06 PROCEDURE — 0202U NFCT DS 22 TRGT SARS-COV-2: CPT

## 2025-01-06 PROCEDURE — 2060000000 HC ICU INTERMEDIATE R&B

## 2025-01-06 PROCEDURE — 87040 BLOOD CULTURE FOR BACTERIA: CPT

## 2025-01-06 PROCEDURE — 84443 ASSAY THYROID STIM HORMONE: CPT

## 2025-01-06 PROCEDURE — 82962 GLUCOSE BLOOD TEST: CPT

## 2025-01-06 PROCEDURE — 99221 1ST HOSP IP/OBS SF/LOW 40: CPT | Performed by: NURSE PRACTITIONER

## 2025-01-06 RX ORDER — AMLODIPINE BESYLATE 5 MG/1
5 TABLET ORAL DAILY
Status: DISCONTINUED | OUTPATIENT
Start: 2025-01-06 | End: 2025-01-10 | Stop reason: HOSPADM

## 2025-01-06 RX ORDER — PAROXETINE 20 MG/1
20 TABLET, FILM COATED ORAL NIGHTLY
Status: DISCONTINUED | OUTPATIENT
Start: 2025-01-06 | End: 2025-01-10 | Stop reason: HOSPADM

## 2025-01-06 RX ORDER — METOPROLOL TARTRATE 50 MG
100 TABLET ORAL DAILY
Status: DISCONTINUED | OUTPATIENT
Start: 2025-01-06 | End: 2025-01-10 | Stop reason: HOSPADM

## 2025-01-06 RX ORDER — MEMANTINE HYDROCHLORIDE 5 MG/1
5 TABLET ORAL 2 TIMES DAILY
Status: DISCONTINUED | OUTPATIENT
Start: 2025-01-06 | End: 2025-01-10 | Stop reason: HOSPADM

## 2025-01-06 RX ORDER — THIAMINE HYDROCHLORIDE 100 MG/ML
100 INJECTION, SOLUTION INTRAMUSCULAR; INTRAVENOUS DAILY
Status: CANCELLED | OUTPATIENT
Start: 2025-01-06

## 2025-01-06 RX ORDER — SODIUM CHLORIDE 0.9 % (FLUSH) 0.9 %
5-40 SYRINGE (ML) INJECTION PRN
Status: DISCONTINUED | OUTPATIENT
Start: 2025-01-06 | End: 2025-01-10 | Stop reason: HOSPADM

## 2025-01-06 RX ORDER — DOXYCYCLINE HYCLATE 100 MG
100 TABLET ORAL EVERY 12 HOURS SCHEDULED
Status: DISCONTINUED | OUTPATIENT
Start: 2025-01-06 | End: 2025-01-07

## 2025-01-06 RX ORDER — ONDANSETRON 2 MG/ML
4 INJECTION INTRAMUSCULAR; INTRAVENOUS EVERY 6 HOURS PRN
Status: DISCONTINUED | OUTPATIENT
Start: 2025-01-06 | End: 2025-01-10 | Stop reason: HOSPADM

## 2025-01-06 RX ORDER — HEPARIN SODIUM 5000 [USP'U]/ML
5000 INJECTION, SOLUTION INTRAVENOUS; SUBCUTANEOUS EVERY 8 HOURS SCHEDULED
Status: DISCONTINUED | OUTPATIENT
Start: 2025-01-06 | End: 2025-01-10 | Stop reason: HOSPADM

## 2025-01-06 RX ORDER — SODIUM CHLORIDE 9 MG/ML
INJECTION, SOLUTION INTRAVENOUS PRN
Status: DISCONTINUED | OUTPATIENT
Start: 2025-01-06 | End: 2025-01-10 | Stop reason: HOSPADM

## 2025-01-06 RX ORDER — ACETAMINOPHEN 650 MG/1
650 SUPPOSITORY RECTAL EVERY 6 HOURS PRN
Status: CANCELLED | OUTPATIENT
Start: 2025-01-06

## 2025-01-06 RX ORDER — VITAMIN B COMPLEX
2000 TABLET ORAL DAILY
Status: DISCONTINUED | OUTPATIENT
Start: 2025-01-06 | End: 2025-01-10 | Stop reason: HOSPADM

## 2025-01-06 RX ORDER — SODIUM CHLORIDE 9 MG/ML
INJECTION, SOLUTION INTRAVENOUS CONTINUOUS
Status: DISCONTINUED | OUTPATIENT
Start: 2025-01-06 | End: 2025-01-07

## 2025-01-06 RX ORDER — FAMOTIDINE 20 MG/1
20 TABLET, FILM COATED ORAL 2 TIMES DAILY
Status: DISCONTINUED | OUTPATIENT
Start: 2025-01-06 | End: 2025-01-06 | Stop reason: DRUGHIGH

## 2025-01-06 RX ORDER — FAMOTIDINE 20 MG/1
20 TABLET, FILM COATED ORAL DAILY
Status: DISCONTINUED | OUTPATIENT
Start: 2025-01-06 | End: 2025-01-07

## 2025-01-06 RX ORDER — DONEPEZIL HYDROCHLORIDE 10 MG/1
5 TABLET, FILM COATED ORAL NIGHTLY
Status: DISCONTINUED | OUTPATIENT
Start: 2025-01-06 | End: 2025-01-10 | Stop reason: HOSPADM

## 2025-01-06 RX ORDER — ACETAMINOPHEN 325 MG/1
650 TABLET ORAL EVERY 6 HOURS PRN
Status: DISCONTINUED | OUTPATIENT
Start: 2025-01-06 | End: 2025-01-10 | Stop reason: HOSPADM

## 2025-01-06 RX ORDER — FERROUS SULFATE 325(65) MG
325 TABLET ORAL DAILY
Status: DISCONTINUED | OUTPATIENT
Start: 2025-01-06 | End: 2025-01-10 | Stop reason: HOSPADM

## 2025-01-06 RX ORDER — POLYETHYLENE GLYCOL 3350 17 G/17G
17 POWDER, FOR SOLUTION ORAL DAILY PRN
Status: CANCELLED | OUTPATIENT
Start: 2025-01-06

## 2025-01-06 RX ORDER — ATORVASTATIN CALCIUM 20 MG/1
20 TABLET, FILM COATED ORAL DAILY
Status: DISCONTINUED | OUTPATIENT
Start: 2025-01-06 | End: 2025-01-10 | Stop reason: HOSPADM

## 2025-01-06 RX ORDER — SODIUM CHLORIDE 0.9 % (FLUSH) 0.9 %
5-40 SYRINGE (ML) INJECTION EVERY 12 HOURS SCHEDULED
Status: DISCONTINUED | OUTPATIENT
Start: 2025-01-06 | End: 2025-01-10 | Stop reason: HOSPADM

## 2025-01-06 RX ORDER — ASPIRIN 81 MG/1
81 TABLET, CHEWABLE ORAL DAILY
Status: DISCONTINUED | OUTPATIENT
Start: 2025-01-06 | End: 2025-01-10 | Stop reason: HOSPADM

## 2025-01-06 RX ORDER — DEXTROSE MONOHYDRATE 100 MG/ML
INJECTION, SOLUTION INTRAVENOUS CONTINUOUS PRN
Status: DISCONTINUED | OUTPATIENT
Start: 2025-01-06 | End: 2025-01-10 | Stop reason: HOSPADM

## 2025-01-06 RX ORDER — ENOXAPARIN SODIUM 100 MG/ML
30 INJECTION SUBCUTANEOUS DAILY
Status: CANCELLED | OUTPATIENT
Start: 2025-01-06

## 2025-01-06 RX ORDER — ONDANSETRON 4 MG/1
4 TABLET, ORALLY DISINTEGRATING ORAL EVERY 8 HOURS PRN
Status: CANCELLED | OUTPATIENT
Start: 2025-01-06

## 2025-01-06 RX ORDER — INSULIN LISPRO 100 [IU]/ML
0-4 INJECTION, SOLUTION INTRAVENOUS; SUBCUTANEOUS EVERY 6 HOURS SCHEDULED
Status: DISCONTINUED | OUTPATIENT
Start: 2025-01-06 | End: 2025-01-09

## 2025-01-06 RX ADMIN — DOXYCYCLINE HYCLATE 100 MG: 100 TABLET, COATED ORAL at 21:46

## 2025-01-06 RX ADMIN — DONEPEZIL HYDROCHLORIDE 5 MG: 10 TABLET, FILM COATED ORAL at 21:47

## 2025-01-06 RX ADMIN — PAROXETINE HYDROCHLORIDE 20 MG: 20 TABLET, FILM COATED ORAL at 21:47

## 2025-01-06 RX ADMIN — WATER 1000 MG: 1 INJECTION INTRAMUSCULAR; INTRAVENOUS; SUBCUTANEOUS at 14:46

## 2025-01-06 RX ADMIN — MEMANTINE 5 MG: 5 TABLET ORAL at 21:47

## 2025-01-06 RX ADMIN — HEPARIN SODIUM 5000 UNITS: 5000 INJECTION INTRAVENOUS; SUBCUTANEOUS at 21:45

## 2025-01-06 RX ADMIN — AZITHROMYCIN MONOHYDRATE 500 MG: 500 INJECTION, POWDER, LYOPHILIZED, FOR SOLUTION INTRAVENOUS at 14:46

## 2025-01-06 ASSESSMENT — PAIN - FUNCTIONAL ASSESSMENT: PAIN_FUNCTIONAL_ASSESSMENT: NONE - DENIES PAIN

## 2025-01-06 NOTE — ED PROVIDER NOTES
[facility] EMERGENCY DEP  EMERGENCY DEPARTMENT ENCOUNTER      Pt Name: Elba Donald  MRN: [id number]  Birthdate 1935  Date of evaluation: 1/6/2025  Provider: Ilya Pang MD      HISTORY OF PRESENT ILLNESS      89-year-old female history of depression, diabetes, hypertension presents to the emergency department by EMS with concern for decreased mental status since yesterday afternoon.  Apparently was diagnosed with pneumonia several days ago at urgent care.  Was treated with Augmentin and azithromycin.  She is still completing Augmentin.  No fevers.  No injury.  Family at bedside reports some dementia.    Patient has no complaints to me.  Seems confused about where she is, why she is here.    Review of the medical record indicates that chest x-ray was clear, no fevers at her urgent care visit but was treated for pneumonia.  At that time there is report of new onset lower extreme edema and heart murmur.    The history is provided by the patient, the EMS personnel, medical records and a relative.           Nursing Notes were reviewed.    REVIEW OF SYSTEMS         Review of Systems        PAST MEDICAL HISTORY     Past Medical History:   Diagnosis Date   • Arthritis    • Chronic pain    • Depression    • Diabetes (HCC)    • Hypercholesterolemia    • Hypertension    • Stroke (HCC)     TIA 10 yrs back   • Stroke (HCC)     2003         SURGICAL HISTORY       Past Surgical History:   Procedure Laterality Date   • CARPAL TUNNEL RELEASE  1990   • CATARACT REMOVAL      bilateral   • HYSTERECTOMY (CERVIX STATUS UNKNOWN)  1986   • HYSTERECTOMY (CERVIX STATUS UNKNOWN)     • ORTHOPEDIC SURGERY     • ORTHOPEDIC SURGERY      carpel tunnel left   • ORTHOPEDIC SURGERY      left foot heel spur   • REFRACTIVE SURGERY  2006         CURRENT MEDICATIONS       Previous Medications    ACETAMINOPHEN (TYLENOL) 650 MG EXTENDED RELEASE TABLET    Take 500 mg by mouth 2 times daily as needed for Pain    AMLODIPINE (NORVASC) 5 MG  UNIT/GM CREAM    Apply topically 2 times daily.    PAROXETINE (PAXIL) 20 MG TABLET    Take 1 tablet by mouth nightly    SIMVASTATIN (ZOCOR) 40 MG TABLET    Take 1 tablet by mouth nightly    TRAMADOL (ULTRAM) 50 MG TABLET    Take 1 tablet by mouth every 6 hours as needed for Pain (moderate pain).    VITAMIN D (ERGOCALCIFEROL) 1.25 MG (04542 UT) CAPS CAPSULE    Take 1 capsule by mouth once a week    VITAMIN D 25 MCG (1000 UT) CAPS    Take 2 capsules by mouth daily       ALLERGIES     Losartan and Metformin and related    FAMILY HISTORY       Family History   Adopted: Yes          SOCIAL HISTORY       Social History     Socioeconomic History   • Marital status:    Tobacco Use   • Smoking status: Never   • Smokeless tobacco: Never   Vaping Use   • Vaping status: Never Used   Substance and Sexual Activity   • Alcohol use: No   • Drug use: No   • Sexual activity: Not Currently     Partners: Male     Birth control/protection: None     Comment: retired,relocated from [place], [place],living with daughter   Social History Narrative         ** Merged History Encounter **     Social Determinants of Health     Financial Resource Strain: Low Risk  (9/13/2024)    Overall Financial Resource Strain (CARDIA)    • Difficulty of Paying Living Expenses: Not hard at all   Food Insecurity: No Food Insecurity (9/13/2024)    Hunger Vital Sign    • Worried About Running Out of Food in the Last Year: Never true    • Ran Out of Food in the Last Year: Never true   Transportation Needs: Unknown (9/13/2024)    PRAPARE - Transportation    • Lack of Transportation (Non-Medical): No   Physical Activity: Inactive (9/29/2023)    Exercise Vital Sign    • Days of Exercise per Week: 0 days    • Minutes of Exercise per Session: 0 min   Social Connections: Feeling Socially Integrated (7/5/2023)    OASIS : Social Isolation    • Frequency of experiencing loneliness or isolation: Never   Housing Stability: Unknown (9/13/2024)    Housing Stability

## 2025-01-06 NOTE — ED TRIAGE NOTES
Patient in through ems from home with complaints of worsening ams. Blood sugar 152 enroute. Enroute stroke scale negative. Family believes this is worsening dementia, patient was hypoxic on arrival, placed on 4L by ems.

## 2025-01-06 NOTE — ED NOTES
[facility]  O- [phone]    [facility] ED Geriatric Consult Team  Chloé Dumas APRN-NP   Maco Restrepo CM    Patient Name: Elba Donald  YOB: 1935    Date of Initial Consult: 1/6/2025  Reason for Consult: geriatric medication assessment, ACP, goals of care  Requesting Provider: Dr Pang      SUMMARY:   Elba Donald is a 89 y.o. with a past history of chronic pain, depression, arthritis, diabetes, hypercholesterolemia, hypertension, stroke, insufficiency, MDD who was arrived in the [facility] ED via EMS on 1/6/2025 from Home with a diagnosis of altered mental status, unspecified AMS,  elevated BNP, pulmonary edema, and CA.     Current medical issues leading to Geriatric Consult  involvement include:   [] SNF Transfer  [] Prior admission within 30 days  [x] Geriatric Medication Assessment  -Performed with daughter, Roma Donald and Nirav- grandson via telephone whom manage all medications.   -Verified current allergies and pharmacy is current [facility] on [facility].   [] Complex Medical Conditions  [] Complex Case Management and/or SDOH  [x] Goals of Care    GERIATRIC DIAGNOSES:   Altered mental status, unspecified altered mental status type  Elevated BNP  Pulmonary edema  CA     PLAN:   Admission per ER physician for AMS, elevated BNP, Pulmonary Edema and CA.   Geriatric medication assessment performed with daughter at the bedside and grandson to assist with management of patient's medications.  Discussed ACP documentation and current CODE STATUS.  Rachel Brandon at the bedside endorses full code, patient does not have any advanced directives or living will. Due to memory impairment unable to complete VA Advance Directive for Healthcare.  Collaborate with Case Management for inquiry regarding home hospital bed.   [] Initial consult note routed to primary continuity provider and/or primary health care team members  [x] Communicated plan of care with: ED and/or

## 2025-01-06 NOTE — CARE COORDINATION
Care Management Initial Assessment       RUR: 14%  Readmission? No  1st IM letter given? No  1st  letter given: No     25 1642   Service Assessment   Patient Orientation Person   Cognition Dementia / Early Alzheimer's   History Provided By Child/Family   Primary Caregiver Self   Accompanied By/Relationship Roma Donald [phone]   Support Systems Children;Family Members   PCP Verified by CM Yes   Last Visit to PCP Within last 6 months   Prior Functional Level Assistance with the following:;Cooking;Housework;Shopping   Can patient return to prior living arrangement Yes   Family able to assist with home care needs: Yes   Would you like for me to discuss the discharge plan with any other family members/significant others, and if so, who? Yes  (Roma Donald / Nirav Donald)   Financial Resources Medicare   Social/Functional History   Lives With Family   Home Layout One level   Home Access Ramped entrance   Bathroom Equipment Shower chair   Home Equipment Wheelchair - Manual;Walker - Rolling   Receives Help From Family   Discharge Planning   History of falls? 1     CM consult received and appreciated. EMR reviewed. Met w/patient and daughter Roma introduced to role. Ms. Donald is pleasant and smiled when greeting CM unable to provide  history of dementia. Patient lives home w/ her daughter and grandson Nirav who also provide assistance.     Previous providers [facility]  and [facility]. Daughter asked about possible hospital bed. This writer acknowledged would need to qualify for a hospital bed (sacral wound/ feeding tube). Per daughter has been applying cream and powder to area on sacral area requested can follow up w/ PCP . Can also consider renting a bed as alternative option.     CM Department will continue to follow for RIK needs.     [employer]/ Medicare is insurance provider.

## 2025-01-06 NOTE — H&P
Worried About Running Out of Food in the Last Year: Never true     Ran Out of Food in the Last Year: Never true   Transportation Needs: Unknown (9/13/2024)    PRAPARE - Transportation     Lack of Transportation (Medical): Not on file     Lack of Transportation (Non-Medical): No   Physical Activity: Inactive (9/29/2023)    Exercise Vital Sign     Days of Exercise per Week: 0 days     Minutes of Exercise per Session: 0 min   Stress: Not on file   Social Connections: Feeling Socially Integrated (7/5/2023)    OASIS : Social Isolation     Frequency of experiencing loneliness or isolation: Never   Intimate Partner Violence: Not on file   Depression: Not at risk (9/13/2024)    PHQ-2     PHQ-2 Score: 0   Housing Stability: Unknown (9/13/2024)    Housing Stability Vital Sign     Unable to Pay for Housing in the Last Year: Not on file     Number of Times Moved in the Last Year: Not on file     Homeless in the Last Year: No   Interpersonal Safety: Not At Risk (6/13/2023)    Interpersonal Safety Domain Source: IP Abuse Screening     How often does anyone, including family and friends, physically hurt you?: Not on file     How often does anyone, including family and friends, scream or curse at you?: Not on file     How often does anyone, including family and friends, insult or talk down to you?: Not on file     How often does anyone, including family and friends, threaten you with harm?: Not on file     Physical abuse: Denies     Verbal abuse: Denies     Emotional abuse: Denies     Financial abuse: Denies     Sexual abuse: Denies   Utilities: Not on file        Medications were reconciled to the best of my ability given all available resources at the time of admission. Route is PO if not otherwise noted.     Family and social history were personally reviewed, all pertinent and relevant details are outlined as above.    Objective:   /61   Pulse 59   Temp 98.9 °F (37.2 °C) (Rectal)   Resp 14   Ht 1.702 m (5' 7\")    department. Complex decision making was performed, which includes reviewing the patient's available past medical records, laboratory results, and imaging studies.    Acute metabolic encephalopathy on chronic dementia  - admit inpatient telemetry  - possibly multifactorial due to PNA , hypoxia, hyponatremia, CA  - check ammonia, RPR, TSH, B12 folate  - check CT head  - PT/OT/SLP evals    Bilateral CAP  Acute hypoxic respiratory failure  - SpO2 86% on RA and placed on 2L O2 NC; may need home O2 eval prior to discharge  - BC 1/6 NGTD x2  - procal pending  - empiric CTX/doxyc  - check legionella, Mycoplasma, RVP  - NPO except meds for now as pt reports aspiration    Acute hyponatremia, 133  - possibly due to IVVD  - start IVF    CA on CKD Stage 3  - possibly due to IVVD  - IVF    Hypercalcemia  - start IVF    Elevated BNP  - check TTE    Thrombocytopenia, acute  - avoid platelet-lowering agents    T2DM  - check A1c, POC glucose q6h with SSI  - hold home oral hypoglycemics for now    Arthritis  Chronic pain  Depression  Obesity,Body mass index is 32.63 kg/m².  HTN  HLD  H/o TIA and stroke    DIET: Diet NPO Exceptions are: Sips of Water with Meds   ISOLATION PRECAUTIONS: No active isolations  CODE STATUS: Full Code per chart  Central Line:   none  DVT PROPHYLAXIS: SCDs  EARLY MOBILITY ASSESSMENT: Recommend an assessment from physical therapy and/or occupational therapy  ANTICIPATED DISCHARGE: Greater than 48 hours.  ANTICIPATED DISPOSITION: TBD  EMERGENCY CONTACT/SURROGATE DECISION MAKER: [place],Roma (Child)  [phone] (Mobile)     Signed By: Bea Braswell MD     January 6, 2025

## 2025-01-07 LAB
ALBUMIN SERPL-MCNC: 3.1 G/DL (ref 3.5–5)
ANION GAP SERPL CALC-SCNC: 4 MMOL/L (ref 2–12)
BASOPHILS # BLD: 0.05 K/UL (ref 0–0.1)
BASOPHILS NFR BLD: 0.6 % (ref 0–1)
BUN SERPL-MCNC: 20 MG/DL (ref 6–20)
BUN/CREAT SERPL: 12 (ref 12–20)
CALCIUM SERPL-MCNC: 9.9 MG/DL (ref 8.5–10.1)
CHLORIDE SERPL-SCNC: 103 MMOL/L (ref 97–108)
CO2 SERPL-SCNC: 31 MMOL/L (ref 21–32)
COMMENT:: NORMAL
CREAT SERPL-MCNC: 1.61 MG/DL (ref 0.55–1.02)
DIFFERENTIAL METHOD BLD: ABNORMAL
EOSINOPHIL # BLD: 0.37 K/UL (ref 0–0.4)
EOSINOPHIL NFR BLD: 4.7 % (ref 0–7)
ERYTHROCYTE [DISTWIDTH] IN BLOOD BY AUTOMATED COUNT: 15.2 % (ref 11.5–14.5)
EST. AVERAGE GLUCOSE BLD GHB EST-MCNC: 166 MG/DL
GLUCOSE BLD STRIP.AUTO-MCNC: 102 MG/DL (ref 65–117)
GLUCOSE BLD STRIP.AUTO-MCNC: 111 MG/DL (ref 65–117)
GLUCOSE BLD STRIP.AUTO-MCNC: 126 MG/DL (ref 65–117)
GLUCOSE BLD STRIP.AUTO-MCNC: 136 MG/DL (ref 65–117)
GLUCOSE BLD STRIP.AUTO-MCNC: 65 MG/DL (ref 65–117)
GLUCOSE BLD STRIP.AUTO-MCNC: 68 MG/DL (ref 65–117)
GLUCOSE BLD STRIP.AUTO-MCNC: 79 MG/DL (ref 65–117)
GLUCOSE BLD STRIP.AUTO-MCNC: 96 MG/DL (ref 65–117)
GLUCOSE BLD STRIP.AUTO-MCNC: 96 MG/DL (ref 65–117)
GLUCOSE SERPL-MCNC: 86 MG/DL (ref 65–100)
HBA1C MFR BLD: 7.4 % (ref 4–5.6)
HCT VFR BLD AUTO: 33.8 % (ref 35–47)
HGB BLD-MCNC: 10.4 G/DL (ref 11.5–16)
IMM GRANULOCYTES # BLD AUTO: 0.02 K/UL (ref 0–0.04)
IMM GRANULOCYTES NFR BLD AUTO: 0.3 % (ref 0–0.5)
LYMPHOCYTES # BLD: 2.21 K/UL (ref 0.8–3.5)
LYMPHOCYTES NFR BLD: 28.1 % (ref 12–49)
MCH RBC QN AUTO: 29.5 PG (ref 26–34)
MCHC RBC AUTO-ENTMCNC: 30.8 G/DL (ref 30–36.5)
MCV RBC AUTO: 96 FL (ref 80–99)
MONOCYTES # BLD: 0.84 K/UL (ref 0–1)
MONOCYTES NFR BLD: 10.7 % (ref 5–13)
NEUTS SEG # BLD: 4.38 K/UL (ref 1.8–8)
NEUTS SEG NFR BLD: 55.6 % (ref 32–75)
NRBC # BLD: 0 K/UL (ref 0–0.01)
NRBC BLD-RTO: 0 PER 100 WBC
PHOSPHATE SERPL-MCNC: 3.7 MG/DL (ref 2.6–4.7)
PLATELET # BLD AUTO: 126 K/UL (ref 150–400)
PMV BLD AUTO: 11.8 FL (ref 8.9–12.9)
POTASSIUM SERPL-SCNC: 3.9 MMOL/L (ref 3.5–5.1)
RBC # BLD AUTO: 3.52 M/UL (ref 3.8–5.2)
SERVICE CMNT-IMP: ABNORMAL
SERVICE CMNT-IMP: ABNORMAL
SERVICE CMNT-IMP: NORMAL
SODIUM SERPL-SCNC: 138 MMOL/L (ref 136–145)
SPECIMEN HOLD: NORMAL
WBC # BLD AUTO: 7.9 K/UL (ref 3.6–11)

## 2025-01-07 PROCEDURE — 85025 COMPLETE CBC W/AUTO DIFF WBC: CPT

## 2025-01-07 PROCEDURE — 97530 THERAPEUTIC ACTIVITIES: CPT

## 2025-01-07 PROCEDURE — 2500000003 HC RX 250 WO HCPCS: Performed by: INTERNAL MEDICINE

## 2025-01-07 PROCEDURE — 2500000003 HC RX 250 WO HCPCS: Performed by: STUDENT IN AN ORGANIZED HEALTH CARE EDUCATION/TRAINING PROGRAM

## 2025-01-07 PROCEDURE — 83036 HEMOGLOBIN GLYCOSYLATED A1C: CPT

## 2025-01-07 PROCEDURE — 2580000003 HC RX 258: Performed by: INTERNAL MEDICINE

## 2025-01-07 PROCEDURE — 36415 COLL VENOUS BLD VENIPUNCTURE: CPT

## 2025-01-07 PROCEDURE — 2060000000 HC ICU INTERMEDIATE R&B

## 2025-01-07 PROCEDURE — 6360000002 HC RX W HCPCS: Performed by: INTERNAL MEDICINE

## 2025-01-07 PROCEDURE — 92610 EVALUATE SWALLOWING FUNCTION: CPT

## 2025-01-07 PROCEDURE — 6360000002 HC RX W HCPCS: Performed by: STUDENT IN AN ORGANIZED HEALTH CARE EDUCATION/TRAINING PROGRAM

## 2025-01-07 PROCEDURE — 97535 SELF CARE MNGMENT TRAINING: CPT

## 2025-01-07 PROCEDURE — 97161 PT EVAL LOW COMPLEX 20 MIN: CPT

## 2025-01-07 PROCEDURE — 97165 OT EVAL LOW COMPLEX 30 MIN: CPT

## 2025-01-07 PROCEDURE — 6370000000 HC RX 637 (ALT 250 FOR IP): Performed by: INTERNAL MEDICINE

## 2025-01-07 PROCEDURE — 2580000003 HC RX 258: Performed by: STUDENT IN AN ORGANIZED HEALTH CARE EDUCATION/TRAINING PROGRAM

## 2025-01-07 PROCEDURE — 82962 GLUCOSE BLOOD TEST: CPT

## 2025-01-07 PROCEDURE — 80069 RENAL FUNCTION PANEL: CPT

## 2025-01-07 RX ORDER — LOPERAMIDE HYDROCHLORIDE 2 MG/1
2 CAPSULE ORAL 2 TIMES DAILY PRN
Status: DISCONTINUED | OUTPATIENT
Start: 2025-01-07 | End: 2025-01-10 | Stop reason: HOSPADM

## 2025-01-07 RX ORDER — PANTOPRAZOLE SODIUM 40 MG/1
40 TABLET, DELAYED RELEASE ORAL
Status: DISCONTINUED | OUTPATIENT
Start: 2025-01-07 | End: 2025-01-07

## 2025-01-07 RX ORDER — DEXTROSE MONOHYDRATE AND SODIUM CHLORIDE 5; .9 G/100ML; G/100ML
INJECTION, SOLUTION INTRAVENOUS CONTINUOUS
Status: DISPENSED | OUTPATIENT
Start: 2025-01-07 | End: 2025-01-08

## 2025-01-07 RX ADMIN — DEXTROSE MONOHYDRATE: 100 INJECTION, SOLUTION INTRAVENOUS at 09:35

## 2025-01-07 RX ADMIN — PAROXETINE HYDROCHLORIDE 20 MG: 20 TABLET, FILM COATED ORAL at 21:49

## 2025-01-07 RX ADMIN — SODIUM CHLORIDE, PRESERVATIVE FREE 10 ML: 5 INJECTION INTRAVENOUS at 21:52

## 2025-01-07 RX ADMIN — SODIUM CHLORIDE, PRESERVATIVE FREE 10 ML: 5 INJECTION INTRAVENOUS at 12:23

## 2025-01-07 RX ADMIN — WATER 1000 MG: 1 INJECTION INTRAMUSCULAR; INTRAVENOUS; SUBCUTANEOUS at 15:03

## 2025-01-07 RX ADMIN — DONEPEZIL HYDROCHLORIDE 5 MG: 10 TABLET, FILM COATED ORAL at 21:49

## 2025-01-07 RX ADMIN — MEMANTINE 5 MG: 5 TABLET ORAL at 21:49

## 2025-01-07 RX ADMIN — PANTOPRAZOLE SODIUM 40 MG: 40 INJECTION, POWDER, FOR SOLUTION INTRAVENOUS at 18:01

## 2025-01-07 RX ADMIN — DEXTROSE MONOHYDRATE 125 ML: 100 INJECTION, SOLUTION INTRAVENOUS at 01:08

## 2025-01-07 RX ADMIN — HEPARIN SODIUM 5000 UNITS: 5000 INJECTION INTRAVENOUS; SUBCUTANEOUS at 15:03

## 2025-01-07 RX ADMIN — DOXYCYCLINE 100 MG: 100 INJECTION, POWDER, LYOPHILIZED, FOR SOLUTION INTRAVENOUS at 18:01

## 2025-01-07 RX ADMIN — DEXTROSE AND SODIUM CHLORIDE: 5; 900 INJECTION, SOLUTION INTRAVENOUS at 12:22

## 2025-01-07 RX ADMIN — DEXTROSE MONOHYDRATE 125 ML: 100 INJECTION, SOLUTION INTRAVENOUS at 06:57

## 2025-01-07 RX ADMIN — SODIUM CHLORIDE: 9 INJECTION, SOLUTION INTRAVENOUS at 04:47

## 2025-01-07 RX ADMIN — HEPARIN SODIUM 5000 UNITS: 5000 INJECTION INTRAVENOUS; SUBCUTANEOUS at 06:53

## 2025-01-07 NOTE — PROGRESS NOTES
Hospitalist Progress Note  Felix Mason MD  Answering service: [phone]        Date of Service:  2025  NAME:  Elba Donald  :  1935  MRN:  [id number]      Admission Summary:   Elba Donald is a 89 y.o. female with arthritis, chronic pain, CKD Stage 3, depression, T2DM, obesity, HTN, HLD, h/o TIA and stroke, dementia who was BIBEMS with worsening confusion. EMS noted hypoxia and applied 4L O2 NC. Pt was diagnosed with bilateral PNA per CXR at Urgent Care on 24 and prescribed Augmentin and azithromycin; she is still taking Augmentin. Pt is c/o SOB, cough, weakness, leg swelling. She is confused. No family is at bedside. She is currently on O2 NC.        Interval history / Subjective:   Admit for AMS AHRF 2/2 PNA &  CA   Patient seen & examined today     Awake alert ox 3   Reports diarrhea x 3 last night   No abdominal pain nausea or vomiting   Per RN /failed bedside swallow evaluation  Due to concern for recurrent aspiration pna / esophageal dysphagia , speech & GI consulted   Discussed with GI , reevaluate bedside swallow eval , plan for possible EGD in AM   NPO PM     Discussed with patient's daughter , RN , speech & GI            Assessment & Plan:          Acute metabolic encephalopathy on chronic dementia  - possibly multifactorial due to PNA , hypoxia, hyponatremia, CA  - ammonia,TSH, B12 folate within limits RPR negative   - CT head no acute process  -Esophageal dysphagia /per speech / started on oral diet /easy chew   -failed bedside swallow per ED RN   -GI consulted /plan for possible EGD in AM   - PT/OT eval pending     Esophageal dysphagia   Speech consulted   GI consulted /plan for EGD in AM      Bilateral CAP  Acute hypoxic respiratory failure  - SpO2 86% on RA and placed on 2L O2 NC; may need home O2 eval prior to discharge  - BC  NGTD x2  - procal < 0.05   - empiric  needed    traMADol (ULTRAM) 50 MG tablet Take 1 tablet by mouth every 6 hours as needed for Pain (moderate pain).    azithromycin (ZITHROMAX) 250 MG tablet 500mg on day 1 followed by 250mg on days 2 - 5 (Patient not taking: Reported on 1/6/2025)     ______________________________________________________________________  EXPECTED LENGTH OF STAY: 5  ACTUAL LENGTH OF STAY:          1                 Felix Mason MD

## 2025-01-07 NOTE — PROGRESS NOTES
Speech LAnguage Pathology EVALUATION/DISCHARGE    Patient: Elba Donald (89 y.o. female)  Date: 1/7/2025  Primary Diagnosis: Acute metabolic encephalopathy [G93.41]       Precautions:                     ASSESSMENT :  Patient seen for swallow evaluation. Pt with functional oropharyngeal swallow but concern for reflux. Patient belching and touching her sternum delayed after trials of thin liquids. This is consistent with findings on esophagram (see below). Recommend strict reflux precautions. Given that airspace disease is bilateral and WBC is stable, would favor an inflammatory process relating to possible post-prandial aspiration over a prandial aspiration bacterial pneumonia. For reference, below are some of the post-prandial aspiration-related pulmonary illness.        No further swallowing needs anticipated. For reference, esophagram in 2022 showed the following below:          Patient will be discharged from skilled speech-language pathology services at this time.     PLAN :  Recommendations and Planned Interventions:  Diet: Easy to chew and thin liquids  Strict reflux precautions  Medical management  Upright during meals  Alternate liquids/solids  Small sip/bites       Acute SLP Services: No, patient will be discharged from acute skilled speech-language pathology at this time.  Discharge Recommendations: No, additional SLP treatment not indicated at discharge     SUBJECTIVE:   Patient stated, “I'm fine.”    OBJECTIVE:     Past Medical History:   Diagnosis Date    Arthritis     Chronic pain     Depression     Diabetes (HCC)     Hypercholesterolemia     Hypertension     Stroke (HCC)     TIA 10 yrs back    Stroke (HCC)     2003     Past Surgical History:   Procedure Laterality Date    CARPAL TUNNEL RELEASE  1990    CATARACT REMOVAL      bilateral    HYSTERECTOMY (CERVIX STATUS UNKNOWN)  1986    HYSTERECTOMY (CERVIX STATUS UNKNOWN)      ORTHOPEDIC SURGERY      ORTHOPEDIC SURGERY      carpel tunnel left

## 2025-01-07 NOTE — ED NOTES
Completed bed change, pure wick in place, warm blankets placed on pt.  Call light within reach.  Pt's daughter returned to the room.  Advised her mother currently has an nothing by mouth order.

## 2025-01-07 NOTE — PROGRESS NOTES
ED TO INPATIENT SBAR HANDOFF    Patient Name: Elba Donald   :  1935  89 y.o.   MRN:  [id number]  ED Room #:  ER30/30     Situation  Code Status: Full Code   Allergies: Lisinopril, Losartan, and Metformin and related  Weight: Patient Vitals for the past 96 hrs (Last 3 readings):   Weight   25 1332 94.5 kg (208 lb 5.4 oz)       Arrived from: home    Chief Complaint:   Chief Complaint   Patient presents with    Altered Mental Status       Hospital Problem/Diagnosis:  Principal Problem:    Acute metabolic encephalopathy  Active Problems:    Acute pulmonary edema    CA (acute kidney injury) ([facility])    Elevated brain natriuretic peptide (BNP) level  Resolved Problems:    * No resolved hospital problems. *      Mobility: limited transfer mobility   ED Fall Risk: Presents to emergency department  because of falls (Syncope, seizure, or loss of consciousness): No, Age > 70: Yes, Altered Mental Status, Intoxication with alcohol or substance confusion (Disorientation, impaired judgment, poor safety awaremess, or inability to follow instructions): Yes, Impaired Mobility: Ambulates or transfers with assistive devices or assistance; Unable to ambulate or transer.: Yes, Nursing Judgement: Yes   Fell in ED or prior to admission: no   Restraints: no     Sitter: no   Family/Caregiver Present: yes    Neet to know social/safety information:       Background  History:   Past Medical History:   Diagnosis Date    Arthritis     Chronic pain     Depression     Diabetes ([facility])     Hypercholesterolemia     Hypertension     Stroke ([facility])     TIA 10 yrs back    Stroke ([facility])     2003       Assessment    Abnormal Assessment Findings: Low BS, on D5 and 0.9 NS at 75 mL/hr    Imaging:   CT head WO contrast   Final Result   No acute intracranial process.   There is no intracranial mass or hemorrhage.      Electronically signed by GERRI HABIB      XR CHEST PORTABLE   Final Result   Development of patchy bilateral airspace disease,

## 2025-01-07 NOTE — PROGRESS NOTES
Orders received, chart reviewed and patient evaluated by physical therapy. Pending progression with skilled acute physical therapy, recommend:    Intermittent physical therapy up to 2-3x/week in previous living setting with continued support needed for ADLs and mobility.    Full evaluation to follow.

## 2025-01-07 NOTE — PROGRESS NOTES
4 Eyes Skin Assessment     NAME:  Elba Donald  YOB: 1935  MEDICAL RECORD NUMBER:  [id number]    The patient is being assessed for  Admission    I agree that at least one RN has performed a thorough Head to Toe Skin Assessment on the patient. ALL assessment sites listed below have been assessed.      Areas assessed by both nurses:    Head, Face, Ears, Shoulders, Back, Chest, Arms, Elbows, Hands, Sacrum. Buttock, Coccyx, Ischium, Legs. Feet and Heels, and Under Medical Devices         Does the Patient have a Wound? Yes wound(s) were present on assessment. LDA wound assessment was Initiated and completed by RN       Chele Prevention initiated by RN: Yes  Wound Care Orders initiated by RN: Yes    Pressure Injury (Stage 3,4, Unstageable, DTI, NWPT, and Complex wounds) if present, place Wound referral order by RN under : No    New Ostomies, if present place, Ostomy referral order under : No     Nurse 1 eSignature: Electronically signed by Neda Pinto RN on 1/7/25 at 6:27 PM EST    **SHARE this note so that the co-signing nurse can place an eSignature**    Nurse 2 eSignature: Electronically signed by HENRY PRASAD RN on 1/7/25 at 6:28 PM EST

## 2025-01-07 NOTE — CONSULTS
[facility]  [address]  [place] [address] [phone]                     GI CONSULTATION NOTE      NAME:  Elba Donald   :   1935   MRN:   [id number]     Consult Date: 2025     Chief Complaint: dysphagia, concern for aspiration, recurrent pneumonia    History of Present Illness:  Patient is a 89 y.o. AAF who is seen in consultation at the request of Dr. Mason for the above mentioned problem.    She has a pmh T2D, HTN, HLD, CKD, microscopic colitis, TIA, stroke, dementia admitted with confusion, hypoxia, CA on CKD. She was diagnosed with bilateral pneumonia a week ago and treated with azithromycin and Augmentin. Due to concern for aspiration pneumonia, SLP was consulted. SLP reviewed chart and noted prior history of esophageal dysphagia (moderate smooth narrowing of distal esophagus, decreased esophageal peristalsis, moderate pooling of contrast within esophagus and barium tablet held up until it dissolved) per barium esophagram in  and suggested GI consult.    Most of history obtained from daughter. Reports post prandial coughing, dry heaving for few years. They pat her on the back to help. Sometimes she brings up clear froth. Patient says she only has difficulty with swallowing water. She has dark stools but takes iron. Daughter says she has some diarrhea. She has known history of microscopic colitis and just completed antibiotics. Patient takes ASA 81 mg and tylenol for pain and is on 20 mg famotidine BID.     No prior upper endoscopy.      PMH:  Past Medical History:   Diagnosis Date    Arthritis     Chronic pain     Depression     Diabetes (HCC)     Hypercholesterolemia     Hypertension     Stroke (HCC)     TIA 10 yrs back    Stroke (HCC)            PSH:  Past Surgical History:   Procedure Laterality Date    CARPAL TUNNEL RELEASE      CATARACT REMOVAL      bilateral    HYSTERECTOMY (CERVIX STATUS UNKNOWN)      HYSTERECTOMY (CERVIX

## 2025-01-07 NOTE — PLAN OF CARE
Problem: Occupational Therapy - Adult  Goal: By Discharge: Performs self-care activities at highest level of function for planned discharge setting.  See evaluation for individualized goals.  Description: FUNCTIONAL STATUS PRIOR TO ADMISSION:  Patient was ambulatory using a RW and was independent for basic ADLs, daughter assists with shower transfers and IADLs.     Receives Help From: Family, Prior Level of Assist for ADLs: Independent, Prior Level of Assist for Homemaking: Independent, Prior Level of Assist for Transfers: Needs assistance (daughter assists for shower transfers), Active : No     HOME SUPPORT: Patient lived with her daughter and grandson who assist primarily with IADLs.    Occupational Therapy Goals:  Initiated 1/7/2025  1.  Patient will perform grooming standing at sink with Modified [place] within 7 day(s).  2.  Patient will perform lower body dressing using AD PRN with Minimal Assist within 7 day(s).  3.  Patient will perform bathing with Minimal Assist within 7 day(s).  4.  Patient will perform toilet transfers with Modified [place]  within 7 day(s).  5.  Patient will perform all aspects of toileting with Modified [place] within 7 day(s).  6.  Patient will participate in upper extremity therapeutic exercise/activities with [place] for 10 minutes within 7 day(s).    7.  Patient will utilize energy conservation techniques during functional activities with verbal cues within 7 day(s).  Outcome: Progressing   OCCUPATIONAL THERAPY EVALUATION    Patient: Elba Donald (89 y.o. female)  Date: 1/7/2025  Primary Diagnosis: Acute metabolic encephalopathy [G93.41]         Precautions: Fall Risk                  ASSESSMENT :  The patient is limited by decreased functional mobility, independence in ADLs, high-level IADLs, ROM, strength, activity tolerance, endurance, safety awareness, cognition, balance. Patient presented with worsening confusion and found to have hypoxia by  physical therapist and registered nurse    Patient Education  Education Given To: Patient  Education Provided: Role of Therapy;Plan of Care;ADL Adaptive Strategies;Transfer Training;Mobility Training;Family Education  Education Method: Verbal;Demonstration  Barriers to Learning: Cognition  Education Outcome: Verbalized understanding;Demonstrated understanding;Continued education needed    Thank you for this referral.  Yu Leija OTR/L  Minutes: 32    Occupational Therapy Evaluation Charge Determination   History Examination Decision-Making   LOW Complexity : Brief history review  MEDIUM Complexity: 3-5 Performance deficits relating to physical, cognitive, or psychosocial skills that result in activity limitations and/or participation restrictions LOW Complexity: No comorbidities that affect functional and  no verbal  or physical assist needed to complete eval tasks   Based on the above components, the patient evaluation is determined to be of the following complexity level: Low

## 2025-01-07 NOTE — PLAN OF CARE
Problem: Physical Therapy - Adult  Goal: By Discharge: Performs mobility at highest level of function for planned discharge setting.  See evaluation for individualized goals.  Description: FUNCTIONAL STATUS PRIOR TO ADMISSION: The patient ambulated inside the home w/ a rolling walker, family assisted with a wheelchair in the community, several falls the few weeks prior to admission but none before    HOME SUPPORT PRIOR TO ADMISSION: The patient lived with family assisting as needed.  Physical Therapy Goals  Initiated 1/7/2025  1.  Patient will move from supine to sit and sit to supine in bed with supervision/set-up within 7 day(s).    2.  Patient will perform sit to stand with supervision/set-up within 7 day(s).  3.  Patient will transfer from bed to chair and chair to bed with supervision/set-up using the least restrictive device within 7 day(s).  4.  Patient will ambulate with supervision/set-up for 50 feet with the least restrictive device within 7 day(s).       Outcome: Progressing   PHYSICAL THERAPY EVALUATION    Patient: Elba Donald (89 y.o. female)  Date: 1/7/2025  Primary Diagnosis: Acute metabolic encephalopathy [G93.41]       Precautions: Restrictions/Precautions: Fall Risk                      ASSESSMENT :   DEFICITS/IMPAIRMENTS:   The patient is limited by decreased functional mobility, independence in ADLs, strength, activity tolerance, safety awareness, cognition and balance, now requiring oxygen for O2 sat >90%.    Received pt on the ER stretcher with her daughter in the room, SpO2 100% on 6L, decreased to 85% on RA trial, sustained mid 90's on 4L.  Pt's dtr indicates h/o hypoxia to 80's w/ activity in the home.  Pt completed bed mobility and transfers at overall Min to CGA level.  She benefits from RW support and cues to improve posture and weight shift for bed to chair transfers.  She fatigues with light activity which limits ambulation today.  Pt remained up in the chair post session, VSS

## 2025-01-08 ENCOUNTER — APPOINTMENT (OUTPATIENT)
Facility: HOSPITAL | Age: 89
End: 2025-01-08
Attending: STUDENT IN AN ORGANIZED HEALTH CARE EDUCATION/TRAINING PROGRAM
Payer: MEDICARE

## 2025-01-08 LAB
ALBUMIN SERPL-MCNC: 3.1 G/DL (ref 3.5–5)
ANION GAP SERPL CALC-SCNC: 5 MMOL/L (ref 2–12)
BASOPHILS # BLD: 0.03 K/UL (ref 0–0.1)
BASOPHILS NFR BLD: 0.4 % (ref 0–1)
BUN SERPL-MCNC: 16 MG/DL (ref 6–20)
BUN/CREAT SERPL: 12 (ref 12–20)
CALCIUM SERPL-MCNC: 9.9 MG/DL (ref 8.5–10.1)
CHLORIDE SERPL-SCNC: 102 MMOL/L (ref 97–108)
CO2 SERPL-SCNC: 33 MMOL/L (ref 21–32)
COMMENT:: NORMAL
CREAT SERPL-MCNC: 1.34 MG/DL (ref 0.55–1.02)
DIFFERENTIAL METHOD BLD: ABNORMAL
ECHO AO ASC DIAM: 3.2 CM
ECHO AO ASCENDING AORTA INDEX: 1.55 CM/M2
ECHO AO ROOT DIAM: 3.8 CM
ECHO AO ROOT INDEX: 1.84 CM/M2
ECHO AV AREA PEAK VELOCITY: 1.5 CM2
ECHO AV AREA VTI: 1.5 CM2
ECHO AV AREA/BSA PEAK VELOCITY: 0.7 CM2/M2
ECHO AV AREA/BSA VTI: 0.7 CM2/M2
ECHO AV MEAN GRADIENT: 28 MMHG
ECHO AV MEAN VELOCITY: 2.5 M/S
ECHO AV PEAK GRADIENT: 50 MMHG
ECHO AV PEAK VELOCITY: 3.6 M/S
ECHO AV VELOCITY RATIO: 0.33
ECHO AV VTI: 75.3 CM
ECHO EST RA PRESSURE: 3 MMHG
ECHO LA DIAMETER INDEX: 1.84 CM/M2
ECHO LA DIAMETER: 3.8 CM
ECHO LA TO AORTIC ROOT RATIO: 1
ECHO LV E' LATERAL VELOCITY: 9.94 CM/S
ECHO LV EF PHYSICIAN: 60 %
ECHO LV FRACTIONAL SHORTENING: 31 % (ref 28–44)
ECHO LV INTERNAL DIMENSION DIASTOLE INDEX: 1.89 CM/M2
ECHO LV INTERNAL DIMENSION DIASTOLIC: 3.9 CM (ref 3.9–5.3)
ECHO LV INTERNAL DIMENSION SYSTOLIC INDEX: 1.31 CM/M2
ECHO LV INTERNAL DIMENSION SYSTOLIC: 2.7 CM
ECHO LV IVSD: 1.1 CM (ref 0.6–0.9)
ECHO LV MASS 2D: 159.3 G (ref 67–162)
ECHO LV MASS INDEX 2D: 77.3 G/M2 (ref 43–95)
ECHO LV POSTERIOR WALL DIASTOLIC: 1.3 CM (ref 0.6–0.9)
ECHO LV RELATIVE WALL THICKNESS RATIO: 0.67
ECHO LVOT AREA: 4.5 CM2
ECHO LVOT AV VTI INDEX: 0.33
ECHO LVOT DIAM: 2.4 CM
ECHO LVOT MEAN GRADIENT: 3 MMHG
ECHO LVOT PEAK GRADIENT: 6 MMHG
ECHO LVOT PEAK VELOCITY: 1.2 M/S
ECHO LVOT STROKE VOLUME INDEX: 54.9 ML/M2
ECHO LVOT SV: 113 ML
ECHO LVOT VTI: 25 CM
ECHO MV A VELOCITY: 0.96 M/S
ECHO MV AREA PHT: 4 CM2
ECHO MV AREA VTI: 3 CM2
ECHO MV E DECELERATION TIME (DT): 191.7 MS
ECHO MV E VELOCITY: 1.2 M/S
ECHO MV E/A RATIO: 1.25
ECHO MV E/E' LATERAL: 12.07
ECHO MV LVOT VTI INDEX: 1.5
ECHO MV MAX VELOCITY: 1.2 M/S
ECHO MV MEAN GRADIENT: 3 MMHG
ECHO MV MEAN VELOCITY: 0.8 M/S
ECHO MV PEAK GRADIENT: 6 MMHG
ECHO MV PRESSURE HALF TIME (PHT): 55.6 MS
ECHO MV VTI: 37.5 CM
ECHO RIGHT VENTRICULAR SYSTOLIC PRESSURE (RVSP): 23 MMHG
ECHO RV INTERNAL DIMENSION: 4.3 CM
ECHO RV TAPSE: 1.7 CM (ref 1.7–?)
ECHO TV REGURGITANT MAX VELOCITY: 2.26 M/S
ECHO TV REGURGITANT PEAK GRADIENT: 20 MMHG
EKG ATRIAL RATE: 68 BPM
EKG DIAGNOSIS: NORMAL
EKG P AXIS: 89 DEGREES
EKG P-R INTERVAL: 218 MS
EKG Q-T INTERVAL: 436 MS
EKG QRS DURATION: 140 MS
EKG QTC CALCULATION (BAZETT): 463 MS
EKG R AXIS: -52 DEGREES
EKG T AXIS: 101 DEGREES
EKG VENTRICULAR RATE: 68 BPM
EOSINOPHIL # BLD: 0.29 K/UL (ref 0–0.4)
EOSINOPHIL NFR BLD: 4 % (ref 0–7)
ERYTHROCYTE [DISTWIDTH] IN BLOOD BY AUTOMATED COUNT: 15 % (ref 11.5–14.5)
GLUCOSE BLD STRIP.AUTO-MCNC: 132 MG/DL (ref 65–117)
GLUCOSE BLD STRIP.AUTO-MCNC: 133 MG/DL (ref 65–117)
GLUCOSE BLD STRIP.AUTO-MCNC: 171 MG/DL (ref 65–117)
GLUCOSE BLD STRIP.AUTO-MCNC: 174 MG/DL (ref 65–117)
GLUCOSE BLD STRIP.AUTO-MCNC: 95 MG/DL (ref 65–117)
GLUCOSE SERPL-MCNC: 140 MG/DL (ref 65–100)
HCT VFR BLD AUTO: 36.5 % (ref 35–47)
HGB BLD-MCNC: 11.4 G/DL (ref 11.5–16)
IMM GRANULOCYTES # BLD AUTO: 0.02 K/UL (ref 0–0.04)
IMM GRANULOCYTES NFR BLD AUTO: 0.3 % (ref 0–0.5)
LYMPHOCYTES # BLD: 1.64 K/UL (ref 0.8–3.5)
LYMPHOCYTES NFR BLD: 22.6 % (ref 12–49)
MCH RBC QN AUTO: 30.2 PG (ref 26–34)
MCHC RBC AUTO-ENTMCNC: 31.2 G/DL (ref 30–36.5)
MCV RBC AUTO: 96.6 FL (ref 80–99)
MONOCYTES # BLD: 0.68 K/UL (ref 0–1)
MONOCYTES NFR BLD: 9.4 % (ref 5–13)
NEUTS SEG # BLD: 4.6 K/UL (ref 1.8–8)
NEUTS SEG NFR BLD: 63.3 % (ref 32–75)
NRBC # BLD: 0 K/UL (ref 0–0.01)
NRBC BLD-RTO: 0 PER 100 WBC
PHOSPHATE SERPL-MCNC: 3.2 MG/DL (ref 2.6–4.7)
PLATELET # BLD AUTO: 135 K/UL (ref 150–400)
PMV BLD AUTO: 11.4 FL (ref 8.9–12.9)
POTASSIUM SERPL-SCNC: 4 MMOL/L (ref 3.5–5.1)
PROCALCITONIN SERPL-MCNC: <0.05 NG/ML
RBC # BLD AUTO: 3.78 M/UL (ref 3.8–5.2)
SERVICE CMNT-IMP: ABNORMAL
SERVICE CMNT-IMP: NORMAL
SODIUM SERPL-SCNC: 140 MMOL/L (ref 136–145)
SPECIMEN HOLD: NORMAL
WBC # BLD AUTO: 7.3 K/UL (ref 3.6–11)

## 2025-01-08 PROCEDURE — 6360000002 HC RX W HCPCS

## 2025-01-08 PROCEDURE — 97535 SELF CARE MNGMENT TRAINING: CPT

## 2025-01-08 PROCEDURE — 93010 ELECTROCARDIOGRAM REPORT: CPT | Performed by: INTERNAL MEDICINE

## 2025-01-08 PROCEDURE — 97530 THERAPEUTIC ACTIVITIES: CPT

## 2025-01-08 PROCEDURE — 2060000000 HC ICU INTERMEDIATE R&B

## 2025-01-08 PROCEDURE — 82962 GLUCOSE BLOOD TEST: CPT

## 2025-01-08 PROCEDURE — 84145 PROCALCITONIN (PCT): CPT

## 2025-01-08 PROCEDURE — 6360000002 HC RX W HCPCS: Performed by: INTERNAL MEDICINE

## 2025-01-08 PROCEDURE — 80069 RENAL FUNCTION PANEL: CPT

## 2025-01-08 PROCEDURE — 85025 COMPLETE CBC W/AUTO DIFF WBC: CPT

## 2025-01-08 PROCEDURE — 36415 COLL VENOUS BLD VENIPUNCTURE: CPT

## 2025-01-08 PROCEDURE — 6370000000 HC RX 637 (ALT 250 FOR IP): Performed by: INTERNAL MEDICINE

## 2025-01-08 PROCEDURE — 2500000003 HC RX 250 WO HCPCS: Performed by: STUDENT IN AN ORGANIZED HEALTH CARE EDUCATION/TRAINING PROGRAM

## 2025-01-08 PROCEDURE — 6370000000 HC RX 637 (ALT 250 FOR IP): Performed by: STUDENT IN AN ORGANIZED HEALTH CARE EDUCATION/TRAINING PROGRAM

## 2025-01-08 PROCEDURE — 93306 TTE W/DOPPLER COMPLETE: CPT

## 2025-01-08 PROCEDURE — 2580000003 HC RX 258: Performed by: STUDENT IN AN ORGANIZED HEALTH CARE EDUCATION/TRAINING PROGRAM

## 2025-01-08 PROCEDURE — 6360000002 HC RX W HCPCS: Performed by: STUDENT IN AN ORGANIZED HEALTH CARE EDUCATION/TRAINING PROGRAM

## 2025-01-08 PROCEDURE — 2500000003 HC RX 250 WO HCPCS: Performed by: INTERNAL MEDICINE

## 2025-01-08 PROCEDURE — 93005 ELECTROCARDIOGRAM TRACING: CPT

## 2025-01-08 PROCEDURE — 93306 TTE W/DOPPLER COMPLETE: CPT | Performed by: INTERNAL MEDICINE

## 2025-01-08 RX ORDER — PANTOPRAZOLE SODIUM 40 MG/1
40 TABLET, DELAYED RELEASE ORAL
Status: DISCONTINUED | OUTPATIENT
Start: 2025-01-09 | End: 2025-01-10 | Stop reason: HOSPADM

## 2025-01-08 RX ORDER — CARBOXYMETHYLCELLULOSE SODIUM 5 MG/ML
1 SOLUTION/ DROPS OPHTHALMIC 3 TIMES DAILY
Status: DISCONTINUED | OUTPATIENT
Start: 2025-01-08 | End: 2025-01-10 | Stop reason: HOSPADM

## 2025-01-08 RX ADMIN — FERROUS SULFATE TAB 325 MG (65 MG ELEMENTAL FE) 325 MG: 325 (65 FE) TAB at 08:50

## 2025-01-08 RX ADMIN — HEPARIN SODIUM 5000 UNITS: 5000 INJECTION INTRAVENOUS; SUBCUTANEOUS at 14:52

## 2025-01-08 RX ADMIN — Medication 1 DROP: at 20:58

## 2025-01-08 RX ADMIN — MEMANTINE 5 MG: 5 TABLET ORAL at 08:50

## 2025-01-08 RX ADMIN — SODIUM CHLORIDE, PRESERVATIVE FREE 10 ML: 5 INJECTION INTRAVENOUS at 21:15

## 2025-01-08 RX ADMIN — ASPIRIN 81 MG CHEWABLE TABLET 81 MG: 81 TABLET CHEWABLE at 08:49

## 2025-01-08 RX ADMIN — DOXYCYCLINE 100 MG: 100 INJECTION, POWDER, LYOPHILIZED, FOR SOLUTION INTRAVENOUS at 17:46

## 2025-01-08 RX ADMIN — MEMANTINE 5 MG: 5 TABLET ORAL at 21:19

## 2025-01-08 RX ADMIN — PANTOPRAZOLE SODIUM 40 MG: 40 INJECTION, POWDER, FOR SOLUTION INTRAVENOUS at 08:50

## 2025-01-08 RX ADMIN — ATORVASTATIN CALCIUM 20 MG: 20 TABLET, FILM COATED ORAL at 08:49

## 2025-01-08 RX ADMIN — SODIUM CHLORIDE, PRESERVATIVE FREE 10 ML: 5 INJECTION INTRAVENOUS at 08:50

## 2025-01-08 RX ADMIN — DONEPEZIL HYDROCHLORIDE 5 MG: 10 TABLET, FILM COATED ORAL at 20:58

## 2025-01-08 RX ADMIN — HEPARIN SODIUM 5000 UNITS: 5000 INJECTION INTRAVENOUS; SUBCUTANEOUS at 20:59

## 2025-01-08 RX ADMIN — MICONAZOLE NITRATE: 2 OINTMENT TOPICAL at 21:16

## 2025-01-08 RX ADMIN — DOXYCYCLINE 100 MG: 100 INJECTION, POWDER, LYOPHILIZED, FOR SOLUTION INTRAVENOUS at 05:40

## 2025-01-08 RX ADMIN — Medication 2000 UNITS: at 08:49

## 2025-01-08 RX ADMIN — PAROXETINE HYDROCHLORIDE 20 MG: 20 TABLET, FILM COATED ORAL at 20:58

## 2025-01-08 RX ADMIN — WATER 1000 MG: 1 INJECTION INTRAMUSCULAR; INTRAVENOUS; SUBCUTANEOUS at 14:52

## 2025-01-08 RX ADMIN — METOPROLOL TARTRATE 100 MG: 50 TABLET, FILM COATED ORAL at 08:50

## 2025-01-08 RX ADMIN — MICONAZOLE NITRATE: 2 OINTMENT TOPICAL at 12:23

## 2025-01-08 RX ADMIN — AMLODIPINE BESYLATE 5 MG: 5 TABLET ORAL at 08:49

## 2025-01-08 NOTE — PROGRESS NOTES
[facility]  [address]  [place] [address] [phone]                     GI PROGRESS NOTE    Patient Name: Elba Donald      : 1935      MRN: [id number]  Admit Date: 2025  Today's Date: 2025  CC: follow up    Subjective:     She ate soft and bite sized diet last night including chicken pot pie and broccoli. She took her pills this morning without issue. She is on 4L 02. No dyspnea. She endorses diarrhea PTA - watery. She cannot quantify how much. Enteric panel ordered but not collected.     Objective:     Blood pressure (!) 118/59, pulse 71, temperature 99 °F (37.2 °C), temperature source Oral, resp. rate 18, height 1.702 m (5' 7\"), weight 94.5 kg (208 lb 5.4 oz), SpO2 96%.    Physical Exam:  General appearance: cooperative, no distress, appears stated age, elderly female  HEENT: Sclerae anicteric. Extra-occular muscles are intact. +NC @ 4L  Cardiovascular: Regular rate and rhythm. No murmurs, gallops, or rubs.   Respiratory: Comfortable breathing with no accessory muscle use.   GI: Abdomen nondistended, soft, and nontender.   Rectal: Deferred    Neurological: Patient is alert   Psychiatric: Mood appears appropriate with good judgement.  No anxiety or agitation.      Data Review:    Recent Results (from the past 24 hour(s))   POCT Glucose    Collection Time: 25 10:49 AM   Result Value Ref Range    POC Glucose 102 65 - 117 mg/dL    Performed by: MANPREET Porras    POCT Glucose    Collection Time: 25 12:39 PM   Result Value Ref Range    POC Glucose 126 (H) 65 - 117 mg/dL    Performed by: MANPREET Porras    POCT Glucose    Collection Time: 25  5:58 PM   Result Value Ref Range    POC Glucose 96 65 - 117 mg/dL    Performed by: REMY RUSS    POCT Glucose    Collection Time: 25  8:46 PM   Result Value Ref Range    POC Glucose 136 (H) 65 - 117 mg/dL    Performed by: MAYTE Oneal    Echo (TTE) complete (PRN

## 2025-01-08 NOTE — PLAN OF CARE
Problem: Occupational Therapy - Adult  Goal: By Discharge: Performs self-care activities at highest level of function for planned discharge setting.  See evaluation for individualized goals.  Description: FUNCTIONAL STATUS PRIOR TO ADMISSION:  Patient was ambulatory using a RW and was independent for basic ADLs, daughter assists with shower transfers and IADLs.     Receives Help From: Family, Prior Level of Assist for ADLs: Independent, Prior Level of Assist for Homemaking: Independent, Prior Level of Assist for Transfers: Needs assistance (daughter assists for shower transfers), Active : No     HOME SUPPORT: Patient lived with her daughter and grandson who assist primarily with IADLs.    Occupational Therapy Goals:  Initiated 1/7/2025  1.  Patient will perform grooming standing at sink with Modified [place] within 7 day(s).  2.  Patient will perform lower body dressing using AD PRN with Minimal Assist within 7 day(s).  3.  Patient will perform bathing with Minimal Assist within 7 day(s).  4.  Patient will perform toilet transfers with Modified [place]  within 7 day(s).  5.  Patient will perform all aspects of toileting with Modified [place] within 7 day(s).  6.  Patient will participate in upper extremity therapeutic exercise/activities with [place] for 10 minutes within 7 day(s).    7.  Patient will utilize energy conservation techniques during functional activities with verbal cues within 7 day(s).  1/8/2025 1323 by Maci Seo, OT  Outcome: Progressing   OCCUPATIONAL THERAPY TREATMENT  Patient: Elba Donald (89 y.o. female)  Date: 1/8/2025  Primary Diagnosis: Acute pulmonary edema [J81.0]  CA (acute kidney injury) (HCC) [N17.9]  Elevated brain natriuretic peptide (BNP) level [R79.89]  Altered mental status, unspecified altered mental status type [R41.82]  Acute metabolic encephalopathy [G93.41]       Precautions: Fall Risk                Chart, occupational therapy  X2  Scooting: Stand-by assistance     Transfers:   Transfer Training  Transfer Training: Yes  Overall Level of Assistance: Assist X2;Maximum assistance  Sit to Stand: Moderate assistance;Assist X2  Stand to Sit: Moderate assistance;Assist X2  Stand Pivot Transfers: Maximum assistance;Assist X2  Bed to Chair: Maximum assistance;Assist X2           Balance:     Balance  Sitting: Impaired  Sitting - Static: Fair (occasional)  Sitting - Dynamic: Poor (constant support)  Standing: With support;Impaired  Standing - Static: Constant support;Fair  Standing - Dynamic: Constant support;Poor      ADL Intervention:                   Grooming: Maximum assistance   Grooming Skilled Clinical Factors: Pt assisted with hairbrushing and putting on deoderant seated in chair with increased fatigue requiring OT assist for brushing middle / back of head.                                    Toileting: Maximum assistance;Dependent/Total  Toileting Skilled Clinical Factors: Pt able to stand with Max A x2 to assist donning brief and required Total A from RN for posterior perineal hygine                             Pain Rating:  Pt displayed no pain behaviors    Activity Tolerance:   Fair   Please refer to the flowsheet for vital signs taken during this treatment.    After treatment:   Patient left in no apparent distress sitting up in chair, Call bell within reach, Bed/ chair alarm activated, and Caregiver / family present    COMMUNICATION/EDUCATION:   The patient's plan of care was discussed with: physical therapist, occupational therapist, and registered nurse    Patient Education  Education Given To: Patient;Family  Education Provided: Role of Therapy;Plan of Care;Fall Prevention Strategies;Energy Conservation;Transfer Training  Education Method: Verbal  Barriers to Learning: Cognition  Education Outcome: Continued education needed    Thank you for this referral.  Maci Seo, OT  Minutes: 28

## 2025-01-08 NOTE — PROGRESS NOTES
supple, no JVD, no meningeal signs  Chest: Clear to auscultation bilaterally   CVS: S1 S2 heard, Capillary refill less than 2 seconds  Abd: soft/ non tender, non distended, BS physiological,   Ext: no clubbing, no cyanosis, no edema, brisk 2+ DP pulses  Neuro/Psych: pleasant mood and affect, CN 2-12 grossly intact, sensory grossly within normal limit, Strength 5/5 in all extremities, DTR 1+ x 4  Skin: warm            Data Review:    Review and/or order of clinical lab test    I have independently reviewed and interpreted patient's lab and all other diagnostic data    Notes reviewed from all clinical/nonclinical/nursing services involved in patient's clinical care. Care coordination discussions were held with appropriate clinical/nonclinical/ nursing providers based on care coordination needs.     Labs:     Recent Labs     01/07/25  0433 01/08/25  0246   WBC 7.9 7.3   HGB 10.4* 11.4*   HCT 33.8* 36.5   * 135*     Recent Labs     01/06/25  1334 01/07/25  0433 01/08/25  0246   * 138 140   K 4.9 3.9 4.0   CL 99 103 102   CO2 28 31 33*   BUN 25* 20 16   PHOS  --  3.7 3.2     Recent Labs     01/06/25  1334   ALT 23   GLOB 4.2*     No results for input(s): \"INR\", \"APTT\" in the last 72 hours.    Invalid input(s): \"PTP\"   No results for input(s): \"TIBC\" in the last 72 hours.    Invalid input(s): \"FE\", \"PSAT\", \"FERR\"   No results found for: \"RBCF\"   No results for input(s): \"PH\", \"PCO2\", \"PO2\" in the last 72 hours.  No results for input(s): \"CPK\" in the last 72 hours.    Invalid input(s): \"CPKMB\", \"CKNDX\", \"TROIQ\"  Lab Results   Component Value Date/Time    CHOL 151 04/05/2024 11:21 AM    CHOL 124 06/17/2022 01:09 PM    HDL 70 04/05/2024 11:21 AM    LDL 68 04/05/2024 11:21 AM     No results found for: \"GLUCPOC\"  [unfilled]      Medications Reviewed:     Current Facility-Administered Medications   Medication Dose Route Frequency    miconazole nitrate 2 % ointment   Topical BID    loperamide (IMODIUM) capsule 2 mg  2  mg Oral BID PRN    doxycycline (VIBRAMYCIN) 100 mg in sodium chloride 0.9 % 100 mL IVPB (mini-bag)  100 mg IntraVENous Q12H    pantoprazole (PROTONIX) 40 mg in sodium chloride (PF) 0.9 % 10 mL injection  40 mg IntraVENous Daily    sodium chloride flush 0.9 % injection 5-40 mL  5-40 mL IntraVENous 2 times per day    sodium chloride flush 0.9 % injection 5-40 mL  5-40 mL IntraVENous PRN    0.9 % sodium chloride infusion   IntraVENous PRN    ondansetron (ZOFRAN) injection 4 mg  4 mg IntraVENous Q6H PRN    acetaminophen (TYLENOL) tablet 650 mg  650 mg Oral Q6H PRN    heparin (porcine) injection 5,000 Units  5,000 Units SubCUTAneous 3 times per day    cefTRIAXone (ROCEPHIN) 1,000 mg in sterile water 10 mL IV syringe  1,000 mg IntraVENous Q24H    amLODIPine (NORVASC) tablet 5 mg  5 mg Oral Daily    aspirin chewable tablet 81 mg  81 mg Oral Daily    donepezil (ARICEPT) tablet 5 mg  5 mg Oral Nightly    ferrous sulfate (IRON 325) tablet 325 mg  325 mg Oral Daily    memantine (NAMENDA) tablet 5 mg  5 mg Oral BID    metoprolol tartrate (LOPRESSOR) tablet 100 mg  100 mg Oral Daily    PARoxetine (PAXIL) tablet 20 mg  20 mg Oral Nightly    atorvastatin (LIPITOR) tablet 20 mg  20 mg Oral Daily    Vitamin D (CHOLECALCIFEROL) tablet 2,000 Units  2,000 Units Oral Daily    glucose chewable tablet 16 g  4 tablet Oral PRN    dextrose bolus 10% 125 mL  125 mL IntraVENous PRN    Or    dextrose bolus 10% 250 mL  250 mL IntraVENous PRN    glucagon injection 1 mg  1 mg SubCUTAneous PRN    dextrose 10 % infusion   IntraVENous Continuous PRN    insulin lispro (HUMALOG,ADMELOG) injection vial 0-4 Units  0-4 Units SubCUTAneous 4 times per day     ______________________________________________________________________  EXPECTED LENGTH OF STAY: 4  ACTUAL LENGTH OF STAY:          2                 Felix Mason MD

## 2025-01-08 NOTE — PLAN OF CARE
Problem: Physical Therapy - Adult  Goal: By Discharge: Performs mobility at highest level of function for planned discharge setting.  See evaluation for individualized goals.  Description: FUNCTIONAL STATUS PRIOR TO ADMISSION: The patient ambulated inside the home w/ a rolling walker, family assisted with a wheelchair in the community, several falls the few weeks prior to admission but none before    HOME SUPPORT PRIOR TO ADMISSION: The patient lived with family assisting as needed.    Physical Therapy Goals  Initiated 1/7/2025  1.  Patient will move from supine to sit and sit to supine in bed with supervision/set-up within 7 day(s).    2.  Patient will perform sit to stand with supervision/set-up within 7 day(s).  3.  Patient will transfer from bed to chair and chair to bed with supervision/set-up using the least restrictive device within 7 day(s).  4.  Patient will ambulate with supervision/set-up for 50 feet with the least restrictive device within 7 day(s).       Outcome: Progressing     PHYSICAL THERAPY TREATMENT    Patient: Elba Donald (89 y.o. female)  Date: 1/8/2025  Diagnosis: Acute pulmonary edema [J81.0]  CA (acute kidney injury) (HCC) [N17.9]  Elevated brain natriuretic peptide (BNP) level [R79.89]  Altered mental status, unspecified altered mental status type [R41.82]  Acute metabolic encephalopathy [G93.41] Acute metabolic encephalopathy      Precautions: Fall Risk                      ASSESSMENT:  Patient continues to benefit from skilled PT services and is progressing towards goals. Pt received in bed with grandson at bedside, agreeable to participate with PT. Pt able to transfer EOB and OOB > chair with overall Hallie x 2 for transfers using RW. Able to stand from chair, but demos forward flexed posture, requiring Vc'ing for use of Ue's to achieve upright positioning.  Pt functioning below baseline, and would benefit from continued acute PT to progress mobility as able.

## 2025-01-08 NOTE — WOUND CARE
Wound Care Note:     New consult for \"gluteal cleft, excoriation in thighs, skin folds\"  Seen in 207/02 with PT/OT    89 y.o. y/o female admitted on 1/6/2025 from home  Admitted for    Acute pulmonary edema [J81.0]  CA (acute kidney injury) (HCC) [N17.9]  Elevated brain natriuretic peptide (BNP) level [R79.89]  Altered mental status, unspecified altered mental status type [R41.82]  Acute metabolic encephalopathy [G93.41]   History of    Past Medical History:   Diagnosis Date    Arthritis     Chronic pain     Depression     Diabetes (HCC)     Hypercholesterolemia     Hypertension     Stroke (HCC)     TIA 10 yrs back    Stroke (HCC)     2003     Lab Results   Component Value Date/Time    WBC 7.3 01/08/2025 02:46 AM    LABA1C 7.4 (H) 01/07/2025 04:33 AM    POCGLU 132 (H) 01/08/2025 05:43 AM    POCGLU 174 (H) 01/08/2025 12:35 AM    HGB 11.4 (L) 01/08/2025 02:46 AM    HCT 36.5 01/08/2025 02:46 AM     (L) 01/08/2025 02:46 AM     Diet: ADULT DIET; Easy to Chew           Assessment:   Appropriately conversational and Communicative and reports no pain.    Two assist to stand. Up in chair.  Purewick and brief.    Surface: [place] foam mattress    Buttocks and sacrum intact without erythema; sacral protective dressing put in place. Can use clear dressing.    1. POA BL breast and groin folds, gluteal cleft  End stages of maculopapular rash with flaky boarder consistent with candidiasis.  Patient using antifungal ointment at home  Periwound intact & without erythema    Tx: Ordered miconazole ointment.     Recommend:    BL breast folds, pannus, BL groin folds, gluteal cleft: Cleanse dry and apply miconazole ointment twice daily.      and Chele Protocol:  Turn/reposition approximately every 2 hours  Offload heels with heels hanging off end of pillow at all times while in bed.  Sacral Preventive dressing: change as needed ~every 4 days. Discontinue if incontinence is frequently soiling dressing.     Address

## 2025-01-09 ENCOUNTER — APPOINTMENT (OUTPATIENT)
Facility: HOSPITAL | Age: 89
End: 2025-01-09
Payer: MEDICARE

## 2025-01-09 LAB
ALBUMIN SERPL-MCNC: 3 G/DL (ref 3.5–5)
ANION GAP SERPL CALC-SCNC: 6 MMOL/L (ref 2–12)
BASOPHILS # BLD: 0.02 K/UL (ref 0–0.1)
BASOPHILS NFR BLD: 0.3 % (ref 0–1)
BUN SERPL-MCNC: 17 MG/DL (ref 6–20)
BUN/CREAT SERPL: 13 (ref 12–20)
CALCIUM SERPL-MCNC: 9.3 MG/DL (ref 8.5–10.1)
CHLORIDE SERPL-SCNC: 101 MMOL/L (ref 97–108)
CO2 SERPL-SCNC: 31 MMOL/L (ref 21–32)
CREAT SERPL-MCNC: 1.26 MG/DL (ref 0.55–1.02)
DIFFERENTIAL METHOD BLD: ABNORMAL
EOSINOPHIL # BLD: 0.32 K/UL (ref 0–0.4)
EOSINOPHIL NFR BLD: 4 % (ref 0–7)
ERYTHROCYTE [DISTWIDTH] IN BLOOD BY AUTOMATED COUNT: 14.7 % (ref 11.5–14.5)
GLUCOSE BLD STRIP.AUTO-MCNC: 113 MG/DL (ref 65–117)
GLUCOSE BLD STRIP.AUTO-MCNC: 137 MG/DL (ref 65–117)
GLUCOSE BLD STRIP.AUTO-MCNC: 148 MG/DL (ref 65–117)
GLUCOSE BLD STRIP.AUTO-MCNC: 174 MG/DL (ref 65–117)
GLUCOSE SERPL-MCNC: 155 MG/DL (ref 65–100)
HCT VFR BLD AUTO: 34.9 % (ref 35–47)
HGB BLD-MCNC: 10.9 G/DL (ref 11.5–16)
IMM GRANULOCYTES # BLD AUTO: 0.03 K/UL (ref 0–0.04)
IMM GRANULOCYTES NFR BLD AUTO: 0.4 % (ref 0–0.5)
L PNEUMO1 AG UR QL IA: NEGATIVE
LYMPHOCYTES # BLD: 2.13 K/UL (ref 0.8–3.5)
LYMPHOCYTES NFR BLD: 26.7 % (ref 12–49)
MCH RBC QN AUTO: 30.2 PG (ref 26–34)
MCHC RBC AUTO-ENTMCNC: 31.2 G/DL (ref 30–36.5)
MCV RBC AUTO: 96.7 FL (ref 80–99)
MONOCYTES # BLD: 0.95 K/UL (ref 0–1)
MONOCYTES NFR BLD: 11.9 % (ref 5–13)
NEUTS SEG # BLD: 4.54 K/UL (ref 1.8–8)
NEUTS SEG NFR BLD: 56.7 % (ref 32–75)
NRBC # BLD: 0 K/UL (ref 0–0.01)
NRBC BLD-RTO: 0 PER 100 WBC
PHOSPHATE SERPL-MCNC: 3 MG/DL (ref 2.6–4.7)
PLATELET # BLD AUTO: 135 K/UL (ref 150–400)
PMV BLD AUTO: 11.4 FL (ref 8.9–12.9)
POTASSIUM SERPL-SCNC: 4.5 MMOL/L (ref 3.5–5.1)
PROCALCITONIN SERPL-MCNC: <0.05 NG/ML
RBC # BLD AUTO: 3.61 M/UL (ref 3.8–5.2)
SERVICE CMNT-IMP: ABNORMAL
SERVICE CMNT-IMP: NORMAL
SODIUM SERPL-SCNC: 138 MMOL/L (ref 136–145)
SPECIMEN SOURCE: NORMAL
WBC # BLD AUTO: 8 K/UL (ref 3.6–11)

## 2025-01-09 PROCEDURE — 97530 THERAPEUTIC ACTIVITIES: CPT

## 2025-01-09 PROCEDURE — 6360000002 HC RX W HCPCS

## 2025-01-09 PROCEDURE — 2500000003 HC RX 250 WO HCPCS: Performed by: INTERNAL MEDICINE

## 2025-01-09 PROCEDURE — 6360000002 HC RX W HCPCS: Performed by: INTERNAL MEDICINE

## 2025-01-09 PROCEDURE — 2580000003 HC RX 258: Performed by: STUDENT IN AN ORGANIZED HEALTH CARE EDUCATION/TRAINING PROGRAM

## 2025-01-09 PROCEDURE — 84145 PROCALCITONIN (PCT): CPT

## 2025-01-09 PROCEDURE — 94760 N-INVAS EAR/PLS OXIMETRY 1: CPT

## 2025-01-09 PROCEDURE — 2500000003 HC RX 250 WO HCPCS: Performed by: STUDENT IN AN ORGANIZED HEALTH CARE EDUCATION/TRAINING PROGRAM

## 2025-01-09 PROCEDURE — 80069 RENAL FUNCTION PANEL: CPT

## 2025-01-09 PROCEDURE — 6370000000 HC RX 637 (ALT 250 FOR IP): Performed by: INTERNAL MEDICINE

## 2025-01-09 PROCEDURE — 71045 X-RAY EXAM CHEST 1 VIEW: CPT

## 2025-01-09 PROCEDURE — 82962 GLUCOSE BLOOD TEST: CPT

## 2025-01-09 PROCEDURE — 6370000000 HC RX 637 (ALT 250 FOR IP): Performed by: STUDENT IN AN ORGANIZED HEALTH CARE EDUCATION/TRAINING PROGRAM

## 2025-01-09 PROCEDURE — 2700000000 HC OXYGEN THERAPY PER DAY

## 2025-01-09 PROCEDURE — 2060000000 HC ICU INTERMEDIATE R&B

## 2025-01-09 PROCEDURE — 85025 COMPLETE CBC W/AUTO DIFF WBC: CPT

## 2025-01-09 RX ORDER — DOXYCYCLINE HYCLATE 100 MG
100 TABLET ORAL EVERY 12 HOURS SCHEDULED
Status: DISCONTINUED | OUTPATIENT
Start: 2025-01-09 | End: 2025-01-10 | Stop reason: HOSPADM

## 2025-01-09 RX ORDER — INSULIN LISPRO 100 [IU]/ML
0-8 INJECTION, SOLUTION INTRAVENOUS; SUBCUTANEOUS
Status: DISCONTINUED | OUTPATIENT
Start: 2025-01-09 | End: 2025-01-10 | Stop reason: HOSPADM

## 2025-01-09 RX ORDER — DEXTROSE MONOHYDRATE 100 MG/ML
INJECTION, SOLUTION INTRAVENOUS CONTINUOUS PRN
Status: DISCONTINUED | OUTPATIENT
Start: 2025-01-09 | End: 2025-01-10 | Stop reason: HOSPADM

## 2025-01-09 RX ADMIN — PANTOPRAZOLE SODIUM 40 MG: 40 TABLET, DELAYED RELEASE ORAL at 06:33

## 2025-01-09 RX ADMIN — DOXYCYCLINE 100 MG: 100 INJECTION, POWDER, LYOPHILIZED, FOR SOLUTION INTRAVENOUS at 06:34

## 2025-01-09 RX ADMIN — WATER 1000 MG: 1 INJECTION INTRAMUSCULAR; INTRAVENOUS; SUBCUTANEOUS at 16:22

## 2025-01-09 RX ADMIN — HEPARIN SODIUM 5000 UNITS: 5000 INJECTION INTRAVENOUS; SUBCUTANEOUS at 16:23

## 2025-01-09 RX ADMIN — Medication 1 DROP: at 09:07

## 2025-01-09 RX ADMIN — ATORVASTATIN CALCIUM 20 MG: 20 TABLET, FILM COATED ORAL at 09:07

## 2025-01-09 RX ADMIN — DOXYCYCLINE HYCLATE 100 MG: 100 TABLET, COATED ORAL at 20:56

## 2025-01-09 RX ADMIN — MEMANTINE 5 MG: 5 TABLET ORAL at 09:07

## 2025-01-09 RX ADMIN — Medication 2000 UNITS: at 09:06

## 2025-01-09 RX ADMIN — AMLODIPINE BESYLATE 5 MG: 5 TABLET ORAL at 09:07

## 2025-01-09 RX ADMIN — MICONAZOLE NITRATE: 2 OINTMENT TOPICAL at 20:57

## 2025-01-09 RX ADMIN — Medication 1 DROP: at 20:56

## 2025-01-09 RX ADMIN — PAROXETINE HYDROCHLORIDE 20 MG: 20 TABLET, FILM COATED ORAL at 20:56

## 2025-01-09 RX ADMIN — DONEPEZIL HYDROCHLORIDE 5 MG: 10 TABLET, FILM COATED ORAL at 20:56

## 2025-01-09 RX ADMIN — Medication 1 DROP: at 16:23

## 2025-01-09 RX ADMIN — MEMANTINE 5 MG: 5 TABLET ORAL at 20:56

## 2025-01-09 RX ADMIN — SODIUM CHLORIDE, PRESERVATIVE FREE 10 ML: 5 INJECTION INTRAVENOUS at 20:57

## 2025-01-09 RX ADMIN — HEPARIN SODIUM 5000 UNITS: 5000 INJECTION INTRAVENOUS; SUBCUTANEOUS at 20:56

## 2025-01-09 RX ADMIN — MICONAZOLE NITRATE: 2 OINTMENT TOPICAL at 09:08

## 2025-01-09 RX ADMIN — METOPROLOL TARTRATE 100 MG: 50 TABLET, FILM COATED ORAL at 09:07

## 2025-01-09 RX ADMIN — ASPIRIN 81 MG CHEWABLE TABLET 81 MG: 81 TABLET CHEWABLE at 09:07

## 2025-01-09 RX ADMIN — FERROUS SULFATE TAB 325 MG (65 MG ELEMENTAL FE) 325 MG: 325 (65 FE) TAB at 09:07

## 2025-01-09 RX ADMIN — SODIUM CHLORIDE, PRESERVATIVE FREE 10 ML: 5 INJECTION INTRAVENOUS at 09:07

## 2025-01-09 RX ADMIN — HEPARIN SODIUM 5000 UNITS: 5000 INJECTION INTRAVENOUS; SUBCUTANEOUS at 06:33

## 2025-01-09 NOTE — PLAN OF CARE
Problem: Occupational Therapy - Adult  Goal: By Discharge: Performs self-care activities at highest level of function for planned discharge setting.  See evaluation for individualized goals.  Description: FUNCTIONAL STATUS PRIOR TO ADMISSION:  Patient was ambulatory using a RW and was independent for basic ADLs, daughter assists with shower transfers and IADLs.     Receives Help From: Family, Prior Level of Assist for ADLs: Independent, Prior Level of Assist for Homemaking: Independent, Prior Level of Assist for Transfers: Needs assistance (daughter assists for shower transfers), Active : No     HOME SUPPORT: Patient lived with her daughter and grandson who assist primarily with IADLs.    Occupational Therapy Goals:  Initiated 1/7/2025  1.  Patient will perform grooming standing at sink with Modified [place] within 7 day(s).  2.  Patient will perform lower body dressing using AD PRN with Minimal Assist within 7 day(s).  3.  Patient will perform bathing with Minimal Assist within 7 day(s).  4.  Patient will perform toilet transfers with Modified [place]  within 7 day(s).  5.  Patient will perform all aspects of toileting with Modified [place] within 7 day(s).  6.  Patient will participate in upper extremity therapeutic exercise/activities with [place] for 10 minutes within 7 day(s).    7.  Patient will utilize energy conservation techniques during functional activities with verbal cues within 7 day(s).  1/8/2025 1323 by Maci Seo, OT  Outcome: Progressing  1/8/2025 1322 by Maci Seo, OT  Outcome: Progressing     Problem: Physical Therapy - Adult  Goal: By Discharge: Performs mobility at highest level of function for planned discharge setting.  See evaluation for individualized goals.  Description: FUNCTIONAL STATUS PRIOR TO ADMISSION: The patient ambulated inside the home w/ a rolling walker, family assisted with a wheelchair in the community, several falls the few weeks prior to

## 2025-01-09 NOTE — PLAN OF CARE
Problem: Physical Therapy - Adult  Goal: By Discharge: Performs mobility at highest level of function for planned discharge setting.  See evaluation for individualized goals.  Description: FUNCTIONAL STATUS PRIOR TO ADMISSION: The patient ambulated inside the home w/ a rolling walker, family assisted with a wheelchair in the community, several falls the few weeks prior to admission but none before    HOME SUPPORT PRIOR TO ADMISSION: The patient lived with family assisting as needed.    Physical Therapy Goals  Initiated 1/7/2025  1.  Patient will move from supine to sit and sit to supine in bed with supervision/set-up within 7 day(s).    2.  Patient will perform sit to stand with supervision/set-up within 7 day(s).  3.  Patient will transfer from bed to chair and chair to bed with supervision/set-up using the least restrictive device within 7 day(s).  4.  Patient will ambulate with supervision/set-up for 50 feet with the least restrictive device within 7 day(s).       Outcome: Progressing     PHYSICAL THERAPY TREATMENT    Patient: Elba Donald (89 y.o. female)  Date: 1/9/2025  Diagnosis: Acute pulmonary edema [J81.0]  CA (acute kidney injury) (HCC) [N17.9]  Elevated brain natriuretic peptide (BNP) level [R79.89]  Altered mental status, unspecified altered mental status type [R41.82]  Acute metabolic encephalopathy [G93.41] Acute metabolic encephalopathy      Precautions: Fall Risk                      ASSESSMENT:  Patient continues to benefit from skilled PT services and is progressing towards goals. Pt seen for ongoing PT intervention, overall tolerating session well. Pt received in bed on room air (nc out of nose), with SpO2 noted to be 87-88%; placed back on supplemental O2 (4L) with improvement in O2 sats noted. Pt requires overall Hallie for bed mobility and assist x 2 for transfers OOB > chair. Pt demos increased L lateral lean while in bed and seated EOB; grandson states this is baseline. Will continue  to follow patient while in hospital to progress mobility as able.          PLAN:  Patient continues to benefit from skilled intervention to address the above impairments.  Continue treatment per established plan of care.    Recommendations for staff mobility and toileting assistance:  Recommend that staff completes patient mobility with assist x2 using Joana [place].      Recommendation for discharge: (in order for the patient to meet his/her long term goals):   Intermittent physical therapy up to 2-3x/week in previous living setting    Other factors to consider for discharge: impaired cognition and concern for safely navigating or managing the home environment    IF patient discharges home will need the following DME: continuing to assess with progress       SUBJECTIVE:   Patient stated, \"I can't stand up straight because of my back.\"    OBJECTIVE DATA SUMMARY:          Functional Mobility Training:  Bed Mobility:  Bed Mobility Training  Bed Mobility Training: Yes  Rolling: Stand-by assistance  Supine to Sit: Minimum assistance;Assist X1  Scooting: Stand-by assistance  Transfers:  Transfer Training  Transfer Training: Yes  Overall Level of Assistance: Maximum assistance;Assist X2  Sit to Stand: Moderate assistance;Assist X2  Stand to Sit: Minimum assistance;Assist X2  Stand Pivot Transfers: Maximum assistance;Assist X2  Bed to Chair: Maximum assistance;Assist X2  - Pt performing sit <> stand from EOB x 2, from recliner chair x 2 with Vc'ing for hand placement , Vc'ing for upright posture upon standing   - Requires assistance for RW management with transfer to chair    Balance:  Balance  Sitting: Impaired  Sitting - Static:  (Demos increased L lateral trunk lean)  Sitting - Dynamic: Poor (constant support)  Standing: With support;Impaired  Standing - Static: Constant support;Fair  Standing - Dynamic: Constant support;Poor                Pain Ratin/10       Activity Tolerance:   Good    After treatment:   Patient

## 2025-01-09 NOTE — CARE COORDINATION
Update 3:25pm: [facility] can take if just PT/OT HH per Malika [phone], awaiting confirmation in [facility], patient's daughter will need to be notified, patient asleep and no one at the bedside.    Update 1pm: [facility] denied due to staffing shortage, CM sent referrals via [facility] to other HH agencies in patient's insurance network, awaiting responses.    Transition of Care Plan:    RUR: 15%  Prior Level of Functioning: lives with her daughter and grandson  Disposition: patient will return back to her daughter's home with Home health, has had [facility] in the past, [facility]  DRAGAN: greater than 48 hours  If SNF or IPR: Date FOC offered: NA  Date FOC received: NA  Accepting facility: NA  Date authorization started with reference number: NA  Date authorization received and expires: NA  Follow up appointments: TBD  DME needed: owns needed equipment per PT/OT  Transportation at discharge: her daughter Roma  IM/IMM Medicare/ letter given: 1/6/25  Is patient a  and connected with [facility]? NA   If yes, was [place] transfer form completed and [facility] notified? NA  Caregiver Contact: Romalynne Donald [phone]  Discharge Caregiver contacted prior to discharge? CM attempted to call patient's daughter, could not get through, no voicemail option  Care Conference needed? no  Barriers to discharge:  medical    CM met the patient and her grandson at the bedside, explained role. Discussed home health rec per PT/OT notes. Patient and grandson agreed with [facility] to be consulted again as the patient has experience using their services. CM attempted to call the patient's daughter to also discuss DC plan but was unable to get through or leave a voicemail, but the patient's grandson at the bedside explained that his mom Roma works and won't be able to answer until she gets off. CM will follow for RIK.    Lisa Alcantara RN, BSN, CM

## 2025-01-09 NOTE — PLAN OF CARE
Problem: Occupational Therapy - Adult  Goal: By Discharge: Performs self-care activities at highest level of function for planned discharge setting.  See evaluation for individualized goals.  Description: FUNCTIONAL STATUS PRIOR TO ADMISSION:  Patient was ambulatory using a RW and was independent for basic ADLs, daughter assists with shower transfers and IADLs.     Receives Help From: Family, Prior Level of Assist for ADLs: Independent, Prior Level of Assist for Homemaking: Independent, Prior Level of Assist for Transfers: Needs assistance (daughter assists for shower transfers), Active : No     HOME SUPPORT: Patient lived with her daughter and grandson who assist primarily with IADLs.    Occupational Therapy Goals:  Initiated 1/7/2025  1.  Patient will perform grooming standing at sink with Modified [place] within 7 day(s).  2.  Patient will perform lower body dressing using AD PRN with Minimal Assist within 7 day(s).  3.  Patient will perform bathing with Minimal Assist within 7 day(s).  4.  Patient will perform toilet transfers with Modified [place]  within 7 day(s).  5.  Patient will perform all aspects of toileting with Modified [place] within 7 day(s).  6.  Patient will participate in upper extremity therapeutic exercise/activities with [place] for 10 minutes within 7 day(s).    7.  Patient will utilize energy conservation techniques during functional activities with verbal cues within 7 day(s).  Outcome: Progressing   OCCUPATIONAL THERAPY TREATMENT  Patient: Elba Donald (89 y.o. female)  Date: 1/9/2025  Primary Diagnosis: Acute pulmonary edema [J81.0]  CA (acute kidney injury) (HCC) [N17.9]  Elevated brain natriuretic peptide (BNP) level [R79.89]  Altered mental status, unspecified altered mental status type [R41.82]  Acute metabolic encephalopathy [G93.41]       Precautions: Fall Risk                Chart, occupational therapy assessment, plan of care, and goals were

## 2025-01-09 NOTE — PROGRESS NOTES
Hospitalist Progress Note  Nahid Betancourt MD  Answering service: [phone]        Date of Service:  2025  NAME:  Elba Donald  :  1935  MRN:  [id number]      Admission Summary:   Elba Donald is a 89 y.o. female with arthritis, chronic pain, CKD Stage 3, depression, T2DM, obesity, HTN, HLD, h/o TIA and stroke, dementia who was BIBEMS with worsening confusion. EMS noted hypoxia and applied 4L O2 NC. Pt was diagnosed with bilateral PNA per CXR at Urgent Care on 24 and prescribed Augmentin and azithromycin; she is still taking Augmentin. Pt is c/o SOB, cough, weakness, leg swelling. She is confused. No family is at bedside. She is currently on O2 NC.        Interval history / Subjective:   Patient seen and examined.  Still on nasal cannula, in no acute respiratory distress, very pleasant.  Grandson at the bedside.  He reports patient coughing earlier while drinking water.  Discussed aspiration precautions: Patient to sit up straight, chin down, avoid straws.  Mentation back to baseline.       Assessment & Plan:          #Acute metabolic encephalopathy / resolved   #chronic dementia  - possibly multifactorial due to PNA , hypoxia, hyponatremia, CA  - ammonia,TSH, B12 folate within limits RPR negative   - CT head: No acute process  -monitor mental status   -continue home meds for dementia     Oropharyngeal dysphagia  - failed bedside swallow per ED RN;  - Speech consulted: Started on a dysphagia diet: Easy to chew/soft;   -Aspiration precautions;    Esophageal dysphagia   -Chronic;   -GI consulted: Likely multifactorial due to GERD, stricture/web, achalasia, ineffective esophageal motility, etc.  -Recommend: PPI for 8 weeks;  -Outpatient follow-up in clinic in 2 months, EGD as outpatient if indicated;    Bilateral CAP  Acute hypoxic respiratory failure  - SpO2 86% on RA and placed on 2L O2  mg  20 mg Oral Daily    Vitamin D (CHOLECALCIFEROL) tablet 2,000 Units  2,000 Units Oral Daily    glucose chewable tablet 16 g  4 tablet Oral PRN    dextrose bolus 10% 125 mL  125 mL IntraVENous PRN    Or    dextrose bolus 10% 250 mL  250 mL IntraVENous PRN    glucagon injection 1 mg  1 mg SubCUTAneous PRN    dextrose 10 % infusion   IntraVENous Continuous PRN     ______________________________________________________________________  EXPECTED LENGTH OF STAY: 4  ACTUAL LENGTH OF STAY:          3                 Nahid Betancourt MD

## 2025-01-09 NOTE — PROGRESS NOTES
Clinical Pharmacy Note: Re: IV to PO Automatic Conversion - Antibiotic    Please note: Elba ALVARADO Washington’s medication(s) (doxycycline) has/have been changed from IV to PO based on the following criteria:    The patient:  Received IV therapy for at least 24 hours   Is tolerating diet more advanced than clear liquids  Is tolerating oral medications  Is not on vasopressor blood pressure support (i.e. no signs of shock)      This IV to PO conversion is based on the P&T approved automatic conversion policy for eligible patients.  Please call with questions.

## 2025-01-09 NOTE — PROGRESS NOTES
[facility]  [address]  [place] [address] [phone]                     GI PROGRESS NOTE    Patient Name: Elba Donald      : 1935      MRN: [id number]  Admit Date: 2025  Today's Date: 2025  CC: follow up    Subjective:     Tolerating diet. No BM but thinks she might have one today.      Objective:     Blood pressure 119/73, pulse 70, temperature 98.1 °F (36.7 °C), temperature source Oral, resp. rate 18, height 1.702 m (5' 7\"), weight 93.5 kg (206 lb 1.6 oz), SpO2 95%.    Physical Exam:  General appearance: cooperative, no distress, appears stated age, elderly female  HEENT: Sclerae anicteric. Extra-occular muscles are intact.   Cardiovascular: Regular rate and rhythm. No murmurs, gallops, or rubs.   Respiratory: Comfortable breathing with no accessory muscle use.   GI: Abdomen nondistended, soft, and nontender.   Rectal: Deferred    Neurological: Patient is alert   Psychiatric: Mood appears appropriate with good judgement.  No anxiety or agitation.      Data Review:    Recent Results (from the past 24 hour(s))   Echo (TTE) complete (PRN contrast/bubble/strain/3D)    Collection Time: 25 10:38 AM   Result Value Ref Range    IVSd 1.1 (A) 0.6 - 0.9 cm    LVIDd 3.9 3.9 - 5.3 cm    LVIDs 2.7 cm    LVOT Diameter 2.4 cm    LVPWd 1.3 (A) 0.6 - 0.9 cm    LVOT Peak Gradient 6 mmHg    LVOT Mean Gradient 3 mmHg    LVOT .0 ml    LVOT Peak Velocity 1.2 m/s    LVOT VTI 25.0 cm    RVSP 23 mmHg    LA Diameter 3.8 cm    Est. RA Pressure 3 mmHg    AV Area by Peak Velocity 1.5 cm2    AV Area by VTI 1.5 cm2    AV Peak Gradient 50 mmHg    AV Mean Gradient 28 mmHg    AV Peak Velocity 3.6 m/s    AV Mean Velocity 2.5 m/s    AV VTI 75.3 cm    MV A Velocity 0.96 m/s    MV E Wave Deceleration Time 191.7 ms    MV E Velocity 1.20 m/s    LV E' Lateral Velocity 9.94 cm/s    MV PHT 55.6 ms    MV Area by PHT 4.0 cm2    MV Area by VTI 3.0 cm2    MV Peak Gradient 6 mmHg    MV  Patient: 15 MINUTES    Silverio Frye PA-C  01/09/25  10:25 AM

## 2025-01-10 VITALS
OXYGEN SATURATION: 100 % | WEIGHT: 206.1 LBS | DIASTOLIC BLOOD PRESSURE: 61 MMHG | RESPIRATION RATE: 18 BRPM | SYSTOLIC BLOOD PRESSURE: 126 MMHG | HEIGHT: 67 IN | BODY MASS INDEX: 32.35 KG/M2 | TEMPERATURE: 97.9 F | HEART RATE: 70 BPM

## 2025-01-10 LAB
ANION GAP SERPL CALC-SCNC: 5 MMOL/L (ref 2–12)
BASOPHILS # BLD: 0.04 K/UL (ref 0–0.1)
BASOPHILS NFR BLD: 0.5 % (ref 0–1)
BUN SERPL-MCNC: 22 MG/DL (ref 6–20)
BUN/CREAT SERPL: 19 (ref 12–20)
CALCIUM SERPL-MCNC: 9.4 MG/DL (ref 8.5–10.1)
CHLORIDE SERPL-SCNC: 100 MMOL/L (ref 97–108)
CO2 SERPL-SCNC: 32 MMOL/L (ref 21–32)
CREAT SERPL-MCNC: 1.14 MG/DL (ref 0.55–1.02)
DIFFERENTIAL METHOD BLD: ABNORMAL
EOSINOPHIL # BLD: 0.31 K/UL (ref 0–0.4)
EOSINOPHIL NFR BLD: 3.7 % (ref 0–7)
ERYTHROCYTE [DISTWIDTH] IN BLOOD BY AUTOMATED COUNT: 14.5 % (ref 11.5–14.5)
GLUCOSE BLD STRIP.AUTO-MCNC: 133 MG/DL (ref 65–117)
GLUCOSE BLD STRIP.AUTO-MCNC: 173 MG/DL (ref 65–117)
GLUCOSE SERPL-MCNC: 151 MG/DL (ref 65–100)
HCT VFR BLD AUTO: 37.7 % (ref 35–47)
HGB BLD-MCNC: 12 G/DL (ref 11.5–16)
IMM GRANULOCYTES # BLD AUTO: 0.02 K/UL (ref 0–0.04)
IMM GRANULOCYTES NFR BLD AUTO: 0.2 % (ref 0–0.5)
LYMPHOCYTES # BLD: 2.31 K/UL (ref 0.8–3.5)
LYMPHOCYTES NFR BLD: 27.3 % (ref 12–49)
MAGNESIUM SERPL-MCNC: 1.5 MG/DL (ref 1.6–2.4)
MCH RBC QN AUTO: 30 PG (ref 26–34)
MCHC RBC AUTO-ENTMCNC: 31.8 G/DL (ref 30–36.5)
MCV RBC AUTO: 94.3 FL (ref 80–99)
MONOCYTES # BLD: 0.89 K/UL (ref 0–1)
MONOCYTES NFR BLD: 10.5 % (ref 5–13)
NEUTS SEG # BLD: 4.88 K/UL (ref 1.8–8)
NEUTS SEG NFR BLD: 57.8 % (ref 32–75)
NRBC # BLD: 0 K/UL (ref 0–0.01)
NRBC BLD-RTO: 0 PER 100 WBC
PHOSPHATE SERPL-MCNC: 2.6 MG/DL (ref 2.6–4.7)
PLATELET # BLD AUTO: 143 K/UL (ref 150–400)
PMV BLD AUTO: 12.1 FL (ref 8.9–12.9)
POTASSIUM SERPL-SCNC: 4.5 MMOL/L (ref 3.5–5.1)
RBC # BLD AUTO: 4 M/UL (ref 3.8–5.2)
SERVICE CMNT-IMP: ABNORMAL
SERVICE CMNT-IMP: ABNORMAL
SODIUM SERPL-SCNC: 137 MMOL/L (ref 136–145)
WBC # BLD AUTO: 8.5 K/UL (ref 3.6–11)

## 2025-01-10 PROCEDURE — 94760 N-INVAS EAR/PLS OXIMETRY 1: CPT

## 2025-01-10 PROCEDURE — 83735 ASSAY OF MAGNESIUM: CPT

## 2025-01-10 PROCEDURE — 6360000002 HC RX W HCPCS

## 2025-01-10 PROCEDURE — 80048 BASIC METABOLIC PNL TOTAL CA: CPT

## 2025-01-10 PROCEDURE — 6370000000 HC RX 637 (ALT 250 FOR IP): Performed by: INTERNAL MEDICINE

## 2025-01-10 PROCEDURE — 82962 GLUCOSE BLOOD TEST: CPT

## 2025-01-10 PROCEDURE — 85025 COMPLETE CBC W/AUTO DIFF WBC: CPT

## 2025-01-10 PROCEDURE — 6370000000 HC RX 637 (ALT 250 FOR IP): Performed by: STUDENT IN AN ORGANIZED HEALTH CARE EDUCATION/TRAINING PROGRAM

## 2025-01-10 PROCEDURE — 2500000003 HC RX 250 WO HCPCS: Performed by: INTERNAL MEDICINE

## 2025-01-10 PROCEDURE — 84100 ASSAY OF PHOSPHORUS: CPT

## 2025-01-10 PROCEDURE — 2700000000 HC OXYGEN THERAPY PER DAY

## 2025-01-10 RX ADMIN — DOXYCYCLINE HYCLATE 100 MG: 100 TABLET, COATED ORAL at 10:01

## 2025-01-10 RX ADMIN — HEPARIN SODIUM 5000 UNITS: 5000 INJECTION INTRAVENOUS; SUBCUTANEOUS at 06:37

## 2025-01-10 RX ADMIN — AMLODIPINE BESYLATE 5 MG: 5 TABLET ORAL at 10:01

## 2025-01-10 RX ADMIN — ATORVASTATIN CALCIUM 20 MG: 20 TABLET, FILM COATED ORAL at 10:01

## 2025-01-10 RX ADMIN — METOPROLOL TARTRATE 100 MG: 50 TABLET, FILM COATED ORAL at 10:01

## 2025-01-10 RX ADMIN — MICONAZOLE NITRATE: 2 OINTMENT TOPICAL at 10:03

## 2025-01-10 RX ADMIN — ASPIRIN 81 MG CHEWABLE TABLET 81 MG: 81 TABLET CHEWABLE at 10:01

## 2025-01-10 RX ADMIN — PANTOPRAZOLE SODIUM 40 MG: 40 TABLET, DELAYED RELEASE ORAL at 06:37

## 2025-01-10 RX ADMIN — FERROUS SULFATE TAB 325 MG (65 MG ELEMENTAL FE) 325 MG: 325 (65 FE) TAB at 10:01

## 2025-01-10 RX ADMIN — Medication 1 DROP: at 10:03

## 2025-01-10 RX ADMIN — MEMANTINE 5 MG: 5 TABLET ORAL at 10:01

## 2025-01-10 RX ADMIN — Medication 2000 UNITS: at 10:01

## 2025-01-10 RX ADMIN — SODIUM CHLORIDE, PRESERVATIVE FREE 10 ML: 5 INJECTION INTRAVENOUS at 10:02

## 2025-01-10 NOTE — PLAN OF CARE
Problem: Chronic Conditions and Co-morbidities  Goal: Patient's chronic conditions and co-morbidity symptoms are monitored and maintained or improved  1/10/2025 1206 by Shelly Castaneda RN  Outcome: Progressing  Flowsheets (Taken 1/10/2025 0959)  Care Plan - Patient's Chronic Conditions and Co-Morbidity Symptoms are Monitored and Maintained or Improved: Monitor and assess patient's chronic conditions and comorbid symptoms for stability, deterioration, or improvement  1/9/2025 2209 by Hermelinda Piedra RN  Outcome: Progressing  Flowsheets (Taken 1/9/2025 2042)  Care Plan - Patient's Chronic Conditions and Co-Morbidity Symptoms are Monitored and Maintained or Improved: Monitor and assess patient's chronic conditions and comorbid symptoms for stability, deterioration, or improvement     Problem: Discharge Planning  Goal: Discharge to home or other facility with appropriate resources  1/10/2025 1206 by Shelly Castaneda RN  Outcome: Progressing  Flowsheets (Taken 1/10/2025 0959)  Discharge to home or other facility with appropriate resources: Identify barriers to discharge with patient and caregiver  1/9/2025 2209 by Hermelinda Piedra RN  Outcome: Progressing  Flowsheets (Taken 1/9/2025 2042)  Discharge to home or other facility with appropriate resources:   Identify barriers to discharge with patient and caregiver   Identify discharge learning needs (meds, wound care, etc)     Problem: Safety - Adult  Goal: Free from fall injury  1/10/2025 1206 by Shelly Castaneda RN  Outcome: Progressing  Flowsheets (Taken 1/10/2025 1204)  Free From Fall Injury: Instruct family/caregiver on patient safety  1/9/2025 2209 by Hermelinda Piedra RN  Outcome: Progressing  Flowsheets  Taken 1/9/2025 2042 by Hermelinda Piedra RN  Free From Fall Injury: Instruct family/caregiver on patient safety  Taken 1/9/2025 1922 by Jeanine Damian, RN  Free From Fall Injury: Instruct family/caregiver on patient safety     Problem: Skin/Tissue  Integrity  Goal: Absence of new skin breakdown  Description: 1.  Monitor for areas of redness and/or skin breakdown  2.  Assess vascular access sites hourly  3.  Every 4-6 hours minimum:  Change oxygen saturation probe site  4.  Every 4-6 hours:  If on nasal continuous positive airway pressure, respiratory therapy assess nares and determine need for appliance change or resting period.  Outcome: Progressing     Problem: ABCDS Injury Assessment  Goal: Absence of physical injury  Outcome: Progressing

## 2025-01-10 NOTE — PLAN OF CARE
Problem: Chronic Conditions and Co-morbidities  Goal: Patient's chronic conditions and co-morbidity symptoms are monitored and maintained or improved  1/10/2025 1730 by Shelly Castaneda RN  Outcome: Adequate for Discharge  1/10/2025 1206 by Shelly Castaneda RN  Outcome: Progressing  Flowsheets (Taken 1/10/2025 0959)  Care Plan - Patient's Chronic Conditions and Co-Morbidity Symptoms are Monitored and Maintained or Improved: Monitor and assess patient's chronic conditions and comorbid symptoms for stability, deterioration, or improvement     Problem: Discharge Planning  Goal: Discharge to home or other facility with appropriate resources  1/10/2025 1730 by Shelly Castaneda RN  Outcome: Adequate for Discharge  1/10/2025 1206 by Shelly Castaneda RN  Outcome: Progressing  Flowsheets (Taken 1/10/2025 0959)  Discharge to home or other facility with appropriate resources: Identify barriers to discharge with patient and caregiver     Problem: Safety - Adult  Goal: Free from fall injury  1/10/2025 1730 by Shelly Castaneda RN  Outcome: Adequate for Discharge  1/10/2025 1206 by Shelly Castaneda RN  Outcome: Progressing  Flowsheets (Taken 1/10/2025 1204)  Free From Fall Injury: Instruct family/caregiver on patient safety     Problem: Skin/Tissue Integrity  Goal: Absence of new skin breakdown  Description: 1.  Monitor for areas of redness and/or skin breakdown  2.  Assess vascular access sites hourly  3.  Every 4-6 hours minimum:  Change oxygen saturation probe site  4.  Every 4-6 hours:  If on nasal continuous positive airway pressure, respiratory therapy assess nares and determine need for appliance change or resting period.  1/10/2025 1730 by Shelly Castaneda RN  Outcome: Adequate for Discharge  1/10/2025 1206 by Shelly Castaneda RN  Outcome: Progressing     Problem: ABCDS Injury Assessment  Goal: Absence of physical injury  1/10/2025 1730 by Shelly Castaneda RN  Outcome: Adequate for  Discharge  1/10/2025 1206 by Shelly Castaneda, RN  Outcome: Progressing

## 2025-01-10 NOTE — CARE COORDINATION
Transition of Care Plan:    RUR: 15%  Prior Level of Functioning: lives with her daughter and grandson  Disposition: patient will return back to her daughter's home with Home health, has had [facility] in the past, would like them again (referral sent through [facility])  DRAGAN: today  If SNF or IPR: Date FOC offered: NA  Date FOC received: NA  Accepting facility: NA  Date authorization started with reference number: NA  Date authorization received and expires: NA  Follow up appointments: TBD  DME needed: owns needed equipment per PT/OT  Transportation at discharge: her daughter Roma  IM/IMM Medicare/ letter given: 1/6/25  Is patient a  and connected with [facility]? NA              If yes, was [place] transfer form completed and [facility] notified? NA  Caregiver Contact: Roma Washington [phone]  Discharge Caregiver contacted prior to discharge? CM attempted to call patient's daughter, could not get through, no voicemail option  Care Conference needed? no  Barriers to discharge:  medical stability       [facility] accepted pt and will provide PT/OT.     [facility] unable to accept due to staffing shortage.    CM to notify Roma, daughter of pt, in regards to agency that was able to accept.    AVS updated.    CM will continue to follow.    Allyson Laam RN BSN CM    [facility]

## 2025-01-10 NOTE — PLAN OF CARE
Problem: Safety - Adult  Goal: Free from fall injury  Recent Flowsheet Documentation  Taken 1/9/2025 1922 by Jeanine Damian, RN  Free From Fall Injury: Instruct family/caregiver on patient safety     Problem: Discharge Planning  Goal: Discharge to home or other facility with appropriate resources  Outcome: Progressing  Flowsheets (Taken 1/9/2025 0800)  Discharge to home or other facility with appropriate resources: Identify barriers to discharge with patient and caregiver

## 2025-01-11 LAB
BACTERIA SPEC CULT: NORMAL
BACTERIA SPEC CULT: NORMAL
SERVICE CMNT-IMP: NORMAL
SERVICE CMNT-IMP: NORMAL

## 2025-01-13 ENCOUNTER — TELEPHONE (OUTPATIENT)
Age: 89
End: 2025-01-13

## 2025-01-13 NOTE — TELEPHONE ENCOUNTER
Care Transitions Initial Follow Up Call    Outreach made within 2 business days of discharge: Yes    Patient: Elba Donald Patient : 1935   MRN: [id number]  Reason for Admission: AMS  Discharge Date: 1/10/25       Spoke with: unable to reach patient     Discharge department/facility: [facility] Interactive Patient Contact:  Was patient able to fill all prescriptions: Yes  Was patient instructed to bring all medications to the follow-up visit: Yes  Is patient taking all medications as directed in the discharge summary? Yes  Does patient understand their discharge instructions: Yes  Does patient have questions or concerns that need addressed prior to 7-14 day follow up office visit: no    Additional needs identified to be addressed with provider  No needs identified             Scheduled appointment with PCP within 7-14 days    Follow Up  Future Appointments   Date Time Provider Department Center   2025  9:50 AM Michael Smith MD RDE [facility] LPN

## 2025-01-14 ENCOUNTER — TELEPHONE (OUTPATIENT)
Age: 89
End: 2025-01-14

## 2025-01-24 ENCOUNTER — OFFICE VISIT (OUTPATIENT)
Age: 89
End: 2025-01-24
Payer: MEDICARE

## 2025-01-24 VITALS
HEIGHT: 67 IN | HEART RATE: 52 BPM | RESPIRATION RATE: 18 BRPM | OXYGEN SATURATION: 92 % | BODY MASS INDEX: 32.28 KG/M2 | SYSTOLIC BLOOD PRESSURE: 124 MMHG | DIASTOLIC BLOOD PRESSURE: 66 MMHG

## 2025-01-24 DIAGNOSIS — E11.21 TYPE 2 DIABETES WITH NEPHROPATHY (HCC): ICD-10-CM

## 2025-01-24 DIAGNOSIS — Z74.09 MOBILITY IMPAIRED: ICD-10-CM

## 2025-01-24 DIAGNOSIS — R26.9 ABNORMAL GAIT: ICD-10-CM

## 2025-01-24 DIAGNOSIS — I10 ESSENTIAL HYPERTENSION: ICD-10-CM

## 2025-01-24 DIAGNOSIS — Z09 HOSPITAL DISCHARGE FOLLOW-UP: Primary | ICD-10-CM

## 2025-01-24 DIAGNOSIS — R13.10 DYSPHAGIA, UNSPECIFIED TYPE: ICD-10-CM

## 2025-01-24 PROCEDURE — 99214 OFFICE O/P EST MOD 30 MIN: CPT | Performed by: INTERNAL MEDICINE

## 2025-01-24 RX ORDER — PANTOPRAZOLE SODIUM 20 MG/1
20 TABLET, DELAYED RELEASE ORAL 2 TIMES DAILY
Qty: 120 TABLET | Refills: 0 | Status: SHIPPED | OUTPATIENT
Start: 2025-01-24

## 2025-01-24 ASSESSMENT — PATIENT HEALTH QUESTIONNAIRE - PHQ9
SUM OF ALL RESPONSES TO PHQ9 QUESTIONS 1 & 2: 0
SUM OF ALL RESPONSES TO PHQ QUESTIONS 1-9: 0
10. IF YOU CHECKED OFF ANY PROBLEMS, HOW DIFFICULT HAVE THESE PROBLEMS MADE IT FOR YOU TO DO YOUR WORK, TAKE CARE OF THINGS AT HOME, OR GET ALONG WITH OTHER PEOPLE: NOT DIFFICULT AT ALL
3. TROUBLE FALLING OR STAYING ASLEEP: NOT AT ALL
SUM OF ALL RESPONSES TO PHQ QUESTIONS 1-9: 0
7. TROUBLE CONCENTRATING ON THINGS, SUCH AS READING THE NEWSPAPER OR WATCHING TELEVISION: NOT AT ALL
9. THOUGHTS THAT YOU WOULD BE BETTER OFF DEAD, OR OF HURTING YOURSELF: NOT AT ALL
SUM OF ALL RESPONSES TO PHQ QUESTIONS 1-9: 0
1. LITTLE INTEREST OR PLEASURE IN DOING THINGS: NOT AT ALL
SUM OF ALL RESPONSES TO PHQ QUESTIONS 1-9: 0
8. MOVING OR SPEAKING SO SLOWLY THAT OTHER PEOPLE COULD HAVE NOTICED. OR THE OPPOSITE, BEING SO FIGETY OR RESTLESS THAT YOU HAVE BEEN MOVING AROUND A LOT MORE THAN USUAL: NOT AT ALL
5. POOR APPETITE OR OVEREATING: NOT AT ALL
4. FEELING TIRED OR HAVING LITTLE ENERGY: NOT AT ALL
2. FEELING DOWN, DEPRESSED OR HOPELESS: NOT AT ALL
6. FEELING BAD ABOUT YOURSELF - OR THAT YOU ARE A FAILURE OR HAVE LET YOURSELF OR YOUR FAMILY DOWN: NOT AT ALL

## 2025-01-24 ASSESSMENT — ENCOUNTER SYMPTOMS: RESPIRATORY NEGATIVE: 1

## 2025-01-24 NOTE — PROGRESS NOTES
Chief Complaint   Patient presents with    Follow-Up from Hospital     \"Have you been to the ER, urgent care clinic since your last visit?  Hospitalized since your last visit?\"    1/6/2025 - 1/10/2025 (4 days)  [facility]  Acute metabolic encephalopathy     “Have you seen or consulted any other health care providers outside our system since your last visit?”    NO             
sufficient amount for indicated testing frequency plus additional to accommodate PRN testing needs. 200 strip 1    donepezil (ARICEPT) 5 MG tablet Take 1 tablet by mouth nightly 90 tablet 1    memantine (NAMENDA) 5 MG tablet Take 1 tablet by mouth 2 times daily 180 tablet 1    Ferrous Sulfate (IRON) 325 (65 Fe) MG TABS TAKE 1 TABLET BY MOUTH ONCE DAILY IN THE MORNING BEFORE BREAKFAST 90 tablet 0    CALCIUM CITRATE-VITAMIN D3 PO Take 2 tablets by mouth 2 times daily      acetaminophen (TYLENOL) 650 MG extended release tablet Take 500 mg by mouth 2 times daily as needed for Pain      aspirin 81 MG chewable tablet Take 1 tablet by mouth daily      cholestyramine (QUESTRAN) 4 GM/DOSE powder Take 1 packet by mouth as needed (as needed with dinner)      guaiFENesin (MUCINEX) 600 MG extended release tablet Take 1 tablet by mouth 2 times daily as needed      loperamide (IMODIUM) 2 MG capsule Take 1 capsule by mouth as needed      traMADol (ULTRAM) 50 MG tablet Take 1 tablet by mouth every 6 hours as needed for Pain (moderate pain).       No current facility-administered medications on file prior to visit.           Review of Systems   Constitutional:  Positive for fatigue.   Respiratory: Negative.     Cardiovascular: Negative.    Gastrointestinal:         Loose stool    Musculoskeletal:  Positive for gait problem.   Neurological:  Positive for weakness.   Psychiatric/Behavioral:  The patient is nervous/anxious.          Objective    Physical Exam   Physical Exam  Patient is in no acute distress.  Lungs are clear to auscultation. No signs of respiratory distress.  S1, S2 heard. Heart has a regular rate and rhythm.  Abdomen is soft.  No ankle swelling.  Patient is in wheelchair.    Vital Signs  Blood pressure is 124/66. Pulse is 52. Oxygen saturation is 87.  Oxygen resolved on room air to 92% before patient left     Assessment & Plan    Assessment & Plan  1. Post-hospitalization follow-up.  Her blood pressure readings are

## 2025-01-27 ENCOUNTER — TELEPHONE (OUTPATIENT)
Age: 89
End: 2025-01-27

## 2025-01-31 ENCOUNTER — TELEMEDICINE (OUTPATIENT)
Age: 89
End: 2025-01-31
Payer: MEDICARE

## 2025-01-31 DIAGNOSIS — F41.9 ANXIETY: ICD-10-CM

## 2025-01-31 DIAGNOSIS — N18.31 TYPE 2 DIABETES MELLITUS WITH STAGE 3A CHRONIC KIDNEY DISEASE, WITHOUT LONG-TERM CURRENT USE OF INSULIN (HCC): Primary | ICD-10-CM

## 2025-01-31 DIAGNOSIS — E78.2 MIXED HYPERLIPIDEMIA: ICD-10-CM

## 2025-01-31 DIAGNOSIS — E11.22 TYPE 2 DIABETES MELLITUS WITH STAGE 3A CHRONIC KIDNEY DISEASE, WITHOUT LONG-TERM CURRENT USE OF INSULIN (HCC): Primary | ICD-10-CM

## 2025-01-31 PROCEDURE — 99214 OFFICE O/P EST MOD 30 MIN: CPT | Performed by: GENERAL ACUTE CARE HOSPITAL

## 2025-01-31 PROCEDURE — 1160F RVW MEDS BY RX/DR IN RCRD: CPT | Performed by: GENERAL ACUTE CARE HOSPITAL

## 2025-01-31 PROCEDURE — G2211 COMPLEX E/M VISIT ADD ON: HCPCS | Performed by: GENERAL ACUTE CARE HOSPITAL

## 2025-01-31 PROCEDURE — 1159F MED LIST DOCD IN RCRD: CPT | Performed by: GENERAL ACUTE CARE HOSPITAL

## 2025-01-31 PROCEDURE — 1123F ACP DISCUSS/DSCN MKR DOCD: CPT | Performed by: GENERAL ACUTE CARE HOSPITAL

## 2025-01-31 PROCEDURE — 3051F HG A1C>EQUAL 7.0%<8.0%: CPT | Performed by: GENERAL ACUTE CARE HOSPITAL

## 2025-01-31 RX ORDER — GLIMEPIRIDE 1 MG/1
1 TABLET ORAL
Qty: 90 TABLET | Refills: 3 | Status: SHIPPED | OUTPATIENT
Start: 2025-01-31

## 2025-01-31 RX ORDER — SITAGLIPTIN 25 MG/1
TABLET, FILM COATED ORAL
Qty: 90 TABLET | Refills: 3 | Status: SHIPPED | OUTPATIENT
Start: 2025-01-31

## 2025-01-31 NOTE — PROGRESS NOTES
[facility]  DR MICHAEL SMITH     The patient (or guardian, if applicable) and other individuals in attendance with the patient were advised that Artificial Intelligence will be utilized during this visit to record, process the conversation to generate a clinical note, and support improvement of the AI technology. The patient (or guardian, if applicable) and other individuals in attendance at the appointment consented to the use of AI, including the recording.      Elba Donald was evaluated through a synchronous (real-time) audio-video encounter. The patient (or guardian if applicable) is aware that this is a billable service, which includes applicable co-pays. This Virtual Visit was conducted with patient's (and/or legal guardian's) consent. Patient identification was verified, and a caregiver was present when appropriate.   The patient was located at [address] [facility] [id number]   Provider was located at [address] [facility] [address] [address]    --Michael Smith MD    An electronic signature was used to authenticate this note.     Elba Donald is a 89 y.o. female  has a past medical history of Arthritis, Chronic pain, Depression, Diabetes (HCC), Hypercholesterolemia, Hypertension, Stroke (HCC), and Stroke (HCC).       ASSESSMENT AND PLAN:     Type 2 diabetes Mellitus, suboptimal   Complications: CKD 3b  Discussed the details of diabetes mellitus including pathophysiology and diabetes care and importance of achieving target blood sugar numbers for a goal a1c of less than 8% given age and fraility    - A1c increased from 7.0 in October to 7.4 two weeks ago, potentially due to recent illness  - Blood sugar goal: Less than 8%  - Advise incorporating more protein into diet to manage blood sugar levels and sustain energy  - Take glimepiride and Januvia once daily in the morning  - Repeat A1c test scheduled for the last week of April or the first

## 2025-02-01 DIAGNOSIS — D64.9 ANEMIA, UNSPECIFIED TYPE: ICD-10-CM

## 2025-02-03 RX ORDER — PNV NO.95/FERROUS FUM/FOLIC AC 28MG-0.8MG
TABLET ORAL
Qty: 90 TABLET | Refills: 0 | Status: SHIPPED | OUTPATIENT
Start: 2025-02-03

## 2025-02-08 DIAGNOSIS — E78.2 MIXED HYPERLIPIDEMIA: ICD-10-CM

## 2025-02-10 RX ORDER — SIMVASTATIN 40 MG
40 TABLET ORAL NIGHTLY
Qty: 90 TABLET | Refills: 0 | Status: SHIPPED | OUTPATIENT
Start: 2025-02-10

## 2025-03-16 DIAGNOSIS — R44.0 AUDITORY HALLUCINATIONS: ICD-10-CM

## 2025-03-16 DIAGNOSIS — R13.10 DYSPHAGIA, UNSPECIFIED TYPE: ICD-10-CM

## 2025-03-16 DIAGNOSIS — R41.3 SHORT-TERM MEMORY LOSS: ICD-10-CM

## 2025-03-16 DIAGNOSIS — F01.50 MIXED CORTICAL AND SUBCORTICAL VASCULAR DEMENTIA WITHOUT BEHAVIORAL DISTURBANCE (HCC): ICD-10-CM

## 2025-03-16 DIAGNOSIS — T88.7XXA MEDICATION SIDE EFFECTS: ICD-10-CM

## 2025-03-17 RX ORDER — PANTOPRAZOLE SODIUM 20 MG/1
20 TABLET, DELAYED RELEASE ORAL 2 TIMES DAILY
Qty: 120 TABLET | Refills: 0 | Status: SHIPPED | OUTPATIENT
Start: 2025-03-17

## 2025-03-18 RX ORDER — DONEPEZIL HYDROCHLORIDE 5 MG/1
5 TABLET, FILM COATED ORAL NIGHTLY
Qty: 90 TABLET | Refills: 0 | OUTPATIENT
Start: 2025-03-18

## 2025-03-18 RX ORDER — MEMANTINE HYDROCHLORIDE 5 MG/1
5 TABLET ORAL 2 TIMES DAILY
Qty: 180 TABLET | Refills: 0 | OUTPATIENT
Start: 2025-03-18

## 2025-03-19 RX ORDER — ERGOCALCIFEROL 1.25 MG/1
50000 CAPSULE, LIQUID FILLED ORAL WEEKLY
Qty: 12 CAPSULE | Refills: 0 | Status: SHIPPED | OUTPATIENT
Start: 2025-03-19

## 2025-03-24 DIAGNOSIS — F01.50 MIXED CORTICAL AND SUBCORTICAL VASCULAR DEMENTIA WITHOUT BEHAVIORAL DISTURBANCE (HCC): ICD-10-CM

## 2025-03-24 DIAGNOSIS — R41.3 SHORT-TERM MEMORY LOSS: ICD-10-CM

## 2025-03-24 DIAGNOSIS — T88.7XXA MEDICATION SIDE EFFECTS: ICD-10-CM

## 2025-03-24 DIAGNOSIS — R44.0 AUDITORY HALLUCINATIONS: ICD-10-CM

## 2025-03-26 RX ORDER — DONEPEZIL HYDROCHLORIDE 5 MG/1
5 TABLET, FILM COATED ORAL NIGHTLY
Qty: 90 TABLET | Refills: 0 | OUTPATIENT
Start: 2025-03-26

## 2025-03-27 DIAGNOSIS — T88.7XXA MEDICATION SIDE EFFECTS: ICD-10-CM

## 2025-03-27 DIAGNOSIS — R41.3 SHORT-TERM MEMORY LOSS: ICD-10-CM

## 2025-03-27 DIAGNOSIS — R44.0 AUDITORY HALLUCINATIONS: ICD-10-CM

## 2025-03-27 DIAGNOSIS — F01.50 MIXED CORTICAL AND SUBCORTICAL VASCULAR DEMENTIA WITHOUT BEHAVIORAL DISTURBANCE (HCC): ICD-10-CM

## 2025-03-28 RX ORDER — DONEPEZIL HYDROCHLORIDE 5 MG/1
5 TABLET, FILM COATED ORAL 2 TIMES DAILY
Qty: 180 TABLET | Refills: 0 | Status: SHIPPED | OUTPATIENT
Start: 2025-03-28

## 2025-03-28 NOTE — TELEPHONE ENCOUNTER
LOV: 09/13/24  Last refill: 09/13/24 with 1 refill  Next visit: 06/27/25    LOV states, \"I have also offered her to start Namenda 5 mg BID in a few weeks once her symptoms are better and the daughter would like to do that.\"

## 2025-03-30 DIAGNOSIS — I50.22 CHRONIC SYSTOLIC (CONGESTIVE) HEART FAILURE: ICD-10-CM

## 2025-03-30 DIAGNOSIS — F01.50 MIXED CORTICAL AND SUBCORTICAL VASCULAR DEMENTIA WITHOUT BEHAVIORAL DISTURBANCE (HCC): Primary | ICD-10-CM

## 2025-03-30 DIAGNOSIS — R41.3 SHORT-TERM MEMORY LOSS: ICD-10-CM

## 2025-03-30 RX ORDER — FUROSEMIDE 20 MG/1
20 TABLET ORAL DAILY
Qty: 90 TABLET | Refills: 0 | Status: SHIPPED | OUTPATIENT
Start: 2025-03-30

## 2025-03-31 DIAGNOSIS — I10 ESSENTIAL HYPERTENSION: ICD-10-CM

## 2025-03-31 RX ORDER — MEMANTINE HYDROCHLORIDE 5 MG/1
5 TABLET ORAL 2 TIMES DAILY
Qty: 180 TABLET | Refills: 0 | Status: SHIPPED | OUTPATIENT
Start: 2025-03-31

## 2025-03-31 RX ORDER — AMLODIPINE BESYLATE 5 MG/1
5 TABLET ORAL DAILY
Qty: 90 TABLET | Refills: 0 | Status: SHIPPED | OUTPATIENT
Start: 2025-03-31

## 2025-03-31 RX ORDER — DONEPEZIL HYDROCHLORIDE 5 MG/1
5 TABLET, FILM COATED ORAL NIGHTLY
Qty: 90 TABLET | Refills: 0 | Status: SHIPPED | OUTPATIENT
Start: 2025-03-31

## 2025-04-07 DIAGNOSIS — F01.50 MIXED CORTICAL AND SUBCORTICAL VASCULAR DEMENTIA WITHOUT BEHAVIORAL DISTURBANCE (HCC): ICD-10-CM

## 2025-04-07 RX ORDER — MEMANTINE HYDROCHLORIDE 5 MG/1
5 TABLET ORAL 2 TIMES DAILY
Qty: 180 TABLET | Refills: 0 | OUTPATIENT
Start: 2025-04-07

## 2025-04-07 NOTE — TELEPHONE ENCOUNTER
Patient's EC stated they were informed by pharmacy that there is an issue filling Rx for memantine (NAMENDA) 5 MG. Please contact pharmacy

## 2025-04-25 NOTE — TELEPHONE ENCOUNTER
Pt's daughter Roma LVM stating her mother needs OneTouch test strips sent to [facility]. Roma also stated she'd like a list of endocrinology practices for her mother. Read list provided by Dr Smith. Roma verbalized understanding.

## 2025-04-27 DIAGNOSIS — F32.0 MAJOR DEPRESSIVE DISORDER, SINGLE EPISODE, MILD: ICD-10-CM

## 2025-04-27 DIAGNOSIS — D64.9 ANEMIA, UNSPECIFIED TYPE: ICD-10-CM

## 2025-04-28 RX ORDER — BLOOD SUGAR DIAGNOSTIC
1 STRIP MISCELLANEOUS DAILY
Qty: 100 STRIP | Refills: 11 | Status: SHIPPED | OUTPATIENT
Start: 2025-04-28

## 2025-04-28 RX ORDER — PNV NO.95/FERROUS FUM/FOLIC AC 28MG-0.8MG
TABLET ORAL
Qty: 90 TABLET | Refills: 0 | Status: SHIPPED | OUTPATIENT
Start: 2025-04-28

## 2025-04-28 RX ORDER — PAROXETINE 20 MG/1
20 TABLET, FILM COATED ORAL NIGHTLY
Qty: 90 TABLET | Refills: 0 | Status: SHIPPED | OUTPATIENT
Start: 2025-04-28

## 2025-04-29 ENCOUNTER — TRANSCRIBE ORDERS (OUTPATIENT)
Facility: HOSPITAL | Age: 89
End: 2025-04-29

## 2025-04-29 DIAGNOSIS — R13.10 DYSPHAGIA, UNSPECIFIED TYPE: ICD-10-CM

## 2025-04-29 DIAGNOSIS — R13.12 DYSPHAGIA, OROPHARYNGEAL PHASE: Primary | ICD-10-CM

## 2025-05-20 DIAGNOSIS — R13.10 DYSPHAGIA, UNSPECIFIED TYPE: ICD-10-CM

## 2025-05-20 RX ORDER — PANTOPRAZOLE SODIUM 20 MG/1
20 TABLET, DELAYED RELEASE ORAL 2 TIMES DAILY
Qty: 120 TABLET | Refills: 0 | Status: SHIPPED | OUTPATIENT
Start: 2025-05-20

## 2025-05-24 DIAGNOSIS — R13.10 DYSPHAGIA, UNSPECIFIED TYPE: ICD-10-CM

## 2025-05-24 DIAGNOSIS — E78.2 MIXED HYPERLIPIDEMIA: ICD-10-CM

## 2025-05-24 DIAGNOSIS — F01.50 MIXED CORTICAL AND SUBCORTICAL VASCULAR DEMENTIA WITHOUT BEHAVIORAL DISTURBANCE (HCC): ICD-10-CM

## 2025-05-26 RX ORDER — ERGOCALCIFEROL 1.25 MG/1
50000 CAPSULE, LIQUID FILLED ORAL WEEKLY
Qty: 12 CAPSULE | Refills: 0 | Status: SHIPPED | OUTPATIENT
Start: 2025-05-26

## 2025-05-27 RX ORDER — MEMANTINE HYDROCHLORIDE 5 MG/1
5 TABLET ORAL 2 TIMES DAILY
Qty: 180 TABLET | Refills: 0 | OUTPATIENT
Start: 2025-05-27

## 2025-05-27 RX ORDER — SIMVASTATIN 40 MG
40 TABLET ORAL NIGHTLY
Qty: 90 TABLET | Refills: 0 | Status: SHIPPED | OUTPATIENT
Start: 2025-05-27

## 2025-05-27 RX ORDER — PANTOPRAZOLE SODIUM 20 MG/1
20 TABLET, DELAYED RELEASE ORAL 2 TIMES DAILY
Qty: 120 TABLET | Refills: 0 | OUTPATIENT
Start: 2025-05-27

## 2025-06-06 ENCOUNTER — OFFICE VISIT (OUTPATIENT)
Age: 89
End: 2025-06-06
Payer: MEDICARE

## 2025-06-06 VITALS
HEART RATE: 69 BPM | OXYGEN SATURATION: 94 % | HEIGHT: 67 IN | SYSTOLIC BLOOD PRESSURE: 108 MMHG | BODY MASS INDEX: 29.19 KG/M2 | DIASTOLIC BLOOD PRESSURE: 62 MMHG | TEMPERATURE: 98.1 F | WEIGHT: 186 LBS | RESPIRATION RATE: 16 BRPM

## 2025-06-06 DIAGNOSIS — I10 ESSENTIAL HYPERTENSION: ICD-10-CM

## 2025-06-06 DIAGNOSIS — E78.2 MIXED HYPERLIPIDEMIA: ICD-10-CM

## 2025-06-06 DIAGNOSIS — E11.21 TYPE 2 DIABETES WITH NEPHROPATHY (HCC): ICD-10-CM

## 2025-06-06 DIAGNOSIS — R13.10 DYSPHAGIA, UNSPECIFIED TYPE: ICD-10-CM

## 2025-06-06 DIAGNOSIS — Z00.00 MEDICARE ANNUAL WELLNESS VISIT, SUBSEQUENT: Primary | ICD-10-CM

## 2025-06-06 DIAGNOSIS — Z00.00 MEDICARE ANNUAL WELLNESS VISIT, SUBSEQUENT: ICD-10-CM

## 2025-06-06 DIAGNOSIS — R21 GROIN RASH: ICD-10-CM

## 2025-06-06 PROCEDURE — 1159F MED LIST DOCD IN RCRD: CPT | Performed by: INTERNAL MEDICINE

## 2025-06-06 PROCEDURE — 1126F AMNT PAIN NOTED NONE PRSNT: CPT | Performed by: INTERNAL MEDICINE

## 2025-06-06 PROCEDURE — 3051F HG A1C>EQUAL 7.0%<8.0%: CPT | Performed by: INTERNAL MEDICINE

## 2025-06-06 PROCEDURE — G0439 PPPS, SUBSEQ VISIT: HCPCS | Performed by: INTERNAL MEDICINE

## 2025-06-06 PROCEDURE — 1123F ACP DISCUSS/DSCN MKR DOCD: CPT | Performed by: INTERNAL MEDICINE

## 2025-06-06 PROCEDURE — 1160F RVW MEDS BY RX/DR IN RCRD: CPT | Performed by: INTERNAL MEDICINE

## 2025-06-06 RX ORDER — FUROSEMIDE 40 MG/1
40 TABLET ORAL 2 TIMES DAILY
COMMUNITY
Start: 2025-06-04

## 2025-06-06 RX ORDER — EMPAGLIFLOZIN 10 MG/1
10 TABLET, FILM COATED ORAL EVERY MORNING
COMMUNITY
Start: 2025-05-19

## 2025-06-06 RX ORDER — IPRATROPIUM BROMIDE 42 UG/1
SPRAY, METERED NASAL
COMMUNITY
Start: 2025-06-03

## 2025-06-06 RX ORDER — NYSTATIN 100000 U/G
CREAM TOPICAL
Qty: 1 EACH | Refills: 1 | Status: SHIPPED | OUTPATIENT
Start: 2025-06-06

## 2025-06-06 ASSESSMENT — PATIENT HEALTH QUESTIONNAIRE - PHQ9
9. THOUGHTS THAT YOU WOULD BE BETTER OFF DEAD, OR OF HURTING YOURSELF: NOT AT ALL
SUM OF ALL RESPONSES TO PHQ QUESTIONS 1-9: 3
8. MOVING OR SPEAKING SO SLOWLY THAT OTHER PEOPLE COULD HAVE NOTICED. OR THE OPPOSITE, BEING SO FIGETY OR RESTLESS THAT YOU HAVE BEEN MOVING AROUND A LOT MORE THAN USUAL: NOT AT ALL
SUM OF ALL RESPONSES TO PHQ QUESTIONS 1-9: 3
4. FEELING TIRED OR HAVING LITTLE ENERGY: SEVERAL DAYS
1. LITTLE INTEREST OR PLEASURE IN DOING THINGS: SEVERAL DAYS
7. TROUBLE CONCENTRATING ON THINGS, SUCH AS READING THE NEWSPAPER OR WATCHING TELEVISION: NOT AT ALL
3. TROUBLE FALLING OR STAYING ASLEEP: NOT AT ALL
5. POOR APPETITE OR OVEREATING: NOT AT ALL
10. IF YOU CHECKED OFF ANY PROBLEMS, HOW DIFFICULT HAVE THESE PROBLEMS MADE IT FOR YOU TO DO YOUR WORK, TAKE CARE OF THINGS AT HOME, OR GET ALONG WITH OTHER PEOPLE: SOMEWHAT DIFFICULT
SUM OF ALL RESPONSES TO PHQ QUESTIONS 1-9: 3
6. FEELING BAD ABOUT YOURSELF - OR THAT YOU ARE A FAILURE OR HAVE LET YOURSELF OR YOUR FAMILY DOWN: NOT AT ALL
SUM OF ALL RESPONSES TO PHQ QUESTIONS 1-9: 3
2. FEELING DOWN, DEPRESSED OR HOPELESS: SEVERAL DAYS

## 2025-06-06 NOTE — PROGRESS NOTES
Medicare Annual Wellness Visit    Elba Donald is here for Medicare AWV, Diabetes, Congestive Heart Failure, Hypertension, Dementia, Chronic Kidney Disease, and Cholesterol Problem    Assessment & Plan   Assessment & Plan  1. Medicare wellness visit.  - Blood pressure readings are within the normal range today at 108/62.  - Scheduled for lab work next week with cardiologist, Dr. Cerda.  - Lab orders will be provided to present during the upcoming cardiology appointment. If the tests align, no need for additional blood draws. If they differ, request both sets of labs be drawn concurrently.    2. Dysphagia.  - Experiencing dysphagia over the past 6 to 7 months.  - No current issues with swallowing observed; diet includes finely cut foods and varied textures.  - Barium swallow test will be ordered to assess for potential aspiration.   - Order will be placed, and the number to schedule the appointment will be provided.    3. Diabetes Mellitus.  - Last A1c was 7.4 in 01/2025, slightly elevated from previous 6.3 but controlled compared to 8.9 two years ago.  - Reports blood sugar around 134 this morning.  - No increase in thirst or excessive fluid intake; regular bathroom use noted.  - Advised to continue monitoring blood sugar levels and maintain current diet and medication regimen.    4. Yeast infection.  - Prescription refill for nystatin will be sent to pharmacy.  - No signs of open sores or redness in the vaginal area; maintaining cleanliness to prevent moisture buildup.    Medicare annual wellness visit, subsequent  -     Comprehensive Metabolic Panel; Future  Dysphagia, unspecified type  -     FL MODIFIED BARIUM SWALLOW W VIDEO; Future  Essential hypertension  Type 2 diabetes with nephropathy (HCC)  -     Hemoglobin A1C; Future  -     Comprehensive Metabolic Panel; Future  Mixed hyperlipidemia  -     Lipid Panel; Future  Groin rash  -     nystatin (MYCOSTATIN) 314084 UNIT/GM cream; Apply topically 2 times

## 2025-06-06 NOTE — PROGRESS NOTES
Chief Complaint   Patient presents with    Medicare AWV    Diabetes    Congestive Heart Failure    Hypertension    Dementia    Chronic Kidney Disease    Cholesterol Problem     Have you been to the ER, urgent care clinic since your last visit?  Hospitalized since your last visit?   NO    Have you seen or consulted any other health care providers outside our system since your last visit?   NO

## 2025-06-30 RX ORDER — ERGOCALCIFEROL 1.25 MG/1
50000 CAPSULE, LIQUID FILLED ORAL WEEKLY
Qty: 12 CAPSULE | Refills: 1 | Status: SHIPPED | OUTPATIENT
Start: 2025-06-30

## 2025-07-01 DIAGNOSIS — I10 ESSENTIAL HYPERTENSION: ICD-10-CM

## 2025-07-02 RX ORDER — AMLODIPINE BESYLATE 5 MG/1
5 TABLET ORAL DAILY
Qty: 90 TABLET | Refills: 0 | Status: SHIPPED | OUTPATIENT
Start: 2025-07-02

## 2025-07-11 ENCOUNTER — TELEPHONE (OUTPATIENT)
Age: 89
End: 2025-07-11

## 2025-07-11 NOTE — TELEPHONE ENCOUNTER
Candida Mahan is a primary care provider at [facility]. Not an endocrinologist. Called patients EC to clarify what she is wanting, she did not answer left a vm.

## 2025-07-19 DIAGNOSIS — R13.10 DYSPHAGIA, UNSPECIFIED TYPE: ICD-10-CM

## 2025-07-21 RX ORDER — PANTOPRAZOLE SODIUM 20 MG/1
20 TABLET, DELAYED RELEASE ORAL 2 TIMES DAILY
Qty: 120 TABLET | Refills: 0 | Status: SHIPPED | OUTPATIENT
Start: 2025-07-21

## 2025-07-22 ENCOUNTER — TELEPHONE (OUTPATIENT)
Age: 89
End: 2025-07-22

## 2025-07-22 DIAGNOSIS — F01.50 MIXED CORTICAL AND SUBCORTICAL VASCULAR DEMENTIA WITHOUT BEHAVIORAL DISTURBANCE (HCC): ICD-10-CM

## 2025-07-22 RX ORDER — MEMANTINE HYDROCHLORIDE 5 MG/1
5 TABLET ORAL 2 TIMES DAILY
Qty: 180 TABLET | Refills: 0 | Status: SHIPPED | OUTPATIENT
Start: 2025-07-22

## 2025-07-22 RX ORDER — DONEPEZIL HYDROCHLORIDE 5 MG/1
5 TABLET, FILM COATED ORAL NIGHTLY
Qty: 90 TABLET | Refills: 0 | Status: SHIPPED | OUTPATIENT
Start: 2025-07-22

## 2025-07-22 NOTE — TELEPHONE ENCOUNTER
Patient's daughter is calling to request a refill of her mother's donepezil and memantine to be filled at the [facility] on file.

## 2025-08-07 DIAGNOSIS — D64.9 ANEMIA, UNSPECIFIED TYPE: ICD-10-CM

## 2025-08-07 RX ORDER — PNV NO.95/FERROUS FUM/FOLIC AC 28MG-0.8MG
TABLET ORAL
Qty: 90 TABLET | Refills: 0 | Status: SHIPPED | OUTPATIENT
Start: 2025-08-07

## 2025-08-09 DIAGNOSIS — D64.9 ANEMIA, UNSPECIFIED TYPE: ICD-10-CM

## 2025-08-09 DIAGNOSIS — F32.0 MAJOR DEPRESSIVE DISORDER, SINGLE EPISODE, MILD: ICD-10-CM

## 2025-08-10 DIAGNOSIS — E78.2 MIXED HYPERLIPIDEMIA: ICD-10-CM

## 2025-08-10 DIAGNOSIS — F01.50 MIXED CORTICAL AND SUBCORTICAL VASCULAR DEMENTIA WITHOUT BEHAVIORAL DISTURBANCE (HCC): ICD-10-CM

## 2025-08-10 DIAGNOSIS — R13.10 DYSPHAGIA, UNSPECIFIED TYPE: ICD-10-CM

## 2025-08-11 DIAGNOSIS — F32.0 MAJOR DEPRESSIVE DISORDER, SINGLE EPISODE, MILD: ICD-10-CM

## 2025-08-11 RX ORDER — MEMANTINE HYDROCHLORIDE 5 MG/1
5 TABLET ORAL 2 TIMES DAILY
Qty: 180 TABLET | Refills: 0 | Status: SHIPPED | OUTPATIENT
Start: 2025-08-11

## 2025-08-11 RX ORDER — FERROUS SULFATE 325(65) MG
TABLET ORAL
Qty: 90 TABLET | Refills: 0 | OUTPATIENT
Start: 2025-08-11

## 2025-08-11 RX ORDER — PAROXETINE 20 MG/1
20 TABLET, FILM COATED ORAL NIGHTLY
Qty: 90 TABLET | Refills: 0 | Status: SHIPPED | OUTPATIENT
Start: 2025-08-11 | End: 2025-08-11 | Stop reason: SDUPTHER

## 2025-08-11 RX ORDER — PAROXETINE 20 MG/1
20 TABLET, FILM COATED ORAL NIGHTLY
Qty: 90 TABLET | Refills: 0 | Status: SHIPPED | OUTPATIENT
Start: 2025-08-11

## 2025-08-11 RX ORDER — DONEPEZIL HYDROCHLORIDE 5 MG/1
5 TABLET, FILM COATED ORAL NIGHTLY
Qty: 90 TABLET | Refills: 0 | Status: SHIPPED | OUTPATIENT
Start: 2025-08-11

## 2025-08-12 RX ORDER — PANTOPRAZOLE SODIUM 20 MG/1
20 TABLET, DELAYED RELEASE ORAL 2 TIMES DAILY
Qty: 120 TABLET | Refills: 0 | OUTPATIENT
Start: 2025-08-12

## 2025-08-12 RX ORDER — SIMVASTATIN 40 MG
40 TABLET ORAL NIGHTLY
Qty: 90 TABLET | Refills: 0 | Status: SHIPPED | OUTPATIENT
Start: 2025-08-12